# Patient Record
Sex: FEMALE | Race: WHITE | Employment: UNEMPLOYED | URBAN - METROPOLITAN AREA
[De-identification: names, ages, dates, MRNs, and addresses within clinical notes are randomized per-mention and may not be internally consistent; named-entity substitution may affect disease eponyms.]

---

## 2018-04-20 ENCOUNTER — APPOINTMENT (OUTPATIENT)
Dept: CT IMAGING | Age: 78
DRG: 149 | End: 2018-04-20
Payer: MEDICARE

## 2018-04-20 ENCOUNTER — HOSPITAL ENCOUNTER (INPATIENT)
Age: 78
LOS: 1 days | Discharge: HOME | DRG: 149 | End: 2018-04-23
Attending: STUDENT IN AN ORGANIZED HEALTH CARE EDUCATION/TRAINING PROGRAM | Admitting: INTERNAL MEDICINE
Payer: MEDICARE

## 2018-04-20 ENCOUNTER — APPOINTMENT (OUTPATIENT)
Dept: GENERAL RADIOLOGY | Age: 78
DRG: 149 | End: 2018-04-20
Payer: MEDICARE

## 2018-04-20 DIAGNOSIS — R11.2 INTRACTABLE VOMITING WITH NAUSEA, UNSPECIFIED VOMITING TYPE: ICD-10-CM

## 2018-04-20 DIAGNOSIS — H81.13 BENIGN PAROXYSMAL POSITIONAL VERTIGO DUE TO BILATERAL VESTIBULAR DISORDER: ICD-10-CM

## 2018-04-20 DIAGNOSIS — J18.9 PNEUMONIA OF RIGHT LOWER LOBE DUE TO INFECTIOUS ORGANISM: Primary | ICD-10-CM

## 2018-04-20 DIAGNOSIS — N18.9 CHRONIC KIDNEY DISEASE, UNSPECIFIED CKD STAGE: ICD-10-CM

## 2018-04-20 DIAGNOSIS — R63.8 POOR FLUID INTAKE: ICD-10-CM

## 2018-04-20 DIAGNOSIS — R42 DIZZINESS: ICD-10-CM

## 2018-04-20 LAB
ALBUMIN SERPL-MCNC: 4.4 G/DL (ref 3.9–4.9)
ALP BLD-CCNC: 79 U/L (ref 40–130)
ALT SERPL-CCNC: 13 U/L (ref 0–33)
ANION GAP SERPL CALCULATED.3IONS-SCNC: 17 MEQ/L (ref 7–13)
APTT: 25 SEC (ref 21.6–35.4)
AST SERPL-CCNC: 20 U/L (ref 0–35)
BASOPHILS ABSOLUTE: 0.1 K/UL (ref 0–0.2)
BASOPHILS RELATIVE PERCENT: 0.8 %
BILIRUB SERPL-MCNC: 0.3 MG/DL (ref 0–1.2)
BUN BLDV-MCNC: 16 MG/DL (ref 8–23)
C-REACTIVE PROTEIN, HIGH SENSITIVITY: 8.5 MG/L (ref 0–5)
CALCIUM SERPL-MCNC: 9.6 MG/DL (ref 8.6–10.2)
CHLORIDE BLD-SCNC: 99 MEQ/L (ref 98–107)
CO2: 22 MEQ/L (ref 22–29)
CREAT SERPL-MCNC: 0.69 MG/DL (ref 0.5–0.9)
EKG ATRIAL RATE: 78 BPM
EKG P AXIS: 37 DEGREES
EKG P-R INTERVAL: 162 MS
EKG Q-T INTERVAL: 408 MS
EKG QRS DURATION: 80 MS
EKG QTC CALCULATION (BAZETT): 465 MS
EKG R AXIS: 24 DEGREES
EKG T AXIS: 14 DEGREES
EKG VENTRICULAR RATE: 78 BPM
EOSINOPHILS ABSOLUTE: 0.3 K/UL (ref 0–0.7)
EOSINOPHILS RELATIVE PERCENT: 3.9 %
GFR AFRICAN AMERICAN: >60
GFR NON-AFRICAN AMERICAN: >60
GLOBULIN: 2.2 G/DL (ref 2.3–3.5)
GLUCOSE BLD-MCNC: 152 MG/DL (ref 74–109)
HCT VFR BLD CALC: 40.3 % (ref 37–47)
HEMOGLOBIN: 13.7 G/DL (ref 12–16)
INR BLD: 1
LACTIC ACID: 2.2 MMOL/L (ref 0.5–2.2)
LYMPHOCYTES ABSOLUTE: 3.4 K/UL (ref 1–4.8)
LYMPHOCYTES RELATIVE PERCENT: 45.3 %
MCH RBC QN AUTO: 31.2 PG (ref 27–31.3)
MCHC RBC AUTO-ENTMCNC: 34 % (ref 33–37)
MCV RBC AUTO: 91.8 FL (ref 82–100)
MONOCYTES ABSOLUTE: 0.6 K/UL (ref 0.2–0.8)
MONOCYTES RELATIVE PERCENT: 8.5 %
NEUTROPHILS ABSOLUTE: 3.2 K/UL (ref 1.4–6.5)
NEUTROPHILS RELATIVE PERCENT: 41.5 %
PDW BLD-RTO: 13.8 % (ref 11.5–14.5)
PLATELET # BLD: 242 K/UL (ref 130–400)
POTASSIUM SERPL-SCNC: 4.3 MEQ/L (ref 3.5–5.1)
PRO-BNP: 77 PG/ML
PROTHROMBIN TIME: 10 SEC (ref 9.6–12.3)
RBC # BLD: 4.39 M/UL (ref 4.2–5.4)
SODIUM BLD-SCNC: 138 MEQ/L (ref 132–144)
TOTAL CK: 131 U/L (ref 0–170)
TOTAL PROTEIN: 6.6 G/DL (ref 6.4–8.1)
TROPONIN: <0.01 NG/ML (ref 0–0.01)
WBC # BLD: 7.6 K/UL (ref 4.8–10.8)

## 2018-04-20 PROCEDURE — 83880 ASSAY OF NATRIURETIC PEPTIDE: CPT

## 2018-04-20 PROCEDURE — 6360000002 HC RX W HCPCS: Performed by: STUDENT IN AN ORGANIZED HEALTH CARE EDUCATION/TRAINING PROGRAM

## 2018-04-20 PROCEDURE — G0378 HOSPITAL OBSERVATION PER HR: HCPCS

## 2018-04-20 PROCEDURE — 85610 PROTHROMBIN TIME: CPT

## 2018-04-20 PROCEDURE — 86141 C-REACTIVE PROTEIN HS: CPT

## 2018-04-20 PROCEDURE — 82550 ASSAY OF CK (CPK): CPT

## 2018-04-20 PROCEDURE — 70450 CT HEAD/BRAIN W/O DYE: CPT

## 2018-04-20 PROCEDURE — 96375 TX/PRO/DX INJ NEW DRUG ADDON: CPT

## 2018-04-20 PROCEDURE — 96365 THER/PROPH/DIAG IV INF INIT: CPT

## 2018-04-20 PROCEDURE — 2580000003 HC RX 258: Performed by: STUDENT IN AN ORGANIZED HEALTH CARE EDUCATION/TRAINING PROGRAM

## 2018-04-20 PROCEDURE — 80053 COMPREHEN METABOLIC PANEL: CPT

## 2018-04-20 PROCEDURE — 96372 THER/PROPH/DIAG INJ SC/IM: CPT

## 2018-04-20 PROCEDURE — 96366 THER/PROPH/DIAG IV INF ADDON: CPT

## 2018-04-20 PROCEDURE — 85025 COMPLETE CBC W/AUTO DIFF WBC: CPT

## 2018-04-20 PROCEDURE — 36415 COLL VENOUS BLD VENIPUNCTURE: CPT

## 2018-04-20 PROCEDURE — 87040 BLOOD CULTURE FOR BACTERIA: CPT

## 2018-04-20 PROCEDURE — 6360000004 HC RX CONTRAST MEDICATION: Performed by: PHYSICIAN ASSISTANT

## 2018-04-20 PROCEDURE — 93005 ELECTROCARDIOGRAM TRACING: CPT

## 2018-04-20 PROCEDURE — 6370000000 HC RX 637 (ALT 250 FOR IP): Performed by: STUDENT IN AN ORGANIZED HEALTH CARE EDUCATION/TRAINING PROGRAM

## 2018-04-20 PROCEDURE — 99285 EMERGENCY DEPT VISIT HI MDM: CPT

## 2018-04-20 PROCEDURE — 84484 ASSAY OF TROPONIN QUANT: CPT

## 2018-04-20 PROCEDURE — 83605 ASSAY OF LACTIC ACID: CPT

## 2018-04-20 PROCEDURE — 71260 CT THORAX DX C+: CPT

## 2018-04-20 PROCEDURE — 71045 X-RAY EXAM CHEST 1 VIEW: CPT

## 2018-04-20 PROCEDURE — 85730 THROMBOPLASTIN TIME PARTIAL: CPT

## 2018-04-20 RX ORDER — DOCUSATE SODIUM 100 MG/1
100 CAPSULE, LIQUID FILLED ORAL 2 TIMES DAILY
Status: DISCONTINUED | OUTPATIENT
Start: 2018-04-20 | End: 2018-04-23 | Stop reason: HOSPADM

## 2018-04-20 RX ORDER — LORAZEPAM 2 MG/ML
3 INJECTION INTRAMUSCULAR
Status: DISCONTINUED | OUTPATIENT
Start: 2018-04-20 | End: 2018-04-23 | Stop reason: HOSPADM

## 2018-04-20 RX ORDER — ANASTROZOLE 1 MG/1
1 TABLET ORAL
COMMUNITY
Start: 2016-12-05

## 2018-04-20 RX ORDER — LORAZEPAM 1 MG/1
1 TABLET ORAL
Status: DISCONTINUED | OUTPATIENT
Start: 2018-04-20 | End: 2018-04-23 | Stop reason: HOSPADM

## 2018-04-20 RX ORDER — PROMETHAZINE HYDROCHLORIDE 25 MG/ML
25 INJECTION, SOLUTION INTRAMUSCULAR; INTRAVENOUS ONCE
Status: COMPLETED | OUTPATIENT
Start: 2018-04-20 | End: 2018-04-20

## 2018-04-20 RX ORDER — METOCLOPRAMIDE HYDROCHLORIDE 5 MG/ML
10 INJECTION INTRAMUSCULAR; INTRAVENOUS EVERY 6 HOURS
Status: DISCONTINUED | OUTPATIENT
Start: 2018-04-20 | End: 2018-04-23 | Stop reason: HOSPADM

## 2018-04-20 RX ORDER — LORAZEPAM 1 MG/1
2 TABLET ORAL
Status: DISCONTINUED | OUTPATIENT
Start: 2018-04-20 | End: 2018-04-23 | Stop reason: HOSPADM

## 2018-04-20 RX ORDER — 0.9 % SODIUM CHLORIDE 0.9 %
10 VIAL (ML) INJECTION DAILY
Status: DISCONTINUED | OUTPATIENT
Start: 2018-04-21 | End: 2018-04-23 | Stop reason: HOSPADM

## 2018-04-20 RX ORDER — ESOMEPRAZOLE MAGNESIUM 40 MG/1
CAPSULE, DELAYED RELEASE ORAL
COMMUNITY
Start: 2008-12-18

## 2018-04-20 RX ORDER — LORAZEPAM 2 MG/ML
2 INJECTION INTRAMUSCULAR
Status: DISCONTINUED | OUTPATIENT
Start: 2018-04-20 | End: 2018-04-23 | Stop reason: HOSPADM

## 2018-04-20 RX ORDER — LORAZEPAM 1 MG/1
3 TABLET ORAL
Status: DISCONTINUED | OUTPATIENT
Start: 2018-04-20 | End: 2018-04-23 | Stop reason: HOSPADM

## 2018-04-20 RX ORDER — ONDANSETRON 2 MG/ML
4 INJECTION INTRAMUSCULAR; INTRAVENOUS EVERY 6 HOURS PRN
Status: DISCONTINUED | OUTPATIENT
Start: 2018-04-20 | End: 2018-04-23 | Stop reason: HOSPADM

## 2018-04-20 RX ORDER — LOSARTAN POTASSIUM 50 MG/1
100 TABLET ORAL DAILY
Status: DISCONTINUED | OUTPATIENT
Start: 2018-04-21 | End: 2018-04-20

## 2018-04-20 RX ORDER — THIAMINE HYDROCHLORIDE 100 MG/ML
100 INJECTION, SOLUTION INTRAMUSCULAR; INTRAVENOUS DAILY
Status: DISCONTINUED | OUTPATIENT
Start: 2018-04-21 | End: 2018-04-21

## 2018-04-20 RX ORDER — LORAZEPAM 2 MG/ML
4 INJECTION INTRAMUSCULAR
Status: DISCONTINUED | OUTPATIENT
Start: 2018-04-20 | End: 2018-04-23 | Stop reason: HOSPADM

## 2018-04-20 RX ORDER — PRAVASTATIN SODIUM 20 MG
10 TABLET ORAL NIGHTLY
Status: DISCONTINUED | OUTPATIENT
Start: 2018-04-20 | End: 2018-04-23 | Stop reason: HOSPADM

## 2018-04-20 RX ORDER — SODIUM CHLORIDE 0.9 % (FLUSH) 0.9 %
10 SYRINGE (ML) INJECTION PRN
Status: DISCONTINUED | OUTPATIENT
Start: 2018-04-20 | End: 2018-04-23 | Stop reason: HOSPADM

## 2018-04-20 RX ORDER — MECLIZINE HYDROCHLORIDE 25 MG/1
25 TABLET ORAL ONCE
Status: COMPLETED | OUTPATIENT
Start: 2018-04-20 | End: 2018-04-20

## 2018-04-20 RX ORDER — ANASTROZOLE 1 MG/1
1 TABLET ORAL DAILY
Status: DISCONTINUED | OUTPATIENT
Start: 2018-04-21 | End: 2018-04-23 | Stop reason: HOSPADM

## 2018-04-20 RX ORDER — MECLIZINE HYDROCHLORIDE 25 MG/1
25 TABLET ORAL 3 TIMES DAILY
Status: DISCONTINUED | OUTPATIENT
Start: 2018-04-20 | End: 2018-04-23 | Stop reason: HOSPADM

## 2018-04-20 RX ORDER — LORAZEPAM 2 MG/ML
1 INJECTION INTRAMUSCULAR
Status: DISCONTINUED | OUTPATIENT
Start: 2018-04-20 | End: 2018-04-23 | Stop reason: HOSPADM

## 2018-04-20 RX ORDER — BENZONATATE 100 MG/1
100 CAPSULE ORAL 3 TIMES DAILY PRN
Status: DISCONTINUED | OUTPATIENT
Start: 2018-04-20 | End: 2018-04-23 | Stop reason: HOSPADM

## 2018-04-20 RX ORDER — FAMOTIDINE 20 MG/1
20 TABLET, FILM COATED ORAL DAILY
Status: DISCONTINUED | OUTPATIENT
Start: 2018-04-21 | End: 2018-04-23 | Stop reason: HOSPADM

## 2018-04-20 RX ORDER — PANTOPRAZOLE SODIUM 40 MG/10ML
40 INJECTION, POWDER, LYOPHILIZED, FOR SOLUTION INTRAVENOUS DAILY
Status: DISCONTINUED | OUTPATIENT
Start: 2018-04-21 | End: 2018-04-23 | Stop reason: HOSPADM

## 2018-04-20 RX ORDER — ACETAMINOPHEN 325 MG/1
650 TABLET ORAL EVERY 4 HOURS PRN
Status: DISCONTINUED | OUTPATIENT
Start: 2018-04-20 | End: 2018-04-23 | Stop reason: HOSPADM

## 2018-04-20 RX ORDER — FOLIC ACID 1 MG/1
1 TABLET ORAL DAILY
Status: DISCONTINUED | OUTPATIENT
Start: 2018-04-21 | End: 2018-04-23 | Stop reason: HOSPADM

## 2018-04-20 RX ORDER — HYDRALAZINE HYDROCHLORIDE 20 MG/ML
5 INJECTION INTRAMUSCULAR; INTRAVENOUS EVERY 6 HOURS PRN
Status: DISCONTINUED | OUTPATIENT
Start: 2018-04-20 | End: 2018-04-20

## 2018-04-20 RX ORDER — MULTIVITAMIN WITH FOLIC ACID 400 MCG
1 TABLET ORAL DAILY
Status: DISCONTINUED | OUTPATIENT
Start: 2018-04-21 | End: 2018-04-23 | Stop reason: HOSPADM

## 2018-04-20 RX ORDER — SODIUM CHLORIDE 0.9 % (FLUSH) 0.9 %
10 SYRINGE (ML) INJECTION EVERY 12 HOURS SCHEDULED
Status: DISCONTINUED | OUTPATIENT
Start: 2018-04-20 | End: 2018-04-23 | Stop reason: HOSPADM

## 2018-04-20 RX ORDER — LORAZEPAM 1 MG/1
4 TABLET ORAL
Status: DISCONTINUED | OUTPATIENT
Start: 2018-04-20 | End: 2018-04-23 | Stop reason: HOSPADM

## 2018-04-20 RX ORDER — ONDANSETRON 2 MG/ML
4 INJECTION INTRAMUSCULAR; INTRAVENOUS ONCE
Status: COMPLETED | OUTPATIENT
Start: 2018-04-20 | End: 2018-04-20

## 2018-04-20 RX ORDER — SODIUM CHLORIDE 9 MG/ML
INJECTION, SOLUTION INTRAVENOUS CONTINUOUS
Status: DISCONTINUED | OUTPATIENT
Start: 2018-04-20 | End: 2018-04-21

## 2018-04-20 RX ORDER — MELOXICAM 15 MG/1
15 TABLET ORAL
COMMUNITY

## 2018-04-20 RX ORDER — PRAVASTATIN SODIUM 10 MG
10 TABLET ORAL
COMMUNITY

## 2018-04-20 RX ORDER — LOSARTAN POTASSIUM AND HYDROCHLOROTHIAZIDE 12.5; 1 MG/1; MG/1
TABLET ORAL
COMMUNITY
Start: 2008-12-18

## 2018-04-20 RX ORDER — AMLODIPINE BESYLATE 5 MG/1
5 TABLET ORAL DAILY
Status: DISCONTINUED | OUTPATIENT
Start: 2018-04-21 | End: 2018-04-23 | Stop reason: HOSPADM

## 2018-04-20 RX ADMIN — PROMETHAZINE HYDROCHLORIDE 25 MG: 25 INJECTION INTRAMUSCULAR; INTRAVENOUS at 19:05

## 2018-04-20 RX ADMIN — IOPAMIDOL 75 ML: 755 INJECTION, SOLUTION INTRAVENOUS at 22:44

## 2018-04-20 RX ADMIN — CEFTRIAXONE SODIUM 1 G: 1 INJECTION, POWDER, FOR SOLUTION INTRAMUSCULAR; INTRAVENOUS at 19:18

## 2018-04-20 RX ADMIN — ONDANSETRON 4 MG: 2 INJECTION INTRAMUSCULAR; INTRAVENOUS at 18:20

## 2018-04-20 RX ADMIN — MECLIZINE HYDROCHLORIDE 25 MG: 25 TABLET ORAL at 19:21

## 2018-04-20 RX ADMIN — PROMETHAZINE HYDROCHLORIDE 25 MG: 25 INJECTION INTRAMUSCULAR; INTRAVENOUS at 17:36

## 2018-04-20 RX ADMIN — AZITHROMYCIN MONOHYDRATE 500 MG: 500 INJECTION, POWDER, LYOPHILIZED, FOR SOLUTION INTRAVENOUS at 20:17

## 2018-04-20 ASSESSMENT — ENCOUNTER SYMPTOMS
CHEST TIGHTNESS: 0
DIARRHEA: 0
COUGH: 0
SINUS PRESSURE: 0
BACK PAIN: 0
NAUSEA: 1
VOMITING: 1
ABDOMINAL PAIN: 0
TROUBLE SWALLOWING: 0
SHORTNESS OF BREATH: 0

## 2018-04-20 ASSESSMENT — PAIN SCALES - GENERAL: PAINLEVEL_OUTOF10: 0

## 2018-04-21 LAB
ANION GAP SERPL CALCULATED.3IONS-SCNC: 14 MEQ/L (ref 7–13)
BUN BLDV-MCNC: 13 MG/DL (ref 8–23)
CALCIUM SERPL-MCNC: 8.9 MG/DL (ref 8.6–10.2)
CHLORIDE BLD-SCNC: 100 MEQ/L (ref 98–107)
CK MB: 5.2 NG/ML (ref 0–3.8)
CK MB: 5.9 NG/ML (ref 0–3.8)
CK MB: 7.4 NG/ML (ref 0–3.8)
CO2: 26 MEQ/L (ref 22–29)
CREAT SERPL-MCNC: 0.62 MG/DL (ref 0.5–0.9)
CREATINE KINASE-MB INDEX: 3.9 % (ref 0–3.5)
CREATINE KINASE-MB INDEX: 4.1 % (ref 0–3.5)
CREATINE KINASE-MB INDEX: 4.3 % (ref 0–3.5)
FOLATE: 8.7 NG/ML (ref 7.3–26.1)
GFR AFRICAN AMERICAN: >60
GFR NON-AFRICAN AMERICAN: >60
GLUCOSE BLD-MCNC: 107 MG/DL (ref 74–109)
HBA1C MFR BLD: 6 % (ref 4.8–5.9)
HCT VFR BLD CALC: 36.7 % (ref 37–47)
HEMOGLOBIN: 12.7 G/DL (ref 12–16)
MAGNESIUM: 1.7 MG/DL (ref 1.7–2.3)
MCH RBC QN AUTO: 31.7 PG (ref 27–31.3)
MCHC RBC AUTO-ENTMCNC: 34.5 % (ref 33–37)
MCV RBC AUTO: 92.1 FL (ref 82–100)
PDW BLD-RTO: 13.5 % (ref 11.5–14.5)
PLATELET # BLD: 211 K/UL (ref 130–400)
POTASSIUM REFLEX MAGNESIUM: 3.5 MEQ/L (ref 3.5–5.1)
RBC # BLD: 3.99 M/UL (ref 4.2–5.4)
SODIUM BLD-SCNC: 140 MEQ/L (ref 132–144)
TOTAL CK: 134 U/L (ref 0–170)
TOTAL CK: 145 U/L (ref 0–170)
TOTAL CK: 172 U/L (ref 0–170)
TROPONIN: <0.01 NG/ML (ref 0–0.01)
TROPONIN: <0.01 NG/ML (ref 0–0.01)
TSH SERPL DL<=0.05 MIU/L-ACNC: 3.7 UIU/ML (ref 0.27–4.2)
VITAMIN B-12: 323 PG/ML (ref 232–1245)
VITAMIN D 25-HYDROXY: 22.4 NG/ML (ref 30–100)
WBC # BLD: 7.6 K/UL (ref 4.8–10.8)

## 2018-04-21 PROCEDURE — 96376 TX/PRO/DX INJ SAME DRUG ADON: CPT

## 2018-04-21 PROCEDURE — G8988 SELF CARE GOAL STATUS: HCPCS

## 2018-04-21 PROCEDURE — G8979 MOBILITY GOAL STATUS: HCPCS

## 2018-04-21 PROCEDURE — 83036 HEMOGLOBIN GLYCOSYLATED A1C: CPT

## 2018-04-21 PROCEDURE — 82607 VITAMIN B-12: CPT

## 2018-04-21 PROCEDURE — 87449 NOS EACH ORGANISM AG IA: CPT

## 2018-04-21 PROCEDURE — 84145 PROCALCITONIN (PCT): CPT

## 2018-04-21 PROCEDURE — 80048 BASIC METABOLIC PNL TOTAL CA: CPT

## 2018-04-21 PROCEDURE — 96375 TX/PRO/DX INJ NEW DRUG ADDON: CPT

## 2018-04-21 PROCEDURE — 97166 OT EVAL MOD COMPLEX 45 MIN: CPT

## 2018-04-21 PROCEDURE — G0378 HOSPITAL OBSERVATION PER HR: HCPCS

## 2018-04-21 PROCEDURE — 87899 AGENT NOS ASSAY W/OPTIC: CPT

## 2018-04-21 PROCEDURE — 6360000002 HC RX W HCPCS: Performed by: PHYSICIAN ASSISTANT

## 2018-04-21 PROCEDURE — 82746 ASSAY OF FOLIC ACID SERUM: CPT

## 2018-04-21 PROCEDURE — 96372 THER/PROPH/DIAG INJ SC/IM: CPT

## 2018-04-21 PROCEDURE — 36415 COLL VENOUS BLD VENIPUNCTURE: CPT

## 2018-04-21 PROCEDURE — 85027 COMPLETE CBC AUTOMATED: CPT

## 2018-04-21 PROCEDURE — G8978 MOBILITY CURRENT STATUS: HCPCS

## 2018-04-21 PROCEDURE — 84484 ASSAY OF TROPONIN QUANT: CPT

## 2018-04-21 PROCEDURE — 6370000000 HC RX 637 (ALT 250 FOR IP): Performed by: PHYSICIAN ASSISTANT

## 2018-04-21 PROCEDURE — 2580000003 HC RX 258: Performed by: PHYSICIAN ASSISTANT

## 2018-04-21 PROCEDURE — 82550 ASSAY OF CK (CPK): CPT

## 2018-04-21 PROCEDURE — 82306 VITAMIN D 25 HYDROXY: CPT

## 2018-04-21 PROCEDURE — 84443 ASSAY THYROID STIM HORMONE: CPT

## 2018-04-21 PROCEDURE — 83735 ASSAY OF MAGNESIUM: CPT

## 2018-04-21 PROCEDURE — C9113 INJ PANTOPRAZOLE SODIUM, VIA: HCPCS | Performed by: PHYSICIAN ASSISTANT

## 2018-04-21 PROCEDURE — 6370000000 HC RX 637 (ALT 250 FOR IP): Performed by: INTERNAL MEDICINE

## 2018-04-21 PROCEDURE — 97162 PT EVAL MOD COMPLEX 30 MIN: CPT

## 2018-04-21 PROCEDURE — G8987 SELF CARE CURRENT STATUS: HCPCS

## 2018-04-21 PROCEDURE — 82553 CREATINE MB FRACTION: CPT

## 2018-04-21 RX ORDER — AMLODIPINE BESYLATE 5 MG/1
2.5 TABLET ORAL DAILY
Qty: 30 TABLET | Refills: 3 | Status: SHIPPED | OUTPATIENT
Start: 2018-04-21

## 2018-04-21 RX ORDER — MECLIZINE HYDROCHLORIDE 25 MG/1
25 TABLET ORAL 2 TIMES DAILY PRN
Qty: 30 TABLET | Refills: 0 | Status: SHIPPED | OUTPATIENT
Start: 2018-04-21 | End: 2018-04-23

## 2018-04-21 RX ORDER — MECLIZINE HCL 12.5 MG/1
12.5 TABLET ORAL 3 TIMES DAILY PRN
Status: DISCONTINUED | OUTPATIENT
Start: 2018-04-21 | End: 2018-04-23

## 2018-04-21 RX ORDER — BENZONATATE 100 MG/1
100 CAPSULE ORAL 3 TIMES DAILY PRN
Qty: 10 CAPSULE | Refills: 0 | Status: SHIPPED | OUTPATIENT
Start: 2018-04-21 | End: 2018-04-28

## 2018-04-21 RX ADMIN — METOCLOPRAMIDE 10 MG: 5 INJECTION, SOLUTION INTRAMUSCULAR; INTRAVENOUS at 00:42

## 2018-04-21 RX ADMIN — PRAVASTATIN SODIUM 10 MG: 20 TABLET ORAL at 21:47

## 2018-04-21 RX ADMIN — THERA TABS 1 TABLET: TAB at 11:55

## 2018-04-21 RX ADMIN — METOCLOPRAMIDE 10 MG: 5 INJECTION, SOLUTION INTRAMUSCULAR; INTRAVENOUS at 11:54

## 2018-04-21 RX ADMIN — SODIUM CHLORIDE: 9 INJECTION, SOLUTION INTRAVENOUS at 00:42

## 2018-04-21 RX ADMIN — AMLODIPINE BESYLATE 5 MG: 5 TABLET ORAL at 11:55

## 2018-04-21 RX ADMIN — SODIUM CHLORIDE: 9 INJECTION, SOLUTION INTRAVENOUS at 10:23

## 2018-04-21 RX ADMIN — Medication 10 ML: at 11:58

## 2018-04-21 RX ADMIN — DOCUSATE SODIUM 100 MG: 100 CAPSULE, LIQUID FILLED ORAL at 11:56

## 2018-04-21 RX ADMIN — FAMOTIDINE 20 MG: 20 TABLET ORAL at 11:56

## 2018-04-21 RX ADMIN — PANTOPRAZOLE SODIUM 40 MG: 40 INJECTION, POWDER, FOR SOLUTION INTRAVENOUS at 11:50

## 2018-04-21 RX ADMIN — Medication 10 ML: at 21:47

## 2018-04-21 RX ADMIN — FOLIC ACID 1 MG: 1 TABLET ORAL at 11:55

## 2018-04-21 RX ADMIN — METOCLOPRAMIDE 10 MG: 5 INJECTION, SOLUTION INTRAMUSCULAR; INTRAVENOUS at 06:43

## 2018-04-21 RX ADMIN — MECLIZINE HYDROCHLORIDE 25 MG: 25 TABLET ORAL at 11:55

## 2018-04-21 RX ADMIN — ANASTROZOLE 1 MG: 1 TABLET ORAL at 11:56

## 2018-04-21 RX ADMIN — MECLIZINE HYDROCHLORIDE 25 MG: 25 TABLET ORAL at 21:47

## 2018-04-21 RX ADMIN — ENOXAPARIN SODIUM 40 MG: 40 INJECTION SUBCUTANEOUS at 11:55

## 2018-04-21 ASSESSMENT — PAIN SCALES - GENERAL
PAINLEVEL_OUTOF10: 0
PAINLEVEL_OUTOF10: 0

## 2018-04-22 ENCOUNTER — APPOINTMENT (OUTPATIENT)
Dept: MRI IMAGING | Age: 78
DRG: 149 | End: 2018-04-22
Payer: MEDICARE

## 2018-04-22 ENCOUNTER — APPOINTMENT (OUTPATIENT)
Dept: ULTRASOUND IMAGING | Age: 78
DRG: 149 | End: 2018-04-22
Payer: MEDICARE

## 2018-04-22 PROBLEM — F10.930 ALCOHOL WITHDRAWAL, UNCOMPLICATED (HCC): Status: ACTIVE | Noted: 2018-04-22

## 2018-04-22 LAB
L. PNEUMOPHILA SEROGP 1 UR AG: NEGATIVE
STREP PNEUMO AG, UR: NEGATIVE

## 2018-04-22 PROCEDURE — 6360000002 HC RX W HCPCS: Performed by: PHYSICIAN ASSISTANT

## 2018-04-22 PROCEDURE — 70547 MR ANGIOGRAPHY NECK W/O DYE: CPT

## 2018-04-22 PROCEDURE — 93880 EXTRACRANIAL BILAT STUDY: CPT

## 2018-04-22 PROCEDURE — 6370000000 HC RX 637 (ALT 250 FOR IP): Performed by: INTERNAL MEDICINE

## 2018-04-22 PROCEDURE — 70544 MR ANGIOGRAPHY HEAD W/O DYE: CPT

## 2018-04-22 PROCEDURE — 6370000000 HC RX 637 (ALT 250 FOR IP): Performed by: SPECIALIST

## 2018-04-22 PROCEDURE — 70553 MRI BRAIN STEM W/O & W/DYE: CPT

## 2018-04-22 PROCEDURE — 6370000000 HC RX 637 (ALT 250 FOR IP): Performed by: PHYSICIAN ASSISTANT

## 2018-04-22 PROCEDURE — 2580000003 HC RX 258: Performed by: INTERNAL MEDICINE

## 2018-04-22 PROCEDURE — 1210000000 HC MED SURG R&B

## 2018-04-22 PROCEDURE — 96372 THER/PROPH/DIAG INJ SC/IM: CPT

## 2018-04-22 PROCEDURE — 96376 TX/PRO/DX INJ SAME DRUG ADON: CPT

## 2018-04-22 PROCEDURE — 2580000003 HC RX 258: Performed by: PHYSICIAN ASSISTANT

## 2018-04-22 PROCEDURE — A9579 GAD-BASE MR CONTRAST NOS,1ML: HCPCS | Performed by: SPECIALIST

## 2018-04-22 PROCEDURE — 96375 TX/PRO/DX INJ NEW DRUG ADDON: CPT

## 2018-04-22 PROCEDURE — 6360000004 HC RX CONTRAST MEDICATION: Performed by: SPECIALIST

## 2018-04-22 PROCEDURE — C9113 INJ PANTOPRAZOLE SODIUM, VIA: HCPCS | Performed by: PHYSICIAN ASSISTANT

## 2018-04-22 RX ORDER — SODIUM CHLORIDE 9 MG/ML
INJECTION, SOLUTION INTRAVENOUS CONTINUOUS
Status: DISPENSED | OUTPATIENT
Start: 2018-04-22 | End: 2018-04-22

## 2018-04-22 RX ORDER — LORAZEPAM 2 MG/ML
1 INJECTION INTRAMUSCULAR ONCE
Status: COMPLETED | OUTPATIENT
Start: 2018-04-22 | End: 2018-04-22

## 2018-04-22 RX ADMIN — METOCLOPRAMIDE 10 MG: 5 INJECTION, SOLUTION INTRAMUSCULAR; INTRAVENOUS at 19:29

## 2018-04-22 RX ADMIN — METOCLOPRAMIDE 10 MG: 5 INJECTION, SOLUTION INTRAMUSCULAR; INTRAVENOUS at 00:45

## 2018-04-22 RX ADMIN — Medication 10 ML: at 08:55

## 2018-04-22 RX ADMIN — AMLODIPINE BESYLATE 5 MG: 5 TABLET ORAL at 08:54

## 2018-04-22 RX ADMIN — FOLIC ACID 1 MG: 1 TABLET ORAL at 08:54

## 2018-04-22 RX ADMIN — Medication 10 ML: at 22:05

## 2018-04-22 RX ADMIN — FAMOTIDINE 20 MG: 20 TABLET ORAL at 08:54

## 2018-04-22 RX ADMIN — PRAVASTATIN SODIUM 10 MG: 20 TABLET ORAL at 22:04

## 2018-04-22 RX ADMIN — LORAZEPAM 1 MG: 2 INJECTION, SOLUTION INTRAMUSCULAR; INTRAVENOUS at 10:11

## 2018-04-22 RX ADMIN — PANTOPRAZOLE SODIUM 40 MG: 40 INJECTION, POWDER, FOR SOLUTION INTRAVENOUS at 08:55

## 2018-04-22 RX ADMIN — ENOXAPARIN SODIUM 40 MG: 40 INJECTION SUBCUTANEOUS at 08:55

## 2018-04-22 RX ADMIN — METOCLOPRAMIDE 10 MG: 5 INJECTION, SOLUTION INTRAMUSCULAR; INTRAVENOUS at 13:18

## 2018-04-22 RX ADMIN — METOCLOPRAMIDE 10 MG: 5 INJECTION, SOLUTION INTRAMUSCULAR; INTRAVENOUS at 05:13

## 2018-04-22 RX ADMIN — VITAMIN D, TAB 1000IU (100/BT) 1000 UNITS: 25 TAB at 22:04

## 2018-04-22 RX ADMIN — MECLIZINE HYDROCHLORIDE 25 MG: 25 TABLET ORAL at 08:54

## 2018-04-22 RX ADMIN — GADOTERIDOL 17 ML: 279.3 INJECTION, SOLUTION INTRAVENOUS at 11:29

## 2018-04-22 RX ADMIN — MECLIZINE HYDROCHLORIDE 25 MG: 25 TABLET ORAL at 16:00

## 2018-04-22 RX ADMIN — THERA TABS 1 TABLET: TAB at 08:54

## 2018-04-22 RX ADMIN — MECLIZINE HYDROCHLORIDE 25 MG: 25 TABLET ORAL at 22:04

## 2018-04-22 RX ADMIN — ANASTROZOLE 1 MG: 1 TABLET ORAL at 13:17

## 2018-04-22 RX ADMIN — SODIUM CHLORIDE: 9 INJECTION, SOLUTION INTRAVENOUS at 13:12

## 2018-04-22 ASSESSMENT — ENCOUNTER SYMPTOMS
COUGH: 0
SHORTNESS OF BREATH: 0
DIARRHEA: 0
VOMITING: 0
NAUSEA: 0

## 2018-04-22 ASSESSMENT — PAIN SCALES - GENERAL: PAINLEVEL_OUTOF10: 0

## 2018-04-23 VITALS
OXYGEN SATURATION: 95 % | RESPIRATION RATE: 18 BRPM | TEMPERATURE: 97.8 F | BODY MASS INDEX: 36.23 KG/M2 | SYSTOLIC BLOOD PRESSURE: 150 MMHG | DIASTOLIC BLOOD PRESSURE: 66 MMHG | WEIGHT: 196.87 LBS | HEIGHT: 62 IN | HEART RATE: 86 BPM

## 2018-04-23 PROCEDURE — 6370000000 HC RX 637 (ALT 250 FOR IP): Performed by: SPECIALIST

## 2018-04-23 PROCEDURE — 6370000000 HC RX 637 (ALT 250 FOR IP): Performed by: PHYSICIAN ASSISTANT

## 2018-04-23 PROCEDURE — 97116 GAIT TRAINING THERAPY: CPT

## 2018-04-23 PROCEDURE — 6370000000 HC RX 637 (ALT 250 FOR IP): Performed by: INTERNAL MEDICINE

## 2018-04-23 PROCEDURE — 97110 THERAPEUTIC EXERCISES: CPT

## 2018-04-23 PROCEDURE — 2580000003 HC RX 258: Performed by: PHYSICIAN ASSISTANT

## 2018-04-23 PROCEDURE — C9113 INJ PANTOPRAZOLE SODIUM, VIA: HCPCS | Performed by: PHYSICIAN ASSISTANT

## 2018-04-23 PROCEDURE — 6360000002 HC RX W HCPCS: Performed by: PHYSICIAN ASSISTANT

## 2018-04-23 RX ORDER — MECLIZINE HYDROCHLORIDE 25 MG/1
25 TABLET ORAL 2 TIMES DAILY
Qty: 20 TABLET | Refills: 1 | Status: SHIPPED | OUTPATIENT
Start: 2018-04-23 | End: 2018-04-23

## 2018-04-23 RX ORDER — MECLIZINE HYDROCHLORIDE 25 MG/1
25 TABLET ORAL 2 TIMES DAILY
Qty: 20 TABLET | Refills: 1 | Status: SHIPPED | OUTPATIENT
Start: 2018-04-23 | End: 2018-05-03

## 2018-04-23 RX ADMIN — THERA TABS 1 TABLET: TAB at 10:06

## 2018-04-23 RX ADMIN — PANTOPRAZOLE SODIUM 40 MG: 40 INJECTION, POWDER, FOR SOLUTION INTRAVENOUS at 10:07

## 2018-04-23 RX ADMIN — METOCLOPRAMIDE 10 MG: 5 INJECTION, SOLUTION INTRAMUSCULAR; INTRAVENOUS at 00:48

## 2018-04-23 RX ADMIN — ENOXAPARIN SODIUM 40 MG: 40 INJECTION SUBCUTANEOUS at 10:07

## 2018-04-23 RX ADMIN — FAMOTIDINE 20 MG: 20 TABLET ORAL at 10:06

## 2018-04-23 RX ADMIN — METOCLOPRAMIDE 10 MG: 5 INJECTION, SOLUTION INTRAMUSCULAR; INTRAVENOUS at 06:27

## 2018-04-23 RX ADMIN — FOLIC ACID 1 MG: 1 TABLET ORAL at 10:06

## 2018-04-23 RX ADMIN — Medication 10 ML: at 10:07

## 2018-04-23 RX ADMIN — MECLIZINE HYDROCHLORIDE 25 MG: 25 TABLET ORAL at 10:06

## 2018-04-23 RX ADMIN — VITAMIN D, TAB 1000IU (100/BT) 1000 UNITS: 25 TAB at 10:06

## 2018-04-23 RX ADMIN — Medication 10 ML: at 10:21

## 2018-04-23 RX ADMIN — AMLODIPINE BESYLATE 5 MG: 5 TABLET ORAL at 10:06

## 2018-04-23 RX ADMIN — ANASTROZOLE 1 MG: 1 TABLET ORAL at 12:27

## 2018-04-23 ASSESSMENT — PAIN SCALES - GENERAL: PAINLEVEL_OUTOF10: 0

## 2018-04-24 LAB — PROCALCITONIN: <0.07 NG/ML

## 2018-04-25 LAB
BLOOD CULTURE, ROUTINE: NORMAL
CULTURE, BLOOD 2: NORMAL

## 2022-01-10 DIAGNOSIS — M17.12 PRIMARY OSTEOARTHRITIS OF LEFT KNEE: Primary | ICD-10-CM

## 2022-01-10 DIAGNOSIS — M17.11 PRIMARY OSTEOARTHRITIS OF RIGHT KNEE: ICD-10-CM

## 2022-01-14 ENCOUNTER — OFFICE VISIT (OUTPATIENT)
Dept: ORTHOPEDIC SURGERY | Age: 82
End: 2022-01-14
Payer: MEDICARE

## 2022-01-14 VITALS
TEMPERATURE: 97 F | WEIGHT: 182 LBS | HEART RATE: 86 BPM | OXYGEN SATURATION: 95 % | HEIGHT: 61 IN | BODY MASS INDEX: 34.36 KG/M2

## 2022-01-14 DIAGNOSIS — M17.12 PRIMARY OSTEOARTHRITIS OF LEFT KNEE: ICD-10-CM

## 2022-01-14 DIAGNOSIS — M17.11 PRIMARY OSTEOARTHRITIS OF RIGHT KNEE: Primary | ICD-10-CM

## 2022-01-14 PROCEDURE — G8484 FLU IMMUNIZE NO ADMIN: HCPCS | Performed by: ORTHOPAEDIC SURGERY

## 2022-01-14 PROCEDURE — G8427 DOCREV CUR MEDS BY ELIG CLIN: HCPCS | Performed by: ORTHOPAEDIC SURGERY

## 2022-01-14 PROCEDURE — 20610 DRAIN/INJ JOINT/BURSA W/O US: CPT | Performed by: ORTHOPAEDIC SURGERY

## 2022-01-14 PROCEDURE — 1090F PRES/ABSN URINE INCON ASSESS: CPT | Performed by: ORTHOPAEDIC SURGERY

## 2022-01-14 PROCEDURE — 4040F PNEUMOC VAC/ADMIN/RCVD: CPT | Performed by: ORTHOPAEDIC SURGERY

## 2022-01-14 PROCEDURE — 99204 OFFICE O/P NEW MOD 45 MIN: CPT | Performed by: ORTHOPAEDIC SURGERY

## 2022-01-14 PROCEDURE — G8417 CALC BMI ABV UP PARAM F/U: HCPCS | Performed by: ORTHOPAEDIC SURGERY

## 2022-01-14 PROCEDURE — 1036F TOBACCO NON-USER: CPT | Performed by: ORTHOPAEDIC SURGERY

## 2022-01-14 PROCEDURE — G8400 PT W/DXA NO RESULTS DOC: HCPCS | Performed by: ORTHOPAEDIC SURGERY

## 2022-01-14 PROCEDURE — 1123F ACP DISCUSS/DSCN MKR DOCD: CPT | Performed by: ORTHOPAEDIC SURGERY

## 2022-01-14 RX ORDER — TRIAMCINOLONE ACETONIDE 40 MG/ML
40 INJECTION, SUSPENSION INTRA-ARTICULAR; INTRAMUSCULAR ONCE
Status: COMPLETED | OUTPATIENT
Start: 2022-01-14 | End: 2022-01-14

## 2022-01-14 RX ORDER — LIDOCAINE HYDROCHLORIDE 10 MG/ML
8 INJECTION, SOLUTION INFILTRATION; PERINEURAL ONCE
Status: COMPLETED | OUTPATIENT
Start: 2022-01-14 | End: 2022-01-14

## 2022-01-14 RX ADMIN — LIDOCAINE HYDROCHLORIDE 8 ML: 10 INJECTION, SOLUTION INFILTRATION; PERINEURAL at 16:01

## 2022-01-14 RX ADMIN — TRIAMCINOLONE ACETONIDE 40 MG: 40 INJECTION, SUSPENSION INTRA-ARTICULAR; INTRAMUSCULAR at 16:00

## 2022-01-14 RX ADMIN — TRIAMCINOLONE ACETONIDE 40 MG: 40 INJECTION, SUSPENSION INTRA-ARTICULAR; INTRAMUSCULAR at 16:02

## 2022-01-14 RX ADMIN — LIDOCAINE HYDROCHLORIDE 8 ML: 10 INJECTION, SOLUTION INFILTRATION; PERINEURAL at 15:59

## 2022-01-14 ASSESSMENT — ENCOUNTER SYMPTOMS
COUGH: 0
CONSTIPATION: 0
EYE DISCHARGE: 0
EYE PAIN: 0
ABDOMINAL PAIN: 0
SHORTNESS OF BREATH: 0
DIARRHEA: 0
EYE ITCHING: 0

## 2022-01-14 NOTE — PROGRESS NOTES
Patient's name, date of birth, and allergies have been confirmed. Patient is aware that injection is to be given in bilateral knee. He/she is aware that they will be seeing Dr. Tri Morrell and the injection will be given by him.

## 2022-01-14 NOTE — PROGRESS NOTES
Patient ID:  Ann Crane is a 80 y.o. female who presents today for evaluation of bilateral knee pain. Injury: no  Metal Allergy: no    Location: bilateral knee pain, located on the medial, lateral and anterior aspect of the knee  Pain: yes; 7 on a scale of 1 to 10 on the right; 6 out of 10 on the left  Onset: Gradual  Duration: About a year on the right and 5 months on the left months  Frequency:  occurs daily  Quality: aching and boring   Swelling: patient notes intermittent swelling of the joint  Aggravating factors: weight bearing activity, standing and walking  Alleviating factors: removing weight from leg and rest  Mechanical symptoms: catching  Radiation: no    Activities: walking independently  Restriction:  decreased ambulatory tolerance  Progression:  worsening    Previous treatment:  anti-inflammatories  NSAIDs:  meloxicam  PT:  none    Medications:    Current Outpatient Medications on File Prior to Visit   Medication Sig Dispense Refill    amLODIPine (NORVASC) 5 MG tablet Take 0.5 tablets by mouth daily 30 tablet 3    anastrozole (ARIMIDEX) 1 MG tablet Take 1 mg by mouth      esomeprazole (NEXIUM) 40 MG delayed release capsule Take by mouth      losartan-hydrochlorothiazide (HYZAAR) 100-12.5 MG per tablet Take by mouth      pravastatin (PRAVACHOL) 10 MG tablet Take 10 mg by mouth      meloxicam (MOBIC) 15 MG tablet Take 15 mg by mouth       No current facility-administered medications on file prior to visit. Allergies:     Allergies   Allergen Reactions    Dorzolamide Other (See Comments)     Burning        Past Medical History:    Past Medical History:   Diagnosis Date    Breast cancer ([facility] [address]     right       Past Surgical History:    Past Surgical History:   Procedure Laterality Date    MASTECTOMY      right       Social History:    Social History     Socioeconomic History    Marital status:      Spouse name: Not on file    Number of children: Not on file    Years of education: Not on file    Highest education level: Not on file   Occupational History    Not on file   Tobacco Use    Smoking status: Never Smoker    Smokeless tobacco: Never Used   Substance and Sexual Activity    Alcohol use: Not on file    Drug use: Not on file    Sexual activity: Not on file   Other Topics Concern    Not on file   Social History Narrative    Not on file     Social Determinants of Health     Financial Resource Strain:     Difficulty of Paying Living Expenses: Not on file   Food Insecurity:     Worried About Running Out of Food in the Last Year: Not on file    Dayne of Food in the Last Year: Not on file   Transportation Needs:     Lack of Transportation (Medical): Not on file    Lack of Transportation (Non-Medical): Not on file   Physical Activity:     Days of Exercise per Week: Not on file    Minutes of Exercise per Session: Not on file   Stress:     Feeling of Stress : Not on file   Social Connections:     Frequency of Communication with Friends and Family: Not on file    Frequency of Social Gatherings with Friends and Family: Not on file    Attends Protestant Services: Not on file    Active Member of [address] [place] [address] or Organizations: Not on file    Attends Club or Organization Meetings: Not on file    Marital Status: Not on file   Intimate Partner Violence:     Fear of Current or Ex-Partner: Not on file    Emotionally Abused: Not on file    Physically Abused: Not on file    Sexually Abused: Not on file   Housing Stability:     Unable to Pay for Housing in the Last Year: Not on file    Number of [place] in the Last Year: Not on file    Unstable Housing in the Last Year: Not on file       Family History:    History reviewed. No pertinent family history. Occupation:  Accounting   - Occupational requirements:  Office work    Workers Compensation:  Have you missed work for this issue?  no  Is this issue being addressed under a worker's comp claim?   no    Review of Systems:    Review of Systems   Constitutional: Negative for activity change, appetite change and chills. HENT: Negative for congestion, ear pain and hearing loss. Eyes: Negative for pain, discharge and itching. Respiratory: Negative for cough and shortness of breath. Cardiovascular: Negative for chest pain and leg swelling. Gastrointestinal: Negative for abdominal pain, constipation and diarrhea. Endocrine: Negative for cold intolerance, heat intolerance and polydipsia. Genitourinary: Negative for difficulty urinating, flank pain and frequency. Skin: Negative for rash and wound. Allergic/Immunologic: Negative for environmental allergies and food allergies. Neurological: Negative for dizziness, seizures and syncope. Physical Exam:    Examination of the bilateral knee    Gait:  antalgic gait affecting right  Inspection:  neutral  Swelling:  none  Erythema:  none  Ecchymosis:  none  Effusion:  1+  Palpation:  Patient has pain on palpation over the distal medial femoral condyle of the right knee; she has pain on palpation over the distal medial and distal lateral femoral condyles of the left knee  Extension:  5  Flexion:  110  Strength:  5  Varus/Valgus Instability:  none  Anterior/Posterior Instability:  none  [place]:  negative  [place]ss[place]:  positive  Modified Apley:  positive  Lachman:  negative  Patellar compression:  negative  Neurological/Vascular:  Sensation grossly intact. Dorsalis pedis palpable and 2+    Radiographs:  Radiographs of the bilateral knee were personally reviewed, bilateral standing AP, bilateral notch, bilateral sunrise, lateral left knee and lateral right knee and they revealed:  no evidence of fracture and The patient has significant medial compartment osteoarthritis of the right knee, and significant lateral compartment osteoarthritis of the left knee.   She also has medial patellofemoral osteoarthritis on the left and lateral patellofemoral osteoarthritis on the right.    MRI: None    Diagnosis:   Diagnosis Orders   1. Primary osteoarthritis of right knee  LA ARTHROCENTESIS ASPIR&/INJ MAJOR JT/BURSA W/O US   2. Primary osteoarthritis of left knee  LA ARTHROCENTESIS ASPIR&/INJ MAJOR JT/BURSA W/O US       Plan:    Patient has osteoarthritis of the knees bilaterally. Treatment options were discussed. Patient's been taking meloxicam for upwards of 5 years. We decided together that it is time to change that. Were going to try Voltaren twice daily. Additionally, patient opted for bilateral knee intra-articular steroid injections. We discussed arthritis and its progressive nature. She will follow-up as needed. Time Out  [x] Patient Verified  [x] Site Verified  [x] Laterality Verified  [x] Procedure Verified    Before aspiration/injection risks/benefits of a cortisone injection including infection, local skin irritation, skin atrophy, continued pain/discomfort, elevated blood sugar, burning, failure to relieve pain, possible late infection were discussed with patient. Patient verbalized understanding and wanted to proceed with planned procedure. After prepping and draping the left knee in the usual sterile fashion, 1 cc of 40 mg/ml kenalog with 8 cc of 1% lidocaine were injected intra-articularly without any complications. Patient tolerated this well. After prepping and draping the right knee in the usual sterile fashion, 1 cc of 40 mg/ml kenalog with 8 cc of 1% lidocaine were injected intra-articularly without any complications. Patient tolerated this well. Post-procedure discomfort can be alleviated with additional medications/ice/elevation/rest over the first 24 hours as recommended. The patient tolerated the procedure well.     If fluid was aspirated it was sent for further studies  in sterile specimen container as ordered to the lab    Orders Placed This Encounter   Procedures    LA ARTHROCENTESIS ASPIR&/INJ MAJOR JT/BURSA W/O US    LA

## 2022-05-27 ENCOUNTER — OFFICE VISIT (OUTPATIENT)
Dept: ORTHOPEDIC SURGERY | Age: 82
End: 2022-05-27
Payer: MEDICARE

## 2022-05-27 VITALS
HEART RATE: 86 BPM | BODY MASS INDEX: 34.36 KG/M2 | OXYGEN SATURATION: 98 % | HEIGHT: 61 IN | TEMPERATURE: 97.6 F | WEIGHT: 182 LBS

## 2022-05-27 DIAGNOSIS — M17.12 PRIMARY OSTEOARTHRITIS OF LEFT KNEE: ICD-10-CM

## 2022-05-27 DIAGNOSIS — M17.11 PRIMARY OSTEOARTHRITIS OF RIGHT KNEE: Primary | ICD-10-CM

## 2022-05-27 PROCEDURE — 20610 DRAIN/INJ JOINT/BURSA W/O US: CPT | Performed by: ORTHOPAEDIC SURGERY

## 2022-05-27 RX ORDER — LIDOCAINE HYDROCHLORIDE 10 MG/ML
8 INJECTION, SOLUTION INFILTRATION; PERINEURAL ONCE
Status: COMPLETED | OUTPATIENT
Start: 2022-05-27 | End: 2022-05-27

## 2022-05-27 RX ORDER — TRIAMCINOLONE ACETONIDE 40 MG/ML
40 INJECTION, SUSPENSION INTRA-ARTICULAR; INTRAMUSCULAR ONCE
Status: COMPLETED | OUTPATIENT
Start: 2022-05-27 | End: 2022-05-27

## 2022-05-27 RX ADMIN — LIDOCAINE HYDROCHLORIDE 8 ML: 10 INJECTION, SOLUTION INFILTRATION; PERINEURAL at 09:51

## 2022-05-27 RX ADMIN — TRIAMCINOLONE ACETONIDE 40 MG: 40 INJECTION, SUSPENSION INTRA-ARTICULAR; INTRAMUSCULAR at 09:52

## 2022-05-27 RX ADMIN — LIDOCAINE HYDROCHLORIDE 8 ML: 10 INJECTION, SOLUTION INFILTRATION; PERINEURAL at 09:52

## 2022-05-27 RX ADMIN — TRIAMCINOLONE ACETONIDE 40 MG: 40 INJECTION, SUSPENSION INTRA-ARTICULAR; INTRAMUSCULAR at 09:54

## 2022-05-27 NOTE — PROGRESS NOTES
Patient's name, date of birth, and allergies have been confirmed. Patient is aware that injection is to be given in bilateral knee. He/she is aware that they will be seeing Dr. Suzanna Champion and the injection will be given by him.

## 2022-05-27 NOTE — PROGRESS NOTES
Patient ID:  Dia Davis is a 80 y.o. female who presents today for follow up of bilateral knee pain. He received bilateral knee intra-articular steroid injections on January 14. She reports that she has had about 3 weeks of return of pain. He is here today to discuss her treatment options. Injury: no    Location: bilateral knee pain, located on the medial and posterior aspect of the knee  Pain: yes; 7 on a scale of 1 to 10  Onset: gradual  Duration: 3 weeks  Frequency:  occurs daily  Quality: aching and boring   Swelling: patient notes intermittent swelling of the joint  Aggravating factors: weight bearing activity, standing and walking  Alleviating factors: removing weight from leg and rest  Mechanical symptoms: catching  Radiation: no    Activities: walking independently  Restriction:  decreased ambulatory tolerance  Progression:  worsening    Previous treatment:  steroid injection   NSAIDs:  Voltaren  PT:  none    Medications:    Current Outpatient Medications on File Prior to Visit   Medication Sig Dispense Refill    diclofenac (VOLTAREN) 50 MG EC tablet Take 1 tablet by mouth 2 times daily (with meals) 60 tablet 3    amLODIPine (NORVASC) 5 MG tablet Take 0.5 tablets by mouth daily 30 tablet 3    anastrozole (ARIMIDEX) 1 MG tablet Take 1 mg by mouth      esomeprazole (NEXIUM) 40 MG delayed release capsule Take by mouth      losartan-hydrochlorothiazide (HYZAAR) 100-12.5 MG per tablet Take by mouth      pravastatin (PRAVACHOL) 10 MG tablet Take 10 mg by mouth      meloxicam (MOBIC) 15 MG tablet Take 15 mg by mouth       No current facility-administered medications on file prior to visit. Allergies:     Allergies   Allergen Reactions    Dorzolamide Other (See Comments)     Burning        Physical Exam:    Examination of the bilateral knee    Gait:  antalgic gait affecting left  Inspection:  genu varus  Swelling:  none  Erythema:  none  Ecchymosis:  none  Effusion:  1+  Palpation:  distal medial femoral condyle, medial joint line and distal lateral femoral condyle  Extension:  5  Flexion:  110  Strength:  5  Varus/Valgus stability:  none  Anterior/Posterior Instability:  none  [place]:  negative  Thessaly:  Positive medially on the left  Modified Apley:  negative  Lachman:  negative  Patellar compression:  positive  Neurological/Vascular:  Sensation grossly intact. Dorsalis pedis palpable and 2+    Radiographs:  None today    MRI: None    Diagnosis:   Diagnosis Orders   1. Primary osteoarthritis of right knee  [place] ARTHROCENTESIS ASPIR&/INJ MAJOR JT/BURSA W/O US   2. Primary osteoarthritis of left knee  ME ARTHROCENTESIS ASPIR&/INJ MAJOR JT/BURSA W/O US       Plan:    The patient has osteoarthritis of the knees bilaterally. Treatment options were discussed. Patient is taking the Voltaren which she feels is helpful. We discussed repeat steroid injections. We discussed viscosupplementation. We discussed surgery. Patient had some questions. We answered all of her questions. Ultimately, the patient opted to try a repeat set of bilateral knee intra-articular steroid injections. We once again discussed arthritis and its progressive nature. She will follow-up as needed. Time Out  [x] Patient Verified  [x] Site Verified  [x] Laterality Verified  [x] Procedure Verified    Before aspiration/injection risks/benefits of a cortisone injection including infection, local skin irritation, skin atrophy, continued pain/discomfort, elevated blood sugar, burning, failure to relieve pain, possible late infection were discussed with patient. Patient verbalized understanding and wanted to proceed with planned procedure. After prepping and draping the left knee in the usual sterile fashion, 1 cc of 40 mg/ml kenalog with 8 cc of 1% lidocaine were injected intra-articularly without any complications. Patient tolerated this well.     After prepping and draping the right knee in the usual sterile fashion, 1 cc of 40 mg/ml kenalog with 8 cc of 1% lidocaine were injected intra-articularly without any complications. Patient tolerated this well. Post-procedure discomfort can be alleviated with additional medications/ice/elevation/rest over the first 24 hours as recommended. The patient tolerated the procedure well. If fluid was aspirated it was sent for further studies  in sterile specimen container as ordered to the lab    Orders Placed This Encounter   Procedures    [place] ARTHROCENTESIS ASPIR&/INJ MAJOR JT/BURSA W/O US    [place] ARTHROCENTESIS ASPIR&/INJ MAJOR JT/BURSA W/O US       Orders Placed This Encounter   Medications    lidocaine 1 % injection 8 mL    triamcinolone acetonide (KENALOG-40) injection 40 mg    lidocaine 1 % injection 8 mL    triamcinolone acetonide (KENALOG-40) injection 40 mg       Return if symptoms worsen or fail to improve.     Misty Freeman MD

## 2023-03-20 DIAGNOSIS — R94.4 DECREASED GFR: Primary | ICD-10-CM

## 2023-03-21 ENCOUNTER — OFFICE VISIT (OUTPATIENT)
Dept: ORTHOPEDIC SURGERY | Age: 83
End: 2023-03-21

## 2023-03-21 VITALS
BODY MASS INDEX: 34.36 KG/M2 | WEIGHT: 182 LBS | OXYGEN SATURATION: 96 % | HEIGHT: 61 IN | TEMPERATURE: 98.4 F | HEART RATE: 72 BPM

## 2023-03-21 DIAGNOSIS — M17.11 PRIMARY OSTEOARTHRITIS OF RIGHT KNEE: Primary | ICD-10-CM

## 2023-03-21 RX ORDER — METHYLPREDNISOLONE 4 MG/1
TABLET ORAL
Qty: 21 TABLET | Refills: 0 | Status: SHIPPED | OUTPATIENT
Start: 2023-03-21

## 2023-03-21 RX ORDER — HYLAN G-F 20 16MG/2ML
48 SYRINGE (ML) INTRAARTICULAR ONCE
Qty: 1 EACH | Refills: 0 | Status: SHIPPED | OUTPATIENT
Start: 2023-03-21 | End: 2023-03-21

## 2023-03-21 NOTE — PROGRESS NOTES
patella  Extension:  5  Flexion:  110  Strength:  5  Varus/Valgus stability:  none  Anterior/Posterior Instability:  none  [place]:  negative  Thessaly:  negative  Modified Apley:  positive  Lachman:  negative  Patellar compression:  positive  Neurological/Vascular:  Sensation grossly intact. Dorsalis pedis palpable and 2+    Radiographs:  None today    MRI: None    Diagnosis:   Diagnosis Orders   1. Primary osteoarthritis of right knee  [place] ARTHROCENTESIS ASPIR&/INJ MAJOR JT/BURSA W/O US          Plan:    Patient has osteoarthritis of the right knee. We sat down and discussed arthritis. We discussed its progressive nature. Treatment options were discussed. The patient feels that the steroid injection gave her only moderate relief. She wished to try something different. We discussed viscosupplementation and knee replacement. The patient does not wish to consider surgery. She opted to proceed with a course of viscosupplementation. She will follow-up as needed. Orders Placed This Encounter   Procedures    VA ARTHROCENTESIS ASPIR&/INJ MAJOR JT/BURSA W/O US       Orders Placed This Encounter   Medications    Hylan (SYNVISC ONE) 48 MG/6ML injection     Sig: Inject 6 mLs into the articular space once for 1 dose     Dispense:  1 each     Refill:  0    Hylan injection 48 mg    methylPREDNISolone (MEDROL DOSEPACK) 4 MG tablet     Sig: On days 1-6 take as directed on package for first 6-day course. Dispense:  21 tablet     Refill:  0       Return if symptoms worsen or fail to improve. Roxana Parekh MD     Time Out  [x] Patient Verified  [x] Site Verified  [x] Laterality Verified  [x] Procedure Verified    Before aspiration/injection risks/benefits of a gel injection including infection, local skin irritation, skin atrophy, continued pain/discomfort, elevated blood sugar, burning, failure to relieve pain, possible late infection were discussed with patient.    Patient verbalized understanding and wanted

## 2023-03-23 ENCOUNTER — TELEPHONE (OUTPATIENT)
Dept: PRIMARY CARE | Facility: CLINIC | Age: 83
End: 2023-03-23
Payer: MEDICARE

## 2023-03-23 NOTE — TELEPHONE ENCOUNTER
----- Message from Gerhard Roblero CMA sent at 3/23/2023  8:30 AM EDT -----  manda  ----- Message -----  From: Abraham Mohan DO  Sent: 3/20/2023   4:04 PM EDT  To: #    Patient's kidney function is still slightly decreased.  Her GFR is 54.  Her creatinine was normal this time but we need to monitor this and protect her kidneys

## 2023-05-24 ENCOUNTER — OFFICE VISIT (OUTPATIENT)
Dept: PRIMARY CARE | Facility: CLINIC | Age: 83
End: 2023-05-24
Payer: MEDICARE

## 2023-05-24 VITALS
HEART RATE: 98 BPM | WEIGHT: 175.38 LBS | RESPIRATION RATE: 16 BRPM | TEMPERATURE: 97.6 F | SYSTOLIC BLOOD PRESSURE: 161 MMHG | OXYGEN SATURATION: 93 % | DIASTOLIC BLOOD PRESSURE: 79 MMHG | BODY MASS INDEX: 32.08 KG/M2

## 2023-05-24 DIAGNOSIS — G89.29 CHRONIC RIGHT HIP PAIN: Primary | ICD-10-CM

## 2023-05-24 DIAGNOSIS — M79.661 PAIN IN RIGHT SHIN: ICD-10-CM

## 2023-05-24 DIAGNOSIS — M25.551 CHRONIC RIGHT HIP PAIN: Primary | ICD-10-CM

## 2023-05-24 PROCEDURE — 1159F MED LIST DOCD IN RCRD: CPT | Performed by: FAMILY MEDICINE

## 2023-05-24 PROCEDURE — 1036F TOBACCO NON-USER: CPT | Performed by: FAMILY MEDICINE

## 2023-05-24 PROCEDURE — 99213 OFFICE O/P EST LOW 20 MIN: CPT | Performed by: FAMILY MEDICINE

## 2023-05-24 RX ORDER — AMLODIPINE BESYLATE 2.5 MG/1
2.5 TABLET ORAL DAILY
COMMUNITY
End: 2024-02-20 | Stop reason: SDUPTHER

## 2023-05-24 RX ORDER — DICLOFENAC SODIUM 50 MG/1
1 TABLET, DELAYED RELEASE ORAL
COMMUNITY
Start: 2022-01-14 | End: 2023-12-18 | Stop reason: SDUPTHER

## 2023-05-24 RX ORDER — PRAVASTATIN SODIUM 10 MG/1
10 TABLET ORAL DAILY
COMMUNITY
End: 2024-02-20 | Stop reason: SDUPTHER

## 2023-05-24 RX ORDER — TRIAMCINOLONE ACETONIDE 1 MG/G
0.1 CREAM TOPICAL 2 TIMES DAILY
COMMUNITY
Start: 2022-11-02 | End: 2023-10-09 | Stop reason: ALTCHOICE

## 2023-05-24 RX ORDER — A/SINGAPORE/GP1908/2015 IVR-180 (AN A/MICHIGAN/45/2015 (H1N1)PDM09-LIKE VIRUS, A/HONG KONG/4801/2014, NYMC X-263B (H3N2) (AN A/HONG KONG/4801/2014-LIKE VIRUS), AND B/BRISBANE/60/2008, WILD TYPE (A B/BRISBANE/60/2008-LIKE VIRUS) 15; 15; 15 UG/.5ML; UG/.5ML; UG/.5ML
INJECTION, SUSPENSION INTRAMUSCULAR
COMMUNITY
Start: 2020-09-16 | End: 2023-10-09 | Stop reason: ALTCHOICE

## 2023-05-24 RX ORDER — ESOMEPRAZOLE MAGNESIUM 40 MG/1
40 CAPSULE, DELAYED RELEASE ORAL
COMMUNITY
Start: 2008-12-18

## 2023-05-24 RX ORDER — LOSARTAN POTASSIUM AND HYDROCHLOROTHIAZIDE 12.5; 1 MG/1; MG/1
1 TABLET ORAL DAILY
COMMUNITY
Start: 2008-12-18 | End: 2024-02-20 | Stop reason: SDUPTHER

## 2023-05-24 RX ORDER — BRIMONIDINE TARTRATE AND TIMOLOL MALEATE 2; 5 MG/ML; MG/ML
1 SOLUTION OPHTHALMIC EVERY 12 HOURS SCHEDULED
COMMUNITY
Start: 2021-02-18

## 2023-05-24 ASSESSMENT — ENCOUNTER SYMPTOMS
RESPIRATORY NEGATIVE: 1
CARDIOVASCULAR NEGATIVE: 1
LOSS OF SENSATION IN FEET: 0
OCCASIONAL FEELINGS OF UNSTEADINESS: 0
DEPRESSION: 0
LEG PAIN: 1
NUMBNESS: 1
ARTHRALGIAS: 1
CONSTITUTIONAL NEGATIVE: 1

## 2023-05-24 NOTE — PROGRESS NOTES
Subjective   Patient ID: Arabella Springer is a 82 y.o. female who presents for Leg Pain (Pt here today for ongoing pain in right leg. Pt also appears to have edema in both feet.) and Fall (Pt stated that she fell about 3 weeks ago. Pt stated that she fell on both knees and face. Pt has bruising to right knee).    Patient states she is always had right knee pain.  Has been bad.  They want to do replacement.  However her shins been bugging her for the last few months.  He had a right hip.  She did fall 3 weeks ago but she had the pain before.  No numbness tingling no redness no swelling, no injuries.  Is just in the mid shin again that radiates down.  No swelling, no bruising, she did bruise her knee.  That was weeks ago no loss of consciousness.  Patient does see Ortho    Leg Pain   Associated symptoms include numbness.   Fall  Associated symptoms include numbness.        Review of Systems   Constitutional: Negative.    Respiratory: Negative.     Cardiovascular: Negative.    Musculoskeletal:  Positive for arthralgias.   Neurological:  Positive for numbness.       Objective   /79 (BP Location: Left arm, Patient Position: Sitting)   Pulse 98   Temp 36.4 °C (97.6 °F) (Temporal)   Resp 16   Wt 79.5 kg (175 lb 6 oz)   SpO2 93%   BMI 32.08 kg/m²     Physical Exam  Constitutional:       Appearance: Normal appearance.   Cardiovascular:      Rate and Rhythm: Normal rate and regular rhythm.   Pulmonary:      Effort: Pulmonary effort is normal.      Breath sounds: Normal breath sounds.   Musculoskeletal:      Comments: Patient has tenderness along her right shin.  No bruising no swelling, neurovascular intact.  Also tenderness with hip testing.  Range of motion intact   Neurological:      Mental Status: She is alert.      Sensory: No sensory deficit.         Assessment/Plan   Problem List Items Addressed This Visit    None  Visit Diagnoses       Chronic right hip pain    -  Primary    Pain in right shin         Relevant Orders    XR tibia fibula right 2 views        Patient can follow-up with orthopedics.  We will get an x-ray of her hip and shin.  Patient may need an MRI.  If any increased symptoms worsening symptoms go to the ER if any numbness tingling swelling ER  Follow-up in 2 weeks

## 2023-06-05 ENCOUNTER — TELEPHONE (OUTPATIENT)
Dept: ORTHOPEDIC SURGERY | Age: 83
End: 2023-06-05

## 2023-06-06 ENCOUNTER — OFFICE VISIT (OUTPATIENT)
Dept: PRIMARY CARE | Facility: CLINIC | Age: 83
End: 2023-06-06
Payer: MEDICARE

## 2023-06-06 VITALS
DIASTOLIC BLOOD PRESSURE: 59 MMHG | WEIGHT: 172.38 LBS | RESPIRATION RATE: 16 BRPM | SYSTOLIC BLOOD PRESSURE: 95 MMHG | BODY MASS INDEX: 31.53 KG/M2 | HEART RATE: 86 BPM | OXYGEN SATURATION: 91 % | TEMPERATURE: 98.2 F

## 2023-06-06 DIAGNOSIS — J06.9 UPPER RESPIRATORY INFECTION, ACUTE: Primary | ICD-10-CM

## 2023-06-06 DIAGNOSIS — J40 BRONCHITIS: ICD-10-CM

## 2023-06-06 PROCEDURE — 1159F MED LIST DOCD IN RCRD: CPT

## 2023-06-06 PROCEDURE — 1036F TOBACCO NON-USER: CPT

## 2023-06-06 PROCEDURE — 99213 OFFICE O/P EST LOW 20 MIN: CPT

## 2023-06-06 RX ORDER — AZITHROMYCIN 250 MG/1
TABLET, FILM COATED ORAL
Qty: 6 TABLET | Refills: 0 | Status: SHIPPED | OUTPATIENT
Start: 2023-06-06 | End: 2023-06-11

## 2023-06-06 RX ORDER — FLUTICASONE PROPIONATE 50 MCG
1 SPRAY, SUSPENSION (ML) NASAL DAILY
Qty: 16 G | Refills: 5 | Status: SHIPPED | OUTPATIENT
Start: 2023-06-06 | End: 2023-10-09 | Stop reason: ALTCHOICE

## 2023-06-06 ASSESSMENT — ENCOUNTER SYMPTOMS
LOSS OF SENSATION IN FEET: 0
RHINORRHEA: 1
SINUS PAIN: 0
VOMITING: 0
COUGH: 1
EYE PAIN: 0
SHORTNESS OF BREATH: 0
NAUSEA: 0
EYE DISCHARGE: 0
DEPRESSION: 0
OCCASIONAL FEELINGS OF UNSTEADINESS: 0
SINUS PRESSURE: 0
CHILLS: 0
FEVER: 0
FATIGUE: 1
SORE THROAT: 1

## 2023-06-06 NOTE — ASSESSMENT & PLAN NOTE
Pt on day 10 of symptoms, pt has had historic issue with infections req zpaks previously. Given worsening of symptoms and PE pt to benefit from zpak for tx. Pt also to benefit from flonase for any post nasal drip component. Pt can use ocean mist spray after for comfort. Pt to call back should symptoms worsen or new concerning symptoms arise.

## 2023-06-06 NOTE — PROGRESS NOTES
Subjective   Arabella Springer is a 82 y.o. female who presents for Cough, Nasal Congestion, Sore Throat, and Foreign Body in Skin (Pt here today for cough, runny nose, sore throat at times, and to F/U for biopsy to lower right leg).  Cough  Associated symptoms include rhinorrhea and a sore throat. Pertinent negatives include no chills, ear pain, fever or shortness of breath.   Sore Throat   Associated symptoms include congestion and coughing. Pertinent negatives include no ear discharge, ear pain, shortness of breath or vomiting.     Pt has had a sore throat that started the day before memorial day. Last Wednesday she went to the [facility] and they prescribed her tessalon for her cough and claritin. The claritin helped with her congestion slightly. She is still feeling fatigued, has had a cough, it is much worse at night. She has coughed up colored phlegm once or twice. She did not have any fevers. She tested neg for covid, strep, and flu. She denies any facial fullness, ear pain, eye drainage. She did have pain with swallowing. Her symptoms have worsened since then, she feels like she is too fatigued to go anywhere and the cough is causing issues. She also had a biopsy of a mole of her RLE and was worried about an infection she wanted it to get looked it. Denies any warmth to touch or fevers however does have LLE pain. She also fell on both knees in May and had some swelling which has since resolved, did not have any xrays, is able to walk without difficulties.     Review of Systems   Constitutional:  Positive for fatigue. Negative for chills and fever.   HENT:  Positive for congestion, rhinorrhea and sore throat. Negative for ear discharge, ear pain, sinus pressure and sinus pain.    Eyes:  Negative for pain and discharge.   Respiratory:  Positive for cough. Negative for shortness of breath.    Gastrointestinal:  Negative for nausea and vomiting.       Objective   Physical  Exam  Constitutional:       Appearance: Normal appearance.   HENT:      Head: Normocephalic and atraumatic.      Right Ear: External ear normal.      Left Ear: External ear normal.      Nose: Congestion present.   Eyes:      Extraocular Movements: Extraocular movements intact.      Conjunctiva/sclera: Conjunctivae normal.   Cardiovascular:      Rate and Rhythm: Normal rate and regular rhythm.   Pulmonary:      Effort: Pulmonary effort is normal.      Breath sounds: Normal breath sounds.   Musculoskeletal:         General: Swelling present.      Comments: Swelling at arthritic baseline per pt   Neurological:      General: No focal deficit present.      Mental Status: She is alert and oriented to person, place, and time.   Psychiatric:         Mood and Affect: Mood normal.         Behavior: Behavior normal.         Assessment/Plan   Problem List Items Addressed This Visit          Respiratory    Bronchitis     Pt on day 10 of symptoms, pt has had historic issue with infections req zpaks previously. Given worsening of symptoms and PE pt to benefit from zpak for tx. Pt also to benefit from flonase for any post nasal drip component. Pt can use ocean mist spray after for comfort. Pt to call back should symptoms worsen or new concerning symptoms arise.         Relevant Medications    azithromycin (Zithromax) 250 mg tablet    fluticasone (Flonase) 50 mcg/actuation nasal spray    sodium chloride (Ocean) 0.65 % nasal spray       Infectious/Inflammatory    Upper respiratory infection, acute - Primary    Relevant Medications    azithromycin (Zithromax) 250 mg tablet    fluticasone (Flonase) 50 mcg/actuation nasal spray    sodium chloride (Ocean) 0.65 % nasal spray

## 2023-06-09 NOTE — PROGRESS NOTES
I reviewed with the resident the medical history and the resident’s findings on physical examination.  I discussed with the resident the patient’s diagnosis and concur with the treatment plan as documented in the resident note.     Daljit Gasca, DO

## 2023-08-30 ENCOUNTER — APPOINTMENT (OUTPATIENT)
Dept: PRIMARY CARE | Facility: CLINIC | Age: 83
End: 2023-08-30
Payer: MEDICARE

## 2023-09-29 DIAGNOSIS — C50.911 CARCINOMA OF RIGHT BREAST METASTATIC TO BONE (MULTI): Primary | ICD-10-CM

## 2023-09-29 DIAGNOSIS — M89.9 LYTIC BONE LESIONS ON XRAY: ICD-10-CM

## 2023-09-29 DIAGNOSIS — C79.51 CARCINOMA OF RIGHT BREAST METASTATIC TO BONE (MULTI): Primary | ICD-10-CM

## 2023-09-29 PROBLEM — M89.8X9 LYTIC BONE LESIONS ON XRAY: Status: ACTIVE | Noted: 2023-09-29

## 2023-09-29 PROBLEM — C50.919 BREAST CANCER METASTASIZED TO BONE (MULTI): Status: ACTIVE | Noted: 2023-09-29

## 2023-09-29 RX ORDER — FAMOTIDINE 10 MG/ML
20 INJECTION INTRAVENOUS ONCE AS NEEDED
Status: CANCELLED | OUTPATIENT
Start: 2023-11-10

## 2023-09-29 RX ORDER — HEPARIN SODIUM,PORCINE/PF 10 UNIT/ML
50 SYRINGE (ML) INTRAVENOUS AS NEEDED
Status: CANCELLED | OUTPATIENT
Start: 2023-09-29

## 2023-09-29 RX ORDER — EPINEPHRINE 0.3 MG/.3ML
0.3 INJECTION SUBCUTANEOUS EVERY 5 MIN PRN
Status: CANCELLED | OUTPATIENT
Start: 2023-11-10

## 2023-09-29 RX ORDER — FAMOTIDINE 10 MG/ML
20 INJECTION INTRAVENOUS ONCE AS NEEDED
Status: CANCELLED | OUTPATIENT
Start: 2023-10-20

## 2023-09-29 RX ORDER — EPINEPHRINE 0.3 MG/.3ML
0.3 INJECTION SUBCUTANEOUS EVERY 5 MIN PRN
Status: CANCELLED | OUTPATIENT
Start: 2023-10-20

## 2023-09-29 RX ORDER — ALBUTEROL SULFATE 0.83 MG/ML
3 SOLUTION RESPIRATORY (INHALATION) AS NEEDED
Status: CANCELLED | OUTPATIENT
Start: 2023-11-10

## 2023-09-29 RX ORDER — DIPHENHYDRAMINE HYDROCHLORIDE 50 MG/ML
50 INJECTION INTRAMUSCULAR; INTRAVENOUS AS NEEDED
Status: CANCELLED | OUTPATIENT
Start: 2023-11-10

## 2023-09-29 RX ORDER — PROCHLORPERAZINE MALEATE 10 MG
10 TABLET ORAL EVERY 6 HOURS PRN
Status: CANCELLED | OUTPATIENT
Start: 2023-10-20

## 2023-09-29 RX ORDER — DIPHENHYDRAMINE HYDROCHLORIDE 50 MG/ML
50 INJECTION INTRAMUSCULAR; INTRAVENOUS AS NEEDED
Status: CANCELLED | OUTPATIENT
Start: 2023-10-20

## 2023-09-29 RX ORDER — HEPARIN 100 UNIT/ML
500 SYRINGE INTRAVENOUS AS NEEDED
Status: CANCELLED | OUTPATIENT
Start: 2023-09-29

## 2023-09-29 RX ORDER — ALBUTEROL SULFATE 0.83 MG/ML
3 SOLUTION RESPIRATORY (INHALATION) AS NEEDED
Status: CANCELLED | OUTPATIENT
Start: 2023-10-20

## 2023-10-09 ENCOUNTER — OFFICE VISIT (OUTPATIENT)
Dept: PRIMARY CARE | Facility: CLINIC | Age: 83
End: 2023-10-09
Payer: MEDICARE

## 2023-10-09 VITALS
TEMPERATURE: 97.4 F | DIASTOLIC BLOOD PRESSURE: 74 MMHG | HEIGHT: 61 IN | OXYGEN SATURATION: 93 % | WEIGHT: 167 LBS | SYSTOLIC BLOOD PRESSURE: 139 MMHG | HEART RATE: 89 BPM | BODY MASS INDEX: 31.53 KG/M2 | RESPIRATION RATE: 18 BRPM

## 2023-10-09 DIAGNOSIS — K59.03 DRUG-INDUCED CONSTIPATION: ICD-10-CM

## 2023-10-09 DIAGNOSIS — Z00.00 ROUTINE GENERAL MEDICAL EXAMINATION AT HEALTH CARE FACILITY: Primary | ICD-10-CM

## 2023-10-09 DIAGNOSIS — C50.919 CARCINOMA OF BREAST METASTATIC TO BONE, UNSPECIFIED LATERALITY (MULTI): ICD-10-CM

## 2023-10-09 DIAGNOSIS — Z23 ENCOUNTER FOR IMMUNIZATION: ICD-10-CM

## 2023-10-09 DIAGNOSIS — N18.31 CHRONIC KIDNEY DISEASE (CKD) STAGE G3A/A2, MODERATELY DECREASED GLOMERULAR FILTRATION RATE (GFR) BETWEEN 45-59 ML/MIN/1.73 SQUARE METER AND ALBUMINURIA CREATININE RATIO BETWEEN 30-299 MG/G (C* (MULTI): ICD-10-CM

## 2023-10-09 DIAGNOSIS — F10.930 ALCOHOL WITHDRAWAL, UNCOMPLICATED (MULTI): ICD-10-CM

## 2023-10-09 DIAGNOSIS — M79.661 PAIN IN RIGHT SHIN: ICD-10-CM

## 2023-10-09 DIAGNOSIS — C79.51 CARCINOMA OF BREAST METASTATIC TO BONE, UNSPECIFIED LATERALITY (MULTI): ICD-10-CM

## 2023-10-09 PROBLEM — Z15.01 MONOALLELIC MUTATION OF CHEK2 GENE IN FEMALE PATIENT: Status: ACTIVE | Noted: 2023-10-09

## 2023-10-09 PROBLEM — H81.13 BPPV (BENIGN PAROXYSMAL POSITIONAL VERTIGO), BILATERAL: Status: ACTIVE | Noted: 2018-04-20

## 2023-10-09 PROBLEM — Z15.02 MONOALLELIC MUTATION OF CHEK2 GENE IN FEMALE PATIENT: Status: ACTIVE | Noted: 2023-10-09

## 2023-10-09 PROBLEM — Z15.89 MONOALLELIC MUTATION OF CHEK2 GENE IN FEMALE PATIENT: Status: ACTIVE | Noted: 2023-10-09

## 2023-10-09 PROBLEM — R25.2 LEG CRAMPS: Status: ACTIVE | Noted: 2023-10-09

## 2023-10-09 PROBLEM — H40.9 GLAUCOMA: Status: ACTIVE | Noted: 2023-10-09

## 2023-10-09 PROBLEM — G89.3 PAIN, CANCER: Status: ACTIVE | Noted: 2023-10-09

## 2023-10-09 PROBLEM — D64.9 ANEMIA: Status: ACTIVE | Noted: 2023-10-09

## 2023-10-09 PROBLEM — Z15.09 MONOALLELIC MUTATION OF CHEK2 GENE IN FEMALE PATIENT: Status: ACTIVE | Noted: 2023-10-09

## 2023-10-09 PROBLEM — M17.12 PRIMARY OSTEOARTHRITIS OF LEFT KNEE: Status: ACTIVE | Noted: 2023-04-11

## 2023-10-09 PROBLEM — Z15.02: Status: ACTIVE | Noted: 2023-10-09

## 2023-10-09 PROBLEM — Z15.09: Status: ACTIVE | Noted: 2023-10-09

## 2023-10-09 PROBLEM — Z15.89: Status: ACTIVE | Noted: 2023-10-09

## 2023-10-09 PROBLEM — D72.819 DECREASED WHITE BLOOD CELL COUNT: Status: ACTIVE | Noted: 2023-10-09

## 2023-10-09 PROBLEM — M85.80 OSTEOPENIA: Status: ACTIVE | Noted: 2023-10-09

## 2023-10-09 PROCEDURE — 90677 PCV20 VACCINE IM: CPT

## 2023-10-09 PROCEDURE — 3075F SYST BP GE 130 - 139MM HG: CPT

## 2023-10-09 PROCEDURE — 1170F FXNL STATUS ASSESSED: CPT

## 2023-10-09 PROCEDURE — 3078F DIAST BP <80 MM HG: CPT

## 2023-10-09 PROCEDURE — 90662 IIV NO PRSV INCREASED AG IM: CPT

## 2023-10-09 PROCEDURE — G0009 ADMIN PNEUMOCOCCAL VACCINE: HCPCS

## 2023-10-09 PROCEDURE — G0439 PPPS, SUBSEQ VISIT: HCPCS

## 2023-10-09 PROCEDURE — 1159F MED LIST DOCD IN RCRD: CPT

## 2023-10-09 PROCEDURE — 1160F RVW MEDS BY RX/DR IN RCRD: CPT

## 2023-10-09 PROCEDURE — 1036F TOBACCO NON-USER: CPT

## 2023-10-09 PROCEDURE — G0008 ADMIN INFLUENZA VIRUS VAC: HCPCS

## 2023-10-09 RX ORDER — PEMBROLIZUMAB 25 MG/ML
INJECTION, SOLUTION INTRAVENOUS ONCE
COMMUNITY
End: 2024-01-14 | Stop reason: WASHOUT

## 2023-10-09 RX ORDER — TRAMADOL HYDROCHLORIDE 50 MG/1
TABLET ORAL
COMMUNITY
Start: 2023-09-17

## 2023-10-09 RX ORDER — CALCIUM ACETATE 667 MG/1
1334 CAPSULE ORAL
COMMUNITY
End: 2023-11-29 | Stop reason: ALTCHOICE

## 2023-10-09 RX ORDER — POLYETHYLENE GLYCOL 3350 17 G/17G
17 POWDER, FOR SOLUTION ORAL DAILY
Qty: 30 PACKET | Refills: 0 | Status: SHIPPED | OUTPATIENT
Start: 2023-10-09 | End: 2023-11-08

## 2023-10-09 SDOH — ECONOMIC STABILITY: INCOME INSECURITY: IN THE LAST 12 MONTHS, WAS THERE A TIME WHEN YOU WERE NOT ABLE TO PAY THE MORTGAGE OR RENT ON TIME?: NO

## 2023-10-09 SDOH — HEALTH STABILITY: PHYSICAL HEALTH: ON AVERAGE, HOW MANY DAYS PER WEEK DO YOU ENGAGE IN MODERATE TO STRENUOUS EXERCISE (LIKE A BRISK WALK)?: 0 DAYS

## 2023-10-09 SDOH — ECONOMIC STABILITY: FOOD INSECURITY: WITHIN THE PAST 12 MONTHS, THE FOOD YOU BOUGHT JUST DIDN'T LAST AND YOU DIDN'T HAVE MONEY TO GET MORE.: NEVER TRUE

## 2023-10-09 SDOH — ECONOMIC STABILITY: FOOD INSECURITY: WITHIN THE PAST 12 MONTHS, YOU WORRIED THAT YOUR FOOD WOULD RUN OUT BEFORE YOU GOT MONEY TO BUY MORE.: NEVER TRUE

## 2023-10-09 SDOH — ECONOMIC STABILITY: TRANSPORTATION INSECURITY
IN THE PAST 12 MONTHS, HAS THE LACK OF TRANSPORTATION KEPT YOU FROM MEDICAL APPOINTMENTS OR FROM GETTING MEDICATIONS?: NO

## 2023-10-09 SDOH — ECONOMIC STABILITY: TRANSPORTATION INSECURITY
IN THE PAST 12 MONTHS, HAS LACK OF TRANSPORTATION KEPT YOU FROM MEETINGS, WORK, OR FROM GETTING THINGS NEEDED FOR DAILY LIVING?: NO

## 2023-10-09 SDOH — ECONOMIC STABILITY: HOUSING INSECURITY
IN THE LAST 12 MONTHS, WAS THERE A TIME WHEN YOU DID NOT HAVE A STEADY PLACE TO SLEEP OR SLEPT IN A SHELTER (INCLUDING NOW)?: NO

## 2023-10-09 SDOH — ECONOMIC STABILITY: GENERAL
WHICH OF THE FOLLOWING DO YOU KNOW HOW TO USE AND HAVE ACCESS TO EVERY DAY? (CHOOSE ALL THAT APPLY): DESKTOP COMPUTER, LAPTOP COMPUTER, OR TABLET WITH BROADBAND INTERNET CONNECTION;SMARTPHONE WITH CELLULAR DATA PLAN

## 2023-10-09 SDOH — HEALTH STABILITY: PHYSICAL HEALTH: ON AVERAGE, HOW MANY MINUTES DO YOU ENGAGE IN EXERCISE AT THIS LEVEL?: 0 MIN

## 2023-10-09 ASSESSMENT — SOCIAL DETERMINANTS OF HEALTH (SDOH)
IN THE PAST 12 MONTHS, HAS THE ELECTRIC, GAS, OIL, OR WATER COMPANY THREATENED TO SHUT OFF SERVICE IN YOUR HOME?: NO
HOW OFTEN DO YOU ATTENT MEETINGS OF THE CLUB OR ORGANIZATION YOU BELONG TO?: NEVER
HOW OFTEN DO YOU ATTEND CHURCH OR RELIGIOUS SERVICES?: NEVER
WITHIN THE LAST YEAR, HAVE YOU BEEN KICKED, HIT, SLAPPED, OR OTHERWISE PHYSICALLY HURT BY YOUR PARTNER OR EX-PARTNER?: NO
IN A TYPICAL WEEK, HOW MANY TIMES DO YOU TALK ON THE PHONE WITH FAMILY, FRIENDS, OR NEIGHBORS?: MORE THAN THREE TIMES A WEEK
WITHIN THE LAST YEAR, HAVE TO BEEN RAPED OR FORCED TO HAVE ANY KIND OF SEXUAL ACTIVITY BY YOUR PARTNER OR EX-PARTNER?: NO
HOW HARD IS IT FOR YOU TO PAY FOR THE VERY BASICS LIKE FOOD, HOUSING, MEDICAL CARE, AND HEATING?: NOT HARD AT ALL
HOW OFTEN DO YOU GET TOGETHER WITH FRIENDS OR RELATIVES?: ONCE A WEEK
WITHIN THE LAST YEAR, HAVE YOU BEEN AFRAID OF YOUR PARTNER OR EX-PARTNER?: NO
WITHIN THE LAST YEAR, HAVE YOU BEEN HUMILIATED OR EMOTIONALLY ABUSED IN OTHER WAYS BY YOUR PARTNER OR EX-PARTNER?: NO
DO YOU BELONG TO ANY CLUBS OR ORGANIZATIONS SUCH AS CHURCH GROUPS UNIONS, FRATERNAL OR ATHLETIC GROUPS, OR SCHOOL GROUPS?: NO

## 2023-10-09 ASSESSMENT — ENCOUNTER SYMPTOMS
FREQUENCY: 0
CONSTIPATION: 1
HEMATURIA: 0
RHINORRHEA: 0
EYE REDNESS: 0
LOSS OF SENSATION IN FEET: 0
DIARRHEA: 0
COUGH: 0
PALPITATIONS: 0
SORE THROAT: 0
DYSURIA: 0
NAUSEA: 1
ARTHRALGIAS: 1
MYALGIAS: 0
CHILLS: 0
SINUS PAIN: 0
FATIGUE: 0
HEADACHES: 0
ACTIVITY CHANGE: 0
SHORTNESS OF BREATH: 0
CHEST TIGHTNESS: 0
DIZZINESS: 0
EYE ITCHING: 0
LIGHT-HEADEDNESS: 0
NECK STIFFNESS: 0
ABDOMINAL DISTENTION: 0
SEIZURES: 0
FEVER: 0
OCCASIONAL FEELINGS OF UNSTEADINESS: 1
VOMITING: 0
STRIDOR: 0
EYE PAIN: 0
DEPRESSION: 0
TREMORS: 0
WHEEZING: 0
EYE DISCHARGE: 0
ABDOMINAL PAIN: 0
NECK PAIN: 0
WOUND: 0
UNEXPECTED WEIGHT CHANGE: 0
APPETITE CHANGE: 0
BACK PAIN: 0
SINUS PRESSURE: 0
NUMBNESS: 0
FACIAL SWELLING: 0

## 2023-10-09 ASSESSMENT — ACTIVITIES OF DAILY LIVING (ADL)
TAKING_MEDICATION: INDEPENDENT
DOING_HOUSEWORK: INDEPENDENT
MANAGING_FINANCES: INDEPENDENT
GROCERY_SHOPPING: INDEPENDENT
DRESSING: INDEPENDENT
BATHING: INDEPENDENT

## 2023-10-09 ASSESSMENT — LIFESTYLE VARIABLES
HOW OFTEN DO YOU HAVE A DRINK CONTAINING ALCOHOL: NEVER
SKIP TO QUESTIONS 9-10: 1
AUDIT-C TOTAL SCORE: 0
HOW MANY STANDARD DRINKS CONTAINING ALCOHOL DO YOU HAVE ON A TYPICAL DAY: PATIENT DOES NOT DRINK
HOW OFTEN DO YOU HAVE SIX OR MORE DRINKS ON ONE OCCASION: NEVER

## 2023-10-09 ASSESSMENT — PATIENT HEALTH QUESTIONNAIRE - PHQ9
1. LITTLE INTEREST OR PLEASURE IN DOING THINGS: NOT AT ALL
2. FEELING DOWN, DEPRESSED OR HOPELESS: NOT AT ALL
SUM OF ALL RESPONSES TO PHQ9 QUESTIONS 1 & 2: 0

## 2023-10-09 NOTE — PROGRESS NOTES
Subjective   Reason for Visit: Arabella Springer is an 82 y.o. female here for a Medicare Wellness visit.     Interval history:     Currently on Xgeva and Keytruda for metastatic breast cancer.  She is taking Tramadol for pain but feels that this makes her nauseous and constipated without helping her pain. She is currently taking Senna for the constipation. She was seen by orthopedic oncology who felt that her right lower leg pain is not caused by cancer mets.   She does report taking Diclofenac 50 mg BID for past three years despite decline in kidney function.    Past Medical, Surgical, and Family History reviewed and updated in chart.  Reviewed all medications by prescribing practitioner or clinical pharmacist (such as prescriptions, OTCs, herbal therapies and supplements) and documented in the medical record.      Specialists:   Dr. Mcfarland - Heme/onc   Optho - glaucoma     Screening:   Colonoscopy: 10 years ago, negative   Breast: S/p mastectomy   Pap: aged out     Immunizations:   Shingles: completed 2020  Pneumococcal: needs to be updated   Flu: needs to be updated  Td: needs to be updated       Patient Care Team:  Abraham Mohan DO as PCP - General  Abraham Mohan DO as PCP - [facility] Attributed Provider  Andrew Mcfarland MD as Consulting Physician (Hematology and Oncology)     Review of Systems   Constitutional:  Negative for activity change, appetite change, chills, fatigue, fever and unexpected weight change.   HENT:  Negative for dental problem, ear pain, facial swelling, rhinorrhea, sinus pressure, sinus pain, sneezing, sore throat and tinnitus.    Eyes:  Negative for pain, discharge, redness and itching.   Respiratory:  Negative for cough, chest tightness, shortness of breath, wheezing and stridor.    Cardiovascular:  Negative for chest pain, palpitations and leg swelling.   Gastrointestinal:  Positive for constipation and nausea. Negative for abdominal distention, abdominal pain, diarrhea and vomiting.  "  Genitourinary:  Negative for decreased urine volume, dysuria, enuresis, frequency, hematuria and urgency.   Musculoskeletal:  Positive for arthralgias. Negative for back pain, myalgias, neck pain and neck stiffness.   Skin:  Negative for pallor, rash and wound.   Neurological:  Negative for dizziness, tremors, seizures, light-headedness, numbness and headaches.       Objective   Vitals:  /74 (BP Location: Left arm, Patient Position: Sitting)   Pulse 89   Temp 36.3 °C (97.4 °F)   Resp 18   Ht 1.549 m (5' 1\")   Wt 75.8 kg (167 lb)   SpO2 93%   BMI 31.55 kg/m²       Physical Exam  Vitals and nursing note reviewed.   Constitutional:       General: She is not in acute distress.     Appearance: Normal appearance. She is not toxic-appearing.   HENT:      Head: Normocephalic and atraumatic.      Right Ear: External ear normal.      Left Ear: External ear normal.      Nose: Nose normal.      Mouth/Throat:      Mouth: Mucous membranes are moist.      Pharynx: No oropharyngeal exudate or posterior oropharyngeal erythema.   Eyes:      General: No scleral icterus.     Extraocular Movements: Extraocular movements intact.      Conjunctiva/sclera: Conjunctivae normal.      Pupils: Pupils are equal, round, and reactive to light.   Cardiovascular:      Rate and Rhythm: Normal rate and regular rhythm.      Pulses: Normal pulses.      Heart sounds: Normal heart sounds. No murmur heard.     No friction rub. No gallop.   Pulmonary:      Effort: Pulmonary effort is normal. No respiratory distress.      Breath sounds: Normal breath sounds. No stridor. No wheezing, rhonchi or rales.   Abdominal:      General: Abdomen is flat. Bowel sounds are normal. There is no distension.      Palpations: Abdomen is soft. There is no mass.      Tenderness: There is no abdominal tenderness. There is no guarding or rebound.      Hernia: No hernia is present.   Musculoskeletal:         General: No swelling, deformity or signs of injury. Normal " range of motion.      Cervical back: Normal range of motion and neck supple. No rigidity.      Right lower leg: No edema.      Left lower leg: No edema.   Lymphadenopathy:      Cervical: No cervical adenopathy.   Skin:     General: Skin is warm and dry.      Capillary Refill: Capillary refill takes less than 2 seconds.      Findings: No rash.   Neurological:      General: No focal deficit present.      Mental Status: She is alert and oriented to person, place, and time. Mental status is at baseline.      Cranial Nerves: No cranial nerve deficit.      Sensory: No sensory deficit.      Motor: No weakness.      Coordination: Coordination normal.   Psychiatric:         Mood and Affect: Mood normal.         Behavior: Behavior normal.         Assessment/Plan   Problem List Items Addressed This Visit    None  Visit Diagnoses       Routine general medical examination at health care facility    -  Primary    Relevant Orders    Lipid panel    Pain in right shin        Relevant Orders    FL pain management TC    Encounter for immunization        Drug-induced constipation        Relevant Medications    polyethylene glycol (Glycolax, Miralax) 17 gram packet    Chronic kidney disease (CKD) stage G3a/A2, moderately decreased glomerular filtration rate (GFR) between 45-59 mL/min/1.73 square meter and albuminuria creatinine ratio between  mg/g (CMS/[facility])                 Pneumococcal and flu shot updated today.  Patient does need tetanus updated but she would like to defer this for now.  Most of her screening lab work has been performed with oncology, but will add on lipid panel.  Discussed with the patient that NSAIDs could be worsening her kidney function.  She will use these sparingly and we will monitor.  Patient was also referred to pain management for her overall pain due to cancer metastases.  We did discuss a referral to palliative care for symptomatic treatment, however patient is resistant to this at this time.

## 2023-10-10 NOTE — PROGRESS NOTES
I saw and evaluated the patient. I personally obtained the key and critical portions of the history and physical exam or was physically present for key and critical portions performed by the resident/fellow. I reviewed the resident/fellow's documentation and discussed the patient with the resident/fellow. I agree with the resident/fellow's medical decision making as documented in the note.  Patient has no chest pain or shortness of breath.  Normal bowel bladder.  Patient has been taking her medications as prescribed.  She follows up with Dr. Mcfarland concerning her breast cancer.  Patient is trying to stay active.  Her medications appear to be working.  Has been getting her blood work.  Kidney function has been slightly off.  We will continue to monitor this.  We will get her lipid panel.  She can follow-up in 3 months.  If any chest pain shortness of breath any nausea, vomiting, diarrhea, any concerning symptoms notify the office or go to the ER  Agree with assessment and plan    Abraham Mohan, DO

## 2023-10-11 ENCOUNTER — TELEPHONE (OUTPATIENT)
Dept: HEMATOLOGY/ONCOLOGY | Facility: CLINIC | Age: 83
End: 2023-10-11
Payer: MEDICARE

## 2023-10-11 NOTE — TELEPHONE ENCOUNTER
Spoke with the patient. States she needs a cortisone shot and getting knee drained. Provided update from Dr. Mcfarland below. Pt was most happy and had no further questions or concerns at this time.

## 2023-10-18 PROBLEM — M19.90 ARTHRITIS: Status: ACTIVE | Noted: 2023-10-18

## 2023-10-19 ENCOUNTER — LAB (OUTPATIENT)
Dept: LAB | Facility: CLINIC | Age: 83
End: 2023-10-19
Payer: MEDICARE

## 2023-10-19 DIAGNOSIS — C79.51 CARCINOMA OF RIGHT BREAST METASTATIC TO BONE (MULTI): ICD-10-CM

## 2023-10-19 DIAGNOSIS — Z00.00 ROUTINE GENERAL MEDICAL EXAMINATION AT HEALTH CARE FACILITY: ICD-10-CM

## 2023-10-19 DIAGNOSIS — C50.911 CARCINOMA OF RIGHT BREAST METASTATIC TO BONE (MULTI): ICD-10-CM

## 2023-10-19 LAB
ALBUMIN SERPL BCP-MCNC: 4.2 G/DL (ref 3.4–5)
ALP SERPL-CCNC: 106 U/L (ref 33–136)
ALT SERPL W P-5'-P-CCNC: 17 U/L (ref 7–45)
ANION GAP SERPL CALC-SCNC: 14 MMOL/L (ref 10–20)
AST SERPL W P-5'-P-CCNC: 20 U/L (ref 9–39)
BASOPHILS # BLD AUTO: 0.01 X10*3/UL (ref 0–0.1)
BASOPHILS NFR BLD AUTO: 0.1 %
BILIRUB SERPL-MCNC: 0.5 MG/DL (ref 0–1.2)
BUN SERPL-MCNC: 31 MG/DL (ref 6–23)
CALCIUM SERPL-MCNC: 8.1 MG/DL (ref 8.6–10.3)
CANCER AG27-29 SERPL-ACNC: 586.8 U/ML (ref 0–38.6)
CHLORIDE SERPL-SCNC: 103 MMOL/L (ref 98–107)
CHOLEST SERPL-MCNC: 188 MG/DL (ref 0–199)
CHOLESTEROL/HDL RATIO: 2.7
CO2 SERPL-SCNC: 23 MMOL/L (ref 21–32)
CORTIS AM PEAK SERPL-MSCNC: 0.8 UG/DL (ref 5–20)
CREAT SERPL-MCNC: 0.99 MG/DL (ref 0.5–1.05)
EOSINOPHIL # BLD AUTO: 0.01 X10*3/UL (ref 0–0.4)
EOSINOPHIL NFR BLD AUTO: 0.1 %
ERYTHROCYTE [DISTWIDTH] IN BLOOD BY AUTOMATED COUNT: 14.1 % (ref 11.5–14.5)
GFR SERPL CREATININE-BSD FRML MDRD: 58 ML/MIN/1.73M*2
GLUCOSE SERPL-MCNC: 116 MG/DL (ref 74–99)
HCT VFR BLD AUTO: 31.7 % (ref 36–46)
HDLC SERPL-MCNC: 69.2 MG/DL
HGB BLD-MCNC: 10.1 G/DL (ref 12–16)
IMM GRANULOCYTES # BLD AUTO: 0.2 X10*3/UL (ref 0–0.5)
IMM GRANULOCYTES NFR BLD AUTO: 2.1 % (ref 0–0.9)
LDLC SERPL CALC-MCNC: 106 MG/DL
LYMPHOCYTES # BLD AUTO: 4.97 X10*3/UL (ref 0.8–3)
LYMPHOCYTES NFR BLD AUTO: 53.3 %
MCH RBC QN AUTO: 30.9 PG (ref 26–34)
MCHC RBC AUTO-ENTMCNC: 31.9 G/DL (ref 32–36)
MCV RBC AUTO: 97 FL (ref 80–100)
MONOCYTES # BLD AUTO: 0.53 X10*3/UL (ref 0.05–0.8)
MONOCYTES NFR BLD AUTO: 5.7 %
NEUTROPHILS # BLD AUTO: 3.6 X10*3/UL (ref 1.6–5.5)
NEUTROPHILS NFR BLD AUTO: 38.7 %
NON HDL CHOLESTEROL: 119 MG/DL (ref 0–149)
PLATELET # BLD AUTO: 227 X10*3/UL (ref 150–450)
PMV BLD AUTO: 9.2 FL (ref 7.5–11.5)
POTASSIUM SERPL-SCNC: 4.3 MMOL/L (ref 3.5–5.3)
PROT SERPL-MCNC: 6.9 G/DL (ref 6.4–8.2)
RBC # BLD AUTO: 3.27 X10*6/UL (ref 4–5.2)
SODIUM SERPL-SCNC: 136 MMOL/L (ref 136–145)
T4 FREE SERPL-MCNC: 0.96 NG/DL (ref 0.61–1.12)
TRIGL SERPL-MCNC: 65 MG/DL (ref 0–149)
TSH SERPL-ACNC: 5.42 MIU/L (ref 0.44–3.98)
VLDL: 13 MG/DL (ref 0–40)
WBC # BLD AUTO: 9.3 X10*3/UL (ref 4.4–11.3)

## 2023-10-19 PROCEDURE — 80061 LIPID PANEL: CPT | Performed by: FAMILY MEDICINE

## 2023-10-19 PROCEDURE — 82024 ASSAY OF ACTH: CPT

## 2023-10-19 PROCEDURE — 80053 COMPREHEN METABOLIC PANEL: CPT

## 2023-10-19 PROCEDURE — 84439 ASSAY OF FREE THYROXINE: CPT | Performed by: INTERNAL MEDICINE

## 2023-10-19 PROCEDURE — 85025 COMPLETE CBC W/AUTO DIFF WBC: CPT

## 2023-10-19 PROCEDURE — 86300 IMMUNOASSAY TUMOR CA 15-3: CPT | Mod: CMCLAB | Performed by: INTERNAL MEDICINE

## 2023-10-19 PROCEDURE — 82533 TOTAL CORTISOL: CPT | Performed by: INTERNAL MEDICINE

## 2023-10-19 PROCEDURE — 84443 ASSAY THYROID STIM HORMONE: CPT | Performed by: INTERNAL MEDICINE

## 2023-10-19 PROCEDURE — 36415 COLL VENOUS BLD VENIPUNCTURE: CPT

## 2023-10-20 ENCOUNTER — OFFICE VISIT (OUTPATIENT)
Dept: HEMATOLOGY/ONCOLOGY | Facility: CLINIC | Age: 83
End: 2023-10-20
Payer: MEDICARE

## 2023-10-20 ENCOUNTER — INFUSION (OUTPATIENT)
Dept: HEMATOLOGY/ONCOLOGY | Facility: CLINIC | Age: 83
End: 2023-10-20
Payer: MEDICARE

## 2023-10-20 VITALS
DIASTOLIC BLOOD PRESSURE: 65 MMHG | OXYGEN SATURATION: 96 % | SYSTOLIC BLOOD PRESSURE: 138 MMHG | RESPIRATION RATE: 18 BRPM | BODY MASS INDEX: 31.08 KG/M2 | WEIGHT: 164.46 LBS | TEMPERATURE: 97.5 F | HEART RATE: 91 BPM

## 2023-10-20 DIAGNOSIS — H40.9 GLAUCOMA OF BOTH EYES, UNSPECIFIED GLAUCOMA TYPE: ICD-10-CM

## 2023-10-20 DIAGNOSIS — C79.51 CARCINOMA OF RIGHT BREAST METASTATIC TO BONE (MULTI): ICD-10-CM

## 2023-10-20 DIAGNOSIS — C50.911 CARCINOMA OF RIGHT BREAST METASTATIC TO BONE (MULTI): ICD-10-CM

## 2023-10-20 DIAGNOSIS — E78.2 MIXED HYPERLIPIDEMIA: ICD-10-CM

## 2023-10-20 DIAGNOSIS — C50.911 CARCINOMA OF RIGHT BREAST METASTATIC TO BONE (MULTI): Primary | ICD-10-CM

## 2023-10-20 DIAGNOSIS — I10 ESSENTIAL HYPERTENSION: ICD-10-CM

## 2023-10-20 DIAGNOSIS — M89.9 LYTIC BONE LESIONS ON XRAY: ICD-10-CM

## 2023-10-20 DIAGNOSIS — M19.90 ARTHRITIS: ICD-10-CM

## 2023-10-20 DIAGNOSIS — C79.51 CARCINOMA OF RIGHT BREAST METASTATIC TO BONE (MULTI): Primary | ICD-10-CM

## 2023-10-20 DIAGNOSIS — K21.00 GASTROESOPHAGEAL REFLUX DISEASE WITH ESOPHAGITIS WITHOUT HEMORRHAGE: ICD-10-CM

## 2023-10-20 DIAGNOSIS — D61.82 MYELOPHTHISIC ANEMIA (MULTI): ICD-10-CM

## 2023-10-20 PROCEDURE — 99214 OFFICE O/P EST MOD 30 MIN: CPT | Mod: 25 | Performed by: INTERNAL MEDICINE

## 2023-10-20 PROCEDURE — 1159F MED LIST DOCD IN RCRD: CPT | Performed by: INTERNAL MEDICINE

## 2023-10-20 PROCEDURE — 96413 CHEMO IV INFUSION 1 HR: CPT

## 2023-10-20 PROCEDURE — 2500000004 HC RX 250 GENERAL PHARMACY W/ HCPCS (ALT 636 FOR OP/ED): Performed by: INTERNAL MEDICINE

## 2023-10-20 PROCEDURE — 3075F SYST BP GE 130 - 139MM HG: CPT | Performed by: INTERNAL MEDICINE

## 2023-10-20 PROCEDURE — 99214 OFFICE O/P EST MOD 30 MIN: CPT | Performed by: INTERNAL MEDICINE

## 2023-10-20 PROCEDURE — 1036F TOBACCO NON-USER: CPT | Performed by: INTERNAL MEDICINE

## 2023-10-20 PROCEDURE — 1126F AMNT PAIN NOTED NONE PRSNT: CPT | Performed by: INTERNAL MEDICINE

## 2023-10-20 PROCEDURE — 1160F RVW MEDS BY RX/DR IN RCRD: CPT | Performed by: INTERNAL MEDICINE

## 2023-10-20 PROCEDURE — 3078F DIAST BP <80 MM HG: CPT | Performed by: INTERNAL MEDICINE

## 2023-10-20 RX ORDER — FAMOTIDINE 10 MG/ML
20 INJECTION INTRAVENOUS ONCE AS NEEDED
Status: CANCELLED | OUTPATIENT
Start: 2023-11-10

## 2023-10-20 RX ORDER — EPINEPHRINE 0.3 MG/.3ML
0.3 INJECTION SUBCUTANEOUS EVERY 5 MIN PRN
Status: DISCONTINUED | OUTPATIENT
Start: 2023-10-20 | End: 2023-10-20 | Stop reason: HOSPADM

## 2023-10-20 RX ORDER — FAMOTIDINE 10 MG/ML
20 INJECTION INTRAVENOUS ONCE AS NEEDED
Status: DISCONTINUED | OUTPATIENT
Start: 2023-10-20 | End: 2023-10-20 | Stop reason: HOSPADM

## 2023-10-20 RX ORDER — ALBUTEROL SULFATE 0.83 MG/ML
3 SOLUTION RESPIRATORY (INHALATION) AS NEEDED
Status: CANCELLED | OUTPATIENT
Start: 2023-11-10

## 2023-10-20 RX ORDER — HEPARIN 100 UNIT/ML
500 SYRINGE INTRAVENOUS AS NEEDED
OUTPATIENT
Start: 2023-10-20

## 2023-10-20 RX ORDER — DIPHENHYDRAMINE HYDROCHLORIDE 50 MG/ML
50 INJECTION INTRAMUSCULAR; INTRAVENOUS AS NEEDED
Status: DISCONTINUED | OUTPATIENT
Start: 2023-10-20 | End: 2023-10-20 | Stop reason: HOSPADM

## 2023-10-20 RX ORDER — ALBUTEROL SULFATE 0.83 MG/ML
3 SOLUTION RESPIRATORY (INHALATION) AS NEEDED
Status: DISCONTINUED | OUTPATIENT
Start: 2023-10-20 | End: 2023-10-20 | Stop reason: HOSPADM

## 2023-10-20 RX ORDER — DIPHENHYDRAMINE HYDROCHLORIDE 50 MG/ML
50 INJECTION INTRAMUSCULAR; INTRAVENOUS AS NEEDED
Status: CANCELLED | OUTPATIENT
Start: 2023-11-10

## 2023-10-20 RX ORDER — PROCHLORPERAZINE MALEATE 10 MG
10 TABLET ORAL EVERY 6 HOURS PRN
Status: DISCONTINUED | OUTPATIENT
Start: 2023-10-20 | End: 2023-10-20 | Stop reason: HOSPADM

## 2023-10-20 RX ORDER — PROCHLORPERAZINE MALEATE 10 MG
10 TABLET ORAL EVERY 6 HOURS PRN
Status: CANCELLED | OUTPATIENT
Start: 2023-11-10

## 2023-10-20 RX ORDER — EPINEPHRINE 0.3 MG/.3ML
0.3 INJECTION SUBCUTANEOUS EVERY 5 MIN PRN
Status: CANCELLED | OUTPATIENT
Start: 2023-11-10

## 2023-10-20 RX ORDER — HEPARIN SODIUM,PORCINE/PF 10 UNIT/ML
50 SYRINGE (ML) INTRAVENOUS AS NEEDED
OUTPATIENT
Start: 2023-10-20

## 2023-10-20 RX ADMIN — SODIUM CHLORIDE 200 MG: 9 INJECTION, SOLUTION INTRAVENOUS at 14:37

## 2023-10-20 ASSESSMENT — PAIN SCALES - GENERAL: PAINLEVEL: 0-NO PAIN

## 2023-10-20 ASSESSMENT — ENCOUNTER SYMPTOMS
DEPRESSION: 0
LOSS OF SENSATION IN FEET: 0
OCCASIONAL FEELINGS OF UNSTEADINESS: 1

## 2023-10-20 ASSESSMENT — PATIENT HEALTH QUESTIONNAIRE - PHQ9
1. LITTLE INTEREST OR PLEASURE IN DOING THINGS: NOT AT ALL
SUM OF ALL RESPONSES TO PHQ9 QUESTIONS 1 AND 2: 0
2. FEELING DOWN, DEPRESSED OR HOPELESS: NOT AT ALL

## 2023-10-20 ASSESSMENT — COLUMBIA-SUICIDE SEVERITY RATING SCALE - C-SSRS
1. IN THE PAST MONTH, HAVE YOU WISHED YOU WERE DEAD OR WISHED YOU COULD GO TO SLEEP AND NOT WAKE UP?: NO
2. HAVE YOU ACTUALLY HAD ANY THOUGHTS OF KILLING YOURSELF?: NO
6. HAVE YOU EVER DONE ANYTHING, STARTED TO DO ANYTHING, OR PREPARED TO DO ANYTHING TO END YOUR LIFE?: NO

## 2023-10-20 NOTE — SIGNIFICANT EVENT
10/20/23 1356   Prechemo Checklist   Has the patient been in the hospital, ED, or urgent care since last date of service No   Chemo/Immuno Consent Signed Yes   Protocol/Indications Verified Yes   Confirmed to previous date/time of medication Yes   Compared to previous dose Yes   All medications are dated accurately Yes   Pregnancy Test Negative Not applicable   Parameters Met Yes   BSA/Weight-Height Verified Yes   Dose Calculations Verified Yes

## 2023-10-20 NOTE — PROGRESS NOTES
"Patient ID: Arabella Springer is a 82 y.o. female.  Referring Physician: Andrew Mcfarland MD  [address]  [place] [address]  Primary Care Provider: Abraham Mohan DO  Visit Type: Follow Up      Subjective    HPI    Review of Systems - Oncology     Objective   BSA: 1.79 meters squared  /65 (BP Location: Left arm, Patient Position: Sitting)   Pulse 91   Temp 36.4 °C (97.5 °F)   Resp 18   Wt 74.6 kg (164 lb 7.4 oz)   SpO2 96%   BMI 31.08 kg/m²      has no past medical history on file.   has no past surgical history on file.  No family history on file.  Oncology History   Breast cancer metastasized to bone (CMS/HCC)   9/8/2023 -  Chemotherapy    Pembrolizumab, 21 Day Cycles     9/29/2023 Initial Diagnosis    Breast cancer metastasized to bone (CMS/HCC)         Arabella Springer  reports that she has never smoked. She has never used smokeless tobacco.  She  reports no history of alcohol use.  She  reports no history of drug use.    Physical Exam    WBC   Date/Time Value Ref Range Status   10/19/2023 08:01 AM 9.3 4.4 - 11.3 x10*3/uL Final   09/28/2023 11:46 AM 5.4 4.4 - 11.3 x10E9/L Final   08/28/2023 10:41 AM 5.0 4.4 - 11.3 x10E9/L Final   08/09/2023 02:52 PM 5.8 4.4 - 11.3 x10E9/L Final     No results found for: \"NRBC\"  RBC   Date Value Ref Range Status   10/19/2023 3.27 (L) 4.00 - 5.20 x10*6/uL Final   09/28/2023 3.13 (L) 4.00 - 5.20 x10E12/L Final   08/28/2023 3.21 (L) 4.00 - 5.20 x10E12/L Final   08/09/2023 3.24 (L) 4.00 - 5.20 x10E12/L Final     Hemoglobin   Date Value Ref Range Status   10/19/2023 10.1 (L) 12.0 - 16.0 g/dL Final   09/28/2023 9.8 (L) 12.0 - 16.0 g/dL Final   08/28/2023 10.2 (L) 12.0 - 16.0 g/dL Final   08/09/2023 10.4 (L) 12.0 - 16.0 g/dL Final     Hematocrit   Date Value Ref Range Status   10/19/2023 31.7 (L) 36.0 - 46.0 % Final   09/28/2023 29.8 (L) 36.0 - 46.0 % Final   08/28/2023 31.1 (L) 36.0 - 46.0 % Final   08/09/2023 31.6 (L) 36.0 - 46.0 % Final     MCV "   Date/Time Value Ref Range Status   10/19/2023 08:01 AM 97 80 - 100 fL Final   09/28/2023 11:46 AM 95 80 - 100 fL Final   08/28/2023 10:41 AM 97 80 - 100 fL Final   08/09/2023 02:52 PM 98 80 - 100 fL Final     MCH   Date/Time Value Ref Range Status   10/19/2023 08:01 AM 30.9 26.0 - 34.0 pg Final     MCHC   Date/Time Value Ref Range Status   10/19/2023 08:01 AM 31.9 (L) 32.0 - 36.0 g/dL Final   09/28/2023 11:46 AM 32.9 32.0 - 36.0 g/dL Final   08/28/2023 10:41 AM 32.8 32.0 - 36.0 g/dL Final   08/09/2023 02:52 PM 32.9 32.0 - 36.0 g/dL Final     RDW   Date/Time Value Ref Range Status   10/19/2023 08:01 AM 14.1 11.5 - 14.5 % Final   09/28/2023 11:46 AM 13.1 11.5 - 14.5 % Final   08/28/2023 10:41 AM 13.6 11.5 - 14.5 % Final   08/09/2023 02:52 PM 14.2 11.5 - 14.5 % Final     Platelets   Date/Time Value Ref Range Status   10/19/2023 08:01  150 - 450 x10*3/uL Final   09/28/2023 11:46  150 - 450 x10E9/L Final   08/28/2023 10:41  150 - 450 x10E9/L Final   08/09/2023 02:52  150 - 450 x10E9/L Final     MPV   Date/Time Value Ref Range Status   10/19/2023 08:01 AM 9.2 7.5 - 11.5 fL Final     Neutrophils %   Date/Time Value Ref Range Status   10/19/2023 08:01 AM 38.7 40.0 - 80.0 % Final   09/28/2023 11:46 AM 35.9 40.0 - 80.0 % Final   08/28/2023 10:41 AM 44.9 40.0 - 80.0 % Final   08/09/2023 02:52 PM 42.0 40.0 - 80.0 % Final     Immature Granulocytes %, Automated   Date/Time Value Ref Range Status   10/19/2023 08:01 AM 2.1 (H) 0.0 - 0.9 % Final     Comment:     Immature Granulocyte Count (IG) includes promyelocytes, myelocytes and metamyelocytes but does not include bands. Percent differential counts (%) should be interpreted in the context of the absolute cell counts (cells/UL).   09/28/2023 11:46 AM 0.6 0.0 - 0.9 % Final     Comment:      Immature Granulocyte Count (IG) includes promyelocytes,    myelocytes and metamyelocytes but does not include bands.   Percent differential counts (%) should be  interpreted in the   context of the absolute cell counts (cells/L).     08/28/2023 10:41 AM 0.6 0.0 - 0.9 % Final     Comment:      Immature Granulocyte Count (IG) includes promyelocytes,    myelocytes and metamyelocytes but does not include bands.   Percent differential counts (%) should be interpreted in the   context of the absolute cell counts (cells/L).     08/09/2023 02:52 PM 0.3 0.0 - 0.9 % Final     Comment:      Immature Granulocyte Count (IG) includes promyelocytes,    myelocytes and metamyelocytes but does not include bands.   Percent differential counts (%) should be interpreted in the   context of the absolute cell counts (cells/L).       Lymphocytes %   Date/Time Value Ref Range Status   10/19/2023 08:01 AM 53.3 13.0 - 44.0 % Final   09/28/2023 11:46 AM 50.8 13.0 - 44.0 % Final   08/28/2023 10:41 AM 45.0 13.0 - 44.0 % Final   08/09/2023 02:52 PM 46.2 13.0 - 44.0 % Final     Monocytes %   Date/Time Value Ref Range Status   10/19/2023 08:01 AM 5.7 2.0 - 10.0 % Final   09/28/2023 11:46 AM 9.1 2.0 - 10.0 % Final   08/28/2023 10:41 AM 6.7 2.0 - 10.0 % Final   08/09/2023 02:52 PM 9.1 2.0 - 10.0 % Final     Eosinophils %   Date/Time Value Ref Range Status   10/19/2023 08:01 AM 0.1 0.0 - 6.0 % Final   09/28/2023 11:46 AM 3.2 0.0 - 6.0 % Final   08/28/2023 10:41 AM 2.4 0.0 - 6.0 % Final   08/09/2023 02:52 PM 1.9 0.0 - 6.0 % Final     Basophils %   Date/Time Value Ref Range Status   10/19/2023 08:01 AM 0.1 0.0 - 2.0 % Final   09/28/2023 11:46 AM 0.4 0.0 - 2.0 % Final   08/28/2023 10:41 AM 0.4 0.0 - 2.0 % Final   08/09/2023 02:52 PM 0.5 0.0 - 2.0 % Final     Neutrophils Absolute   Date/Time Value Ref Range Status   10/19/2023 08:01 AM 3.60 1.60 - 5.50 x10*3/uL Final     Comment:     Percent differential counts (%) should be interpreted in the context of the absolute cell counts (cells/uL).   09/28/2023 11:46 AM 1.94 1.60 - 5.50 x10E9/L Final   08/28/2023 10:41 AM 2.23 1.60 - 5.50 x10E9/L Final   08/09/2023 02:52  "PM 2.43 1.60 - 5.50 x10E9/L Final     Immature Granulocytes Absolute, Automated   Date/Time Value Ref Range Status   10/19/2023 08:01 AM 0.20 0.00 - 0.50 x10*3/uL Final     Lymphocytes Absolute   Date/Time Value Ref Range Status   10/19/2023 08:01 AM 4.97 (H) 0.80 - 3.00 x10*3/uL Final   09/28/2023 11:46 AM 2.74 0.80 - 3.00 x10E9/L Final   08/28/2023 10:41 AM 2.23 0.80 - 3.00 x10E9/L Final   08/09/2023 02:52 PM 2.68 0.80 - 3.00 x10E9/L Final     Monocytes Absolute   Date/Time Value Ref Range Status   10/19/2023 08:01 AM 0.53 0.05 - 0.80 x10*3/uL Final   09/28/2023 11:46 AM 0.49 0.05 - 0.80 x10E9/L Final   08/28/2023 10:41 AM 0.33 0.05 - 0.80 x10E9/L Final   08/09/2023 02:52 PM 0.53 0.05 - 0.80 x10E9/L Final     Eosinophils Absolute   Date/Time Value Ref Range Status   10/19/2023 08:01 AM 0.01 0.00 - 0.40 x10*3/uL Final   09/28/2023 11:46 AM 0.17 0.00 - 0.40 x10E9/L Final   08/28/2023 10:41 AM 0.12 0.00 - 0.40 x10E9/L Final   08/09/2023 02:52 PM 0.11 0.00 - 0.40 x10E9/L Final     Basophils Absolute   Date/Time Value Ref Range Status   10/19/2023 08:01 AM 0.01 0.00 - 0.10 x10*3/uL Final   09/28/2023 11:46 AM 0.02 0.00 - 0.10 x10E9/L Final   08/28/2023 10:41 AM 0.02 0.00 - 0.10 x10E9/L Final   08/09/2023 02:52 PM 0.03 0.00 - 0.10 x10E9/L Final       No components found for: \"PT\"  No results found for: \"APTT\"    Assessment/Plan    1) stage IV breast cancer  -has high TMB  -has BRCA1  variant of uncertain significance  -also has PIK3CA mutation  -here for next dose pembrolizumab #3  -labs done on 10/19/2023 were reviewed--TSH 5.42, cortisol 0.8, ACTH 4.7, creatinine 0.99, AST 20, ALT 17, wbc 9.3, hgb 10.1, plt 227,000, CA 27.29 586 (not surprising that marker appears to be trending up, this is secondary to immunotherapy induced inflammation of tumor lesions)  -benefits, risks, potential morbidity related to pembrolizumab were reviewed with Arabella, and she provided informed consent to proceed  -she went on to receive " pembrolizumab 200 mg IV flat  -she will return in 3 weeks for next dose, then she will have a followup CT scan done      2) bone metastases  -secondary to metastatic breast cancer  -last received xgeva on 9/29  -next xgeva due in 3 weeks  -on calcium    3) anemia  -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  -hgb slowly improving    4) hyperlipidemia  -on pravastatin    5) arthritis  -on tylenol PRN  -on diclofenac PRN  -received steroid injections into her knees and she feels so much better    6) hypertension  -on amlodipine  -on losartan-HCTZ    7) GERD  -on nexium    8) glaucoma  -on combigan eyedrops     Problem List Items Addressed This Visit             ICD-10-CM    Breast cancer metastasized to bone (CMS/HCC) C50.919, C79.51    Relevant Orders    Clinic Appointment Request Chemo Follow Up; [facility]; [facility] [id number]    Infusion Appointment Request [facility] INFUSION    Thyroid Stimulating Hormone    Cortisol    ACTH    Cancer Antigen 27-29    CBC and Auto Differential    Comprehensive metabolic panel    Lytic bone lesions on xray - Primary M89.9            Andrew Mcfarland MD

## 2023-10-21 LAB — ACTH PLAS-MCNC: 4.7 PG/ML (ref 7.2–63.3)

## 2023-11-09 ENCOUNTER — LAB (OUTPATIENT)
Dept: LAB | Facility: CLINIC | Age: 83
End: 2023-11-09
Payer: MEDICARE

## 2023-11-09 DIAGNOSIS — C50.911 CARCINOMA OF RIGHT BREAST METASTATIC TO BONE (MULTI): ICD-10-CM

## 2023-11-09 DIAGNOSIS — C79.51 CARCINOMA OF RIGHT BREAST METASTATIC TO BONE (MULTI): ICD-10-CM

## 2023-11-09 LAB
ALBUMIN SERPL BCP-MCNC: 3.9 G/DL (ref 3.4–5)
ALP SERPL-CCNC: 121 U/L (ref 33–136)
ALT SERPL W P-5'-P-CCNC: 14 U/L (ref 7–45)
ANION GAP SERPL CALC-SCNC: 15 MMOL/L (ref 10–20)
AST SERPL W P-5'-P-CCNC: 18 U/L (ref 9–39)
BASOPHILS # BLD AUTO: 0.02 X10*3/UL (ref 0–0.1)
BASOPHILS NFR BLD AUTO: 0.5 %
BILIRUB SERPL-MCNC: 0.4 MG/DL (ref 0–1.2)
BUN SERPL-MCNC: 24 MG/DL (ref 6–23)
CALCIUM SERPL-MCNC: 8.7 MG/DL (ref 8.6–10.3)
CANCER AG27-29 SERPL-ACNC: 694.7 U/ML (ref 0–38.6)
CHLORIDE SERPL-SCNC: 98 MMOL/L (ref 98–107)
CO2 SERPL-SCNC: 25 MMOL/L (ref 21–32)
CORTIS SERPL-MCNC: 3.4 UG/DL (ref 2.5–20)
CREAT SERPL-MCNC: 0.96 MG/DL (ref 0.5–1.05)
EOSINOPHIL # BLD AUTO: 0.1 X10*3/UL (ref 0–0.4)
EOSINOPHIL NFR BLD AUTO: 2.7 %
ERYTHROCYTE [DISTWIDTH] IN BLOOD BY AUTOMATED COUNT: 15.1 % (ref 11.5–14.5)
GFR SERPL CREATININE-BSD FRML MDRD: 60 ML/MIN/1.73M*2
GLUCOSE SERPL-MCNC: 152 MG/DL (ref 74–99)
HCT VFR BLD AUTO: 27.9 % (ref 36–46)
HGB BLD-MCNC: 9 G/DL (ref 12–16)
IMM GRANULOCYTES # BLD AUTO: 0.08 X10*3/UL (ref 0–0.5)
IMM GRANULOCYTES NFR BLD AUTO: 2.1 % (ref 0–0.9)
LYMPHOCYTES # BLD AUTO: 1.52 X10*3/UL (ref 0.8–3)
LYMPHOCYTES NFR BLD AUTO: 40.8 %
MCH RBC QN AUTO: 31.7 PG (ref 26–34)
MCHC RBC AUTO-ENTMCNC: 32.3 G/DL (ref 32–36)
MCV RBC AUTO: 98 FL (ref 80–100)
MONOCYTES # BLD AUTO: 0.32 X10*3/UL (ref 0.05–0.8)
MONOCYTES NFR BLD AUTO: 8.6 %
NEUTROPHILS # BLD AUTO: 1.69 X10*3/UL (ref 1.6–5.5)
NEUTROPHILS NFR BLD AUTO: 45.3 %
PLATELET # BLD AUTO: 189 X10*3/UL (ref 150–450)
POTASSIUM SERPL-SCNC: 4 MMOL/L (ref 3.5–5.3)
PROT SERPL-MCNC: 6.4 G/DL (ref 6.4–8.2)
RBC # BLD AUTO: 2.84 X10*6/UL (ref 4–5.2)
SODIUM SERPL-SCNC: 134 MMOL/L (ref 136–145)
TSH SERPL-ACNC: 5.82 MIU/L (ref 0.44–3.98)
WBC # BLD AUTO: 3.7 X10*3/UL (ref 4.4–11.3)

## 2023-11-09 PROCEDURE — 36415 COLL VENOUS BLD VENIPUNCTURE: CPT

## 2023-11-09 PROCEDURE — 86300 IMMUNOASSAY TUMOR CA 15-3: CPT | Performed by: INTERNAL MEDICINE

## 2023-11-09 PROCEDURE — 82024 ASSAY OF ACTH: CPT

## 2023-11-09 PROCEDURE — 84443 ASSAY THYROID STIM HORMONE: CPT | Performed by: INTERNAL MEDICINE

## 2023-11-09 PROCEDURE — 85025 COMPLETE CBC W/AUTO DIFF WBC: CPT

## 2023-11-09 PROCEDURE — 80053 COMPREHEN METABOLIC PANEL: CPT

## 2023-11-09 PROCEDURE — 82533 TOTAL CORTISOL: CPT | Performed by: INTERNAL MEDICINE

## 2023-11-10 ENCOUNTER — OFFICE VISIT (OUTPATIENT)
Dept: HEMATOLOGY/ONCOLOGY | Facility: CLINIC | Age: 83
End: 2023-11-10
Payer: MEDICARE

## 2023-11-10 ENCOUNTER — INFUSION (OUTPATIENT)
Dept: HEMATOLOGY/ONCOLOGY | Facility: CLINIC | Age: 83
End: 2023-11-10
Payer: MEDICARE

## 2023-11-10 VITALS
TEMPERATURE: 97.5 F | SYSTOLIC BLOOD PRESSURE: 125 MMHG | BODY MASS INDEX: 31.08 KG/M2 | DIASTOLIC BLOOD PRESSURE: 70 MMHG | OXYGEN SATURATION: 95 % | HEART RATE: 64 BPM | RESPIRATION RATE: 16 BRPM | WEIGHT: 164.46 LBS

## 2023-11-10 DIAGNOSIS — H40.1232 LOW-TENSION GLAUCOMA OF BOTH EYES, MODERATE STAGE: ICD-10-CM

## 2023-11-10 DIAGNOSIS — C50.911 CARCINOMA OF RIGHT BREAST METASTATIC TO BONE (MULTI): Primary | ICD-10-CM

## 2023-11-10 DIAGNOSIS — M89.9 LYTIC BONE LESIONS ON XRAY: ICD-10-CM

## 2023-11-10 DIAGNOSIS — C50.911 CARCINOMA OF RIGHT BREAST METASTATIC TO BONE (MULTI): ICD-10-CM

## 2023-11-10 DIAGNOSIS — E78.2 MIXED HYPERLIPIDEMIA: ICD-10-CM

## 2023-11-10 DIAGNOSIS — K21.00 GASTROESOPHAGEAL REFLUX DISEASE WITH ESOPHAGITIS WITHOUT HEMORRHAGE: ICD-10-CM

## 2023-11-10 DIAGNOSIS — D61.82 MYELOPHTHISIC ANEMIA (MULTI): ICD-10-CM

## 2023-11-10 DIAGNOSIS — M17.11 PRIMARY OSTEOARTHRITIS OF RIGHT KNEE: ICD-10-CM

## 2023-11-10 DIAGNOSIS — C79.51 CARCINOMA OF RIGHT BREAST METASTATIC TO BONE (MULTI): Primary | ICD-10-CM

## 2023-11-10 DIAGNOSIS — F41.9 ANXIETY: ICD-10-CM

## 2023-11-10 DIAGNOSIS — C79.51 CARCINOMA OF RIGHT BREAST METASTATIC TO BONE (MULTI): ICD-10-CM

## 2023-11-10 PROCEDURE — 2500000004 HC RX 250 GENERAL PHARMACY W/ HCPCS (ALT 636 FOR OP/ED): Mod: JZ | Performed by: INTERNAL MEDICINE

## 2023-11-10 PROCEDURE — 99214 OFFICE O/P EST MOD 30 MIN: CPT | Performed by: INTERNAL MEDICINE

## 2023-11-10 PROCEDURE — 1036F TOBACCO NON-USER: CPT | Performed by: INTERNAL MEDICINE

## 2023-11-10 PROCEDURE — 1126F AMNT PAIN NOTED NONE PRSNT: CPT | Performed by: INTERNAL MEDICINE

## 2023-11-10 PROCEDURE — 1160F RVW MEDS BY RX/DR IN RCRD: CPT | Performed by: INTERNAL MEDICINE

## 2023-11-10 PROCEDURE — 3074F SYST BP LT 130 MM HG: CPT | Performed by: INTERNAL MEDICINE

## 2023-11-10 PROCEDURE — 1159F MED LIST DOCD IN RCRD: CPT | Performed by: INTERNAL MEDICINE

## 2023-11-10 PROCEDURE — 96372 THER/PROPH/DIAG INJ SC/IM: CPT

## 2023-11-10 PROCEDURE — 3078F DIAST BP <80 MM HG: CPT | Performed by: INTERNAL MEDICINE

## 2023-11-10 PROCEDURE — 96413 CHEMO IV INFUSION 1 HR: CPT

## 2023-11-10 RX ORDER — EPINEPHRINE 0.3 MG/.3ML
0.3 INJECTION SUBCUTANEOUS EVERY 5 MIN PRN
Status: CANCELLED | OUTPATIENT
Start: 2023-12-01

## 2023-11-10 RX ORDER — ALBUTEROL SULFATE 0.83 MG/ML
3 SOLUTION RESPIRATORY (INHALATION) AS NEEDED
Status: CANCELLED | OUTPATIENT
Start: 2023-12-22

## 2023-11-10 RX ORDER — FAMOTIDINE 10 MG/ML
20 INJECTION INTRAVENOUS ONCE AS NEEDED
Status: CANCELLED | OUTPATIENT
Start: 2023-12-22

## 2023-11-10 RX ORDER — PROCHLORPERAZINE MALEATE 10 MG
10 TABLET ORAL EVERY 6 HOURS PRN
Status: DISCONTINUED | OUTPATIENT
Start: 2023-11-10 | End: 2023-11-10 | Stop reason: HOSPADM

## 2023-11-10 RX ORDER — EPINEPHRINE 0.3 MG/.3ML
0.3 INJECTION SUBCUTANEOUS EVERY 5 MIN PRN
Status: DISCONTINUED | OUTPATIENT
Start: 2023-11-10 | End: 2023-11-10 | Stop reason: HOSPADM

## 2023-11-10 RX ORDER — DIPHENHYDRAMINE HYDROCHLORIDE 50 MG/ML
50 INJECTION INTRAMUSCULAR; INTRAVENOUS AS NEEDED
Status: CANCELLED | OUTPATIENT
Start: 2023-12-01

## 2023-11-10 RX ORDER — DIPHENHYDRAMINE HYDROCHLORIDE 50 MG/ML
50 INJECTION INTRAMUSCULAR; INTRAVENOUS AS NEEDED
Status: CANCELLED | OUTPATIENT
Start: 2023-12-22

## 2023-11-10 RX ORDER — ALBUTEROL SULFATE 0.83 MG/ML
3 SOLUTION RESPIRATORY (INHALATION) AS NEEDED
Status: DISCONTINUED | OUTPATIENT
Start: 2023-11-10 | End: 2023-11-10 | Stop reason: HOSPADM

## 2023-11-10 RX ORDER — PROCHLORPERAZINE MALEATE 10 MG
10 TABLET ORAL EVERY 6 HOURS PRN
Status: CANCELLED | OUTPATIENT
Start: 2023-12-01

## 2023-11-10 RX ORDER — FAMOTIDINE 10 MG/ML
20 INJECTION INTRAVENOUS ONCE AS NEEDED
Status: DISCONTINUED | OUTPATIENT
Start: 2023-11-10 | End: 2023-11-10 | Stop reason: HOSPADM

## 2023-11-10 RX ORDER — FAMOTIDINE 10 MG/ML
20 INJECTION INTRAVENOUS ONCE AS NEEDED
Status: CANCELLED | OUTPATIENT
Start: 2023-12-01

## 2023-11-10 RX ORDER — DIPHENHYDRAMINE HYDROCHLORIDE 50 MG/ML
50 INJECTION INTRAMUSCULAR; INTRAVENOUS AS NEEDED
Status: DISCONTINUED | OUTPATIENT
Start: 2023-11-10 | End: 2023-11-10 | Stop reason: HOSPADM

## 2023-11-10 RX ORDER — ALBUTEROL SULFATE 0.83 MG/ML
3 SOLUTION RESPIRATORY (INHALATION) AS NEEDED
Status: CANCELLED | OUTPATIENT
Start: 2023-12-01

## 2023-11-10 RX ORDER — EPINEPHRINE 0.3 MG/.3ML
0.3 INJECTION SUBCUTANEOUS EVERY 5 MIN PRN
Status: CANCELLED | OUTPATIENT
Start: 2023-12-22

## 2023-11-10 RX ORDER — LORAZEPAM 0.5 MG/1
0.5 TABLET ORAL EVERY 2 HOUR PRN
Qty: 5 TABLET | Refills: 0 | Status: SHIPPED | OUTPATIENT
Start: 2023-11-10

## 2023-11-10 RX ADMIN — DENOSUMAB 120 MG: 120 INJECTION SUBCUTANEOUS at 14:43

## 2023-11-10 RX ADMIN — SODIUM CHLORIDE 200 MG: 9 INJECTION, SOLUTION INTRAVENOUS at 14:43

## 2023-11-10 ASSESSMENT — PAIN SCALES - GENERAL: PAINLEVEL: 0-NO PAIN

## 2023-11-10 NOTE — PROGRESS NOTES
Patient ID: Arabella pSringer is a 83 y.o. female.  Referring Physician: Andrew Mcfarland MD  [id number] [facility] [address]  [place] [address]  Primary Care Provider: Abraham Mohan DO  Visit Type: Follow Up      Subjective    HPI I am doing okay    Review of Systems   Constitutional: Negative.    HENT:  Negative.     Eyes: Negative.    Respiratory: Negative.     Cardiovascular: Negative.    Gastrointestinal: Negative.    Endocrine: Negative.    Genitourinary: Negative.     Musculoskeletal: Negative.    Skin: Negative.    Neurological: Negative.    Hematological: Negative.    Psychiatric/Behavioral: Negative.          Objective   BSA: 1.79 meters squared  /70 (BP Location: Left arm)   Pulse 64   Temp 36.4 °C (97.5 °F) (Temporal)   Resp 16   Wt 74.6 kg (164 lb 7.4 oz)   SpO2 95%   BMI 31.08 kg/m²      has no past medical history on file.   has no past surgical history on file.  No family history on file.  Oncology History   Breast cancer metastasized to bone (CMS/HCC)   9/8/2023 -  Chemotherapy    Pembrolizumab, 21 Day Cycles     9/29/2023 Initial Diagnosis    Breast cancer metastasized to bone (CMS/HCC)         Arabella Springer  reports that she has never smoked. She has never used smokeless tobacco.  She  reports no history of alcohol use.  She  reports no history of drug use.    Physical Exam  Vitals reviewed.   HENT:      Head: Normocephalic.      Mouth/Throat:      Mouth: Mucous membranes are moist.   Eyes:      Extraocular Movements: Extraocular movements intact.      Pupils: Pupils are equal, round, and reactive to light.   Cardiovascular:      Rate and Rhythm: Normal rate and regular rhythm.      Heart sounds: Normal heart sounds.   Pulmonary:      Breath sounds: Normal breath sounds.   Abdominal:      General: Bowel sounds are normal.      Palpations: Abdomen is soft.   Musculoskeletal:         General: Normal range of motion.      Cervical back: Normal range of motion and neck supple.  "  Skin:     General: Skin is warm and dry.   Neurological:      General: No focal deficit present.      Mental Status: She is alert and oriented to person, place, and time.   Psychiatric:         Mood and Affect: Mood normal.         WBC   Date/Time Value Ref Range Status   11/09/2023 09:33 AM 3.7 (L) 4.4 - 11.3 x10*3/uL Final   10/19/2023 08:01 AM 9.3 4.4 - 11.3 x10*3/uL Final   09/28/2023 11:46 AM 5.4 4.4 - 11.3 x10E9/L Final   08/28/2023 10:41 AM 5.0 4.4 - 11.3 x10E9/L Final   08/09/2023 02:52 PM 5.8 4.4 - 11.3 x10E9/L Final     No results found for: \"NRBC\"  RBC   Date Value Ref Range Status   11/09/2023 2.84 (L) 4.00 - 5.20 x10*6/uL Final   10/19/2023 3.27 (L) 4.00 - 5.20 x10*6/uL Final   09/28/2023 3.13 (L) 4.00 - 5.20 x10E12/L Final   08/28/2023 3.21 (L) 4.00 - 5.20 x10E12/L Final   08/09/2023 3.24 (L) 4.00 - 5.20 x10E12/L Final     Hemoglobin   Date Value Ref Range Status   11/09/2023 9.0 (L) 12.0 - 16.0 g/dL Final   10/19/2023 10.1 (L) 12.0 - 16.0 g/dL Final   09/28/2023 9.8 (L) 12.0 - 16.0 g/dL Final   08/28/2023 10.2 (L) 12.0 - 16.0 g/dL Final   08/09/2023 10.4 (L) 12.0 - 16.0 g/dL Final     Hematocrit   Date Value Ref Range Status   11/09/2023 27.9 (L) 36.0 - 46.0 % Final   10/19/2023 31.7 (L) 36.0 - 46.0 % Final   09/28/2023 29.8 (L) 36.0 - 46.0 % Final   08/28/2023 31.1 (L) 36.0 - 46.0 % Final   08/09/2023 31.6 (L) 36.0 - 46.0 % Final     MCV   Date/Time Value Ref Range Status   11/09/2023 09:33 AM 98 80 - 100 fL Final   10/19/2023 08:01 AM 97 80 - 100 fL Final   09/28/2023 11:46 AM 95 80 - 100 fL Final   08/28/2023 10:41 AM 97 80 - 100 fL Final   08/09/2023 02:52 PM 98 80 - 100 fL Final     MCH   Date/Time Value Ref Range Status   11/09/2023 09:33 AM 31.7 26.0 - 34.0 pg Final   10/19/2023 08:01 AM 30.9 26.0 - 34.0 pg Final     MCHC   Date/Time Value Ref Range Status   11/09/2023 09:33 AM 32.3 32.0 - 36.0 g/dL Final   10/19/2023 08:01 AM 31.9 (L) 32.0 - 36.0 g/dL Final   09/28/2023 11:46 AM 32.9 32.0 - " 36.0 g/dL Final   08/28/2023 10:41 AM 32.8 32.0 - 36.0 g/dL Final   08/09/2023 02:52 PM 32.9 32.0 - 36.0 g/dL Final     RDW   Date/Time Value Ref Range Status   11/09/2023 09:33 AM 15.1 (H) 11.5 - 14.5 % Final   10/19/2023 08:01 AM 14.1 11.5 - 14.5 % Final   09/28/2023 11:46 AM 13.1 11.5 - 14.5 % Final   08/28/2023 10:41 AM 13.6 11.5 - 14.5 % Final   08/09/2023 02:52 PM 14.2 11.5 - 14.5 % Final     Platelets   Date/Time Value Ref Range Status   11/09/2023 09:33  150 - 450 x10*3/uL Final   10/19/2023 08:01  150 - 450 x10*3/uL Final   09/28/2023 11:46  150 - 450 x10E9/L Final   08/28/2023 10:41  150 - 450 x10E9/L Final   08/09/2023 02:52  150 - 450 x10E9/L Final     MPV   Date/Time Value Ref Range Status   10/19/2023 08:01 AM 9.2 7.5 - 11.5 fL Final     Neutrophils %   Date/Time Value Ref Range Status   11/09/2023 09:33 AM 45.3 40.0 - 80.0 % Final   10/19/2023 08:01 AM 38.7 40.0 - 80.0 % Final   09/28/2023 11:46 AM 35.9 40.0 - 80.0 % Final   08/28/2023 10:41 AM 44.9 40.0 - 80.0 % Final   08/09/2023 02:52 PM 42.0 40.0 - 80.0 % Final     Immature Granulocytes %, Automated   Date/Time Value Ref Range Status   11/09/2023 09:33 AM 2.1 (H) 0.0 - 0.9 % Final     Comment:     Immature Granulocyte Count (IG) includes promyelocytes, myelocytes and metamyelocytes but does not include bands. Percent differential counts (%) should be interpreted in the context of the absolute cell counts (cells/UL).   10/19/2023 08:01 AM 2.1 (H) 0.0 - 0.9 % Final     Comment:     Immature Granulocyte Count (IG) includes promyelocytes, myelocytes and metamyelocytes but does not include bands. Percent differential counts (%) should be interpreted in the context of the absolute cell counts (cells/UL).   09/28/2023 11:46 AM 0.6 0.0 - 0.9 % Final     Comment:      Immature Granulocyte Count (IG) includes promyelocytes,    myelocytes and metamyelocytes but does not include bands.   Percent differential counts (%) should be  interpreted in the   context of the absolute cell counts (cells/L).     08/28/2023 10:41 AM 0.6 0.0 - 0.9 % Final     Comment:      Immature Granulocyte Count (IG) includes promyelocytes,    myelocytes and metamyelocytes but does not include bands.   Percent differential counts (%) should be interpreted in the   context of the absolute cell counts (cells/L).     08/09/2023 02:52 PM 0.3 0.0 - 0.9 % Final     Comment:      Immature Granulocyte Count (IG) includes promyelocytes,    myelocytes and metamyelocytes but does not include bands.   Percent differential counts (%) should be interpreted in the   context of the absolute cell counts (cells/L).       Lymphocytes %   Date/Time Value Ref Range Status   11/09/2023 09:33 AM 40.8 13.0 - 44.0 % Final   10/19/2023 08:01 AM 53.3 13.0 - 44.0 % Final   09/28/2023 11:46 AM 50.8 13.0 - 44.0 % Final   08/28/2023 10:41 AM 45.0 13.0 - 44.0 % Final   08/09/2023 02:52 PM 46.2 13.0 - 44.0 % Final     Monocytes %   Date/Time Value Ref Range Status   11/09/2023 09:33 AM 8.6 2.0 - 10.0 % Final   10/19/2023 08:01 AM 5.7 2.0 - 10.0 % Final   09/28/2023 11:46 AM 9.1 2.0 - 10.0 % Final   08/28/2023 10:41 AM 6.7 2.0 - 10.0 % Final   08/09/2023 02:52 PM 9.1 2.0 - 10.0 % Final     Eosinophils %   Date/Time Value Ref Range Status   11/09/2023 09:33 AM 2.7 0.0 - 6.0 % Final   10/19/2023 08:01 AM 0.1 0.0 - 6.0 % Final   09/28/2023 11:46 AM 3.2 0.0 - 6.0 % Final   08/28/2023 10:41 AM 2.4 0.0 - 6.0 % Final   08/09/2023 02:52 PM 1.9 0.0 - 6.0 % Final     Basophils %   Date/Time Value Ref Range Status   11/09/2023 09:33 AM 0.5 0.0 - 2.0 % Final   10/19/2023 08:01 AM 0.1 0.0 - 2.0 % Final   09/28/2023 11:46 AM 0.4 0.0 - 2.0 % Final   08/28/2023 10:41 AM 0.4 0.0 - 2.0 % Final   08/09/2023 02:52 PM 0.5 0.0 - 2.0 % Final     Neutrophils Absolute   Date/Time Value Ref Range Status   11/09/2023 09:33 AM 1.69 1.60 - 5.50 x10*3/uL Final     Comment:     Percent differential counts (%) should be interpreted  "in the context of the absolute cell counts (cells/uL).   10/19/2023 08:01 AM 3.60 1.60 - 5.50 x10*3/uL Final     Comment:     Percent differential counts (%) should be interpreted in the context of the absolute cell counts (cells/uL).   09/28/2023 11:46 AM 1.94 1.60 - 5.50 x10E9/L Final   08/28/2023 10:41 AM 2.23 1.60 - 5.50 x10E9/L Final   08/09/2023 02:52 PM 2.43 1.60 - 5.50 x10E9/L Final     Immature Granulocytes Absolute, Automated   Date/Time Value Ref Range Status   11/09/2023 09:33 AM 0.08 0.00 - 0.50 x10*3/uL Final   10/19/2023 08:01 AM 0.20 0.00 - 0.50 x10*3/uL Final     Lymphocytes Absolute   Date/Time Value Ref Range Status   11/09/2023 09:33 AM 1.52 0.80 - 3.00 x10*3/uL Final   10/19/2023 08:01 AM 4.97 (H) 0.80 - 3.00 x10*3/uL Final   09/28/2023 11:46 AM 2.74 0.80 - 3.00 x10E9/L Final   08/28/2023 10:41 AM 2.23 0.80 - 3.00 x10E9/L Final   08/09/2023 02:52 PM 2.68 0.80 - 3.00 x10E9/L Final     Monocytes Absolute   Date/Time Value Ref Range Status   11/09/2023 09:33 AM 0.32 0.05 - 0.80 x10*3/uL Final   10/19/2023 08:01 AM 0.53 0.05 - 0.80 x10*3/uL Final   09/28/2023 11:46 AM 0.49 0.05 - 0.80 x10E9/L Final   08/28/2023 10:41 AM 0.33 0.05 - 0.80 x10E9/L Final   08/09/2023 02:52 PM 0.53 0.05 - 0.80 x10E9/L Final     Eosinophils Absolute   Date/Time Value Ref Range Status   11/09/2023 09:33 AM 0.10 0.00 - 0.40 x10*3/uL Final   10/19/2023 08:01 AM 0.01 0.00 - 0.40 x10*3/uL Final   09/28/2023 11:46 AM 0.17 0.00 - 0.40 x10E9/L Final   08/28/2023 10:41 AM 0.12 0.00 - 0.40 x10E9/L Final   08/09/2023 02:52 PM 0.11 0.00 - 0.40 x10E9/L Final     Basophils Absolute   Date/Time Value Ref Range Status   11/09/2023 09:33 AM 0.02 0.00 - 0.10 x10*3/uL Final   10/19/2023 08:01 AM 0.01 0.00 - 0.10 x10*3/uL Final   09/28/2023 11:46 AM 0.02 0.00 - 0.10 x10E9/L Final   08/28/2023 10:41 AM 0.02 0.00 - 0.10 x10E9/L Final   08/09/2023 02:52 PM 0.03 0.00 - 0.10 x10E9/L Final       No components found for: \"PT\"  No results found for: " "\"APTT\"    Assessment/Plan    1) stage IV breast cancer  -has high TMB  -has BRCA1  variant of uncertain significance  -also has PIK3CA mutation  -here for next dose pembrolizumab #4  -labs done on 10/19/2023 were reviewed--TSH 5.82, cortisol 3.4, ACTH 13.1, creatinine 0.99, AST 20, ALT 17, wbc 93.7,  hgb 9.0, plt 189,000, CA 27.29 694(not surprising that marker appears to be trending up, this is secondary to immunotherapy induced inflammation of tumor lesions)  -benefits, risks, potential morbidity related to pembrolizumab were reviewed with Arabella, and she provided informed consent to proceed  -she went on to receive pembrolizumab 200 mg IV flat  -she will have have followup bone scan and CT chest/abdomen/pelvis with IV contrast to be done in 2 weeks to assess for response  -she requested prescription for anxiolytic (ativan) to help her get through the scans        2) bone metastases  -secondary to metastatic breast cancer  -last received xgeva on 9/29  -due for xgeva today  -labs done yesterday showed creatinine 0.96, calcium 8.7  -on calcium  -benefits, risks, potential morbidity related to xgeva were reviewed with Arabella, and she provided informed consent to proceed  -she went on to receive xgeva 120 mg SC     3) anemia  -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  -hgb slowly improving     4) hyperlipidemia  -on pravastatin     5) arthritis  -on tylenol PRN  -on diclofenac PRN  -received steroid injections into her knees and she feels so much better     6) hypertension  -on amlodipine  -on losartan-HCTZ     7) GERD  -on nexium     8) glaucoma  -on combigan eyedrops     Problem List Items Addressed This Visit             ICD-10-CM    Breast cancer metastasized to bone (CMS/HCC) C50.919, C79.51    Relevant Orders    Clinic Appointment Request Chemo Follow Up; JAY GARCIA; [facility] MEDONC[facility]    Infusion Appointment Request [facility] INFUSION    Thyroid Stimulating Hormone    Cortisol    ACTH "    Cancer Antigen 27-29    CBC and Auto Differential    Comprehensive metabolic panel    CT chest w IV contrast    CT abdomen pelvis w IV contrast    NM bone 3 phase     Other Visit Diagnoses         Codes    Anxiety    -  Primary F41.9    Relevant Medications    LORazepam (Ativan) 0.5 mg tablet                 Andrew Mcfarland MD

## 2023-11-10 NOTE — SIGNIFICANT EVENT
11/10/23 1420   Prechemo Checklist   Has the patient been in the hospital, ED, or urgent care since last date of service No   Chemo/Immuno Consent Signed Yes   Protocol/Indications Verified Yes   Confirmed to previous date/time of medication Yes   Compared to previous dose Yes   All medications are dated accurately Yes   Pregnancy Test Negative Not applicable   Parameters Met Yes   BSA/Weight-Height Verified Yes   Dose Calculations Verified Yes

## 2023-11-11 LAB — ACTH PLAS-MCNC: 13.1 PG/ML (ref 7.2–63.3)

## 2023-11-12 PROBLEM — F41.9 ANXIETY: Status: ACTIVE | Noted: 2023-11-12

## 2023-11-12 PROBLEM — K21.00 GASTROESOPHAGEAL REFLUX DISEASE WITH ESOPHAGITIS WITHOUT HEMORRHAGE: Status: ACTIVE | Noted: 2023-11-12

## 2023-11-12 ASSESSMENT — ENCOUNTER SYMPTOMS
CARDIOVASCULAR NEGATIVE: 1
MUSCULOSKELETAL NEGATIVE: 1
RESPIRATORY NEGATIVE: 1
CONSTITUTIONAL NEGATIVE: 1
GASTROINTESTINAL NEGATIVE: 1
EYES NEGATIVE: 1
PSYCHIATRIC NEGATIVE: 1
ENDOCRINE NEGATIVE: 1
HEMATOLOGIC/LYMPHATIC NEGATIVE: 1
NEUROLOGICAL NEGATIVE: 1

## 2023-11-27 ENCOUNTER — HOSPITAL ENCOUNTER (OUTPATIENT)
Dept: RADIOLOGY | Facility: HOSPITAL | Age: 83
Discharge: HOME | End: 2023-11-27
Payer: MEDICARE

## 2023-11-27 DIAGNOSIS — C50.911 CARCINOMA OF RIGHT BREAST METASTATIC TO BONE (MULTI): ICD-10-CM

## 2023-11-27 DIAGNOSIS — C79.51 CARCINOMA OF RIGHT BREAST METASTATIC TO BONE (MULTI): ICD-10-CM

## 2023-11-27 PROCEDURE — 2550000001 HC RX 255 CONTRASTS: Performed by: INTERNAL MEDICINE

## 2023-11-27 PROCEDURE — 74177 CT ABD & PELVIS W/CONTRAST: CPT

## 2023-11-27 PROCEDURE — 3430000001 HC RX 343 DIAGNOSTIC RADIOPHARMACEUTICALS: Performed by: INTERNAL MEDICINE

## 2023-11-27 PROCEDURE — A9503 TC99M MEDRONATE: HCPCS | Performed by: INTERNAL MEDICINE

## 2023-11-27 PROCEDURE — 74177 CT ABD & PELVIS W/CONTRAST: CPT | Performed by: RADIOLOGY

## 2023-11-27 PROCEDURE — 71260 CT THORAX DX C+: CPT | Performed by: RADIOLOGY

## 2023-11-27 PROCEDURE — 78306 BONE IMAGING WHOLE BODY: CPT

## 2023-11-27 PROCEDURE — 78306 BONE IMAGING WHOLE BODY: CPT | Performed by: STUDENT IN AN ORGANIZED HEALTH CARE EDUCATION/TRAINING PROGRAM

## 2023-11-27 RX ADMIN — IOHEXOL 75 ML: 350 INJECTION, SOLUTION INTRAVENOUS at 10:42

## 2023-11-27 RX ADMIN — TECHNETIUM TC 99M MEDRONATE 27.5 MILLICURIE: 25 INJECTION, POWDER, FOR SOLUTION INTRAVENOUS at 09:32

## 2023-11-28 ENCOUNTER — TELEPHONE (OUTPATIENT)
Dept: HEMATOLOGY/ONCOLOGY | Facility: CLINIC | Age: 83
End: 2023-11-28
Payer: MEDICARE

## 2023-11-29 ENCOUNTER — OFFICE VISIT (OUTPATIENT)
Dept: HEMATOLOGY/ONCOLOGY | Facility: CLINIC | Age: 83
End: 2023-11-29
Payer: MEDICARE

## 2023-11-29 VITALS
DIASTOLIC BLOOD PRESSURE: 77 MMHG | BODY MASS INDEX: 31.91 KG/M2 | SYSTOLIC BLOOD PRESSURE: 160 MMHG | HEART RATE: 83 BPM | RESPIRATION RATE: 16 BRPM | TEMPERATURE: 97.7 F | OXYGEN SATURATION: 96 % | WEIGHT: 168.87 LBS

## 2023-11-29 DIAGNOSIS — C50.912 CARCINOMA OF LEFT BREAST METASTATIC TO BONE (MULTI): Primary | ICD-10-CM

## 2023-11-29 DIAGNOSIS — M89.9 LYTIC BONE LESIONS ON XRAY: ICD-10-CM

## 2023-11-29 DIAGNOSIS — D61.82 MYELOPHTHISIC ANEMIA (MULTI): ICD-10-CM

## 2023-11-29 DIAGNOSIS — H40.9 GLAUCOMA OF BOTH EYES, UNSPECIFIED GLAUCOMA TYPE: ICD-10-CM

## 2023-11-29 DIAGNOSIS — M19.90 ARTHRITIS: ICD-10-CM

## 2023-11-29 DIAGNOSIS — L27.0 DRUG ERUPTION: ICD-10-CM

## 2023-11-29 DIAGNOSIS — I10 ESSENTIAL HYPERTENSION: ICD-10-CM

## 2023-11-29 DIAGNOSIS — K21.00 GASTROESOPHAGEAL REFLUX DISEASE WITH ESOPHAGITIS WITHOUT HEMORRHAGE: ICD-10-CM

## 2023-11-29 DIAGNOSIS — C79.51 CARCINOMA OF LEFT BREAST METASTATIC TO BONE (MULTI): Primary | ICD-10-CM

## 2023-11-29 DIAGNOSIS — L30.9 DERMATITIS: ICD-10-CM

## 2023-11-29 DIAGNOSIS — E78.2 MIXED HYPERLIPIDEMIA: ICD-10-CM

## 2023-11-29 PROCEDURE — 1126F AMNT PAIN NOTED NONE PRSNT: CPT | Performed by: INTERNAL MEDICINE

## 2023-11-29 PROCEDURE — 99214 OFFICE O/P EST MOD 30 MIN: CPT | Performed by: INTERNAL MEDICINE

## 2023-11-29 PROCEDURE — 3077F SYST BP >= 140 MM HG: CPT | Performed by: INTERNAL MEDICINE

## 2023-11-29 PROCEDURE — 3078F DIAST BP <80 MM HG: CPT | Performed by: INTERNAL MEDICINE

## 2023-11-29 PROCEDURE — 1036F TOBACCO NON-USER: CPT | Performed by: INTERNAL MEDICINE

## 2023-11-29 PROCEDURE — 1160F RVW MEDS BY RX/DR IN RCRD: CPT | Performed by: INTERNAL MEDICINE

## 2023-11-29 PROCEDURE — 1159F MED LIST DOCD IN RCRD: CPT | Performed by: INTERNAL MEDICINE

## 2023-11-29 RX ORDER — PREDNISONE 10 MG/1
TABLET ORAL
Qty: 15 TABLET | Refills: 0 | Status: SHIPPED | OUTPATIENT
Start: 2023-11-29 | End: 2023-12-04

## 2023-11-29 RX ORDER — LETROZOLE 2.5 MG/1
2.5 TABLET, FILM COATED ORAL DAILY
Qty: 30 TABLET | Refills: 3 | Status: SHIPPED | OUTPATIENT
Start: 2023-11-29 | End: 2024-03-28

## 2023-11-29 ASSESSMENT — PAIN SCALES - GENERAL: PAINLEVEL: 0-NO PAIN

## 2023-11-29 NOTE — PATIENT INSTRUCTIONS
I will give you a 5 day prednisone taper --to help your skin rash resolve faster    We need to hold off on keytruda for now    You will also start letrozole     See you again on 12/15/2023

## 2023-11-29 NOTE — PROGRESS NOTES
Patient ID: Arabella Springer is a 83 y.o. female.  Referring Physician: No referring provider defined for this encounter.  Primary Care Provider: Abraham Mohan DO  Visit Type: Follow Up      Subjective    HPI  I have this rash on my arms    Review of Systems   Constitutional: Negative.    HENT:  Negative.     Eyes: Negative.    Respiratory: Negative.     Cardiovascular: Negative.    Gastrointestinal: Negative.    Endocrine: Negative.    Genitourinary: Negative.     Musculoskeletal: Negative.    Skin:  Positive for rash.   Neurological: Negative.    Hematological: Negative.    Psychiatric/Behavioral: Negative.          Objective   BSA: 1.82 meters squared  /77   Pulse 83   Temp 36.5 °C (97.7 °F) (Temporal)   Resp 16   Wt 76.6 kg (168 lb 14 oz)   SpO2 96%   BMI 31.91 kg/m²      has no past medical history on file.   has no past surgical history on file.  No family history on file.  Oncology History   Breast cancer metastasized to bone (CMS/HCC)   9/8/2023 -  Chemotherapy    Pembrolizumab, 21 Day Cycles     9/29/2023 Initial Diagnosis    Breast cancer metastasized to bone (CMS/HCC)         Arabella Springer  reports that she has never smoked. She has never used smokeless tobacco.  She  reports no history of alcohol use.  She  reports no history of drug use.    Physical Exam  Vitals reviewed.   HENT:      Head: Normocephalic.      Mouth/Throat:      Mouth: Mucous membranes are moist.   Eyes:      Extraocular Movements: Extraocular movements intact.      Pupils: Pupils are equal, round, and reactive to light.   Cardiovascular:      Rate and Rhythm: Normal rate and regular rhythm.      Heart sounds: Normal heart sounds.   Pulmonary:      Breath sounds: Normal breath sounds.   Abdominal:      General: Bowel sounds are normal.      Palpations: Abdomen is soft.   Musculoskeletal:         General: Normal range of motion.      Cervical back: Normal range of motion and neck supple.   Skin:     Findings: Rash  "present.      Comments: Diffuse maculopapular erythematous rash on at least 80% of skin--all extremities, back, torso, left cheek of face   Neurological:      General: No focal deficit present.      Mental Status: She is alert and oriented to person, place, and time.   Psychiatric:         Mood and Affect: Mood normal.         WBC   Date/Time Value Ref Range Status   11/09/2023 09:33 AM 3.7 (L) 4.4 - 11.3 x10*3/uL Final   10/19/2023 08:01 AM 9.3 4.4 - 11.3 x10*3/uL Final   09/28/2023 11:46 AM 5.4 4.4 - 11.3 x10E9/L Final   08/28/2023 10:41 AM 5.0 4.4 - 11.3 x10E9/L Final   08/09/2023 02:52 PM 5.8 4.4 - 11.3 x10E9/L Final     No results found for: \"NRBC\"  RBC   Date Value Ref Range Status   11/09/2023 2.84 (L) 4.00 - 5.20 x10*6/uL Final   10/19/2023 3.27 (L) 4.00 - 5.20 x10*6/uL Final   09/28/2023 3.13 (L) 4.00 - 5.20 x10E12/L Final   08/28/2023 3.21 (L) 4.00 - 5.20 x10E12/L Final   08/09/2023 3.24 (L) 4.00 - 5.20 x10E12/L Final     Hemoglobin   Date Value Ref Range Status   11/09/2023 9.0 (L) 12.0 - 16.0 g/dL Final   10/19/2023 10.1 (L) 12.0 - 16.0 g/dL Final   09/28/2023 9.8 (L) 12.0 - 16.0 g/dL Final   08/28/2023 10.2 (L) 12.0 - 16.0 g/dL Final   08/09/2023 10.4 (L) 12.0 - 16.0 g/dL Final     Hematocrit   Date Value Ref Range Status   11/09/2023 27.9 (L) 36.0 - 46.0 % Final   10/19/2023 31.7 (L) 36.0 - 46.0 % Final   09/28/2023 29.8 (L) 36.0 - 46.0 % Final   08/28/2023 31.1 (L) 36.0 - 46.0 % Final   08/09/2023 31.6 (L) 36.0 - 46.0 % Final     MCV   Date/Time Value Ref Range Status   11/09/2023 09:33 AM 98 80 - 100 fL Final   10/19/2023 08:01 AM 97 80 - 100 fL Final   09/28/2023 11:46 AM 95 80 - 100 fL Final   08/28/2023 10:41 AM 97 80 - 100 fL Final   08/09/2023 02:52 PM 98 80 - 100 fL Final     MCH   Date/Time Value Ref Range Status   11/09/2023 09:33 AM 31.7 26.0 - 34.0 pg Final   10/19/2023 08:01 AM 30.9 26.0 - 34.0 pg Final     MCHC   Date/Time Value Ref Range Status   11/09/2023 09:33 AM 32.3 32.0 - 36.0 g/dL " Final   10/19/2023 08:01 AM 31.9 (L) 32.0 - 36.0 g/dL Final   09/28/2023 11:46 AM 32.9 32.0 - 36.0 g/dL Final   08/28/2023 10:41 AM 32.8 32.0 - 36.0 g/dL Final   08/09/2023 02:52 PM 32.9 32.0 - 36.0 g/dL Final     RDW   Date/Time Value Ref Range Status   11/09/2023 09:33 AM 15.1 (H) 11.5 - 14.5 % Final   10/19/2023 08:01 AM 14.1 11.5 - 14.5 % Final   09/28/2023 11:46 AM 13.1 11.5 - 14.5 % Final   08/28/2023 10:41 AM 13.6 11.5 - 14.5 % Final   08/09/2023 02:52 PM 14.2 11.5 - 14.5 % Final     Platelets   Date/Time Value Ref Range Status   11/09/2023 09:33  150 - 450 x10*3/uL Final   10/19/2023 08:01  150 - 450 x10*3/uL Final   09/28/2023 11:46  150 - 450 x10E9/L Final   08/28/2023 10:41  150 - 450 x10E9/L Final   08/09/2023 02:52  150 - 450 x10E9/L Final     MPV   Date/Time Value Ref Range Status   10/19/2023 08:01 AM 9.2 7.5 - 11.5 fL Final     Neutrophils %   Date/Time Value Ref Range Status   11/09/2023 09:33 AM 45.3 40.0 - 80.0 % Final   10/19/2023 08:01 AM 38.7 40.0 - 80.0 % Final   09/28/2023 11:46 AM 35.9 40.0 - 80.0 % Final   08/28/2023 10:41 AM 44.9 40.0 - 80.0 % Final   08/09/2023 02:52 PM 42.0 40.0 - 80.0 % Final     Immature Granulocytes %, Automated   Date/Time Value Ref Range Status   11/09/2023 09:33 AM 2.1 (H) 0.0 - 0.9 % Final     Comment:     Immature Granulocyte Count (IG) includes promyelocytes, myelocytes and metamyelocytes but does not include bands. Percent differential counts (%) should be interpreted in the context of the absolute cell counts (cells/UL).   10/19/2023 08:01 AM 2.1 (H) 0.0 - 0.9 % Final     Comment:     Immature Granulocyte Count (IG) includes promyelocytes, myelocytes and metamyelocytes but does not include bands. Percent differential counts (%) should be interpreted in the context of the absolute cell counts (cells/UL).   09/28/2023 11:46 AM 0.6 0.0 - 0.9 % Final     Comment:      Immature Granulocyte Count (IG) includes promyelocytes,     myelocytes and metamyelocytes but does not include bands.   Percent differential counts (%) should be interpreted in the   context of the absolute cell counts (cells/L).     08/28/2023 10:41 AM 0.6 0.0 - 0.9 % Final     Comment:      Immature Granulocyte Count (IG) includes promyelocytes,    myelocytes and metamyelocytes but does not include bands.   Percent differential counts (%) should be interpreted in the   context of the absolute cell counts (cells/L).     08/09/2023 02:52 PM 0.3 0.0 - 0.9 % Final     Comment:      Immature Granulocyte Count (IG) includes promyelocytes,    myelocytes and metamyelocytes but does not include bands.   Percent differential counts (%) should be interpreted in the   context of the absolute cell counts (cells/L).       Lymphocytes %   Date/Time Value Ref Range Status   11/09/2023 09:33 AM 40.8 13.0 - 44.0 % Final   10/19/2023 08:01 AM 53.3 13.0 - 44.0 % Final   09/28/2023 11:46 AM 50.8 13.0 - 44.0 % Final   08/28/2023 10:41 AM 45.0 13.0 - 44.0 % Final   08/09/2023 02:52 PM 46.2 13.0 - 44.0 % Final     Monocytes %   Date/Time Value Ref Range Status   11/09/2023 09:33 AM 8.6 2.0 - 10.0 % Final   10/19/2023 08:01 AM 5.7 2.0 - 10.0 % Final   09/28/2023 11:46 AM 9.1 2.0 - 10.0 % Final   08/28/2023 10:41 AM 6.7 2.0 - 10.0 % Final   08/09/2023 02:52 PM 9.1 2.0 - 10.0 % Final     Eosinophils %   Date/Time Value Ref Range Status   11/09/2023 09:33 AM 2.7 0.0 - 6.0 % Final   10/19/2023 08:01 AM 0.1 0.0 - 6.0 % Final   09/28/2023 11:46 AM 3.2 0.0 - 6.0 % Final   08/28/2023 10:41 AM 2.4 0.0 - 6.0 % Final   08/09/2023 02:52 PM 1.9 0.0 - 6.0 % Final     Basophils %   Date/Time Value Ref Range Status   11/09/2023 09:33 AM 0.5 0.0 - 2.0 % Final   10/19/2023 08:01 AM 0.1 0.0 - 2.0 % Final   09/28/2023 11:46 AM 0.4 0.0 - 2.0 % Final   08/28/2023 10:41 AM 0.4 0.0 - 2.0 % Final   08/09/2023 02:52 PM 0.5 0.0 - 2.0 % Final     Neutrophils Absolute   Date/Time Value Ref Range Status   11/09/2023 09:33 AM  1.69 1.60 - 5.50 x10*3/uL Final     Comment:     Percent differential counts (%) should be interpreted in the context of the absolute cell counts (cells/uL).   10/19/2023 08:01 AM 3.60 1.60 - 5.50 x10*3/uL Final     Comment:     Percent differential counts (%) should be interpreted in the context of the absolute cell counts (cells/uL).   09/28/2023 11:46 AM 1.94 1.60 - 5.50 x10E9/L Final   08/28/2023 10:41 AM 2.23 1.60 - 5.50 x10E9/L Final   08/09/2023 02:52 PM 2.43 1.60 - 5.50 x10E9/L Final     Immature Granulocytes Absolute, Automated   Date/Time Value Ref Range Status   11/09/2023 09:33 AM 0.08 0.00 - 0.50 x10*3/uL Final   10/19/2023 08:01 AM 0.20 0.00 - 0.50 x10*3/uL Final     Lymphocytes Absolute   Date/Time Value Ref Range Status   11/09/2023 09:33 AM 1.52 0.80 - 3.00 x10*3/uL Final   10/19/2023 08:01 AM 4.97 (H) 0.80 - 3.00 x10*3/uL Final   09/28/2023 11:46 AM 2.74 0.80 - 3.00 x10E9/L Final   08/28/2023 10:41 AM 2.23 0.80 - 3.00 x10E9/L Final   08/09/2023 02:52 PM 2.68 0.80 - 3.00 x10E9/L Final     Monocytes Absolute   Date/Time Value Ref Range Status   11/09/2023 09:33 AM 0.32 0.05 - 0.80 x10*3/uL Final   10/19/2023 08:01 AM 0.53 0.05 - 0.80 x10*3/uL Final   09/28/2023 11:46 AM 0.49 0.05 - 0.80 x10E9/L Final   08/28/2023 10:41 AM 0.33 0.05 - 0.80 x10E9/L Final   08/09/2023 02:52 PM 0.53 0.05 - 0.80 x10E9/L Final     Eosinophils Absolute   Date/Time Value Ref Range Status   11/09/2023 09:33 AM 0.10 0.00 - 0.40 x10*3/uL Final   10/19/2023 08:01 AM 0.01 0.00 - 0.40 x10*3/uL Final   09/28/2023 11:46 AM 0.17 0.00 - 0.40 x10E9/L Final   08/28/2023 10:41 AM 0.12 0.00 - 0.40 x10E9/L Final   08/09/2023 02:52 PM 0.11 0.00 - 0.40 x10E9/L Final     Basophils Absolute   Date/Time Value Ref Range Status   11/09/2023 09:33 AM 0.02 0.00 - 0.10 x10*3/uL Final   10/19/2023 08:01 AM 0.01 0.00 - 0.10 x10*3/uL Final   09/28/2023 11:46 AM 0.02 0.00 - 0.10 x10E9/L Final   08/28/2023 10:41 AM 0.02 0.00 - 0.10 x10E9/L Final  "  08/09/2023 02:52 PM 0.03 0.00 - 0.10 x10E9/L Final       No components found for: \"PT\"  No results found for: \"APTT\"  Medication Documentation Review Audit       Reviewed by Alina Greene MA (Medical Assistant) on 11/29/23 at 1401      Medication Order Taking? Sig Documenting Provider Last Dose Status   amLODIPine (Norvasc) 2.5 mg tablet [id number] Yes Take 1 tablet (2.5 mg) by mouth once daily. Historical MD Roxann Taking Active   brimonidine-timoloL (Combigan) 0.2-0.5 % ophthalmic solution [id number] Yes Administer 1 drop into both eyes every 12 hours. Historical Provider, MD Taking Active   calcium acetate (Phoslo) 667 mg capsule [id number]  Take 2 capsules (1,334 mg) by mouth 3 times a day with meals. Historical Provider, MD  Active   denosumab (Xgeva) 120 mg/1.7 mL (70 mg/mL) injection [id number] Yes Inject 1.7 mL (120 mg) under the skin 1 time. Historical MD Roxann Taking Active   diclofenac (Voltaren) 50 mg EC tablet [id number] Yes Take 1 tablet (50 mg) by mouth 2 times a day with meals. Historical Provider, MD Taking Active   esomeprazole (NexIUM) 40 mg DR capsule [id number] Yes Take 1 capsule (40 mg) by mouth once daily in the morning. Take before meals. Historical MD Roxann Taking Active   LORazepam (Ativan) 0.5 mg tablet [id number] Yes Take 1 tablet (0.5 mg) by mouth every 2 hours if needed for anxiety (anxiety related to radiology tests) for up to 5 doses. Andrew Mcfarland MD Taking Active   losartan-hydrochlorothiazide (Hyzaar) 100-12.5 mg tablet [id number] Yes Take 1 tablet by mouth once daily. Historical Provider, MD Taking Active   pembrolizumab (Keytruda) 25 mg/mL chemo injection [id number] Yes Infuse into a venous catheter 1 time. Historical Provider, MD Taking Active   pravastatin (Pravachol) 10 mg tablet [id number] Yes Take 1 tablet (10 mg) by mouth once daily. Tika Hackett MD Taking Active   traMADol (Ultram) 50 mg tablet [id number] Yes TAKE ONE TABLET BY MOUTH EVERY 8 HOURS AS NEEDED FOR " MODERATE TO SEVERE PAIN Historical Provider, MD Taking Active                   Assessment/Plan    1) stage IV breast cancer  -has high TMB  -has BRCA1  variant of uncertain significance  -also has PIK3CA mutation  -next dose pembrolizumab (#5) planned for this Friday  -however, as of a couple days ago, has developed a rash which she thought was just on her hands and forearms--has been using her 's ointment  -bone scan done shows diffuse sclerotic bony metastases  -body CT scan shows a possible mass in the gallbladder (although she did have an abdominal US done about a year ago which showed a large gallstone) and numerous very small lung nodules--which may be evidence of old childhood infection (PET scan has no sensitivity to detect anything <1 cm)  -this drug eruption is most likely related to I/O--I have recommended stopping I/O for now  -on closer exam--she has the rash all over  her back, which she was not aware of  -I have recommended a PO prednisone taper (50 -40-30-20-10)  -I have recommended starting AI therapy at the very least--her tumor is ER+ and she does not have the ESR1 mutation  -she will return in just over 2 weeks for next followup      2) bone metastases  -secondary to metastatic breast cancer  -last received xgeva on 11/10  -due for xgeva next visit     3) anemia  -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  -hgb slowly improving     4) hyperlipidemia  -on pravastatin     5) arthritis  -on tylenol PRN  -on diclofenac PRN  -received steroid injections into her knees and she feels so much better     6) hypertension  -on amlodipine  -on losartan-HCTZ     7) GERD  -on nexium     8) glaucoma  -on combigan eyedrops     Problem List Items Addressed This Visit             ICD-10-CM    Breast cancer metastasized to bone (CMS/HCC) - Primary C50.919, C79.51    Relevant Orders    Clinic Appointment Request Follow Up; JAY GARCIA; [facility]    CBC and Auto Differential     Comprehensive metabolic panel    Cancer Antigen 27-29    Infusion Appointment Request [facility] INFUSION            Andrew Mcfarland MD

## 2023-11-30 ENCOUNTER — EDUCATION (OUTPATIENT)
Dept: HEMATOLOGY/ONCOLOGY | Facility: CLINIC | Age: 83
End: 2023-11-30
Payer: MEDICARE

## 2023-11-30 NOTE — PROGRESS NOTES
I met with pt and dtr.  Pt. Developed immune mediated skin rash.  She was seen by Dr. Mcfarland.  Pembrolizumab has been stopped.  Pt. Will start oral prednisone taper per Dr. Mcfarland's instructions.  She will being oral letrozole.  We reviewed medications, how to take, possible side effects.  She will follow up in 2 weeks for toxicity check.  She is aware to call with any side effects (hot flashes, edema, arthralgias/myalgias, sleep disturbances).  She was given written information on the medication.  She/dtr. Voiced good understanding.

## 2023-12-01 ENCOUNTER — APPOINTMENT (OUTPATIENT)
Dept: HEMATOLOGY/ONCOLOGY | Facility: CLINIC | Age: 83
End: 2023-12-01
Payer: MEDICARE

## 2023-12-03 PROBLEM — L27.0 DRUG ERUPTION: Status: ACTIVE | Noted: 2023-12-03

## 2023-12-03 ASSESSMENT — ENCOUNTER SYMPTOMS
RESPIRATORY NEGATIVE: 1
MUSCULOSKELETAL NEGATIVE: 1
HEMATOLOGIC/LYMPHATIC NEGATIVE: 1
ENDOCRINE NEGATIVE: 1
CARDIOVASCULAR NEGATIVE: 1
NEUROLOGICAL NEGATIVE: 1
CONSTITUTIONAL NEGATIVE: 1
GASTROINTESTINAL NEGATIVE: 1
EYES NEGATIVE: 1
PSYCHIATRIC NEGATIVE: 1

## 2023-12-06 ENCOUNTER — TELEPHONE (OUTPATIENT)
Dept: HEMATOLOGY/ONCOLOGY | Facility: CLINIC | Age: 83
End: 2023-12-06
Payer: MEDICARE

## 2023-12-06 NOTE — TELEPHONE ENCOUNTER
VM  Patient calling Maryanne back.  Started medication this past Saturday and is not having any problems.  As for rash- she should just wait it out?  Patient was at eye doctor- available now if you want to call back to discuss.

## 2023-12-14 ENCOUNTER — LAB (OUTPATIENT)
Dept: LAB | Facility: CLINIC | Age: 83
End: 2023-12-14
Payer: MEDICARE

## 2023-12-14 DIAGNOSIS — C79.51 CARCINOMA OF LEFT BREAST METASTATIC TO BONE (MULTI): ICD-10-CM

## 2023-12-14 DIAGNOSIS — C50.912 CARCINOMA OF LEFT BREAST METASTATIC TO BONE (MULTI): ICD-10-CM

## 2023-12-14 LAB
ALBUMIN SERPL BCP-MCNC: 4.1 G/DL (ref 3.4–5)
ALP SERPL-CCNC: 98 U/L (ref 33–136)
ALT SERPL W P-5'-P-CCNC: 16 U/L (ref 7–45)
ANION GAP SERPL CALC-SCNC: 13 MMOL/L (ref 10–20)
AST SERPL W P-5'-P-CCNC: 19 U/L (ref 9–39)
BASOPHILS # BLD AUTO: 0.02 X10*3/UL (ref 0–0.1)
BASOPHILS NFR BLD AUTO: 0.5 %
BILIRUB SERPL-MCNC: 0.4 MG/DL (ref 0–1.2)
BUN SERPL-MCNC: 21 MG/DL (ref 6–23)
CALCIUM SERPL-MCNC: 8.3 MG/DL (ref 8.6–10.3)
CANCER AG27-29 SERPL-ACNC: 660.3 U/ML (ref 0–38.6)
CHLORIDE SERPL-SCNC: 102 MMOL/L (ref 98–107)
CO2 SERPL-SCNC: 25 MMOL/L (ref 21–32)
CREAT SERPL-MCNC: 0.82 MG/DL (ref 0.5–1.05)
EOSINOPHIL # BLD AUTO: 0.09 X10*3/UL (ref 0–0.4)
EOSINOPHIL NFR BLD AUTO: 2.1 %
ERYTHROCYTE [DISTWIDTH] IN BLOOD BY AUTOMATED COUNT: 16.4 % (ref 11.5–14.5)
GFR SERPL CREATININE-BSD FRML MDRD: 72 ML/MIN/1.73M*2
GLUCOSE SERPL-MCNC: 119 MG/DL (ref 74–99)
HCT VFR BLD AUTO: 28.6 % (ref 36–46)
HGB BLD-MCNC: 9.1 G/DL (ref 12–16)
IMM GRANULOCYTES # BLD AUTO: 0.05 X10*3/UL (ref 0–0.5)
IMM GRANULOCYTES NFR BLD AUTO: 1.2 % (ref 0–0.9)
LYMPHOCYTES # BLD AUTO: 1.46 X10*3/UL (ref 0.8–3)
LYMPHOCYTES NFR BLD AUTO: 34 %
MCH RBC QN AUTO: 31.8 PG (ref 26–34)
MCHC RBC AUTO-ENTMCNC: 31.8 G/DL (ref 32–36)
MCV RBC AUTO: 100 FL (ref 80–100)
MONOCYTES # BLD AUTO: 0.37 X10*3/UL (ref 0.05–0.8)
MONOCYTES NFR BLD AUTO: 8.6 %
NEUTROPHILS # BLD AUTO: 2.31 X10*3/UL (ref 1.6–5.5)
NEUTROPHILS NFR BLD AUTO: 53.6 %
PLATELET # BLD AUTO: 175 X10*3/UL (ref 150–450)
POTASSIUM SERPL-SCNC: 3.8 MMOL/L (ref 3.5–5.3)
PROT SERPL-MCNC: 6.5 G/DL (ref 6.4–8.2)
RBC # BLD AUTO: 2.86 X10*6/UL (ref 4–5.2)
SODIUM SERPL-SCNC: 136 MMOL/L (ref 136–145)
WBC # BLD AUTO: 4.3 X10*3/UL (ref 4.4–11.3)

## 2023-12-14 PROCEDURE — 85025 COMPLETE CBC W/AUTO DIFF WBC: CPT

## 2023-12-14 PROCEDURE — 86300 IMMUNOASSAY TUMOR CA 15-3: CPT | Performed by: INTERNAL MEDICINE

## 2023-12-14 PROCEDURE — 80053 COMPREHEN METABOLIC PANEL: CPT

## 2023-12-14 PROCEDURE — 36415 COLL VENOUS BLD VENIPUNCTURE: CPT

## 2023-12-15 ENCOUNTER — OFFICE VISIT (OUTPATIENT)
Dept: HEMATOLOGY/ONCOLOGY | Facility: CLINIC | Age: 83
End: 2023-12-15
Payer: MEDICARE

## 2023-12-15 VITALS
BODY MASS INDEX: 32.53 KG/M2 | HEART RATE: 80 BPM | TEMPERATURE: 97.3 F | RESPIRATION RATE: 16 BRPM | DIASTOLIC BLOOD PRESSURE: 71 MMHG | WEIGHT: 172.18 LBS | OXYGEN SATURATION: 99 % | SYSTOLIC BLOOD PRESSURE: 147 MMHG

## 2023-12-15 DIAGNOSIS — M89.9 LYTIC BONE LESIONS ON XRAY: Primary | ICD-10-CM

## 2023-12-15 DIAGNOSIS — I10 ESSENTIAL HYPERTENSION: ICD-10-CM

## 2023-12-15 DIAGNOSIS — D61.82 MYELOPHTHISIC ANEMIA (MULTI): ICD-10-CM

## 2023-12-15 DIAGNOSIS — E78.2 MIXED HYPERLIPIDEMIA: ICD-10-CM

## 2023-12-15 DIAGNOSIS — M19.90 ARTHRITIS: ICD-10-CM

## 2023-12-15 DIAGNOSIS — C50.912 CARCINOMA OF LEFT BREAST METASTATIC TO BONE (MULTI): ICD-10-CM

## 2023-12-15 DIAGNOSIS — C79.51 CARCINOMA OF LEFT BREAST METASTATIC TO BONE (MULTI): ICD-10-CM

## 2023-12-15 DIAGNOSIS — K21.00 GASTROESOPHAGEAL REFLUX DISEASE WITH ESOPHAGITIS WITHOUT HEMORRHAGE: ICD-10-CM

## 2023-12-15 DIAGNOSIS — H40.9 GLAUCOMA OF BOTH EYES, UNSPECIFIED GLAUCOMA TYPE: ICD-10-CM

## 2023-12-15 PROCEDURE — 1036F TOBACCO NON-USER: CPT | Performed by: INTERNAL MEDICINE

## 2023-12-15 PROCEDURE — 3077F SYST BP >= 140 MM HG: CPT | Performed by: INTERNAL MEDICINE

## 2023-12-15 PROCEDURE — 1159F MED LIST DOCD IN RCRD: CPT | Performed by: INTERNAL MEDICINE

## 2023-12-15 PROCEDURE — 1126F AMNT PAIN NOTED NONE PRSNT: CPT | Performed by: INTERNAL MEDICINE

## 2023-12-15 PROCEDURE — 3078F DIAST BP <80 MM HG: CPT | Performed by: INTERNAL MEDICINE

## 2023-12-15 PROCEDURE — 1160F RVW MEDS BY RX/DR IN RCRD: CPT | Performed by: INTERNAL MEDICINE

## 2023-12-15 PROCEDURE — 99214 OFFICE O/P EST MOD 30 MIN: CPT | Performed by: INTERNAL MEDICINE

## 2023-12-15 ASSESSMENT — PAIN SCALES - GENERAL: PAINLEVEL: 0-NO PAIN

## 2023-12-15 NOTE — PROGRESS NOTES
Patient ID: Arabella Springer is a 83 y.o. female.  Referring Physician: Andrew Mcfarland MD  [id number] [facility] [address]  [place] [address]  Primary Care Provider: Abraham Mohan DO  Visit Type: Follow Up      Subjective    HPI My rash is even worse    Review of Systems   Constitutional: Negative.    HENT:  Negative.     Eyes: Negative.    Cardiovascular: Negative.    Gastrointestinal: Negative.    Genitourinary: Negative.     Musculoskeletal: Negative.    Skin:  Positive for rash.   Neurological: Negative.    Hematological: Negative.    Psychiatric/Behavioral: Negative.          Objective   BSA: 1.83 meters squared  /71   Pulse 80   Temp 36.3 °C (97.3 °F) (Temporal)   Resp 16   Wt 78.1 kg (172 lb 2.9 oz)   SpO2 99%   BMI 32.53 kg/m²      has no past medical history on file.   has no past surgical history on file.  No family history on file.  Oncology History   Breast cancer metastasized to bone (CMS/HCC)   9/8/2023 -  Chemotherapy    Pembrolizumab, 21 Day Cycles     9/29/2023 Initial Diagnosis    Breast cancer metastasized to bone (CMS/HCC)         Arabella Springer  reports that she has never smoked. She has never used smokeless tobacco.  She  reports no history of alcohol use.  She  reports no history of drug use.    Physical Exam  Vitals reviewed.   HENT:      Head: Normocephalic.      Mouth/Throat:      Mouth: Mucous membranes are moist.   Eyes:      Extraocular Movements: Extraocular movements intact.      Pupils: Pupils are equal, round, and reactive to light.   Cardiovascular:      Rate and Rhythm: Normal rate and regular rhythm.      Heart sounds: Normal heart sounds.   Abdominal:      General: Bowel sounds are normal.      Palpations: Abdomen is soft.   Musculoskeletal:         General: Normal range of motion.      Cervical back: Normal range of motion and neck supple.   Skin:     Comments: Diffuse body rash on all extremities (below knees relatively spared)--torso, backside  Confluent  "erythema   Neurological:      General: No focal deficit present.      Mental Status: She is alert.         WBC   Date/Time Value Ref Range Status   12/14/2023 10:02 AM 4.3 (L) 4.4 - 11.3 x10*3/uL Final   11/09/2023 09:33 AM 3.7 (L) 4.4 - 11.3 x10*3/uL Final   10/19/2023 08:01 AM 9.3 4.4 - 11.3 x10*3/uL Final     No results found for: \"NRBC\"  RBC   Date Value Ref Range Status   12/14/2023 2.86 (L) 4.00 - 5.20 x10*6/uL Final   11/09/2023 2.84 (L) 4.00 - 5.20 x10*6/uL Final   10/19/2023 3.27 (L) 4.00 - 5.20 x10*6/uL Final     Hemoglobin   Date Value Ref Range Status   12/14/2023 9.1 (L) 12.0 - 16.0 g/dL Final   11/09/2023 9.0 (L) 12.0 - 16.0 g/dL Final   10/19/2023 10.1 (L) 12.0 - 16.0 g/dL Final     Hematocrit   Date Value Ref Range Status   12/14/2023 28.6 (L) 36.0 - 46.0 % Final   11/09/2023 27.9 (L) 36.0 - 46.0 % Final   10/19/2023 31.7 (L) 36.0 - 46.0 % Final     MCV   Date/Time Value Ref Range Status   12/14/2023 10:02  80 - 100 fL Final   11/09/2023 09:33 AM 98 80 - 100 fL Final   10/19/2023 08:01 AM 97 80 - 100 fL Final     MCH   Date/Time Value Ref Range Status   12/14/2023 10:02 AM 31.8 26.0 - 34.0 pg Final   11/09/2023 09:33 AM 31.7 26.0 - 34.0 pg Final   10/19/2023 08:01 AM 30.9 26.0 - 34.0 pg Final     MCHC   Date/Time Value Ref Range Status   12/14/2023 10:02 AM 31.8 (L) 32.0 - 36.0 g/dL Final   11/09/2023 09:33 AM 32.3 32.0 - 36.0 g/dL Final   10/19/2023 08:01 AM 31.9 (L) 32.0 - 36.0 g/dL Final     RDW   Date/Time Value Ref Range Status   12/14/2023 10:02 AM 16.4 (H) 11.5 - 14.5 % Final   11/09/2023 09:33 AM 15.1 (H) 11.5 - 14.5 % Final   10/19/2023 08:01 AM 14.1 11.5 - 14.5 % Final     Platelets   Date/Time Value Ref Range Status   12/14/2023 10:02  150 - 450 x10*3/uL Final   11/09/2023 09:33  150 - 450 x10*3/uL Final   10/19/2023 08:01  150 - 450 x10*3/uL Final     MPV   Date/Time Value Ref Range Status   10/19/2023 08:01 AM 9.2 7.5 - 11.5 fL Final     Neutrophils %   Date/Time " Value Ref Range Status   12/14/2023 10:02 AM 53.6 40.0 - 80.0 % Final   11/09/2023 09:33 AM 45.3 40.0 - 80.0 % Final   10/19/2023 08:01 AM 38.7 40.0 - 80.0 % Final     Immature Granulocytes %, Automated   Date/Time Value Ref Range Status   12/14/2023 10:02 AM 1.2 (H) 0.0 - 0.9 % Final     Comment:     Immature Granulocyte Count (IG) includes promyelocytes, myelocytes and metamyelocytes but does not include bands. Percent differential counts (%) should be interpreted in the context of the absolute cell counts (cells/UL).   11/09/2023 09:33 AM 2.1 (H) 0.0 - 0.9 % Final     Comment:     Immature Granulocyte Count (IG) includes promyelocytes, myelocytes and metamyelocytes but does not include bands. Percent differential counts (%) should be interpreted in the context of the absolute cell counts (cells/UL).   10/19/2023 08:01 AM 2.1 (H) 0.0 - 0.9 % Final     Comment:     Immature Granulocyte Count (IG) includes promyelocytes, myelocytes and metamyelocytes but does not include bands. Percent differential counts (%) should be interpreted in the context of the absolute cell counts (cells/UL).     Lymphocytes %   Date/Time Value Ref Range Status   12/14/2023 10:02 AM 34.0 13.0 - 44.0 % Final   11/09/2023 09:33 AM 40.8 13.0 - 44.0 % Final   10/19/2023 08:01 AM 53.3 13.0 - 44.0 % Final     Monocytes %   Date/Time Value Ref Range Status   12/14/2023 10:02 AM 8.6 2.0 - 10.0 % Final   11/09/2023 09:33 AM 8.6 2.0 - 10.0 % Final   10/19/2023 08:01 AM 5.7 2.0 - 10.0 % Final     Eosinophils %   Date/Time Value Ref Range Status   12/14/2023 10:02 AM 2.1 0.0 - 6.0 % Final   11/09/2023 09:33 AM 2.7 0.0 - 6.0 % Final   10/19/2023 08:01 AM 0.1 0.0 - 6.0 % Final     Basophils %   Date/Time Value Ref Range Status   12/14/2023 10:02 AM 0.5 0.0 - 2.0 % Final   11/09/2023 09:33 AM 0.5 0.0 - 2.0 % Final   10/19/2023 08:01 AM 0.1 0.0 - 2.0 % Final     Neutrophils Absolute   Date/Time Value Ref Range Status   12/14/2023 10:02 AM 2.31 1.60 - 5.50  x10*3/uL Final     Comment:     Percent differential counts (%) should be interpreted in the context of the absolute cell counts (cells/uL).   11/09/2023 09:33 AM 1.69 1.60 - 5.50 x10*3/uL Final     Comment:     Percent differential counts (%) should be interpreted in the context of the absolute cell counts (cells/uL).   10/19/2023 08:01 AM 3.60 1.60 - 5.50 x10*3/uL Final     Comment:     Percent differential counts (%) should be interpreted in the context of the absolute cell counts (cells/uL).     Immature Granulocytes Absolute, Automated   Date/Time Value Ref Range Status   12/14/2023 10:02 AM 0.05 0.00 - 0.50 x10*3/uL Final   11/09/2023 09:33 AM 0.08 0.00 - 0.50 x10*3/uL Final   10/19/2023 08:01 AM 0.20 0.00 - 0.50 x10*3/uL Final     Lymphocytes Absolute   Date/Time Value Ref Range Status   12/14/2023 10:02 AM 1.46 0.80 - 3.00 x10*3/uL Final   11/09/2023 09:33 AM 1.52 0.80 - 3.00 x10*3/uL Final   10/19/2023 08:01 AM 4.97 (H) 0.80 - 3.00 x10*3/uL Final     Monocytes Absolute   Date/Time Value Ref Range Status   12/14/2023 10:02 AM 0.37 0.05 - 0.80 x10*3/uL Final   11/09/2023 09:33 AM 0.32 0.05 - 0.80 x10*3/uL Final   10/19/2023 08:01 AM 0.53 0.05 - 0.80 x10*3/uL Final     Eosinophils Absolute   Date/Time Value Ref Range Status   12/14/2023 10:02 AM 0.09 0.00 - 0.40 x10*3/uL Final   11/09/2023 09:33 AM 0.10 0.00 - 0.40 x10*3/uL Final   10/19/2023 08:01 AM 0.01 0.00 - 0.40 x10*3/uL Final     Basophils Absolute   Date/Time Value Ref Range Status   12/14/2023 10:02 AM 0.02 0.00 - 0.10 x10*3/uL Final   11/09/2023 09:33 AM 0.02 0.00 - 0.10 x10*3/uL Final   10/19/2023 08:01 AM 0.01 0.00 - 0.10 x10*3/uL Final     Medication Documentation Review Audit       Reviewed by Babita Givens MA (Medical Assistant) on 12/15/23 at 1323      Medication Order Taking? Sig Documenting Provider Last Dose Status   amLODIPine (Norvasc) 2.5 mg tablet [id number] Yes Take 1 tablet (2.5 mg) by mouth once daily. Historical Provider, MD  "Taking Active   brimonidine-timoloL (Combigan) 0.2-0.5 % ophthalmic solution [id number] Yes Administer 1 drop into both eyes every 12 hours. Historical Provider, MD Taking Active   denosumab (Xgeva) 120 mg/1.7 mL (70 mg/mL) injection [id number] Yes Inject 1.7 mL (120 mg) under the skin 1 time. Tika Hackett MD Taking Active   diclofenac (Voltaren) 50 mg EC tablet [id number] Yes Take 1 tablet (50 mg) by mouth 2 times a day with meals. Tika Hackett MD Taking Active   esomeprazole (NexIUM) 40 mg DR capsule [id number] Yes Take 1 capsule (40 mg) by mouth once daily in the morning. Take before meals. Tika Hackett MD Taking Active   letrozole (Femara) 2.5 mg tablet [id number] Yes Take 1 tablet (2.5 mg total) by mouth once daily.  Take with or without food. Andrew Mcfarland MD Taking Active   LORazepam (Ativan) 0.5 mg tablet [id number] Yes Take 1 tablet (0.5 mg) by mouth every 2 hours if needed for anxiety (anxiety related to radiology tests) for up to 5 doses. Andrew Mcfarland MD Taking Active   losartan-hydrochlorothiazide (Hyzaar) 100-12.5 mg tablet [id number] Yes Take 1 tablet by mouth once daily. Tika Hackett MD Taking Active   pembrolizumab (Keytruda) 25 mg/mL chemo injection [id number] Yes Infuse into a venous catheter 1 time. Tika Hackett MD Taking Active   pravastatin (Pravachol) 10 mg tablet [id number] Yes Take 1 tablet (10 mg) by mouth once daily. Tika Hackett MD Taking Active   traMADol (Ultram) 50 mg tablet [id number] Yes TAKE ONE TABLET BY MOUTH EVERY 8 HOURS AS NEEDED FOR MODERATE TO SEVERE PAIN Historical MD Roxann Taking Active                   No components found for: \"PT\"  No results found for: \"APTT\"    Assessment/Plan       1) stage IV breast cancer  -has high TMB  -has BRCA1  variant of uncertain significance  -also has PIK3CA mutation  -pembrolizumab has been on hold since she developed a diffuse body rash, course of prednisone did not help, in fact a week after " "finishing prednisone, the rash got worse--papules have become confluent red/pink areas  -forearms sometimes \"burns\" at night so she uses her 's ointment  -she did start letrozole--has been uneventful she believes  -labs done on 12/14/2023 included CBC, COMP, CA 27.29  -results reviewed--wbc 4.3, hgb 9.1, plt 175,000, creatinine 0.82, calcium 8.3, albumin 4.1, AST 19, ALT 16, CA 27.29 660  -as calcium is <8.6, we will hold off on xgeva today-she will continue taking calcium + D in order to \"catch up\"  -I still believe the rash is I/O induced, she does have an upcoming appointment with her dermatologist next Wednesday  -advised her for the time being to hold letrozole too  -will return in 4 weeks, or earlier if necessary  -she and the family will keep us posted about dermatology followup--I suspect punch biopsy will be done     2) bone metastases  -secondary to metastatic breast cancer  -last received xgeva on 11/10  -due for xgeva next visit     3) anemia  -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  -hgb slowly improving     4) hyperlipidemia  -on pravastatin     5) arthritis  -on tylenol PRN  -on diclofenac PRN  -received steroid injections into her knees and she feels so much better     6) hypertension  -on amlodipine  -on losartan-HCTZ     7) GERD  -on nexium     8) glaucoma  -on combigan eyedrops  Problem List Items Addressed This Visit             ICD-10-CM    Breast cancer metastasized to bone (CMS/HCC) C50.919, C79.51    Relevant Orders    Clinic Appointment Request Chemo Follow Up; ANDREW MCFARLAND; [facility]    Infusion Appointment Request [facility] INFUSION            Andrew Mcfarland MD                         "

## 2023-12-15 NOTE — PATIENT INSTRUCTIONS
Hold off on taking letrozole for now    You will see dermatologist on Wednesday    Continue taking calcium    See you again in 4 weeks

## 2023-12-17 ASSESSMENT — ENCOUNTER SYMPTOMS
HEMATOLOGIC/LYMPHATIC NEGATIVE: 1
CARDIOVASCULAR NEGATIVE: 1
CONSTITUTIONAL NEGATIVE: 1
MUSCULOSKELETAL NEGATIVE: 1
EYES NEGATIVE: 1
NEUROLOGICAL NEGATIVE: 1
PSYCHIATRIC NEGATIVE: 1
GASTROINTESTINAL NEGATIVE: 1

## 2023-12-18 DIAGNOSIS — M19.90 ARTHRITIS: Primary | ICD-10-CM

## 2023-12-18 RX ORDER — DICLOFENAC SODIUM 50 MG/1
50 TABLET, DELAYED RELEASE ORAL
Qty: 60 TABLET | Refills: 0 | Status: SHIPPED | OUTPATIENT
Start: 2023-12-18 | End: 2024-03-26

## 2023-12-22 ENCOUNTER — APPOINTMENT (OUTPATIENT)
Dept: HEMATOLOGY/ONCOLOGY | Facility: CLINIC | Age: 83
End: 2023-12-22
Payer: MEDICARE

## 2024-01-11 ENCOUNTER — LAB (OUTPATIENT)
Dept: LAB | Facility: CLINIC | Age: 84
End: 2024-01-11
Payer: MEDICARE

## 2024-01-11 DIAGNOSIS — C50.911 CARCINOMA OF RIGHT BREAST METASTATIC TO BONE (MULTI): ICD-10-CM

## 2024-01-11 DIAGNOSIS — C79.51 CARCINOMA OF RIGHT BREAST METASTATIC TO BONE (MULTI): ICD-10-CM

## 2024-01-11 LAB
ALBUMIN SERPL BCP-MCNC: 4 G/DL (ref 3.4–5)
ALP SERPL-CCNC: 84 U/L (ref 33–136)
ALT SERPL W P-5'-P-CCNC: 15 U/L (ref 7–45)
ANION GAP SERPL CALC-SCNC: 12 MMOL/L (ref 10–20)
AST SERPL W P-5'-P-CCNC: 17 U/L (ref 9–39)
BASOPHILS # BLD AUTO: 0.02 X10*3/UL (ref 0–0.1)
BASOPHILS NFR BLD AUTO: 0.3 %
BILIRUB SERPL-MCNC: 0.5 MG/DL (ref 0–1.2)
BUN SERPL-MCNC: 19 MG/DL (ref 6–23)
CALCIUM SERPL-MCNC: 8.7 MG/DL (ref 8.6–10.3)
CANCER AG27-29 SERPL-ACNC: 640.3 U/ML (ref 0–38.6)
CHLORIDE SERPL-SCNC: 99 MMOL/L (ref 98–107)
CO2 SERPL-SCNC: 26 MMOL/L (ref 21–32)
CORTIS SERPL-MCNC: 23.4 UG/DL (ref 2.5–20)
CREAT SERPL-MCNC: 0.86 MG/DL (ref 0.5–1.05)
EGFRCR SERPLBLD CKD-EPI 2021: 68 ML/MIN/1.73M*2
EOSINOPHIL # BLD AUTO: 0.03 X10*3/UL (ref 0–0.4)
EOSINOPHIL NFR BLD AUTO: 0.5 %
ERYTHROCYTE [DISTWIDTH] IN BLOOD BY AUTOMATED COUNT: 16.1 % (ref 11.5–14.5)
GLUCOSE SERPL-MCNC: 121 MG/DL (ref 74–99)
HCT VFR BLD AUTO: 30.2 % (ref 36–46)
HGB BLD-MCNC: 9.6 G/DL (ref 12–16)
IMM GRANULOCYTES # BLD AUTO: 0.1 X10*3/UL (ref 0–0.5)
IMM GRANULOCYTES NFR BLD AUTO: 1.6 % (ref 0–0.9)
LYMPHOCYTES # BLD AUTO: 2.33 X10*3/UL (ref 0.8–3)
LYMPHOCYTES NFR BLD AUTO: 36.1 %
MCH RBC QN AUTO: 31.9 PG (ref 26–34)
MCHC RBC AUTO-ENTMCNC: 31.8 G/DL (ref 32–36)
MCV RBC AUTO: 100 FL (ref 80–100)
MONOCYTES # BLD AUTO: 0.32 X10*3/UL (ref 0.05–0.8)
MONOCYTES NFR BLD AUTO: 5 %
NEUTROPHILS # BLD AUTO: 3.65 X10*3/UL (ref 1.6–5.5)
NEUTROPHILS NFR BLD AUTO: 56.5 %
PLATELET # BLD AUTO: 185 X10*3/UL (ref 150–450)
POTASSIUM SERPL-SCNC: 4.4 MMOL/L (ref 3.5–5.3)
PROT SERPL-MCNC: 6.3 G/DL (ref 6.4–8.2)
RBC # BLD AUTO: 3.01 X10*6/UL (ref 4–5.2)
SODIUM SERPL-SCNC: 133 MMOL/L (ref 136–145)
TSH SERPL-ACNC: 4.53 MIU/L (ref 0.44–3.98)
WBC # BLD AUTO: 6.5 X10*3/UL (ref 4.4–11.3)

## 2024-01-11 PROCEDURE — 86300 IMMUNOASSAY TUMOR CA 15-3: CPT | Performed by: INTERNAL MEDICINE

## 2024-01-11 PROCEDURE — 84443 ASSAY THYROID STIM HORMONE: CPT | Performed by: INTERNAL MEDICINE

## 2024-01-11 PROCEDURE — 82533 TOTAL CORTISOL: CPT | Performed by: INTERNAL MEDICINE

## 2024-01-11 PROCEDURE — 80053 COMPREHEN METABOLIC PANEL: CPT

## 2024-01-11 PROCEDURE — 36415 COLL VENOUS BLD VENIPUNCTURE: CPT

## 2024-01-11 PROCEDURE — 82024 ASSAY OF ACTH: CPT

## 2024-01-11 PROCEDURE — 85025 COMPLETE CBC W/AUTO DIFF WBC: CPT

## 2024-01-12 ENCOUNTER — INFUSION (OUTPATIENT)
Dept: HEMATOLOGY/ONCOLOGY | Facility: CLINIC | Age: 84
End: 2024-01-12
Payer: MEDICARE

## 2024-01-12 ENCOUNTER — OFFICE VISIT (OUTPATIENT)
Dept: HEMATOLOGY/ONCOLOGY | Facility: CLINIC | Age: 84
End: 2024-01-12
Payer: MEDICARE

## 2024-01-12 ENCOUNTER — APPOINTMENT (OUTPATIENT)
Dept: HEMATOLOGY/ONCOLOGY | Facility: CLINIC | Age: 84
End: 2024-01-12
Payer: MEDICARE

## 2024-01-12 VITALS
HEART RATE: 74 BPM | TEMPERATURE: 97.7 F | SYSTOLIC BLOOD PRESSURE: 134 MMHG | BODY MASS INDEX: 32.41 KG/M2 | WEIGHT: 171.52 LBS | DIASTOLIC BLOOD PRESSURE: 66 MMHG | RESPIRATION RATE: 18 BRPM | OXYGEN SATURATION: 98 %

## 2024-01-12 DIAGNOSIS — M19.90 ARTHRITIS: ICD-10-CM

## 2024-01-12 DIAGNOSIS — C79.51 CARCINOMA OF LEFT BREAST METASTATIC TO BONE (MULTI): ICD-10-CM

## 2024-01-12 DIAGNOSIS — L40.9 PSORIASIS: ICD-10-CM

## 2024-01-12 DIAGNOSIS — C50.911 CARCINOMA OF RIGHT BREAST METASTATIC TO BONE (MULTI): ICD-10-CM

## 2024-01-12 DIAGNOSIS — E78.2 MIXED HYPERLIPIDEMIA: ICD-10-CM

## 2024-01-12 DIAGNOSIS — D61.82 MYELOPHTHISIC ANEMIA (MULTI): ICD-10-CM

## 2024-01-12 DIAGNOSIS — M89.9 LYTIC BONE LESIONS ON XRAY: ICD-10-CM

## 2024-01-12 DIAGNOSIS — C79.51 CARCINOMA OF RIGHT BREAST METASTATIC TO BONE (MULTI): ICD-10-CM

## 2024-01-12 DIAGNOSIS — M89.9 LYTIC BONE LESIONS ON XRAY: Primary | ICD-10-CM

## 2024-01-12 DIAGNOSIS — I10 ESSENTIAL HYPERTENSION: ICD-10-CM

## 2024-01-12 DIAGNOSIS — C50.912 CARCINOMA OF LEFT BREAST METASTATIC TO BONE (MULTI): ICD-10-CM

## 2024-01-12 PROCEDURE — 1126F AMNT PAIN NOTED NONE PRSNT: CPT | Performed by: INTERNAL MEDICINE

## 2024-01-12 PROCEDURE — 99214 OFFICE O/P EST MOD 30 MIN: CPT | Performed by: INTERNAL MEDICINE

## 2024-01-12 PROCEDURE — 1036F TOBACCO NON-USER: CPT | Performed by: INTERNAL MEDICINE

## 2024-01-12 PROCEDURE — 96372 THER/PROPH/DIAG INJ SC/IM: CPT

## 2024-01-12 PROCEDURE — 96372 THER/PROPH/DIAG INJ SC/IM: CPT | Performed by: INTERNAL MEDICINE

## 2024-01-12 PROCEDURE — 3078F DIAST BP <80 MM HG: CPT | Performed by: INTERNAL MEDICINE

## 2024-01-12 PROCEDURE — 3075F SYST BP GE 130 - 139MM HG: CPT | Performed by: INTERNAL MEDICINE

## 2024-01-12 PROCEDURE — 1159F MED LIST DOCD IN RCRD: CPT | Performed by: INTERNAL MEDICINE

## 2024-01-12 PROCEDURE — 2500000004 HC RX 250 GENERAL PHARMACY W/ HCPCS (ALT 636 FOR OP/ED): Mod: JZ | Performed by: INTERNAL MEDICINE

## 2024-01-12 RX ORDER — DIPHENHYDRAMINE HYDROCHLORIDE 50 MG/ML
50 INJECTION INTRAMUSCULAR; INTRAVENOUS AS NEEDED
Status: CANCELLED | OUTPATIENT
Start: 2024-02-09

## 2024-01-12 RX ORDER — FAMOTIDINE 10 MG/ML
20 INJECTION INTRAVENOUS ONCE AS NEEDED
Status: CANCELLED | OUTPATIENT
Start: 2024-02-09

## 2024-01-12 RX ORDER — EPINEPHRINE 0.3 MG/.3ML
0.3 INJECTION SUBCUTANEOUS EVERY 5 MIN PRN
Status: CANCELLED | OUTPATIENT
Start: 2024-02-09

## 2024-01-12 RX ORDER — ALBUTEROL SULFATE 0.83 MG/ML
3 SOLUTION RESPIRATORY (INHALATION) AS NEEDED
Status: CANCELLED | OUTPATIENT
Start: 2024-02-09

## 2024-01-12 RX ORDER — FERROUS SULFATE, DRIED 160(50) MG
2 TABLET, EXTENDED RELEASE ORAL 2 TIMES DAILY
COMMUNITY

## 2024-01-12 RX ORDER — BISMUTH SUBSALICYLATE 262 MG
1 TABLET,CHEWABLE ORAL DAILY
COMMUNITY

## 2024-01-12 RX ADMIN — DENOSUMAB 120 MG: 120 INJECTION SUBCUTANEOUS at 12:29

## 2024-01-12 ASSESSMENT — PAIN SCALES - GENERAL: PAINLEVEL: 0-NO PAIN

## 2024-01-12 NOTE — PROGRESS NOTES
Patient ID: Arabella Springer is a 83 y.o. female.  Referring Physician: No referring provider defined for this encounter.  Primary Care Provider: Abraham Mohan DO  Visit Type: Follow Up      Subjective    HPI My skin is slowly getting better    Review of Systems   Constitutional: Negative.    HENT:  Negative.     Eyes: Negative.    Respiratory: Negative.     Cardiovascular: Negative.    Gastrointestinal: Negative.    Endocrine: Negative.    Genitourinary: Negative.     Musculoskeletal: Negative.    Skin:  Positive for itching and rash.   Neurological: Negative.    Hematological: Negative.    Psychiatric/Behavioral: Negative.          Objective   BSA: 1.83 meters squared  Resp 18   Wt 77.8 kg (171 lb 8.3 oz)   BMI 32.41 kg/m²      has no past medical history on file.   has no past surgical history on file.  No family history on file.  Oncology History   Breast cancer metastasized to bone (CMS/HCC)   9/8/2023 -  Chemotherapy    Pembrolizumab, 21 Day Cycles     9/29/2023 Initial Diagnosis    Breast cancer metastasized to bone (CMS/HCC)         Arabella Springer  reports that she has never smoked. She has never used smokeless tobacco.  She  reports no history of alcohol use.  She  reports no history of drug use.    Physical Exam  Vitals reviewed.   Constitutional:       Appearance: Normal appearance.   HENT:      Head: Normocephalic.      Mouth/Throat:      Mouth: Mucous membranes are moist.   Eyes:      Extraocular Movements: Extraocular movements intact.      Pupils: Pupils are equal, round, and reactive to light.   Cardiovascular:      Rate and Rhythm: Normal rate and regular rhythm.      Pulses: Normal pulses.      Heart sounds: Normal heart sounds.   Pulmonary:      Breath sounds: Normal breath sounds.   Abdominal:      General: Bowel sounds are normal.      Palpations: Abdomen is soft.   Musculoskeletal:         General: Normal range of motion.      Cervical back: Normal range of motion and neck supple.  "  Skin:     Findings: Erythema, lesion and rash present.   Neurological:      General: No focal deficit present.      Mental Status: She is alert and oriented to person, place, and time.   Psychiatric:         Mood and Affect: Mood normal.         Behavior: Behavior normal.         WBC   Date/Time Value Ref Range Status   01/11/2024 11:51 AM 6.5 4.4 - 11.3 x10*3/uL Final   12/14/2023 10:02 AM 4.3 (L) 4.4 - 11.3 x10*3/uL Final   11/09/2023 09:33 AM 3.7 (L) 4.4 - 11.3 x10*3/uL Final     No results found for: \"NRBC\"  RBC   Date Value Ref Range Status   01/11/2024 3.01 (L) 4.00 - 5.20 x10*6/uL Final   12/14/2023 2.86 (L) 4.00 - 5.20 x10*6/uL Final   11/09/2023 2.84 (L) 4.00 - 5.20 x10*6/uL Final     Hemoglobin   Date Value Ref Range Status   01/11/2024 9.6 (L) 12.0 - 16.0 g/dL Final   12/14/2023 9.1 (L) 12.0 - 16.0 g/dL Final   11/09/2023 9.0 (L) 12.0 - 16.0 g/dL Final     Hematocrit   Date Value Ref Range Status   01/11/2024 30.2 (L) 36.0 - 46.0 % Final   12/14/2023 28.6 (L) 36.0 - 46.0 % Final   11/09/2023 27.9 (L) 36.0 - 46.0 % Final     MCV   Date/Time Value Ref Range Status   01/11/2024 11:51  80 - 100 fL Final   12/14/2023 10:02  80 - 100 fL Final   11/09/2023 09:33 AM 98 80 - 100 fL Final     MCH   Date/Time Value Ref Range Status   01/11/2024 11:51 AM 31.9 26.0 - 34.0 pg Final   12/14/2023 10:02 AM 31.8 26.0 - 34.0 pg Final   11/09/2023 09:33 AM 31.7 26.0 - 34.0 pg Final     MCHC   Date/Time Value Ref Range Status   01/11/2024 11:51 AM 31.8 (L) 32.0 - 36.0 g/dL Final   12/14/2023 10:02 AM 31.8 (L) 32.0 - 36.0 g/dL Final   11/09/2023 09:33 AM 32.3 32.0 - 36.0 g/dL Final     RDW   Date/Time Value Ref Range Status   01/11/2024 11:51 AM 16.1 (H) 11.5 - 14.5 % Final   12/14/2023 10:02 AM 16.4 (H) 11.5 - 14.5 % Final   11/09/2023 09:33 AM 15.1 (H) 11.5 - 14.5 % Final     Platelets   Date/Time Value Ref Range Status   01/11/2024 11:51  150 - 450 x10*3/uL Final   12/14/2023 10:02  150 - 450 " x10*3/uL Final   11/09/2023 09:33  150 - 450 x10*3/uL Final     MPV   Date/Time Value Ref Range Status   10/19/2023 08:01 AM 9.2 7.5 - 11.5 fL Final     Neutrophils %   Date/Time Value Ref Range Status   01/11/2024 11:51 AM 56.5 40.0 - 80.0 % Final   12/14/2023 10:02 AM 53.6 40.0 - 80.0 % Final   11/09/2023 09:33 AM 45.3 40.0 - 80.0 % Final     Immature Granulocytes %, Automated   Date/Time Value Ref Range Status   01/11/2024 11:51 AM 1.6 (H) 0.0 - 0.9 % Final     Comment:     Immature Granulocyte Count (IG) includes promyelocytes, myelocytes and metamyelocytes but does not include bands. Percent differential counts (%) should be interpreted in the context of the absolute cell counts (cells/UL).   12/14/2023 10:02 AM 1.2 (H) 0.0 - 0.9 % Final     Comment:     Immature Granulocyte Count (IG) includes promyelocytes, myelocytes and metamyelocytes but does not include bands. Percent differential counts (%) should be interpreted in the context of the absolute cell counts (cells/UL).   11/09/2023 09:33 AM 2.1 (H) 0.0 - 0.9 % Final     Comment:     Immature Granulocyte Count (IG) includes promyelocytes, myelocytes and metamyelocytes but does not include bands. Percent differential counts (%) should be interpreted in the context of the absolute cell counts (cells/UL).     Lymphocytes %   Date/Time Value Ref Range Status   01/11/2024 11:51 AM 36.1 13.0 - 44.0 % Final   12/14/2023 10:02 AM 34.0 13.0 - 44.0 % Final   11/09/2023 09:33 AM 40.8 13.0 - 44.0 % Final     Monocytes %   Date/Time Value Ref Range Status   01/11/2024 11:51 AM 5.0 2.0 - 10.0 % Final   12/14/2023 10:02 AM 8.6 2.0 - 10.0 % Final   11/09/2023 09:33 AM 8.6 2.0 - 10.0 % Final     Eosinophils %   Date/Time Value Ref Range Status   01/11/2024 11:51 AM 0.5 0.0 - 6.0 % Final   12/14/2023 10:02 AM 2.1 0.0 - 6.0 % Final   11/09/2023 09:33 AM 2.7 0.0 - 6.0 % Final     Basophils %   Date/Time Value Ref Range Status   01/11/2024 11:51 AM 0.3 0.0 - 2.0 % Final  "  12/14/2023 10:02 AM 0.5 0.0 - 2.0 % Final   11/09/2023 09:33 AM 0.5 0.0 - 2.0 % Final     Neutrophils Absolute   Date/Time Value Ref Range Status   01/11/2024 11:51 AM 3.65 1.60 - 5.50 x10*3/uL Final     Comment:     Percent differential counts (%) should be interpreted in the context of the absolute cell counts (cells/uL).   12/14/2023 10:02 AM 2.31 1.60 - 5.50 x10*3/uL Final     Comment:     Percent differential counts (%) should be interpreted in the context of the absolute cell counts (cells/uL).   11/09/2023 09:33 AM 1.69 1.60 - 5.50 x10*3/uL Final     Comment:     Percent differential counts (%) should be interpreted in the context of the absolute cell counts (cells/uL).     Immature Granulocytes Absolute, Automated   Date/Time Value Ref Range Status   01/11/2024 11:51 AM 0.10 0.00 - 0.50 x10*3/uL Final   12/14/2023 10:02 AM 0.05 0.00 - 0.50 x10*3/uL Final   11/09/2023 09:33 AM 0.08 0.00 - 0.50 x10*3/uL Final     Lymphocytes Absolute   Date/Time Value Ref Range Status   01/11/2024 11:51 AM 2.33 0.80 - 3.00 x10*3/uL Final   12/14/2023 10:02 AM 1.46 0.80 - 3.00 x10*3/uL Final   11/09/2023 09:33 AM 1.52 0.80 - 3.00 x10*3/uL Final     Monocytes Absolute   Date/Time Value Ref Range Status   01/11/2024 11:51 AM 0.32 0.05 - 0.80 x10*3/uL Final   12/14/2023 10:02 AM 0.37 0.05 - 0.80 x10*3/uL Final   11/09/2023 09:33 AM 0.32 0.05 - 0.80 x10*3/uL Final     Eosinophils Absolute   Date/Time Value Ref Range Status   01/11/2024 11:51 AM 0.03 0.00 - 0.40 x10*3/uL Final   12/14/2023 10:02 AM 0.09 0.00 - 0.40 x10*3/uL Final   11/09/2023 09:33 AM 0.10 0.00 - 0.40 x10*3/uL Final     Basophils Absolute   Date/Time Value Ref Range Status   01/11/2024 11:51 AM 0.02 0.00 - 0.10 x10*3/uL Final   12/14/2023 10:02 AM 0.02 0.00 - 0.10 x10*3/uL Final   11/09/2023 09:33 AM 0.02 0.00 - 0.10 x10*3/uL Final       No components found for: \"PT\"  No results found for: \"APTT\"  Medication Documentation Review Audit       Reviewed by Nimisha SCHWAB" Cedrick on 01/12/24 at 1134      Medication Order Taking? Sig Documenting Provider Last Dose Status   amLODIPine (Norvasc) 2.5 mg tablet [id number] Yes Take 1 tablet (2.5 mg) by mouth once daily. Historical MD Roxann Taking Active   brimonidine-timoloL (Combigan) 0.2-0.5 % ophthalmic solution [id number] Yes Administer 1 drop into both eyes every 12 hours. Historical Provider, MD Taking Active   calcium carbonate-vitamin D3 500 mg-5 mcg (200 unit) tablet [id number] Yes Take 2 tablets by mouth 2 times a day. Historical Provider, MD Taking Active   denosumab (Xgeva) 120 mg/1.7 mL (70 mg/mL) injection [id number] Yes Inject 1.7 mL (120 mg) under the skin 1 time. Historical Provider, MD Taking Active   diclofenac (Voltaren) 50 mg EC tablet [id number] Yes Take 1 tablet (50 mg) by mouth 2 times a day with meals. Leigh Dumont PA-C Taking Active   esomeprazole (NexIUM) 40 mg DR capsule [id number] Yes Take 1 capsule (40 mg) by mouth once daily in the morning. Take before meals. Historical Provider, MD Taking Active   letrozole (Femara) 2.5 mg tablet [id number] Yes Take 1 tablet (2.5 mg total) by mouth once daily.  Take with or without food. Andrew Mcfarland MD Taking Active   LORazepam (Ativan) 0.5 mg tablet [id number] Yes Take 1 tablet (0.5 mg) by mouth every 2 hours if needed for anxiety (anxiety related to radiology tests) for up to 5 doses. Andrew Mcfarland MD Taking Active   losartan-hydrochlorothiazide (Hyzaar) 100-12.5 mg tablet [id number] Yes Take 1 tablet by mouth once daily. Historical Provider, MD Taking Active   multivitamin tablet [id number] Yes Take 1 tablet by mouth once daily. Historical Provider, MD Taking Active   pembrolizumab (Keytruda) 25 mg/mL chemo injection [id number] No Infuse into a venous catheter 1 time. Historical Provider, MD Not Taking Active   pravastatin (Pravachol) 10 mg tablet [id number] Yes Take 1 tablet (10 mg) by mouth once daily. Historical Provider, MD Taking Active   traMADol (Ultram) 50 mg tablet  [id number] Yes TAKE ONE TABLET BY MOUTH EVERY 8 HOURS AS NEEDED FOR MODERATE TO SEVERE PAIN Historical Provider, MD Taking Active                   Assessment/Plan    1) stage IV breast cancer  -has high TMB  -has BRCA1  variant of uncertain significance  -also has PIK3CA mutation  -pembrolizumab has been on hold since she developed a diffuse body rash, course of prednisone did not help, in fact a week after finishing prednisone, the rash got worse--papules have become confluent red/pink areas  -letrozole and xgeva also currently on hold  -she did see her dermatologist (Dr Wolff)--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, and she did have a punch biopsy done--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  -she is now on a longer PO prednisone taper as well as topical steroids  -skin is slowly getting better  -labs done on 1/11/2024 included CBC, COMP, CA 27.29  -results reviewed--wbc 6.5, hgb 9.6, plt 185,000, creatinine 0.86, calcium 8.7, albumin 4.0, AST 17, ALT 15, CA 27.29 640  -benefits, risks potential morbidity related to xgeva were reviewed with Arabella and she provided informed consent to proceed  -she went on to receive xgeva 120 mg SC  -advised her to resume letrozole 2.5 mg daily as well  -will return in 4 weeks     2) bone metastases  -secondary to metastatic breast cancer  -last received xgeva on 11/10  -due for xgeva next visit     3) anemia  -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  -hgb slowly improving     4) hyperlipidemia  -on pravastatin     5) arthritis  -on tylenol PRN  -on diclofenac PRN  -received steroid injections into her knees and she feels so much better     6) hypertension  -on amlodipine  -on losartan-HCTZ     7) GERD  -on nexium     8) glaucoma  -on combigan eyedrops     Problem List Items Addressed This Visit             ICD-10-CM    Breast cancer metastasized to bone (CMS/HCC) C50.919, C79.51    Relevant Orders    Clinic  Appointment Request Chemo Follow Up; ANDREW MCFARLAND; [facility]    Infusion Appointment Request [id number] INF[facility] (Completed)    Lytic bone lesions on xray - Primary M89.9    Relevant Orders    Clinic Appointment Request Chemo Follow Up; ANDREW MCFARLAND; [facility]    Infusion Appointment Request [facility] (Completed)            Andrew Mcfarland MD                          negative...

## 2024-01-14 PROBLEM — L40.9 PSORIASIS: Status: ACTIVE | Noted: 2024-01-14

## 2024-01-14 LAB — ACTH PLAS-MCNC: 1.8 PG/ML (ref 7.2–63.3)

## 2024-01-14 ASSESSMENT — ENCOUNTER SYMPTOMS
CONSTITUTIONAL NEGATIVE: 1
EYES NEGATIVE: 1
PSYCHIATRIC NEGATIVE: 1
CARDIOVASCULAR NEGATIVE: 1
ENDOCRINE NEGATIVE: 1
RESPIRATORY NEGATIVE: 1
NEUROLOGICAL NEGATIVE: 1
HEMATOLOGIC/LYMPHATIC NEGATIVE: 1
MUSCULOSKELETAL NEGATIVE: 1
GASTROINTESTINAL NEGATIVE: 1

## 2024-01-17 ENCOUNTER — TELEPHONE (OUTPATIENT)
Dept: HEMATOLOGY/ONCOLOGY | Facility: CLINIC | Age: 84
End: 2024-01-17
Payer: MEDICARE

## 2024-02-01 ENCOUNTER — TELEPHONE (OUTPATIENT)
Dept: HEMATOLOGY/ONCOLOGY | Facility: CLINIC | Age: 84
End: 2024-02-01
Payer: MEDICARE

## 2024-02-01 NOTE — TELEPHONE ENCOUNTER
Reviewed patient chart. Appt is for Xgeva- previously scheduled. Pt currently on Xheva every 28 days. Last dose was 1.12.24 per chart. Has FUV and Xgeva appt on 2.9.24- which is correct timing. Will cancel tomorrow's appt.

## 2024-02-02 ENCOUNTER — APPOINTMENT (OUTPATIENT)
Dept: HEMATOLOGY/ONCOLOGY | Facility: CLINIC | Age: 84
End: 2024-02-02
Payer: MEDICARE

## 2024-02-08 ENCOUNTER — LAB (OUTPATIENT)
Dept: LAB | Facility: CLINIC | Age: 84
End: 2024-02-08
Payer: MEDICARE

## 2024-02-08 DIAGNOSIS — C79.51 CARCINOMA OF BREAST METASTATIC TO BONE, UNSPECIFIED LATERALITY (MULTI): ICD-10-CM

## 2024-02-08 DIAGNOSIS — C50.919 CARCINOMA OF BREAST METASTATIC TO BONE, UNSPECIFIED LATERALITY (MULTI): ICD-10-CM

## 2024-02-08 LAB
ALBUMIN SERPL BCP-MCNC: 3.9 G/DL (ref 3.4–5)
ALP SERPL-CCNC: 108 U/L (ref 33–136)
ALT SERPL W P-5'-P-CCNC: 13 U/L (ref 7–45)
ANION GAP SERPL CALC-SCNC: 14 MMOL/L (ref 10–20)
AST SERPL W P-5'-P-CCNC: 18 U/L (ref 9–39)
BASOPHILS # BLD AUTO: 0.02 X10*3/UL (ref 0–0.1)
BASOPHILS NFR BLD AUTO: 0.4 %
BILIRUB SERPL-MCNC: 0.3 MG/DL (ref 0–1.2)
BUN SERPL-MCNC: 23 MG/DL (ref 6–23)
CALCIUM SERPL-MCNC: 8.2 MG/DL (ref 8.6–10.3)
CANCER AG27-29 SERPL-ACNC: 419.5 U/ML (ref 0–38.6)
CHLORIDE SERPL-SCNC: 102 MMOL/L (ref 98–107)
CO2 SERPL-SCNC: 24 MMOL/L (ref 21–32)
CREAT SERPL-MCNC: 1.11 MG/DL (ref 0.5–1.05)
EGFRCR SERPLBLD CKD-EPI 2021: 50 ML/MIN/1.73M*2
EOSINOPHIL # BLD AUTO: 0.11 X10*3/UL (ref 0–0.4)
EOSINOPHIL NFR BLD AUTO: 2.1 %
ERYTHROCYTE [DISTWIDTH] IN BLOOD BY AUTOMATED COUNT: 14.7 % (ref 11.5–14.5)
GLUCOSE SERPL-MCNC: 112 MG/DL (ref 74–99)
HCT VFR BLD AUTO: 29.4 % (ref 36–46)
HGB BLD-MCNC: 9.4 G/DL (ref 12–16)
IMM GRANULOCYTES # BLD AUTO: 0.07 X10*3/UL (ref 0–0.5)
IMM GRANULOCYTES NFR BLD AUTO: 1.4 % (ref 0–0.9)
LYMPHOCYTES # BLD AUTO: 1.62 X10*3/UL (ref 0.8–3)
LYMPHOCYTES NFR BLD AUTO: 31.6 %
MCH RBC QN AUTO: 32.1 PG (ref 26–34)
MCHC RBC AUTO-ENTMCNC: 32 G/DL (ref 32–36)
MCV RBC AUTO: 100 FL (ref 80–100)
MONOCYTES # BLD AUTO: 0.51 X10*3/UL (ref 0.05–0.8)
MONOCYTES NFR BLD AUTO: 9.9 %
NEUTROPHILS # BLD AUTO: 2.8 X10*3/UL (ref 1.6–5.5)
NEUTROPHILS NFR BLD AUTO: 54.6 %
PLATELET # BLD AUTO: 216 X10*3/UL (ref 150–450)
POTASSIUM SERPL-SCNC: 3.8 MMOL/L (ref 3.5–5.3)
PROT SERPL-MCNC: 6.2 G/DL (ref 6.4–8.2)
RBC # BLD AUTO: 2.93 X10*6/UL (ref 4–5.2)
SODIUM SERPL-SCNC: 136 MMOL/L (ref 136–145)
WBC # BLD AUTO: 5.1 X10*3/UL (ref 4.4–11.3)

## 2024-02-08 PROCEDURE — 85025 COMPLETE CBC W/AUTO DIFF WBC: CPT

## 2024-02-08 PROCEDURE — 86300 IMMUNOASSAY TUMOR CA 15-3: CPT | Performed by: INTERNAL MEDICINE

## 2024-02-08 PROCEDURE — 36415 COLL VENOUS BLD VENIPUNCTURE: CPT

## 2024-02-08 PROCEDURE — 80053 COMPREHEN METABOLIC PANEL: CPT

## 2024-02-09 ENCOUNTER — OFFICE VISIT (OUTPATIENT)
Dept: HEMATOLOGY/ONCOLOGY | Facility: CLINIC | Age: 84
End: 2024-02-09
Payer: MEDICARE

## 2024-02-09 ENCOUNTER — INFUSION (OUTPATIENT)
Dept: HEMATOLOGY/ONCOLOGY | Facility: CLINIC | Age: 84
End: 2024-02-09
Payer: MEDICARE

## 2024-02-09 VITALS
DIASTOLIC BLOOD PRESSURE: 76 MMHG | SYSTOLIC BLOOD PRESSURE: 133 MMHG | RESPIRATION RATE: 16 BRPM | HEART RATE: 87 BPM | BODY MASS INDEX: 31.99 KG/M2 | TEMPERATURE: 97.7 F | WEIGHT: 169.31 LBS | OXYGEN SATURATION: 93 %

## 2024-02-09 DIAGNOSIS — M89.9 LYTIC BONE LESIONS ON XRAY: ICD-10-CM

## 2024-02-09 DIAGNOSIS — M19.90 ARTHRITIS: ICD-10-CM

## 2024-02-09 DIAGNOSIS — I10 ESSENTIAL HYPERTENSION: ICD-10-CM

## 2024-02-09 DIAGNOSIS — H40.9 GLAUCOMA OF BOTH EYES, UNSPECIFIED GLAUCOMA TYPE: ICD-10-CM

## 2024-02-09 DIAGNOSIS — C79.51 CARCINOMA OF RIGHT BREAST METASTATIC TO BONE (MULTI): ICD-10-CM

## 2024-02-09 DIAGNOSIS — E78.2 MIXED HYPERLIPIDEMIA: ICD-10-CM

## 2024-02-09 DIAGNOSIS — C79.51 CARCINOMA OF BREAST METASTATIC TO BONE, UNSPECIFIED LATERALITY (MULTI): ICD-10-CM

## 2024-02-09 DIAGNOSIS — C50.911 CARCINOMA OF RIGHT BREAST METASTATIC TO BONE (MULTI): ICD-10-CM

## 2024-02-09 DIAGNOSIS — C79.51 CARCINOMA OF RIGHT BREAST METASTATIC TO BONE (MULTI): Primary | ICD-10-CM

## 2024-02-09 DIAGNOSIS — C50.911 CARCINOMA OF RIGHT BREAST METASTATIC TO BONE (MULTI): Primary | ICD-10-CM

## 2024-02-09 DIAGNOSIS — D61.82 MYELOPHTHISIC ANEMIA (MULTI): ICD-10-CM

## 2024-02-09 DIAGNOSIS — C50.919 CARCINOMA OF BREAST METASTATIC TO BONE, UNSPECIFIED LATERALITY (MULTI): ICD-10-CM

## 2024-02-09 DIAGNOSIS — K21.00 GASTROESOPHAGEAL REFLUX DISEASE WITH ESOPHAGITIS WITHOUT HEMORRHAGE: ICD-10-CM

## 2024-02-09 PROCEDURE — 3075F SYST BP GE 130 - 139MM HG: CPT | Performed by: INTERNAL MEDICINE

## 2024-02-09 PROCEDURE — 1126F AMNT PAIN NOTED NONE PRSNT: CPT | Performed by: INTERNAL MEDICINE

## 2024-02-09 PROCEDURE — 3078F DIAST BP <80 MM HG: CPT | Performed by: INTERNAL MEDICINE

## 2024-02-09 PROCEDURE — 1036F TOBACCO NON-USER: CPT | Performed by: INTERNAL MEDICINE

## 2024-02-09 PROCEDURE — 99214 OFFICE O/P EST MOD 30 MIN: CPT | Performed by: INTERNAL MEDICINE

## 2024-02-09 PROCEDURE — 1159F MED LIST DOCD IN RCRD: CPT | Performed by: INTERNAL MEDICINE

## 2024-02-09 ASSESSMENT — PAIN SCALES - GENERAL: PAINLEVEL: 0-NO PAIN

## 2024-02-09 NOTE — PROGRESS NOTES
Patient ID: Arabella Springer is a 83 y.o. female.  Referring Physician: nAdrew Mcfarland MD  [address] [address]  [place] [address]  Primary Care Provider: Abraham Mohan DO  Visit Type: Follow Up      Subjective    HPI My psoriasis is slowly getting better    Review of Systems   Constitutional: Negative.    HENT:  Negative.     Eyes: Negative.    Respiratory: Negative.     Cardiovascular: Negative.    Gastrointestinal: Negative.    Endocrine: Negative.    Genitourinary: Negative.     Musculoskeletal: Negative.    Skin:  Positive for itching and rash.   Neurological: Negative.    Hematological: Negative.    Psychiatric/Behavioral: Negative.          Objective   BSA: 1.82 meters squared  /76 (BP Location: Left arm)   Pulse 87   Temp 36.5 °C (97.7 °F) (Temporal)   Resp 16   Wt 76.8 kg (169 lb 5 oz)   SpO2 93%   BMI 31.99 kg/m²      has no past medical history on file.   has no past surgical history on file.  No family history on file.  Oncology History   Breast cancer metastasized to bone (CMS/HCC)   9/8/2023 - 11/10/2023 Chemotherapy    Pembrolizumab, 21 Day Cycles     9/29/2023 Initial Diagnosis    Breast cancer metastasized to bone (CMS/HCC)         Arabella Springer  reports that she has never smoked. She has never used smokeless tobacco.  She  reports no history of alcohol use.  She  reports no history of drug use.    Physical Exam  Vitals reviewed.   Constitutional:       Appearance: Normal appearance.   HENT:      Head: Normocephalic.      Mouth/Throat:      Mouth: Mucous membranes are moist.   Eyes:      Extraocular Movements: Extraocular movements intact.      Pupils: Pupils are equal, round, and reactive to light.   Cardiovascular:      Rate and Rhythm: Normal rate and regular rhythm.      Heart sounds: Normal heart sounds.   Pulmonary:      Breath sounds: Normal breath sounds.   Abdominal:      General: Bowel sounds are normal.      Palpations: Abdomen is soft.   Musculoskeletal:     "     General: Normal range of motion.      Cervical back: Normal range of motion and neck supple.   Skin:     Findings: Lesion and rash present.   Neurological:      General: No focal deficit present.      Mental Status: She is alert and oriented to person, place, and time.   Psychiatric:         Mood and Affect: Mood normal.         Behavior: Behavior normal.         WBC   Date/Time Value Ref Range Status   02/08/2024 11:18 AM 5.1 4.4 - 11.3 x10*3/uL Final   01/11/2024 11:51 AM 6.5 4.4 - 11.3 x10*3/uL Final   12/14/2023 10:02 AM 4.3 (L) 4.4 - 11.3 x10*3/uL Final     No results found for: \"NRBC\"  RBC   Date Value Ref Range Status   02/08/2024 2.93 (L) 4.00 - 5.20 x10*6/uL Final   01/11/2024 3.01 (L) 4.00 - 5.20 x10*6/uL Final   12/14/2023 2.86 (L) 4.00 - 5.20 x10*6/uL Final     Hemoglobin   Date Value Ref Range Status   02/08/2024 9.4 (L) 12.0 - 16.0 g/dL Final   01/11/2024 9.6 (L) 12.0 - 16.0 g/dL Final   12/14/2023 9.1 (L) 12.0 - 16.0 g/dL Final     Hematocrit   Date Value Ref Range Status   02/08/2024 29.4 (L) 36.0 - 46.0 % Final   01/11/2024 30.2 (L) 36.0 - 46.0 % Final   12/14/2023 28.6 (L) 36.0 - 46.0 % Final     MCV   Date/Time Value Ref Range Status   02/08/2024 11:18  80 - 100 fL Final   01/11/2024 11:51  80 - 100 fL Final   12/14/2023 10:02  80 - 100 fL Final     MCH   Date/Time Value Ref Range Status   02/08/2024 11:18 AM 32.1 26.0 - 34.0 pg Final   01/11/2024 11:51 AM 31.9 26.0 - 34.0 pg Final   12/14/2023 10:02 AM 31.8 26.0 - 34.0 pg Final     MCHC   Date/Time Value Ref Range Status   02/08/2024 11:18 AM 32.0 32.0 - 36.0 g/dL Final   01/11/2024 11:51 AM 31.8 (L) 32.0 - 36.0 g/dL Final   12/14/2023 10:02 AM 31.8 (L) 32.0 - 36.0 g/dL Final     RDW   Date/Time Value Ref Range Status   02/08/2024 11:18 AM 14.7 (H) 11.5 - 14.5 % Final   01/11/2024 11:51 AM 16.1 (H) 11.5 - 14.5 % Final   12/14/2023 10:02 AM 16.4 (H) 11.5 - 14.5 % Final     Platelets   Date/Time Value Ref Range Status "   02/08/2024 11:18  150 - 450 x10*3/uL Final   01/11/2024 11:51  150 - 450 x10*3/uL Final   12/14/2023 10:02  150 - 450 x10*3/uL Final     MPV   Date/Time Value Ref Range Status   10/19/2023 08:01 AM 9.2 7.5 - 11.5 fL Final     Neutrophils %   Date/Time Value Ref Range Status   02/08/2024 11:18 AM 54.6 40.0 - 80.0 % Final   01/11/2024 11:51 AM 56.5 40.0 - 80.0 % Final   12/14/2023 10:02 AM 53.6 40.0 - 80.0 % Final     Immature Granulocytes %, Automated   Date/Time Value Ref Range Status   02/08/2024 11:18 AM 1.4 (H) 0.0 - 0.9 % Final     Comment:     Immature Granulocyte Count (IG) includes promyelocytes, myelocytes and metamyelocytes but does not include bands. Percent differential counts (%) should be interpreted in the context of the absolute cell counts (cells/UL).   01/11/2024 11:51 AM 1.6 (H) 0.0 - 0.9 % Final     Comment:     Immature Granulocyte Count (IG) includes promyelocytes, myelocytes and metamyelocytes but does not include bands. Percent differential counts (%) should be interpreted in the context of the absolute cell counts (cells/UL).   12/14/2023 10:02 AM 1.2 (H) 0.0 - 0.9 % Final     Comment:     Immature Granulocyte Count (IG) includes promyelocytes, myelocytes and metamyelocytes but does not include bands. Percent differential counts (%) should be interpreted in the context of the absolute cell counts (cells/UL).     Lymphocytes %   Date/Time Value Ref Range Status   02/08/2024 11:18 AM 31.6 13.0 - 44.0 % Final   01/11/2024 11:51 AM 36.1 13.0 - 44.0 % Final   12/14/2023 10:02 AM 34.0 13.0 - 44.0 % Final     Monocytes %   Date/Time Value Ref Range Status   02/08/2024 11:18 AM 9.9 2.0 - 10.0 % Final   01/11/2024 11:51 AM 5.0 2.0 - 10.0 % Final   12/14/2023 10:02 AM 8.6 2.0 - 10.0 % Final     Eosinophils %   Date/Time Value Ref Range Status   02/08/2024 11:18 AM 2.1 0.0 - 6.0 % Final   01/11/2024 11:51 AM 0.5 0.0 - 6.0 % Final   12/14/2023 10:02 AM 2.1 0.0 - 6.0 % Final  "    Basophils %   Date/Time Value Ref Range Status   02/08/2024 11:18 AM 0.4 0.0 - 2.0 % Final   01/11/2024 11:51 AM 0.3 0.0 - 2.0 % Final   12/14/2023 10:02 AM 0.5 0.0 - 2.0 % Final     Neutrophils Absolute   Date/Time Value Ref Range Status   02/08/2024 11:18 AM 2.80 1.60 - 5.50 x10*3/uL Final     Comment:     Percent differential counts (%) should be interpreted in the context of the absolute cell counts (cells/uL).   01/11/2024 11:51 AM 3.65 1.60 - 5.50 x10*3/uL Final     Comment:     Percent differential counts (%) should be interpreted in the context of the absolute cell counts (cells/uL).   12/14/2023 10:02 AM 2.31 1.60 - 5.50 x10*3/uL Final     Comment:     Percent differential counts (%) should be interpreted in the context of the absolute cell counts (cells/uL).     Immature Granulocytes Absolute, Automated   Date/Time Value Ref Range Status   02/08/2024 11:18 AM 0.07 0.00 - 0.50 x10*3/uL Final   01/11/2024 11:51 AM 0.10 0.00 - 0.50 x10*3/uL Final   12/14/2023 10:02 AM 0.05 0.00 - 0.50 x10*3/uL Final     Lymphocytes Absolute   Date/Time Value Ref Range Status   02/08/2024 11:18 AM 1.62 0.80 - 3.00 x10*3/uL Final   01/11/2024 11:51 AM 2.33 0.80 - 3.00 x10*3/uL Final   12/14/2023 10:02 AM 1.46 0.80 - 3.00 x10*3/uL Final     Monocytes Absolute   Date/Time Value Ref Range Status   02/08/2024 11:18 AM 0.51 0.05 - 0.80 x10*3/uL Final   01/11/2024 11:51 AM 0.32 0.05 - 0.80 x10*3/uL Final   12/14/2023 10:02 AM 0.37 0.05 - 0.80 x10*3/uL Final     Eosinophils Absolute   Date/Time Value Ref Range Status   02/08/2024 11:18 AM 0.11 0.00 - 0.40 x10*3/uL Final   01/11/2024 11:51 AM 0.03 0.00 - 0.40 x10*3/uL Final   12/14/2023 10:02 AM 0.09 0.00 - 0.40 x10*3/uL Final     Basophils Absolute   Date/Time Value Ref Range Status   02/08/2024 11:18 AM 0.02 0.00 - 0.10 x10*3/uL Final   01/11/2024 11:51 AM 0.02 0.00 - 0.10 x10*3/uL Final   12/14/2023 10:02 AM 0.02 0.00 - 0.10 x10*3/uL Final       No components found for: \"PT\"  No " "results found for: \"APTT\"  Medication Documentation Review Audit       Reviewed by Alina Greene MA (Medical Assistant) on 02/09/24 at 1422      Medication Order Taking? Sig Documenting Provider Last Dose Status   amLODIPine (Norvasc) 2.5 mg tablet [id number] Yes Take 1 tablet (2.5 mg) by mouth once daily. Historical MD Roxann Taking Active   brimonidine-timoloL (Combigan) 0.2-0.5 % ophthalmic solution [id number] Yes Administer 1 drop into both eyes every 12 hours. Historical Provider, MD Taking Active   calcium carbonate-vitamin D3 500 mg-5 mcg (200 unit) tablet [id number] Yes Take 2 tablets by mouth 2 times a day. Historical Provider, MD Taking Active   denosumab (Xgeva) 120 mg/1.7 mL (70 mg/mL) injection [id number] Yes Inject 1.7 mL (120 mg) under the skin 1 time. Historical Provider, MD Taking Active   diclofenac (Voltaren) 50 mg EC tablet [id number] Yes Take 1 tablet (50 mg) by mouth 2 times a day with meals. Leigh Dumont PA-C Taking Active   esomeprazole (NexIUM) 40 mg DR capsule [id number] Yes Take 1 capsule (40 mg) by mouth once daily in the morning. Take before meals. Historical Provider, MD Taking Active   letrozole (Femara) 2.5 mg tablet [id number] Yes Take 1 tablet (2.5 mg total) by mouth once daily.  Take with or without food. Andrew Mcfarland MD Taking Active   LORazepam (Ativan) 0.5 mg tablet [id number] Yes Take 1 tablet (0.5 mg) by mouth every 2 hours if needed for anxiety (anxiety related to radiology tests) for up to 5 doses. Andrew Mcfarland MD Taking Active   losartan-hydrochlorothiazide (Hyzaar) 100-12.5 mg tablet [id number] Yes Take 1 tablet by mouth once daily. Historical Provider, MD Taking Active   multivitamin tablet [id number] Yes Take 1 tablet by mouth once daily. Historical Provider, MD Taking Active   pravastatin (Pravachol) 10 mg tablet [id number] Yes Take 1 tablet (10 mg) by mouth once daily. Historical Provider, MD Taking Active   traMADol (Ultram) 50 mg tablet [id number] Yes TAKE ONE TABLET BY MOUTH " EVERY 8 HOURS AS NEEDED FOR MODERATE TO SEVERE PAIN Historical Provider, MD Taking Active                   Assessment/Plan    1) stage IV breast cancer  -has high TMB  -has BRCA1  variant of uncertain significance  -also has PIK3CA mutation  -pembrolizumab has been on hold since she developed a diffuse body rash, course of prednisone did not help, in fact a week after finishing prednisone, the rash got worse--papules have become confluent red/pink areas  -she did see her dermatologist (Dr Wolff)--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, and she did have a punch biopsy done--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  -she is now on a longer PO prednisone taper as well as topical steroids  -skin is slowly getting better  -went to he ED on 1/16/2024 for evaluation of chest/chest wall pain--no evidence of cardiac ischemia, but imaging showed 2 new additional rib fractures--pain is slowly getting better  -labs done on 2.8/2024 included CBC, COMP, CA 27.29  -results reviewed--wbc 5.1, hgb 9.4, plt 216,000, creatinine 1.11, calcium 8.2, albumin 3.9, AST 18, ALT 13, CA 27.29 419 (down from 640)  -as her calcium today was <8.4, we will need to withhold xgeva today  -advised her to continue taking calcium + D  -advised her to continue letrozole 2.5 mg daily   -will return in 4 weeks     2) bone metastases  -secondary to metastatic breast cancer  -receives xgeva Q4 weeks     3) anemia  -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  -hgb slowly improving     4) hyperlipidemia  -on pravastatin     5) arthritis  -on tylenol PRN  -on diclofenac PRN  -received steroid injections into her knees and she feels so much better     6) hypertension  -on amlodipine  -on losartan-HCTZ     7) GERD  -on nexium     8) glaucoma  -on combigan eyedrops     Problem List Items Addressed This Visit             ICD-10-CM    Breast cancer metastasized to bone (CMS/HCC) C50.919, C79.51     Relevant Orders    CBC and Auto Differential (Completed)    Comprehensive Metabolic Panel (Completed)    Cancer Antigen 27-29 (Completed)    Clinic Appointment Request Chemo Follow Up; ANDREW [facility]; [facility]    CBC and Auto Differential    Comprehensive metabolic panel    Cancer Antigen 27-29    Lytic bone lesions on xray M89.9    Obesity (BMI 30-39.9) E66.9            Andrew Mcfarland MD

## 2024-02-20 DIAGNOSIS — E78.2 MIXED HYPERLIPIDEMIA: Primary | ICD-10-CM

## 2024-02-20 DIAGNOSIS — I10 HYPERTENSION, UNSPECIFIED TYPE: ICD-10-CM

## 2024-02-20 RX ORDER — PRAVASTATIN SODIUM 10 MG/1
10 TABLET ORAL DAILY
Qty: 90 TABLET | Refills: 0 | Status: SHIPPED | OUTPATIENT
Start: 2024-02-20 | End: 2024-05-20 | Stop reason: SDUPTHER

## 2024-02-20 RX ORDER — AMLODIPINE BESYLATE 2.5 MG/1
2.5 TABLET ORAL DAILY
Qty: 90 TABLET | Refills: 0 | Status: SHIPPED | OUTPATIENT
Start: 2024-02-20 | End: 2024-05-20 | Stop reason: SDUPTHER

## 2024-02-20 RX ORDER — LOSARTAN POTASSIUM AND HYDROCHLOROTHIAZIDE 12.5; 1 MG/1; MG/1
1 TABLET ORAL DAILY
Qty: 90 TABLET | Refills: 0 | Status: SHIPPED | OUTPATIENT
Start: 2024-02-20 | End: 2024-05-20 | Stop reason: SDUPTHER

## 2024-02-22 ASSESSMENT — ENCOUNTER SYMPTOMS
HEMATOLOGIC/LYMPHATIC NEGATIVE: 1
MUSCULOSKELETAL NEGATIVE: 1
PSYCHIATRIC NEGATIVE: 1
RESPIRATORY NEGATIVE: 1
CONSTITUTIONAL NEGATIVE: 1
ENDOCRINE NEGATIVE: 1
GASTROINTESTINAL NEGATIVE: 1
CARDIOVASCULAR NEGATIVE: 1
EYES NEGATIVE: 1
NEUROLOGICAL NEGATIVE: 1

## 2024-03-07 ENCOUNTER — OFFICE VISIT (OUTPATIENT)
Dept: PRIMARY CARE | Facility: CLINIC | Age: 84
End: 2024-03-07
Payer: MEDICARE

## 2024-03-07 ENCOUNTER — LAB (OUTPATIENT)
Dept: LAB | Facility: CLINIC | Age: 84
End: 2024-03-07
Payer: MEDICARE

## 2024-03-07 VITALS
OXYGEN SATURATION: 98 % | RESPIRATION RATE: 16 BRPM | HEART RATE: 88 BPM | TEMPERATURE: 97.8 F | DIASTOLIC BLOOD PRESSURE: 72 MMHG | WEIGHT: 171 LBS | BODY MASS INDEX: 32.31 KG/M2 | SYSTOLIC BLOOD PRESSURE: 122 MMHG

## 2024-03-07 DIAGNOSIS — C50.919 CARCINOMA OF BREAST METASTATIC TO BONE, UNSPECIFIED LATERALITY (MULTI): ICD-10-CM

## 2024-03-07 DIAGNOSIS — C79.51 CARCINOMA OF BREAST METASTATIC TO BONE, UNSPECIFIED LATERALITY (MULTI): ICD-10-CM

## 2024-03-07 DIAGNOSIS — H61.23 BILATERAL IMPACTED CERUMEN: Primary | ICD-10-CM

## 2024-03-07 LAB
ALBUMIN SERPL BCP-MCNC: 3.9 G/DL (ref 3.4–5)
ALP SERPL-CCNC: 96 U/L (ref 33–136)
ALT SERPL W P-5'-P-CCNC: 13 U/L (ref 7–45)
ANION GAP SERPL CALC-SCNC: 12 MMOL/L (ref 10–20)
AST SERPL W P-5'-P-CCNC: 17 U/L (ref 9–39)
BASOPHILS # BLD AUTO: 0.01 X10*3/UL (ref 0–0.1)
BASOPHILS NFR BLD AUTO: 0.3 %
BILIRUB SERPL-MCNC: 0.3 MG/DL (ref 0–1.2)
BUN SERPL-MCNC: 24 MG/DL (ref 6–23)
CALCIUM SERPL-MCNC: 8.2 MG/DL (ref 8.6–10.3)
CANCER AG27-29 SERPL-ACNC: 637.4 U/ML (ref 0–38.6)
CHLORIDE SERPL-SCNC: 102 MMOL/L (ref 98–107)
CO2 SERPL-SCNC: 27 MMOL/L (ref 21–32)
CREAT SERPL-MCNC: 1.01 MG/DL (ref 0.5–1.05)
EGFRCR SERPLBLD CKD-EPI 2021: 56 ML/MIN/1.73M*2
EOSINOPHIL # BLD AUTO: 0.07 X10*3/UL (ref 0–0.4)
EOSINOPHIL NFR BLD AUTO: 1.8 %
ERYTHROCYTE [DISTWIDTH] IN BLOOD BY AUTOMATED COUNT: 14.3 % (ref 11.5–14.5)
GLUCOSE SERPL-MCNC: 110 MG/DL (ref 74–99)
HCT VFR BLD AUTO: 30.2 % (ref 36–46)
HGB BLD-MCNC: 9.6 G/DL (ref 12–16)
IMM GRANULOCYTES # BLD AUTO: 0.04 X10*3/UL (ref 0–0.5)
IMM GRANULOCYTES NFR BLD AUTO: 1 % (ref 0–0.9)
LYMPHOCYTES # BLD AUTO: 1.44 X10*3/UL (ref 0.8–3)
LYMPHOCYTES NFR BLD AUTO: 37.6 %
MCH RBC QN AUTO: 31.7 PG (ref 26–34)
MCHC RBC AUTO-ENTMCNC: 31.8 G/DL (ref 32–36)
MCV RBC AUTO: 100 FL (ref 80–100)
MONOCYTES # BLD AUTO: 0.35 X10*3/UL (ref 0.05–0.8)
MONOCYTES NFR BLD AUTO: 9.1 %
NEUTROPHILS # BLD AUTO: 1.92 X10*3/UL (ref 1.6–5.5)
NEUTROPHILS NFR BLD AUTO: 50.2 %
PLATELET # BLD AUTO: 189 X10*3/UL (ref 150–450)
POTASSIUM SERPL-SCNC: 4 MMOL/L (ref 3.5–5.3)
PROT SERPL-MCNC: 6.1 G/DL (ref 6.4–8.2)
RBC # BLD AUTO: 3.03 X10*6/UL (ref 4–5.2)
SODIUM SERPL-SCNC: 137 MMOL/L (ref 136–145)
WBC # BLD AUTO: 3.8 X10*3/UL (ref 4.4–11.3)

## 2024-03-07 PROCEDURE — 69210 REMOVE IMPACTED EAR WAX UNI: CPT | Performed by: FAMILY MEDICINE

## 2024-03-07 PROCEDURE — 1159F MED LIST DOCD IN RCRD: CPT

## 2024-03-07 PROCEDURE — 36415 COLL VENOUS BLD VENIPUNCTURE: CPT

## 2024-03-07 PROCEDURE — 3074F SYST BP LT 130 MM HG: CPT

## 2024-03-07 PROCEDURE — 84075 ASSAY ALKALINE PHOSPHATASE: CPT

## 2024-03-07 PROCEDURE — 85025 COMPLETE CBC W/AUTO DIFF WBC: CPT

## 2024-03-07 PROCEDURE — 86300 IMMUNOASSAY TUMOR CA 15-3: CPT | Performed by: INTERNAL MEDICINE

## 2024-03-07 PROCEDURE — 1126F AMNT PAIN NOTED NONE PRSNT: CPT

## 2024-03-07 PROCEDURE — 1036F TOBACCO NON-USER: CPT

## 2024-03-07 PROCEDURE — 99213 OFFICE O/P EST LOW 20 MIN: CPT

## 2024-03-07 PROCEDURE — 3078F DIAST BP <80 MM HG: CPT

## 2024-03-07 NOTE — ASSESSMENT & PLAN NOTE
Bilateral ear canals were impacted with cerumen and after flushing the patient's symptoms completely resolved.  Follow-up as needed.

## 2024-03-07 NOTE — PROGRESS NOTES
Subjective   Arabella Springer is a 83 y.o. female who presents for Ear Fullness.  Patient reports 5 days of left ear fullness and decreased hearing.  Originally she thought there was some dry skin on the outside of the ears so she put her head and ears under the shower and she thought maybe she got water stuck inside of it.  She has tried rubbing the front of the ear and yawning and this has not made the sensation go away.  She has never had a sensation that like this before.  She has not put any Q-tips on the inside the ear now.  She has a long history of tinnitus in both ears but it is not worse recently.  She denies any ear pain, ear drainage, dizziness, lightheadedness, stuffy/runny nose, coughs, postnasal drip, sore throat.    Objective   /72 (BP Location: Left arm, Patient Position: Sitting, BP Cuff Size: Adult)   Pulse 88   Temp 36.6 °C (97.8 °F)   Resp 16   Wt 77.6 kg (171 lb)   SpO2 98%   BMI 32.31 kg/m²    PHYSICAL EXAM  Gen: Well appearing, in NAD  Eyes: EOMI  HEENT: Originally bilateral ear canals have impacted cerumen.  Both ears were flushed out with water.  After flushing, bilateral ears canals were mostly clear and TMs were normal.  Heart: RRR, no murmurs  Lungs: No increased work of breathing, CTAB, on RA  Extremities: WWP, cap refill <2sec  Neuro: Alert, symmetrical facies, moves all extremities equally  Psych: Appropriate mood and affect    Assessment/Plan     Problem List Items Addressed This Visit       Bilateral impacted cerumen - Primary     Bilateral ear canals were impacted with cerumen and after flushing the patient's symptoms completely resolved.  Follow-up as needed.           Naina Pisano DO  Family Medicine Resident, PGY-3  [facility]  [address] [place] [address] [phone]

## 2024-03-08 ENCOUNTER — APPOINTMENT (OUTPATIENT)
Dept: HEMATOLOGY/ONCOLOGY | Facility: CLINIC | Age: 84
End: 2024-03-08
Payer: MEDICARE

## 2024-03-08 ENCOUNTER — OFFICE VISIT (OUTPATIENT)
Dept: HEMATOLOGY/ONCOLOGY | Facility: CLINIC | Age: 84
End: 2024-03-08
Payer: MEDICARE

## 2024-03-08 VITALS
WEIGHT: 167.99 LBS | DIASTOLIC BLOOD PRESSURE: 52 MMHG | HEART RATE: 79 BPM | OXYGEN SATURATION: 97 % | BODY MASS INDEX: 31.74 KG/M2 | RESPIRATION RATE: 16 BRPM | TEMPERATURE: 97.2 F | SYSTOLIC BLOOD PRESSURE: 101 MMHG

## 2024-03-08 DIAGNOSIS — I10 ESSENTIAL HYPERTENSION: ICD-10-CM

## 2024-03-08 DIAGNOSIS — M89.9 LYTIC BONE LESIONS ON XRAY: Primary | ICD-10-CM

## 2024-03-08 DIAGNOSIS — E78.2 MIXED HYPERLIPIDEMIA: ICD-10-CM

## 2024-03-08 DIAGNOSIS — C79.51 CARCINOMA OF BREAST METASTATIC TO BONE, UNSPECIFIED LATERALITY (MULTI): ICD-10-CM

## 2024-03-08 DIAGNOSIS — K21.00 GASTROESOPHAGEAL REFLUX DISEASE WITH ESOPHAGITIS WITHOUT HEMORRHAGE: ICD-10-CM

## 2024-03-08 DIAGNOSIS — C50.919 CARCINOMA OF BREAST METASTATIC TO BONE, UNSPECIFIED LATERALITY (MULTI): ICD-10-CM

## 2024-03-08 DIAGNOSIS — D61.82 MYELOPHTHISIC ANEMIA (MULTI): ICD-10-CM

## 2024-03-08 PROCEDURE — 3078F DIAST BP <80 MM HG: CPT | Performed by: INTERNAL MEDICINE

## 2024-03-08 PROCEDURE — 3074F SYST BP LT 130 MM HG: CPT | Performed by: INTERNAL MEDICINE

## 2024-03-08 PROCEDURE — 99214 OFFICE O/P EST MOD 30 MIN: CPT | Performed by: INTERNAL MEDICINE

## 2024-03-08 PROCEDURE — 1159F MED LIST DOCD IN RCRD: CPT | Performed by: INTERNAL MEDICINE

## 2024-03-08 PROCEDURE — 1036F TOBACCO NON-USER: CPT | Performed by: INTERNAL MEDICINE

## 2024-03-08 PROCEDURE — 1126F AMNT PAIN NOTED NONE PRSNT: CPT | Performed by: INTERNAL MEDICINE

## 2024-03-08 ASSESSMENT — PAIN SCALES - GENERAL: PAINLEVEL: 0-NO PAIN

## 2024-03-08 NOTE — PROGRESS NOTES
Patient ID: Arabella Springer is a 83 y.o. female.  Referring Physician: Andrew Mcfarland MD  [address] [facility] [address]  [place] [address]  Primary Care Provider: Abraham Mohan DO  Visit Type: Follow Up      Subjective    HPI My psoriasis is getting a lot better    Review of Systems   Constitutional: Negative.    HENT:  Negative.     Eyes: Negative.    Respiratory: Negative.     Cardiovascular: Negative.    Gastrointestinal: Negative.    Endocrine: Negative.    Genitourinary: Negative.     Musculoskeletal: Negative.    Skin:  Positive for rash.   Neurological: Negative.    Hematological: Negative.    Psychiatric/Behavioral: Negative.          Objective   BSA: There is no height or weight on file to calculate BSA.  There were no vitals taken for this visit.     has no past medical history on file.   has no past surgical history on file.  No family history on file.  Oncology History   Breast cancer metastasized to bone (CMS/HCC)   9/8/2023 - 11/10/2023 Chemotherapy    Pembrolizumab, 21 Day Cycles     9/29/2023 Initial Diagnosis    Breast cancer metastasized to bone (CMS/HCC)         Arabella Sprinegr  reports that she has never smoked. She has never used smokeless tobacco.  She  reports no history of alcohol use.  She  reports no history of drug use.    Physical Exam  Vitals reviewed.   Constitutional:       Appearance: Normal appearance.   HENT:      Head: Normocephalic.      Mouth/Throat:      Mouth: Mucous membranes are moist.   Eyes:      Extraocular Movements: Extraocular movements intact.      Pupils: Pupils are equal, round, and reactive to light.   Cardiovascular:      Rate and Rhythm: Normal rate and regular rhythm.      Pulses: Normal pulses.      Heart sounds: Normal heart sounds.   Pulmonary:      Breath sounds: Normal breath sounds.   Abdominal:      General: Bowel sounds are normal.      Palpations: Abdomen is soft.   Musculoskeletal:         General: Normal range of motion.      Cervical back:  "Normal range of motion and neck supple.   Skin:     General: Skin is warm.   Neurological:      General: No focal deficit present.      Mental Status: She is alert and oriented to person, place, and time.   Psychiatric:         Mood and Affect: Mood normal.         Behavior: Behavior normal.         WBC   Date/Time Value Ref Range Status   03/07/2024 10:31 AM 3.8 (L) 4.4 - 11.3 x10*3/uL Final   02/08/2024 11:18 AM 5.1 4.4 - 11.3 x10*3/uL Final   01/11/2024 11:51 AM 6.5 4.4 - 11.3 x10*3/uL Final     No results found for: \"NRBC\"  RBC   Date Value Ref Range Status   03/07/2024 3.03 (L) 4.00 - 5.20 x10*6/uL Final   02/08/2024 2.93 (L) 4.00 - 5.20 x10*6/uL Final   01/11/2024 3.01 (L) 4.00 - 5.20 x10*6/uL Final     Hemoglobin   Date Value Ref Range Status   03/07/2024 9.6 (L) 12.0 - 16.0 g/dL Final   02/08/2024 9.4 (L) 12.0 - 16.0 g/dL Final   01/11/2024 9.6 (L) 12.0 - 16.0 g/dL Final     Hematocrit   Date Value Ref Range Status   03/07/2024 30.2 (L) 36.0 - 46.0 % Final   02/08/2024 29.4 (L) 36.0 - 46.0 % Final   01/11/2024 30.2 (L) 36.0 - 46.0 % Final     MCV   Date/Time Value Ref Range Status   03/07/2024 10:31  80 - 100 fL Final   02/08/2024 11:18  80 - 100 fL Final   01/11/2024 11:51  80 - 100 fL Final     MCH   Date/Time Value Ref Range Status   03/07/2024 10:31 AM 31.7 26.0 - 34.0 pg Final   02/08/2024 11:18 AM 32.1 26.0 - 34.0 pg Final   01/11/2024 11:51 AM 31.9 26.0 - 34.0 pg Final     MCHC   Date/Time Value Ref Range Status   03/07/2024 10:31 AM 31.8 (L) 32.0 - 36.0 g/dL Final   02/08/2024 11:18 AM 32.0 32.0 - 36.0 g/dL Final   01/11/2024 11:51 AM 31.8 (L) 32.0 - 36.0 g/dL Final     RDW   Date/Time Value Ref Range Status   03/07/2024 10:31 AM 14.3 11.5 - 14.5 % Final   02/08/2024 11:18 AM 14.7 (H) 11.5 - 14.5 % Final   01/11/2024 11:51 AM 16.1 (H) 11.5 - 14.5 % Final     Platelets   Date/Time Value Ref Range Status   03/07/2024 10:31  150 - 450 x10*3/uL Final   02/08/2024 11:18  150 " - 450 x10*3/uL Final   01/11/2024 11:51  150 - 450 x10*3/uL Final     MPV   Date/Time Value Ref Range Status   10/19/2023 08:01 AM 9.2 7.5 - 11.5 fL Final     Neutrophils %   Date/Time Value Ref Range Status   03/07/2024 10:31 AM 50.2 40.0 - 80.0 % Final   02/08/2024 11:18 AM 54.6 40.0 - 80.0 % Final   01/11/2024 11:51 AM 56.5 40.0 - 80.0 % Final     Immature Granulocytes %, Automated   Date/Time Value Ref Range Status   03/07/2024 10:31 AM 1.0 (H) 0.0 - 0.9 % Final     Comment:     Immature Granulocyte Count (IG) includes promyelocytes, myelocytes and metamyelocytes but does not include bands. Percent differential counts (%) should be interpreted in the context of the absolute cell counts (cells/UL).   02/08/2024 11:18 AM 1.4 (H) 0.0 - 0.9 % Final     Comment:     Immature Granulocyte Count (IG) includes promyelocytes, myelocytes and metamyelocytes but does not include bands. Percent differential counts (%) should be interpreted in the context of the absolute cell counts (cells/UL).   01/11/2024 11:51 AM 1.6 (H) 0.0 - 0.9 % Final     Comment:     Immature Granulocyte Count (IG) includes promyelocytes, myelocytes and metamyelocytes but does not include bands. Percent differential counts (%) should be interpreted in the context of the absolute cell counts (cells/UL).     Lymphocytes %   Date/Time Value Ref Range Status   03/07/2024 10:31 AM 37.6 13.0 - 44.0 % Final   02/08/2024 11:18 AM 31.6 13.0 - 44.0 % Final   01/11/2024 11:51 AM 36.1 13.0 - 44.0 % Final     Monocytes %   Date/Time Value Ref Range Status   03/07/2024 10:31 AM 9.1 2.0 - 10.0 % Final   02/08/2024 11:18 AM 9.9 2.0 - 10.0 % Final   01/11/2024 11:51 AM 5.0 2.0 - 10.0 % Final     Eosinophils %   Date/Time Value Ref Range Status   03/07/2024 10:31 AM 1.8 0.0 - 6.0 % Final   02/08/2024 11:18 AM 2.1 0.0 - 6.0 % Final   01/11/2024 11:51 AM 0.5 0.0 - 6.0 % Final     Basophils %   Date/Time Value Ref Range Status   03/07/2024 10:31 AM 0.3 0.0 - 2.0 %  "Final   02/08/2024 11:18 AM 0.4 0.0 - 2.0 % Final   01/11/2024 11:51 AM 0.3 0.0 - 2.0 % Final     Neutrophils Absolute   Date/Time Value Ref Range Status   03/07/2024 10:31 AM 1.92 1.60 - 5.50 x10*3/uL Final     Comment:     Percent differential counts (%) should be interpreted in the context of the absolute cell counts (cells/uL).   02/08/2024 11:18 AM 2.80 1.60 - 5.50 x10*3/uL Final     Comment:     Percent differential counts (%) should be interpreted in the context of the absolute cell counts (cells/uL).   01/11/2024 11:51 AM 3.65 1.60 - 5.50 x10*3/uL Final     Comment:     Percent differential counts (%) should be interpreted in the context of the absolute cell counts (cells/uL).     Immature Granulocytes Absolute, Automated   Date/Time Value Ref Range Status   03/07/2024 10:31 AM 0.04 0.00 - 0.50 x10*3/uL Final   02/08/2024 11:18 AM 0.07 0.00 - 0.50 x10*3/uL Final   01/11/2024 11:51 AM 0.10 0.00 - 0.50 x10*3/uL Final     Lymphocytes Absolute   Date/Time Value Ref Range Status   03/07/2024 10:31 AM 1.44 0.80 - 3.00 x10*3/uL Final   02/08/2024 11:18 AM 1.62 0.80 - 3.00 x10*3/uL Final   01/11/2024 11:51 AM 2.33 0.80 - 3.00 x10*3/uL Final     Monocytes Absolute   Date/Time Value Ref Range Status   03/07/2024 10:31 AM 0.35 0.05 - 0.80 x10*3/uL Final   02/08/2024 11:18 AM 0.51 0.05 - 0.80 x10*3/uL Final   01/11/2024 11:51 AM 0.32 0.05 - 0.80 x10*3/uL Final     Eosinophils Absolute   Date/Time Value Ref Range Status   03/07/2024 10:31 AM 0.07 0.00 - 0.40 x10*3/uL Final   02/08/2024 11:18 AM 0.11 0.00 - 0.40 x10*3/uL Final   01/11/2024 11:51 AM 0.03 0.00 - 0.40 x10*3/uL Final     Basophils Absolute   Date/Time Value Ref Range Status   03/07/2024 10:31 AM 0.01 0.00 - 0.10 x10*3/uL Final   02/08/2024 11:18 AM 0.02 0.00 - 0.10 x10*3/uL Final   01/11/2024 11:51 AM 0.02 0.00 - 0.10 x10*3/uL Final       No components found for: \"PT\"  No results found for: \"APTT\"  Medication Documentation Review Audit       Reviewed by " Naina Jacobson MA (Medical Assistant) on 03/08/24 at 1142      Medication Order Taking? Sig Documenting Provider Last Dose Status   amLODIPine (Norvasc) 2.5 mg tablet [id number] Yes Take 1 tablet (2.5 mg) by mouth once daily. Abraham Mohan, DO Taking Active   brimonidine-timoloL (Combigan) 0.2-0.5 % ophthalmic solution [id number] Yes Administer 1 drop into both eyes every 12 hours. Historical Provider, MD Taking Active   calcium carbonate-vitamin D3 500 mg-5 mcg (200 unit) tablet [id number] Yes Take 2 tablets by mouth 2 times a day. Historical Provider, MD Taking Active   denosumab (Xgeva) 120 mg/1.7 mL (70 mg/mL) injection [id number] Yes Inject 1.7 mL (120 mg) under the skin 1 time. Historical Provider, MD Taking Active   diclofenac (Voltaren) 50 mg EC tablet [id number] Yes Take 1 tablet (50 mg) by mouth 2 times a day with meals. Leigh Dumont PA-C Taking Active   esomeprazole (NexIUM) 40 mg DR capsule [id number] Yes Take 1 capsule (40 mg) by mouth once daily in the morning. Take before meals. Historical Provider, MD Taking Active   letrozole (Femara) 2.5 mg tablet [id number] Yes Take 1 tablet (2.5 mg total) by mouth once daily.  Take with or without food. Andrew Mcfarland MD Taking Active   LORazepam (Ativan) 0.5 mg tablet [id number] Yes Take 1 tablet (0.5 mg) by mouth every 2 hours if needed for anxiety (anxiety related to radiology tests) for up to 5 doses. Andrew Mcfarland MD Taking Active   losartan-hydrochlorothiazide (Hyzaar) 100-12.5 mg tablet [id number] Yes Take 1 tablet by mouth once daily. Abraham Mohan, DO Taking Active   multivitamin tablet [id number] Yes Take 1 tablet by mouth once daily. Historical Provider, MD Taking Active   pravastatin (Pravachol) 10 mg tablet [id number] Yes Take 1 tablet (10 mg) by mouth once daily. Abraham Mohan, DO Taking Active   traMADol (Ultram) 50 mg tablet [id number] Yes TAKE ONE TABLET BY MOUTH EVERY 8 HOURS AS NEEDED FOR MODERATE TO SEVERE PAIN Historical Provider, MD Taking  Active                   Assessment/Plan    1) stage IV breast cancer  -has high TMB  -has BRCA1  variant of uncertain significance  -also has PIK3CA mutation  -pembrolizumab has been on hold since she developed a diffuse body rash, course of prednisone did not help, in fact a week after finishing prednisone, the rash got worse--papules have become confluent red/pink areas  -she did see her dermatologist (Dr Wolff)--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, and she did have a punch biopsy done--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  -she is now on a longer PO prednisone taper as well as topical steroids  -skin is slowly getting better  -went to he ED on 1/16/2024 for evaluation of chest/chest wall pain--no evidence of cardiac ischemia, but imaging showed 2 new additional rib fractures--pain is slowly getting better  -labs done on 3/7/2024 included CBC, COMP, CA 27.29  -results reviewed--wbc 3.8, hgb 9.6, plt 189,000, creatinine 1.01, calcium 8.2, albumin 3.9, AST 17, ALT 13, CA 27.29 637  -as her calcium today was <8.4, we will need to withhold xgeva today  -advised her to continue taking calcium + D--increase from 5 to 6 a day  -advised her to continue letrozole 2.5 mg daily   -will return in 4 weeks after new followup body CT scan is done first     2) bone metastases  -secondary to metastatic breast cancer  -receives xgeva Q4 weeks     3) anemia  -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  -hgb slowly improving     4) hyperlipidemia  -on pravastatin     5) arthritis  -on tylenol PRN  -on diclofenac PRN  -received steroid injections into her knees and she feels so much better     6) hypertension  -on amlodipine  -on losartan-HCTZ     7) GERD  -on nexium     8) glaucoma  -on combigan eyedrops        Diagnoses and all orders for this visit:  Carcinoma of breast metastatic to bone, unspecified laterality (CMS/HCC)  -     Clinic Appointment Request Chemo  Follow Up; [facility]; [place] [address]  -     CT chest w IV contrast; Future  -     CT abdomen pelvis w IV contrast; Future  -     Clinic Appointment Request Chemo Follow Up; [facility]; [facility]ON[facility]; Future  -     Infusion Appointment Request [facility] INFUSION; Future           Andrew Mcfarland MD

## 2024-03-08 NOTE — PROGRESS NOTES
I reviewed the resident/fellow's documentation and discussed the patient with the resident/fellow. I agree with the resident/fellow's medical decision making as documented in the note.     Casi Moss MD

## 2024-03-08 NOTE — PATIENT INSTRUCTIONS
Continue taking the letrozole    Increase your calcium supplement intake to 6 a day    See you again in 4 weeks (after new body CT scan is done)

## 2024-03-19 ASSESSMENT — ENCOUNTER SYMPTOMS
MUSCULOSKELETAL NEGATIVE: 1
PSYCHIATRIC NEGATIVE: 1
CONSTITUTIONAL NEGATIVE: 1
EYES NEGATIVE: 1
CARDIOVASCULAR NEGATIVE: 1
GASTROINTESTINAL NEGATIVE: 1
NEUROLOGICAL NEGATIVE: 1
RESPIRATORY NEGATIVE: 1
HEMATOLOGIC/LYMPHATIC NEGATIVE: 1
ENDOCRINE NEGATIVE: 1

## 2024-03-26 DIAGNOSIS — M19.90 ARTHRITIS: ICD-10-CM

## 2024-03-26 RX ORDER — DICLOFENAC SODIUM 50 MG/1
TABLET, DELAYED RELEASE ORAL
Qty: 60 TABLET | Refills: 0 | Status: SHIPPED | OUTPATIENT
Start: 2024-03-26

## 2024-04-03 ENCOUNTER — TELEPHONE (OUTPATIENT)
Dept: HEMATOLOGY/ONCOLOGY | Facility: CLINIC | Age: 84
End: 2024-04-03
Payer: MEDICARE

## 2024-04-03 ENCOUNTER — HOSPITAL ENCOUNTER (OUTPATIENT)
Dept: RADIOLOGY | Facility: HOSPITAL | Age: 84
Discharge: HOME | End: 2024-04-03
Payer: MEDICARE

## 2024-04-03 ENCOUNTER — LAB (OUTPATIENT)
Dept: LAB | Facility: CLINIC | Age: 84
End: 2024-04-03
Payer: MEDICARE

## 2024-04-03 DIAGNOSIS — C50.919 CARCINOMA OF BREAST METASTATIC TO BONE, UNSPECIFIED LATERALITY (MULTI): ICD-10-CM

## 2024-04-03 DIAGNOSIS — C79.51 CARCINOMA OF BREAST METASTATIC TO BONE, UNSPECIFIED LATERALITY (MULTI): ICD-10-CM

## 2024-04-03 LAB
ALBUMIN SERPL BCP-MCNC: 4 G/DL (ref 3.4–5)
ALP SERPL-CCNC: 83 U/L (ref 33–136)
ALT SERPL W P-5'-P-CCNC: 13 U/L (ref 7–45)
ANION GAP SERPL CALC-SCNC: 11 MMOL/L (ref 10–20)
AST SERPL W P-5'-P-CCNC: 19 U/L (ref 9–39)
BASOPHILS # BLD AUTO: 0.02 X10*3/UL (ref 0–0.1)
BASOPHILS NFR BLD AUTO: 0.5 %
BILIRUB SERPL-MCNC: 0.3 MG/DL (ref 0–1.2)
BUN SERPL-MCNC: 23 MG/DL (ref 6–23)
CALCIUM SERPL-MCNC: 9.2 MG/DL (ref 8.6–10.3)
CANCER AG27-29 SERPL-ACNC: 406.9 U/ML (ref 0–38.6)
CHLORIDE SERPL-SCNC: 102 MMOL/L (ref 98–107)
CO2 SERPL-SCNC: 26 MMOL/L (ref 21–32)
CREAT SERPL-MCNC: 0.94 MG/DL (ref 0.5–1.05)
EGFRCR SERPLBLD CKD-EPI 2021: 61 ML/MIN/1.73M*2
EOSINOPHIL # BLD AUTO: 0.09 X10*3/UL (ref 0–0.4)
EOSINOPHIL NFR BLD AUTO: 2.2 %
ERYTHROCYTE [DISTWIDTH] IN BLOOD BY AUTOMATED COUNT: 14.3 % (ref 11.5–14.5)
GLUCOSE SERPL-MCNC: 101 MG/DL (ref 74–99)
HCT VFR BLD AUTO: 29.8 % (ref 36–46)
HGB BLD-MCNC: 9.4 G/DL (ref 12–16)
IMM GRANULOCYTES # BLD AUTO: 0.05 X10*3/UL (ref 0–0.5)
IMM GRANULOCYTES NFR BLD AUTO: 1.2 % (ref 0–0.9)
LYMPHOCYTES # BLD AUTO: 1.31 X10*3/UL (ref 0.8–3)
LYMPHOCYTES NFR BLD AUTO: 32.3 %
MCH RBC QN AUTO: 30.6 PG (ref 26–34)
MCHC RBC AUTO-ENTMCNC: 31.5 G/DL (ref 32–36)
MCV RBC AUTO: 97 FL (ref 80–100)
MONOCYTES # BLD AUTO: 0.42 X10*3/UL (ref 0.05–0.8)
MONOCYTES NFR BLD AUTO: 10.3 %
NEUTROPHILS # BLD AUTO: 2.17 X10*3/UL (ref 1.6–5.5)
NEUTROPHILS NFR BLD AUTO: 53.5 %
PLATELET # BLD AUTO: 187 X10*3/UL (ref 150–450)
POTASSIUM SERPL-SCNC: 4.4 MMOL/L (ref 3.5–5.3)
PROT SERPL-MCNC: 6.2 G/DL (ref 6.4–8.2)
RBC # BLD AUTO: 3.07 X10*6/UL (ref 4–5.2)
SODIUM SERPL-SCNC: 135 MMOL/L (ref 136–145)
WBC # BLD AUTO: 4.1 X10*3/UL (ref 4.4–11.3)

## 2024-04-03 PROCEDURE — 80053 COMPREHEN METABOLIC PANEL: CPT

## 2024-04-03 PROCEDURE — 74177 CT ABD & PELVIS W/CONTRAST: CPT

## 2024-04-03 PROCEDURE — 74177 CT ABD & PELVIS W/CONTRAST: CPT | Performed by: RADIOLOGY

## 2024-04-03 PROCEDURE — 85025 COMPLETE CBC W/AUTO DIFF WBC: CPT

## 2024-04-03 PROCEDURE — 86300 IMMUNOASSAY TUMOR CA 15-3: CPT | Performed by: INTERNAL MEDICINE

## 2024-04-03 PROCEDURE — 2550000001 HC RX 255 CONTRASTS: Performed by: INTERNAL MEDICINE

## 2024-04-03 PROCEDURE — 36415 COLL VENOUS BLD VENIPUNCTURE: CPT

## 2024-04-03 PROCEDURE — 71260 CT THORAX DX C+: CPT | Performed by: RADIOLOGY

## 2024-04-03 RX ADMIN — IOHEXOL 75 ML: 350 INJECTION, SOLUTION INTRAVENOUS at 16:01

## 2024-04-03 NOTE — TELEPHONE ENCOUNTER
Left detailed message that all lab work appears to have been collected and is either resulted or is pending. Explained that we do not see any concerns as of now. Encouraged to call office back with any further questions or concerns,

## 2024-04-05 ENCOUNTER — OFFICE VISIT (OUTPATIENT)
Dept: HEMATOLOGY/ONCOLOGY | Facility: CLINIC | Age: 84
End: 2024-04-05
Payer: MEDICARE

## 2024-04-05 ENCOUNTER — INFUSION (OUTPATIENT)
Dept: HEMATOLOGY/ONCOLOGY | Facility: CLINIC | Age: 84
End: 2024-04-05
Payer: MEDICARE

## 2024-04-05 VITALS
SYSTOLIC BLOOD PRESSURE: 136 MMHG | DIASTOLIC BLOOD PRESSURE: 74 MMHG | WEIGHT: 173.5 LBS | HEIGHT: 61 IN | TEMPERATURE: 97.9 F | RESPIRATION RATE: 16 BRPM | HEART RATE: 89 BPM | OXYGEN SATURATION: 95 % | BODY MASS INDEX: 32.76 KG/M2

## 2024-04-05 DIAGNOSIS — C79.51 CARCINOMA OF RIGHT BREAST METASTATIC TO BONE (MULTI): ICD-10-CM

## 2024-04-05 DIAGNOSIS — C79.51 CARCINOMA OF BREAST METASTATIC TO BONE, UNSPECIFIED LATERALITY (MULTI): ICD-10-CM

## 2024-04-05 DIAGNOSIS — C50.919 CARCINOMA OF BREAST METASTATIC TO BONE, UNSPECIFIED LATERALITY (MULTI): ICD-10-CM

## 2024-04-05 DIAGNOSIS — H40.9 GLAUCOMA OF BOTH EYES, UNSPECIFIED GLAUCOMA TYPE: ICD-10-CM

## 2024-04-05 DIAGNOSIS — D61.82 MYELOPHTHISIC ANEMIA (MULTI): ICD-10-CM

## 2024-04-05 DIAGNOSIS — I10 ESSENTIAL HYPERTENSION: ICD-10-CM

## 2024-04-05 DIAGNOSIS — M89.9 LYTIC BONE LESIONS ON XRAY: ICD-10-CM

## 2024-04-05 DIAGNOSIS — M19.90 ARTHRITIS: ICD-10-CM

## 2024-04-05 DIAGNOSIS — K21.00 GASTROESOPHAGEAL REFLUX DISEASE WITH ESOPHAGITIS WITHOUT HEMORRHAGE: ICD-10-CM

## 2024-04-05 DIAGNOSIS — E78.2 MIXED HYPERLIPIDEMIA: Primary | ICD-10-CM

## 2024-04-05 DIAGNOSIS — C50.911 CARCINOMA OF RIGHT BREAST METASTATIC TO BONE (MULTI): ICD-10-CM

## 2024-04-05 PROCEDURE — 96372 THER/PROPH/DIAG INJ SC/IM: CPT

## 2024-04-05 PROCEDURE — 3078F DIAST BP <80 MM HG: CPT | Performed by: INTERNAL MEDICINE

## 2024-04-05 PROCEDURE — 96372 THER/PROPH/DIAG INJ SC/IM: CPT | Performed by: INTERNAL MEDICINE

## 2024-04-05 PROCEDURE — 3075F SYST BP GE 130 - 139MM HG: CPT | Performed by: INTERNAL MEDICINE

## 2024-04-05 PROCEDURE — 1159F MED LIST DOCD IN RCRD: CPT | Performed by: INTERNAL MEDICINE

## 2024-04-05 PROCEDURE — 1036F TOBACCO NON-USER: CPT | Performed by: INTERNAL MEDICINE

## 2024-04-05 PROCEDURE — 2500000004 HC RX 250 GENERAL PHARMACY W/ HCPCS (ALT 636 FOR OP/ED): Mod: JZ | Performed by: INTERNAL MEDICINE

## 2024-04-05 PROCEDURE — 99214 OFFICE O/P EST MOD 30 MIN: CPT | Performed by: INTERNAL MEDICINE

## 2024-04-05 PROCEDURE — 1126F AMNT PAIN NOTED NONE PRSNT: CPT | Performed by: INTERNAL MEDICINE

## 2024-04-05 RX ORDER — EPINEPHRINE 0.3 MG/.3ML
0.3 INJECTION SUBCUTANEOUS EVERY 5 MIN PRN
Status: CANCELLED | OUTPATIENT
Start: 2024-05-03

## 2024-04-05 RX ORDER — FAMOTIDINE 10 MG/ML
20 INJECTION INTRAVENOUS ONCE AS NEEDED
Status: CANCELLED | OUTPATIENT
Start: 2024-05-03

## 2024-04-05 RX ORDER — DIPHENHYDRAMINE HYDROCHLORIDE 50 MG/ML
50 INJECTION INTRAMUSCULAR; INTRAVENOUS AS NEEDED
Status: CANCELLED | OUTPATIENT
Start: 2024-05-03

## 2024-04-05 RX ORDER — ALBUTEROL SULFATE 0.83 MG/ML
3 SOLUTION RESPIRATORY (INHALATION) AS NEEDED
Status: CANCELLED | OUTPATIENT
Start: 2024-05-03

## 2024-04-05 RX ADMIN — DENOSUMAB 120 MG: 120 INJECTION SUBCUTANEOUS at 15:11

## 2024-04-05 ASSESSMENT — ENCOUNTER SYMPTOMS
CARDIOVASCULAR NEGATIVE: 1
ARTHRALGIAS: 1
RESPIRATORY NEGATIVE: 1
EYES NEGATIVE: 1
GASTROINTESTINAL NEGATIVE: 1
NEUROLOGICAL NEGATIVE: 1
ENDOCRINE NEGATIVE: 1
PSYCHIATRIC NEGATIVE: 1
HEMATOLOGIC/LYMPHATIC NEGATIVE: 1
CONSTITUTIONAL NEGATIVE: 1

## 2024-04-05 ASSESSMENT — PAIN SCALES - GENERAL: PAINLEVEL: 0-NO PAIN

## 2024-04-05 NOTE — PROGRESS NOTES
"Patient ID: Arabella Springer is a 83 y.o. female.  Referring Physician: Andrew Mcfarland MD  [address] [facility] [address]  [place] [address]  Primary Care Provider: Abraham Mohan DO  Visit Type: Follow Up      Subjective    HPI I ended up with this rash on my arms after eating Chinese peanut salad    Review of Systems   Constitutional: Negative.    HENT:  Negative.     Eyes: Negative.    Respiratory: Negative.     Cardiovascular: Negative.    Gastrointestinal: Negative.    Endocrine: Negative.    Genitourinary: Negative.     Musculoskeletal:  Positive for arthralgias.   Skin:  Positive for rash.   Neurological: Negative.    Hematological: Negative.    Psychiatric/Behavioral: Negative.          Objective   BSA: 1.84 meters squared  /74 (BP Location: Right arm, Patient Position: Sitting, BP Cuff Size: Adult)   Pulse 89   Temp 36.6 °C (97.9 °F) (Temporal)   Resp 16   Ht 1.541 m (5' 0.67\")   Wt 78.7 kg (173 lb 8 oz)   SpO2 95%   BMI 33.14 kg/m²      has no past medical history on file.   has no past surgical history on file.  No family history on file.  Oncology History   Breast cancer metastasized to bone (CMS/HCC)   9/8/2023 - 11/10/2023 Chemotherapy    Pembrolizumab, 21 Day Cycles     9/29/2023 Initial Diagnosis    Breast cancer metastasized to bone (CMS/HCC)         Arabella Springer  reports that she has never smoked. She has never used smokeless tobacco.  She  reports no history of alcohol use.  She  reports no history of drug use.    Physical Exam  Vitals reviewed.   Constitutional:       Appearance: Normal appearance.   HENT:      Head: Normocephalic.   Eyes:      Extraocular Movements: Extraocular movements intact.      Pupils: Pupils are equal, round, and reactive to light.   Cardiovascular:      Rate and Rhythm: Normal rate and regular rhythm.      Heart sounds: Normal heart sounds.   Pulmonary:      Breath sounds: Normal breath sounds.   Abdominal:      General: Bowel sounds are normal.      " "Palpations: Abdomen is soft.   Musculoskeletal:         General: Normal range of motion.      Cervical back: Normal range of motion and neck supple.   Skin:     Findings: Rash present.   Neurological:      General: No focal deficit present.      Mental Status: She is alert and oriented to person, place, and time.   Psychiatric:         Mood and Affect: Mood normal.         Behavior: Behavior normal.         WBC   Date/Time Value Ref Range Status   04/03/2024 02:21 PM 4.1 (L) 4.4 - 11.3 x10*3/uL Final   03/07/2024 10:31 AM 3.8 (L) 4.4 - 11.3 x10*3/uL Final   02/08/2024 11:18 AM 5.1 4.4 - 11.3 x10*3/uL Final     No results found for: \"NRBC\"  RBC   Date Value Ref Range Status   04/03/2024 3.07 (L) 4.00 - 5.20 x10*6/uL Final   03/07/2024 3.03 (L) 4.00 - 5.20 x10*6/uL Final   02/08/2024 2.93 (L) 4.00 - 5.20 x10*6/uL Final     Hemoglobin   Date Value Ref Range Status   04/03/2024 9.4 (L) 12.0 - 16.0 g/dL Final   03/07/2024 9.6 (L) 12.0 - 16.0 g/dL Final   02/08/2024 9.4 (L) 12.0 - 16.0 g/dL Final     Hematocrit   Date Value Ref Range Status   04/03/2024 29.8 (L) 36.0 - 46.0 % Final   03/07/2024 30.2 (L) 36.0 - 46.0 % Final   02/08/2024 29.4 (L) 36.0 - 46.0 % Final     MCV   Date/Time Value Ref Range Status   04/03/2024 02:21 PM 97 80 - 100 fL Final   03/07/2024 10:31  80 - 100 fL Final   02/08/2024 11:18  80 - 100 fL Final     MCH   Date/Time Value Ref Range Status   04/03/2024 02:21 PM 30.6 26.0 - 34.0 pg Final   03/07/2024 10:31 AM 31.7 26.0 - 34.0 pg Final   02/08/2024 11:18 AM 32.1 26.0 - 34.0 pg Final     MCHC   Date/Time Value Ref Range Status   04/03/2024 02:21 PM 31.5 (L) 32.0 - 36.0 g/dL Final   03/07/2024 10:31 AM 31.8 (L) 32.0 - 36.0 g/dL Final   02/08/2024 11:18 AM 32.0 32.0 - 36.0 g/dL Final     RDW   Date/Time Value Ref Range Status   04/03/2024 02:21 PM 14.3 11.5 - 14.5 % Final   03/07/2024 10:31 AM 14.3 11.5 - 14.5 % Final   02/08/2024 11:18 AM 14.7 (H) 11.5 - 14.5 % Final     Platelets "   Date/Time Value Ref Range Status   04/03/2024 02:21  150 - 450 x10*3/uL Final   03/07/2024 10:31  150 - 450 x10*3/uL Final   02/08/2024 11:18  150 - 450 x10*3/uL Final     MPV   Date/Time Value Ref Range Status   10/19/2023 08:01 AM 9.2 7.5 - 11.5 fL Final     Neutrophils %   Date/Time Value Ref Range Status   04/03/2024 02:21 PM 53.5 40.0 - 80.0 % Final   03/07/2024 10:31 AM 50.2 40.0 - 80.0 % Final   02/08/2024 11:18 AM 54.6 40.0 - 80.0 % Final     Immature Granulocytes %, Automated   Date/Time Value Ref Range Status   04/03/2024 02:21 PM 1.2 (H) 0.0 - 0.9 % Final     Comment:     Immature Granulocyte Count (IG) includes promyelocytes, myelocytes and metamyelocytes but does not include bands. Percent differential counts (%) should be interpreted in the context of the absolute cell counts (cells/UL).   03/07/2024 10:31 AM 1.0 (H) 0.0 - 0.9 % Final     Comment:     Immature Granulocyte Count (IG) includes promyelocytes, myelocytes and metamyelocytes but does not include bands. Percent differential counts (%) should be interpreted in the context of the absolute cell counts (cells/UL).   02/08/2024 11:18 AM 1.4 (H) 0.0 - 0.9 % Final     Comment:     Immature Granulocyte Count (IG) includes promyelocytes, myelocytes and metamyelocytes but does not include bands. Percent differential counts (%) should be interpreted in the context of the absolute cell counts (cells/UL).     Lymphocytes %   Date/Time Value Ref Range Status   04/03/2024 02:21 PM 32.3 13.0 - 44.0 % Final   03/07/2024 10:31 AM 37.6 13.0 - 44.0 % Final   02/08/2024 11:18 AM 31.6 13.0 - 44.0 % Final     Monocytes %   Date/Time Value Ref Range Status   04/03/2024 02:21 PM 10.3 2.0 - 10.0 % Final   03/07/2024 10:31 AM 9.1 2.0 - 10.0 % Final   02/08/2024 11:18 AM 9.9 2.0 - 10.0 % Final     Eosinophils %   Date/Time Value Ref Range Status   04/03/2024 02:21 PM 2.2 0.0 - 6.0 % Final   03/07/2024 10:31 AM 1.8 0.0 - 6.0 % Final   02/08/2024 11:18  AM 2.1 0.0 - 6.0 % Final     Basophils %   Date/Time Value Ref Range Status   04/03/2024 02:21 PM 0.5 0.0 - 2.0 % Final   03/07/2024 10:31 AM 0.3 0.0 - 2.0 % Final   02/08/2024 11:18 AM 0.4 0.0 - 2.0 % Final     Neutrophils Absolute   Date/Time Value Ref Range Status   04/03/2024 02:21 PM 2.17 1.60 - 5.50 x10*3/uL Final     Comment:     Percent differential counts (%) should be interpreted in the context of the absolute cell counts (cells/uL).   03/07/2024 10:31 AM 1.92 1.60 - 5.50 x10*3/uL Final     Comment:     Percent differential counts (%) should be interpreted in the context of the absolute cell counts (cells/uL).   02/08/2024 11:18 AM 2.80 1.60 - 5.50 x10*3/uL Final     Comment:     Percent differential counts (%) should be interpreted in the context of the absolute cell counts (cells/uL).     Immature Granulocytes Absolute, Automated   Date/Time Value Ref Range Status   04/03/2024 02:21 PM 0.05 0.00 - 0.50 x10*3/uL Final   03/07/2024 10:31 AM 0.04 0.00 - 0.50 x10*3/uL Final   02/08/2024 11:18 AM 0.07 0.00 - 0.50 x10*3/uL Final     Lymphocytes Absolute   Date/Time Value Ref Range Status   04/03/2024 02:21 PM 1.31 0.80 - 3.00 x10*3/uL Final   03/07/2024 10:31 AM 1.44 0.80 - 3.00 x10*3/uL Final   02/08/2024 11:18 AM 1.62 0.80 - 3.00 x10*3/uL Final     Monocytes Absolute   Date/Time Value Ref Range Status   04/03/2024 02:21 PM 0.42 0.05 - 0.80 x10*3/uL Final   03/07/2024 10:31 AM 0.35 0.05 - 0.80 x10*3/uL Final   02/08/2024 11:18 AM 0.51 0.05 - 0.80 x10*3/uL Final     Eosinophils Absolute   Date/Time Value Ref Range Status   04/03/2024 02:21 PM 0.09 0.00 - 0.40 x10*3/uL Final   03/07/2024 10:31 AM 0.07 0.00 - 0.40 x10*3/uL Final   02/08/2024 11:18 AM 0.11 0.00 - 0.40 x10*3/uL Final     Basophils Absolute   Date/Time Value Ref Range Status   04/03/2024 02:21 PM 0.02 0.00 - 0.10 x10*3/uL Final   03/07/2024 10:31 AM 0.01 0.00 - 0.10 x10*3/uL Final   02/08/2024 11:18 AM 0.02 0.00 - 0.10 x10*3/uL Final       No  "components found for: \"PT\"  No results found for: \"APTT\"  Medication Documentation Review Audit       Reviewed by Babita Kaur MA (Medical Assistant) on 04/05/24 at 1429      Medication Order Taking? Sig Documenting Provider Last Dose Status   amLODIPine (Norvasc) 2.5 mg tablet [id number] Yes Take 1 tablet (2.5 mg) by mouth once daily. Abraham Mohan, DO Taking Active   brimonidine-timoloL (Combigan) 0.2-0.5 % ophthalmic solution [id number] Yes Administer 1 drop into both eyes every 12 hours. Historical Provider, MD Taking Active   calcium carbonate-vitamin D3 500 mg-5 mcg (200 unit) tablet [id number] Yes Take 2 tablets by mouth 2 times a day. Historical Provider, MD Taking Active   denosumab (Xgeva) 120 mg/1.7 mL (70 mg/mL) injection [id number] Yes Inject 1.7 mL (120 mg) under the skin 1 time. Historical Provider, MD Taking Active   diclofenac (Voltaren) 50 mg EC tablet [id number] Yes TAKE 1 TABLET(50 MG) BY MOUTH TWICE DAILY WITH MEALS ROCHELLE Cheng-CNP Taking Active   esomeprazole (NexIUM) 40 mg DR capsule [id number] Yes Take 1 capsule (40 mg) by mouth once daily in the morning. Take before meals. Historical Provider, MD Taking Active   LORazepam (Ativan) 0.5 mg tablet [id number] Yes Take 1 tablet (0.5 mg) by mouth every 2 hours if needed for anxiety (anxiety related to radiology tests) for up to 5 doses. Andrew Mcfarland MD Taking Active   losartan-hydrochlorothiazide (Hyzaar) 100-12.5 mg tablet [id number] Yes Take 1 tablet by mouth once daily. Abraham Mohan DO Taking Active   multivitamin tablet [id number] Yes Take 1 tablet by mouth once daily. Historical Provider, MD Taking Active   pravastatin (Pravachol) 10 mg tablet [id number] Yes Take 1 tablet (10 mg) by mouth once daily. Abraham Mohan DO Taking Active   traMADol (Ultram) 50 mg tablet [id number] Yes TAKE ONE TABLET BY MOUTH EVERY 8 HOURS AS NEEDED FOR MODERATE TO SEVERE PAIN Historical Provider, MD Taking Active                   Assessment/Plan    1) " stage IV breast cancer  -has high TMB  -has BRCA1  variant of uncertain significance  -also has PIK3CA mutation  -pembrolizumab has been on hold since she developed a diffuse body rash, course of prednisone did not help, in fact a week after finishing prednisone, the rash got worse--papules have become confluent red/pink areas  -she did see her dermatologist (Dr Wolff)--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, and she did have a punch biopsy done--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  -now off steroids  -on the way here she stopped for lunch at a restaurant, at [employer]--when she took off her jacket to get her vitals done, she discovered a maculopapular rash on her forearms  -has been reporting hair thinning--secondary to aromatase inhibitor--her hairstylist wishes to offer something to put into her hair/scalp to help promote hair growth  -reports shortness of breath at times  -labs done on 4/3/2024 included CBC, COMP, CA 27.29  -results reviewed--wbc 4.1, hgb 9.4, plt 187,000, creatinine 0.94, calcium 9.2, albumin 4.0, AST 19, ALT 13, CA 27.29 406  -new CT scan done on 4/3/2024 reviewed: interval worsening of bilateral pleural effusions, right >left; multiple scattered <0.5 mm pulmonary nodules bilateral lungs not changed; interval development of several branching opacities involving left lung apex; there are no new worrisome hepatic lesions; mild right sided hydronephrosis; diffuse sclerosis of axial and appendicular skeleton  -she had a cough for a week, which is now resolved, but it may have resulted in the left lung apex branching opacities  -her rib fractures sustained a few months ago may have resulted in the pleural effusions which are likely reactive  -she otherwise has no symptoms (no edema) suggestive of heart failure  -I did bring up possibility of performing thoracentesis on the right--however, the risk of pneumothorax scares her  -advised  her to continue letrozole 2.5 mg daily   -benefits, risks, potential morbidity related to xgeva were reviewed with Arabella and she provided informed consent to proceed  -she went on to receive xgeva 120 mg subcutaneous     2) bone metastases  -secondary to metastatic breast cancer  -receives xgeva Q4 weeks     3) anemia  -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  -hgb slowly improving     4) hyperlipidemia  -on pravastatin     5) arthritis  -on tylenol PRN  -on diclofenac PRN  -received steroid injections into her knees and she feels so much better, so she wants to receive them again     6) hypertension  -on amlodipine  -on losartan-HCTZ     7) GERD  -on nexium     8) glaucoma  -on combigan eyedrops           Problem List Items Addressed This Visit             ICD-10-CM    Breast cancer metastasized to bone (CMS/HCC) C50.919, C79.51    Relevant Orders    Clinic Appointment Request Chemo Follow Up; ANDREW [facility]; [facility]            Andrew Mcfarland MD

## 2024-04-11 ENCOUNTER — OFFICE VISIT (OUTPATIENT)
Dept: PRIMARY CARE | Facility: CLINIC | Age: 84
End: 2024-04-11
Payer: MEDICARE

## 2024-04-11 VITALS
OXYGEN SATURATION: 96 % | WEIGHT: 175 LBS | RESPIRATION RATE: 16 BRPM | TEMPERATURE: 98.5 F | HEART RATE: 82 BPM | BODY MASS INDEX: 33.43 KG/M2 | SYSTOLIC BLOOD PRESSURE: 126 MMHG | DIASTOLIC BLOOD PRESSURE: 72 MMHG

## 2024-04-11 DIAGNOSIS — C50.919 CARCINOMA OF BREAST METASTATIC TO BONE, UNSPECIFIED LATERALITY (MULTI): ICD-10-CM

## 2024-04-11 DIAGNOSIS — C79.51 CARCINOMA OF BREAST METASTATIC TO BONE, UNSPECIFIED LATERALITY (MULTI): ICD-10-CM

## 2024-04-11 DIAGNOSIS — F41.9 ANXIETY: ICD-10-CM

## 2024-04-11 DIAGNOSIS — H61.23 BILATERAL IMPACTED CERUMEN: ICD-10-CM

## 2024-04-11 DIAGNOSIS — J21.9 BRONCHIOLITIS: ICD-10-CM

## 2024-04-11 DIAGNOSIS — R06.02 SOB (SHORTNESS OF BREATH): Primary | ICD-10-CM

## 2024-04-11 DIAGNOSIS — R60.1 GENERALIZED EDEMA: ICD-10-CM

## 2024-04-11 DIAGNOSIS — J90 PLEURAL EFFUSION: ICD-10-CM

## 2024-04-11 PROCEDURE — 1159F MED LIST DOCD IN RCRD: CPT

## 2024-04-11 PROCEDURE — 99214 OFFICE O/P EST MOD 30 MIN: CPT

## 2024-04-11 PROCEDURE — 3074F SYST BP LT 130 MM HG: CPT

## 2024-04-11 PROCEDURE — 3078F DIAST BP <80 MM HG: CPT

## 2024-04-11 PROCEDURE — 1036F TOBACCO NON-USER: CPT

## 2024-04-11 RX ORDER — ESCITALOPRAM OXALATE 5 MG/1
5 TABLET ORAL DAILY
Qty: 30 TABLET | Refills: 1 | Status: SHIPPED | OUTPATIENT
Start: 2024-04-11 | End: 2024-06-10

## 2024-04-11 RX ORDER — PEN NEEDLE, DIABETIC 32GX 5/32"
1 NEEDLE, DISPOSABLE MISCELLANEOUS EVERY 4 HOURS PRN
Qty: 1 EACH | Refills: 0 | Status: SHIPPED | OUTPATIENT
Start: 2024-04-11

## 2024-04-11 RX ORDER — BENZONATATE 200 MG/1
200 CAPSULE ORAL 3 TIMES DAILY PRN
Qty: 42 CAPSULE | Refills: 0 | Status: SHIPPED | OUTPATIENT
Start: 2024-04-11 | End: 2024-04-25

## 2024-04-11 RX ORDER — LETROZOLE 2.5 MG/1
2.5 TABLET, FILM COATED ORAL DAILY
Qty: 90 TABLET | Refills: 3 | Status: SHIPPED | OUTPATIENT
Start: 2024-04-11

## 2024-04-11 RX ORDER — NETARSUDIL 0.2 MG/ML
SOLUTION/ DROPS OPHTHALMIC; TOPICAL
COMMUNITY

## 2024-04-11 RX ORDER — LETROZOLE 2.5 MG/1
2.5 TABLET, FILM COATED ORAL DAILY
COMMUNITY
End: 2024-04-11 | Stop reason: SDUPTHER

## 2024-04-11 RX ORDER — ALBUTEROL SULFATE 90 UG/1
2 AEROSOL, METERED RESPIRATORY (INHALATION) EVERY 4 HOURS PRN
Qty: 8.5 G | Refills: 0 | Status: SHIPPED | OUTPATIENT
Start: 2024-04-11 | End: 2025-04-11

## 2024-04-11 NOTE — ASSESSMENT & PLAN NOTE
This could be multifactorial in origin.  Recent imaging does show bronchiolitis and she is having some recent cough and congestion associated with her shortness of breath.  For this we will treat symptomatically with Tessalon Perles and an albuterol inhaler with a spacer.  She also has worsening of her chronic bilateral pleural effusions.  She is still in remission from her breast cancer so does not seem to be related to her cancer.  It was recommended that she get an echocardiogram to see if this is related to her heart function so this was ordered today.  Patient also feels that the shortness of breath could be related to anxiety that seems to worsen with anxiety and she has never been treated for this before.  We will start her on Lexapro 5 mg daily.  Follow-up in 1 month.  If symptoms are not improving then will consider pulmonology referral.  Strict ED precautions given.

## 2024-04-11 NOTE — TELEPHONE ENCOUNTER
Per Dr. Mcfarland:  Ok to refill--she is on this long term        Refill sent to Dr. Mcfarland for approval

## 2024-04-11 NOTE — ASSESSMENT & PLAN NOTE
Bilateral ears were impacted with cerumen and after they were flushed her symptoms resolved.  No sign of infection or fluid behind the TMs.

## 2024-04-11 NOTE — PROGRESS NOTES
Subjective   Arabella Springer is a 83 y.o. female who presents for Ear Fullness.  Patient reports left ear fullness and mild discomfort.  She also had this a month ago and at that time she had bilateral cerumen impaction and her symptoms resolved after bilateral ear flushing.  Patient denies fever, chills.    Patient has had some shortness of breath for about 2 weeks.  She also has some associated nasal congestion and dry cough.  She has some rhinorrhea but she says that this is chronic for her.  She feels like the shortness of breath is worse with anxiety.  She also has acid reflux for which she takes Nexium.  She recently had a chest CT scan done by her oncologist for monitoring her breast cancer remission and it did show interval worsening of bilateral pleural effusions and possible bronchiolitis.  She was told that they did not want to drain the fluid from her lungs at that time.    Objective   /72 (BP Location: Left arm, Patient Position: Sitting, BP Cuff Size: Adult)   Pulse 82   Temp 36.9 °C (98.5 °F)   Resp 16   Wt 79.4 kg (175 lb)   SpO2 96%   BMI 33.43 kg/m²    PHYSICAL EXAM  Gen: Well appearing, in NAD  Eyes: EOMI  HEENT: MMM.  Bilateral cerumen impaction.  There is also some scaling in her left external ear which could be psoriasis as she also has psoriasis on her scalp.  After bilateral ear flushing her ear canals are clear and she has normal TMs with no sign of infection or fluid.  Heart: RRR, no murmurs  Lungs: No increased work of breathing, decreased breath sounds in bilateral lung bases, good air movement in the rest of the lungs without any wheezing, rhonchi, crackles. On RA  Extremities: WWP, cap refill <2sec, no pitting edema in LE b/l  Neuro: Alert, symmetrical facies, moves all extremities equally  Psych: Appropriate mood and affect    Assessment/Plan     Problem List Items Addressed This Visit       Anxiety    Relevant Medications    escitalopram (Lexapro) 5 mg tablet    Bilateral  impacted cerumen     Bilateral ears were impacted with cerumen and after they were flushed her symptoms resolved.  No sign of infection or fluid behind the TMs.         Bronchiolitis    Relevant Medications    albuterol (ProAir HFA) 90 mcg/actuation inhaler    inhalat.spacing dev,large mask (Pro Comfort Spacer-Adult Mask) spacer    benzonatate (Tessalon) 200 mg capsule    Pleural effusion    Relevant Orders    Transthoracic Echo (TTE) Complete    SOB (shortness of breath) - Primary     This could be multifactorial in origin.  Recent imaging does show bronchiolitis and she is having some recent cough and congestion associated with her shortness of breath.  For this we will treat symptomatically with Tessalon Perles and an albuterol inhaler with a spacer.  She also has worsening of her chronic bilateral pleural effusions.  She is still in remission from her breast cancer so does not seem to be related to her cancer.  It was recommended that she get an echocardiogram to see if this is related to her heart function so this was ordered today.  Patient also feels that the shortness of breath could be related to anxiety that seems to worsen with anxiety and she has never been treated for this before.  We will start her on Lexapro 5 mg daily.  Follow-up in 1 month.  If symptoms are not improving then will consider pulmonology referral.  Strict ED precautions given.         RESOLVED: Generalized edema    Relevant Orders    Transthoracic Echo (TTE) Complete      Naina Pisano DO  Family Medicine Resident, PGY-3  [facility]  [address] [place] [address] [phone]

## 2024-04-29 ENCOUNTER — TELEPHONE (OUTPATIENT)
Dept: HEMATOLOGY/ONCOLOGY | Facility: CLINIC | Age: 84
End: 2024-04-29
Payer: MEDICARE

## 2024-04-29 DIAGNOSIS — C50.919 CARCINOMA OF BREAST METASTATIC TO BONE, UNSPECIFIED LATERALITY (MULTI): Primary | ICD-10-CM

## 2024-04-29 DIAGNOSIS — C79.51 CARCINOMA OF BREAST METASTATIC TO BONE, UNSPECIFIED LATERALITY (MULTI): Primary | ICD-10-CM

## 2024-05-01 ENCOUNTER — LAB (OUTPATIENT)
Dept: LAB | Facility: CLINIC | Age: 84
End: 2024-05-01
Payer: MEDICARE

## 2024-05-01 DIAGNOSIS — C50.919 CARCINOMA OF BREAST METASTATIC TO BONE, UNSPECIFIED LATERALITY (MULTI): ICD-10-CM

## 2024-05-01 DIAGNOSIS — C79.51 CARCINOMA OF BREAST METASTATIC TO BONE, UNSPECIFIED LATERALITY (MULTI): ICD-10-CM

## 2024-05-01 LAB
ALBUMIN SERPL BCP-MCNC: 4 G/DL (ref 3.4–5)
ALP SERPL-CCNC: 69 U/L (ref 33–136)
ALT SERPL W P-5'-P-CCNC: 14 U/L (ref 7–45)
ANION GAP SERPL CALC-SCNC: 13 MMOL/L (ref 10–20)
AST SERPL W P-5'-P-CCNC: 18 U/L (ref 9–39)
BASOPHILS # BLD AUTO: 0.02 X10*3/UL (ref 0–0.1)
BASOPHILS NFR BLD AUTO: 0.4 %
BILIRUB SERPL-MCNC: 0.3 MG/DL (ref 0–1.2)
BUN SERPL-MCNC: 23 MG/DL (ref 6–23)
CALCIUM SERPL-MCNC: 8.7 MG/DL (ref 8.6–10.3)
CHLORIDE SERPL-SCNC: 101 MMOL/L (ref 98–107)
CO2 SERPL-SCNC: 25 MMOL/L (ref 21–32)
CREAT SERPL-MCNC: 1.01 MG/DL (ref 0.5–1.05)
EGFRCR SERPLBLD CKD-EPI 2021: 56 ML/MIN/1.73M*2
EOSINOPHIL # BLD AUTO: 0.15 X10*3/UL (ref 0–0.4)
EOSINOPHIL NFR BLD AUTO: 2.9 %
ERYTHROCYTE [DISTWIDTH] IN BLOOD BY AUTOMATED COUNT: 14.6 % (ref 11.5–14.5)
GLUCOSE SERPL-MCNC: 117 MG/DL (ref 74–99)
HCT VFR BLD AUTO: 30.3 % (ref 36–46)
HGB BLD-MCNC: 9.8 G/DL (ref 12–16)
IMM GRANULOCYTES # BLD AUTO: 0.03 X10*3/UL (ref 0–0.5)
IMM GRANULOCYTES NFR BLD AUTO: 0.6 % (ref 0–0.9)
LYMPHOCYTES # BLD AUTO: 1.71 X10*3/UL (ref 0.8–3)
LYMPHOCYTES NFR BLD AUTO: 33.3 %
MCH RBC QN AUTO: 30.8 PG (ref 26–34)
MCHC RBC AUTO-ENTMCNC: 32.3 G/DL (ref 32–36)
MCV RBC AUTO: 95 FL (ref 80–100)
MONOCYTES # BLD AUTO: 0.49 X10*3/UL (ref 0.05–0.8)
MONOCYTES NFR BLD AUTO: 9.6 %
NEUTROPHILS # BLD AUTO: 2.73 X10*3/UL (ref 1.6–5.5)
NEUTROPHILS NFR BLD AUTO: 53.2 %
PLATELET # BLD AUTO: 178 X10*3/UL (ref 150–450)
POTASSIUM SERPL-SCNC: 3.8 MMOL/L (ref 3.5–5.3)
PROT SERPL-MCNC: 6.2 G/DL (ref 6.4–8.2)
RBC # BLD AUTO: 3.18 X10*6/UL (ref 4–5.2)
SODIUM SERPL-SCNC: 135 MMOL/L (ref 136–145)
WBC # BLD AUTO: 5.1 X10*3/UL (ref 4.4–11.3)

## 2024-05-01 PROCEDURE — 86300 IMMUNOASSAY TUMOR CA 15-3: CPT | Performed by: INTERNAL MEDICINE

## 2024-05-01 PROCEDURE — 85025 COMPLETE CBC W/AUTO DIFF WBC: CPT

## 2024-05-01 PROCEDURE — 36415 COLL VENOUS BLD VENIPUNCTURE: CPT

## 2024-05-01 PROCEDURE — 84075 ASSAY ALKALINE PHOSPHATASE: CPT

## 2024-05-02 LAB — CANCER AG27-29 SERPL-ACNC: 759.4 U/ML (ref 0–38.6)

## 2024-05-03 ENCOUNTER — OFFICE VISIT (OUTPATIENT)
Dept: HEMATOLOGY/ONCOLOGY | Facility: CLINIC | Age: 84
End: 2024-05-03
Payer: MEDICARE

## 2024-05-03 ENCOUNTER — INFUSION (OUTPATIENT)
Dept: HEMATOLOGY/ONCOLOGY | Facility: CLINIC | Age: 84
End: 2024-05-03
Payer: MEDICARE

## 2024-05-03 VITALS
BODY MASS INDEX: 32.72 KG/M2 | DIASTOLIC BLOOD PRESSURE: 76 MMHG | HEART RATE: 79 BPM | WEIGHT: 171.3 LBS | RESPIRATION RATE: 18 BRPM | OXYGEN SATURATION: 94 % | TEMPERATURE: 96.4 F | SYSTOLIC BLOOD PRESSURE: 126 MMHG

## 2024-05-03 DIAGNOSIS — E78.2 MIXED HYPERLIPIDEMIA: ICD-10-CM

## 2024-05-03 DIAGNOSIS — D61.82 MYELOPHTHISIC ANEMIA (MULTI): ICD-10-CM

## 2024-05-03 DIAGNOSIS — C50.911 CARCINOMA OF RIGHT BREAST METASTATIC TO BONE (MULTI): ICD-10-CM

## 2024-05-03 DIAGNOSIS — C79.51 CARCINOMA OF RIGHT BREAST METASTATIC TO BONE (MULTI): ICD-10-CM

## 2024-05-03 DIAGNOSIS — C79.51 CARCINOMA OF BREAST METASTATIC TO BONE, UNSPECIFIED LATERALITY (MULTI): Primary | ICD-10-CM

## 2024-05-03 DIAGNOSIS — M89.9 LYTIC BONE LESIONS ON XRAY: ICD-10-CM

## 2024-05-03 DIAGNOSIS — K21.00 GASTROESOPHAGEAL REFLUX DISEASE WITH ESOPHAGITIS WITHOUT HEMORRHAGE: ICD-10-CM

## 2024-05-03 DIAGNOSIS — H40.9 GLAUCOMA OF BOTH EYES, UNSPECIFIED GLAUCOMA TYPE: ICD-10-CM

## 2024-05-03 DIAGNOSIS — I10 ESSENTIAL HYPERTENSION: ICD-10-CM

## 2024-05-03 DIAGNOSIS — M19.90 ARTHRITIS: ICD-10-CM

## 2024-05-03 DIAGNOSIS — C50.919 CARCINOMA OF BREAST METASTATIC TO BONE, UNSPECIFIED LATERALITY (MULTI): Primary | ICD-10-CM

## 2024-05-03 PROCEDURE — 99214 OFFICE O/P EST MOD 30 MIN: CPT | Performed by: INTERNAL MEDICINE

## 2024-05-03 PROCEDURE — 96372 THER/PROPH/DIAG INJ SC/IM: CPT

## 2024-05-03 PROCEDURE — 3074F SYST BP LT 130 MM HG: CPT | Performed by: INTERNAL MEDICINE

## 2024-05-03 PROCEDURE — 3078F DIAST BP <80 MM HG: CPT | Performed by: INTERNAL MEDICINE

## 2024-05-03 PROCEDURE — 1159F MED LIST DOCD IN RCRD: CPT | Performed by: INTERNAL MEDICINE

## 2024-05-03 PROCEDURE — 1126F AMNT PAIN NOTED NONE PRSNT: CPT | Performed by: INTERNAL MEDICINE

## 2024-05-03 PROCEDURE — 2500000004 HC RX 250 GENERAL PHARMACY W/ HCPCS (ALT 636 FOR OP/ED): Mod: JZ | Performed by: INTERNAL MEDICINE

## 2024-05-03 RX ORDER — EPINEPHRINE 0.3 MG/.3ML
0.3 INJECTION SUBCUTANEOUS EVERY 5 MIN PRN
OUTPATIENT
Start: 2024-05-31

## 2024-05-03 RX ORDER — DIPHENHYDRAMINE HYDROCHLORIDE 50 MG/ML
50 INJECTION INTRAMUSCULAR; INTRAVENOUS AS NEEDED
OUTPATIENT
Start: 2024-05-31

## 2024-05-03 RX ORDER — FAMOTIDINE 10 MG/ML
20 INJECTION INTRAVENOUS ONCE AS NEEDED
OUTPATIENT
Start: 2024-05-31

## 2024-05-03 RX ORDER — ALBUTEROL SULFATE 0.83 MG/ML
3 SOLUTION RESPIRATORY (INHALATION) AS NEEDED
OUTPATIENT
Start: 2024-05-31

## 2024-05-03 RX ADMIN — DENOSUMAB 120 MG: 120 INJECTION SUBCUTANEOUS at 11:28

## 2024-05-03 ASSESSMENT — PAIN SCALES - GENERAL: PAINLEVEL: 0-NO PAIN

## 2024-05-03 NOTE — PATIENT INSTRUCTIONS
Continue taking the femara    You will continue with monthly xgeva    We will anticipate a PET scan to be done some time between August and October

## 2024-05-05 ASSESSMENT — ENCOUNTER SYMPTOMS
RESPIRATORY NEGATIVE: 1
ENDOCRINE NEGATIVE: 1
GASTROINTESTINAL NEGATIVE: 1
CONSTITUTIONAL NEGATIVE: 1
PSYCHIATRIC NEGATIVE: 1
NEUROLOGICAL NEGATIVE: 1
HEMATOLOGIC/LYMPHATIC NEGATIVE: 1
MUSCULOSKELETAL NEGATIVE: 1
EYES NEGATIVE: 1
CARDIOVASCULAR NEGATIVE: 1

## 2024-05-05 NOTE — PROGRESS NOTES
Patient ID: Arabella Springer is a 83 y.o. female.  Referring Physician: Andrew Mcfarland MD  [id number]  [place] [address]  Primary Care Provider: Abraham Mohan DO  Visit Type: Follow Up      Subjective    HPI I am doing okay    Review of Systems   Constitutional: Negative.    HENT:  Negative.     Eyes: Negative.    Respiratory: Negative.     Cardiovascular: Negative.    Gastrointestinal: Negative.    Endocrine: Negative.    Genitourinary: Negative.     Musculoskeletal: Negative.    Skin: Negative.    Neurological: Negative.    Hematological: Negative.    Psychiatric/Behavioral: Negative.          Objective   BSA: 1.82 meters squared  /76 (BP Location: Left arm, Patient Position: Sitting)   Pulse 79   Temp 35.8 °C (96.4 °F) (Temporal)   Resp 18   Wt 77.7 kg (171 lb 4.8 oz)   SpO2 94%   BMI 32.72 kg/m²      has no past medical history on file.   has no past surgical history on file.  No family history on file.  Oncology History   Breast cancer metastasized to bone (Multi)   9/8/2023 - 11/10/2023 Chemotherapy    Pembrolizumab, 21 Day Cycles     9/29/2023 Initial Diagnosis    Breast cancer metastasized to bone (CMS/HCC)         Arabella Springer  reports that she has never smoked. She has never used smokeless tobacco.  She  reports no history of alcohol use.  She  reports no history of drug use.    Physical Exam  Vitals reviewed.   Constitutional:       Appearance: Normal appearance.   HENT:      Head: Normocephalic.      Mouth/Throat:      Mouth: Mucous membranes are moist.   Eyes:      Extraocular Movements: Extraocular movements intact.   Cardiovascular:      Rate and Rhythm: Normal rate and regular rhythm.      Heart sounds: Normal heart sounds.   Pulmonary:      Breath sounds: Normal breath sounds.   Abdominal:      General: Bowel sounds are normal.      Palpations: Abdomen is soft.   Musculoskeletal:         General: Normal range of motion.      Cervical back: Normal range of  "motion and neck supple.   Skin:     General: Skin is warm.   Neurological:      General: No focal deficit present.      Mental Status: She is alert and oriented to person, place, and time.   Psychiatric:         Mood and Affect: Mood normal.         Behavior: Behavior normal.         WBC   Date/Time Value Ref Range Status   05/01/2024 01:37 PM 5.1 4.4 - 11.3 x10*3/uL Final   04/03/2024 02:21 PM 4.1 (L) 4.4 - 11.3 x10*3/uL Final   03/07/2024 10:31 AM 3.8 (L) 4.4 - 11.3 x10*3/uL Final     No results found for: \"NRBC\"  RBC   Date Value Ref Range Status   05/01/2024 3.18 (L) 4.00 - 5.20 x10*6/uL Final   04/03/2024 3.07 (L) 4.00 - 5.20 x10*6/uL Final   03/07/2024 3.03 (L) 4.00 - 5.20 x10*6/uL Final     Hemoglobin   Date Value Ref Range Status   05/01/2024 9.8 (L) 12.0 - 16.0 g/dL Final   04/03/2024 9.4 (L) 12.0 - 16.0 g/dL Final   03/07/2024 9.6 (L) 12.0 - 16.0 g/dL Final     Hematocrit   Date Value Ref Range Status   05/01/2024 30.3 (L) 36.0 - 46.0 % Final   04/03/2024 29.8 (L) 36.0 - 46.0 % Final   03/07/2024 30.2 (L) 36.0 - 46.0 % Final     MCV   Date/Time Value Ref Range Status   05/01/2024 01:37 PM 95 80 - 100 fL Final   04/03/2024 02:21 PM 97 80 - 100 fL Final   03/07/2024 10:31  80 - 100 fL Final     MCH   Date/Time Value Ref Range Status   05/01/2024 01:37 PM 30.8 26.0 - 34.0 pg Final   04/03/2024 02:21 PM 30.6 26.0 - 34.0 pg Final   03/07/2024 10:31 AM 31.7 26.0 - 34.0 pg Final     MCHC   Date/Time Value Ref Range Status   05/01/2024 01:37 PM 32.3 32.0 - 36.0 g/dL Final   04/03/2024 02:21 PM 31.5 (L) 32.0 - 36.0 g/dL Final   03/07/2024 10:31 AM 31.8 (L) 32.0 - 36.0 g/dL Final     RDW   Date/Time Value Ref Range Status   05/01/2024 01:37 PM 14.6 (H) 11.5 - 14.5 % Final   04/03/2024 02:21 PM 14.3 11.5 - 14.5 % Final   03/07/2024 10:31 AM 14.3 11.5 - 14.5 % Final     Platelets   Date/Time Value Ref Range Status   05/01/2024 01:37  150 - 450 x10*3/uL Final   04/03/2024 02:21  150 - 450 x10*3/uL " Final   03/07/2024 10:31  150 - 450 x10*3/uL Final     MPV   Date/Time Value Ref Range Status   10/19/2023 08:01 AM 9.2 7.5 - 11.5 fL Final     Neutrophils %   Date/Time Value Ref Range Status   05/01/2024 01:37 PM 53.2 40.0 - 80.0 % Final   04/03/2024 02:21 PM 53.5 40.0 - 80.0 % Final   03/07/2024 10:31 AM 50.2 40.0 - 80.0 % Final     Immature Granulocytes %, Automated   Date/Time Value Ref Range Status   05/01/2024 01:37 PM 0.6 0.0 - 0.9 % Final     Comment:     Immature Granulocyte Count (IG) includes promyelocytes, myelocytes and metamyelocytes but does not include bands. Percent differential counts (%) should be interpreted in the context of the absolute cell counts (cells/UL).   04/03/2024 02:21 PM 1.2 (H) 0.0 - 0.9 % Final     Comment:     Immature Granulocyte Count (IG) includes promyelocytes, myelocytes and metamyelocytes but does not include bands. Percent differential counts (%) should be interpreted in the context of the absolute cell counts (cells/UL).   03/07/2024 10:31 AM 1.0 (H) 0.0 - 0.9 % Final     Comment:     Immature Granulocyte Count (IG) includes promyelocytes, myelocytes and metamyelocytes but does not include bands. Percent differential counts (%) should be interpreted in the context of the absolute cell counts (cells/UL).     Lymphocytes %   Date/Time Value Ref Range Status   05/01/2024 01:37 PM 33.3 13.0 - 44.0 % Final   04/03/2024 02:21 PM 32.3 13.0 - 44.0 % Final   03/07/2024 10:31 AM 37.6 13.0 - 44.0 % Final     Monocytes %   Date/Time Value Ref Range Status   05/01/2024 01:37 PM 9.6 2.0 - 10.0 % Final   04/03/2024 02:21 PM 10.3 2.0 - 10.0 % Final   03/07/2024 10:31 AM 9.1 2.0 - 10.0 % Final     Eosinophils %   Date/Time Value Ref Range Status   05/01/2024 01:37 PM 2.9 0.0 - 6.0 % Final   04/03/2024 02:21 PM 2.2 0.0 - 6.0 % Final   03/07/2024 10:31 AM 1.8 0.0 - 6.0 % Final     Basophils %   Date/Time Value Ref Range Status   05/01/2024 01:37 PM 0.4 0.0 - 2.0 % Final   04/03/2024  "02:21 PM 0.5 0.0 - 2.0 % Final   03/07/2024 10:31 AM 0.3 0.0 - 2.0 % Final     Neutrophils Absolute   Date/Time Value Ref Range Status   05/01/2024 01:37 PM 2.73 1.60 - 5.50 x10*3/uL Final     Comment:     Percent differential counts (%) should be interpreted in the context of the absolute cell counts (cells/uL).   04/03/2024 02:21 PM 2.17 1.60 - 5.50 x10*3/uL Final     Comment:     Percent differential counts (%) should be interpreted in the context of the absolute cell counts (cells/uL).   03/07/2024 10:31 AM 1.92 1.60 - 5.50 x10*3/uL Final     Comment:     Percent differential counts (%) should be interpreted in the context of the absolute cell counts (cells/uL).     Immature Granulocytes Absolute, Automated   Date/Time Value Ref Range Status   05/01/2024 01:37 PM 0.03 0.00 - 0.50 x10*3/uL Final   04/03/2024 02:21 PM 0.05 0.00 - 0.50 x10*3/uL Final   03/07/2024 10:31 AM 0.04 0.00 - 0.50 x10*3/uL Final     Lymphocytes Absolute   Date/Time Value Ref Range Status   05/01/2024 01:37 PM 1.71 0.80 - 3.00 x10*3/uL Final   04/03/2024 02:21 PM 1.31 0.80 - 3.00 x10*3/uL Final   03/07/2024 10:31 AM 1.44 0.80 - 3.00 x10*3/uL Final     Monocytes Absolute   Date/Time Value Ref Range Status   05/01/2024 01:37 PM 0.49 0.05 - 0.80 x10*3/uL Final   04/03/2024 02:21 PM 0.42 0.05 - 0.80 x10*3/uL Final   03/07/2024 10:31 AM 0.35 0.05 - 0.80 x10*3/uL Final     Eosinophils Absolute   Date/Time Value Ref Range Status   05/01/2024 01:37 PM 0.15 0.00 - 0.40 x10*3/uL Final   04/03/2024 02:21 PM 0.09 0.00 - 0.40 x10*3/uL Final   03/07/2024 10:31 AM 0.07 0.00 - 0.40 x10*3/uL Final     Basophils Absolute   Date/Time Value Ref Range Status   05/01/2024 01:37 PM 0.02 0.00 - 0.10 x10*3/uL Final   04/03/2024 02:21 PM 0.02 0.00 - 0.10 x10*3/uL Final   03/07/2024 10:31 AM 0.01 0.00 - 0.10 x10*3/uL Final       No components found for: \"PT\"  No results found for: \"APTT\"  Medication Documentation Review Audit       Reviewed by Sanna Hernandez MA " (Medical Assistant) on 05/03/24 at 1020      Medication Order Taking? Sig Documenting Provider Last Dose Status   albuterol (ProAir HFA) 90 mcg/actuation inhaler [id number] No Inhale 2 puffs every 4 hours if needed for wheezing or shortness of breath.   Patient not taking: Reported on 5/3/2024    Naina Pisano, DO Not Taking Active   amLODIPine (Norvasc) 2.5 mg tablet [id number] Yes Take 1 tablet (2.5 mg) by mouth once daily. Abraham Mohan, DO Taking Active   brimonidine-timoloL (Combigan) 0.2-0.5 % ophthalmic solution [id number] Yes Administer 1 drop into both eyes every 12 hours. Historical Provider, MD Taking Active   calcium carbonate-vitamin D3 500 mg-5 mcg (200 unit) tablet [id number] Yes Take 2 tablets by mouth 2 times a day. Historical Provider, MD Taking Active   denosumab (Xgeva) 120 mg/1.7 mL (70 mg/mL) injection [id number] Yes Inject 1.7 mL (120 mg) under the skin 1 time. Historical Provider, MD Taking Active   diclofenac (Voltaren) 50 mg EC tablet [id number] Yes TAKE 1 TABLET(50 MG) BY MOUTH TWICE DAILY WITH MEALS DEX Cheng Taking Active   escitalopram (Lexapro) 5 mg tablet [id number] Yes Take 1 tablet (5 mg) by mouth once daily. Naina Pisano, DO Taking Active   esomeprazole (NexIUM) 40 mg DR capsule [id number] Yes Take 1 capsule (40 mg) by mouth once daily in the morning. Take before meals. Historical Provider, MD Taking Active   inhalat.spacing dev,large mask (Pro Comfort Spacer-Adult Mask) spacer [id number] Yes 1 each every 4 hours if needed (shortness of breath, wheezing, cough). Naina Pisano, DO Taking Active   letrozole (Femara) 2.5 mg tablet [id number] No Take 1 tablet (2.5 mg total) by mouth once daily.  Take with or without food. Andrew Mcfarland MD Unknown Active   LORazepam (Ativan) 0.5 mg tablet [id number] No Take 1 tablet (0.5 mg) by mouth every 2 hours if needed for anxiety (anxiety related to radiology tests) for up to 5 doses.   Patient not taking: Reported on  5/3/2024    Andrew Mcfarland MD Not Taking Active   losartan-hydrochlorothiazide (Hyzaar) 100-12.5 mg tablet [id number] Yes Take 1 tablet by mouth once daily. Abraham Mohan, DO Taking Active   multivitamin tablet [id number] Yes Take 1 tablet by mouth once daily. Historical Provider, MD Taking Active   pravastatin (Pravachol) 10 mg tablet [id number] Yes Take 1 tablet (10 mg) by mouth once daily. Abraham Mohan, DO Taking Active   Rhopressa 0.02 % drops opthalmic solution [id number] Yes INSTILL 1 DROP IN RIGHT EYE DAILY AT BEDTIME Historical Provider, MD Taking Active   traMADol (Ultram) 50 mg tablet [id number] No TAKE ONE TABLET BY MOUTH EVERY 8 HOURS AS NEEDED FOR MODERATE TO SEVERE PAIN Historical Provider, MD Not Taking Active                   Assessment/Plan    1) stage IV breast cancer  -has high TMB  -has BRCA1  variant of uncertain significance  -also has PIK3CA mutation  -pembrolizumab has been on hold since she developed a diffuse body rash, course of prednisone did not help, in fact a week after finishing prednisone, the rash got worse--papules have become confluent red/pink areas  -she did see her dermatologist (Dr Wolff)--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, and she did have a punch biopsy done--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  -now off steroids  -labs done on 5/1/2024 included CBC, COMP, CA 27.29  -results reviewed--wbc 5.1, hgb 9.48 plt 178,000, creatinine 1.01, calcium 8.7, albumin 4.0, AST 18, ALT 14, CA 27.29 759  -new CT scan done on 4/3/2024 reviewed: interval worsening of bilateral pleural effusions, right >left; multiple scattered <0.5 mm pulmonary nodules bilateral lungs not changed; interval development of several branching opacities involving left lung apex; there are no new worrisome hepatic lesions; mild right sided hydronephrosis; diffuse sclerosis of axial and appendicular skeleton  -advised her to continue letrozole 2.5 mg daily    -benefits, risks, potential morbidity related to xgeva were reviewed with Arabella and she provided informed consent to proceed  -she went on to receive xgeva 120 mg subcutaneous  -she will continue to return monthly for xgeva  -will check PET scan at end of summer     2) bone metastases  -secondary to metastatic breast cancer  -receives xgeva Q4 weeks     3) anemia  -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  -hgb slowly improving     4) hyperlipidemia  -on pravastatin     5) arthritis  -on tylenol PRN  -on diclofenac PRN  -received steroid injections into her knees and she feels so much better, so she wants to receive them again     6) hypertension  -on amlodipine  -on losartan-HCTZ     7) GERD  -on nexium     8) glaucoma  -on combigan eyedrops        Problem List Items Addressed This Visit             ICD-10-CM    Breast cancer metastasized to bone (Multi) C50.919, C79.51    Relevant Orders    Clinic Appointment Request Chemo Follow Up; [facility]; [facility]    Lytic bone lesions on xray - Primary M89.9    Relevant Orders    Clinic Appointment Request Chemo Follow Up; [facility]REW [facility]; [facility]            Andrew Mcfarland MD

## 2024-05-09 ENCOUNTER — APPOINTMENT (OUTPATIENT)
Dept: CARDIOLOGY | Facility: CLINIC | Age: 84
End: 2024-05-09
Payer: MEDICARE

## 2024-05-13 ENCOUNTER — APPOINTMENT (OUTPATIENT)
Dept: PRIMARY CARE | Facility: CLINIC | Age: 84
End: 2024-05-13
Payer: MEDICARE

## 2024-05-20 DIAGNOSIS — E78.2 MIXED HYPERLIPIDEMIA: ICD-10-CM

## 2024-05-20 DIAGNOSIS — I10 HYPERTENSION, UNSPECIFIED TYPE: ICD-10-CM

## 2024-05-20 RX ORDER — LOSARTAN POTASSIUM AND HYDROCHLOROTHIAZIDE 12.5; 1 MG/1; MG/1
1 TABLET ORAL DAILY
Qty: 90 TABLET | Refills: 0 | Status: SHIPPED | OUTPATIENT
Start: 2024-05-20

## 2024-05-20 RX ORDER — AMLODIPINE BESYLATE 2.5 MG/1
2.5 TABLET ORAL DAILY
Qty: 90 TABLET | Refills: 0 | Status: SHIPPED | OUTPATIENT
Start: 2024-05-20

## 2024-05-20 RX ORDER — PRAVASTATIN SODIUM 10 MG/1
10 TABLET ORAL DAILY
Qty: 90 TABLET | Refills: 0 | Status: SHIPPED | OUTPATIENT
Start: 2024-05-20

## 2024-05-29 ENCOUNTER — LAB (OUTPATIENT)
Dept: LAB | Facility: CLINIC | Age: 84
End: 2024-05-29
Payer: MEDICARE

## 2024-05-29 DIAGNOSIS — M89.9 LYTIC BONE LESIONS ON XRAY: ICD-10-CM

## 2024-05-29 DIAGNOSIS — C50.911 CARCINOMA OF RIGHT BREAST METASTATIC TO BONE (MULTI): ICD-10-CM

## 2024-05-29 DIAGNOSIS — C79.51 CARCINOMA OF RIGHT BREAST METASTATIC TO BONE (MULTI): ICD-10-CM

## 2024-05-29 LAB
ALBUMIN SERPL BCP-MCNC: 3.9 G/DL (ref 3.4–5)
ALP SERPL-CCNC: 72 U/L (ref 33–136)
ALT SERPL W P-5'-P-CCNC: 13 U/L (ref 7–45)
ANION GAP SERPL CALC-SCNC: 12 MMOL/L (ref 10–20)
AST SERPL W P-5'-P-CCNC: 17 U/L (ref 9–39)
BASOPHILS # BLD AUTO: 0.02 X10*3/UL (ref 0–0.1)
BASOPHILS NFR BLD AUTO: 0.5 %
BILIRUB SERPL-MCNC: 0.3 MG/DL (ref 0–1.2)
BUN SERPL-MCNC: 22 MG/DL (ref 6–23)
CALCIUM SERPL-MCNC: 8.5 MG/DL (ref 8.6–10.3)
CHLORIDE SERPL-SCNC: 103 MMOL/L (ref 98–107)
CO2 SERPL-SCNC: 26 MMOL/L (ref 21–32)
CREAT SERPL-MCNC: 0.95 MG/DL (ref 0.5–1.05)
EGFRCR SERPLBLD CKD-EPI 2021: 60 ML/MIN/1.73M*2
EOSINOPHIL # BLD AUTO: 0.14 X10*3/UL (ref 0–0.4)
EOSINOPHIL NFR BLD AUTO: 3.2 %
ERYTHROCYTE [DISTWIDTH] IN BLOOD BY AUTOMATED COUNT: 15.4 % (ref 11.5–14.5)
GLUCOSE SERPL-MCNC: 175 MG/DL (ref 74–99)
HCT VFR BLD AUTO: 29 % (ref 36–46)
HGB BLD-MCNC: 9.4 G/DL (ref 12–16)
IMM GRANULOCYTES # BLD AUTO: 0.05 X10*3/UL (ref 0–0.5)
IMM GRANULOCYTES NFR BLD AUTO: 1.1 % (ref 0–0.9)
LYMPHOCYTES # BLD AUTO: 1.22 X10*3/UL (ref 0.8–3)
LYMPHOCYTES NFR BLD AUTO: 27.6 %
MCH RBC QN AUTO: 30.8 PG (ref 26–34)
MCHC RBC AUTO-ENTMCNC: 32.4 G/DL (ref 32–36)
MCV RBC AUTO: 95 FL (ref 80–100)
MONOCYTES # BLD AUTO: 0.44 X10*3/UL (ref 0.05–0.8)
MONOCYTES NFR BLD AUTO: 10 %
NEUTROPHILS # BLD AUTO: 2.55 X10*3/UL (ref 1.6–5.5)
NEUTROPHILS NFR BLD AUTO: 57.6 %
PLATELET # BLD AUTO: 176 X10*3/UL (ref 150–450)
POTASSIUM SERPL-SCNC: 4 MMOL/L (ref 3.5–5.3)
PROT SERPL-MCNC: 6 G/DL (ref 6.4–8.2)
RBC # BLD AUTO: 3.05 X10*6/UL (ref 4–5.2)
SODIUM SERPL-SCNC: 137 MMOL/L (ref 136–145)
WBC # BLD AUTO: 4.4 X10*3/UL (ref 4.4–11.3)

## 2024-05-29 PROCEDURE — 36415 COLL VENOUS BLD VENIPUNCTURE: CPT

## 2024-05-29 PROCEDURE — 86300 IMMUNOASSAY TUMOR CA 15-3: CPT | Performed by: INTERNAL MEDICINE

## 2024-05-29 PROCEDURE — 80053 COMPREHEN METABOLIC PANEL: CPT

## 2024-05-29 PROCEDURE — 85025 COMPLETE CBC W/AUTO DIFF WBC: CPT

## 2024-05-30 LAB — CANCER AG27-29 SERPL-ACNC: 636 U/ML (ref 0–38.6)

## 2024-05-31 ENCOUNTER — OFFICE VISIT (OUTPATIENT)
Dept: HEMATOLOGY/ONCOLOGY | Facility: CLINIC | Age: 84
End: 2024-05-31
Payer: MEDICARE

## 2024-05-31 ENCOUNTER — OFFICE VISIT (OUTPATIENT)
Dept: PRIMARY CARE | Facility: CLINIC | Age: 84
End: 2024-05-31
Payer: MEDICARE

## 2024-05-31 ENCOUNTER — INFUSION (OUTPATIENT)
Dept: HEMATOLOGY/ONCOLOGY | Facility: CLINIC | Age: 84
End: 2024-05-31
Payer: MEDICARE

## 2024-05-31 VITALS
SYSTOLIC BLOOD PRESSURE: 144 MMHG | HEART RATE: 91 BPM | BODY MASS INDEX: 32.09 KG/M2 | OXYGEN SATURATION: 92 % | WEIGHT: 168 LBS | TEMPERATURE: 98.1 F | RESPIRATION RATE: 18 BRPM | DIASTOLIC BLOOD PRESSURE: 78 MMHG

## 2024-05-31 VITALS
TEMPERATURE: 97.7 F | RESPIRATION RATE: 17 BRPM | WEIGHT: 168.65 LBS | SYSTOLIC BLOOD PRESSURE: 114 MMHG | HEART RATE: 82 BPM | OXYGEN SATURATION: 96 % | BODY MASS INDEX: 32.22 KG/M2 | DIASTOLIC BLOOD PRESSURE: 70 MMHG

## 2024-05-31 DIAGNOSIS — I10 ESSENTIAL HYPERTENSION: ICD-10-CM

## 2024-05-31 DIAGNOSIS — M89.9 LYTIC BONE LESIONS ON XRAY: ICD-10-CM

## 2024-05-31 DIAGNOSIS — D61.82 MYELOPHTHISIC ANEMIA (MULTI): Primary | ICD-10-CM

## 2024-05-31 DIAGNOSIS — H40.9 GLAUCOMA OF BOTH EYES, UNSPECIFIED GLAUCOMA TYPE: ICD-10-CM

## 2024-05-31 DIAGNOSIS — E78.2 MIXED HYPERLIPIDEMIA: ICD-10-CM

## 2024-05-31 DIAGNOSIS — C79.51 CARCINOMA OF RIGHT BREAST METASTATIC TO BONE (MULTI): ICD-10-CM

## 2024-05-31 DIAGNOSIS — C50.919 CARCINOMA OF BREAST METASTATIC TO BONE, UNSPECIFIED LATERALITY (MULTI): ICD-10-CM

## 2024-05-31 DIAGNOSIS — H61.23 BILATERAL IMPACTED CERUMEN: Primary | ICD-10-CM

## 2024-05-31 DIAGNOSIS — C50.911 CARCINOMA OF RIGHT BREAST METASTATIC TO BONE (MULTI): ICD-10-CM

## 2024-05-31 DIAGNOSIS — H54.61 VISION LOSS OF RIGHT EYE: ICD-10-CM

## 2024-05-31 DIAGNOSIS — M19.90 ARTHRITIS: ICD-10-CM

## 2024-05-31 DIAGNOSIS — K21.00 GASTROESOPHAGEAL REFLUX DISEASE WITH ESOPHAGITIS WITHOUT HEMORRHAGE: ICD-10-CM

## 2024-05-31 DIAGNOSIS — C79.51 CARCINOMA OF BREAST METASTATIC TO BONE, UNSPECIFIED LATERALITY (MULTI): ICD-10-CM

## 2024-05-31 PROCEDURE — 99213 OFFICE O/P EST LOW 20 MIN: CPT

## 2024-05-31 PROCEDURE — 3074F SYST BP LT 130 MM HG: CPT | Performed by: INTERNAL MEDICINE

## 2024-05-31 PROCEDURE — 3077F SYST BP >= 140 MM HG: CPT

## 2024-05-31 PROCEDURE — 1159F MED LIST DOCD IN RCRD: CPT | Performed by: INTERNAL MEDICINE

## 2024-05-31 PROCEDURE — 1036F TOBACCO NON-USER: CPT | Performed by: INTERNAL MEDICINE

## 2024-05-31 PROCEDURE — 99214 OFFICE O/P EST MOD 30 MIN: CPT | Performed by: INTERNAL MEDICINE

## 2024-05-31 PROCEDURE — 3078F DIAST BP <80 MM HG: CPT | Performed by: INTERNAL MEDICINE

## 2024-05-31 PROCEDURE — 1126F AMNT PAIN NOTED NONE PRSNT: CPT | Performed by: INTERNAL MEDICINE

## 2024-05-31 PROCEDURE — 3078F DIAST BP <80 MM HG: CPT

## 2024-05-31 PROCEDURE — 1036F TOBACCO NON-USER: CPT

## 2024-05-31 PROCEDURE — 1160F RVW MEDS BY RX/DR IN RCRD: CPT

## 2024-05-31 PROCEDURE — 1159F MED LIST DOCD IN RCRD: CPT

## 2024-05-31 ASSESSMENT — ENCOUNTER SYMPTOMS
HEMATOLOGIC/LYMPHATIC NEGATIVE: 1
PSYCHIATRIC NEGATIVE: 1
RESPIRATORY NEGATIVE: 1
GASTROINTESTINAL NEGATIVE: 1
MUSCULOSKELETAL NEGATIVE: 1
CONSTITUTIONAL NEGATIVE: 1
EYE PROBLEMS: 1
ENDOCRINE NEGATIVE: 1
NEUROLOGICAL NEGATIVE: 1
CARDIOVASCULAR NEGATIVE: 1

## 2024-05-31 ASSESSMENT — PAIN SCALES - GENERAL: PAINLEVEL: 0-NO PAIN

## 2024-05-31 NOTE — PROGRESS NOTES
Patient ID: Arabella Springer is a 83 y.o. female.  Referring Physician: Andrew Mcfarland MD  [address]  [place] [address]  Primary Care Provider: Abraham Mohan DO  Visit Type: Follow Up      Subjective    HPI I am having problems with my right eye    Review of Systems   Constitutional: Negative.    HENT:  Negative.     Eyes:  Positive for eye problems.   Respiratory: Negative.     Cardiovascular: Negative.    Gastrointestinal: Negative.    Endocrine: Negative.    Genitourinary: Negative.     Musculoskeletal: Negative.    Skin: Negative.    Neurological: Negative.    Hematological: Negative.    Psychiatric/Behavioral: Negative.          Objective   BSA: 1.81 meters squared  /70 (BP Location: Right arm, Patient Position: Sitting, BP Cuff Size: Adult)   Pulse 82   Temp 36.5 °C (97.7 °F) (Temporal)   Resp 17   Wt 76.5 kg (168 lb 10.4 oz)   SpO2 96%   BMI 32.22 kg/m²      has no past medical history on file.   has no past surgical history on file.  No family history on file.  Oncology History   Breast cancer metastasized to bone (Multi)   9/8/2023 - 11/10/2023 Chemotherapy    Pembrolizumab, 21 Day Cycles     9/29/2023 Initial Diagnosis    Breast cancer metastasized to bone (CMS/HCC)         Arabella Springer  reports that she has never smoked. She has never used smokeless tobacco.  She  reports no history of alcohol use.  She  reports no history of drug use.    Physical Exam  Vitals reviewed.   Constitutional:       Appearance: Normal appearance.   HENT:      Head: Normocephalic.      Mouth/Throat:      Mouth: Mucous membranes are moist.   Eyes:      Extraocular Movements: Extraocular movements intact.      Pupils: Pupils are equal, round, and reactive to light.   Cardiovascular:      Rate and Rhythm: Normal rate and regular rhythm.      Pulses: Normal pulses.      Heart sounds: Normal heart sounds.   Pulmonary:      Breath sounds: Normal breath sounds.   Abdominal:      General: Bowel  "sounds are normal.      Palpations: Abdomen is soft.   Musculoskeletal:         General: Normal range of motion.      Cervical back: Normal range of motion and neck supple.   Skin:     General: Skin is warm.   Neurological:      General: No focal deficit present.      Mental Status: She is alert and oriented to person, place, and time.   Psychiatric:         Mood and Affect: Mood normal.         Behavior: Behavior normal.         WBC   Date/Time Value Ref Range Status   05/29/2024 02:55 PM 4.4 4.4 - 11.3 x10*3/uL Final   05/01/2024 01:37 PM 5.1 4.4 - 11.3 x10*3/uL Final   04/03/2024 02:21 PM 4.1 (L) 4.4 - 11.3 x10*3/uL Final     No results found for: \"NRBC\"  RBC   Date Value Ref Range Status   05/29/2024 3.05 (L) 4.00 - 5.20 x10*6/uL Final   05/01/2024 3.18 (L) 4.00 - 5.20 x10*6/uL Final   04/03/2024 3.07 (L) 4.00 - 5.20 x10*6/uL Final     Hemoglobin   Date Value Ref Range Status   05/29/2024 9.4 (L) 12.0 - 16.0 g/dL Final   05/01/2024 9.8 (L) 12.0 - 16.0 g/dL Final   04/03/2024 9.4 (L) 12.0 - 16.0 g/dL Final     Hematocrit   Date Value Ref Range Status   05/29/2024 29.0 (L) 36.0 - 46.0 % Final   05/01/2024 30.3 (L) 36.0 - 46.0 % Final   04/03/2024 29.8 (L) 36.0 - 46.0 % Final     MCV   Date/Time Value Ref Range Status   05/29/2024 02:55 PM 95 80 - 100 fL Final   05/01/2024 01:37 PM 95 80 - 100 fL Final   04/03/2024 02:21 PM 97 80 - 100 fL Final     MCH   Date/Time Value Ref Range Status   05/29/2024 02:55 PM 30.8 26.0 - 34.0 pg Final   05/01/2024 01:37 PM 30.8 26.0 - 34.0 pg Final   04/03/2024 02:21 PM 30.6 26.0 - 34.0 pg Final     MCHC   Date/Time Value Ref Range Status   05/29/2024 02:55 PM 32.4 32.0 - 36.0 g/dL Final   05/01/2024 01:37 PM 32.3 32.0 - 36.0 g/dL Final   04/03/2024 02:21 PM 31.5 (L) 32.0 - 36.0 g/dL Final     RDW   Date/Time Value Ref Range Status   05/29/2024 02:55 PM 15.4 (H) 11.5 - 14.5 % Final   05/01/2024 01:37 PM 14.6 (H) 11.5 - 14.5 % Final   04/03/2024 02:21 PM 14.3 11.5 - 14.5 % Final "     Platelets   Date/Time Value Ref Range Status   05/29/2024 02:55  150 - 450 x10*3/uL Final   05/01/2024 01:37  150 - 450 x10*3/uL Final   04/03/2024 02:21  150 - 450 x10*3/uL Final     MPV   Date/Time Value Ref Range Status   10/19/2023 08:01 AM 9.2 7.5 - 11.5 fL Final     Neutrophils %   Date/Time Value Ref Range Status   05/29/2024 02:55 PM 57.6 40.0 - 80.0 % Final   05/01/2024 01:37 PM 53.2 40.0 - 80.0 % Final   04/03/2024 02:21 PM 53.5 40.0 - 80.0 % Final     Immature Granulocytes %, Automated   Date/Time Value Ref Range Status   05/29/2024 02:55 PM 1.1 (H) 0.0 - 0.9 % Final     Comment:     Immature Granulocyte Count (IG) includes promyelocytes, myelocytes and metamyelocytes but does not include bands. Percent differential counts (%) should be interpreted in the context of the absolute cell counts (cells/UL).   05/01/2024 01:37 PM 0.6 0.0 - 0.9 % Final     Comment:     Immature Granulocyte Count (IG) includes promyelocytes, myelocytes and metamyelocytes but does not include bands. Percent differential counts (%) should be interpreted in the context of the absolute cell counts (cells/UL).   04/03/2024 02:21 PM 1.2 (H) 0.0 - 0.9 % Final     Comment:     Immature Granulocyte Count (IG) includes promyelocytes, myelocytes and metamyelocytes but does not include bands. Percent differential counts (%) should be interpreted in the context of the absolute cell counts (cells/UL).     Lymphocytes %   Date/Time Value Ref Range Status   05/29/2024 02:55 PM 27.6 13.0 - 44.0 % Final   05/01/2024 01:37 PM 33.3 13.0 - 44.0 % Final   04/03/2024 02:21 PM 32.3 13.0 - 44.0 % Final     Monocytes %   Date/Time Value Ref Range Status   05/29/2024 02:55 PM 10.0 2.0 - 10.0 % Final   05/01/2024 01:37 PM 9.6 2.0 - 10.0 % Final   04/03/2024 02:21 PM 10.3 2.0 - 10.0 % Final     Eosinophils %   Date/Time Value Ref Range Status   05/29/2024 02:55 PM 3.2 0.0 - 6.0 % Final   05/01/2024 01:37 PM 2.9 0.0 - 6.0 % Final    04/03/2024 02:21 PM 2.2 0.0 - 6.0 % Final     Basophils %   Date/Time Value Ref Range Status   05/29/2024 02:55 PM 0.5 0.0 - 2.0 % Final   05/01/2024 01:37 PM 0.4 0.0 - 2.0 % Final   04/03/2024 02:21 PM 0.5 0.0 - 2.0 % Final     Neutrophils Absolute   Date/Time Value Ref Range Status   05/29/2024 02:55 PM 2.55 1.60 - 5.50 x10*3/uL Final     Comment:     Percent differential counts (%) should be interpreted in the context of the absolute cell counts (cells/uL).   05/01/2024 01:37 PM 2.73 1.60 - 5.50 x10*3/uL Final     Comment:     Percent differential counts (%) should be interpreted in the context of the absolute cell counts (cells/uL).   04/03/2024 02:21 PM 2.17 1.60 - 5.50 x10*3/uL Final     Comment:     Percent differential counts (%) should be interpreted in the context of the absolute cell counts (cells/uL).     Immature Granulocytes Absolute, Automated   Date/Time Value Ref Range Status   05/29/2024 02:55 PM 0.05 0.00 - 0.50 x10*3/uL Final   05/01/2024 01:37 PM 0.03 0.00 - 0.50 x10*3/uL Final   04/03/2024 02:21 PM 0.05 0.00 - 0.50 x10*3/uL Final     Lymphocytes Absolute   Date/Time Value Ref Range Status   05/29/2024 02:55 PM 1.22 0.80 - 3.00 x10*3/uL Final   05/01/2024 01:37 PM 1.71 0.80 - 3.00 x10*3/uL Final   04/03/2024 02:21 PM 1.31 0.80 - 3.00 x10*3/uL Final     Monocytes Absolute   Date/Time Value Ref Range Status   05/29/2024 02:55 PM 0.44 0.05 - 0.80 x10*3/uL Final   05/01/2024 01:37 PM 0.49 0.05 - 0.80 x10*3/uL Final   04/03/2024 02:21 PM 0.42 0.05 - 0.80 x10*3/uL Final     Eosinophils Absolute   Date/Time Value Ref Range Status   05/29/2024 02:55 PM 0.14 0.00 - 0.40 x10*3/uL Final   05/01/2024 01:37 PM 0.15 0.00 - 0.40 x10*3/uL Final   04/03/2024 02:21 PM 0.09 0.00 - 0.40 x10*3/uL Final     Basophils Absolute   Date/Time Value Ref Range Status   05/29/2024 02:55 PM 0.02 0.00 - 0.10 x10*3/uL Final   05/01/2024 01:37 PM 0.02 0.00 - 0.10 x10*3/uL Final   04/03/2024 02:21 PM 0.02 0.00 - 0.10 x10*3/uL  "Final       No components found for: \"PT\"  No results found for: \"APTT\"  Medication Documentation Review Audit       Reviewed by Jimmie Ardon DO (Resident) on 05/31/24 at 1351      Medication Order Taking? Sig Documenting Provider Last Dose Status   albuterol (ProAir HFA) 90 mcg/actuation inhaler [id number] Yes Inhale 2 puffs every 4 hours if needed for wheezing or shortness of breath. Naina Pisano, DO Taking Active   amLODIPine (Norvasc) 2.5 mg tablet [id number] Yes Take 1 tablet (2.5 mg) by mouth once daily. Abraham Mohan, DO Taking Active   brimonidine-timoloL (Combigan) 0.2-0.5 % ophthalmic solution [id number] Yes Administer 1 drop into both eyes every 12 hours. Historical Provider, MD Taking Active   calcium carbonate-vitamin D3 500 mg-5 mcg (200 unit) tablet [id number] Yes Take 2 tablets by mouth 2 times a day. Historical Provider, MD Taking Active   carbamide peroxide (Debrox) 6.5 % otic solution [id number]  Administer 5 drops into each ear 2 times a day for 4 days. Jimmie Ardon, DO  Active   denosumab (Xgeva) 120 mg/1.7 mL (70 mg/mL) injection [id number] Yes Inject 1.7 mL (120 mg) under the skin 1 time. Historical Provider, MD Taking Active   diclofenac (Voltaren) 50 mg EC tablet [id number] Yes TAKE 1 TABLET(50 MG) BY MOUTH TWICE DAILY WITH MEALS ROCHELLE Cheng-CNP Taking Active   escitalopram (Lexapro) 5 mg tablet [id number] Yes Take 1 tablet (5 mg) by mouth once daily. Naina Pisano,  Taking Active   esomeprazole (NexIUM) 40 mg DR capsule [id number] Yes Take 1 capsule (40 mg) by mouth once daily in the morning. Take before meals. Historical Provider, MD Taking Active   inhalat.spacing dev,large mask (Pro Comfort Spacer-Adult Mask) spacer [id number] Yes 1 each every 4 hours if needed (shortness of breath, wheezing, cough). Naina Pisano DO Taking Active   letrozole (Femara) 2.5 mg tablet [id number] Yes Take 1 tablet (2.5 mg total) by mouth once daily.  Take with or without food. Andrew" MARIA G Mcfarland MD Taking Active   LORazepam (Ativan) 0.5 mg tablet [id number] Yes Take 1 tablet (0.5 mg) by mouth every 2 hours if needed for anxiety (anxiety related to radiology tests) for up to 5 doses. Andrew Mcfarland MD Taking Active   losartan-hydrochlorothiazide (Hyzaar) 100-12.5 mg tablet [id number] Yes Take 1 tablet by mouth once daily. Abraham Mohan,  Taking Active   multivitamin tablet [id number] Yes Take 1 tablet by mouth once daily. Historical Provider, MD Taking Active   pravastatin (Pravachol) 10 mg tablet [id number] Yes Take 1 tablet (10 mg) by mouth once daily. Abraham Mohan, DO Taking Active   Rhopressa 0.02 % drops opthalmic solution [id number] Yes INSTILL 1 DROP IN RIGHT EYE DAILY AT BEDTIME Historical Provider, MD Taking Active   traMADol (Ultram) 50 mg tablet [id number] Yes TAKE ONE TABLET BY MOUTH EVERY 8 HOURS AS NEEDED FOR MODERATE TO SEVERE PAIN Historical Provider, MD Taking Active                   Assessment/Plan    1) stage IV breast cancer  -has high TMB  -has BRCA1  variant of uncertain significance  -also has PIK3CA mutation  -pembrolizumab has been on hold since she developed a diffuse body rash, course of prednisone did not help, in fact a week after finishing prednisone, the rash got worse--papules have become confluent red/pink areas  -she did see her dermatologist (Dr Wolff)--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, and she did have a punch biopsy done--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  -now off steroids  -reports easy bruising on her left arm--likely secondary to senile purpura, and cumulative steroid use (PO and topical) including going on another PO prednisone taper for acute bronchitis diagnosed on Mother's day  -4 weeks ago had full dilated eye exam--was told everything was fine; then 2 weeks ago noted right vision getting blurry--went to see her eye doctor again--was told that there was now an occluded vessel at the back of  her right eye--will be seeing specialist on Tuesday  -we discussed that aromatase inhibitors are associated with 4% risk for thrombotic events and that she may benefit from taking baby ASA--however, she wished to wait until she saw the eye specialist  -labs done on 5/29/2024 included CBC, COMP, CA 27.29  -results reviewed--wbc 4.4, hgb 9.4 plt 176,000, creatinine 0.95, calcium 8.5, albumin 3.9, AST 17, ALT 13  CA 27.29 636  -new CT scan done on 4/3/2024 reviewed: interval worsening of bilateral pleural effusions, right >left; multiple scattered <0.5 mm pulmonary nodules bilateral lungs not changed; interval development of several branching opacities involving left lung apex; there are no new worrisome hepatic lesions; mild right sided hydronephrosis; diffuse sclerosis of axial and appendicular skeleton  -advised her to continue letrozole 2.5 mg daily   -her corrected calcium today (given albumin 3.9) is 8.58, so she will not be able to receive xgeva today  -will see her again in 3 weeks  -also has gone twice in the last month to see ENT for cerumen removal--checked both of her ear canals--completely impacted with earwax  -will check PET scan at end of summer     2) bone metastases  -secondary to metastatic breast cancer  -receives xgeva Q4 weeks     3) anemia  -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  -hgb slowly improving     4) hyperlipidemia  -on pravastatin     5) arthritis  -on tylenol PRN  -on diclofenac PRN  -received steroid injections into her knees and she feels so much better, so she wants to receive them again     6) hypertension  -on amlodipine  -on losartan-HCTZ     7) GERD  -on nexium     8) glaucoma  -on combigan eyedrops        Problem List Items Addressed This Visit             ICD-10-CM    Breast cancer metastasized to bone (Multi) C50.919, C79.51    Relevant Orders    Clinic Appointment Request Chemo Follow Up; JAY GARCIA; [facility]    Infusion Appointment  Request [facility]    Lytic bone lesions on xray M89.9    Relevant Orders    Clinic Appointment Request Chemo Follow Up; ANDREW MCFARLAND; [facility]    Infusion Appointment Request [facility] INFUSION            Andrew Mcfarland MD

## 2024-05-31 NOTE — PROGRESS NOTES
Subjective   Patient ID: Arabella Springer is a 83 y.o. female who presents for Ear Fullness.  Arabella describes ear fullness and hearing loss for the past week which she attributes to earwax.  This is the third time this has happened since over the winter.  She is not having any fevers or chills, no pain just fullness, no discharge, no bleeding, no trauma to the ears.  Does not stick anything in her ears.  Does not use a solution at home.  She was at an appointment with her oncologist and he looked in ears and saw that there were cerumen impaction and sent her here to have it cleaned out.  She states that she does not have any runny nose or congestion.  She did have bronchitis for the past month which is resolving.  No changes in medications that she is aware of.  She also endorses some vision loss in her right eye that is more chronic over the past 2 months with significant decrease in vision and is seeing a retina specialist on Tuesday.  No shortness of breath currently.  She states that she does have some fluid in her lungs followed by an oncologist but no plans for a tap right now.  She is overall doing well but would like cerumen removed to help with her hearing.    Ear Fullness   There is pain in both ears. This is a recurrent problem. The current episode started in the past 7 days. The problem occurs constantly. There has been no fever.       Review of Systems    Objective   Physical Exam  Constitutional:       Appearance: She is normal weight.   HENT:      Right Ear: External ear normal. Decreased hearing noted. There is impacted cerumen.      Left Ear: External ear normal. Decreased hearing noted. There is impacted cerumen.   Eyes:      Comments: Both pupils responsive to light, significantly diminished visual acuity in right eye.    Cardiovascular:      Rate and Rhythm: Normal rate and regular rhythm.      Pulses: Normal pulses.   Pulmonary:      Effort: Pulmonary effort is normal.      Breath sounds:  Well positioned. Examination of the right-lower field reveals decreased breath sounds. Examination of the left-lower field reveals decreased breath sounds. Decreased breath sounds present. No wheezing or rhonchi.   Skin:     General: Skin is warm and dry.   Neurological:      Mental Status: She is alert.         Assessment/Plan   Problem List Items Addressed This Visit             ICD-10-CM    Bilateral impacted cerumen - Primary H61.23    Relevant Medications    carbamide peroxide (Debrox) 6.5 % otic solution    Other Relevant Orders    Ear cerumen removal     Regarding cerumen removal, bilateral cerumen impaction, irrigated with water and use speculum to remove small piece of wax from left ear afterwards.  Discussed with patient care going forward and use of possible hydroperoxide versus sending in Debrox patient to use if symptoms recur. Cerumen completely removed with bilateral ear drums intact and no immediate complications    Regarding vision loss of right eye, does not appear to be an acute problem but now chronic, patient has appointment with ophthalmology on Tuesday and will further follow-up with them.    Discussed return precautions with patient for cerumen impaction and she can otherwise follow-up as needed.Patient ID: Arabella Springer is a 83 y.o. female.    Ear Cerumen Removal    Date/Time: 5/31/2024 2:11 PM    Performed by: Jimmie Ardon DO  Authorized by: Jian Bridges MD    Consent:     Consent obtained:  Verbal    Consent given by:  Patient    Risks, benefits, and alternatives were discussed: yes      Risks discussed:  Bleeding, infection, pain, incomplete removal, dizziness and TM perforation    Alternatives discussed:  No treatment, delayed treatment and observation  Procedure details:     Location:  L ear and R ear    Procedure type: irrigation      Procedure type comment:  Followed by curette    Procedure outcomes: cerumen removed    Post-procedure details:     Inspection:  No bleeding, ear canal clear and  TM intact    Hearing quality:  Normal    Procedure completion:  Tolerated well, no immediate complications             Jimmie Ardon DO 05/31/24 1:42 PM

## 2024-06-03 NOTE — PROGRESS NOTES
I reviewed the resident/fellow's documentation and discussed the patient with the resident/fellow. I agree with the resident/fellow's medical decision making as documented in the note.     Jian Bridges MD

## 2024-06-07 ENCOUNTER — HOSPITAL ENCOUNTER (OUTPATIENT)
Dept: CARDIOLOGY | Facility: CLINIC | Age: 84
Discharge: HOME | End: 2024-06-07
Payer: MEDICARE

## 2024-06-07 DIAGNOSIS — R60.1 GENERALIZED EDEMA: ICD-10-CM

## 2024-06-07 DIAGNOSIS — J90 PLEURAL EFFUSION: ICD-10-CM

## 2024-06-07 LAB
AORTIC VALVE MEAN GRADIENT: 5 MMHG
AORTIC VALVE PEAK VELOCITY: 1.41 M/S
AV PEAK GRADIENT: 8 MMHG
AVA (PEAK VEL): 2.53 CM2
AVA (VTI): 2.38 CM2
EJECTION FRACTION APICAL 4 CHAMBER: 64.1
LEFT ATRIUM VOLUME AREA LENGTH INDEX BSA: 23.8 ML/M2
LEFT VENTRICLE INTERNAL DIMENSION DIASTOLE: 4.09 CM (ref 3.5–6)
LEFT VENTRICULAR OUTFLOW TRACT DIAMETER: 1.95 CM
LV EJECTION FRACTION BIPLANE: 64 %
MITRAL VALVE E/A RATIO: 0.58
MITRAL VALVE E/E' RATIO: 13.14
RIGHT VENTRICLE FREE WALL PEAK S': 14 CM/S
RIGHT VENTRICLE PEAK SYSTOLIC PRESSURE: 41.8 MMHG
TRICUSPID ANNULAR PLANE SYSTOLIC EXCURSION: 2.3 CM

## 2024-06-07 PROCEDURE — 93306 TTE W/DOPPLER COMPLETE: CPT | Performed by: INTERNAL MEDICINE

## 2024-06-07 PROCEDURE — 93306 TTE W/DOPPLER COMPLETE: CPT

## 2024-06-14 DIAGNOSIS — J21.9 BRONCHIOLITIS: ICD-10-CM

## 2024-06-14 RX ORDER — ALBUTEROL SULFATE 90 UG/1
2 AEROSOL, METERED RESPIRATORY (INHALATION) EVERY 4 HOURS PRN
Qty: 18 G | Refills: 11 | Status: SHIPPED | OUTPATIENT
Start: 2024-06-14 | End: 2025-06-14

## 2024-06-17 ENCOUNTER — TELEPHONE (OUTPATIENT)
Dept: HEMATOLOGY/ONCOLOGY | Facility: CLINIC | Age: 84
End: 2024-06-17
Payer: MEDICARE

## 2024-06-17 NOTE — TELEPHONE ENCOUNTER
Spoke with the patient. Pt receives Xgeva- going for dental checkup on Wednesday & cleaning. Explained to patient that  general cleaning and xrays at the dentist are fine- but she would need to have the dentist call us before doing any work below the gum line. Also updated that lab work has already been ordered and patient can go to any  lab to get this lab work drawn, Pt verbalized understanding & had no further questions or concerns at this time

## 2024-06-17 NOTE — TELEPHONE ENCOUNTER
VM    Ok that she has dental cleaning on Wednesday?  Patient getting Xgeva.    Also, checking on lab orders for appointment on Friday.  Would like to have done on Wednesday.

## 2024-06-19 ENCOUNTER — LAB (OUTPATIENT)
Dept: LAB | Facility: CLINIC | Age: 84
End: 2024-06-19
Payer: MEDICARE

## 2024-06-19 DIAGNOSIS — C50.911 CARCINOMA OF RIGHT BREAST METASTATIC TO BONE (MULTI): ICD-10-CM

## 2024-06-19 DIAGNOSIS — C79.51 CARCINOMA OF RIGHT BREAST METASTATIC TO BONE (MULTI): ICD-10-CM

## 2024-06-19 DIAGNOSIS — M89.9 LYTIC BONE LESIONS ON XRAY: ICD-10-CM

## 2024-06-19 LAB
ALBUMIN SERPL BCP-MCNC: 3.9 G/DL (ref 3.4–5)
ALP SERPL-CCNC: 71 U/L (ref 33–136)
ALT SERPL W P-5'-P-CCNC: 12 U/L (ref 7–45)
ANION GAP SERPL CALC-SCNC: <7 MMOL/L (ref 10–20)
AST SERPL W P-5'-P-CCNC: 18 U/L (ref 9–39)
BASOPHILS # BLD AUTO: 0.04 X10*3/UL (ref 0–0.1)
BASOPHILS NFR BLD AUTO: 1 %
BILIRUB SERPL-MCNC: 0.3 MG/DL (ref 0–1.2)
BUN SERPL-MCNC: 19 MG/DL (ref 6–23)
CALCIUM SERPL-MCNC: 8.9 MG/DL (ref 8.6–10.3)
CANCER AG27-29 SERPL-ACNC: 613.4 U/ML (ref 0–38.6)
CHLORIDE SERPL-SCNC: 102 MMOL/L (ref 98–107)
CO2 SERPL-SCNC: 27 MMOL/L (ref 21–32)
CREAT SERPL-MCNC: 1.01 MG/DL (ref 0.5–1.05)
EGFRCR SERPLBLD CKD-EPI 2021: 56 ML/MIN/1.73M*2
EOSINOPHIL # BLD AUTO: 0.09 X10*3/UL (ref 0–0.4)
EOSINOPHIL NFR BLD AUTO: 2.2 %
ERYTHROCYTE [DISTWIDTH] IN BLOOD BY AUTOMATED COUNT: 15.7 % (ref 11.5–14.5)
GLUCOSE SERPL-MCNC: 137 MG/DL (ref 74–99)
HCT VFR BLD AUTO: 29.4 % (ref 36–46)
HGB BLD-MCNC: 9.5 G/DL (ref 12–16)
IMM GRANULOCYTES # BLD AUTO: 0.05 X10*3/UL (ref 0–0.5)
IMM GRANULOCYTES NFR BLD AUTO: 1.2 % (ref 0–0.9)
LYMPHOCYTES # BLD AUTO: 1.45 X10*3/UL (ref 0.8–3)
LYMPHOCYTES NFR BLD AUTO: 34.9 %
MCH RBC QN AUTO: 30.6 PG (ref 26–34)
MCHC RBC AUTO-ENTMCNC: 32.3 G/DL (ref 32–36)
MCV RBC AUTO: 95 FL (ref 80–100)
MONOCYTES # BLD AUTO: 0.43 X10*3/UL (ref 0.05–0.8)
MONOCYTES NFR BLD AUTO: 10.4 %
NEUTROPHILS # BLD AUTO: 2.09 X10*3/UL (ref 1.6–5.5)
NEUTROPHILS NFR BLD AUTO: 50.3 %
PLATELET # BLD AUTO: 215 X10*3/UL (ref 150–450)
POTASSIUM SERPL-SCNC: 4 MMOL/L (ref 3.5–5.3)
PROT SERPL-MCNC: 6 G/DL (ref 6.4–8.2)
RBC # BLD AUTO: 3.1 X10*6/UL (ref 4–5.2)
SODIUM SERPL-SCNC: 131 MMOL/L (ref 136–145)
WBC # BLD AUTO: 4.2 X10*3/UL (ref 4.4–11.3)

## 2024-06-19 PROCEDURE — 80053 COMPREHEN METABOLIC PANEL: CPT

## 2024-06-19 PROCEDURE — 36415 COLL VENOUS BLD VENIPUNCTURE: CPT

## 2024-06-19 PROCEDURE — 85025 COMPLETE CBC W/AUTO DIFF WBC: CPT

## 2024-06-19 PROCEDURE — 86300 IMMUNOASSAY TUMOR CA 15-3: CPT | Performed by: INTERNAL MEDICINE

## 2024-06-21 ENCOUNTER — OFFICE VISIT (OUTPATIENT)
Dept: HEMATOLOGY/ONCOLOGY | Facility: CLINIC | Age: 84
End: 2024-06-21
Payer: MEDICARE

## 2024-06-21 ENCOUNTER — INFUSION (OUTPATIENT)
Dept: HEMATOLOGY/ONCOLOGY | Facility: CLINIC | Age: 84
End: 2024-06-21
Payer: MEDICARE

## 2024-06-21 VITALS
DIASTOLIC BLOOD PRESSURE: 66 MMHG | HEART RATE: 78 BPM | TEMPERATURE: 98.1 F | RESPIRATION RATE: 16 BRPM | WEIGHT: 167.33 LBS | BODY MASS INDEX: 31.96 KG/M2 | SYSTOLIC BLOOD PRESSURE: 135 MMHG | OXYGEN SATURATION: 96 %

## 2024-06-21 DIAGNOSIS — C50.919 CARCINOMA OF BREAST METASTATIC TO BONE, UNSPECIFIED LATERALITY (MULTI): ICD-10-CM

## 2024-06-21 DIAGNOSIS — K21.00 GASTROESOPHAGEAL REFLUX DISEASE WITH ESOPHAGITIS WITHOUT HEMORRHAGE: ICD-10-CM

## 2024-06-21 DIAGNOSIS — M89.9 LYTIC BONE LESIONS ON XRAY: ICD-10-CM

## 2024-06-21 DIAGNOSIS — C79.51 CARCINOMA OF BREAST METASTATIC TO BONE, UNSPECIFIED LATERALITY (MULTI): ICD-10-CM

## 2024-06-21 DIAGNOSIS — M17.12 PRIMARY OSTEOARTHRITIS OF LEFT KNEE: ICD-10-CM

## 2024-06-21 DIAGNOSIS — L40.9 PSORIASIS: ICD-10-CM

## 2024-06-21 DIAGNOSIS — C79.51 CARCINOMA OF RIGHT BREAST METASTATIC TO BONE (MULTI): ICD-10-CM

## 2024-06-21 DIAGNOSIS — H61.23 BILATERAL IMPACTED CERUMEN: ICD-10-CM

## 2024-06-21 DIAGNOSIS — C50.911 CARCINOMA OF RIGHT BREAST METASTATIC TO BONE (MULTI): ICD-10-CM

## 2024-06-21 DIAGNOSIS — I10 ESSENTIAL HYPERTENSION: Primary | ICD-10-CM

## 2024-06-21 DIAGNOSIS — E78.2 MIXED HYPERLIPIDEMIA: ICD-10-CM

## 2024-06-21 DIAGNOSIS — H40.9 GLAUCOMA OF BOTH EYES, UNSPECIFIED GLAUCOMA TYPE: ICD-10-CM

## 2024-06-21 PROCEDURE — 3075F SYST BP GE 130 - 139MM HG: CPT | Performed by: INTERNAL MEDICINE

## 2024-06-21 PROCEDURE — 2500000004 HC RX 250 GENERAL PHARMACY W/ HCPCS (ALT 636 FOR OP/ED): Mod: JZ | Performed by: INTERNAL MEDICINE

## 2024-06-21 PROCEDURE — 96372 THER/PROPH/DIAG INJ SC/IM: CPT

## 2024-06-21 PROCEDURE — 1126F AMNT PAIN NOTED NONE PRSNT: CPT | Performed by: INTERNAL MEDICINE

## 2024-06-21 PROCEDURE — 1159F MED LIST DOCD IN RCRD: CPT | Performed by: INTERNAL MEDICINE

## 2024-06-21 PROCEDURE — 99214 OFFICE O/P EST MOD 30 MIN: CPT | Mod: 25 | Performed by: INTERNAL MEDICINE

## 2024-06-21 PROCEDURE — 3078F DIAST BP <80 MM HG: CPT | Performed by: INTERNAL MEDICINE

## 2024-06-21 PROCEDURE — 99214 OFFICE O/P EST MOD 30 MIN: CPT | Performed by: INTERNAL MEDICINE

## 2024-06-21 RX ORDER — EPINEPHRINE 0.3 MG/.3ML
0.3 INJECTION SUBCUTANEOUS EVERY 5 MIN PRN
OUTPATIENT
Start: 2024-06-28

## 2024-06-21 RX ORDER — FAMOTIDINE 10 MG/ML
20 INJECTION INTRAVENOUS ONCE AS NEEDED
Status: DISCONTINUED | OUTPATIENT
Start: 2024-06-21 | End: 2024-06-21 | Stop reason: HOSPADM

## 2024-06-21 RX ORDER — DIPHENHYDRAMINE HYDROCHLORIDE 50 MG/ML
50 INJECTION INTRAMUSCULAR; INTRAVENOUS AS NEEDED
OUTPATIENT
Start: 2024-06-28

## 2024-06-21 RX ORDER — DIPHENHYDRAMINE HYDROCHLORIDE 50 MG/ML
50 INJECTION INTRAMUSCULAR; INTRAVENOUS AS NEEDED
Status: DISCONTINUED | OUTPATIENT
Start: 2024-06-21 | End: 2024-06-21 | Stop reason: HOSPADM

## 2024-06-21 RX ORDER — EPINEPHRINE 0.3 MG/.3ML
0.3 INJECTION SUBCUTANEOUS EVERY 5 MIN PRN
Status: DISCONTINUED | OUTPATIENT
Start: 2024-06-21 | End: 2024-06-21 | Stop reason: HOSPADM

## 2024-06-21 RX ORDER — FAMOTIDINE 10 MG/ML
20 INJECTION INTRAVENOUS ONCE AS NEEDED
OUTPATIENT
Start: 2024-06-28

## 2024-06-21 RX ORDER — ALBUTEROL SULFATE 0.83 MG/ML
3 SOLUTION RESPIRATORY (INHALATION) AS NEEDED
Status: DISCONTINUED | OUTPATIENT
Start: 2024-06-21 | End: 2024-06-21 | Stop reason: HOSPADM

## 2024-06-21 RX ORDER — ALBUTEROL SULFATE 0.83 MG/ML
3 SOLUTION RESPIRATORY (INHALATION) AS NEEDED
OUTPATIENT
Start: 2024-06-28

## 2024-06-21 ASSESSMENT — PAIN SCALES - GENERAL: PAINLEVEL: 0-NO PAIN

## 2024-06-24 ENCOUNTER — TELEPHONE (OUTPATIENT)
Dept: PRIMARY CARE | Facility: CLINIC | Age: 84
End: 2024-06-24
Payer: MEDICARE

## 2024-06-24 ASSESSMENT — ENCOUNTER SYMPTOMS
NEUROLOGICAL NEGATIVE: 1
GASTROINTESTINAL NEGATIVE: 1
MUSCULOSKELETAL NEGATIVE: 1
CONSTITUTIONAL NEGATIVE: 1
EYES NEGATIVE: 1
CARDIOVASCULAR NEGATIVE: 1
ENDOCRINE NEGATIVE: 1
PSYCHIATRIC NEGATIVE: 1
RESPIRATORY NEGATIVE: 1
HEMATOLOGIC/LYMPHATIC NEGATIVE: 1

## 2024-06-24 NOTE — PROGRESS NOTES
Patient ID: Arabella Springer is a 83 y.o. female.  Referring Physician: Andrew Mcfarland MD  [address] [facility] [address]  [place] [address]  Primary Care Provider: Abraham Mohan DO  Visit Type: Follow Up      Subjective    HPI I am doing okay    Review of Systems   Constitutional: Negative.    HENT:  Negative.     Eyes: Negative.    Respiratory: Negative.     Cardiovascular: Negative.    Gastrointestinal: Negative.    Endocrine: Negative.    Genitourinary: Negative.     Musculoskeletal: Negative.    Skin: Negative.    Neurological: Negative.    Hematological: Negative.    Psychiatric/Behavioral: Negative.          Objective   BSA: 1.8 meters squared  /66 (BP Location: Left arm, Patient Position: Sitting, BP Cuff Size: Adult)   Pulse 78   Temp 36.7 °C (98.1 °F) (Temporal)   Resp 16   Wt 75.9 kg (167 lb 5.3 oz)   SpO2 96%   BMI 31.96 kg/m²      has no past medical history on file.   has no past surgical history on file.  No family history on file.  Oncology History   Breast cancer metastasized to bone (Multi)   9/8/2023 - 11/10/2023 Chemotherapy    Pembrolizumab, 21 Day Cycles     9/29/2023 Initial Diagnosis    Breast cancer metastasized to bone (CMS/HCC)         Arabella Springer  reports that she has never smoked. She has never used smokeless tobacco.  She  reports no history of alcohol use.  She  reports no history of drug use.    Physical Exam  Vitals reviewed.   Constitutional:       Appearance: Normal appearance.   HENT:      Head: Normocephalic.      Mouth/Throat:      Mouth: Mucous membranes are moist.   Eyes:      Extraocular Movements: Extraocular movements intact.      Pupils: Pupils are equal, round, and reactive to light.   Cardiovascular:      Rate and Rhythm: Normal rate and regular rhythm.      Heart sounds: Normal heart sounds.   Pulmonary:      Breath sounds: Normal breath sounds.   Abdominal:      General: Bowel sounds are normal.      Palpations: Abdomen is soft.   Musculoskeletal:  "        General: Normal range of motion.      Cervical back: Normal range of motion and neck supple.   Skin:     General: Skin is warm.   Neurological:      General: No focal deficit present.      Mental Status: She is alert and oriented to person, place, and time.   Psychiatric:         Mood and Affect: Mood normal.         Behavior: Behavior normal.         WBC   Date/Time Value Ref Range Status   06/19/2024 11:55 AM 4.2 (L) 4.4 - 11.3 x10*3/uL Final   05/29/2024 02:55 PM 4.4 4.4 - 11.3 x10*3/uL Final   05/01/2024 01:37 PM 5.1 4.4 - 11.3 x10*3/uL Final     No results found for: \"NRBC\"  RBC   Date Value Ref Range Status   06/19/2024 3.10 (L) 4.00 - 5.20 x10*6/uL Final   05/29/2024 3.05 (L) 4.00 - 5.20 x10*6/uL Final   05/01/2024 3.18 (L) 4.00 - 5.20 x10*6/uL Final     Hemoglobin   Date Value Ref Range Status   06/19/2024 9.5 (L) 12.0 - 16.0 g/dL Final   05/29/2024 9.4 (L) 12.0 - 16.0 g/dL Final   05/01/2024 9.8 (L) 12.0 - 16.0 g/dL Final     Hematocrit   Date Value Ref Range Status   06/19/2024 29.4 (L) 36.0 - 46.0 % Final   05/29/2024 29.0 (L) 36.0 - 46.0 % Final   05/01/2024 30.3 (L) 36.0 - 46.0 % Final     MCV   Date/Time Value Ref Range Status   06/19/2024 11:55 AM 95 80 - 100 fL Final   05/29/2024 02:55 PM 95 80 - 100 fL Final   05/01/2024 01:37 PM 95 80 - 100 fL Final     MCH   Date/Time Value Ref Range Status   06/19/2024 11:55 AM 30.6 26.0 - 34.0 pg Final   05/29/2024 02:55 PM 30.8 26.0 - 34.0 pg Final   05/01/2024 01:37 PM 30.8 26.0 - 34.0 pg Final     MCHC   Date/Time Value Ref Range Status   06/19/2024 11:55 AM 32.3 32.0 - 36.0 g/dL Final   05/29/2024 02:55 PM 32.4 32.0 - 36.0 g/dL Final   05/01/2024 01:37 PM 32.3 32.0 - 36.0 g/dL Final     RDW   Date/Time Value Ref Range Status   06/19/2024 11:55 AM 15.7 (H) 11.5 - 14.5 % Final   05/29/2024 02:55 PM 15.4 (H) 11.5 - 14.5 % Final   05/01/2024 01:37 PM 14.6 (H) 11.5 - 14.5 % Final     Platelets   Date/Time Value Ref Range Status   06/19/2024 11:55  " 150 - 450 x10*3/uL Final   05/29/2024 02:55  150 - 450 x10*3/uL Final   05/01/2024 01:37  150 - 450 x10*3/uL Final     MPV   Date/Time Value Ref Range Status   10/19/2023 08:01 AM 9.2 7.5 - 11.5 fL Final     Neutrophils %   Date/Time Value Ref Range Status   06/19/2024 11:55 AM 50.3 40.0 - 80.0 % Final   05/29/2024 02:55 PM 57.6 40.0 - 80.0 % Final   05/01/2024 01:37 PM 53.2 40.0 - 80.0 % Final     Immature Granulocytes %, Automated   Date/Time Value Ref Range Status   06/19/2024 11:55 AM 1.2 (H) 0.0 - 0.9 % Final     Comment:     Immature Granulocyte Count (IG) includes promyelocytes, myelocytes and metamyelocytes but does not include bands. Percent differential counts (%) should be interpreted in the context of the absolute cell counts (cells/UL).   05/29/2024 02:55 PM 1.1 (H) 0.0 - 0.9 % Final     Comment:     Immature Granulocyte Count (IG) includes promyelocytes, myelocytes and metamyelocytes but does not include bands. Percent differential counts (%) should be interpreted in the context of the absolute cell counts (cells/UL).   05/01/2024 01:37 PM 0.6 0.0 - 0.9 % Final     Comment:     Immature Granulocyte Count (IG) includes promyelocytes, myelocytes and metamyelocytes but does not include bands. Percent differential counts (%) should be interpreted in the context of the absolute cell counts (cells/UL).     Lymphocytes %   Date/Time Value Ref Range Status   06/19/2024 11:55 AM 34.9 13.0 - 44.0 % Final   05/29/2024 02:55 PM 27.6 13.0 - 44.0 % Final   05/01/2024 01:37 PM 33.3 13.0 - 44.0 % Final     Monocytes %   Date/Time Value Ref Range Status   06/19/2024 11:55 AM 10.4 2.0 - 10.0 % Final   05/29/2024 02:55 PM 10.0 2.0 - 10.0 % Final   05/01/2024 01:37 PM 9.6 2.0 - 10.0 % Final     Eosinophils %   Date/Time Value Ref Range Status   06/19/2024 11:55 AM 2.2 0.0 - 6.0 % Final   05/29/2024 02:55 PM 3.2 0.0 - 6.0 % Final   05/01/2024 01:37 PM 2.9 0.0 - 6.0 % Final     Basophils %   Date/Time Value Ref  "Range Status   06/19/2024 11:55 AM 1.0 0.0 - 2.0 % Final   05/29/2024 02:55 PM 0.5 0.0 - 2.0 % Final   05/01/2024 01:37 PM 0.4 0.0 - 2.0 % Final     Neutrophils Absolute   Date/Time Value Ref Range Status   06/19/2024 11:55 AM 2.09 1.60 - 5.50 x10*3/uL Final     Comment:     Percent differential counts (%) should be interpreted in the context of the absolute cell counts (cells/uL).   05/29/2024 02:55 PM 2.55 1.60 - 5.50 x10*3/uL Final     Comment:     Percent differential counts (%) should be interpreted in the context of the absolute cell counts (cells/uL).   05/01/2024 01:37 PM 2.73 1.60 - 5.50 x10*3/uL Final     Comment:     Percent differential counts (%) should be interpreted in the context of the absolute cell counts (cells/uL).     Immature Granulocytes Absolute, Automated   Date/Time Value Ref Range Status   06/19/2024 11:55 AM 0.05 0.00 - 0.50 x10*3/uL Final   05/29/2024 02:55 PM 0.05 0.00 - 0.50 x10*3/uL Final   05/01/2024 01:37 PM 0.03 0.00 - 0.50 x10*3/uL Final     Lymphocytes Absolute   Date/Time Value Ref Range Status   06/19/2024 11:55 AM 1.45 0.80 - 3.00 x10*3/uL Final   05/29/2024 02:55 PM 1.22 0.80 - 3.00 x10*3/uL Final   05/01/2024 01:37 PM 1.71 0.80 - 3.00 x10*3/uL Final     Monocytes Absolute   Date/Time Value Ref Range Status   06/19/2024 11:55 AM 0.43 0.05 - 0.80 x10*3/uL Final   05/29/2024 02:55 PM 0.44 0.05 - 0.80 x10*3/uL Final   05/01/2024 01:37 PM 0.49 0.05 - 0.80 x10*3/uL Final     Eosinophils Absolute   Date/Time Value Ref Range Status   06/19/2024 11:55 AM 0.09 0.00 - 0.40 x10*3/uL Final   05/29/2024 02:55 PM 0.14 0.00 - 0.40 x10*3/uL Final   05/01/2024 01:37 PM 0.15 0.00 - 0.40 x10*3/uL Final     Basophils Absolute   Date/Time Value Ref Range Status   06/19/2024 11:55 AM 0.04 0.00 - 0.10 x10*3/uL Final   05/29/2024 02:55 PM 0.02 0.00 - 0.10 x10*3/uL Final   05/01/2024 01:37 PM 0.02 0.00 - 0.10 x10*3/uL Final       No components found for: \"PT\"  No results found for: \"APTT\"  Medication " Documentation Review Audit       Reviewed by Yamilet Hilliard RN (Registered Nurse) on 24 at 1206      Medication Order Taking? Sig Documenting Provider Last Dose Status   albuterol (ProAir HFA) 90 mcg/actuation inhaler [id number] Yes Inhale 2 puffs every 4 hours if needed for wheezing or shortness of breath. Naina Pisano DO Taking Active   amLODIPine (Norvasc) 2.5 mg tablet [id number] Yes Take 1 tablet (2.5 mg) by mouth once daily. Abraham Mohan, DO Taking Active   brimonidine-timoloL (Combigan) 0.2-0.5 % ophthalmic solution [id number] Yes Administer 1 drop into both eyes every 12 hours. Historical Provider, MD Taking Active   calcium carbonate-vitamin D3 500 mg-5 mcg (200 unit) tablet [id number] Yes Take 2 tablets by mouth 2 times a day. Historical Provider, MD Taking Active   denosumab (Xgeva) 120 mg/1.7 mL (70 mg/mL) injection [id number] Yes Inject 1.7 mL (120 mg) under the skin 1 time. Historical Provider, MD Taking Active   diclofenac (Voltaren) 50 mg EC tablet [id number] Yes TAKE 1 TABLET(50 MG) BY MOUTH TWICE DAILY WITH MEALS DEX Cheng Taking Active   escitalopram (Lexapro) 5 mg tablet [id number]  Take 1 tablet (5 mg) by mouth once daily. Naina Pisano, DO   06/10/24 2359   esomeprazole (NexIUM) 40 mg DR capsule [id number] Yes Take 1 capsule (40 mg) by mouth once daily in the morning. Take before meals. Historical Provider, MD Taking Active   inhalat.spacing dev,large mask (Pro Comfort Spacer-Adult Mask) spacer [id number] Yes 1 each every 4 hours if needed (shortness of breath, wheezing, cough). Naina Pisano DO Taking Active   letrozole (Femara) 2.5 mg tablet [id number] Yes Take 1 tablet (2.5 mg total) by mouth once daily.  Take with or without food. Andrew Mcfarland MD Taking Active   LORazepam (Ativan) 0.5 mg tablet [id number] No Take 1 tablet (0.5 mg) by mouth every 2 hours if needed for anxiety (anxiety related to radiology tests) for up to 5 doses.   Patient not taking:  Reported on 6/21/2024    Andrew Mcfarland MD Not Taking Flag for Review   losartan-hydrochlorothiazide (Hyzaar) 100-12.5 mg tablet [id number] Yes Take 1 tablet by mouth once daily. Abraham Mohan, DO Taking Active   multivitamin tablet [id number] Yes Take 1 tablet by mouth once daily. Historical Provider, MD Taking Active   pravastatin (Pravachol) 10 mg tablet [id number] Yes Take 1 tablet (10 mg) by mouth once daily. Abraham Mohan, DO Taking Active   Rhopressa 0.02 % drops opthalmic solution [id number] Yes INSTILL 1 DROP IN RIGHT EYE DAILY AT BEDTIME Historical Provider, MD Taking Active   traMADol (Ultram) 50 mg tablet [id number] Yes TAKE ONE TABLET BY MOUTH EVERY 8 HOURS AS NEEDED FOR MODERATE TO SEVERE PAIN Historical Provider, MD Taking Active                   Assessment/Plan    1) stage IV breast cancer  -has high TMB  -has BRCA1  variant of uncertain significance  -also has PIK3CA mutation  -pembrolizumab has been on hold since she developed a diffuse body rash, course of prednisone did not help, in fact a week after finishing prednisone, the rash got worse--papules have become confluent red/pink areas  -she did see her dermatologist (Dr Wolff)--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, and she did have a punch biopsy done--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  -now off steroids  -continues ti very rapid recurrent cerumen buildup in both ear canals; her ENT advised her to use OTC debrox  -labs done on 6/19/2024 included CBC, COMP, CA 27.29  -results reviewed--wbc 4.2, hgb 9.5 plt 215,000, creatinine 1.01, calcium 8.9, albumin 3.9, AST 18, ALT 12  CA 27.29 613  -last CT scan done on 4/3/2024 reviewed: interval worsening of bilateral pleural effusions, right >left; multiple scattered <0.5 mm pulmonary nodules bilateral lungs not changed; interval development of several branching opacities involving left lung apex; there are no new worrisome hepatic lesions; mild  right sided hydronephrosis; diffuse sclerosis of axial and appendicular skeleton  -advised her to continue letrozole 2.5 mg daily   -will check PET later this year  -benefits, risks, potential morbidity related to xgeva were reviewed with Arabella and she provided informed consent to proceed  -she went on to receive xgeva 120 mg subcutaneous  -she will also be given information on calcium rich foods     2) bone metastases  -secondary to metastatic breast cancer  -receives xgeva Q4 weeks     3) anemia  -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  -hgb slowly improving     4) hyperlipidemia  -on pravastatin     5) arthritis  -on tylenol PRN  -on diclofenac PRN  -received steroid injections into her knees and she feels so much better, so she wants to receive them again     6) hypertension  -on amlodipine  -on losartan-HCTZ     7) GERD  -on nexium     8) glaucoma  -on combigan eyedrops        Problem List Items Addressed This Visit             ICD-10-CM    Breast cancer metastasized to bone (Multi) C50.919, C79.51    Relevant Orders    Clinic Appointment Request Chemo Follow Up; ANDREW [facility]; [place] [address]    Lytic bone lesions on xray M89.9    Relevant Orders    Clinic Appointment Request Chemo Follow Up; ANDREW [facility]LAND; [facility]            Andrew Mcfarland MD

## 2024-06-25 ENCOUNTER — TELEPHONE (OUTPATIENT)
Dept: HEMATOLOGY/ONCOLOGY | Facility: CLINIC | Age: 84
End: 2024-06-25
Payer: MEDICARE

## 2024-06-28 ENCOUNTER — APPOINTMENT (OUTPATIENT)
Dept: HEMATOLOGY/ONCOLOGY | Facility: CLINIC | Age: 84
End: 2024-06-28
Payer: MEDICARE

## 2024-06-28 DIAGNOSIS — H61.23 BILATERAL IMPACTED CERUMEN: Primary | ICD-10-CM

## 2024-07-03 ENCOUNTER — OFFICE VISIT (OUTPATIENT)
Dept: PRIMARY CARE | Facility: CLINIC | Age: 84
End: 2024-07-03
Payer: MEDICARE

## 2024-07-03 VITALS
SYSTOLIC BLOOD PRESSURE: 169 MMHG | DIASTOLIC BLOOD PRESSURE: 83 MMHG | HEART RATE: 95 BPM | TEMPERATURE: 98.4 F | OXYGEN SATURATION: 92 % | RESPIRATION RATE: 18 BRPM | BODY MASS INDEX: 32.35 KG/M2 | WEIGHT: 169.38 LBS

## 2024-07-03 DIAGNOSIS — H61.23 BILATERAL IMPACTED CERUMEN: Primary | ICD-10-CM

## 2024-07-03 DIAGNOSIS — H65.92 MIDDLE EAR EFFUSION, LEFT: ICD-10-CM

## 2024-07-03 PROCEDURE — 3079F DIAST BP 80-89 MM HG: CPT

## 2024-07-03 PROCEDURE — 1036F TOBACCO NON-USER: CPT

## 2024-07-03 PROCEDURE — 1159F MED LIST DOCD IN RCRD: CPT

## 2024-07-03 PROCEDURE — 3077F SYST BP >= 140 MM HG: CPT

## 2024-07-03 PROCEDURE — 99213 OFFICE O/P EST LOW 20 MIN: CPT

## 2024-07-03 NOTE — PROGRESS NOTES
Subjective     Arabella Springer is a 83 y.o. female who presents for Ear Fullness (Pt here today to have ears flushed).    Arabella is an 83-year-old female with current breast cancer currently on chemotherapy, history of recurrent impacted cerumen in both ears who presents today with concerns for ear fullness, decreased hearing, muffled hearing, sensation of obstruction in ears.  Patient states that over the last 4 months she has been to the office at least once a month to get cerumen removed from both the ears.  She was previously recommended to use Debrox eardrops over-the-counter but states that it has been difficult for both her and her  to use the drops.  She is unsure if she has been using them correctly because it is hard for her to visualize where the drops are going.  She denies using any Q-tips or any other objects to stick into her ears to clear any obstruction.  The most bothersome aspect has been a weird noise she is hearing when she moves her head side-to-side.    Patient shares that in beginning of May of this year she was diagnosed with acute bronchitis and treated with inhalers, cough medicine for which she feels have brought her relief but she continues to have a lingering cough that is worse when she lies down at night.  Patient acknowledges that this may also be her acid reflux especially if she eats closer to bedtime.  She does continue to take Nexium every morning as she has for the last 20 years.  She also endorses possibility of postnasal drip because she does have a tickle down her throat throughout the rest of the day which causes her to cough and she has not been using any over-the-counter medicine for this.  She denies any fever, chills, purulent nasal drainage, a productive cough, or any other symptoms at this time.    Ear Fullness   There is pain in both ears. This is a recurrent problem. The current episode started 1 to 4 weeks ago. The problem has been gradually worsening. There  has been no fever. The patient is experiencing no pain. Associated symptoms include coughing and hearing loss. Pertinent negatives include no abdominal pain, diarrhea, ear discharge, headaches, neck pain, rash, rhinorrhea, sore throat or vomiting. She has tried ear drops for the symptoms. The treatment provided no relief.        Review of Systems   Constitutional:  Negative for activity change, chills, fatigue, fever and unexpected weight change.   HENT:  Positive for ear pain, hearing loss and postnasal drip. Negative for congestion, ear discharge, rhinorrhea, sinus pressure, sinus pain, sore throat and trouble swallowing.    Respiratory:  Positive for cough. Negative for shortness of breath and wheezing.    Cardiovascular:  Negative for chest pain and leg swelling.   Gastrointestinal:  Negative for abdominal pain, diarrhea and vomiting.   Musculoskeletal:  Negative for neck pain.   Skin:  Negative for rash.   Neurological:  Negative for headaches.       Objective     Vitals:    07/03/24 1452   BP: 169/83   BP Location: Right arm   Patient Position: Sitting   Pulse: 95   Resp: 18   Temp: 36.9 °C (98.4 °F)   TempSrc: Temporal   SpO2: 92%   Weight: 76.8 kg (169 lb 6 oz)        Current Outpatient Medications   Medication Instructions    albuterol (ProAir HFA) 90 mcg/actuation inhaler 2 puffs, inhalation, Every 4 hours PRN    amLODIPine (NORVASC) 2.5 mg, oral, Daily    brimonidine-timoloL (Combigan) 0.2-0.5 % ophthalmic solution 1 drop, Both Eyes, Every 12 hours scheduled    calcium carbonate-vitamin D3 500 mg-5 mcg (200 unit) tablet 2 tablets, oral, 2 times daily    denosumab (XGEVA) 120 mg, subcutaneous, Once    diclofenac (Voltaren) 50 mg EC tablet TAKE 1 TABLET(50 MG) BY MOUTH TWICE DAILY WITH MEALS    esomeprazole (NEXIUM) 40 mg, oral, Daily before breakfast    inhalat.spacing dev,large mask (Pro Comfort Spacer-Adult Mask) spacer 1 each, miscellaneous, Every 4 hours PRN    letrozole (FEMARA) 2.5 mg, oral, Daily,  Take with or without food.    LORazepam (ATIVAN) 0.5 mg, oral, Every 2 hour PRN    losartan-hydrochlorothiazide (Hyzaar) 100-12.5 mg tablet 1 tablet, oral, Daily    multivitamin tablet 1 tablet, oral, Daily    pravastatin (PRAVACHOL) 10 mg, oral, Daily    Rhopressa 0.02 % drops opthalmic solution INSTILL 1 DROP IN RIGHT EYE DAILY AT BEDTIME    traMADol (Ultram) 50 mg tablet TAKE ONE TABLET BY MOUTH EVERY 8 HOURS AS NEEDED FOR MODERATE TO SEVERE PAIN        No Known Allergies     History reviewed. No pertinent surgical history.     Social History     Tobacco Use    Smoking status: Never    Smokeless tobacco: Never   Vaping Use    Vaping status: Never Used   Substance Use Topics    Alcohol use: Never    Drug use: Never        Social History     Substance and Sexual Activity   Alcohol Use Never       No family history on file.     Immunization History   Administered Date(s) Administered    Flu vaccine (IIV4), preservative free *Check age/dose* 09/16/2020    Flu vaccine, quadrivalent, high-dose, preservative free, age 65y+ (FLUZONE) 10/17/2022, 10/09/2023    Influenza, High Dose Seasonal, Preservative Free 11/12/2014, 11/12/2015, 11/07/2017, 10/24/2018, 10/24/2019    Influenza, Seasonal, Quadrivalent, Adjuvanted 10/17/2021    Influenza, live, intranasal, quadrivalent 11/13/2013    Influenza, seasonal, injectable 09/22/2015    Influenza, seasonal, injectable, preservative free 10/27/2011, 10/25/2012    Influenza, trivalent, adjuvanted 10/17/2021    Pfizer COVID-19 vaccine, Fall 2023, 12 years and older, (30mcg/0.3mL) 10/28/2023    Pfizer COVID-19 vaccine, bivalent, age 12 years and older (30 mcg/0.3 mL) 09/25/2022    Pfizer Gray Cap SARS-CoV-2 04/21/2022    Pfizer Purple Cap SARS-CoV-2 01/29/2021, 02/19/2021, 09/28/2021    Pneumococcal conjugate vaccine, 13-valent (PREVNAR 13) 11/12/2015    Pneumococcal conjugate vaccine, 20-valent (PREVNAR 20) 10/09/2023    Pneumococcal polysaccharide vaccine, 23-valent, age 2 years and  older (PNEUMOVAX 23) 09/22/2014        Physical Exam  Vitals and nursing note reviewed.   Constitutional:       General: She is not in acute distress.     Appearance: Normal appearance. She is not ill-appearing.   HENT:      Head: Normocephalic and atraumatic.      Right Ear: External ear normal. No drainage or tenderness. No middle ear effusion. There is impacted cerumen. Tympanic membrane is not perforated, erythematous or bulging.      Left Ear: External ear normal. No drainage or tenderness. A middle ear effusion is present. There is impacted cerumen. Tympanic membrane is not perforated, erythematous or bulging.      Nose: No congestion.      Mouth/Throat:      Mouth: Mucous membranes are moist.      Pharynx: Oropharynx is clear. No oropharyngeal exudate or posterior oropharyngeal erythema.   Eyes:      Conjunctiva/sclera: Conjunctivae normal.   Cardiovascular:      Rate and Rhythm: Normal rate and regular rhythm.      Pulses: Normal pulses.   Pulmonary:      Effort: Pulmonary effort is normal. No respiratory distress.      Breath sounds: Normal breath sounds.   Musculoskeletal:      Cervical back: Normal range of motion and neck supple.   Lymphadenopathy:      Cervical: No cervical adenopathy.   Skin:     General: Skin is warm and dry.      Capillary Refill: Capillary refill takes less than 2 seconds.   Neurological:      General: No focal deficit present.      Mental Status: She is alert and oriented to person, place, and time. Mental status is at baseline.   Psychiatric:         Mood and Affect: Mood normal.         Thought Content: Thought content normal.         Problem List Items Addressed This Visit       Bilateral impacted cerumen - Primary     Other Visit Diagnoses       Middle ear effusion, left                Assessment/Plan     This is an 83-year-old female with past medical history significant for breast cancer on current chemotherapy treatment, mild low Fliss thick anemia, lytic bone lesions,  hyperlipidemia, hypertension, glaucoma, GERD, arthritis, history of bilateral impacted cerumen in ears who presented today with acute decrease in hearing and concern for impaction in her bilateral ears.  Patient was noted to have significant impacted cerumen in the right ear, additional cerumen seen on the left ear.  Patient's ears were flushed with saline with no immediate complications.  Patient endorsed immediate improvement in her hearing and sensation.  Patient's TMs were visualized with otoscope after the flush.  She was noted to have mild serous fluid with no purulence noted behind right TM, as well as moderate serous fluid behind left TM with no signs of disc bulging, erythema, or concern for infection.  Patient was encouraged to use Flonase nasal spray to assist with clearance of this fluid which may also be contributing to patient's discomfort and muffled hearing.  I suspect the fluid is a sequelae of the patient's recent bronchitis and upper respiratory infection over the last month.  Patient's cough is likely related to clearance of this fluid as well, possibly postnasal drip is visualized on physical examination.  Recommended once again that patient use Flonase and can consider using Claritin.  Patient was instructed to return if any concerns of sudden loss of hearing, pain, bleeding, or any other concerns.  Otherwise, she will follow-up with Dr. Mohan for her next scheduled appointment.    Patient ID: Arabella Springer is a 83 y.o. female.    Ear Cerumen Removal    Date/Time: 7/3/2024 3:15 PM    Performed by: Maty Castle DO  Authorized by: Abraham Mohan DO    Consent:     Consent obtained:  Verbal    Consent given by:  Patient    Risks, benefits, and alternatives were discussed: yes      Risks discussed:  Incomplete removal, pain and bleeding    Alternatives discussed:  No treatment and observation  Universal protocol:     Procedure explained and questions answered to patient or proxy's  satisfaction: yes      Required blood products, implants, devices, and special equipment available: yes      Patient identity confirmed:  Verbally with patient  Procedure details:     Location:  L ear and R ear    Procedure type: irrigation      Procedure outcomes: cerumen removed    Post-procedure details:     Inspection:  Ear canal clear, no bleeding and TM intact    Hearing quality:  Improved    Procedure completion:  Tolerated well, no immediate complications      The patient had been seen and discussed with attending physician, Dr. Mohan.     Maty Castle DO  Family Medicine Resident, PGY-2  [facility]  [address] [place] [address] [phone]     This note has been transcribed using Dragon voice recognition system and there is a possibility of unintentional typing misprints.  Any information found to be copied from previous providers is done in the best interest of the patient to provide accurate, quality, and continuity of care.

## 2024-07-04 ASSESSMENT — ENCOUNTER SYMPTOMS
SHORTNESS OF BREATH: 0
SINUS PAIN: 0
VOMITING: 0
UNEXPECTED WEIGHT CHANGE: 0
SORE THROAT: 0
RHINORRHEA: 0
DIARRHEA: 0
FEVER: 0
ABDOMINAL PAIN: 0
WHEEZING: 0
SINUS PRESSURE: 0
FATIGUE: 0
ACTIVITY CHANGE: 0
TROUBLE SWALLOWING: 0
COUGH: 1
CHILLS: 0
HEADACHES: 0
NECK PAIN: 0

## 2024-07-05 NOTE — PROGRESS NOTES
I saw and evaluated the patient. I personally obtained the key and critical portions of the history and physical exam or was physically present for key and critical portions performed by the resident. I reviewed the resident/fellow's documentation and discussed the patient with the resident/fellow. I agree with the resident/fellow's medical decision making as documented in the note.  Patient does have cerumen bilaterally.  Removed tolerated well.  Patient does have a fluid behind her left ear.  She can try Flonase, some OMM techniques may need to see ENT  If any chest pain shortness of breath any ear pain fever chills trouble hearing notify office go to ER  Follow-up in 1 month  Agree with assessment and plan    Abraham Mohan, DO

## 2024-07-18 ENCOUNTER — APPOINTMENT (OUTPATIENT)
Dept: OTOLARYNGOLOGY | Facility: CLINIC | Age: 84
End: 2024-07-18
Payer: MEDICARE

## 2024-07-18 DIAGNOSIS — R13.10 DYSPHAGIA, UNSPECIFIED TYPE: Primary | ICD-10-CM

## 2024-07-18 DIAGNOSIS — H61.23 BILATERAL IMPACTED CERUMEN: ICD-10-CM

## 2024-07-18 DIAGNOSIS — H60.393 INFECTIVE OTITIS EXTERNA OF BOTH EARS: ICD-10-CM

## 2024-07-18 PROCEDURE — 31575 DIAGNOSTIC LARYNGOSCOPY: CPT | Performed by: PHYSICIAN ASSISTANT

## 2024-07-18 PROCEDURE — 99203 OFFICE O/P NEW LOW 30 MIN: CPT | Performed by: PHYSICIAN ASSISTANT

## 2024-07-18 RX ORDER — OFLOXACIN 3 MG/ML
4 SOLUTION AURICULAR (OTIC) 2 TIMES DAILY
Qty: 5 ML | Refills: 0 | Status: SHIPPED | OUTPATIENT
Start: 2024-07-18 | End: 2024-07-25

## 2024-07-18 NOTE — PROGRESS NOTES
Arabella Springer is a 83 y.o. year old female patient with Cerumen Impaction     In addition to cerumen impaction the patient is also here today complaining of frequent coughing which is worse with eating and drinking with some choking episodes.  She does suffer from reflux.  The patient is otherwise well without other ENT concerns.      Review of Systems   All other systems reviewed and are negative.        Physical Exam:   General appearance: No acute distress. Normal facies. Symmetric facial movement. No gross lesions of the face are noted.  The external ear structures appear normal.  Patient with bilateral cerumen impactions removed today with the use of curette and suction under the otomicroscope.  Patient with scant otorrhea bilaterally left greater than right.  The ear canals patent and the tympanic membranes are intact without evidence of air-fluid levels, retraction, or congenital defects.  Anterior rhinoscopy notes essentially a midline nasal septum. Examination is noted for normal healthy mucosal membranes without any evidence of lesions, polyps, or exudate. The tongue is normally mobile. There are no lesions on the gingiva, buccal, or oral mucosa. There are no oral cavity masses.  The neck is negative for mass lymphadenopathy. The trachea and parotid are clear. The thyroid bed is grossly unremarkable. The salivary gland structures are grossly unremarkable.  In order to assess the larynx, flexible laryngoscopy was performed based on the patient's history.    After topical anesthesia, a very complete flexible laryngoscopy was performed. This examination reveals a normal appearance to the laryngeal structures including the true cords, false cords, epiglottis, base of tongue, and piriform sinus, except as noted.        Assessment/Plan   1.  Dysphagia  2.  Cerumen impaction  3.  Otitis externa    Patient seen in the office today for assessment of multiple concerns.  In regards to coughing choking episodes and  difficulty swallow the patient will be set up for modified barium swallow study we will see her back to review.  Patient did have cerumen impactions which were removed and does have some scant otorrhea with low-grade otitis externa and will be started on ofloxacin drops.  We will see her back following the swallow study to review./

## 2024-07-24 ENCOUNTER — LAB (OUTPATIENT)
Dept: LAB | Facility: CLINIC | Age: 84
End: 2024-07-24
Payer: MEDICARE

## 2024-07-24 DIAGNOSIS — C79.51 CARCINOMA OF RIGHT BREAST METASTATIC TO BONE (MULTI): ICD-10-CM

## 2024-07-24 DIAGNOSIS — M89.9 LYTIC BONE LESIONS ON XRAY: ICD-10-CM

## 2024-07-24 DIAGNOSIS — C50.911 CARCINOMA OF RIGHT BREAST METASTATIC TO BONE (MULTI): ICD-10-CM

## 2024-07-24 LAB
ALBUMIN SERPL BCP-MCNC: 4 G/DL (ref 3.4–5)
ALP SERPL-CCNC: 53 U/L (ref 33–136)
ALT SERPL W P-5'-P-CCNC: 13 U/L (ref 7–45)
ANION GAP SERPL CALC-SCNC: 11 MMOL/L (ref 10–20)
AST SERPL W P-5'-P-CCNC: 17 U/L (ref 9–39)
BASOPHILS # BLD AUTO: 0.01 X10*3/UL (ref 0–0.1)
BASOPHILS NFR BLD AUTO: 0.2 %
BILIRUB SERPL-MCNC: 0.4 MG/DL (ref 0–1.2)
BUN SERPL-MCNC: 21 MG/DL (ref 6–23)
CALCIUM SERPL-MCNC: 8.7 MG/DL (ref 8.6–10.3)
CANCER AG27-29 SERPL-ACNC: 481.6 U/ML (ref 0–38.6)
CHLORIDE SERPL-SCNC: 97 MMOL/L (ref 98–107)
CO2 SERPL-SCNC: 27 MMOL/L (ref 21–32)
CREAT SERPL-MCNC: 0.92 MG/DL (ref 0.5–1.05)
EGFRCR SERPLBLD CKD-EPI 2021: 63 ML/MIN/1.73M*2
EOSINOPHIL # BLD AUTO: 0.1 X10*3/UL (ref 0–0.4)
EOSINOPHIL NFR BLD AUTO: 2.5 %
ERYTHROCYTE [DISTWIDTH] IN BLOOD BY AUTOMATED COUNT: 15.2 % (ref 11.5–14.5)
GLUCOSE SERPL-MCNC: 124 MG/DL (ref 74–99)
HCT VFR BLD AUTO: 27.8 % (ref 36–46)
HGB BLD-MCNC: 9 G/DL (ref 12–16)
IMM GRANULOCYTES # BLD AUTO: 0.01 X10*3/UL (ref 0–0.5)
IMM GRANULOCYTES NFR BLD AUTO: 0.2 % (ref 0–0.9)
LYMPHOCYTES # BLD AUTO: 1.16 X10*3/UL (ref 0.8–3)
LYMPHOCYTES NFR BLD AUTO: 28.9 %
MCH RBC QN AUTO: 30.8 PG (ref 26–34)
MCHC RBC AUTO-ENTMCNC: 32.4 G/DL (ref 32–36)
MCV RBC AUTO: 95 FL (ref 80–100)
MONOCYTES # BLD AUTO: 0.47 X10*3/UL (ref 0.05–0.8)
MONOCYTES NFR BLD AUTO: 11.7 %
NEUTROPHILS # BLD AUTO: 2.27 X10*3/UL (ref 1.6–5.5)
NEUTROPHILS NFR BLD AUTO: 56.5 %
PLATELET # BLD AUTO: 185 X10*3/UL (ref 150–450)
POTASSIUM SERPL-SCNC: 4.2 MMOL/L (ref 3.5–5.3)
PROT SERPL-MCNC: 5.9 G/DL (ref 6.4–8.2)
RBC # BLD AUTO: 2.92 X10*6/UL (ref 4–5.2)
SODIUM SERPL-SCNC: 131 MMOL/L (ref 136–145)
WBC # BLD AUTO: 4 X10*3/UL (ref 4.4–11.3)

## 2024-07-24 PROCEDURE — 85025 COMPLETE CBC W/AUTO DIFF WBC: CPT

## 2024-07-24 PROCEDURE — 86300 IMMUNOASSAY TUMOR CA 15-3: CPT | Performed by: INTERNAL MEDICINE

## 2024-07-24 PROCEDURE — 80053 COMPREHEN METABOLIC PANEL: CPT

## 2024-07-24 PROCEDURE — 36415 COLL VENOUS BLD VENIPUNCTURE: CPT

## 2024-07-26 ENCOUNTER — INFUSION (OUTPATIENT)
Dept: HEMATOLOGY/ONCOLOGY | Facility: CLINIC | Age: 84
End: 2024-07-26
Payer: MEDICARE

## 2024-07-26 ENCOUNTER — OFFICE VISIT (OUTPATIENT)
Dept: HEMATOLOGY/ONCOLOGY | Facility: CLINIC | Age: 84
End: 2024-07-26
Payer: MEDICARE

## 2024-07-26 VITALS
HEART RATE: 95 BPM | RESPIRATION RATE: 16 BRPM | OXYGEN SATURATION: 96 % | WEIGHT: 170.64 LBS | SYSTOLIC BLOOD PRESSURE: 124 MMHG | BODY MASS INDEX: 32.59 KG/M2 | DIASTOLIC BLOOD PRESSURE: 75 MMHG | TEMPERATURE: 97.5 F

## 2024-07-26 DIAGNOSIS — C50.919 CARCINOMA OF BREAST METASTATIC TO BONE, UNSPECIFIED LATERALITY (MULTI): ICD-10-CM

## 2024-07-26 DIAGNOSIS — C79.51 CARCINOMA OF RIGHT BREAST METASTATIC TO BONE (MULTI): ICD-10-CM

## 2024-07-26 DIAGNOSIS — C50.911 CARCINOMA OF RIGHT BREAST METASTATIC TO BONE (MULTI): ICD-10-CM

## 2024-07-26 DIAGNOSIS — E78.2 MIXED HYPERLIPIDEMIA: ICD-10-CM

## 2024-07-26 DIAGNOSIS — M89.9 LYTIC BONE LESIONS ON XRAY: ICD-10-CM

## 2024-07-26 DIAGNOSIS — I10 ESSENTIAL HYPERTENSION: ICD-10-CM

## 2024-07-26 DIAGNOSIS — M19.90 ARTHRITIS: ICD-10-CM

## 2024-07-26 DIAGNOSIS — H40.9 GLAUCOMA OF BOTH EYES, UNSPECIFIED GLAUCOMA TYPE: ICD-10-CM

## 2024-07-26 DIAGNOSIS — C79.51 CARCINOMA OF BREAST METASTATIC TO BONE, UNSPECIFIED LATERALITY (MULTI): ICD-10-CM

## 2024-07-26 DIAGNOSIS — K21.00 GASTROESOPHAGEAL REFLUX DISEASE WITH ESOPHAGITIS WITHOUT HEMORRHAGE: ICD-10-CM

## 2024-07-26 DIAGNOSIS — D61.82 MYELOPHTHISIC ANEMIA (MULTI): Primary | ICD-10-CM

## 2024-07-26 PROCEDURE — 99214 OFFICE O/P EST MOD 30 MIN: CPT | Performed by: INTERNAL MEDICINE

## 2024-07-26 PROCEDURE — 3074F SYST BP LT 130 MM HG: CPT | Performed by: INTERNAL MEDICINE

## 2024-07-26 PROCEDURE — 2500000004 HC RX 250 GENERAL PHARMACY W/ HCPCS (ALT 636 FOR OP/ED): Mod: JZ | Performed by: INTERNAL MEDICINE

## 2024-07-26 PROCEDURE — 96372 THER/PROPH/DIAG INJ SC/IM: CPT

## 2024-07-26 PROCEDURE — 1126F AMNT PAIN NOTED NONE PRSNT: CPT | Performed by: INTERNAL MEDICINE

## 2024-07-26 PROCEDURE — 1159F MED LIST DOCD IN RCRD: CPT | Performed by: INTERNAL MEDICINE

## 2024-07-26 PROCEDURE — 3078F DIAST BP <80 MM HG: CPT | Performed by: INTERNAL MEDICINE

## 2024-07-26 RX ORDER — DIPHENHYDRAMINE HYDROCHLORIDE 50 MG/ML
50 INJECTION INTRAMUSCULAR; INTRAVENOUS AS NEEDED
OUTPATIENT
Start: 2024-08-16

## 2024-07-26 RX ORDER — ALBUTEROL SULFATE 0.83 MG/ML
3 SOLUTION RESPIRATORY (INHALATION) AS NEEDED
OUTPATIENT
Start: 2024-08-16

## 2024-07-26 RX ORDER — EPINEPHRINE 0.3 MG/.3ML
0.3 INJECTION SUBCUTANEOUS EVERY 5 MIN PRN
OUTPATIENT
Start: 2024-08-16

## 2024-07-26 RX ORDER — FAMOTIDINE 10 MG/ML
20 INJECTION INTRAVENOUS ONCE AS NEEDED
OUTPATIENT
Start: 2024-08-16

## 2024-07-26 ASSESSMENT — PAIN SCALES - GENERAL: PAINLEVEL: 0-NO PAIN

## 2024-07-26 NOTE — PATIENT INSTRUCTIONS
Continue taking the letrozole    Don't take all of the calcium at night    Try to establish primary care with Dr Velazquez    You will have a PET scan done in a few weeks

## 2024-07-27 ASSESSMENT — ENCOUNTER SYMPTOMS
CARDIOVASCULAR NEGATIVE: 1
NEUROLOGICAL NEGATIVE: 1
HEMATOLOGIC/LYMPHATIC NEGATIVE: 1
CONSTITUTIONAL NEGATIVE: 1
ABDOMINAL DISTENTION: 1
EYES NEGATIVE: 1
ENDOCRINE NEGATIVE: 1
MUSCULOSKELETAL NEGATIVE: 1
WHEEZING: 1
NERVOUS/ANXIOUS: 1

## 2024-07-27 NOTE — PROGRESS NOTES
Patient ID: Arabella Springer is a 83 y.o. female.  Referring Physician: Andrew Mcfarland MD  [address]  [place] [address]  Primary Care Provider: Abraham Mohan DO  Visit Type: Follow Up      Subjective    HPI I am not feeling well  I have been so anxious, I was prescribed an anxiety medication but I am afraid to try it  I am having stomach trouble  I am wheezing at times    Review of Systems   Constitutional: Negative.    HENT:  Negative.     Eyes: Negative.    Respiratory:  Positive for wheezing.    Cardiovascular: Negative.    Gastrointestinal:  Positive for abdominal distention.   Endocrine: Negative.    Genitourinary: Negative.     Musculoskeletal: Negative.    Skin: Negative.    Neurological: Negative.    Hematological: Negative.    Psychiatric/Behavioral:  The patient is nervous/anxious.         Objective   BSA: 1.82 meters squared  /75 (BP Location: Left arm, Patient Position: Sitting, BP Cuff Size: Adult)   Pulse 95   Temp 36.4 °C (97.5 °F) (Temporal)   Resp 16   Wt 77.4 kg (170 lb 10.2 oz)   SpO2 96%   BMI 32.59 kg/m²      has no past medical history on file.   has no past surgical history on file.  No family history on file.  Oncology History   Breast cancer metastasized to bone (Multi)   9/8/2023 - 11/10/2023 Chemotherapy    Pembrolizumab, 21 Day Cycles     9/29/2023 Initial Diagnosis    Breast cancer metastasized to bone (CMS/HCC)         Arabella Springer  reports that she has never smoked. She has never used smokeless tobacco.  She  reports no history of alcohol use.  She  reports no history of drug use.    Physical Exam  Vitals reviewed.   Constitutional:       Appearance: Normal appearance.   HENT:      Head: Normocephalic.      Mouth/Throat:      Mouth: Mucous membranes are moist.   Eyes:      Extraocular Movements: Extraocular movements intact.      Pupils: Pupils are equal, round, and reactive to light.   Cardiovascular:      Rate and Rhythm: Normal rate and  "regular rhythm.      Heart sounds: Normal heart sounds.   Pulmonary:      Breath sounds: Normal breath sounds.   Abdominal:      General: Bowel sounds are normal.      Palpations: Abdomen is soft.   Musculoskeletal:         General: Normal range of motion.      Cervical back: Normal range of motion and neck supple.   Skin:     General: Skin is warm.   Neurological:      General: No focal deficit present.      Mental Status: She is alert and oriented to person, place, and time.   Psychiatric:         Mood and Affect: Mood normal.         Behavior: Behavior normal.         WBC   Date/Time Value Ref Range Status   07/24/2024 09:57 AM 4.0 (L) 4.4 - 11.3 x10*3/uL Final   06/19/2024 11:55 AM 4.2 (L) 4.4 - 11.3 x10*3/uL Final   05/29/2024 02:55 PM 4.4 4.4 - 11.3 x10*3/uL Final     No results found for: \"NRBC\"  RBC   Date Value Ref Range Status   07/24/2024 2.92 (L) 4.00 - 5.20 x10*6/uL Final   06/19/2024 3.10 (L) 4.00 - 5.20 x10*6/uL Final   05/29/2024 3.05 (L) 4.00 - 5.20 x10*6/uL Final     Hemoglobin   Date Value Ref Range Status   07/24/2024 9.0 (L) 12.0 - 16.0 g/dL Final   06/19/2024 9.5 (L) 12.0 - 16.0 g/dL Final   05/29/2024 9.4 (L) 12.0 - 16.0 g/dL Final     Hematocrit   Date Value Ref Range Status   07/24/2024 27.8 (L) 36.0 - 46.0 % Final   06/19/2024 29.4 (L) 36.0 - 46.0 % Final   05/29/2024 29.0 (L) 36.0 - 46.0 % Final     MCV   Date/Time Value Ref Range Status   07/24/2024 09:57 AM 95 80 - 100 fL Final   06/19/2024 11:55 AM 95 80 - 100 fL Final   05/29/2024 02:55 PM 95 80 - 100 fL Final     MCH   Date/Time Value Ref Range Status   07/24/2024 09:57 AM 30.8 26.0 - 34.0 pg Final   06/19/2024 11:55 AM 30.6 26.0 - 34.0 pg Final   05/29/2024 02:55 PM 30.8 26.0 - 34.0 pg Final     MCHC   Date/Time Value Ref Range Status   07/24/2024 09:57 AM 32.4 32.0 - 36.0 g/dL Final   06/19/2024 11:55 AM 32.3 32.0 - 36.0 g/dL Final   05/29/2024 02:55 PM 32.4 32.0 - 36.0 g/dL Final     RDW   Date/Time Value Ref Range Status "   07/24/2024 09:57 AM 15.2 (H) 11.5 - 14.5 % Final   06/19/2024 11:55 AM 15.7 (H) 11.5 - 14.5 % Final   05/29/2024 02:55 PM 15.4 (H) 11.5 - 14.5 % Final     Platelets   Date/Time Value Ref Range Status   07/24/2024 09:57  150 - 450 x10*3/uL Final   06/19/2024 11:55  150 - 450 x10*3/uL Final   05/29/2024 02:55  150 - 450 x10*3/uL Final     MPV   Date/Time Value Ref Range Status   10/19/2023 08:01 AM 9.2 7.5 - 11.5 fL Final     Neutrophils %   Date/Time Value Ref Range Status   07/24/2024 09:57 AM 56.5 40.0 - 80.0 % Final   06/19/2024 11:55 AM 50.3 40.0 - 80.0 % Final   05/29/2024 02:55 PM 57.6 40.0 - 80.0 % Final     Immature Granulocytes %, Automated   Date/Time Value Ref Range Status   07/24/2024 09:57 AM 0.2 0.0 - 0.9 % Final     Comment:     Immature Granulocyte Count (IG) includes promyelocytes, myelocytes and metamyelocytes but does not include bands. Percent differential counts (%) should be interpreted in the context of the absolute cell counts (cells/UL).   06/19/2024 11:55 AM 1.2 (H) 0.0 - 0.9 % Final     Comment:     Immature Granulocyte Count (IG) includes promyelocytes, myelocytes and metamyelocytes but does not include bands. Percent differential counts (%) should be interpreted in the context of the absolute cell counts (cells/UL).   05/29/2024 02:55 PM 1.1 (H) 0.0 - 0.9 % Final     Comment:     Immature Granulocyte Count (IG) includes promyelocytes, myelocytes and metamyelocytes but does not include bands. Percent differential counts (%) should be interpreted in the context of the absolute cell counts (cells/UL).     Lymphocytes %   Date/Time Value Ref Range Status   07/24/2024 09:57 AM 28.9 13.0 - 44.0 % Final   06/19/2024 11:55 AM 34.9 13.0 - 44.0 % Final   05/29/2024 02:55 PM 27.6 13.0 - 44.0 % Final     Monocytes %   Date/Time Value Ref Range Status   07/24/2024 09:57 AM 11.7 2.0 - 10.0 % Final   06/19/2024 11:55 AM 10.4 2.0 - 10.0 % Final   05/29/2024 02:55 PM 10.0 2.0 - 10.0 %  Final     Eosinophils %   Date/Time Value Ref Range Status   07/24/2024 09:57 AM 2.5 0.0 - 6.0 % Final   06/19/2024 11:55 AM 2.2 0.0 - 6.0 % Final   05/29/2024 02:55 PM 3.2 0.0 - 6.0 % Final     Basophils %   Date/Time Value Ref Range Status   07/24/2024 09:57 AM 0.2 0.0 - 2.0 % Final   06/19/2024 11:55 AM 1.0 0.0 - 2.0 % Final   05/29/2024 02:55 PM 0.5 0.0 - 2.0 % Final     Neutrophils Absolute   Date/Time Value Ref Range Status   07/24/2024 09:57 AM 2.27 1.60 - 5.50 x10*3/uL Final     Comment:     Percent differential counts (%) should be interpreted in the context of the absolute cell counts (cells/uL).   06/19/2024 11:55 AM 2.09 1.60 - 5.50 x10*3/uL Final     Comment:     Percent differential counts (%) should be interpreted in the context of the absolute cell counts (cells/uL).   05/29/2024 02:55 PM 2.55 1.60 - 5.50 x10*3/uL Final     Comment:     Percent differential counts (%) should be interpreted in the context of the absolute cell counts (cells/uL).     Immature Granulocytes Absolute, Automated   Date/Time Value Ref Range Status   07/24/2024 09:57 AM 0.01 0.00 - 0.50 x10*3/uL Final   06/19/2024 11:55 AM 0.05 0.00 - 0.50 x10*3/uL Final   05/29/2024 02:55 PM 0.05 0.00 - 0.50 x10*3/uL Final     Lymphocytes Absolute   Date/Time Value Ref Range Status   07/24/2024 09:57 AM 1.16 0.80 - 3.00 x10*3/uL Final   06/19/2024 11:55 AM 1.45 0.80 - 3.00 x10*3/uL Final   05/29/2024 02:55 PM 1.22 0.80 - 3.00 x10*3/uL Final     Monocytes Absolute   Date/Time Value Ref Range Status   07/24/2024 09:57 AM 0.47 0.05 - 0.80 x10*3/uL Final   06/19/2024 11:55 AM 0.43 0.05 - 0.80 x10*3/uL Final   05/29/2024 02:55 PM 0.44 0.05 - 0.80 x10*3/uL Final     Eosinophils Absolute   Date/Time Value Ref Range Status   07/24/2024 09:57 AM 0.10 0.00 - 0.40 x10*3/uL Final   06/19/2024 11:55 AM 0.09 0.00 - 0.40 x10*3/uL Final   05/29/2024 02:55 PM 0.14 0.00 - 0.40 x10*3/uL Final     Basophils Absolute   Date/Time Value Ref Range Status  "  07/24/2024 09:57 AM 0.01 0.00 - 0.10 x10*3/uL Final   06/19/2024 11:55 AM 0.04 0.00 - 0.10 x10*3/uL Final   05/29/2024 02:55 PM 0.02 0.00 - 0.10 x10*3/uL Final       No components found for: \"PT\"  No results found for: \"APTT\"  Medication Documentation Review Audit       Reviewed by Akosua Candelario MA (Medical Assistant) on 07/26/24 at 1140      Medication Order Taking? Sig Documenting Provider Last Dose Status   albuterol (ProAir HFA) 90 mcg/actuation inhaler [id number] Yes Inhale 2 puffs every 4 hours if needed for wheezing or shortness of breath. Naina Pisano, DO Taking Active   amLODIPine (Norvasc) 2.5 mg tablet [id number] Yes Take 1 tablet (2.5 mg) by mouth once daily. Abraham Mohan, DO Taking Active   brimonidine-timoloL (Combigan) 0.2-0.5 % ophthalmic solution [id number] Yes Administer 1 drop into both eyes every 12 hours. Historical Provider, MD Taking Active   calcium carbonate-vitamin D3 500 mg-5 mcg (200 unit) tablet [id number] Yes Take 2 tablets by mouth 2 times a day. Historical Provider, MD Taking Active   denosumab (Xgeva) 120 mg/1.7 mL (70 mg/mL) injection [id number] Yes Inject 1.7 mL (120 mg) under the skin 1 time. Historical Provider, MD Taking Active   diclofenac (Voltaren) 50 mg EC tablet [id number] Yes TAKE 1 TABLET(50 MG) BY MOUTH TWICE DAILY WITH MEALS DEX Cheng Taking Active   esomeprazole (NexIUM) 40 mg DR capsule [id number] Yes Take 1 capsule (40 mg) by mouth once daily in the morning. Take before meals. Historical Provider, MD Taking Active   inhalat.spacing dev,large mask (Pro Comfort Spacer-Adult Mask) spacer [id number] Yes 1 each every 4 hours if needed (shortness of breath, wheezing, cough). Naina Pisano, DO Taking Active   letrozole (Femara) 2.5 mg tablet [id number] Yes Take 1 tablet (2.5 mg total) by mouth once daily.  Take with or without food. Andrew Mcfarland MD Taking Active   LORazepam (Ativan) 0.5 mg tablet [id number] Yes Take 1 tablet (0.5 mg) by mouth every " 2 hours if needed for anxiety (anxiety related to radiology tests) for up to 5 doses. Andrew Mcfarland MD Taking Active   losartan-hydrochlorothiazide (Hyzaar) 100-12.5 mg tablet [id number] Yes Take 1 tablet by mouth once daily. Abraham Mohan, DO Taking Active   multivitamin tablet [id number] Yes Take 1 tablet by mouth once daily. Historical Provider, MD Taking Active   ofloxacin (Floxin) 0.3 % otic solution [id number]  Administer 4 drops into affected ear(s) 2 times a day for 7 days. Abdirahman Leyva PA-C   24 235   pravastatin (Pravachol) 10 mg tablet [id number] Yes Take 1 tablet (10 mg) by mouth once daily. Abraham Mohan, DO Taking Active   Rhopressa 0.02 % drops opthalmic solution [id number] Yes INSTILL 1 DROP IN RIGHT EYE DAILY AT BEDTIME Historical Provider, MD Taking Active   traMADol (Ultram) 50 mg tablet [id number] Yes TAKE ONE TABLET BY MOUTH EVERY 8 HOURS AS NEEDED FOR MODERATE TO SEVERE PAIN Historical Provider, MD Taking Active                   Assessment/Plan    1) stage IV breast cancer  -has high TMB  -has BRCA1  variant of uncertain significance  -also has PIK3CA mutation  -pembrolizumab has been on hold since she developed a diffuse body rash, course of prednisone did not help, in fact a week after finishing prednisone, the rash got worse--papules have become confluent red/pink areas  -she did see her dermatologist (Dr Wolff)--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, and she did have a punch biopsy done--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  -now off steroids  -for the last couple weeks has been very anxious--was prescribed anxiolytic (ativan) by family practice resident--afraid to take it; advised her to proceed--but to take only half a tablet at a time  -having a lot of stomach trouble now--started when she started taking more calcium at night  -doesn't want to go back to her current PCP--says she sees a different resident every  time; she says she will try to establish primary care followup with  IM group at [facility] [address]  -wheezy at times  -labs done on 7/24/2024 included CBC, COMP, CA 27.29  -results reviewed--wbc 4.0, hgb 9.0 plt 185,000, creatinine 0.92, calcium 8.7, albumin 4.0 AST 17, ALT 13  CA 27.29 481  -last CT scan done on 4/3/2024 reviewed: interval worsening of bilateral pleural effusions, right >left; multiple scattered <0.5 mm pulmonary nodules bilateral lungs not changed; interval development of several branching opacities involving left lung apex; there are no new worrisome hepatic lesions; mild right sided hydronephrosis; diffuse sclerosis of axial and appendicular skeleton  -advised her to continue letrozole 2.5 mg daily   -will check PET scan in a few weeks  -benefits, risks, potential morbidity related to xgeva were reviewed with Arabella and she provided informed consent to proceed  -she went on to receive xgeva 120 mg subcutaneous     2) bone metastases  -secondary to metastatic breast cancer  -receives xgeva Q4 weeks     3) anemia  -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  -hgb slowly improving     4) hyperlipidemia  -on pravastatin     5) arthritis  -on tylenol PRN  -on diclofenac PRN  -received steroid injections into her knees and she feels so much better, so she wants to receive them again     6) hypertension  -on amlodipine  -on losartan-HCTZ     7) GERD  -on nexium     8) glaucoma  -on combigan eyedrops     Problem List Items Addressed This Visit             ICD-10-CM    Breast cancer metastasized to bone (Multi) C50.919, C79.51    Relevant Orders    NM PET CT bone skull base to mid thigh    Clinic Appointment Request Chemo Follow Up; ANDREW [facility]; [id number]    Lytic bone lesions on xray M89.9            Andrew Mcfarland MD

## 2024-07-30 ENCOUNTER — OFFICE VISIT (OUTPATIENT)
Dept: PRIMARY CARE | Facility: CLINIC | Age: 84
End: 2024-07-30
Payer: MEDICARE

## 2024-07-30 VITALS
HEART RATE: 84 BPM | HEIGHT: 61 IN | DIASTOLIC BLOOD PRESSURE: 80 MMHG | OXYGEN SATURATION: 97 % | BODY MASS INDEX: 32.28 KG/M2 | RESPIRATION RATE: 16 BRPM | TEMPERATURE: 97.3 F | SYSTOLIC BLOOD PRESSURE: 138 MMHG | WEIGHT: 171 LBS

## 2024-07-30 DIAGNOSIS — M17.11 PRIMARY OSTEOARTHRITIS OF RIGHT KNEE: ICD-10-CM

## 2024-07-30 DIAGNOSIS — D64.9 ANEMIA, UNSPECIFIED TYPE: ICD-10-CM

## 2024-07-30 DIAGNOSIS — H66.93 BILATERAL OTITIS MEDIA, UNSPECIFIED OTITIS MEDIA TYPE: ICD-10-CM

## 2024-07-30 DIAGNOSIS — H40.9 GLAUCOMA OF BOTH EYES, UNSPECIFIED GLAUCOMA TYPE: ICD-10-CM

## 2024-07-30 DIAGNOSIS — C79.51 CARCINOMA OF BREAST METASTATIC TO BONE, UNSPECIFIED LATERALITY (MULTI): ICD-10-CM

## 2024-07-30 DIAGNOSIS — K21.00 GASTROESOPHAGEAL REFLUX DISEASE WITH ESOPHAGITIS WITHOUT HEMORRHAGE: ICD-10-CM

## 2024-07-30 DIAGNOSIS — C50.919 CARCINOMA OF BREAST METASTATIC TO BONE, UNSPECIFIED LATERALITY (MULTI): ICD-10-CM

## 2024-07-30 DIAGNOSIS — R06.02 SOB (SHORTNESS OF BREATH): Primary | ICD-10-CM

## 2024-07-30 DIAGNOSIS — F41.9 ANXIETY: ICD-10-CM

## 2024-07-30 DIAGNOSIS — H60.502 ACUTE OTITIS EXTERNA OF LEFT EAR, UNSPECIFIED TYPE: ICD-10-CM

## 2024-07-30 DIAGNOSIS — E78.2 MIXED HYPERLIPIDEMIA: ICD-10-CM

## 2024-07-30 DIAGNOSIS — I10 ESSENTIAL HYPERTENSION: ICD-10-CM

## 2024-07-30 PROBLEM — F10.930 ALCOHOL WITHDRAWAL, UNCOMPLICATED (MULTI): Status: RESOLVED | Noted: 2018-04-22 | Resolved: 2024-07-30

## 2024-07-30 PROCEDURE — 3075F SYST BP GE 130 - 139MM HG: CPT | Performed by: STUDENT IN AN ORGANIZED HEALTH CARE EDUCATION/TRAINING PROGRAM

## 2024-07-30 PROCEDURE — 99215 OFFICE O/P EST HI 40 MIN: CPT | Performed by: STUDENT IN AN ORGANIZED HEALTH CARE EDUCATION/TRAINING PROGRAM

## 2024-07-30 PROCEDURE — 1036F TOBACCO NON-USER: CPT | Performed by: STUDENT IN AN ORGANIZED HEALTH CARE EDUCATION/TRAINING PROGRAM

## 2024-07-30 PROCEDURE — 93000 ELECTROCARDIOGRAM COMPLETE: CPT | Performed by: STUDENT IN AN ORGANIZED HEALTH CARE EDUCATION/TRAINING PROGRAM

## 2024-07-30 PROCEDURE — 3079F DIAST BP 80-89 MM HG: CPT | Performed by: STUDENT IN AN ORGANIZED HEALTH CARE EDUCATION/TRAINING PROGRAM

## 2024-07-30 PROCEDURE — 1159F MED LIST DOCD IN RCRD: CPT | Performed by: STUDENT IN AN ORGANIZED HEALTH CARE EDUCATION/TRAINING PROGRAM

## 2024-07-30 PROCEDURE — 1160F RVW MEDS BY RX/DR IN RCRD: CPT | Performed by: STUDENT IN AN ORGANIZED HEALTH CARE EDUCATION/TRAINING PROGRAM

## 2024-07-30 RX ORDER — CIPROFLOXACIN AND DEXAMETHASONE 3; 1 MG/ML; MG/ML
4 SUSPENSION/ DROPS AURICULAR (OTIC) 2 TIMES DAILY
Qty: 7.5 ML | Refills: 0 | Status: SHIPPED | OUTPATIENT
Start: 2024-07-30 | End: 2024-08-06

## 2024-07-30 RX ORDER — AMOXICILLIN AND CLAVULANATE POTASSIUM 875; 125 MG/1; MG/1
875 TABLET, FILM COATED ORAL 2 TIMES DAILY
Qty: 14 TABLET | Refills: 0 | Status: SHIPPED | OUTPATIENT
Start: 2024-07-30 | End: 2024-08-06

## 2024-07-30 ASSESSMENT — ENCOUNTER SYMPTOMS
WHEEZING: 1
DIZZINESS: 0
DIARRHEA: 0
DIAPHORESIS: 0
CHILLS: 0
VOMITING: 0
FEVER: 0
SHORTNESS OF BREATH: 1
NAUSEA: 1
LIGHT-HEADEDNESS: 0

## 2024-07-30 NOTE — PROGRESS NOTES
"Subjective   Patient ID: Arabella Springer is a 83 y.o. female who presents for Establish Care (Pt would like to discuss lexapro. ).  HPI    Reports had hx of ear infection and saw ENT and was given drops for low grade otitis externa. Feels like she is in a vacuum in left ear and has decreased hearing.     Has had cough since May 4th. Has swallow test this Thursday.     She still has shortness of breath. Wakes up at 3am usually and has shortness of breath. She has dyspnea on exertion walking from room to room. Has noticed more swelling in last few days. Also feels more SOB in last few weeks.         Review of Systems   Constitutional:  Negative for chills, diaphoresis and fever.   Respiratory:  Positive for shortness of breath and wheezing.    Cardiovascular:  Negative for chest pain.   Gastrointestinal:  Positive for nausea. Negative for diarrhea and vomiting.   Neurological:  Negative for dizziness and light-headedness.       /80   Pulse 84   Temp 36.3 °C (97.3 °F) (Temporal)   Resp 16   Ht 1.541 m (5' 0.67\")   Wt 77.6 kg (171 lb)   SpO2 97%   BMI 32.66 kg/m²     Objective   Physical Exam  Vitals reviewed.   HENT:      Head: Normocephalic.      Right Ear: Ear canal and external ear normal. There is no impacted cerumen.      Left Ear: Ear canal and external ear normal. There is no impacted cerumen.      Ears:      Comments: Bilateral opaque tympanic membrane, left ear canal with mild edema.     Mouth/Throat:      Mouth: Mucous membranes are moist.      Pharynx: Oropharynx is clear. No oropharyngeal exudate or posterior oropharyngeal erythema.   Cardiovascular:      Rate and Rhythm: Normal rate and regular rhythm.   Pulmonary:      Effort: Pulmonary effort is normal. No respiratory distress.      Breath sounds: Normal breath sounds.   Abdominal:      General: There is no distension.   Musculoskeletal:         General: No deformity.      Cervical back: Neck supple. No tenderness.   Lymphadenopathy:      " Cervical: No cervical adenopathy.   Skin:     Coloration: Skin is not jaundiced.   Neurological:      Mental Status: She is alert.   Psychiatric:         Mood and Affect: Mood normal.         Behavior: Behavior normal.         Assessment/Plan   Problem List Items Addressed This Visit       Breast cancer metastasized to bone (Multi)    Primary osteoarthritis of right knee    Hyperlipidemia    Glaucoma    Essential hypertension    Anemia    Anxiety    Gastroesophageal reflux disease with esophagitis without hemorrhage    SOB (shortness of breath) - Primary    Relevant Orders    XR chest 2 views    ECG 12 lead (Clinic Performed) (Completed)    Acute otitis externa of left ear    Relevant Medications    ciprofloxacin-dexamethasone (CiproDEX) otic suspension    Bilateral otitis media    Relevant Medications    amoxicillin-pot clavulanate (Augmentin) 875-125 mg tablet       Shortness of breath  - Discussed multiple etiologies can be explaining this including progression of metastatic disease, pleural effusion.  Given it has been worsening over the last few weeks we will check chest x-ray, this is ordered.  -Labs reviewed from last week and are stable.  Consider repeating labs if no obvious causes found.  -Reviewed how to use albuterol, let us know if using more than twice a week.  -EKG in office unremarkable  -Advised low threshold for ER for worsening SOB, chest pain.   -Reassess anxiety at next appointment.    Bilateral otitis media  Left otitis externa  - Augmentin sent to pharmacy, can take with probiotic yogurt to help with symptoms  - Cipro eardrops sent to pharmacy  - ER for pain, fever, chills, sweats, worsening symptoms.  - Advised prompt follow-up with ENT.    Stage IV breast cancer metastatic to bone  - Seeing Dr. Mcfarland, last CT showed worsening bilateral pleural effusions, right greater than left, multiple scattered unchanged bilateral lung nodules, interval development of left lung apex opacities with no  worrisome hepatic lesions.  - Management per oncology, on letrozole 2.5 mg a day and received Xgeva for bone mets    Anemia  - Secondary to marrow placement by metastatic breast cancer  - Per oncology    Hyperlipidemia  - On pravastatin mildly elevated LDL 10/2023    Arthritis  - On Tylenol and diclofenac as needed.  - Has received steroid injections to the knees with benefit    Hypertension  - On amlodipine and losartan-hydrochlorothiazide, blood pressure relatively controlled today  - Recheck at next visit    GERD  - On Nexium  -Stable    Glaucoma  - On Combigan eyedrops    Short-term follow-up 2 weeks virtually to discuss mood.

## 2024-08-01 ENCOUNTER — HOSPITAL ENCOUNTER (OUTPATIENT)
Dept: RADIOLOGY | Facility: HOSPITAL | Age: 84
Discharge: HOME | End: 2024-08-01
Payer: MEDICARE

## 2024-08-01 DIAGNOSIS — R13.10 DYSPHAGIA, UNSPECIFIED TYPE: ICD-10-CM

## 2024-08-01 PROCEDURE — 74230 X-RAY XM SWLNG FUNCJ C+: CPT

## 2024-08-01 PROCEDURE — 2500000005 HC RX 250 GENERAL PHARMACY W/O HCPCS: Performed by: PHYSICIAN ASSISTANT

## 2024-08-01 PROCEDURE — 92611 MOTION FLUOROSCOPY/SWALLOW: CPT | Mod: GN

## 2024-08-01 PROCEDURE — 74230 X-RAY XM SWLNG FUNCJ C+: CPT | Performed by: STUDENT IN AN ORGANIZED HEALTH CARE EDUCATION/TRAINING PROGRAM

## 2024-08-01 NOTE — PROCEDURES
Speech-Language Pathology  Outpatient Modified Barium Swallow Study    Patient Name: Arabella Springer  MRN: [id number]  : 1940  Today's Date: 24    Time Calculation  Start Time: 815  Stop Time: 855  Time Calculation (min): 40 min       Modified Barium Swallow Study completed. Informed verbal consent obtained prior to completion of exam. Trials of thin, nectar/mildly thick liquid, honey/moderately thick liquid, puree, regular solids and barium tablet with thin liquid were given.       SLP: Elisabeth Ballard CCC-SLP   Contact info: [employer] secure chat; phone: [phone]      Reason for Referral: Assess swallowing physiology; coughing during/following PO intake  Patient Hx: Stage IV breast cancer with bone mets, anemia, HLD, HTN, GERD, hiatal hernia, arthritis, glaucoma  Respiratory Status: Room air  Current diet: Regular solids and thin liquids      Pain:  Pain Scale: 0-10  Ratin      FINAL SPEECH RECOMMENDATIONS  DIET:   - Regular (IDDSI Level 7)  - Thin liquids (IDDSI Level 0)  Per the results of today's MBSS, patient to continue baseline diet of regular consistencies and thin liquids following swallow strategies listed below:    STRATEGIES:  - Small bites  - Small, single sips  - Alternate consistencies  - Upright for all PO intake  - Reflux Precautions  - Employ a few liquid washes to get pills down  - Crush large pills in a pureed carrier as needed      Plan:  Treatment/Interventions: Pharyngeal exercises, Patient/family education, Bolus trials, Compensatory strategy training, Diet tolerance/advancement  SLP Plan: No treatment needs identified at this time       Discussed POC: Patient  Discussed Risks/Benefits: Yes  Patient/Caregiver Agreeable: Yes      Education Provided: Results and recommendations per MBSS, with video review; recommendations and POC at this time. Verbal understanding and agreement given on all accounts.       Additional consult suggested: Gastroenterology; please see  detailed information/patient complaints below.      Mechanics of the Swallow Summary:  ORAL PHASE:  Lip Closure - No labial escape/anterior loss of bolus   Tongue Control During Bolus Hold - Cohesive bolus between tongue to palatal seal   Bolus prep/mastication - Timely and efficient mastication skills   Bolus transport/lingual motion - Brisk tongue motion for A-P movement of the bolus   Oral residue - Trace residue lining oral structures (coating)  PHARYNGEAL PHASE:  Initiation of pharyngeal swallow - Bolus head at posterior angle of ramus   Soft palate elevation - No bolus between soft palate/pharyngeal wall   Laryngeal elevation - Complete superior movement of thyroid cartilage with contact of arytenoids to epiglottic petiole   Anterior hyoid excursion - Complete anterior movement   Epiglottic movement - Complete inversion    Laryngeal vestibule closure - Complete - no air/contrast in laryngeal vestibule   Pharyngeal stripping wave - Complete  Pharyngeal contraction (A/P view) - Complete  Pharyngoesophageal segment opening - Partial distension/partial duration with partial obstruction of flow of bolus (still WFL)  Tongue base retraction - No bolus between tongue base and posterior pharyngeal wall   Pharyngeal residue - Trace residue within or on the pharyngeal structures (coating)  ESOPHAGEAL PHASE:  Esophageal clearance - Esophageal retention with barium tablet; required a few liquid assists to clear/pass through the LES      SLP Impressions with Severity Rating:   Patient is presenting with functional/age appropriate oropharyngeal swallow function upon completion of modified barium swallow study this date. Swallowing physiology is detailed above. The impairment that is most impacting swallowing safety and efficiency is esophageal retention with the barium tablet; required a few liquid assists to clear/pass through the LES. No laryngeal penetration or tracheal aspiration visualized during study. Note, patient  with occasional coughing towards the end of the study; not related to airway invasion or retrograde flow/reflux. Patient with no significant oral or pharyngeal residue; trace residual/coating visualized.       Strategies attempted- Cued liquid assists aided in clearance of the barium tablet.       OUTCOME MEASURES:  Functional Oral Intake Scale  Functional Oral Intake Scale: Level 7        total oral diet with no restrictions       Eating Assessment Tool (EAT-10)   0=No problem, 1=Mild problem, 2=Mild to moderate problem, 3=Moderate problem, 4=Severe problem  EAT 10  My swallowing problem has caused me to lose weight.: 0  My swallowing problem interferes with my ability to go out for meals.: 0  Swallowing liquids takes extra effort.: 0  Swallowing solids takes extra effort.: 1  Swallowing pills takes extra effort.: 1  Swallowing is painful: 0  The pleasure of eating is affected by my swallowing.: 0  When I swallow food sticks in my throat.: 0  I cough when I eat.: 2  Swallowing is stressful: 0  EAT-10 TOTAL SCORE:: 4  A total score of 3 or above may indicate difficulty with swallowing safely and/or efficiently  Note, patient reported that she coughs during/following PO intake; more so after eating and drinking. The coughing has been occurring since May. She also reported a change in her vocal quality over the last year (hoarseness). Patient denied odynophagia. Additionally, patient reported a hx of a hiatal hernia + GERD, and that she has taken Nexium every morning for the past 25 years. Furthermore, she expressed that she gets full very quickly. Patient has not been seen by GI for several years per her report. Note, patient was seen by ENT recently, and is currently on antibiotics for an ear infection. Patient's recent laryngoscopy was WNL.      Rosenbek's Penetration Aspiration Scale  Thin Liquids: 1. NO ASPIRATION & NO PENETRATION - no aspiration, contrast does not enter airway  [place] Thick Liquids: 1. NO  ASPIRATION & NO PENETRATION - no aspiration, contrast does not enter airway  Honey Thick Liquids: 1. NO ASPIRATION & NO PENETRATION - no aspiration, contrast does not enter airway  Puree: 1. NO ASPIRATION & NO PENETRATION - no aspiration, contrast does not enter airway  Solids: 1. NO ASPIRATION & NO PENETRATION - no aspiration, contrast does not enter airway      Thank you!

## 2024-08-01 NOTE — ADDENDUM NOTE
Encounter addended by: Elisabeth Ballard CCC-SLP on: 8/1/2024 11:22 AM   Actions taken: Clinical Note Signed

## 2024-08-06 ENCOUNTER — DOCUMENTATION (OUTPATIENT)
Dept: PRIMARY CARE | Facility: CLINIC | Age: 84
End: 2024-08-06
Payer: MEDICARE

## 2024-08-07 ENCOUNTER — PATIENT OUTREACH (OUTPATIENT)
Dept: CARE COORDINATION | Facility: CLINIC | Age: 84
End: 2024-08-07

## 2024-08-07 ENCOUNTER — CLINICAL SUPPORT (OUTPATIENT)
Dept: AUDIOLOGY | Facility: CLINIC | Age: 84
End: 2024-08-07
Payer: MEDICARE

## 2024-08-07 ENCOUNTER — APPOINTMENT (OUTPATIENT)
Dept: OTOLARYNGOLOGY | Facility: CLINIC | Age: 84
End: 2024-08-07
Payer: MEDICARE

## 2024-08-07 DIAGNOSIS — H60.8X3 OTHER NONINFECTIOUS CHRONIC OTITIS EXTERNA OF BOTH EARS: Primary | ICD-10-CM

## 2024-08-07 DIAGNOSIS — R13.12 OROPHARYNGEAL DYSPHAGIA: ICD-10-CM

## 2024-08-07 DIAGNOSIS — H90.3 BILATERAL HIGH FREQUENCY SENSORINEURAL HEARING LOSS: Primary | ICD-10-CM

## 2024-08-07 DIAGNOSIS — R13.19 ESOPHAGEAL DYSPHAGIA: ICD-10-CM

## 2024-08-07 PROBLEM — H60.63 CHRONIC NON-INFECTIVE OTITIS EXTERNA OF BOTH EARS: Status: ACTIVE | Noted: 2024-08-07

## 2024-08-07 PROCEDURE — 99213 OFFICE O/P EST LOW 20 MIN: CPT | Performed by: OTOLARYNGOLOGY

## 2024-08-07 PROCEDURE — 92567 TYMPANOMETRY: CPT | Performed by: AUDIOLOGIST

## 2024-08-07 PROCEDURE — 1160F RVW MEDS BY RX/DR IN RCRD: CPT | Performed by: OTOLARYNGOLOGY

## 2024-08-07 PROCEDURE — 92557 COMPREHENSIVE HEARING TEST: CPT | Performed by: AUDIOLOGIST

## 2024-08-07 PROCEDURE — 1159F MED LIST DOCD IN RCRD: CPT | Performed by: OTOLARYNGOLOGY

## 2024-08-07 RX ORDER — FUROSEMIDE 20 MG/1
20 TABLET ORAL DAILY
COMMUNITY

## 2024-08-07 NOTE — PROGRESS NOTES
The patient returns.  Was seeing her back today follow-up check on her ears and swallowing.  She had seen her dedicated physicians assistant with evidence of dysphagia.  Modified barium swallow was accomplished showing overall relatively good oropharyngeal function but some slowing in the esophagus.  She has been instructed how to take her pills in that regard.  Overall though nothing worrisome present within the swallow study.  Additionally, here to get her ears cleaned and checked out again if she still not hearing very well with a lot of moisture present.  All remaining ENT inquiry clear.  No other change in past medical surgical history.    Physical exam:  No acute distress.  Bilateral ear exam noted for extensive debris cleaned out.  Following debridement boric acid powder applied.  Tympanometry is normal and bilateral symmetric sensorineural hearing loss noted.  Nasal exam is clear anteriorly. The septum is relatively straight and the nasal mucosa is grossly unremarkable without evidence of any worrisome infection or polyp or mass. The oral cavity and oropharynx are unremarkable. There is no evidence of any gross lesion or mucosal irregularity throughout the structures. The neck is negative for mass or lymphadenopathy. The trachea and parotid region are clear free of obvious mass or tumor. Facial structures are grossly otherwise unremarkable as well.    Assessment and plan:  1.  Bilateral chronic otitis externa variant.  Ears extensively debrided with boric acid powder application performed today.  Recheck with me in 3 weeks, sooner with issue.  2.  Dysphagia both oropharyngeal which is very mild and esophageal which is typically digested tablets or pills for her.  She will follow speech pathology recommendations in this regard.  Update me accordingly as warranted.  All questions were answered in this regard accordingly.

## 2024-08-07 NOTE — PROGRESS NOTES
Discharge Facility:[facility]  Discharge Diagnosis:Pleural effusion   Admission Date:8/4/2024  Discharge Date:8/5/2024     PCP Appointment Date:8/15/2024, virtual 2 week   Specialist Appointment Date:   8/7/2024 ENT/[facility]  8/23/2024 Hem/Onc/[facility]  9/4/2024 Cardio/[facility]zer  Pulmonary TBD  Hospital Encounter and Summary Linked: Yes  See discharge assessment below for further details  Engagement  Call Start Time: 1332 (8/7/2024  2:25 PM)    Medications  Medications reviewed with patient/caregiver?: Yes (8/7/2024  2:25 PM)  Is the patient having any side effects they believe may be caused by any medication additions or changes?: No (8/7/2024  2:25 PM)  Does the patient have all medications ordered at discharge?: Yes (8/7/2024  2:25 PM)  Care Management Interventions: No intervention needed (8/7/2024  2:25 PM)  Prescription Comments: -- (Lasix 20 mg qd, D/C Amlodipine) (8/7/2024  2:25 PM)  Is the patient taking all medications as directed (includes completed medication regime)?: Yes (8/7/2024  2:25 PM)  Care Management Interventions: Provided patient education (Arabella was asking if should increase lasix, we discussed if picking up increase in fluid, she monitors her weight daily and states she has noticed continued loss. She will contact provider to advise if should double.) (8/7/2024  1:42 PM)    Appointments  Does the patient have a primary care provider?: Yes (8/7/2024  2:25 PM)  Care Management Interventions: Verified appointment date/time/provider (8/15/2024, virtual) (8/7/2024  2:25 PM)  Has the patient kept scheduled appointments due by today?: Yes (8/7/2024  2:25 PM)  Care Management Interventions: Advised to schedule with specialist (To schedule fu with Pulmonary) (8/7/2024  2:25 PM)    Self Management  What is the home health agency?: -- (N/A) (8/7/2024  2:25 PM)  What Durable Medical Equipment (DME) was ordered?: -- (N/A) (8/7/2024  2:25 PM)    Patient Teaching  Does the patient have access to their discharge  instructions?: Yes (8/7/2024  2:25 PM)  What is the patient's perception of their health status since discharge?: Improving (8/7/2024  2:25 PM)  Is the patient/caregiver able to teach back the hierarchy of who to call/visit for symptoms/problems? PCP, Specialist, Home Health nurse, Urgent Care, ED, 911: Yes (8/7/2024  2:25 PM)    Wrap Up  Wrap Up Additional Comments: -- (Arabella is doing very well. She saw ENT today and was very pleased. She has noticed weight loss of about 7 lbs, we discussed to continue with daily weights and to notice if picks up weight suddenly.She will fu with Cardio, PCP and Pulm.) (8/7/2024  2:25 PM)

## 2024-08-07 NOTE — PROGRESS NOTES
Ms. Springer, age 83, was seen today for a hearing evaluation during her ENT visit with Dr. Nam and following cerumen removal as well as boric acid powder application to the patient's bilateral ears.    Results:  Otoscopy revealed clear ear canals and tympanic membranes were visualized bilaterally.  Tympanometry revealed normal, Type A tympanograms, indicating normal ear canal volume, peak pressure and compliance bilaterally.  Audiometric thresholds revealed a slight sloping to moderate sensorineural hearing loss in her right ear and a slight sloping to severe sensorineural hearing loss in her left ear.  Word recognition scores were excellent bilaterally.    Recommendations:  Follow-up with PCP, Dr. Velazquez, as medically directed.  Follow-up with ENT, Dr. Nam, as medically directed.  Retest hearing annually to monitor.

## 2024-08-12 DIAGNOSIS — I10 HYPERTENSION, UNSPECIFIED TYPE: ICD-10-CM

## 2024-08-12 DIAGNOSIS — E78.2 MIXED HYPERLIPIDEMIA: ICD-10-CM

## 2024-08-12 RX ORDER — PRAVASTATIN SODIUM 10 MG/1
10 TABLET ORAL DAILY
Qty: 90 TABLET | Refills: 0 | Status: SHIPPED | OUTPATIENT
Start: 2024-08-12

## 2024-08-12 RX ORDER — LOSARTAN POTASSIUM AND HYDROCHLOROTHIAZIDE 12.5; 1 MG/1; MG/1
1 TABLET ORAL DAILY
Qty: 90 TABLET | Refills: 0 | Status: SHIPPED | OUTPATIENT
Start: 2024-08-12

## 2024-08-15 ENCOUNTER — APPOINTMENT (OUTPATIENT)
Dept: PRIMARY CARE | Facility: CLINIC | Age: 84
End: 2024-08-15
Payer: MEDICARE

## 2024-08-15 DIAGNOSIS — C50.919 CARCINOMA OF BREAST METASTATIC TO BONE, UNSPECIFIED LATERALITY (MULTI): ICD-10-CM

## 2024-08-15 DIAGNOSIS — I50.33 ACUTE ON CHRONIC HEART FAILURE WITH PRESERVED EJECTION FRACTION (HFPEF) (MULTI): Primary | ICD-10-CM

## 2024-08-15 DIAGNOSIS — F41.9 ANXIETY: ICD-10-CM

## 2024-08-15 DIAGNOSIS — E87.6 HYPOKALEMIA: ICD-10-CM

## 2024-08-15 DIAGNOSIS — C79.51 CARCINOMA OF BREAST METASTATIC TO BONE, UNSPECIFIED LATERALITY (MULTI): ICD-10-CM

## 2024-08-15 PROCEDURE — 1036F TOBACCO NON-USER: CPT | Performed by: STUDENT IN AN ORGANIZED HEALTH CARE EDUCATION/TRAINING PROGRAM

## 2024-08-15 PROCEDURE — 1160F RVW MEDS BY RX/DR IN RCRD: CPT | Performed by: STUDENT IN AN ORGANIZED HEALTH CARE EDUCATION/TRAINING PROGRAM

## 2024-08-15 PROCEDURE — 99495 TRANSJ CARE MGMT MOD F2F 14D: CPT | Performed by: STUDENT IN AN ORGANIZED HEALTH CARE EDUCATION/TRAINING PROGRAM

## 2024-08-15 PROCEDURE — 1159F MED LIST DOCD IN RCRD: CPT | Performed by: STUDENT IN AN ORGANIZED HEALTH CARE EDUCATION/TRAINING PROGRAM

## 2024-08-15 RX ORDER — BUSPIRONE HYDROCHLORIDE 5 MG/1
5 TABLET ORAL 2 TIMES DAILY
Qty: 60 TABLET | Refills: 1 | Status: SHIPPED | OUTPATIENT
Start: 2024-08-15 | End: 2024-10-14

## 2024-08-15 ASSESSMENT — ANXIETY QUESTIONNAIRES
1. FEELING NERVOUS, ANXIOUS, OR ON EDGE: MORE THAN HALF THE DAYS
7. FEELING AFRAID AS IF SOMETHING AWFUL MIGHT HAPPEN: NEARLY EVERY DAY
4. TROUBLE RELAXING: SEVERAL DAYS
6. BECOMING EASILY ANNOYED OR IRRITABLE: NOT AT ALL
5. BEING SO RESTLESS THAT IT IS HARD TO SIT STILL: SEVERAL DAYS
2. NOT BEING ABLE TO STOP OR CONTROL WORRYING: MORE THAN HALF THE DAYS
GAD7 TOTAL SCORE: 10
3. WORRYING TOO MUCH ABOUT DIFFERENT THINGS: SEVERAL DAYS

## 2024-08-15 NOTE — PROGRESS NOTES
"Patient: Arabella Springer  : 1940  PCP: Angel Luis Velazquez DO  MRN: [id number]  Program: Transitional Care Management  Status: Enrolled  Effective Dates: 2024 - present  Responsible Staff: Catherine Boykin MA  Social Determinants to be Addressed: Physical Activity, Social Connections, Tobacco Use      Virtual or Telephone Consent    An interactive audio and video telecommunication system which permits real time communications between the patient (at the originating site) and provider (at the distant site) was utilized to provide this telehealth service.   Verbal consent was requested and obtained from Arabella Springer on this date, 08/15/24 for a telehealth visit.       Arabella Springer is a 83 y.o. female presenting today for follow-up after being discharged from the hospital 11 days ago. The main problem requiring admission was SOB and lower extremity swelling. The discharge summary and/or Transitional Care Management documentation was reviewed. Medication reconciliation was performed as indicated via the \"Julián as Reviewed\" timestamp.     Arabella Springer was contacted by Transitional Care Management services two days after her discharge. This encounter and supporting documentation was reviewed.    She presented to the ER with chief complaint of worsening shortness of breath and lower extremity swelling.  Elevated D-dimer was noted, CT PE was negative.  She was found to have bilateral pleural effusions.  She was started on Lasix for diuresis.  She had been seen by pulmonary and thought pleural effusion was related to heart failure but cannot rule out malignancy.  She improved with IV Lasix.    Since discharge she was started on lasix and her amlodipine was stopped. Swelling in legs is resolved and shortness is better but persistent since stopping lasix.     Rib pain has not bothered her.     Has PET scan tomorrow.     Wakes up at 2AM to 4AM with anxiety. Denies any hx of anxiety and denies any psychiatric " diagnosis.     Has not been taking tramadol since January.         Review of Systems    There were no vitals taken for this visit.    Physical Exam  Constitutional:       General: She is not in acute distress.  Pulmonary:      Effort: Pulmonary effort is normal. No respiratory distress.   Neurological:      Mental Status: She is alert.       The complexity of medical decision making for this patient's transitional care is moderate.    Assessment/Plan   Problem List Items Addressed This Visit       Breast cancer metastasized to bone (Multi)    Relevant Orders    Referral to Psychology    Anxiety    Relevant Medications    busPIRone (Buspar) 5 mg tablet    Other Relevant Orders    Referral to Psychology    Acute on chronic heart failure with preserved ejection fraction (HFpEF) (Multi) - Primary    Relevant Orders    Basic metabolic panel    Hypokalemia       CHF  Pleural effusions  -She will see cardiology at [facility] regarding this.  -Continue lasix for now  -Check BMP today or tomorrow.     Mild hypokalemia  - Repeat labs ordered    Anxiety  -KIZZY 7 is 10.   -Discussed SSRI versus BuSpar.  Discussed BuSpar probably has better side effect profile and would be better tolerated.  Explained rare and common side effects.  She is interested in starting, sent to pharmacy.  QTc is appropriate from recent EKG in the ER.  - Follow-up 4 to 6 weeks virtually  -Refer to therapy as well.

## 2024-08-16 ENCOUNTER — HOSPITAL ENCOUNTER (OUTPATIENT)
Dept: RADIOLOGY | Facility: CLINIC | Age: 84
Discharge: HOME | End: 2024-08-16
Payer: MEDICARE

## 2024-08-16 ENCOUNTER — LAB (OUTPATIENT)
Dept: LAB | Facility: LAB | Age: 84
End: 2024-08-16
Payer: MEDICARE

## 2024-08-16 ENCOUNTER — PATIENT OUTREACH (OUTPATIENT)
Dept: CARE COORDINATION | Facility: CLINIC | Age: 84
End: 2024-08-16

## 2024-08-16 DIAGNOSIS — I50.33 ACUTE ON CHRONIC HEART FAILURE WITH PRESERVED EJECTION FRACTION (HFPEF) (MULTI): ICD-10-CM

## 2024-08-16 DIAGNOSIS — C50.919 CARCINOMA OF BREAST METASTATIC TO BONE, UNSPECIFIED LATERALITY (MULTI): ICD-10-CM

## 2024-08-16 DIAGNOSIS — C79.51 CARCINOMA OF BREAST METASTATIC TO BONE, UNSPECIFIED LATERALITY (MULTI): ICD-10-CM

## 2024-08-16 LAB
ANION GAP SERPL CALC-SCNC: 14 MMOL/L (ref 10–20)
BUN SERPL-MCNC: 18 MG/DL (ref 6–23)
CALCIUM SERPL-MCNC: 9.1 MG/DL (ref 8.6–10.6)
CHLORIDE SERPL-SCNC: 98 MMOL/L (ref 98–107)
CO2 SERPL-SCNC: 26 MMOL/L (ref 21–32)
CREAT SERPL-MCNC: 0.97 MG/DL (ref 0.5–1.05)
EGFRCR SERPLBLD CKD-EPI 2021: 59 ML/MIN/1.73M*2
GLUCOSE SERPL-MCNC: 111 MG/DL (ref 74–99)
POTASSIUM SERPL-SCNC: 4.8 MMOL/L (ref 3.5–5.3)
SODIUM SERPL-SCNC: 133 MMOL/L (ref 136–145)

## 2024-08-16 PROCEDURE — 78815 PET IMAGE W/CT SKULL-THIGH: CPT | Mod: PS

## 2024-08-16 PROCEDURE — 36415 COLL VENOUS BLD VENIPUNCTURE: CPT

## 2024-08-16 PROCEDURE — 3430000001 HC RX 343 DIAGNOSTIC RADIOPHARMACEUTICALS: Performed by: INTERNAL MEDICINE

## 2024-08-16 PROCEDURE — 80048 BASIC METABOLIC PNL TOTAL CA: CPT

## 2024-08-16 PROCEDURE — A9552 F18 FDG: HCPCS | Performed by: INTERNAL MEDICINE

## 2024-08-16 RX ORDER — FLUDEOXYGLUCOSE F 18 200 MCI/ML
12.7 INJECTION, SOLUTION INTRAVENOUS
Status: COMPLETED | OUTPATIENT
Start: 2024-08-16 | End: 2024-08-16

## 2024-08-16 NOTE — PROGRESS NOTES
Call regarding appt. with PCP on 8/15/2024 after hospitalization.  At time of outreach call the patient feels as if their condition has improved since last visit.  Reviewed the PCP appointment with the pt and addressed any questions or concerns.  Arabella is doing well, had PET scan today.

## 2024-08-19 RX ORDER — FUROSEMIDE 20 MG/1
20 TABLET ORAL DAILY
Qty: 30 TABLET | Refills: 2 | OUTPATIENT
Start: 2024-08-19

## 2024-08-22 ENCOUNTER — LAB (OUTPATIENT)
Dept: LAB | Facility: CLINIC | Age: 84
End: 2024-08-22
Payer: MEDICARE

## 2024-08-22 DIAGNOSIS — M89.9 LYTIC BONE LESIONS ON XRAY: ICD-10-CM

## 2024-08-22 DIAGNOSIS — C50.911 CARCINOMA OF RIGHT BREAST METASTATIC TO BONE (MULTI): ICD-10-CM

## 2024-08-22 DIAGNOSIS — C79.51 CARCINOMA OF RIGHT BREAST METASTATIC TO BONE (MULTI): ICD-10-CM

## 2024-08-22 LAB
ALBUMIN SERPL BCP-MCNC: 4 G/DL (ref 3.4–5)
ALP SERPL-CCNC: 60 U/L (ref 33–136)
ALT SERPL W P-5'-P-CCNC: 12 U/L (ref 7–45)
ANION GAP SERPL CALC-SCNC: 13 MMOL/L (ref 10–20)
AST SERPL W P-5'-P-CCNC: 18 U/L (ref 9–39)
BASOPHILS # BLD AUTO: 0.02 X10*3/UL (ref 0–0.1)
BASOPHILS NFR BLD AUTO: 0.5 %
BILIRUB SERPL-MCNC: 0.3 MG/DL (ref 0–1.2)
BUN SERPL-MCNC: 21 MG/DL (ref 6–23)
CALCIUM SERPL-MCNC: 8.9 MG/DL (ref 8.6–10.3)
CANCER AG27-29 SERPL-ACNC: 377 U/ML (ref 0–38.6)
CHLORIDE SERPL-SCNC: 95 MMOL/L (ref 98–107)
CO2 SERPL-SCNC: 29 MMOL/L (ref 21–32)
CREAT SERPL-MCNC: 1.02 MG/DL (ref 0.5–1.05)
EGFRCR SERPLBLD CKD-EPI 2021: 55 ML/MIN/1.73M*2
EOSINOPHIL # BLD AUTO: 0.11 X10*3/UL (ref 0–0.4)
EOSINOPHIL NFR BLD AUTO: 2.8 %
ERYTHROCYTE [DISTWIDTH] IN BLOOD BY AUTOMATED COUNT: 14.4 % (ref 11.5–14.5)
GLUCOSE SERPL-MCNC: 113 MG/DL (ref 74–99)
HCT VFR BLD AUTO: 28.4 % (ref 36–46)
HGB BLD-MCNC: 9.3 G/DL (ref 12–16)
IMM GRANULOCYTES # BLD AUTO: 0.04 X10*3/UL (ref 0–0.5)
IMM GRANULOCYTES NFR BLD AUTO: 1 % (ref 0–0.9)
LYMPHOCYTES # BLD AUTO: 0.95 X10*3/UL (ref 0.8–3)
LYMPHOCYTES NFR BLD AUTO: 24.4 %
MCH RBC QN AUTO: 31.3 PG (ref 26–34)
MCHC RBC AUTO-ENTMCNC: 32.7 G/DL (ref 32–36)
MCV RBC AUTO: 96 FL (ref 80–100)
MONOCYTES # BLD AUTO: 0.47 X10*3/UL (ref 0.05–0.8)
MONOCYTES NFR BLD AUTO: 12.1 %
NEUTROPHILS # BLD AUTO: 2.31 X10*3/UL (ref 1.6–5.5)
NEUTROPHILS NFR BLD AUTO: 59.2 %
PLATELET # BLD AUTO: 218 X10*3/UL (ref 150–450)
POTASSIUM SERPL-SCNC: 3.9 MMOL/L (ref 3.5–5.3)
PROT SERPL-MCNC: 6.1 G/DL (ref 6.4–8.2)
RBC # BLD AUTO: 2.97 X10*6/UL (ref 4–5.2)
SODIUM SERPL-SCNC: 133 MMOL/L (ref 136–145)
WBC # BLD AUTO: 3.9 X10*3/UL (ref 4.4–11.3)

## 2024-08-22 PROCEDURE — 80053 COMPREHEN METABOLIC PANEL: CPT

## 2024-08-22 PROCEDURE — 85025 COMPLETE CBC W/AUTO DIFF WBC: CPT

## 2024-08-22 PROCEDURE — 86300 IMMUNOASSAY TUMOR CA 15-3: CPT | Performed by: INTERNAL MEDICINE

## 2024-08-22 PROCEDURE — 36415 COLL VENOUS BLD VENIPUNCTURE: CPT

## 2024-08-23 ENCOUNTER — INFUSION (OUTPATIENT)
Dept: HEMATOLOGY/ONCOLOGY | Facility: CLINIC | Age: 84
End: 2024-08-23
Payer: MEDICARE

## 2024-08-23 ENCOUNTER — OFFICE VISIT (OUTPATIENT)
Dept: HEMATOLOGY/ONCOLOGY | Facility: CLINIC | Age: 84
End: 2024-08-23
Payer: MEDICARE

## 2024-08-23 VITALS
BODY MASS INDEX: 31.84 KG/M2 | DIASTOLIC BLOOD PRESSURE: 69 MMHG | WEIGHT: 168.65 LBS | HEIGHT: 61 IN | SYSTOLIC BLOOD PRESSURE: 133 MMHG | OXYGEN SATURATION: 93 % | TEMPERATURE: 97.3 F | HEART RATE: 78 BPM | RESPIRATION RATE: 16 BRPM

## 2024-08-23 DIAGNOSIS — K21.00 GASTROESOPHAGEAL REFLUX DISEASE WITH ESOPHAGITIS WITHOUT HEMORRHAGE: ICD-10-CM

## 2024-08-23 DIAGNOSIS — C50.919 CARCINOMA OF BREAST METASTATIC TO BONE, UNSPECIFIED LATERALITY (MULTI): ICD-10-CM

## 2024-08-23 DIAGNOSIS — I10 ESSENTIAL HYPERTENSION: ICD-10-CM

## 2024-08-23 DIAGNOSIS — M89.9 LYTIC BONE LESIONS ON XRAY: Primary | ICD-10-CM

## 2024-08-23 DIAGNOSIS — D61.82 MYELOPHTHISIC ANEMIA (MULTI): ICD-10-CM

## 2024-08-23 DIAGNOSIS — C79.51 CARCINOMA OF BREAST METASTATIC TO BONE, UNSPECIFIED LATERALITY (MULTI): ICD-10-CM

## 2024-08-23 DIAGNOSIS — M89.9 LYTIC BONE LESIONS ON XRAY: ICD-10-CM

## 2024-08-23 DIAGNOSIS — C79.51 CARCINOMA OF RIGHT BREAST METASTATIC TO BONE (MULTI): ICD-10-CM

## 2024-08-23 DIAGNOSIS — E78.2 MIXED HYPERLIPIDEMIA: ICD-10-CM

## 2024-08-23 DIAGNOSIS — M19.90 ARTHRITIS: ICD-10-CM

## 2024-08-23 DIAGNOSIS — C50.911 CARCINOMA OF RIGHT BREAST METASTATIC TO BONE (MULTI): ICD-10-CM

## 2024-08-23 DIAGNOSIS — H40.9 GLAUCOMA OF BOTH EYES, UNSPECIFIED GLAUCOMA TYPE: ICD-10-CM

## 2024-08-23 PROCEDURE — 2500000004 HC RX 250 GENERAL PHARMACY W/ HCPCS (ALT 636 FOR OP/ED): Mod: JZ | Performed by: INTERNAL MEDICINE

## 2024-08-23 PROCEDURE — 99214 OFFICE O/P EST MOD 30 MIN: CPT | Performed by: INTERNAL MEDICINE

## 2024-08-23 PROCEDURE — 1126F AMNT PAIN NOTED NONE PRSNT: CPT | Performed by: INTERNAL MEDICINE

## 2024-08-23 PROCEDURE — 3078F DIAST BP <80 MM HG: CPT | Performed by: INTERNAL MEDICINE

## 2024-08-23 PROCEDURE — 99214 OFFICE O/P EST MOD 30 MIN: CPT | Mod: 25 | Performed by: INTERNAL MEDICINE

## 2024-08-23 PROCEDURE — 1159F MED LIST DOCD IN RCRD: CPT | Performed by: INTERNAL MEDICINE

## 2024-08-23 PROCEDURE — 96372 THER/PROPH/DIAG INJ SC/IM: CPT

## 2024-08-23 PROCEDURE — 3075F SYST BP GE 130 - 139MM HG: CPT | Performed by: INTERNAL MEDICINE

## 2024-08-23 RX ORDER — EPINEPHRINE 0.3 MG/.3ML
0.3 INJECTION SUBCUTANEOUS EVERY 5 MIN PRN
OUTPATIENT
Start: 2024-09-20

## 2024-08-23 RX ORDER — ALBUTEROL SULFATE 0.83 MG/ML
3 SOLUTION RESPIRATORY (INHALATION) AS NEEDED
OUTPATIENT
Start: 2024-09-20

## 2024-08-23 RX ORDER — DIPHENHYDRAMINE HYDROCHLORIDE 50 MG/ML
50 INJECTION INTRAMUSCULAR; INTRAVENOUS AS NEEDED
OUTPATIENT
Start: 2024-09-20

## 2024-08-23 RX ORDER — FAMOTIDINE 10 MG/ML
20 INJECTION INTRAVENOUS ONCE AS NEEDED
OUTPATIENT
Start: 2024-09-20

## 2024-08-23 ASSESSMENT — PAIN SCALES - GENERAL: PAINLEVEL: 0-NO PAIN

## 2024-08-23 NOTE — PROGRESS NOTES
Patient ID: Arabella Springer is a 83 y.o. female.  Referring Physician: Andrew Mcfarland MD  [id number]  [place] [address]  Primary Care Provider: Angel Luis Velazquez,   Visit Type: Follow Up      Subjective    HPI What should I do about this fluid around my lungs?    Review of Systems   Constitutional: Negative.    HENT:  Negative.     Eyes: Negative.    Respiratory:  Positive for shortness of breath.    Cardiovascular:  Positive for leg swelling.   Gastrointestinal: Negative.    Endocrine: Negative.    Genitourinary: Negative.     Musculoskeletal: Negative.    Neurological: Negative.    Hematological: Negative.    Psychiatric/Behavioral: Negative.          Objective   BSA: 1.81 meters squared  /69 (BP Location: Right arm, Patient Position: Sitting, BP Cuff Size: Adult)   Pulse 78   Temp 36.3 °C (97.3 °F) (Temporal)   Resp 16   Wt 76.5 kg (168 lb 10.4 oz)   SpO2 93%   BMI 32.21 kg/m²      has no past medical history on file.   has no past surgical history on file.  Family History   Problem Relation Name Age of Onset    Colon cancer Sister      Breast cancer Daughter       Oncology History   Breast cancer metastasized to bone (Multi)   9/8/2023 - 11/10/2023 Chemotherapy    Pembrolizumab, 21 Day Cycles     9/29/2023 Initial Diagnosis    Breast cancer metastasized to bone (CMS/HCC)         Arabella Springer  reports that she has never smoked. She has never used smokeless tobacco.  She  reports no history of alcohol use.  She  reports no history of drug use.    Physical Exam  Vitals reviewed.   Constitutional:       Appearance: Normal appearance.   HENT:      Head: Normocephalic.      Mouth/Throat:      Mouth: Mucous membranes are moist.   Eyes:      Extraocular Movements: Extraocular movements intact.      Pupils: Pupils are equal, round, and reactive to light.   Cardiovascular:      Rate and Rhythm: Normal rate and regular rhythm.      Pulses: Normal pulses.      Heart sounds: Normal heart  "sounds.   Pulmonary:      Effort: Pulmonary effort is normal.      Breath sounds: Normal breath sounds.      Comments: Decreased breath sounds at bases  Abdominal:      General: Bowel sounds are normal.      Palpations: Abdomen is soft.   Musculoskeletal:         General: Normal range of motion.      Cervical back: Normal range of motion and neck supple.   Skin:     General: Skin is warm.   Neurological:      General: No focal deficit present.      Mental Status: She is alert and oriented to person, place, and time.   Psychiatric:         Mood and Affect: Mood normal.         Behavior: Behavior normal.         WBC   Date/Time Value Ref Range Status   08/22/2024 11:28 AM 3.9 (L) 4.4 - 11.3 x10*3/uL Final   07/24/2024 09:57 AM 4.0 (L) 4.4 - 11.3 x10*3/uL Final   06/19/2024 11:55 AM 4.2 (L) 4.4 - 11.3 x10*3/uL Final     No results found for: \"NRBC\"  RBC   Date Value Ref Range Status   08/22/2024 2.97 (L) 4.00 - 5.20 x10*6/uL Final   07/24/2024 2.92 (L) 4.00 - 5.20 x10*6/uL Final   06/19/2024 3.10 (L) 4.00 - 5.20 x10*6/uL Final     Hemoglobin   Date Value Ref Range Status   08/22/2024 9.3 (L) 12.0 - 16.0 g/dL Final   07/24/2024 9.0 (L) 12.0 - 16.0 g/dL Final   06/19/2024 9.5 (L) 12.0 - 16.0 g/dL Final     Hematocrit   Date Value Ref Range Status   08/22/2024 28.4 (L) 36.0 - 46.0 % Final   07/24/2024 27.8 (L) 36.0 - 46.0 % Final   06/19/2024 29.4 (L) 36.0 - 46.0 % Final     MCV   Date/Time Value Ref Range Status   08/22/2024 11:28 AM 96 80 - 100 fL Final   07/24/2024 09:57 AM 95 80 - 100 fL Final   06/19/2024 11:55 AM 95 80 - 100 fL Final     MCH   Date/Time Value Ref Range Status   08/22/2024 11:28 AM 31.3 26.0 - 34.0 pg Final   07/24/2024 09:57 AM 30.8 26.0 - 34.0 pg Final   06/19/2024 11:55 AM 30.6 26.0 - 34.0 pg Final     MCHC   Date/Time Value Ref Range Status   08/22/2024 11:28 AM 32.7 32.0 - 36.0 g/dL Final   07/24/2024 09:57 AM 32.4 32.0 - 36.0 g/dL Final   06/19/2024 11:55 AM 32.3 32.0 - 36.0 g/dL Final     RDW "   Date/Time Value Ref Range Status   08/22/2024 11:28 AM 14.4 11.5 - 14.5 % Final   07/24/2024 09:57 AM 15.2 (H) 11.5 - 14.5 % Final   06/19/2024 11:55 AM 15.7 (H) 11.5 - 14.5 % Final     Platelets   Date/Time Value Ref Range Status   08/22/2024 11:28  150 - 450 x10*3/uL Final   07/24/2024 09:57  150 - 450 x10*3/uL Final   06/19/2024 11:55  150 - 450 x10*3/uL Final     MPV   Date/Time Value Ref Range Status   10/19/2023 08:01 AM 9.2 7.5 - 11.5 fL Final     Neutrophils %   Date/Time Value Ref Range Status   08/22/2024 11:28 AM 59.2 40.0 - 80.0 % Final   07/24/2024 09:57 AM 56.5 40.0 - 80.0 % Final   06/19/2024 11:55 AM 50.3 40.0 - 80.0 % Final     Immature Granulocytes %, Automated   Date/Time Value Ref Range Status   08/22/2024 11:28 AM 1.0 (H) 0.0 - 0.9 % Final     Comment:     Immature Granulocyte Count (IG) includes promyelocytes, myelocytes and metamyelocytes but does not include bands. Percent differential counts (%) should be interpreted in the context of the absolute cell counts (cells/UL).   07/24/2024 09:57 AM 0.2 0.0 - 0.9 % Final     Comment:     Immature Granulocyte Count (IG) includes promyelocytes, myelocytes and metamyelocytes but does not include bands. Percent differential counts (%) should be interpreted in the context of the absolute cell counts (cells/UL).   06/19/2024 11:55 AM 1.2 (H) 0.0 - 0.9 % Final     Comment:     Immature Granulocyte Count (IG) includes promyelocytes, myelocytes and metamyelocytes but does not include bands. Percent differential counts (%) should be interpreted in the context of the absolute cell counts (cells/UL).     Lymphocytes %   Date/Time Value Ref Range Status   08/22/2024 11:28 AM 24.4 13.0 - 44.0 % Final   07/24/2024 09:57 AM 28.9 13.0 - 44.0 % Final   06/19/2024 11:55 AM 34.9 13.0 - 44.0 % Final     Monocytes %   Date/Time Value Ref Range Status   08/22/2024 11:28 AM 12.1 2.0 - 10.0 % Final   07/24/2024 09:57 AM 11.7 2.0 - 10.0 % Final    06/19/2024 11:55 AM 10.4 2.0 - 10.0 % Final     Eosinophils %   Date/Time Value Ref Range Status   08/22/2024 11:28 AM 2.8 0.0 - 6.0 % Final   07/24/2024 09:57 AM 2.5 0.0 - 6.0 % Final   06/19/2024 11:55 AM 2.2 0.0 - 6.0 % Final     Basophils %   Date/Time Value Ref Range Status   08/22/2024 11:28 AM 0.5 0.0 - 2.0 % Final   07/24/2024 09:57 AM 0.2 0.0 - 2.0 % Final   06/19/2024 11:55 AM 1.0 0.0 - 2.0 % Final     Neutrophils Absolute   Date/Time Value Ref Range Status   08/22/2024 11:28 AM 2.31 1.60 - 5.50 x10*3/uL Final     Comment:     Percent differential counts (%) should be interpreted in the context of the absolute cell counts (cells/uL).   07/24/2024 09:57 AM 2.27 1.60 - 5.50 x10*3/uL Final     Comment:     Percent differential counts (%) should be interpreted in the context of the absolute cell counts (cells/uL).   06/19/2024 11:55 AM 2.09 1.60 - 5.50 x10*3/uL Final     Comment:     Percent differential counts (%) should be interpreted in the context of the absolute cell counts (cells/uL).     Immature Granulocytes Absolute, Automated   Date/Time Value Ref Range Status   08/22/2024 11:28 AM 0.04 0.00 - 0.50 x10*3/uL Final   07/24/2024 09:57 AM 0.01 0.00 - 0.50 x10*3/uL Final   06/19/2024 11:55 AM 0.05 0.00 - 0.50 x10*3/uL Final     Lymphocytes Absolute   Date/Time Value Ref Range Status   08/22/2024 11:28 AM 0.95 0.80 - 3.00 x10*3/uL Final   07/24/2024 09:57 AM 1.16 0.80 - 3.00 x10*3/uL Final   06/19/2024 11:55 AM 1.45 0.80 - 3.00 x10*3/uL Final     Monocytes Absolute   Date/Time Value Ref Range Status   08/22/2024 11:28 AM 0.47 0.05 - 0.80 x10*3/uL Final   07/24/2024 09:57 AM 0.47 0.05 - 0.80 x10*3/uL Final   06/19/2024 11:55 AM 0.43 0.05 - 0.80 x10*3/uL Final     Eosinophils Absolute   Date/Time Value Ref Range Status   08/22/2024 11:28 AM 0.11 0.00 - 0.40 x10*3/uL Final   07/24/2024 09:57 AM 0.10 0.00 - 0.40 x10*3/uL Final   06/19/2024 11:55 AM 0.09 0.00 - 0.40 x10*3/uL Final     Basophils Absolute  "  Date/Time Value Ref Range Status   08/22/2024 11:28 AM 0.02 0.00 - 0.10 x10*3/uL Final   07/24/2024 09:57 AM 0.01 0.00 - 0.10 x10*3/uL Final   06/19/2024 11:55 AM 0.04 0.00 - 0.10 x10*3/uL Final       No components found for: \"PT\"  No results found for: \"APTT\"  Medication Documentation Review Audit       Reviewed by Akosua Candelario MA (Medical Assistant) on 08/23/24 at 0945      Medication Order Taking? Sig Documenting Provider Last Dose Status   albuterol (ProAir HFA) 90 mcg/actuation inhaler [id number] Yes Inhale 2 puffs every 4 hours if needed for wheezing or shortness of breath. Naina Pisano, DO Taking Active   amLODIPine (Norvasc) 2.5 mg tablet [id number] Yes Take 1 tablet (2.5 mg) by mouth once daily. Abraham Mohan, DO Taking Active   brimonidine-timoloL (Combigan) 0.2-0.5 % ophthalmic solution [id number] Yes Administer 1 drop into both eyes every 12 hours. Historical Provider, MD Taking Active   busPIRone (Buspar) 5 mg tablet [id number] Yes Take 1 tablet (5 mg) by mouth 2 times a day. Angel Luis Velazquez, DO Taking Active   calcium carbonate-vitamin D3 500 mg-5 mcg (200 unit) tablet [id number] Yes Take 3 tablets by mouth 2 times a day. Historical Provider, MD Taking Active   denosumab (Xgeva) 120 mg/1.7 mL (70 mg/mL) injection [id number] Yes Inject 1.7 mL (120 mg) under the skin 1 time. Historical Provider, MD Taking Active   diclofenac (Voltaren) 50 mg EC tablet [id number] Yes TAKE 1 TABLET(50 MG) BY MOUTH TWICE DAILY WITH MEALS DEX Cheng Taking Active   esomeprazole (NexIUM) 40 mg DR capsule [id number] Yes Take 1 capsule (40 mg) by mouth once daily in the morning. Take before meals. Historical Provider, MD Taking Active   furosemide (Lasix) 20 mg tablet [id number] Yes Take 1 tablet (20 mg) by mouth once daily. Historical Provider, MD Taking Active   inhalat.spacing dev,large mask (Pro Comfort Spacer-Adult Mask) spacer [id number] Yes 1 each every 4 hours if needed (shortness of breath, wheezing, " cough). Naina Pisano, DO Taking Active   letrozole (Femara) 2.5 mg tablet [id number] Yes Take 1 tablet (2.5 mg total) by mouth once daily.  Take with or without food. Andrew Mcfarland MD Taking Active   LORazepam (Ativan) 0.5 mg tablet [id number] Yes Take 1 tablet (0.5 mg) by mouth every 2 hours if needed for anxiety (anxiety related to radiology tests) for up to 5 doses. Andrew Mcfarland MD Taking Active   losartan-hydrochlorothiazide (Hyzaar) 100-12.5 mg tablet [id number] Yes Take 1 tablet by mouth once daily. Abraham Mohan, DO Taking Active   multivitamin tablet [id number] Yes Take 1 tablet by mouth once daily. Historical Provider, MD Taking Active   pravastatin (Pravachol) 10 mg tablet [id number] Yes Take 1 tablet (10 mg) by mouth once daily. Abraham Mohan, DO Taking Active   Rhopressa 0.02 % drops opthalmic solution [id number] Yes INSTILL 1 DROP IN RIGHT EYE DAILY AT BEDTIME Historical Provider, MD Taking Active   traMADol (Ultram) 50 mg tablet [id number] Yes TAKE ONE TABLET BY MOUTH EVERY 8 HOURS AS NEEDED FOR MODERATE TO SEVERE PAIN Historical Provider, MD Taking Active                   Assessment/Plan    1) stage IV breast cancer  -has high TMB  -has BRCA1  variant of uncertain significance  -also has PIK3CA mutation  -pembrolizumab has been on hold since she developed a diffuse body rash, course of prednisone did not help, in fact a week after finishing prednisone, the rash got worse--papules have become confluent red/pink areas  -she did see her dermatologist (Dr Wolff)--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, and she did have a punch biopsy done--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  -now off steroids  -CT scan done on 4/3/2024 reviewed: interval worsening of bilateral pleural effusions, right >left; multiple scattered <0.5 mm pulmonary nodules bilateral lungs not changed; interval development of several branching opacities involving left lung apex;  there are no new worrisome hepatic lesions; mild right sided hydronephrosis; diffuse sclerosis of axial and appendicular skeleton  -has been more short of breath recently--was evaluated in the ER at [facility] on 8/4--was told she had bilateral pleural effusions, which are not actually new  -right sided pleural effusion is already seen on PET scan done in 8/2023  -she had an echo done in June which was read as normal, [facility] did not perform a new one, cardiologist on 8/21 told the patient that she did not have CHF and that these effusions were likely malignant; she was started on low dose lasix which barely helped with the leg edema--and so dose was bumped up; she was recommended by pulmonology consult to undergo thoracentesis and she declined to have it done  -since effusions are not new, and actually were already noted over one year ago, a normal echo does NOT completely rule out well-compensated CHF  -PET done on 8/16/2024 reviewed--no concerning pulmonary nodule; moderate-to-large bilateral pleura effusions, right greater than left; no FDG avid mediastinal, hilar or axillary lymphadenopathy; s/p right axillary ike dissection; s/p right mastectomy and reconstruction; no FDG avid lymphadenopathy in the abdomen and pelvis; bilateral hydronephrosis; interval improvement of FDG avid widespread bone metastases throughout the axial and appendicular skeleton many of which are non FDG avid ; residual disease noted in bilateral femur (SUV 3.6), pelvis (SUV 2.4), bilateral humerus (SUV 3.3), bilateral clavicles (SUV 2/9)  -there is no FDG activity in her thorax/pleura, making the effusions less likely to be of malignant origin, and given bilateral nature--more likely cardiac in origin  -performing thoracentesis, especially on the right, has high likelihood of improving her symptoms of dyspnea, and at the same time, fluid can be sent to pathology, however, she does not want to have this done, not even as outpatient  --labs done on  8/22/2024 included CBC, COMP, CA 27.29  -results reviewed--wbc 3.9, hgb 9.3 plt 218,000, creatinine 1.02, calcium 8.9, albumin 4.0 AST 18, ALT 12  CA 27.29 377  -advised her to continue letrozole 2.5 mg daily   -benefits, risks, potential morbidity related to xgeva were reviewed with Arabella and she provided informed consent to proceed  -she went on to receive xgeva 120 mg subcutaneous     2) bone metastases  -secondary to metastatic breast cancer  -receives xgeva Q4 weeks     3) anemia  -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  -hgb slowly improving     4) hyperlipidemia  -on pravastatin     5) arthritis  -on tylenol PRN  -on diclofenac PRN  -received steroid injections into her knees and she feels so much better, so she wants to receive them again     6) hypertension  -on amlodipine  -on losartan-HCTZ     7) GERD  -on nexium     8) glaucoma  -on combigan eyedrops        Problem List Items Addressed This Visit             ICD-10-CM    Breast cancer metastasized to bone (Multi) C50.919, C79.51    Relevant Orders    Clinic Appointment Request Chemo Follow Up; ANDREW MCFARLAND; [facility]            Andrew Mcfarland MD

## 2024-08-23 NOTE — PATIENT INSTRUCTIONS
Continue taking the letrozole    You will continue with monthly xgeva    You wish to think about thoracentesis (and if it were to be done--I would favor doing it on the right side by [facility] = interventional radiology)    See you again on 9/20

## 2024-08-25 ASSESSMENT — ENCOUNTER SYMPTOMS
PSYCHIATRIC NEGATIVE: 1
HEMATOLOGIC/LYMPHATIC NEGATIVE: 1
NEUROLOGICAL NEGATIVE: 1
LEG SWELLING: 1
MUSCULOSKELETAL NEGATIVE: 1
ENDOCRINE NEGATIVE: 1
CONSTITUTIONAL NEGATIVE: 1
EYES NEGATIVE: 1
SHORTNESS OF BREATH: 1
GASTROINTESTINAL NEGATIVE: 1

## 2024-08-30 ENCOUNTER — APPOINTMENT (OUTPATIENT)
Dept: OTOLARYNGOLOGY | Facility: CLINIC | Age: 84
End: 2024-08-30
Payer: MEDICARE

## 2024-08-30 DIAGNOSIS — H60.8X3 OTHER NONINFECTIOUS CHRONIC OTITIS EXTERNA OF BOTH EARS: Primary | ICD-10-CM

## 2024-08-30 PROCEDURE — 1160F RVW MEDS BY RX/DR IN RCRD: CPT | Performed by: OTOLARYNGOLOGY

## 2024-08-30 PROCEDURE — 99213 OFFICE O/P EST LOW 20 MIN: CPT | Performed by: OTOLARYNGOLOGY

## 2024-08-30 PROCEDURE — 1159F MED LIST DOCD IN RCRD: CPT | Performed by: OTOLARYNGOLOGY

## 2024-08-30 RX ORDER — TOBRAMYCIN AND DEXAMETHASONE 3; 1 MG/ML; MG/ML
1 SUSPENSION/ DROPS OPHTHALMIC
Qty: 10 ML | Refills: 1 | Status: SHIPPED | OUTPATIENT
Start: 2024-08-30 | End: 2024-09-09

## 2024-08-30 NOTE — PROGRESS NOTES
Arabella Springer is a 83 y.o. year old female patient with 3 WEEK FU ON EARS       Patient presents to the office today for assessment of her ears.  Patient with chronic otitis externa.  Patient reports symptoms began after taking chemotherapy drug.  She states she noted changes to the hair and skin and that is when her ear problems seem to develop.  She is here today for follow-up.      Physical Exam:   General appearance: No acute distress. Normal facies. Symmetric facial movement. No gross lesions of the face are noted.  The external ear structures appear normal.  Patient with significant chronic otitis externa with dry scaling skin and weeping to the canal skin.  This involves the external ear at the auricle and conchal bowl as well.  Ear was debrided under the otomicroscope with suction and alligator forceps.  Anterior rhinoscopy notes essentially a midline nasal septum. Examination is noted for normal healthy mucosal membranes without any evidence of lesions, polyps, or exudate. The tongue is normally mobile. There are no lesions on the gingiva, buccal, or oral mucosa. There are no oral cavity masses.  The neck is negative for mass lymphadenopathy. The trachea and parotid are clear. The thyroid bed is grossly unremarkable. The salivary gland structures are grossly unremarkable.    Assessment/Plan   1.  Chronic otitis externa    Patient with chronic otitis externa bilateral ears.  Patient to utilize TobraDex drops and see me back in the next few weeks for follow-up.

## 2024-09-13 ENCOUNTER — APPOINTMENT (OUTPATIENT)
Dept: OTOLARYNGOLOGY | Facility: CLINIC | Age: 84
End: 2024-09-13
Payer: MEDICARE

## 2024-09-13 DIAGNOSIS — H60.8X3 OTHER NONINFECTIOUS CHRONIC OTITIS EXTERNA OF BOTH EARS: Primary | ICD-10-CM

## 2024-09-13 DIAGNOSIS — H65.03 NON-RECURRENT ACUTE SEROUS OTITIS MEDIA OF BOTH EARS: ICD-10-CM

## 2024-09-13 PROCEDURE — 1159F MED LIST DOCD IN RCRD: CPT | Performed by: OTOLARYNGOLOGY

## 2024-09-13 PROCEDURE — 1160F RVW MEDS BY RX/DR IN RCRD: CPT | Performed by: OTOLARYNGOLOGY

## 2024-09-13 PROCEDURE — 99212 OFFICE O/P EST SF 10 MIN: CPT | Performed by: OTOLARYNGOLOGY

## 2024-09-13 NOTE — PROGRESS NOTES
Patient returns.  Seeing her back today recheck on her ears.  She is doing better overall.  She has noted significant improvement in the ears both the canal and the middle ear space.  Hearing is much improved.  Remaining head neck inquiry is otherwise unremarkable.  There have been no significant changes in past medical or past surgical histories except as mentioned.    Exam:  No acute distress.  The external ear structures appear normal. The ear canals patent and the tympanic membranes are intact without evidence of air-fluid levels, retraction, or congenital defects.  Anterior rhinoscopy notes essentially a midline nasal septum. Examination is noted for normal healthy mucosal membranes without any evidence of lesions, polyps, or exudate. The tongue is normally mobile. There are no lesions on the gingiva, buccal, or oral mucosa. There are no oral cavity masses.  The neck is negative for mass lymphadenopathy. The trachea and parotid are clear. The thyroid bed is grossly unremarkable. The salivary gland structures are grossly unremarkable.    Assessment and plan:   Significant improvement in otitis externa/media.  Favor observation.  Follow back up with me with any worrisome issues.  All questions were answered in this regard accordingly.

## 2024-09-18 ENCOUNTER — NUTRITION (OUTPATIENT)
Dept: HEMATOLOGY/ONCOLOGY | Facility: CLINIC | Age: 84
End: 2024-09-18
Payer: MEDICARE

## 2024-09-18 VITALS — BODY MASS INDEX: 31.84 KG/M2 | HEIGHT: 61 IN | WEIGHT: 168.65 LBS

## 2024-09-18 NOTE — PROGRESS NOTES
"NUTRITION Assessment NOTE    Nutrition Assessment     Reason for Visit:  Arabella Springer is a 83 y.o. female with metastatic breast cancer, currently receiving Xgeva and is on Letrozole.     Called patient at number listed in chart, but unable to reach her at this time. I did leave a message with my contact information, along with the contact information for Twila Phoebe ([phone]), who will be covering my maternity leave.     This service will follow PRN per nutrition consult or patient phone call.     Lab Results   Component Value Date/Time    GLUCOSE 113 (H) 08/22/2024 1128     (L) 08/22/2024 1128    K 3.9 08/22/2024 1128    CL 95 (L) 08/22/2024 1128    CO2 29 08/22/2024 1128    ANIONGAP 13 08/22/2024 1128    BUN 21 08/22/2024 1128    CREATININE 1.02 08/22/2024 1128    EGFR 55 (L) 08/22/2024 1128    CALCIUM 8.9 08/22/2024 1128    ALBUMIN 4.0 08/22/2024 1128    ALKPHOS 60 08/22/2024 1128    PROT 6.1 (L) 08/22/2024 1128    AST 18 08/22/2024 1128    BILITOT 0.3 08/22/2024 1128    ALT 12 08/22/2024 1128     Anthropometrics:  Anthropometrics  Height: 154.5 cm (5' 0.83\")  Weight: 76.5 kg (168 lb 10.4 oz)  BMI (Calculated): 32.05  IBW/kg (Dietitian Calculated): 47.3 kg  Percent of IBW: 162 %  Adjusted Body Weight (kg): 54.6 kg    Wt Readings from Last 10 Encounters:   09/18/24 76.5 kg (168 lb 10.4 oz)   08/23/24 76.5 kg (168 lb 10.4 oz)   07/30/24 77.6 kg (171 lb)   07/26/24 77.4 kg (170 lb 10.2 oz)   07/03/24 76.8 kg (169 lb 6 oz)   06/21/24 75.9 kg (167 lb 5.3 oz)   05/31/24 76.2 kg (168 lb)   05/31/24 76.5 kg (168 lb 10.4 oz)   05/12/24 74.8 kg (164 lb 14.5 oz)   05/03/24 77.7 kg (171 lb 4.8 oz)        Food And Nutrient Intake:                                                                 Nutrition Focused Physical Exam Findings:                          Energy Needs  Calculated Energy Needs Using Equations  Height: 154.5 cm (5' 0.83\")        Nutrition Diagnosis             Nutrition " Interventions/Recommendations   Nutrition Prescription       Food and Nutrition Delivery       Nutrition Education       Coordination of Care       There are no Patient Instructions on file for this visit.    Nutrition Monitoring and Evaluation

## 2024-09-19 ENCOUNTER — LAB (OUTPATIENT)
Dept: LAB | Facility: CLINIC | Age: 84
End: 2024-09-19
Payer: MEDICARE

## 2024-09-19 DIAGNOSIS — C50.911 CARCINOMA OF RIGHT BREAST METASTATIC TO BONE (MULTI): ICD-10-CM

## 2024-09-19 DIAGNOSIS — M89.9 LYTIC BONE LESIONS ON XRAY: ICD-10-CM

## 2024-09-19 DIAGNOSIS — C79.51 CARCINOMA OF RIGHT BREAST METASTATIC TO BONE (MULTI): ICD-10-CM

## 2024-09-19 LAB
ALBUMIN SERPL BCP-MCNC: 3.9 G/DL (ref 3.4–5)
ALP SERPL-CCNC: 55 U/L (ref 33–136)
ALT SERPL W P-5'-P-CCNC: 13 U/L (ref 7–45)
ANION GAP SERPL CALC-SCNC: 12 MMOL/L (ref 10–20)
AST SERPL W P-5'-P-CCNC: 19 U/L (ref 9–39)
BASOPHILS # BLD AUTO: 0.03 X10*3/UL (ref 0–0.1)
BASOPHILS NFR BLD AUTO: 0.7 %
BILIRUB SERPL-MCNC: 0.4 MG/DL (ref 0–1.2)
BUN SERPL-MCNC: 20 MG/DL (ref 6–23)
CALCIUM SERPL-MCNC: 8.7 MG/DL (ref 8.6–10.3)
CANCER AG27-29 SERPL-ACNC: 408 U/ML (ref 0–38.6)
CHLORIDE SERPL-SCNC: 98 MMOL/L (ref 98–107)
CO2 SERPL-SCNC: 28 MMOL/L (ref 21–32)
CREAT SERPL-MCNC: 0.95 MG/DL (ref 0.5–1.05)
EGFRCR SERPLBLD CKD-EPI 2021: 60 ML/MIN/1.73M*2
EOSINOPHIL # BLD AUTO: 0.2 X10*3/UL (ref 0–0.4)
EOSINOPHIL NFR BLD AUTO: 4.7 %
ERYTHROCYTE [DISTWIDTH] IN BLOOD BY AUTOMATED COUNT: 13.9 % (ref 11.5–14.5)
GLUCOSE SERPL-MCNC: 122 MG/DL (ref 74–99)
HCT VFR BLD AUTO: 28.5 % (ref 36–46)
HGB BLD-MCNC: 9.1 G/DL (ref 12–16)
IMM GRANULOCYTES # BLD AUTO: 0.07 X10*3/UL (ref 0–0.5)
IMM GRANULOCYTES NFR BLD AUTO: 1.6 % (ref 0–0.9)
LYMPHOCYTES # BLD AUTO: 1.38 X10*3/UL (ref 0.8–3)
LYMPHOCYTES NFR BLD AUTO: 32.2 %
MCH RBC QN AUTO: 31 PG (ref 26–34)
MCHC RBC AUTO-ENTMCNC: 31.9 G/DL (ref 32–36)
MCV RBC AUTO: 97 FL (ref 80–100)
MONOCYTES # BLD AUTO: 0.4 X10*3/UL (ref 0.05–0.8)
MONOCYTES NFR BLD AUTO: 9.3 %
NEUTROPHILS # BLD AUTO: 2.21 X10*3/UL (ref 1.6–5.5)
NEUTROPHILS NFR BLD AUTO: 51.5 %
PLATELET # BLD AUTO: 187 X10*3/UL (ref 150–450)
POTASSIUM SERPL-SCNC: 4.3 MMOL/L (ref 3.5–5.3)
PROT SERPL-MCNC: 6.1 G/DL (ref 6.4–8.2)
RBC # BLD AUTO: 2.94 X10*6/UL (ref 4–5.2)
SODIUM SERPL-SCNC: 134 MMOL/L (ref 136–145)
WBC # BLD AUTO: 4.3 X10*3/UL (ref 4.4–11.3)

## 2024-09-19 PROCEDURE — 85025 COMPLETE CBC W/AUTO DIFF WBC: CPT

## 2024-09-19 PROCEDURE — 36415 COLL VENOUS BLD VENIPUNCTURE: CPT

## 2024-09-19 PROCEDURE — 84075 ASSAY ALKALINE PHOSPHATASE: CPT

## 2024-09-19 PROCEDURE — 86300 IMMUNOASSAY TUMOR CA 15-3: CPT

## 2024-09-20 ENCOUNTER — OFFICE VISIT (OUTPATIENT)
Dept: HEMATOLOGY/ONCOLOGY | Facility: CLINIC | Age: 84
End: 2024-09-20
Payer: MEDICARE

## 2024-09-20 ENCOUNTER — INFUSION (OUTPATIENT)
Dept: HEMATOLOGY/ONCOLOGY | Facility: CLINIC | Age: 84
End: 2024-09-20
Payer: MEDICARE

## 2024-09-20 VITALS
DIASTOLIC BLOOD PRESSURE: 75 MMHG | RESPIRATION RATE: 16 BRPM | OXYGEN SATURATION: 92 % | TEMPERATURE: 96.8 F | HEART RATE: 86 BPM | BODY MASS INDEX: 32.17 KG/M2 | WEIGHT: 169.31 LBS | SYSTOLIC BLOOD PRESSURE: 163 MMHG

## 2024-09-20 DIAGNOSIS — C50.911 CARCINOMA OF RIGHT BREAST METASTATIC TO BONE (MULTI): ICD-10-CM

## 2024-09-20 DIAGNOSIS — E78.2 MIXED HYPERLIPIDEMIA: ICD-10-CM

## 2024-09-20 DIAGNOSIS — I10 ESSENTIAL HYPERTENSION: ICD-10-CM

## 2024-09-20 DIAGNOSIS — D61.82 MYELOPHTHISIC ANEMIA (MULTI): ICD-10-CM

## 2024-09-20 DIAGNOSIS — C79.51 CARCINOMA OF BREAST METASTATIC TO BONE, UNSPECIFIED LATERALITY (MULTI): Primary | ICD-10-CM

## 2024-09-20 DIAGNOSIS — C79.51 CARCINOMA OF RIGHT BREAST METASTATIC TO BONE (MULTI): ICD-10-CM

## 2024-09-20 DIAGNOSIS — C50.919 CARCINOMA OF BREAST METASTATIC TO BONE, UNSPECIFIED LATERALITY (MULTI): Primary | ICD-10-CM

## 2024-09-20 DIAGNOSIS — M19.90 ARTHRITIS: ICD-10-CM

## 2024-09-20 DIAGNOSIS — K21.00 GASTROESOPHAGEAL REFLUX DISEASE WITH ESOPHAGITIS WITHOUT HEMORRHAGE: ICD-10-CM

## 2024-09-20 DIAGNOSIS — R07.9 CHEST PAIN, UNSPECIFIED TYPE: ICD-10-CM

## 2024-09-20 DIAGNOSIS — M89.9 LYTIC BONE LESIONS ON XRAY: ICD-10-CM

## 2024-09-20 DIAGNOSIS — H40.9 GLAUCOMA OF BOTH EYES, UNSPECIFIED GLAUCOMA TYPE: ICD-10-CM

## 2024-09-20 DIAGNOSIS — J90 PLEURAL EFFUSION ON RIGHT: ICD-10-CM

## 2024-09-20 PROCEDURE — 99214 OFFICE O/P EST MOD 30 MIN: CPT | Performed by: INTERNAL MEDICINE

## 2024-09-20 PROCEDURE — 96372 THER/PROPH/DIAG INJ SC/IM: CPT

## 2024-09-20 PROCEDURE — 3077F SYST BP >= 140 MM HG: CPT | Performed by: INTERNAL MEDICINE

## 2024-09-20 PROCEDURE — 1159F MED LIST DOCD IN RCRD: CPT | Performed by: INTERNAL MEDICINE

## 2024-09-20 PROCEDURE — 1126F AMNT PAIN NOTED NONE PRSNT: CPT | Performed by: INTERNAL MEDICINE

## 2024-09-20 PROCEDURE — 2500000004 HC RX 250 GENERAL PHARMACY W/ HCPCS (ALT 636 FOR OP/ED): Mod: JZ | Performed by: INTERNAL MEDICINE

## 2024-09-20 PROCEDURE — 3078F DIAST BP <80 MM HG: CPT | Performed by: INTERNAL MEDICINE

## 2024-09-20 RX ORDER — DIPHENHYDRAMINE HYDROCHLORIDE 50 MG/ML
50 INJECTION INTRAMUSCULAR; INTRAVENOUS AS NEEDED
Status: DISCONTINUED | OUTPATIENT
Start: 2024-09-20 | End: 2024-09-23 | Stop reason: HOSPADM

## 2024-09-20 RX ORDER — ALBUTEROL SULFATE 0.83 MG/ML
3 SOLUTION RESPIRATORY (INHALATION) AS NEEDED
Status: DISCONTINUED | OUTPATIENT
Start: 2024-09-20 | End: 2024-09-23 | Stop reason: HOSPADM

## 2024-09-20 RX ORDER — EPINEPHRINE 0.3 MG/.3ML
0.3 INJECTION SUBCUTANEOUS EVERY 5 MIN PRN
OUTPATIENT
Start: 2024-10-18

## 2024-09-20 RX ORDER — FAMOTIDINE 10 MG/ML
20 INJECTION INTRAVENOUS ONCE AS NEEDED
OUTPATIENT
Start: 2024-10-18

## 2024-09-20 RX ORDER — EPINEPHRINE 0.3 MG/.3ML
0.3 INJECTION SUBCUTANEOUS EVERY 5 MIN PRN
Status: DISCONTINUED | OUTPATIENT
Start: 2024-09-20 | End: 2024-09-23 | Stop reason: HOSPADM

## 2024-09-20 RX ORDER — DIPHENHYDRAMINE HYDROCHLORIDE 50 MG/ML
50 INJECTION INTRAMUSCULAR; INTRAVENOUS AS NEEDED
OUTPATIENT
Start: 2024-10-18

## 2024-09-20 RX ORDER — FAMOTIDINE 10 MG/ML
20 INJECTION INTRAVENOUS ONCE AS NEEDED
Status: DISCONTINUED | OUTPATIENT
Start: 2024-09-20 | End: 2024-09-23 | Stop reason: HOSPADM

## 2024-09-20 RX ORDER — ALBUTEROL SULFATE 0.83 MG/ML
3 SOLUTION RESPIRATORY (INHALATION) AS NEEDED
OUTPATIENT
Start: 2024-10-18

## 2024-09-20 ASSESSMENT — PAIN SCALES - GENERAL: PAINLEVEL: 0-NO PAIN

## 2024-09-20 NOTE — PATIENT INSTRUCTIONS
Pt received xgeva today without incident. Scheduled to have a thoracentesis next week. Will RTC in 1 month.

## 2024-09-20 NOTE — PROGRESS NOTES
Patient ID: Arabella Springer is a 83 y.o. female.  Referring Physician: Andrew Mcfarland MD  [id number]  [place] [address]  Primary Care Provider: Angel Luis Velazquez DO  Visit Type: Follow Up      Subjective    HPI I am still short of breath    Review of Systems   Constitutional: Negative.    HENT:  Negative.     Eyes: Negative.    Respiratory:  Positive for chest tightness, cough and shortness of breath.    Cardiovascular: Negative.    Gastrointestinal: Negative.    Endocrine: Negative.    Genitourinary: Negative.     Musculoskeletal: Negative.    Skin: Negative.    Neurological: Negative.    Hematological: Negative.    Psychiatric/Behavioral: Negative.          Objective   BSA: 1.82 meters squared  /75 (BP Location: Left arm, Patient Position: Sitting, BP Cuff Size: Adult)   Pulse 86   Temp 36 °C (96.8 °F) (Temporal)   Resp 16   Wt 76.8 kg (169 lb 5 oz)   SpO2 92%   BMI 32.17 kg/m²      has no past medical history on file.   has no past surgical history on file.  Family History   Problem Relation Name Age of Onset    Colon cancer Sister      Breast cancer Daughter       Oncology History   Breast cancer metastasized to bone (Multi)   9/8/2023 - 11/10/2023 Chemotherapy    Pembrolizumab, 21 Day Cycles     9/29/2023 Initial Diagnosis    Breast cancer metastasized to bone (CMS/HCC)         Arabella Springer  reports that she has never smoked. She has never used smokeless tobacco.  She  reports no history of alcohol use.  She  reports no history of drug use.    Physical Exam  Constitutional:       Appearance: Normal appearance.   HENT:      Head: Normocephalic.      Mouth/Throat:      Mouth: Mucous membranes are moist.   Eyes:      Extraocular Movements: Extraocular movements intact.      Pupils: Pupils are equal, round, and reactive to light.   Cardiovascular:      Rate and Rhythm: Normal rate and regular rhythm.      Pulses: Normal pulses.      Heart sounds: Normal heart sounds.   Pulmonary:  "     Comments: Decreased BS senior living up  Abdominal:      General: Bowel sounds are normal.   Musculoskeletal:         General: Normal range of motion.      Cervical back: Normal range of motion and neck supple.   Skin:     General: Skin is warm.   Neurological:      General: No focal deficit present.      Mental Status: She is alert and oriented to person, place, and time.   Psychiatric:         Mood and Affect: Mood normal.         Behavior: Behavior normal.         WBC   Date/Time Value Ref Range Status   09/19/2024 11:07 AM 4.3 (L) 4.4 - 11.3 x10*3/uL Final   08/22/2024 11:28 AM 3.9 (L) 4.4 - 11.3 x10*3/uL Final   07/24/2024 09:57 AM 4.0 (L) 4.4 - 11.3 x10*3/uL Final     No results found for: \"NRBC\"  RBC   Date Value Ref Range Status   09/19/2024 2.94 (L) 4.00 - 5.20 x10*6/uL Final   08/22/2024 2.97 (L) 4.00 - 5.20 x10*6/uL Final   07/24/2024 2.92 (L) 4.00 - 5.20 x10*6/uL Final     Hemoglobin   Date Value Ref Range Status   09/19/2024 9.1 (L) 12.0 - 16.0 g/dL Final   08/22/2024 9.3 (L) 12.0 - 16.0 g/dL Final   07/24/2024 9.0 (L) 12.0 - 16.0 g/dL Final     Hematocrit   Date Value Ref Range Status   09/19/2024 28.5 (L) 36.0 - 46.0 % Final   08/22/2024 28.4 (L) 36.0 - 46.0 % Final   07/24/2024 27.8 (L) 36.0 - 46.0 % Final     MCV   Date/Time Value Ref Range Status   09/19/2024 11:07 AM 97 80 - 100 fL Final   08/22/2024 11:28 AM 96 80 - 100 fL Final   07/24/2024 09:57 AM 95 80 - 100 fL Final     MCH   Date/Time Value Ref Range Status   09/19/2024 11:07 AM 31.0 26.0 - 34.0 pg Final   08/22/2024 11:28 AM 31.3 26.0 - 34.0 pg Final   07/24/2024 09:57 AM 30.8 26.0 - 34.0 pg Final     MCHC   Date/Time Value Ref Range Status   09/19/2024 11:07 AM 31.9 (L) 32.0 - 36.0 g/dL Final   08/22/2024 11:28 AM 32.7 32.0 - 36.0 g/dL Final   07/24/2024 09:57 AM 32.4 32.0 - 36.0 g/dL Final     RDW   Date/Time Value Ref Range Status   09/19/2024 11:07 AM 13.9 11.5 - 14.5 % Final   08/22/2024 11:28 AM 14.4 11.5 - 14.5 % Final   07/24/2024 " 09:57 AM 15.2 (H) 11.5 - 14.5 % Final     Platelets   Date/Time Value Ref Range Status   09/19/2024 11:07  150 - 450 x10*3/uL Final   08/22/2024 11:28  150 - 450 x10*3/uL Final   07/24/2024 09:57  150 - 450 x10*3/uL Final     MPV   Date/Time Value Ref Range Status   10/19/2023 08:01 AM 9.2 7.5 - 11.5 fL Final     Neutrophils %   Date/Time Value Ref Range Status   09/19/2024 11:07 AM 51.5 40.0 - 80.0 % Final   08/22/2024 11:28 AM 59.2 40.0 - 80.0 % Final   07/24/2024 09:57 AM 56.5 40.0 - 80.0 % Final     Immature Granulocytes %, Automated   Date/Time Value Ref Range Status   09/19/2024 11:07 AM 1.6 (H) 0.0 - 0.9 % Final     Comment:     Immature Granulocyte Count (IG) includes promyelocytes, myelocytes and metamyelocytes but does not include bands. Percent differential counts (%) should be interpreted in the context of the absolute cell counts (cells/UL).   08/22/2024 11:28 AM 1.0 (H) 0.0 - 0.9 % Final     Comment:     Immature Granulocyte Count (IG) includes promyelocytes, myelocytes and metamyelocytes but does not include bands. Percent differential counts (%) should be interpreted in the context of the absolute cell counts (cells/UL).   07/24/2024 09:57 AM 0.2 0.0 - 0.9 % Final     Comment:     Immature Granulocyte Count (IG) includes promyelocytes, myelocytes and metamyelocytes but does not include bands. Percent differential counts (%) should be interpreted in the context of the absolute cell counts (cells/UL).     Lymphocytes %   Date/Time Value Ref Range Status   09/19/2024 11:07 AM 32.2 13.0 - 44.0 % Final   08/22/2024 11:28 AM 24.4 13.0 - 44.0 % Final   07/24/2024 09:57 AM 28.9 13.0 - 44.0 % Final     Monocytes %   Date/Time Value Ref Range Status   09/19/2024 11:07 AM 9.3 2.0 - 10.0 % Final   08/22/2024 11:28 AM 12.1 2.0 - 10.0 % Final   07/24/2024 09:57 AM 11.7 2.0 - 10.0 % Final     Eosinophils %   Date/Time Value Ref Range Status   09/19/2024 11:07 AM 4.7 0.0 - 6.0 % Final   08/22/2024  11:28 AM 2.8 0.0 - 6.0 % Final   07/24/2024 09:57 AM 2.5 0.0 - 6.0 % Final     Basophils %   Date/Time Value Ref Range Status   09/19/2024 11:07 AM 0.7 0.0 - 2.0 % Final   08/22/2024 11:28 AM 0.5 0.0 - 2.0 % Final   07/24/2024 09:57 AM 0.2 0.0 - 2.0 % Final     Neutrophils Absolute   Date/Time Value Ref Range Status   09/19/2024 11:07 AM 2.21 1.60 - 5.50 x10*3/uL Final     Comment:     Percent differential counts (%) should be interpreted in the context of the absolute cell counts (cells/uL).   08/22/2024 11:28 AM 2.31 1.60 - 5.50 x10*3/uL Final     Comment:     Percent differential counts (%) should be interpreted in the context of the absolute cell counts (cells/uL).   07/24/2024 09:57 AM 2.27 1.60 - 5.50 x10*3/uL Final     Comment:     Percent differential counts (%) should be interpreted in the context of the absolute cell counts (cells/uL).     Immature Granulocytes Absolute, Automated   Date/Time Value Ref Range Status   09/19/2024 11:07 AM 0.07 0.00 - 0.50 x10*3/uL Final   08/22/2024 11:28 AM 0.04 0.00 - 0.50 x10*3/uL Final   07/24/2024 09:57 AM 0.01 0.00 - 0.50 x10*3/uL Final     Lymphocytes Absolute   Date/Time Value Ref Range Status   09/19/2024 11:07 AM 1.38 0.80 - 3.00 x10*3/uL Final   08/22/2024 11:28 AM 0.95 0.80 - 3.00 x10*3/uL Final   07/24/2024 09:57 AM 1.16 0.80 - 3.00 x10*3/uL Final     Monocytes Absolute   Date/Time Value Ref Range Status   09/19/2024 11:07 AM 0.40 0.05 - 0.80 x10*3/uL Final   08/22/2024 11:28 AM 0.47 0.05 - 0.80 x10*3/uL Final   07/24/2024 09:57 AM 0.47 0.05 - 0.80 x10*3/uL Final     Eosinophils Absolute   Date/Time Value Ref Range Status   09/19/2024 11:07 AM 0.20 0.00 - 0.40 x10*3/uL Final   08/22/2024 11:28 AM 0.11 0.00 - 0.40 x10*3/uL Final   07/24/2024 09:57 AM 0.10 0.00 - 0.40 x10*3/uL Final     Basophils Absolute   Date/Time Value Ref Range Status   09/19/2024 11:07 AM 0.03 0.00 - 0.10 x10*3/uL Final   08/22/2024 11:28 AM 0.02 0.00 - 0.10 x10*3/uL Final   07/24/2024 09:57  "AM 0.01 0.00 - 0.10 x10*3/uL Final       No components found for: \"PT\"  No results found for: \"APTT\"  Medication Documentation Review Audit       Reviewed by Akosua Candelario MA (Medical Assistant) on 09/20/24 at 1306      Medication Order Taking? Sig Documenting Provider Last Dose Status   albuterol (ProAir HFA) 90 mcg/actuation inhaler [id number] Yes Inhale 2 puffs every 4 hours if needed for wheezing or shortness of breath. Naina Pisano, DO Taking Active   amLODIPine (Norvasc) 2.5 mg tablet [id number] Yes Take 1 tablet (2.5 mg) by mouth once daily. Abraham Mohan, DO Taking Active   brimonidine-timoloL (Combigan) 0.2-0.5 % ophthalmic solution [id number] Yes Administer 1 drop into both eyes every 12 hours. Historical Provider, MD Taking Active   busPIRone (Buspar) 5 mg tablet [id number]  Take 1 tablet (5 mg) by mouth 2 times a day. Angel Luis Velazquez, DO  Active   calcium carbonate-vitamin D3 500 mg-5 mcg (200 unit) tablet [id number] Yes Take 3 tablets by mouth 2 times a day. Historical Provider, MD Taking Active   denosumab (Xgeva) 120 mg/1.7 mL (70 mg/mL) injection [id number] Yes Inject 1.7 mL (120 mg) under the skin 1 time. Historical Provider, MD Taking Active   diclofenac (Voltaren) 50 mg EC tablet [id number] Yes TAKE 1 TABLET(50 MG) BY MOUTH TWICE DAILY WITH MEALS ROCHELLE Cheng-CNP Taking Active   esomeprazole (NexIUM) 40 mg DR capsule [id number] Yes Take 1 capsule (40 mg) by mouth once daily in the morning. Take before meals. Historical Provider, MD Taking Active   furosemide (Lasix) 20 mg tablet [id number] Yes Take 1 tablet (20 mg) by mouth once daily. Historical Provider, MD Taking Active   inhalat.spacing dev,large mask (Pro Comfort Spacer-Adult Mask) spacer [id number] Yes 1 each every 4 hours if needed (shortness of breath, wheezing, cough). Naina Pisano, DO Taking Active   letrozole (Femara) 2.5 mg tablet [id number] Yes Take 1 tablet (2.5 mg total) by mouth once daily.  Take with or without food. " Andrew Mcfarland MD Taking Active   LORazepam (Ativan) 0.5 mg tablet [id number] Yes Take 1 tablet (0.5 mg) by mouth every 2 hours if needed for anxiety (anxiety related to radiology tests) for up to 5 doses. Andrew Mcfarland MD Taking Active   losartan-hydrochlorothiazide (Hyzaar) 100-12.5 mg tablet [id number] Yes Take 1 tablet by mouth once daily. Abraham Mohan,  Taking Active   multivitamin tablet [id number] Yes Take 1 tablet by mouth once daily. Historical Provider, MD Taking Active   pravastatin (Pravachol) 10 mg tablet [id number] Yes Take 1 tablet (10 mg) by mouth once daily. Abraham Mohan,  Taking Active   Rhopressa 0.02 % drops opthalmic solution [id number] Yes INSTILL 1 DROP IN RIGHT EYE DAILY AT BEDTIME Historical Provider, MD Taking Active   traMADol (Ultram) 50 mg tablet [id number] Yes TAKE ONE TABLET BY MOUTH EVERY 8 HOURS AS NEEDED FOR MODERATE TO SEVERE PAIN Historical Provider, MD Taking Active                   Assessment/Plan    1) stage IV breast cancer  -has high TMB  -has BRCA1  variant of uncertain significance  -also has PIK3CA mutation  -pembrolizumab has been on hold since she developed a diffuse body rash, course of prednisone did not help, in fact a week after finishing prednisone, the rash got worse--papules have become confluent red/pink areas  -she did see her dermatologist (Dr Wolff)--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, and she did have a punch biopsy done--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  -now off steroids  -CT scan done on 4/3/2024 reviewed: interval worsening of bilateral pleural effusions, right >left; multiple scattered <0.5 mm pulmonary nodules bilateral lungs not changed; interval development of several branching opacities involving left lung apex; there are no new worrisome hepatic lesions; mild right sided hydronephrosis; diffuse sclerosis of axial and appendicular skeleton  -has been more short of breath recently--was  evaluated in the ER at [facility] on 8/4--was told she had bilateral pleural effusions, which are not actually new  -right sided pleural effusion is already seen on PET scan done in 8/2023  -she had an echo done in June which was read as normal, [facility] did not perform a new one, cardiologist on 8/21 told the patient that she did not have CHF and that these effusions were likely malignant; she was started on low dose lasix which barely helped with the leg edema--and so dose was bumped up; she was recommended by pulmonology consult to undergo thoracentesis and she declined to have it done  -since effusions are not new, and actually were already noted over one year ago, a normal echo does NOT completely rule out well-compensated CHF  -PET done on 8/16/2024 reviewed--no concerning pulmonary nodule; moderate-to-large bilateral pleura effusions, right greater than left; no FDG avid mediastinal, hilar or axillary lymphadenopathy; s/p right axillary ike dissection; s/p right mastectomy and reconstruction; no FDG avid lymphadenopathy in the abdomen and pelvis; bilateral hydronephrosis; interval improvement of FDG avid widespread bone metastases throughout the axial and appendicular skeleton many of which are non FDG avid ; residual disease noted in bilateral femur (SUV 3.6), pelvis (SUV 2.4), bilateral humerus (SUV 3.3), bilateral clavicles (SUV 2/9)  -there is no FDG activity in her thorax/pleura, making the effusions less likely to be of malignant origin, and given bilateral nature--more likely cardiac in origin  -about a week ago, she was told by her other doctor that since her creatinine bumped up, she should back down on her lasix from 40 mg daily to 20 mg daily--since doing that her leg edema has worsened--advised her to increase lasix to 40 mg daily alternating with 20 mg every other day  -performing thoracentesis, especially on the right, has high likelihood of improving her symptoms of dyspnea, and at the same time, fluid  can be sent to pathology,  --labs done on 9/19/2024 included CBC, COMP, CA 27.29  -results reviewed--wbc 4.3, hgb 9.1 plt 187,000, creatinine 0.95, calcium 8.7, albumin 3.9 AST 19, ALT 13  CA 27.29 408  -advised her to continue letrozole 2.5 mg daily   -benefits, risks, potential morbidity related to xgeva were reviewed with Arabella and she provided informed consent to proceed  -she went on to receive xgeva 120 mg subcutaneous  -she is now in agreement to proceed with thoracentesis--right is worse than left; will place order with [facility]     2) bone metastases  -secondary to metastatic breast cancer  -receives xgeva Q4 weeks     3) anemia  -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  -hgb slowly improving     4) hyperlipidemia  -on pravastatin     5) arthritis  -on tylenol PRN  -on diclofenac PRN  -received steroid injections into her knees and she feels so much better, so she wants to receive them again     6) hypertension  -on amlodipine  -on losartan-HCTZ     7) GERD  -on nexium     8) glaucoma  -on combigan eyedrops           Problem List Items Addressed This Visit             ICD-10-CM    Breast cancer metastasized to bone (Multi) C50.919, C79.51    Relevant Orders    US thoracentesis    Cytology Consultation (Non-Gynecologic)     Other Visit Diagnoses         Codes    Pleural effusion on right    -  Primary J90    Relevant Orders    US thoracentesis    Cytology Consultation (Non-Gynecologic)                 Andrew Mcfarland MD

## 2024-09-22 PROBLEM — R07.9 CHEST PAIN: Status: ACTIVE | Noted: 2024-09-22

## 2024-09-22 PROBLEM — J90 PLEURAL EFFUSION ON RIGHT: Status: ACTIVE | Noted: 2024-09-22

## 2024-09-22 ASSESSMENT — ENCOUNTER SYMPTOMS
CONSTITUTIONAL NEGATIVE: 1
ENDOCRINE NEGATIVE: 1
COUGH: 1
GASTROINTESTINAL NEGATIVE: 1
SHORTNESS OF BREATH: 1
HEMATOLOGIC/LYMPHATIC NEGATIVE: 1
MUSCULOSKELETAL NEGATIVE: 1
EYES NEGATIVE: 1
NEUROLOGICAL NEGATIVE: 1
CARDIOVASCULAR NEGATIVE: 1
CHEST TIGHTNESS: 1
PSYCHIATRIC NEGATIVE: 1

## 2024-09-23 ENCOUNTER — PREP FOR PROCEDURE (OUTPATIENT)
Dept: RADIOLOGY | Facility: HOSPITAL | Age: 84
End: 2024-09-23
Payer: MEDICARE

## 2024-09-24 ENCOUNTER — HOSPITAL ENCOUNTER (OUTPATIENT)
Dept: RADIOLOGY | Facility: HOSPITAL | Age: 84
Discharge: HOME | End: 2024-09-24
Payer: MEDICARE

## 2024-09-24 VITALS
DIASTOLIC BLOOD PRESSURE: 70 MMHG | HEART RATE: 80 BPM | SYSTOLIC BLOOD PRESSURE: 158 MMHG | BODY MASS INDEX: 32.2 KG/M2 | WEIGHT: 164 LBS | OXYGEN SATURATION: 97 % | TEMPERATURE: 97.5 F | RESPIRATION RATE: 16 BRPM | HEIGHT: 60 IN

## 2024-09-24 DIAGNOSIS — C79.51 CARCINOMA OF BREAST METASTATIC TO BONE, UNSPECIFIED LATERALITY (MULTI): ICD-10-CM

## 2024-09-24 DIAGNOSIS — R07.9 CHEST PAIN, UNSPECIFIED TYPE: ICD-10-CM

## 2024-09-24 DIAGNOSIS — J90 PLEURAL EFFUSION ON RIGHT: ICD-10-CM

## 2024-09-24 DIAGNOSIS — C50.919 CARCINOMA OF BREAST METASTATIC TO BONE, UNSPECIFIED LATERALITY (MULTI): ICD-10-CM

## 2024-09-24 LAB
BASOPHILS NFR FLD MANUAL: 0 %
BLASTS NFR FLD MANUAL: 0 %
CLARITY FLD: CLEAR
COLOR FLD: YELLOW
EOSINOPHIL NFR FLD MANUAL: 0 %
GLUCOSE FLD-MCNC: 111 MG/DL
IMMATURE GRANULOCYTES IN FLUID: 0 %
LDH FLD L TO P-CCNC: 96 U/L
LYMPHOCYTES NFR FLD MANUAL: 90 %
MONOS+MACROS NFR FLD MANUAL: 9 %
NEUTROPHILS NFR FLD MANUAL: 1 %
OTHER CELLS NFR FLD MANUAL: 0 %
PH FLD: 6.97 [PH]
PLASMA CELLS NFR FLD MANUAL: 0 %
PROT FLD-MCNC: 4.3 G/DL
RBC # FLD AUTO: 1000 /UL
TOTAL CELLS COUNTED FLD: 100
TRIGL FLD-MCNC: 19 MG/DL
WBC # FLD AUTO: 3649 /UL

## 2024-09-24 PROCEDURE — 88112 CYTOPATH CELL ENHANCE TECH: CPT | Mod: TC | Performed by: INTERNAL MEDICINE

## 2024-09-24 PROCEDURE — 7100000010 HC PHASE TWO TIME - EACH INCREMENTAL 1 MINUTE

## 2024-09-24 PROCEDURE — 84478 ASSAY OF TRIGLYCERIDES: CPT | Mod: STJLAB | Performed by: INTERNAL MEDICINE

## 2024-09-24 PROCEDURE — 7100000009 HC PHASE TWO TIME - INITIAL BASE CHARGE

## 2024-09-24 PROCEDURE — 83615 LACTATE (LD) (LDH) ENZYME: CPT | Mod: STJLAB | Performed by: INTERNAL MEDICINE

## 2024-09-24 PROCEDURE — 83986 ASSAY PH BODY FLUID NOS: CPT | Mod: STJLAB | Performed by: INTERNAL MEDICINE

## 2024-09-24 PROCEDURE — 89051 BODY FLUID CELL COUNT: CPT | Performed by: INTERNAL MEDICINE

## 2024-09-24 PROCEDURE — 82945 GLUCOSE OTHER FLUID: CPT | Mod: STJLAB | Performed by: INTERNAL MEDICINE

## 2024-09-24 PROCEDURE — 32555 ASPIRATE PLEURA W/ IMAGING: CPT

## 2024-09-24 PROCEDURE — 2500000005 HC RX 250 GENERAL PHARMACY W/O HCPCS: Performed by: NURSE PRACTITIONER

## 2024-09-24 PROCEDURE — 84157 ASSAY OF PROTEIN OTHER: CPT | Mod: STJLAB | Performed by: INTERNAL MEDICINE

## 2024-09-24 PROCEDURE — 2720000007 HC OR 272 NO HCPCS

## 2024-09-24 RX ORDER — LIDOCAINE HYDROCHLORIDE 10 MG/ML
INJECTION, SOLUTION EPIDURAL; INFILTRATION; INTRACAUDAL; PERINEURAL
Status: COMPLETED | OUTPATIENT
Start: 2024-09-24 | End: 2024-09-24

## 2024-09-24 ASSESSMENT — PAIN SCALES - GENERAL
PAINLEVEL_OUTOF10: 0 - NO PAIN

## 2024-09-24 ASSESSMENT — PAIN - FUNCTIONAL ASSESSMENT
PAIN_FUNCTIONAL_ASSESSMENT: 0-10

## 2024-09-24 ASSESSMENT — COLUMBIA-SUICIDE SEVERITY RATING SCALE - C-SSRS
2. HAVE YOU ACTUALLY HAD ANY THOUGHTS OF KILLING YOURSELF?: NO
6. HAVE YOU EVER DONE ANYTHING, STARTED TO DO ANYTHING, OR PREPARED TO DO ANYTHING TO END YOUR LIFE?: NO
1. IN THE PAST MONTH, HAVE YOU WISHED YOU WERE DEAD OR WISHED YOU COULD GO TO SLEEP AND NOT WAKE UP?: NO

## 2024-09-24 NOTE — Clinical Note
Right thoracentesis completedRight thoracentesis completed. 700 ml of clear yellow serous fluid removed. Samples sent to lab for ordered testing. OP site dressing applied to right side of back. Pt tolerated procedure well.

## 2024-09-24 NOTE — POST-PROCEDURE NOTE
Interventional Radiology Brief Postprocedure Note    Procedure: Right thoracentesis    Provider: ROCHELLE Freeman-CNP    Assistant: None    Diagnosis: Right pleural effusion    Description of procedure: A time out was performed and Right Hemithoraxwas examined with US and appropriate entry point was confirmed and marked.  The patient was prepped and draped in a sterile manner, 1% lidocaine was used to anesthesize the skin and subcutaneous tissue. A 5F Centesis needle was then introduced through the skin into the pleural space, the centesis catheter was then threaded without difficulty. 700 ml of yellow fluid was removed without difficulty. The catheter was then removed. Specimens labeled and sent to lab per primary team. No immediate complications were noted during and immediately following the procedure.          Medications (Filter: Administrations occurring from 1017 to 1042 on 09/24/24) As of 09/24/24 1042      lidocaine PF (Xylocaine) 10 mg/mL (1 %) injection (mL) Total volume:  8 mL      Date/Time Rate/Dose/Volume Action       09/24/24  1033 8 mL Given                     Complications: None    Estimated Blood Loss: none        See detailed result report with images in PACS.    The patient tolerated the procedure well without incident or complication and is in stable condition.

## 2024-09-25 NOTE — PROGRESS NOTES
Patient ID: Arabella Springer is a 83 y.o. female.  Diagnosis:  Breast Cancer   MedOnc: Dr. Mcfarland  Primary Care Provider: Angel Luis Velazquez DO  Referring Provider: No referring provider defined for this encounter.  Visit Type: Follow Up    Date of Service: 10/18/24  Location: [facility]     Patient Care Team:  Angel Luis Velazquez DO as PCP - General (Internal Medicine)  Casi Moss MD as PCP - [facility] Attributed Provider  Andrew Mcfarland MD as Consulting Physician (Hematology and Oncology)  Catherine Boykin MA as Care Manager (Case Management)    Current Therapy: Letrozole + Xgeva    ONCOLOGIC HISTORY     No matching staging information was found for the patient.  stage IV breast cancer    2015: right mastectomy and reconstruction at [facility], positive node, declined XRT, back to OR for node dissection, no chemo, started on endocrine therapy - self D/C'd after 3 years and stopped following up with oncology  7/2022: referred to [facility] for leukopenia and anemia   8/2023: BMBX - incidental finding of CLL/SLL, but the etiology of her anemia is marrow infiltration by metastatic breast cancer   8/2023: PET widespread bone involvement by breast cancer including both femoral shafts and both knees and right sided pleural effusion. Offered Olaparib based on J Clin Oncol. 2020 Dec 20;38(36):3318-8093. doi: 10.1200/JCO.20.00648. ([facility] 048: Phase II Study of Olaparib for Metastatic Breast Cancer and Mutations in Homologous Recombination-Related Genes) - denied by insurance   8/28/2023: started Xgeva   8/2023: started Pembro - developed IO r/t psorasis confirmed with punch bx by dermatologist (Dr Wolff) after cycle 4  11/2023: Restarted on AI - letrozole  1/16/2024: ED for chest wall pain, 2 new rib fractures  4/3/2024: CT scan - interval worsening of bilateral pleural effusions, unchanged pulmonary nodules; diffuse sclerosis of axial and appendicular skeleton  8/16/2024: PET - no concerning pulmonary nodule; moderate-to-large  bilateral pleura effusions, right greater than left; no FDG avid mediastinal, hilar or axillary lymphadenopathy; s/p right axillary ike dissection; s/p right mastectomy and reconstruction; no FDG avid lymphadenopathy in the abdomen and pelvis; bilateral hydronephrosis; interval improvement of FDG avid widespread bone metastases throughout the axial and appendicular skeleton many of which are non FDG avid ; residual disease noted in bilateral femur (SUV 3.6), pelvis (SUV 2.4), bilateral humerus (SUV 3.3), bilateral clavicles (SUV 2/9). There is no FDG activity in her thorax/pleura, making the effusions less likely to be of malignant origin, and given bilateral nature--more likely cardiac in origin.  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 female who presents today for follow up of breast cancer. Patient of Dr. Mcfarland currently on letrozole and Xgeva for bony mets. Feeling good. No new changes. No difference in SOB after thoracentesis with Dr. Alonso. She needs to schedule with him next week. She is hoarse, her cough is gone. Denies pain. No GI symptoms. Issues with cerumen - ENT following. Notices SOB is worse after a meal.     Review of Systems   Constitutional:  Negative for appetite change, fatigue, fever and unexpected weight change.   HENT:   Positive for voice change.    Eyes: Negative.    Respiratory:  Positive for shortness of breath. Negative for cough.    Cardiovascular:  Negative for chest pain, leg swelling and palpitations.    Gastrointestinal:  Negative for constipation, diarrhea, nausea and vomiting.   Endocrine: Negative.    Genitourinary: Negative.     Musculoskeletal: Negative.    Skin: Negative.    Neurological: Negative.    Hematological: Negative.    Psychiatric/Behavioral: Negative.       Objective      /74 (BP Location: Left arm, Patient Position: Sitting, BP Cuff Size: Adult)   Pulse 82   Temp 36.4 °C (97.5 °F) (Temporal)   Resp 16   Wt 75.5 kg (166 lb 7.2 oz)   SpO2 93%   BMI 32.51 kg/m²   BSA: 1.79 meters squared    Wt Readings from Last 5 Encounters:   10/18/24 75.5 kg (166 lb 7.2 oz)   09/24/24 74.4 kg (164 lb)   09/20/24 76.8 kg (169 lb 5 oz)   09/18/24 76.5 kg (168 lb 10.4 oz)   08/23/24 76.5 kg (168 lb 10.4 oz)     Performance Status:  ECOG Score: 1- Restricted in physically strenuous activity.  Carries out light duty.  Karnofsky Score: 80 - Normal activity with effort; some signs or symptoms of disease    PHYSICAL EXAM   Physical Exam  Vitals reviewed.   Constitutional:       General: She is not in acute distress.     Appearance: Normal appearance. She is not toxic-appearing.   HENT:      Head: Normocephalic and atraumatic.      Mouth/Throat:      Mouth: Mucous membranes are moist.      Pharynx: Oropharynx is clear.   Eyes:      Pupils: Pupils are equal, round, and reactive to light.   Cardiovascular:      Rate and Rhythm: Normal rate and regular rhythm.      Pulses: Normal pulses.      Heart sounds: Normal heart sounds.   Pulmonary:      Effort: Pulmonary effort is normal.      Breath sounds: Examination of the right-lower field reveals wheezing. Examination of the left-lower field reveals wheezing. Wheezing present.   Abdominal:      General: Bowel sounds are normal. There is no distension.      Tenderness: There is no abdominal tenderness. There is no guarding.   Musculoskeletal:         General: Normal range of motion.      Cervical back: Normal range of motion.      Right lower leg: No edema.       Left lower leg: No edema.   Skin:     General: Skin is warm and dry.      Capillary Refill: Capillary refill takes less than 2 seconds.   Neurological:      General: No focal deficit present.      Mental Status: She is alert and oriented to person, place, and time.      Comments: cane   Psychiatric:         Mood and Affect: Mood normal.         Behavior: Behavior normal.         Thought Content: Thought content normal.         Judgment: Judgment normal.     Allergies  No Known Allergies   Medications  Current Outpatient Medications   Medication Instructions    albuterol (ProAir HFA) 90 mcg/actuation inhaler 2 puffs, inhalation, Every 4 hours PRN    amLODIPine (NORVASC) 2.5 mg, oral, Daily    brimonidine-timoloL (Combigan) 0.2-0.5 % ophthalmic solution 1 drop, Every 12 hours scheduled    calcium carbonate-vitamin D3 500 mg-5 mcg (200 unit) tablet 3 tablets, 2 times daily    denosumab (XGEVA) 120 mg, Once    diclofenac (Voltaren) 50 mg EC tablet TAKE 1 TABLET(50 MG) BY MOUTH TWICE DAILY WITH MEALS    esomeprazole (NEXIUM) 40 mg, Daily before breakfast    furosemide (LASIX) 20 mg, Daily    inhalat.spacing dev,large mask (Pro Comfort Spacer-Adult Mask) spacer 1 each, miscellaneous, Every 4 hours PRN    letrozole (FEMARA) 2.5 mg, oral, Daily, Take with or without food.    LORazepam (ATIVAN) 0.5 mg, oral, Every 2 hour PRN    losartan-hydrochlorothiazide (Hyzaar) 100-12.5 mg tablet 1 tablet, oral, Daily    multivitamin tablet 1 tablet, Daily    pravastatin (PRAVACHOL) 10 mg, oral, Daily    Rhopressa 0.02 % drops opthalmic solution INSTILL 1 DROP IN RIGHT EYE DAILY AT BEDTIME    traMADol (Ultram) 50 mg tablet TAKE ONE TABLET BY MOUTH EVERY 8 HOURS AS NEEDED FOR MODERATE TO SEVERE PAIN        Diagnostic Results   RESULTS     Results for orders placed or performed in visit on 10/17/24 (from the past 96 hours)   CBC and Auto Differential   Result Value Ref Range    WBC 3.8 (L) 4.4 - 11.3 x10*3/uL    RBC 2.96 (L) 4.00 - 5.20  x10*6/uL    Hemoglobin 9.1 (L) 12.0 - 16.0 g/dL    Hematocrit 28.5 (L) 36.0 - 46.0 %    MCV 96 80 - 100 fL    MCH 30.7 26.0 - 34.0 pg    MCHC 31.9 (L) 32.0 - 36.0 g/dL    RDW 14.0 11.5 - 14.5 %    Platelets 204 150 - 450 x10*3/uL    Neutrophils % 51.9 40.0 - 80.0 %    Immature Granulocytes %, Automated 0.8 0.0 - 0.9 %    Lymphocytes % 32.3 13.0 - 44.0 %    Monocytes % 10.9 2.0 - 10.0 %    Eosinophils % 3.6 0.0 - 6.0 %    Basophils % 0.5 0.0 - 2.0 %    Neutrophils Absolute 1.99 1.60 - 5.50 x10*3/uL    Immature Granulocytes Absolute, Automated 0.03 0.00 - 0.50 x10*3/uL    Lymphocytes Absolute 1.24 0.80 - 3.00 x10*3/uL    Monocytes Absolute 0.42 0.05 - 0.80 x10*3/uL    Eosinophils Absolute 0.14 0.00 - 0.40 x10*3/uL    Basophils Absolute 0.02 0.00 - 0.10 x10*3/uL   Comprehensive Metabolic Panel   Result Value Ref Range    Glucose 132 (H) 74 - 99 mg/dL    Sodium 134 (L) 136 - 145 mmol/L    Potassium 4.3 3.5 - 5.3 mmol/L    Chloride 98 98 - 107 mmol/L    Bicarbonate 29 21 - 32 mmol/L    Anion Gap 11 10 - 20 mmol/L    Urea Nitrogen 22 6 - 23 mg/dL    Creatinine 1.05 0.50 - 1.05 mg/dL    eGFR 53 (L) >60 mL/min/1.73m*2    Calcium 9.0 8.6 - 10.3 mg/dL    Albumin 3.9 3.4 - 5.0 g/dL    Alkaline Phosphatase 63 33 - 136 U/L    Total Protein 6.1 (L) 6.4 - 8.2 g/dL    AST 19 9 - 39 U/L    Bilirubin, Total 0.4 0.0 - 1.2 mg/dL    ALT 13 7 - 45 U/L   Cancer Antigen 27-29   Result Value Ref Range    CA 27.29 358.9 (H) 0.0 - 38.6 U/mL       Recent Imaging     8/16/24 PET  IMPRESSION:  1. Status post right mastectomy and right axillary ike dissection,  no PET evidence of local recurrence in the postsurgical bed.  2. No PET evidence of ike metastasis  3. Interval improvement of FDG avid widespread bone metastases  throughout the axial and appendicular skeleton, many of which are non  FDG avid with remaining mild FDG-avid lesions compatible with  residual viable disease  4. Bilateral hydronephrosis, new from prior exam. Mag3 renogram  could  be obtained to evaluate for mechanical urinary obstruction.  5. Moderate-to-large bilateral pleural effusions,  right-greater-than-left.    Assessment/Plan   ASSESSMENT     Arabella Springer is a 83 y.o. female with a history of breast cancer in 2015 s/p surgery and 3 years of AI now with recurrence who presents in follow up on letrozole and Xgeva. She has pleural effusions and diffuse bony mets.     Thoracentesis in Sept to the right side only, didn't notice much difference. Dr. Alonso is following - goes to [facility]. Still SOB - likely multifactorial - patient also anemic. Cough is gone, remains hoarse. Struggling with cerumen. Otherwise no new concerns. Denies dental issues and jaw pain. Labs reviewed, Ca normal, Ca 27.29 is stable, H&H is baseline, mild leukopenia with normal diff - no signs of infection. Patient remains stable on letrozole and Xgeva.     Last imagining- PET in Aug 2024- plan for imagining in December with a Dr. Mcfarland follow up.     PLAN     # Breast Cancer   - Continue letrozole 2.5 mg daily  - Pleural effusions - Continue follow up with Dr. Alonso  - Scans in December 2024    # Bony Mets  - Monthly Xgeva 120 mg  - Continue Ca and Vit D    # Pleural Effusions  - Continue follow up with Dr. Alonso ([facility])    #Anemia   - known bone marrow infiltration by metastatic breast cancer   - Stable - continue to monitor    # Increased cerumen  - Follow up with ENT     Follow up next month with me and Xgeva. December follow up with scans and Dr. Mcfarland.     Arabella was seen today for follow-up.  Diagnoses and all orders for this visit:  Carcinoma of breast metastatic to bone, unspecified laterality (Multi) (Primary)  Bilateral impacted cerumen  Pleural effusion      There are no diagnoses linked to this encounter.   Venous Access Orders  Denosumab (Xgeva), 28 Day Cycles    Patient verbalizes understanding of above plan. Time provided for patient's questions. All questions answered to patient's satisfaction  in office. Patient instructed to reach out for any new concerning issues at [phone].    Rhoda Santana MSN, APRN, A-GNP-C, [facility]  Division of Hematology & Medical Oncology   [place] [address]  Phone: [phone]  Available via [employer] Secure Chat  [email or web address]

## 2024-09-26 LAB
LABORATORY COMMENT REPORT: NORMAL
LABORATORY COMMENT REPORT: NORMAL
PATH REPORT.FINAL DX SPEC: NORMAL
PATH REPORT.GROSS SPEC: NORMAL
PATH REPORT.RELEVANT HX SPEC: NORMAL
PATH REPORT.TOTAL CANCER: NORMAL

## 2024-10-02 ENCOUNTER — TELEPHONE (OUTPATIENT)
Dept: HEMATOLOGY/ONCOLOGY | Facility: CLINIC | Age: 84
End: 2024-10-02
Payer: MEDICARE

## 2024-10-02 NOTE — TELEPHONE ENCOUNTER
"Spoke with patient's daughter- Twila. States patient is not as SOB since her thoracentesis. Still has an occasional cough and is going to see a lung doctor \"soon.\" Provided update from Dr Mcfarland. Twial verbalized understanding and had no further questions or concerns at this time.   "

## 2024-10-02 NOTE — TELEPHONE ENCOUNTER
Per Dr. Mcfarland:  No evidence of cancer as the cause for the pleural effusions.  And we predicted that too, considering nothing ever lit up in her chest--if cancer was the source of the effusions--we would have seen her pleural sac lighting up on PET  Does she at least feel better since the thoracentesis?      Left message for Twila to call the office back to discuss further.

## 2024-10-16 ENCOUNTER — PATIENT OUTREACH (OUTPATIENT)
Dept: PRIMARY CARE | Facility: CLINIC | Age: 84
End: 2024-10-16
Payer: MEDICARE

## 2024-10-16 DIAGNOSIS — M89.9 LYTIC BONE LESIONS ON XRAY: Primary | ICD-10-CM

## 2024-10-16 DIAGNOSIS — C79.51 CARCINOMA OF RIGHT BREAST METASTATIC TO BONE (MULTI): ICD-10-CM

## 2024-10-16 DIAGNOSIS — C50.911 CARCINOMA OF RIGHT BREAST METASTATIC TO BONE (MULTI): ICD-10-CM

## 2024-10-16 RX ORDER — FAMOTIDINE 10 MG/ML
20 INJECTION INTRAVENOUS ONCE AS NEEDED
Status: CANCELLED | OUTPATIENT
Start: 2024-10-18

## 2024-10-16 RX ORDER — ALBUTEROL SULFATE 0.83 MG/ML
3 SOLUTION RESPIRATORY (INHALATION) AS NEEDED
Status: CANCELLED | OUTPATIENT
Start: 2024-10-18

## 2024-10-16 RX ORDER — HEPARIN 100 UNIT/ML
500 SYRINGE INTRAVENOUS AS NEEDED
OUTPATIENT
Start: 2024-10-16

## 2024-10-16 RX ORDER — HEPARIN SODIUM,PORCINE/PF 10 UNIT/ML
50 SYRINGE (ML) INTRAVENOUS AS NEEDED
OUTPATIENT
Start: 2024-10-16

## 2024-10-16 RX ORDER — EPINEPHRINE 0.3 MG/.3ML
0.3 INJECTION SUBCUTANEOUS EVERY 5 MIN PRN
Status: CANCELLED | OUTPATIENT
Start: 2024-10-18

## 2024-10-16 RX ORDER — DIPHENHYDRAMINE HYDROCHLORIDE 50 MG/ML
50 INJECTION INTRAMUSCULAR; INTRAVENOUS AS NEEDED
Status: CANCELLED | OUTPATIENT
Start: 2024-10-18

## 2024-10-17 ENCOUNTER — LAB (OUTPATIENT)
Dept: LAB | Facility: CLINIC | Age: 84
End: 2024-10-17
Payer: MEDICARE

## 2024-10-17 DIAGNOSIS — C50.911 CARCINOMA OF RIGHT BREAST METASTATIC TO BONE (MULTI): ICD-10-CM

## 2024-10-17 DIAGNOSIS — M89.9 LYTIC BONE LESIONS ON XRAY: ICD-10-CM

## 2024-10-17 DIAGNOSIS — C79.51 CARCINOMA OF RIGHT BREAST METASTATIC TO BONE (MULTI): ICD-10-CM

## 2024-10-17 LAB
ALBUMIN SERPL BCP-MCNC: 3.9 G/DL (ref 3.4–5)
ALP SERPL-CCNC: 63 U/L (ref 33–136)
ALT SERPL W P-5'-P-CCNC: 13 U/L (ref 7–45)
ANION GAP SERPL CALC-SCNC: 11 MMOL/L (ref 10–20)
AST SERPL W P-5'-P-CCNC: 19 U/L (ref 9–39)
BASOPHILS # BLD AUTO: 0.02 X10*3/UL (ref 0–0.1)
BASOPHILS NFR BLD AUTO: 0.5 %
BILIRUB SERPL-MCNC: 0.4 MG/DL (ref 0–1.2)
BUN SERPL-MCNC: 22 MG/DL (ref 6–23)
CALCIUM SERPL-MCNC: 9 MG/DL (ref 8.6–10.3)
CANCER AG27-29 SERPL-ACNC: 358.9 U/ML (ref 0–38.6)
CHLORIDE SERPL-SCNC: 98 MMOL/L (ref 98–107)
CO2 SERPL-SCNC: 29 MMOL/L (ref 21–32)
CREAT SERPL-MCNC: 1.05 MG/DL (ref 0.5–1.05)
EGFRCR SERPLBLD CKD-EPI 2021: 53 ML/MIN/1.73M*2
EOSINOPHIL # BLD AUTO: 0.14 X10*3/UL (ref 0–0.4)
EOSINOPHIL NFR BLD AUTO: 3.6 %
ERYTHROCYTE [DISTWIDTH] IN BLOOD BY AUTOMATED COUNT: 14 % (ref 11.5–14.5)
GLUCOSE SERPL-MCNC: 132 MG/DL (ref 74–99)
HCT VFR BLD AUTO: 28.5 % (ref 36–46)
HGB BLD-MCNC: 9.1 G/DL (ref 12–16)
IMM GRANULOCYTES # BLD AUTO: 0.03 X10*3/UL (ref 0–0.5)
IMM GRANULOCYTES NFR BLD AUTO: 0.8 % (ref 0–0.9)
LYMPHOCYTES # BLD AUTO: 1.24 X10*3/UL (ref 0.8–3)
LYMPHOCYTES NFR BLD AUTO: 32.3 %
MCH RBC QN AUTO: 30.7 PG (ref 26–34)
MCHC RBC AUTO-ENTMCNC: 31.9 G/DL (ref 32–36)
MCV RBC AUTO: 96 FL (ref 80–100)
MONOCYTES # BLD AUTO: 0.42 X10*3/UL (ref 0.05–0.8)
MONOCYTES NFR BLD AUTO: 10.9 %
NEUTROPHILS # BLD AUTO: 1.99 X10*3/UL (ref 1.6–5.5)
NEUTROPHILS NFR BLD AUTO: 51.9 %
PLATELET # BLD AUTO: 204 X10*3/UL (ref 150–450)
POTASSIUM SERPL-SCNC: 4.3 MMOL/L (ref 3.5–5.3)
PROT SERPL-MCNC: 6.1 G/DL (ref 6.4–8.2)
RBC # BLD AUTO: 2.96 X10*6/UL (ref 4–5.2)
SODIUM SERPL-SCNC: 134 MMOL/L (ref 136–145)
WBC # BLD AUTO: 3.8 X10*3/UL (ref 4.4–11.3)

## 2024-10-17 PROCEDURE — 80053 COMPREHEN METABOLIC PANEL: CPT

## 2024-10-17 PROCEDURE — 86300 IMMUNOASSAY TUMOR CA 15-3: CPT

## 2024-10-17 PROCEDURE — 85025 COMPLETE CBC W/AUTO DIFF WBC: CPT

## 2024-10-17 PROCEDURE — 36415 COLL VENOUS BLD VENIPUNCTURE: CPT

## 2024-10-18 ENCOUNTER — INFUSION (OUTPATIENT)
Dept: HEMATOLOGY/ONCOLOGY | Facility: CLINIC | Age: 84
End: 2024-10-18
Payer: MEDICARE

## 2024-10-18 ENCOUNTER — OFFICE VISIT (OUTPATIENT)
Dept: HEMATOLOGY/ONCOLOGY | Facility: CLINIC | Age: 84
End: 2024-10-18
Payer: MEDICARE

## 2024-10-18 VITALS
SYSTOLIC BLOOD PRESSURE: 133 MMHG | RESPIRATION RATE: 16 BRPM | WEIGHT: 166.45 LBS | HEART RATE: 82 BPM | DIASTOLIC BLOOD PRESSURE: 74 MMHG | BODY MASS INDEX: 32.51 KG/M2 | TEMPERATURE: 97.5 F | OXYGEN SATURATION: 93 %

## 2024-10-18 DIAGNOSIS — M89.9 LYTIC BONE LESIONS ON XRAY: ICD-10-CM

## 2024-10-18 DIAGNOSIS — C50.911 CARCINOMA OF RIGHT BREAST METASTATIC TO BONE (MULTI): ICD-10-CM

## 2024-10-18 DIAGNOSIS — C50.919 CARCINOMA OF BREAST METASTATIC TO BONE, UNSPECIFIED LATERALITY (MULTI): Primary | ICD-10-CM

## 2024-10-18 DIAGNOSIS — C79.51 CARCINOMA OF RIGHT BREAST METASTATIC TO BONE (MULTI): ICD-10-CM

## 2024-10-18 DIAGNOSIS — J90 PLEURAL EFFUSION: ICD-10-CM

## 2024-10-18 DIAGNOSIS — H61.23 BILATERAL IMPACTED CERUMEN: ICD-10-CM

## 2024-10-18 DIAGNOSIS — C79.51 CARCINOMA OF BREAST METASTATIC TO BONE, UNSPECIFIED LATERALITY (MULTI): Primary | ICD-10-CM

## 2024-10-18 PROCEDURE — 3075F SYST BP GE 130 - 139MM HG: CPT

## 2024-10-18 PROCEDURE — 99203 OFFICE O/P NEW LOW 30 MIN: CPT

## 2024-10-18 PROCEDURE — 1160F RVW MEDS BY RX/DR IN RCRD: CPT

## 2024-10-18 PROCEDURE — 1126F AMNT PAIN NOTED NONE PRSNT: CPT

## 2024-10-18 PROCEDURE — 1036F TOBACCO NON-USER: CPT

## 2024-10-18 PROCEDURE — 96372 THER/PROPH/DIAG INJ SC/IM: CPT

## 2024-10-18 PROCEDURE — 99213 OFFICE O/P EST LOW 20 MIN: CPT

## 2024-10-18 PROCEDURE — 2500000004 HC RX 250 GENERAL PHARMACY W/ HCPCS (ALT 636 FOR OP/ED): Mod: JZ

## 2024-10-18 PROCEDURE — 1159F MED LIST DOCD IN RCRD: CPT

## 2024-10-18 PROCEDURE — 3078F DIAST BP <80 MM HG: CPT

## 2024-10-18 RX ORDER — ALBUTEROL SULFATE 0.83 MG/ML
3 SOLUTION RESPIRATORY (INHALATION) AS NEEDED
OUTPATIENT
Start: 2024-11-15

## 2024-10-18 RX ORDER — DIPHENHYDRAMINE HYDROCHLORIDE 50 MG/ML
50 INJECTION INTRAMUSCULAR; INTRAVENOUS AS NEEDED
OUTPATIENT
Start: 2024-11-15

## 2024-10-18 RX ORDER — EPINEPHRINE 0.3 MG/.3ML
0.3 INJECTION SUBCUTANEOUS EVERY 5 MIN PRN
OUTPATIENT
Start: 2024-11-15

## 2024-10-18 RX ORDER — FAMOTIDINE 10 MG/ML
20 INJECTION INTRAVENOUS ONCE AS NEEDED
OUTPATIENT
Start: 2024-11-15

## 2024-10-18 ASSESSMENT — ENCOUNTER SYMPTOMS
COUGH: 0
HEMATOLOGIC/LYMPHATIC NEGATIVE: 1
PALPITATIONS: 0
FEVER: 0
NAUSEA: 0
PSYCHIATRIC NEGATIVE: 1
LEG SWELLING: 0
VOICE CHANGE: 1
MUSCULOSKELETAL NEGATIVE: 1
FATIGUE: 0
VOMITING: 0
DIARRHEA: 0
APPETITE CHANGE: 0
ENDOCRINE NEGATIVE: 1
CONSTIPATION: 0
UNEXPECTED WEIGHT CHANGE: 0
SHORTNESS OF BREATH: 1
EYES NEGATIVE: 1
NEUROLOGICAL NEGATIVE: 1

## 2024-10-18 ASSESSMENT — PAIN SCALES - GENERAL: PAINLEVEL_OUTOF10: 0-NO PAIN

## 2024-10-23 ENCOUNTER — APPOINTMENT (OUTPATIENT)
Dept: OTOLARYNGOLOGY | Facility: CLINIC | Age: 84
End: 2024-10-23
Payer: MEDICARE

## 2024-10-23 DIAGNOSIS — H60.8X3 OTHER NONINFECTIOUS CHRONIC OTITIS EXTERNA OF BOTH EARS: ICD-10-CM

## 2024-10-23 DIAGNOSIS — H61.23 BILATERAL IMPACTED CERUMEN: Primary | ICD-10-CM

## 2024-10-23 PROCEDURE — 99213 OFFICE O/P EST LOW 20 MIN: CPT | Performed by: OTOLARYNGOLOGY

## 2024-10-23 NOTE — PROGRESS NOTES
Patient returns.  Seeing her back today follow-up check in ears.  She has significant blockage bilaterally.  This likely relates to her chronic noninfective otitis externa of the ears.  Remaining ENT inquiry is clear.  No other change in past medical surgical history.    Exam/procedure:  Under the operating microscope, extensive debris removed bilaterally.  Following debridement ears look good but still stable appearing noninfectious otitis externa chronic in nature bilateral.  Nasal exam is clear anteriorly. The septum is relatively straight and the nasal mucosa is grossly unremarkable without evidence of any worrisome infection or polyp or mass. The oral cavity and oropharynx are unremarkable. There is no evidence of any gross lesion or mucosal irregularity throughout the structures. The neck is negative for mass or lymphadenopathy. The trachea and parotid region are clear free of obvious mass or tumor. Facial structures are grossly otherwise unremarkable as well.    Assessment and plan:  Bilateral chronic noninfective otitis externa.  Ears extensively debrided.  Recheck with me in 1 month for further debridement.  Previous utilization of other medications was not truly beneficial.  Biggest key for her is going to be unfortunately debridement frequently.  All questions were answered in this regard accordingly.

## 2024-11-04 ENCOUNTER — PATIENT OUTREACH (OUTPATIENT)
Dept: PRIMARY CARE | Facility: CLINIC | Age: 84
End: 2024-11-04
Payer: MEDICARE

## 2024-11-05 ASSESSMENT — ENCOUNTER SYMPTOMS
FEVER: 0
UNEXPECTED WEIGHT CHANGE: 0
EYES NEGATIVE: 1
CONSTIPATION: 0
LEG SWELLING: 0
FATIGUE: 0
VOICE CHANGE: 1
MUSCULOSKELETAL NEGATIVE: 1
NAUSEA: 0
PSYCHIATRIC NEGATIVE: 1
HEMATOLOGIC/LYMPHATIC NEGATIVE: 1
ENDOCRINE NEGATIVE: 1
VOMITING: 0
NEUROLOGICAL NEGATIVE: 1
APPETITE CHANGE: 0
PALPITATIONS: 0
DIARRHEA: 0
SHORTNESS OF BREATH: 1

## 2024-11-05 NOTE — PROGRESS NOTES
Patient ID: Arabella Springer is a 84 y.o. female.  Diagnosis:  Breast Cancer   MedOnc: Dr. Mcfarland  Primary Care Provider: Angel Luis Velazquez DO  Referring Provider: No referring provider defined for this encounter.  Visit Type: Follow Up    Date of Service: 11/15/24  Location: [facility]     Patient Care Team:  Angel Luis Velazquez DO as PCP - General (Internal Medicine)  Casi Moss MD as PCP - [facility] ACO Attributed Provider  Andrew Mcfarland MD as Consulting Physician (Hematology and Oncology)    Current Therapy: Letrozole + Xgeva    ONCOLOGIC HISTORY     No matching staging information was found for the patient.  stage IV breast cancer    2015: right mastectomy and reconstruction at [facility], positive node, declined XRT, back to OR for node dissection, no chemo, started on endocrine therapy - self D/C'd after 3 years and stopped following up with oncology  7/2022: referred to [facility] for leukopenia and anemia   8/2023: BMBX - incidental finding of CLL/SLL, but the etiology of her anemia is marrow infiltration by metastatic breast cancer   8/2023: PET widespread bone involvement by breast cancer including both femoral shafts and both knees and right sided pleural effusion. Offered Olaparib based on J Clin Oncol. 2020 Dec 20;38(36):1011-5439. doi: 10.1200/JCO.20.58189. ([facility] 048: Phase II Study of Olaparib for Metastatic Breast Cancer and Mutations in Homologous Recombination-Related Genes) - denied by insurance   8/28/2023: started Xgeva   8/2023: started Pembro - developed IO r/t psorasis confirmed with punch bx by dermatologist (Dr Wolff) after cycle 4  11/2023: Restarted on AI - letrozole  1/16/2024: ED for chest wall pain, 2 new rib fractures  4/3/2024: CT scan - interval worsening of bilateral pleural effusions, unchanged pulmonary nodules; diffuse sclerosis of axial and appendicular skeleton  8/16/2024: PET - no concerning pulmonary nodule; moderate-to-large bilateral pleura effusions, right greater than left; no  FDG avid mediastinal, hilar or axillary lymphadenopathy; s/p right axillary ike dissection; s/p right mastectomy and reconstruction; no FDG avid lymphadenopathy in the abdomen and pelvis; bilateral hydronephrosis; interval improvement of FDG avid widespread bone metastases throughout the axial and appendicular skeleton many of which are non FDG avid ; residual disease noted in bilateral femur (SUV 3.6), pelvis (SUV 2.4), bilateral humerus (SUV 3.3), bilateral clavicles (SUV 2/9). There is no FDG activity in her thorax/pleura, making the effusions less likely to be of malignant origin, and given bilateral nature--more likely cardiac in origin.  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 female who presents today for follow up of breast cancer. Patient of Dr. Mcfarland currently on letrozole and Xgeva for bony mets. Feeling good. No new changes. No difference in SOB. Coughing may be a little worse. Hoarseness is stable. She is following Dr. Alonso at [facility], saw him recently, is going to be tested for asthma. They were undergoing a renovation and she was exposed to a lot of dust recently. Renovation is now complete. Denies pain. No GI symptoms. Issues with cerumen - Now has a standing ENT appt for removal - goes this afternoon. Tooth chipped, had a crown put on this Saturday. No tooth or jaw pain.     Review of Systems   Constitutional:  Negative for appetite change, fatigue, fever and unexpected weight change.   HENT:   Positive for voice change.    Eyes:  Negative.    Respiratory:  Positive for cough and shortness of breath.    Cardiovascular:  Negative for chest pain, leg swelling and palpitations.   Gastrointestinal:  Negative for constipation, diarrhea, nausea and vomiting.   Endocrine: Negative.    Genitourinary: Negative.     Musculoskeletal: Negative.    Skin: Negative.    Neurological: Negative.    Hematological: Negative.    Psychiatric/Behavioral: Negative.       Objective      /70 (BP Location: Right arm, Patient Position: Sitting, BP Cuff Size: Adult)   Pulse 86   Temp 37 °C (98.6 °F) (Temporal)   Resp 16   Wt 75.6 kg (166 lb 10.7 oz)   SpO2 92%   BMI 32.55 kg/m²   BSA: 1.79 meters squared    Wt Readings from Last 5 Encounters:   11/15/24 75.6 kg (166 lb 10.7 oz)   10/18/24 75.5 kg (166 lb 7.2 oz)   09/24/24 74.4 kg (164 lb)   09/20/24 76.8 kg (169 lb 5 oz)   09/18/24 76.5 kg (168 lb 10.4 oz)     Performance Status:  ECOG Score: 1- Restricted in physically strenuous activity.  Carries out light duty.  Karnofsky Score: 80 - Normal activity with effort; some signs or symptoms of disease    PHYSICAL EXAM   Physical Exam  Constitutional:       General: She is not in acute distress.     Appearance: Normal appearance. She is not toxic-appearing.   HENT:      Head: Normocephalic and atraumatic.      Mouth/Throat:      Mouth: Mucous membranes are moist.      Pharynx: Oropharynx is clear.   Eyes:      Pupils: Pupils are equal, round, and reactive to light.   Pulmonary:      Effort: Pulmonary effort is normal.   Musculoskeletal:         General: Normal range of motion.      Cervical back: Normal range of motion.      Right lower leg: No edema.      Left lower leg: No edema.      Comments: cane   Skin:     General: Skin is warm and dry.   Neurological:      General: No focal deficit present.      Mental Status: She is alert and oriented to person, place, and time.      Motor: No weakness.   Psychiatric:         Mood and Affect: Mood normal.          Behavior: Behavior normal.         Thought Content: Thought content normal.         Judgment: Judgment normal.     Allergies  No Known Allergies   Medications  Current Outpatient Medications   Medication Instructions    albuterol (ProAir HFA) 90 mcg/actuation inhaler 2 puffs, inhalation, Every 4 hours PRN    brimonidine-timoloL (Combigan) 0.2-0.5 % ophthalmic solution 1 drop, Every 12 hours scheduled    calcium carbonate-vitamin D3 500 mg-5 mcg (200 unit) tablet 3 tablets, 2 times daily    denosumab (XGEVA) 120 mg, Once    diclofenac (Voltaren) 50 mg EC tablet TAKE 1 TABLET(50 MG) BY MOUTH TWICE DAILY WITH MEALS    esomeprazole (NEXIUM) 40 mg, Daily before breakfast    furosemide (LASIX) 20 mg, Daily    inhalat.spacing dev,large mask (Pro Comfort Spacer-Adult Mask) spacer 1 each, miscellaneous, Every 4 hours PRN    letrozole (FEMARA) 2.5 mg, oral, Daily, Take with or without food.    LORazepam (ATIVAN) 0.5 mg, oral, Every 2 hour PRN    losartan-hydrochlorothiazide (Hyzaar) 100-12.5 mg tablet 1 tablet, oral, Daily    multivitamin tablet 1 tablet, Daily    pravastatin (PRAVACHOL) 10 mg, oral, Daily    Rhopressa 0.02 % drops opthalmic solution INSTILL 1 DROP IN RIGHT EYE DAILY AT BEDTIME    traMADol (Ultram) 50 mg tablet TAKE ONE TABLET BY MOUTH EVERY 8 HOURS AS NEEDED FOR MODERATE TO SEVERE PAIN        Diagnostic Results   RESULTS     Results for orders placed or performed in visit on 11/13/24 (from the past 96 hours)   CBC and Auto Differential   Result Value Ref Range    WBC 4.3 (L) 4.4 - 11.3 x10*3/uL    RBC 3.04 (L) 4.00 - 5.20 x10*6/uL    Hemoglobin 9.3 (L) 12.0 - 16.0 g/dL    Hematocrit 29.3 (L) 36.0 - 46.0 %    MCV 96 80 - 100 fL    MCH 30.6 26.0 - 34.0 pg    MCHC 31.7 (L) 32.0 - 36.0 g/dL    RDW 14.2 11.5 - 14.5 %    Platelets 225 150 - 450 x10*3/uL    Neutrophils % 55.1 40.0 - 80.0 %    Immature Granulocytes %, Automated 0.7 0.0 - 0.9 %    Lymphocytes % 29.7 13.0 - 44.0 %    Monocytes % 9.7 2.0 - 10.0 %     Eosinophils % 4.1 0.0 - 6.0 %    Basophils % 0.7 0.0 - 2.0 %    Neutrophils Absolute 2.39 1.60 - 5.50 x10*3/uL    Immature Granulocytes Absolute, Automated 0.03 0.00 - 0.50 x10*3/uL    Lymphocytes Absolute 1.29 0.80 - 3.00 x10*3/uL    Monocytes Absolute 0.42 0.05 - 0.80 x10*3/uL    Eosinophils Absolute 0.18 0.00 - 0.40 x10*3/uL    Basophils Absolute 0.03 0.00 - 0.10 x10*3/uL   Comprehensive Metabolic Panel   Result Value Ref Range    Glucose 129 (H) 74 - 99 mg/dL    Sodium 135 (L) 136 - 145 mmol/L    Potassium 3.7 3.5 - 5.3 mmol/L    Chloride 99 98 - 107 mmol/L    Bicarbonate 28 21 - 32 mmol/L    Anion Gap 12 10 - 20 mmol/L    Urea Nitrogen 23 6 - 23 mg/dL    Creatinine 1.14 (H) 0.50 - 1.05 mg/dL    eGFR 48 (L) >60 mL/min/1.73m*2    Calcium 8.8 8.6 - 10.3 mg/dL    Albumin 4.1 3.4 - 5.0 g/dL    Alkaline Phosphatase 65 33 - 136 U/L    Total Protein 6.7 6.4 - 8.2 g/dL    AST 17 9 - 39 U/L    Bilirubin, Total 0.4 0.0 - 1.2 mg/dL    ALT 12 7 - 45 U/L   Cancer Antigen 27-29   Result Value Ref Range    CA 27.29 331.1 (H) 0.0 - 38.6 U/mL         Recent Imaging     8/16/24 PET  IMPRESSION:  1. Status post right mastectomy and right axillary ike dissection,  no PET evidence of local recurrence in the postsurgical bed.  2. No PET evidence of ike metastasis  3. Interval improvement of FDG avid widespread bone metastases  throughout the axial and appendicular skeleton, many of which are non  FDG avid with remaining mild FDG-avid lesions compatible with  residual viable disease  4. Bilateral hydronephrosis, new from prior exam. Mag3 renogram could  be obtained to evaluate for mechanical urinary obstruction.  5. Moderate-to-large bilateral pleural effusions,  right-greater-than-left.    Assessment/Plan   ASSESSMENT     Arabella Springer is a 84 y.o. female with a history of breast cancer in 2015 s/p surgery and 3 years of AI now with recurrence who presents in follow up on letrozole and Xgeva. She has pleural effusions and  diffuse bony mets.     Thoracentesis in Sept to the right side only, didn't notice much difference. Dr. Alonso is following - goes to [facility]. Still SOB - likely multifactorial - patient also anemic - per patient she is being checked for asthma. Cough is present, voice is hoarse. Cerumen removal now routinely scheduled with ENT. No new concerns. Chipped tooth, had crown this weekend, denies dental issues and jaw pain and was cleared to continue Xgeva per dentist. Labs reviewed, Ca normal, Ca 27.29 is stable, H&H is baseline, mild leukopenia with normal diff - no signs of infection. Patient remains stable on letrozole and Xgeva.       PLAN     # Breast Cancer   - Continue letrozole 2.5 mg daily  - Pleural effusions - Continue follow up with Dr. Alonso  - Scans ordered for prior to next visit (CT and WBBS)    # Bony Mets  - Monthly Xgeva 120 mg  - Continue Ca and Vit D    # Pleural Effusions  - Continue follow up with Dr. Alonso ([facility])    #Anemia   - known bone marrow infiltration by metastatic breast cancer   - Stable - continue to monitor    # Increased cerumen  - Follow up with ENT     Follow up next month with Dr. Garcia and Pauline, scans and labs prior.    Arabella was seen today for follow-up.  Diagnoses and all orders for this visit:  Carcinoma of right breast metastatic to bone (Multi) (Primary)  -     CT chest abdomen pelvis w IV contrast; Future  -     NM bone whole body; Future  -     Clinic Appointment Request Follow Up; JAY GARCIA; [facility]ON[facility]; Future  Anxiety  -     LORazepam (Ativan) 0.5 mg tablet; Take 1 tablet (0.5 mg) by mouth every 2 hours if needed for anxiety (anxiety related to radiology tests) for up to 5 doses.      There are no diagnoses linked to this encounter.   Venous Access Orders  Denosumab (Xgeva), 28 Day Cycles    Patient verbalizes understanding of above plan. Time provided for patient's questions. All questions answered to patient's satisfaction in office. Patient instructed  to reach out for any new concerning issues at [phone].    Rhoda Santana MSN, APRN, A-GNP-C, AOCNP  [facility]  Division of Hematology & Medical Oncology   [place] [address]  Phone: [phone]  Available via [employer] Chat  [email or web address]

## 2024-11-11 DIAGNOSIS — I10 HYPERTENSION, UNSPECIFIED TYPE: ICD-10-CM

## 2024-11-11 DIAGNOSIS — E78.2 MIXED HYPERLIPIDEMIA: ICD-10-CM

## 2024-11-11 RX ORDER — PRAVASTATIN SODIUM 10 MG/1
10 TABLET ORAL DAILY
Qty: 90 TABLET | Refills: 0 | Status: SHIPPED | OUTPATIENT
Start: 2024-11-11

## 2024-11-11 RX ORDER — LOSARTAN POTASSIUM AND HYDROCHLOROTHIAZIDE 12.5; 1 MG/1; MG/1
1 TABLET ORAL DAILY
Qty: 90 TABLET | Refills: 0 | Status: SHIPPED | OUTPATIENT
Start: 2024-11-11

## 2024-11-13 ENCOUNTER — LAB (OUTPATIENT)
Dept: LAB | Facility: CLINIC | Age: 84
End: 2024-11-13
Payer: MEDICARE

## 2024-11-13 DIAGNOSIS — M89.9 LYTIC BONE LESIONS ON XRAY: ICD-10-CM

## 2024-11-13 DIAGNOSIS — C79.51 CARCINOMA OF RIGHT BREAST METASTATIC TO BONE (MULTI): ICD-10-CM

## 2024-11-13 DIAGNOSIS — C50.911 CARCINOMA OF RIGHT BREAST METASTATIC TO BONE (MULTI): ICD-10-CM

## 2024-11-13 LAB
ALBUMIN SERPL BCP-MCNC: 4.1 G/DL (ref 3.4–5)
ALP SERPL-CCNC: 65 U/L (ref 33–136)
ALT SERPL W P-5'-P-CCNC: 12 U/L (ref 7–45)
ANION GAP SERPL CALC-SCNC: 12 MMOL/L (ref 10–20)
AST SERPL W P-5'-P-CCNC: 17 U/L (ref 9–39)
BASOPHILS # BLD AUTO: 0.03 X10*3/UL (ref 0–0.1)
BASOPHILS NFR BLD AUTO: 0.7 %
BILIRUB SERPL-MCNC: 0.4 MG/DL (ref 0–1.2)
BUN SERPL-MCNC: 23 MG/DL (ref 6–23)
CALCIUM SERPL-MCNC: 8.8 MG/DL (ref 8.6–10.3)
CANCER AG27-29 SERPL-ACNC: 331.1 U/ML (ref 0–38.6)
CHLORIDE SERPL-SCNC: 99 MMOL/L (ref 98–107)
CO2 SERPL-SCNC: 28 MMOL/L (ref 21–32)
CREAT SERPL-MCNC: 1.14 MG/DL (ref 0.5–1.05)
EGFRCR SERPLBLD CKD-EPI 2021: 48 ML/MIN/1.73M*2
EOSINOPHIL # BLD AUTO: 0.18 X10*3/UL (ref 0–0.4)
EOSINOPHIL NFR BLD AUTO: 4.1 %
ERYTHROCYTE [DISTWIDTH] IN BLOOD BY AUTOMATED COUNT: 14.2 % (ref 11.5–14.5)
GLUCOSE SERPL-MCNC: 129 MG/DL (ref 74–99)
HCT VFR BLD AUTO: 29.3 % (ref 36–46)
HGB BLD-MCNC: 9.3 G/DL (ref 12–16)
IMM GRANULOCYTES # BLD AUTO: 0.03 X10*3/UL (ref 0–0.5)
IMM GRANULOCYTES NFR BLD AUTO: 0.7 % (ref 0–0.9)
LYMPHOCYTES # BLD AUTO: 1.29 X10*3/UL (ref 0.8–3)
LYMPHOCYTES NFR BLD AUTO: 29.7 %
MCH RBC QN AUTO: 30.6 PG (ref 26–34)
MCHC RBC AUTO-ENTMCNC: 31.7 G/DL (ref 32–36)
MCV RBC AUTO: 96 FL (ref 80–100)
MONOCYTES # BLD AUTO: 0.42 X10*3/UL (ref 0.05–0.8)
MONOCYTES NFR BLD AUTO: 9.7 %
NEUTROPHILS # BLD AUTO: 2.39 X10*3/UL (ref 1.6–5.5)
NEUTROPHILS NFR BLD AUTO: 55.1 %
PLATELET # BLD AUTO: 225 X10*3/UL (ref 150–450)
POTASSIUM SERPL-SCNC: 3.7 MMOL/L (ref 3.5–5.3)
PROT SERPL-MCNC: 6.7 G/DL (ref 6.4–8.2)
RBC # BLD AUTO: 3.04 X10*6/UL (ref 4–5.2)
SODIUM SERPL-SCNC: 135 MMOL/L (ref 136–145)
WBC # BLD AUTO: 4.3 X10*3/UL (ref 4.4–11.3)

## 2024-11-13 PROCEDURE — 86300 IMMUNOASSAY TUMOR CA 15-3: CPT

## 2024-11-13 PROCEDURE — 36415 COLL VENOUS BLD VENIPUNCTURE: CPT

## 2024-11-13 PROCEDURE — 80053 COMPREHEN METABOLIC PANEL: CPT

## 2024-11-13 PROCEDURE — 85025 COMPLETE CBC W/AUTO DIFF WBC: CPT

## 2024-11-15 ENCOUNTER — APPOINTMENT (OUTPATIENT)
Dept: OTOLARYNGOLOGY | Facility: CLINIC | Age: 84
End: 2024-11-15
Payer: MEDICARE

## 2024-11-15 ENCOUNTER — INFUSION (OUTPATIENT)
Dept: HEMATOLOGY/ONCOLOGY | Facility: CLINIC | Age: 84
End: 2024-11-15
Payer: MEDICARE

## 2024-11-15 ENCOUNTER — OFFICE VISIT (OUTPATIENT)
Dept: HEMATOLOGY/ONCOLOGY | Facility: CLINIC | Age: 84
End: 2024-11-15
Payer: MEDICARE

## 2024-11-15 VITALS
SYSTOLIC BLOOD PRESSURE: 145 MMHG | OXYGEN SATURATION: 92 % | TEMPERATURE: 98.6 F | RESPIRATION RATE: 16 BRPM | WEIGHT: 166.67 LBS | HEART RATE: 86 BPM | DIASTOLIC BLOOD PRESSURE: 70 MMHG | BODY MASS INDEX: 32.55 KG/M2

## 2024-11-15 DIAGNOSIS — C79.51 CARCINOMA OF RIGHT BREAST METASTATIC TO BONE (MULTI): Primary | ICD-10-CM

## 2024-11-15 DIAGNOSIS — H61.23 BILATERAL IMPACTED CERUMEN: ICD-10-CM

## 2024-11-15 DIAGNOSIS — F41.9 ANXIETY: ICD-10-CM

## 2024-11-15 DIAGNOSIS — M89.9 LYTIC BONE LESIONS ON XRAY: ICD-10-CM

## 2024-11-15 DIAGNOSIS — H60.8X3 OTHER NONINFECTIOUS CHRONIC OTITIS EXTERNA OF BOTH EARS: Primary | ICD-10-CM

## 2024-11-15 DIAGNOSIS — C79.51 CARCINOMA OF RIGHT BREAST METASTATIC TO BONE (MULTI): ICD-10-CM

## 2024-11-15 DIAGNOSIS — C50.911 CARCINOMA OF RIGHT BREAST METASTATIC TO BONE (MULTI): ICD-10-CM

## 2024-11-15 DIAGNOSIS — C50.911 CARCINOMA OF RIGHT BREAST METASTATIC TO BONE (MULTI): Primary | ICD-10-CM

## 2024-11-15 PROCEDURE — 96372 THER/PROPH/DIAG INJ SC/IM: CPT

## 2024-11-15 PROCEDURE — 3078F DIAST BP <80 MM HG: CPT

## 2024-11-15 PROCEDURE — 1159F MED LIST DOCD IN RCRD: CPT

## 2024-11-15 PROCEDURE — 99213 OFFICE O/P EST LOW 20 MIN: CPT

## 2024-11-15 PROCEDURE — 2500000004 HC RX 250 GENERAL PHARMACY W/ HCPCS (ALT 636 FOR OP/ED): Mod: JZ,JG

## 2024-11-15 PROCEDURE — 3077F SYST BP >= 140 MM HG: CPT

## 2024-11-15 PROCEDURE — 1126F AMNT PAIN NOTED NONE PRSNT: CPT

## 2024-11-15 PROCEDURE — 1160F RVW MEDS BY RX/DR IN RCRD: CPT

## 2024-11-15 RX ORDER — ALBUTEROL SULFATE 0.83 MG/ML
3 SOLUTION RESPIRATORY (INHALATION) AS NEEDED
OUTPATIENT
Start: 2024-12-13

## 2024-11-15 RX ORDER — EPINEPHRINE 0.3 MG/.3ML
0.3 INJECTION SUBCUTANEOUS EVERY 5 MIN PRN
Status: DISCONTINUED | OUTPATIENT
Start: 2024-11-15 | End: 2024-11-15 | Stop reason: HOSPADM

## 2024-11-15 RX ORDER — DIPHENHYDRAMINE HYDROCHLORIDE 50 MG/ML
50 INJECTION INTRAMUSCULAR; INTRAVENOUS AS NEEDED
OUTPATIENT
Start: 2024-12-13

## 2024-11-15 RX ORDER — DIPHENHYDRAMINE HYDROCHLORIDE 50 MG/ML
50 INJECTION INTRAMUSCULAR; INTRAVENOUS AS NEEDED
Status: DISCONTINUED | OUTPATIENT
Start: 2024-11-15 | End: 2024-11-15 | Stop reason: HOSPADM

## 2024-11-15 RX ORDER — LORAZEPAM 0.5 MG/1
0.5 TABLET ORAL EVERY 2 HOUR PRN
Qty: 5 TABLET | Refills: 0 | Status: SHIPPED | OUTPATIENT
Start: 2024-11-15

## 2024-11-15 RX ORDER — ALBUTEROL SULFATE 0.83 MG/ML
3 SOLUTION RESPIRATORY (INHALATION) AS NEEDED
Status: DISCONTINUED | OUTPATIENT
Start: 2024-11-15 | End: 2024-11-15 | Stop reason: HOSPADM

## 2024-11-15 RX ORDER — FAMOTIDINE 10 MG/ML
20 INJECTION INTRAVENOUS ONCE AS NEEDED
OUTPATIENT
Start: 2024-12-13

## 2024-11-15 RX ORDER — FAMOTIDINE 10 MG/ML
20 INJECTION INTRAVENOUS ONCE AS NEEDED
Status: DISCONTINUED | OUTPATIENT
Start: 2024-11-15 | End: 2024-11-15 | Stop reason: HOSPADM

## 2024-11-15 RX ORDER — EPINEPHRINE 0.3 MG/.3ML
0.3 INJECTION SUBCUTANEOUS EVERY 5 MIN PRN
OUTPATIENT
Start: 2024-12-13

## 2024-11-15 ASSESSMENT — ENCOUNTER SYMPTOMS: COUGH: 1

## 2024-11-15 ASSESSMENT — PAIN SCALES - GENERAL: PAINLEVEL_OUTOF10: 0-NO PAIN

## 2024-11-15 NOTE — PROGRESS NOTES
Patient returns.  Seeing her back today for ear debridement with history of severe development of sloughed skin and wax etc.  She is here to 3-week interval.  There have been no significant changes in past medical or past surgical histories except as mentioned.    Procedure:  Under the operating microscope the ears are debrided.  A very large extensive plug of wax/sloughed skin is removed bilaterally.  Following removal she looks great and she feels great.    Assessment and plan:  Bilateral chronic otitis externa with severe accumulation of debris now again removed under the microscope.  Recheck 3 weeks sooner with issue.  All questions were answered in this regard accordingly.

## 2024-12-06 ENCOUNTER — APPOINTMENT (OUTPATIENT)
Dept: OTOLARYNGOLOGY | Facility: CLINIC | Age: 84
End: 2024-12-06
Payer: MEDICARE

## 2024-12-06 DIAGNOSIS — S01.312A LACERATION OF EARLOBE, LEFT, INITIAL ENCOUNTER: ICD-10-CM

## 2024-12-06 DIAGNOSIS — H60.8X3 OTHER NONINFECTIOUS CHRONIC OTITIS EXTERNA OF BOTH EARS: Primary | ICD-10-CM

## 2024-12-06 PROCEDURE — 99213 OFFICE O/P EST LOW 20 MIN: CPT | Performed by: OTOLARYNGOLOGY

## 2024-12-06 PROCEDURE — 1160F RVW MEDS BY RX/DR IN RCRD: CPT | Performed by: OTOLARYNGOLOGY

## 2024-12-06 PROCEDURE — 1159F MED LIST DOCD IN RCRD: CPT | Performed by: OTOLARYNGOLOGY

## 2024-12-06 RX ORDER — MOMETASONE FUROATE 1 MG/ML
SOLUTION TOPICAL 2 TIMES DAILY
Qty: 30 ML | Refills: 0 | Status: SHIPPED | OUTPATIENT
Start: 2024-12-06 | End: 2025-12-06

## 2024-12-06 NOTE — PROGRESS NOTES
The patient returns.  We are seeing her back today surveillance recheck history of severe problems with skin sloughing chronic otitis externa variant.  Here for debridement again.  Additionally, is here to find out if we can repair her left torn earlobe from previous hearing use.  All remaining ENT inquiry is clear.  No other change in past medical surgical history.    Exam:  Extensive debris is removed from the ears bilaterally.  Following the level dramatically better and she hears dramatically better.  She also has a torn earlobe on the left-hand side from previous earring use.  Nasal exam is clear anteriorly. The septum is relatively straight and the nasal mucosa is grossly unremarkable without evidence of any worrisome infection or polyp or mass. The oral cavity and oropharynx are unremarkable. There is no evidence of any gross lesion or mucosal irregularity throughout the structures. The neck is negative for mass or lymphadenopathy. The trachea and parotid region are clear free of obvious mass or tumor. Facial structures are grossly otherwise unremarkable as well.    Assessment and plan:  1.  Chronic otitis externa eczema type variant with extreme sloughing of skin.  Start Elocon lotion and recheck 3 to 4 weeks.  2.  Laceration left earlobe as described.  When she returns I will recommend closure of same under brief local anesthetic.  Detailed discussion with her in this regard.  She appears to understand and agrees to proceed.

## 2024-12-09 ENCOUNTER — HOSPITAL ENCOUNTER (OUTPATIENT)
Dept: RADIOLOGY | Facility: HOSPITAL | Age: 84
Discharge: HOME | End: 2024-12-09
Payer: MEDICARE

## 2024-12-09 DIAGNOSIS — C79.51 CARCINOMA OF RIGHT BREAST METASTATIC TO BONE (MULTI): ICD-10-CM

## 2024-12-09 DIAGNOSIS — C50.911 CARCINOMA OF RIGHT BREAST METASTATIC TO BONE (MULTI): ICD-10-CM

## 2024-12-09 PROCEDURE — A9503 TC99M MEDRONATE: HCPCS

## 2024-12-09 PROCEDURE — 2550000001 HC RX 255 CONTRASTS

## 2024-12-09 PROCEDURE — 74177 CT ABD & PELVIS W/CONTRAST: CPT

## 2024-12-09 PROCEDURE — 74177 CT ABD & PELVIS W/CONTRAST: CPT | Performed by: RADIOLOGY

## 2024-12-09 PROCEDURE — 78306 BONE IMAGING WHOLE BODY: CPT | Performed by: STUDENT IN AN ORGANIZED HEALTH CARE EDUCATION/TRAINING PROGRAM

## 2024-12-09 PROCEDURE — 71260 CT THORAX DX C+: CPT | Performed by: RADIOLOGY

## 2024-12-09 PROCEDURE — 3430000001 HC RX 343 DIAGNOSTIC RADIOPHARMACEUTICALS

## 2024-12-09 PROCEDURE — 78306 BONE IMAGING WHOLE BODY: CPT

## 2024-12-11 ENCOUNTER — LAB (OUTPATIENT)
Dept: LAB | Facility: CLINIC | Age: 84
End: 2024-12-11
Payer: MEDICARE

## 2024-12-11 DIAGNOSIS — C50.911 CARCINOMA OF RIGHT BREAST METASTATIC TO BONE (MULTI): ICD-10-CM

## 2024-12-11 DIAGNOSIS — M89.9 LYTIC BONE LESIONS ON XRAY: ICD-10-CM

## 2024-12-11 DIAGNOSIS — C79.51 CARCINOMA OF RIGHT BREAST METASTATIC TO BONE (MULTI): ICD-10-CM

## 2024-12-11 LAB
ALBUMIN SERPL BCP-MCNC: 4.1 G/DL (ref 3.4–5)
ALP SERPL-CCNC: 51 U/L (ref 33–136)
ALT SERPL W P-5'-P-CCNC: 12 U/L (ref 7–45)
ANION GAP SERPL CALC-SCNC: 11 MMOL/L (ref 10–20)
AST SERPL W P-5'-P-CCNC: 18 U/L (ref 9–39)
BASOPHILS # BLD AUTO: 0.03 X10*3/UL (ref 0–0.1)
BASOPHILS NFR BLD AUTO: 0.7 %
BILIRUB SERPL-MCNC: 0.4 MG/DL (ref 0–1.2)
BUN SERPL-MCNC: 23 MG/DL (ref 6–23)
CALCIUM SERPL-MCNC: 9 MG/DL (ref 8.6–10.3)
CHLORIDE SERPL-SCNC: 99 MMOL/L (ref 98–107)
CO2 SERPL-SCNC: 30 MMOL/L (ref 21–32)
CREAT SERPL-MCNC: 1.04 MG/DL (ref 0.5–1.05)
EGFRCR SERPLBLD CKD-EPI 2021: 54 ML/MIN/1.73M*2
EOSINOPHIL # BLD AUTO: 0.12 X10*3/UL (ref 0–0.4)
EOSINOPHIL NFR BLD AUTO: 2.6 %
ERYTHROCYTE [DISTWIDTH] IN BLOOD BY AUTOMATED COUNT: 14.7 % (ref 11.5–14.5)
GLUCOSE SERPL-MCNC: 113 MG/DL (ref 74–99)
HCT VFR BLD AUTO: 28.5 % (ref 36–46)
HGB BLD-MCNC: 9.1 G/DL (ref 12–16)
IMM GRANULOCYTES # BLD AUTO: 0.03 X10*3/UL (ref 0–0.5)
IMM GRANULOCYTES NFR BLD AUTO: 0.7 % (ref 0–0.9)
LYMPHOCYTES # BLD AUTO: 1.41 X10*3/UL (ref 0.8–3)
LYMPHOCYTES NFR BLD AUTO: 30.6 %
MCH RBC QN AUTO: 30.6 PG (ref 26–34)
MCHC RBC AUTO-ENTMCNC: 31.9 G/DL (ref 32–36)
MCV RBC AUTO: 96 FL (ref 80–100)
MONOCYTES # BLD AUTO: 0.55 X10*3/UL (ref 0.05–0.8)
MONOCYTES NFR BLD AUTO: 11.9 %
NEUTROPHILS # BLD AUTO: 2.47 X10*3/UL (ref 1.6–5.5)
NEUTROPHILS NFR BLD AUTO: 53.5 %
PLATELET # BLD AUTO: 211 X10*3/UL (ref 150–450)
POTASSIUM SERPL-SCNC: 4.1 MMOL/L (ref 3.5–5.3)
PROT SERPL-MCNC: 6.2 G/DL (ref 6.4–8.2)
RBC # BLD AUTO: 2.97 X10*6/UL (ref 4–5.2)
SODIUM SERPL-SCNC: 136 MMOL/L (ref 136–145)
WBC # BLD AUTO: 4.6 X10*3/UL (ref 4.4–11.3)

## 2024-12-11 PROCEDURE — 85025 COMPLETE CBC W/AUTO DIFF WBC: CPT

## 2024-12-11 PROCEDURE — 36415 COLL VENOUS BLD VENIPUNCTURE: CPT

## 2024-12-11 PROCEDURE — 80053 COMPREHEN METABOLIC PANEL: CPT

## 2024-12-11 PROCEDURE — 86300 IMMUNOASSAY TUMOR CA 15-3: CPT

## 2024-12-12 LAB — CANCER AG27-29 SERPL-ACNC: 295.1 U/ML (ref 0–38.6)

## 2024-12-13 ENCOUNTER — APPOINTMENT (OUTPATIENT)
Dept: HEMATOLOGY/ONCOLOGY | Facility: CLINIC | Age: 84
End: 2024-12-13
Payer: MEDICARE

## 2024-12-13 ENCOUNTER — SPECIALTY PHARMACY (OUTPATIENT)
Dept: HEMATOLOGY/ONCOLOGY | Facility: CLINIC | Age: 84
End: 2024-12-13

## 2024-12-13 ENCOUNTER — INFUSION (OUTPATIENT)
Dept: HEMATOLOGY/ONCOLOGY | Facility: CLINIC | Age: 84
End: 2024-12-13
Payer: MEDICARE

## 2024-12-13 ENCOUNTER — OFFICE VISIT (OUTPATIENT)
Dept: HEMATOLOGY/ONCOLOGY | Facility: CLINIC | Age: 84
End: 2024-12-13
Payer: MEDICARE

## 2024-12-13 VITALS
OXYGEN SATURATION: 93 % | WEIGHT: 167.33 LBS | RESPIRATION RATE: 16 BRPM | DIASTOLIC BLOOD PRESSURE: 79 MMHG | TEMPERATURE: 97.7 F | HEART RATE: 94 BPM | SYSTOLIC BLOOD PRESSURE: 136 MMHG | BODY MASS INDEX: 32.68 KG/M2

## 2024-12-13 DIAGNOSIS — I10 ESSENTIAL HYPERTENSION: ICD-10-CM

## 2024-12-13 DIAGNOSIS — H40.9 GLAUCOMA OF BOTH EYES, UNSPECIFIED GLAUCOMA TYPE: ICD-10-CM

## 2024-12-13 DIAGNOSIS — C79.51 CARCINOMA OF RIGHT BREAST METASTATIC TO BONE (MULTI): ICD-10-CM

## 2024-12-13 DIAGNOSIS — M89.9 LYTIC BONE LESIONS ON XRAY: ICD-10-CM

## 2024-12-13 DIAGNOSIS — E78.2 MIXED HYPERLIPIDEMIA: ICD-10-CM

## 2024-12-13 DIAGNOSIS — K21.00 GASTROESOPHAGEAL REFLUX DISEASE WITH ESOPHAGITIS WITHOUT HEMORRHAGE: ICD-10-CM

## 2024-12-13 DIAGNOSIS — M19.90 ARTHRITIS: ICD-10-CM

## 2024-12-13 DIAGNOSIS — C79.51 CARCINOMA OF RIGHT BREAST METASTATIC TO BONE (MULTI): Primary | ICD-10-CM

## 2024-12-13 DIAGNOSIS — C50.911 CARCINOMA OF RIGHT BREAST METASTATIC TO BONE (MULTI): ICD-10-CM

## 2024-12-13 DIAGNOSIS — D61.82 MYELOPHTHISIC ANEMIA (MULTI): ICD-10-CM

## 2024-12-13 DIAGNOSIS — C50.911 CARCINOMA OF RIGHT BREAST METASTATIC TO BONE (MULTI): Primary | ICD-10-CM

## 2024-12-13 PROCEDURE — 99214 OFFICE O/P EST MOD 30 MIN: CPT | Performed by: INTERNAL MEDICINE

## 2024-12-13 PROCEDURE — G2211 COMPLEX E/M VISIT ADD ON: HCPCS | Performed by: INTERNAL MEDICINE

## 2024-12-13 PROCEDURE — 3075F SYST BP GE 130 - 139MM HG: CPT | Performed by: INTERNAL MEDICINE

## 2024-12-13 PROCEDURE — 1159F MED LIST DOCD IN RCRD: CPT | Performed by: INTERNAL MEDICINE

## 2024-12-13 PROCEDURE — 2500000004 HC RX 250 GENERAL PHARMACY W/ HCPCS (ALT 636 FOR OP/ED): Mod: JZ,JG

## 2024-12-13 PROCEDURE — 1126F AMNT PAIN NOTED NONE PRSNT: CPT | Performed by: INTERNAL MEDICINE

## 2024-12-13 PROCEDURE — 96372 THER/PROPH/DIAG INJ SC/IM: CPT

## 2024-12-13 PROCEDURE — 3078F DIAST BP <80 MM HG: CPT | Performed by: INTERNAL MEDICINE

## 2024-12-13 RX ORDER — ALBUTEROL SULFATE 0.83 MG/ML
3 SOLUTION RESPIRATORY (INHALATION) AS NEEDED
OUTPATIENT
Start: 2025-01-10

## 2024-12-13 RX ORDER — EPINEPHRINE 0.3 MG/.3ML
0.3 INJECTION SUBCUTANEOUS EVERY 5 MIN PRN
Status: DISCONTINUED | OUTPATIENT
Start: 2024-12-13 | End: 2024-12-13 | Stop reason: HOSPADM

## 2024-12-13 RX ORDER — FAMOTIDINE 10 MG/ML
20 INJECTION INTRAVENOUS ONCE AS NEEDED
Status: DISCONTINUED | OUTPATIENT
Start: 2024-12-13 | End: 2024-12-13 | Stop reason: HOSPADM

## 2024-12-13 RX ORDER — FAMOTIDINE 10 MG/ML
20 INJECTION INTRAVENOUS ONCE AS NEEDED
OUTPATIENT
Start: 2024-12-27

## 2024-12-13 RX ORDER — ALBUTEROL SULFATE 0.83 MG/ML
3 SOLUTION RESPIRATORY (INHALATION) AS NEEDED
OUTPATIENT
Start: 2024-12-27

## 2024-12-13 RX ORDER — EPINEPHRINE 0.3 MG/.3ML
0.3 INJECTION SUBCUTANEOUS EVERY 5 MIN PRN
OUTPATIENT
Start: 2025-01-10

## 2024-12-13 RX ORDER — DIPHENHYDRAMINE HYDROCHLORIDE 50 MG/ML
50 INJECTION INTRAMUSCULAR; INTRAVENOUS AS NEEDED
OUTPATIENT
Start: 2024-12-27

## 2024-12-13 RX ORDER — DIPHENHYDRAMINE HYDROCHLORIDE 50 MG/ML
50 INJECTION INTRAMUSCULAR; INTRAVENOUS AS NEEDED
OUTPATIENT
Start: 2025-01-10

## 2024-12-13 RX ORDER — LAMOTRIGINE 25 MG/1
500 TABLET ORAL ONCE
OUTPATIENT
Start: 2024-12-27

## 2024-12-13 RX ORDER — FAMOTIDINE 10 MG/ML
20 INJECTION INTRAVENOUS ONCE AS NEEDED
OUTPATIENT
Start: 2025-01-10

## 2024-12-13 RX ORDER — LAMOTRIGINE 25 MG/1
500 TABLET ORAL ONCE
OUTPATIENT
Start: 2025-01-10

## 2024-12-13 RX ORDER — PROCHLORPERAZINE MALEATE 10 MG
10 TABLET ORAL EVERY 6 HOURS PRN
Qty: 60 TABLET | Refills: 5 | Status: SHIPPED | OUTPATIENT
Start: 2024-12-13

## 2024-12-13 RX ORDER — ALBUTEROL SULFATE 0.83 MG/ML
3 SOLUTION RESPIRATORY (INHALATION) AS NEEDED
Status: DISCONTINUED | OUTPATIENT
Start: 2024-12-13 | End: 2024-12-13 | Stop reason: HOSPADM

## 2024-12-13 RX ORDER — EPINEPHRINE 0.3 MG/.3ML
0.3 INJECTION SUBCUTANEOUS EVERY 5 MIN PRN
OUTPATIENT
Start: 2024-12-27

## 2024-12-13 RX ORDER — DIPHENHYDRAMINE HYDROCHLORIDE 50 MG/ML
50 INJECTION INTRAMUSCULAR; INTRAVENOUS AS NEEDED
Status: DISCONTINUED | OUTPATIENT
Start: 2024-12-13 | End: 2024-12-13 | Stop reason: HOSPADM

## 2024-12-13 RX ORDER — ONDANSETRON HYDROCHLORIDE 8 MG/1
8 TABLET, FILM COATED ORAL EVERY 8 HOURS PRN
Qty: 60 TABLET | Refills: 5 | Status: SHIPPED | OUTPATIENT
Start: 2024-12-13

## 2024-12-13 ASSESSMENT — PAIN SCALES - GENERAL: PAINLEVEL_OUTOF10: 0-NO PAIN

## 2024-12-13 NOTE — PROGRESS NOTES
Patient ID: Arabella Springer is a 84 y.o. female.  Referring Physician: Rhoda Santana, APRN-CNP  [id number]  [place] [address]  Primary Care Provider: Angel Luis Velazquez DO  Visit Type: Follow Up      Subjective    HPI How was my CT scan?    Review of Systems   Constitutional:  Positive for fatigue.   HENT:  Negative.     Eyes: Negative.    Respiratory:  Positive for shortness of breath.    Cardiovascular: Negative.    Gastrointestinal: Negative.    Endocrine: Negative.    Genitourinary: Negative.     Musculoskeletal: Negative.    Skin: Negative.    Neurological: Negative.    Hematological: Negative.    Psychiatric/Behavioral: Negative.          Objective   BSA: 1.79 meters squared  /79 (BP Location: Left arm, Patient Position: Sitting, BP Cuff Size: Adult)   Pulse 94   Temp 36.5 °C (97.7 °F) (Temporal)   Resp 16   Wt 75.9 kg (167 lb 5.3 oz)   SpO2 93%   BMI 32.68 kg/m²      has a past medical history of Anxiety, Arthritis, Breast cancer (Multi), Breast cancer metastasized to bone (Multi), GERD (gastroesophageal reflux disease), Glaucoma, Hiatal hernia, Hyperlipidemia, and Hypertension.   has a past surgical history that includes Mastectomy complete / simple (Right); Hysterectomy; and Shoulder surgery.  Family History   Problem Relation Name Age of Onset    Colon cancer Sister      Breast cancer Daughter       Oncology History   Breast cancer metastasized to bone (Multi)   9/8/2023 - 11/10/2023 Chemotherapy    Pembrolizumab, 21 Day Cycles     9/29/2023 Initial Diagnosis    Breast cancer metastasized to bone (CMS/HCC)         Arabella Springer  reports that she has never smoked. She has never used smokeless tobacco.  She  reports no history of alcohol use.  She  reports no history of drug use.    Physical Exam  Vitals reviewed.   Constitutional:       Appearance: Normal appearance.   HENT:      Head: Normocephalic.      Mouth/Throat:      Mouth: Mucous membranes are moist.   Eyes:       "Extraocular Movements: Extraocular movements intact.      Pupils: Pupils are equal, round, and reactive to light.   Cardiovascular:      Rate and Rhythm: Normal rate and regular rhythm.      Pulses: Normal pulses.      Heart sounds: Normal heart sounds.   Pulmonary:      Effort: Pulmonary effort is normal.      Breath sounds: Normal breath sounds.   Abdominal:      General: Bowel sounds are normal.      Palpations: Abdomen is soft.   Musculoskeletal:         General: Normal range of motion.      Cervical back: Normal range of motion and neck supple.   Skin:     General: Skin is warm.   Neurological:      General: No focal deficit present.      Mental Status: She is alert and oriented to person, place, and time.   Psychiatric:         Mood and Affect: Mood normal.         Behavior: Behavior normal.       WBC   Date/Time Value Ref Range Status   12/11/2024 09:30 AM 4.6 4.4 - 11.3 x10*3/uL Final   11/13/2024 01:24 PM 4.3 (L) 4.4 - 11.3 x10*3/uL Final   10/17/2024 09:25 AM 3.8 (L) 4.4 - 11.3 x10*3/uL Final     No results found for: \"NRBC\"  RBC   Date Value Ref Range Status   12/11/2024 2.97 (L) 4.00 - 5.20 x10*6/uL Final   11/13/2024 3.04 (L) 4.00 - 5.20 x10*6/uL Final   10/17/2024 2.96 (L) 4.00 - 5.20 x10*6/uL Final     Hemoglobin   Date Value Ref Range Status   12/11/2024 9.1 (L) 12.0 - 16.0 g/dL Final   11/13/2024 9.3 (L) 12.0 - 16.0 g/dL Final   10/17/2024 9.1 (L) 12.0 - 16.0 g/dL Final     Hematocrit   Date Value Ref Range Status   12/11/2024 28.5 (L) 36.0 - 46.0 % Final   11/13/2024 29.3 (L) 36.0 - 46.0 % Final   10/17/2024 28.5 (L) 36.0 - 46.0 % Final     MCV   Date/Time Value Ref Range Status   12/11/2024 09:30 AM 96 80 - 100 fL Final   11/13/2024 01:24 PM 96 80 - 100 fL Final   10/17/2024 09:25 AM 96 80 - 100 fL Final     MCH   Date/Time Value Ref Range Status   12/11/2024 09:30 AM 30.6 26.0 - 34.0 pg Final   11/13/2024 01:24 PM 30.6 26.0 - 34.0 pg Final   10/17/2024 09:25 AM 30.7 26.0 - 34.0 pg Final     MCHC "   Date/Time Value Ref Range Status   12/11/2024 09:30 AM 31.9 (L) 32.0 - 36.0 g/dL Final   11/13/2024 01:24 PM 31.7 (L) 32.0 - 36.0 g/dL Final   10/17/2024 09:25 AM 31.9 (L) 32.0 - 36.0 g/dL Final     RDW   Date/Time Value Ref Range Status   12/11/2024 09:30 AM 14.7 (H) 11.5 - 14.5 % Final   11/13/2024 01:24 PM 14.2 11.5 - 14.5 % Final   10/17/2024 09:25 AM 14.0 11.5 - 14.5 % Final     Platelets   Date/Time Value Ref Range Status   12/11/2024 09:30  150 - 450 x10*3/uL Final   11/13/2024 01:24  150 - 450 x10*3/uL Final   10/17/2024 09:25  150 - 450 x10*3/uL Final     MPV   Date/Time Value Ref Range Status   10/19/2023 08:01 AM 9.2 7.5 - 11.5 fL Final     Neutrophils %   Date/Time Value Ref Range Status   12/11/2024 09:30 AM 53.5 40.0 - 80.0 % Final   11/13/2024 01:24 PM 55.1 40.0 - 80.0 % Final   10/17/2024 09:25 AM 51.9 40.0 - 80.0 % Final     Immature Granulocytes %, Automated   Date/Time Value Ref Range Status   12/11/2024 09:30 AM 0.7 0.0 - 0.9 % Final     Comment:     Immature Granulocyte Count (IG) includes promyelocytes, myelocytes and metamyelocytes but does not include bands. Percent differential counts (%) should be interpreted in the context of the absolute cell counts (cells/UL).   11/13/2024 01:24 PM 0.7 0.0 - 0.9 % Final     Comment:     Immature Granulocyte Count (IG) includes promyelocytes, myelocytes and metamyelocytes but does not include bands. Percent differential counts (%) should be interpreted in the context of the absolute cell counts (cells/UL).   10/17/2024 09:25 AM 0.8 0.0 - 0.9 % Final     Comment:     Immature Granulocyte Count (IG) includes promyelocytes, myelocytes and metamyelocytes but does not include bands. Percent differential counts (%) should be interpreted in the context of the absolute cell counts (cells/UL).     Lymphocytes %   Date/Time Value Ref Range Status   12/11/2024 09:30 AM 30.6 13.0 - 44.0 % Final   11/13/2024 01:24 PM 29.7 13.0 - 44.0 % Final    10/17/2024 09:25 AM 32.3 13.0 - 44.0 % Final     Monocytes %   Date/Time Value Ref Range Status   12/11/2024 09:30 AM 11.9 2.0 - 10.0 % Final   11/13/2024 01:24 PM 9.7 2.0 - 10.0 % Final   10/17/2024 09:25 AM 10.9 2.0 - 10.0 % Final     Eosinophils %   Date/Time Value Ref Range Status   12/11/2024 09:30 AM 2.6 0.0 - 6.0 % Final   11/13/2024 01:24 PM 4.1 0.0 - 6.0 % Final   10/17/2024 09:25 AM 3.6 0.0 - 6.0 % Final     Basophils %   Date/Time Value Ref Range Status   12/11/2024 09:30 AM 0.7 0.0 - 2.0 % Final   11/13/2024 01:24 PM 0.7 0.0 - 2.0 % Final   10/17/2024 09:25 AM 0.5 0.0 - 2.0 % Final     Neutrophils Absolute   Date/Time Value Ref Range Status   12/11/2024 09:30 AM 2.47 1.60 - 5.50 x10*3/uL Final     Comment:     Percent differential counts (%) should be interpreted in the context of the absolute cell counts (cells/uL).   11/13/2024 01:24 PM 2.39 1.60 - 5.50 x10*3/uL Final     Comment:     Percent differential counts (%) should be interpreted in the context of the absolute cell counts (cells/uL).   10/17/2024 09:25 AM 1.99 1.60 - 5.50 x10*3/uL Final     Comment:     Percent differential counts (%) should be interpreted in the context of the absolute cell counts (cells/uL).     Immature Granulocytes Absolute, Automated   Date/Time Value Ref Range Status   12/11/2024 09:30 AM 0.03 0.00 - 0.50 x10*3/uL Final   11/13/2024 01:24 PM 0.03 0.00 - 0.50 x10*3/uL Final   10/17/2024 09:25 AM 0.03 0.00 - 0.50 x10*3/uL Final     Lymphocytes Absolute   Date/Time Value Ref Range Status   12/11/2024 09:30 AM 1.41 0.80 - 3.00 x10*3/uL Final   11/13/2024 01:24 PM 1.29 0.80 - 3.00 x10*3/uL Final   10/17/2024 09:25 AM 1.24 0.80 - 3.00 x10*3/uL Final     Monocytes Absolute   Date/Time Value Ref Range Status   12/11/2024 09:30 AM 0.55 0.05 - 0.80 x10*3/uL Final   11/13/2024 01:24 PM 0.42 0.05 - 0.80 x10*3/uL Final   10/17/2024 09:25 AM 0.42 0.05 - 0.80 x10*3/uL Final     Eosinophils Absolute   Date/Time Value Ref Range Status  "  12/11/2024 09:30 AM 0.12 0.00 - 0.40 x10*3/uL Final   11/13/2024 01:24 PM 0.18 0.00 - 0.40 x10*3/uL Final   10/17/2024 09:25 AM 0.14 0.00 - 0.40 x10*3/uL Final     Basophils Absolute   Date/Time Value Ref Range Status   12/11/2024 09:30 AM 0.03 0.00 - 0.10 x10*3/uL Final   11/13/2024 01:24 PM 0.03 0.00 - 0.10 x10*3/uL Final   10/17/2024 09:25 AM 0.02 0.00 - 0.10 x10*3/uL Final       No components found for: \"PT\"  No results found for: \"APTT\"    Assessment/Plan    1) stage IV breast cancer  -has high TMB  -has BRCA1  variant of uncertain significance  -also has PIK3CA mutation  -pembrolizumab has been on hold since she developed a diffuse body rash, course of prednisone did not help, in fact a week after finishing prednisone, the rash got worse--papules have become confluent red/pink areas  -she did see her dermatologist (Dr Wolff)--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, and she did have a punch biopsy done--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  -now off steroids  -CT scan done on 4/3/2024 reviewed: interval worsening of bilateral pleural effusions, right >left; multiple scattered <0.5 mm pulmonary nodules bilateral lungs not changed; interval development of several branching opacities involving left lung apex; there are no new worrisome hepatic lesions; mild right sided hydronephrosis; diffuse sclerosis of axial and appendicular skeleton  -has been more short of breath recently--was evaluated in the ER at [facility] on 8/4--was told she had bilateral pleural effusions, which are not actually new  -right sided pleural effusion is already seen on PET scan done in 8/2023  -she had an echo done in June which was read as normal, [facility] did not perform a new one, cardiologist on 8/21 told the patient that she did not have CHF and that these effusions were likely malignant; she was started on low dose lasix which barely helped with the leg edema--and so dose was bumped up; she " was recommended by pulmonology consult to undergo thoracentesis and she declined to have it done  -since effusions are not new, and actually were already noted over one year ago, a normal echo does NOT completely rule out well-compensated CHF  -PET done on 8/16/2024 reviewed--no concerning pulmonary nodule; moderate-to-large bilateral pleura effusions, right greater than left; no FDG avid mediastinal, hilar or axillary lymphadenopathy; s/p right axillary ike dissection; s/p right mastectomy and reconstruction; no FDG avid lymphadenopathy in the abdomen and pelvis; bilateral hydronephrosis; interval improvement of FDG avid widespread bone metastases throughout the axial and appendicular skeleton many of which are non FDG avid ; residual disease noted in bilateral femur (SUV 3.6), pelvis (SUV 2.4), bilateral humerus (SUV 3.3), bilateral clavicles (SUV 2/9)  -there is no FDG activity in her thorax/pleura, making the effusions less likely to be of malignant origin, and given bilateral nature--more likely cardiac in origin  -she had thoracentesis done on 9/24/2024--with removal of 700 ml yellow fluid; cytology was negative  -here for interval followup  -CT scan done on 12/9/2024 reviewed--moderate bilateral pleural effusions with atelectasis, right greater than left; no concerning lung nodule; no mediastinal, hilar or axillary lymphadenopathy; postsurgical changes of right mastectomy with right breast implants;  moderate hydronephrosis left greater than right increased from prior exam; small to moderate volume ascites; new peritoneal nodular thickening (33 mm peritoneal implant in anterior deep pelvis); additional areas of peritoneal nodular thickening and enhancement in deep pelvis; no abdominopelvic lymphadenopathy; similar appearance of diffuse sclerotic lesions throughout the axial and appendicular skeleton; chronic bilateral rib fractures are noted; plate and screw fixation of the right humerus  -bone scan done on  12/9/2024 reviewed similar appearance of diffusely increased radiotracer uptake throughout the axial and appendicular skeleton corresponding with widespread sclerotic osseous lesions on CT  --labs done on 12/11/2024 included CBC, COMP, CA 27.29  -results reviewed--wbc 4.6, hgb 9.1 plt 211,000, creatinine 1.04, calcium 9.0, albumin 4.1 AST 18, ALT 12  CA 27.29 295  -benefits, risks, potential morbidity related to xgeva were reviewed with Arabella and she provided informed consent to proceed  -she went on to receive xgeva 120 mg subcutaneous  -as there is now evidence of progression of disease (new peritoneal metastases), she is failing AI, and I have recommended therapeutic switch  -since her tumor has the PIK3CA mutation, I have advised switching to faslodex + palbociclib + Inavolisib  -benefits, risks, potential morbidity related to faslodex + palbociclib + inavolisib were reviewed with Arabella and she signed informed consent to proceed  -on days 1 and 15 (and then Q28 days) she will receive faslodex 500 mg intramuscular  -she will also take palbociclib 125 mg PO once daily for 21 days on then 7 days off  -she will also take inavolisib 9 mg PO once daily     2) bone metastases  -secondary to metastatic breast cancer  -receives xgeva Q4 weeks     3) anemia  -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  -hgb slowly improving     4) hyperlipidemia  -on pravastatin     5) arthritis  -on tylenol PRN  -on diclofenac PRN  -received steroid injections into her knees and she feels so much better, so she wants to receive them again     6) hypertension  -on amlodipine  -on losartan-HCTZ     7) GERD  -on nexium     8) glaucoma  -on combigan eyedrops     Problem List Items Addressed This Visit             ICD-10-CM    Breast cancer metastasized to bone (Multi) - Primary C50.919, C79.51    Relevant Medications    ondansetron (Zofran) 8 mg tablet    prochlorperazine (Compazine) 10 mg tablet    Other Relevant Orders     Infusion Appointment Request [facility]    CBC and Auto Differential    Comprehensive metabolic panel    Hepatitis B surface antigen    Hepatitis B Core Antibody, Total    Hepatitis B surface antibody    Hemoglobin A1c    Clinic Appointment Request Chemo Follow Up; [facility]    Infusion Appointment Request [facility] INFUSION    CBC and Auto Differential    Comprehensive metabolic panel            Andrew Mcfarland MD

## 2024-12-16 DIAGNOSIS — C50.911 CARCINOMA OF RIGHT BREAST METASTATIC TO BONE (MULTI): Primary | ICD-10-CM

## 2024-12-16 DIAGNOSIS — C79.51 CARCINOMA OF RIGHT BREAST METASTATIC TO BONE (MULTI): Primary | ICD-10-CM

## 2024-12-17 ENCOUNTER — SPECIALTY PHARMACY (OUTPATIENT)
Dept: PHARMACY | Facility: CLINIC | Age: 84
End: 2024-12-17

## 2024-12-21 ASSESSMENT — ENCOUNTER SYMPTOMS
GASTROINTESTINAL NEGATIVE: 1
HEMATOLOGIC/LYMPHATIC NEGATIVE: 1
EYES NEGATIVE: 1
MUSCULOSKELETAL NEGATIVE: 1
CARDIOVASCULAR NEGATIVE: 1
ENDOCRINE NEGATIVE: 1
NEUROLOGICAL NEGATIVE: 1
FATIGUE: 1
PSYCHIATRIC NEGATIVE: 1
SHORTNESS OF BREATH: 1

## 2024-12-23 ENCOUNTER — APPOINTMENT (OUTPATIENT)
Dept: HEMATOLOGY/ONCOLOGY | Facility: CLINIC | Age: 84
End: 2024-12-23
Payer: MEDICARE

## 2024-12-26 ENCOUNTER — LAB (OUTPATIENT)
Dept: LAB | Facility: CLINIC | Age: 84
End: 2024-12-26
Payer: MEDICARE

## 2024-12-26 DIAGNOSIS — C79.51 CARCINOMA OF RIGHT BREAST METASTATIC TO BONE (MULTI): ICD-10-CM

## 2024-12-26 DIAGNOSIS — C50.911 CARCINOMA OF RIGHT BREAST METASTATIC TO BONE (MULTI): ICD-10-CM

## 2024-12-26 LAB
ALBUMIN SERPL BCP-MCNC: 4 G/DL (ref 3.4–5)
ALP SERPL-CCNC: 51 U/L (ref 33–136)
ALT SERPL W P-5'-P-CCNC: 11 U/L (ref 7–45)
ANION GAP SERPL CALC-SCNC: 15 MMOL/L (ref 10–20)
AST SERPL W P-5'-P-CCNC: 18 U/L (ref 9–39)
BASOPHILS # BLD AUTO: 0.01 X10*3/UL (ref 0–0.1)
BASOPHILS NFR BLD AUTO: 0.2 %
BILIRUB SERPL-MCNC: 0.4 MG/DL (ref 0–1.2)
BUN SERPL-MCNC: 29 MG/DL (ref 6–23)
CALCIUM SERPL-MCNC: 8.3 MG/DL (ref 8.6–10.3)
CANCER AG27-29 SERPL-ACNC: 296.8 U/ML (ref 0–38.6)
CHLORIDE SERPL-SCNC: 98 MMOL/L (ref 98–107)
CO2 SERPL-SCNC: 29 MMOL/L (ref 21–32)
CREAT SERPL-MCNC: 1.18 MG/DL (ref 0.5–1.05)
EGFRCR SERPLBLD CKD-EPI 2021: 46 ML/MIN/1.73M*2
EOSINOPHIL # BLD AUTO: 0.1 X10*3/UL (ref 0–0.4)
EOSINOPHIL NFR BLD AUTO: 2.5 %
ERYTHROCYTE [DISTWIDTH] IN BLOOD BY AUTOMATED COUNT: 14.5 % (ref 11.5–14.5)
EST. AVERAGE GLUCOSE BLD GHB EST-MCNC: 120 MG/DL
GLUCOSE SERPL-MCNC: 97 MG/DL (ref 74–99)
HBA1C MFR BLD: 5.8 %
HBV SURFACE AB SER-ACNC: <3.1 MIU/ML
HBV SURFACE AG SERPL QL IA: NONREACTIVE
HCT VFR BLD AUTO: 29.1 % (ref 36–46)
HGB BLD-MCNC: 9.4 G/DL (ref 12–16)
IMM GRANULOCYTES # BLD AUTO: 0.03 X10*3/UL (ref 0–0.5)
IMM GRANULOCYTES NFR BLD AUTO: 0.7 % (ref 0–0.9)
LYMPHOCYTES # BLD AUTO: 1.28 X10*3/UL (ref 0.8–3)
LYMPHOCYTES NFR BLD AUTO: 31.4 %
MCH RBC QN AUTO: 30.4 PG (ref 26–34)
MCHC RBC AUTO-ENTMCNC: 32.3 G/DL (ref 32–36)
MCV RBC AUTO: 94 FL (ref 80–100)
MONOCYTES # BLD AUTO: 0.52 X10*3/UL (ref 0.05–0.8)
MONOCYTES NFR BLD AUTO: 12.7 %
NEUTROPHILS # BLD AUTO: 2.14 X10*3/UL (ref 1.6–5.5)
NEUTROPHILS NFR BLD AUTO: 52.5 %
PLATELET # BLD AUTO: 191 X10*3/UL (ref 150–450)
POTASSIUM SERPL-SCNC: 4 MMOL/L (ref 3.5–5.3)
PROT SERPL-MCNC: 6.3 G/DL (ref 6.4–8.2)
RBC # BLD AUTO: 3.09 X10*6/UL (ref 4–5.2)
SODIUM SERPL-SCNC: 138 MMOL/L (ref 136–145)
WBC # BLD AUTO: 4.1 X10*3/UL (ref 4.4–11.3)

## 2024-12-26 PROCEDURE — 80053 COMPREHEN METABOLIC PANEL: CPT

## 2024-12-26 PROCEDURE — 83036 HEMOGLOBIN GLYCOSYLATED A1C: CPT

## 2024-12-26 PROCEDURE — 86704 HEP B CORE ANTIBODY TOTAL: CPT

## 2024-12-26 PROCEDURE — 86300 IMMUNOASSAY TUMOR CA 15-3: CPT

## 2024-12-26 PROCEDURE — 36415 COLL VENOUS BLD VENIPUNCTURE: CPT

## 2024-12-26 PROCEDURE — 86706 HEP B SURFACE ANTIBODY: CPT

## 2024-12-26 PROCEDURE — 87340 HEPATITIS B SURFACE AG IA: CPT

## 2024-12-26 PROCEDURE — 85025 COMPLETE CBC W/AUTO DIFF WBC: CPT

## 2024-12-27 ENCOUNTER — APPOINTMENT (OUTPATIENT)
Dept: OTOLARYNGOLOGY | Facility: CLINIC | Age: 84
End: 2024-12-27
Payer: MEDICARE

## 2024-12-27 ENCOUNTER — INFUSION (OUTPATIENT)
Dept: HEMATOLOGY/ONCOLOGY | Facility: CLINIC | Age: 84
End: 2024-12-27
Payer: MEDICARE

## 2024-12-27 VITALS
BODY MASS INDEX: 32.29 KG/M2 | TEMPERATURE: 96.8 F | WEIGHT: 165.34 LBS | DIASTOLIC BLOOD PRESSURE: 86 MMHG | HEART RATE: 80 BPM | RESPIRATION RATE: 18 BRPM | OXYGEN SATURATION: 92 % | SYSTOLIC BLOOD PRESSURE: 136 MMHG

## 2024-12-27 DIAGNOSIS — C50.911 CARCINOMA OF RIGHT BREAST METASTATIC TO BONE (MULTI): ICD-10-CM

## 2024-12-27 DIAGNOSIS — C79.51 CARCINOMA OF RIGHT BREAST METASTATIC TO BONE (MULTI): ICD-10-CM

## 2024-12-27 LAB — HBV CORE AB SER QL: NONREACTIVE

## 2024-12-27 PROCEDURE — 96402 CHEMO HORMON ANTINEOPL SQ/IM: CPT

## 2024-12-27 PROCEDURE — 2500000004 HC RX 250 GENERAL PHARMACY W/ HCPCS (ALT 636 FOR OP/ED): Mod: JZ,JG | Performed by: INTERNAL MEDICINE

## 2024-12-27 RX ORDER — HEPARIN 100 UNIT/ML
500 SYRINGE INTRAVENOUS AS NEEDED
OUTPATIENT
Start: 2024-12-27

## 2024-12-27 RX ORDER — HEPARIN SODIUM,PORCINE/PF 10 UNIT/ML
50 SYRINGE (ML) INTRAVENOUS AS NEEDED
OUTPATIENT
Start: 2024-12-27

## 2024-12-27 RX ORDER — LAMOTRIGINE 25 MG/1
500 TABLET ORAL ONCE
Status: COMPLETED | OUTPATIENT
Start: 2024-12-27 | End: 2024-12-27

## 2024-12-27 ASSESSMENT — PAIN SCALES - GENERAL: PAINLEVEL_OUTOF10: 0-NO PAIN

## 2025-01-02 ENCOUNTER — SPECIALTY PHARMACY (OUTPATIENT)
Dept: PHARMACY | Facility: CLINIC | Age: 85
End: 2025-01-02

## 2025-01-02 PROCEDURE — RXMED WILLOW AMBULATORY MEDICATION CHARGE

## 2025-01-03 ENCOUNTER — PHARMACY VISIT (OUTPATIENT)
Dept: PHARMACY | Facility: CLINIC | Age: 85
End: 2025-01-03
Payer: COMMERCIAL

## 2025-01-06 ENCOUNTER — APPOINTMENT (OUTPATIENT)
Dept: HEMATOLOGY/ONCOLOGY | Facility: CLINIC | Age: 85
End: 2025-01-06
Payer: MEDICARE

## 2025-01-08 ENCOUNTER — APPOINTMENT (OUTPATIENT)
Dept: OTOLARYNGOLOGY | Facility: CLINIC | Age: 85
End: 2025-01-08
Payer: MEDICARE

## 2025-01-08 DIAGNOSIS — H60.8X3 OTHER NONINFECTIOUS CHRONIC OTITIS EXTERNA OF BOTH EARS: ICD-10-CM

## 2025-01-08 DIAGNOSIS — C79.51 CARCINOMA OF BREAST METASTATIC TO BONE, UNSPECIFIED LATERALITY (MULTI): Primary | ICD-10-CM

## 2025-01-08 DIAGNOSIS — C50.919 CARCINOMA OF BREAST METASTATIC TO BONE, UNSPECIFIED LATERALITY (MULTI): Primary | ICD-10-CM

## 2025-01-08 PROCEDURE — 99213 OFFICE O/P EST LOW 20 MIN: CPT | Performed by: OTOLARYNGOLOGY

## 2025-01-08 PROCEDURE — 1160F RVW MEDS BY RX/DR IN RCRD: CPT | Performed by: OTOLARYNGOLOGY

## 2025-01-08 PROCEDURE — 1159F MED LIST DOCD IN RCRD: CPT | Performed by: OTOLARYNGOLOGY

## 2025-01-08 NOTE — PROGRESS NOTES
Patient returns.  Seeing her back today surveillance recheck on her history of chronic otitis externa poss related to medication for her cancer.  Unfortunately she has been found to have significant metastasis now to the abdomen from her breast cancer.  Therefore, her medications have been changed.  She denies any other worrisome symptoms or signs.  All remaining inquiry is clear.    Exam:  Ear exam is noted to be filled with a prominent skin slough type issue all debrided very easily.  Following removal looks dramatically better on both sides.  This is quite impressive when it is removed.  It is almost a cast of the entire canal.  Nasal exam is clear anteriorly. The septum is relatively straight and the nasal mucosa is grossly unremarkable without evidence of any worrisome infection or polyp or mass. The oral cavity and oropharynx are unremarkable. There is no evidence of any gross lesion or mucosal irregularity throughout the structures. The neck is negative for mass or lymphadenopathy. The trachea and parotid region are clear free of obvious mass or tumor. Facial structures are grossly otherwise unremarkable as well.    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  We will see her back in a month for next check.  Hold off on any aggressive intervention for the earlobe laceration left.  All questions were answered in this regard accordingly.

## 2025-01-09 ENCOUNTER — LAB (OUTPATIENT)
Dept: LAB | Facility: CLINIC | Age: 85
End: 2025-01-09
Payer: MEDICARE

## 2025-01-09 DIAGNOSIS — C79.51 CARCINOMA OF RIGHT BREAST METASTATIC TO BONE (MULTI): ICD-10-CM

## 2025-01-09 DIAGNOSIS — M89.9 LYTIC BONE LESIONS ON XRAY: ICD-10-CM

## 2025-01-09 DIAGNOSIS — C50.911 CARCINOMA OF RIGHT BREAST METASTATIC TO BONE (MULTI): ICD-10-CM

## 2025-01-09 LAB
ALBUMIN SERPL BCP-MCNC: 4 G/DL (ref 3.4–5)
ALP SERPL-CCNC: 48 U/L (ref 33–136)
ALT SERPL W P-5'-P-CCNC: 10 U/L (ref 7–45)
ANION GAP SERPL CALC-SCNC: 9 MMOL/L (ref 10–20)
AST SERPL W P-5'-P-CCNC: 16 U/L (ref 9–39)
BASOPHILS # BLD AUTO: 0.01 X10*3/UL (ref 0–0.1)
BASOPHILS NFR BLD AUTO: 0.2 %
BILIRUB SERPL-MCNC: 0.3 MG/DL (ref 0–1.2)
BUN SERPL-MCNC: 27 MG/DL (ref 6–23)
CALCIUM SERPL-MCNC: 8.8 MG/DL (ref 8.6–10.3)
CHLORIDE SERPL-SCNC: 99 MMOL/L (ref 98–107)
CO2 SERPL-SCNC: 32 MMOL/L (ref 21–32)
CREAT SERPL-MCNC: 1.45 MG/DL (ref 0.5–1.05)
EGFRCR SERPLBLD CKD-EPI 2021: 36 ML/MIN/1.73M*2
EOSINOPHIL # BLD AUTO: 0.12 X10*3/UL (ref 0–0.4)
EOSINOPHIL NFR BLD AUTO: 2.3 %
ERYTHROCYTE [DISTWIDTH] IN BLOOD BY AUTOMATED COUNT: 14.6 % (ref 11.5–14.5)
GLUCOSE SERPL-MCNC: 122 MG/DL (ref 74–99)
HCT VFR BLD AUTO: 29.6 % (ref 36–46)
HGB BLD-MCNC: 9.3 G/DL (ref 12–16)
IMM GRANULOCYTES # BLD AUTO: 0.02 X10*3/UL (ref 0–0.5)
IMM GRANULOCYTES NFR BLD AUTO: 0.4 % (ref 0–0.9)
LYMPHOCYTES # BLD AUTO: 1.94 X10*3/UL (ref 0.8–3)
LYMPHOCYTES NFR BLD AUTO: 37.5 %
MCH RBC QN AUTO: 30.2 PG (ref 26–34)
MCHC RBC AUTO-ENTMCNC: 31.4 G/DL (ref 32–36)
MCV RBC AUTO: 96 FL (ref 80–100)
MONOCYTES # BLD AUTO: 0.56 X10*3/UL (ref 0.05–0.8)
MONOCYTES NFR BLD AUTO: 10.8 %
NEUTROPHILS # BLD AUTO: 2.53 X10*3/UL (ref 1.6–5.5)
NEUTROPHILS NFR BLD AUTO: 48.8 %
PLATELET # BLD AUTO: 243 X10*3/UL (ref 150–450)
POTASSIUM SERPL-SCNC: 4.4 MMOL/L (ref 3.5–5.3)
PROT SERPL-MCNC: 6.4 G/DL (ref 6.4–8.2)
RBC # BLD AUTO: 3.08 X10*6/UL (ref 4–5.2)
SODIUM SERPL-SCNC: 136 MMOL/L (ref 136–145)
WBC # BLD AUTO: 5.2 X10*3/UL (ref 4.4–11.3)

## 2025-01-09 PROCEDURE — 36415 COLL VENOUS BLD VENIPUNCTURE: CPT

## 2025-01-09 PROCEDURE — 84075 ASSAY ALKALINE PHOSPHATASE: CPT

## 2025-01-09 PROCEDURE — 85025 COMPLETE CBC W/AUTO DIFF WBC: CPT

## 2025-01-09 PROCEDURE — 86300 IMMUNOASSAY TUMOR CA 15-3: CPT

## 2025-01-10 ENCOUNTER — OFFICE VISIT (OUTPATIENT)
Dept: HEMATOLOGY/ONCOLOGY | Facility: CLINIC | Age: 85
End: 2025-01-10
Payer: MEDICARE

## 2025-01-10 ENCOUNTER — INFUSION (OUTPATIENT)
Dept: HEMATOLOGY/ONCOLOGY | Facility: CLINIC | Age: 85
End: 2025-01-10
Payer: MEDICARE

## 2025-01-10 VITALS
HEART RATE: 74 BPM | WEIGHT: 164.24 LBS | RESPIRATION RATE: 16 BRPM | TEMPERATURE: 96.8 F | BODY MASS INDEX: 32.08 KG/M2 | OXYGEN SATURATION: 94 % | DIASTOLIC BLOOD PRESSURE: 52 MMHG | SYSTOLIC BLOOD PRESSURE: 117 MMHG

## 2025-01-10 DIAGNOSIS — M19.90 ARTHRITIS: ICD-10-CM

## 2025-01-10 DIAGNOSIS — C79.51 CARCINOMA OF BREAST METASTATIC TO BONE, UNSPECIFIED LATERALITY (MULTI): ICD-10-CM

## 2025-01-10 DIAGNOSIS — C50.911 CARCINOMA OF RIGHT BREAST METASTATIC TO BONE (MULTI): ICD-10-CM

## 2025-01-10 DIAGNOSIS — E78.2 MIXED HYPERLIPIDEMIA: ICD-10-CM

## 2025-01-10 DIAGNOSIS — M89.9 LYTIC BONE LESIONS ON XRAY: ICD-10-CM

## 2025-01-10 DIAGNOSIS — D61.82 MYELOPHTHISIC ANEMIA (MULTI): ICD-10-CM

## 2025-01-10 DIAGNOSIS — H40.9 GLAUCOMA OF BOTH EYES, UNSPECIFIED GLAUCOMA TYPE: ICD-10-CM

## 2025-01-10 DIAGNOSIS — C50.919 CARCINOMA OF BREAST METASTATIC TO BONE, UNSPECIFIED LATERALITY (MULTI): ICD-10-CM

## 2025-01-10 DIAGNOSIS — C79.51 CARCINOMA OF RIGHT BREAST METASTATIC TO BONE (MULTI): Primary | ICD-10-CM

## 2025-01-10 DIAGNOSIS — C79.51 CARCINOMA OF RIGHT BREAST METASTATIC TO BONE (MULTI): ICD-10-CM

## 2025-01-10 DIAGNOSIS — K21.00 GASTROESOPHAGEAL REFLUX DISEASE WITH ESOPHAGITIS WITHOUT HEMORRHAGE: ICD-10-CM

## 2025-01-10 DIAGNOSIS — I10 ESSENTIAL HYPERTENSION: ICD-10-CM

## 2025-01-10 DIAGNOSIS — C50.911 CARCINOMA OF RIGHT BREAST METASTATIC TO BONE (MULTI): Primary | ICD-10-CM

## 2025-01-10 LAB — CANCER AG27-29 SERPL-ACNC: 302.4 U/ML (ref 0–38.6)

## 2025-01-10 PROCEDURE — 96402 CHEMO HORMON ANTINEOPL SQ/IM: CPT

## 2025-01-10 PROCEDURE — 3078F DIAST BP <80 MM HG: CPT | Performed by: INTERNAL MEDICINE

## 2025-01-10 PROCEDURE — 96372 THER/PROPH/DIAG INJ SC/IM: CPT

## 2025-01-10 PROCEDURE — 99214 OFFICE O/P EST MOD 30 MIN: CPT | Performed by: INTERNAL MEDICINE

## 2025-01-10 PROCEDURE — 2500000004 HC RX 250 GENERAL PHARMACY W/ HCPCS (ALT 636 FOR OP/ED): Mod: JG,TB | Performed by: INTERNAL MEDICINE

## 2025-01-10 PROCEDURE — 2500000004 HC RX 250 GENERAL PHARMACY W/ HCPCS (ALT 636 FOR OP/ED): Mod: JZ,JG,TB

## 2025-01-10 PROCEDURE — 3074F SYST BP LT 130 MM HG: CPT | Performed by: INTERNAL MEDICINE

## 2025-01-10 PROCEDURE — 1160F RVW MEDS BY RX/DR IN RCRD: CPT | Performed by: INTERNAL MEDICINE

## 2025-01-10 PROCEDURE — G2211 COMPLEX E/M VISIT ADD ON: HCPCS | Performed by: INTERNAL MEDICINE

## 2025-01-10 PROCEDURE — 1159F MED LIST DOCD IN RCRD: CPT | Performed by: INTERNAL MEDICINE

## 2025-01-10 PROCEDURE — 1126F AMNT PAIN NOTED NONE PRSNT: CPT | Performed by: INTERNAL MEDICINE

## 2025-01-10 RX ORDER — LAMOTRIGINE 25 MG/1
500 TABLET ORAL ONCE
Status: COMPLETED | OUTPATIENT
Start: 2025-01-10 | End: 2025-01-10

## 2025-01-10 RX ORDER — FAMOTIDINE 10 MG/ML
20 INJECTION INTRAVENOUS ONCE AS NEEDED
Status: DISCONTINUED | OUTPATIENT
Start: 2025-01-10 | End: 2025-01-10 | Stop reason: HOSPADM

## 2025-01-10 RX ORDER — DIPHENHYDRAMINE HYDROCHLORIDE 50 MG/ML
50 INJECTION INTRAMUSCULAR; INTRAVENOUS AS NEEDED
OUTPATIENT
Start: 2025-02-07

## 2025-01-10 RX ORDER — ALBUTEROL SULFATE 0.83 MG/ML
3 SOLUTION RESPIRATORY (INHALATION) AS NEEDED
OUTPATIENT
Start: 2025-01-24

## 2025-01-10 RX ORDER — FAMOTIDINE 10 MG/ML
20 INJECTION INTRAVENOUS ONCE AS NEEDED
OUTPATIENT
Start: 2025-02-07

## 2025-01-10 RX ORDER — ALBUTEROL SULFATE 0.83 MG/ML
3 SOLUTION RESPIRATORY (INHALATION) AS NEEDED
OUTPATIENT
Start: 2025-02-07

## 2025-01-10 RX ORDER — EPINEPHRINE 0.3 MG/.3ML
0.3 INJECTION SUBCUTANEOUS EVERY 5 MIN PRN
OUTPATIENT
Start: 2025-01-24

## 2025-01-10 RX ORDER — ALBUTEROL SULFATE 0.83 MG/ML
3 SOLUTION RESPIRATORY (INHALATION) AS NEEDED
Status: DISCONTINUED | OUTPATIENT
Start: 2025-01-10 | End: 2025-01-10 | Stop reason: HOSPADM

## 2025-01-10 RX ORDER — DIPHENHYDRAMINE HYDROCHLORIDE 50 MG/ML
50 INJECTION INTRAMUSCULAR; INTRAVENOUS AS NEEDED
OUTPATIENT
Start: 2025-01-24

## 2025-01-10 RX ORDER — EPINEPHRINE 0.3 MG/.3ML
0.3 INJECTION SUBCUTANEOUS EVERY 5 MIN PRN
Status: DISCONTINUED | OUTPATIENT
Start: 2025-01-10 | End: 2025-01-10 | Stop reason: HOSPADM

## 2025-01-10 RX ORDER — LAMOTRIGINE 25 MG/1
500 TABLET ORAL ONCE
OUTPATIENT
Start: 2025-01-24

## 2025-01-10 RX ORDER — DIPHENHYDRAMINE HYDROCHLORIDE 50 MG/ML
50 INJECTION INTRAMUSCULAR; INTRAVENOUS AS NEEDED
Status: DISCONTINUED | OUTPATIENT
Start: 2025-01-10 | End: 2025-01-10 | Stop reason: HOSPADM

## 2025-01-10 RX ORDER — FAMOTIDINE 10 MG/ML
20 INJECTION INTRAVENOUS ONCE AS NEEDED
OUTPATIENT
Start: 2025-01-24

## 2025-01-10 RX ORDER — EPINEPHRINE 0.3 MG/.3ML
0.3 INJECTION SUBCUTANEOUS EVERY 5 MIN PRN
OUTPATIENT
Start: 2025-02-07

## 2025-01-10 RX ADMIN — DENOSUMAB 120 MG: 120 INJECTION SUBCUTANEOUS at 11:19

## 2025-01-10 RX ADMIN — FULVESTRANT 500 MG: 50 INJECTION, SOLUTION INTRAMUSCULAR at 11:28

## 2025-01-10 ASSESSMENT — PAIN SCALES - GENERAL: PAINLEVEL_OUTOF10: 0-NO PAIN

## 2025-01-10 NOTE — PATIENT INSTRUCTIONS
Continue taking the palbociclib (21 days on then 7 days off) and the inavolisib    You will return in 2 weeks for faslodex

## 2025-01-10 NOTE — PROGRESS NOTES
Patient ID: Arabella Springer is a 84 y.o. female.  Referring Physician: Andrew Mcfarland MD  [address]  [place] [address]  Primary Care Provider: Angel Luis Velazquez DO  Visit Type: Follow Up      Subjective    HPI I started the new pills 2 days ago    Review of Systems   Constitutional: Negative.    HENT:  Negative.     Eyes: Negative.    Respiratory:  Positive for shortness of breath.    Cardiovascular: Negative.    Gastrointestinal: Negative.    Endocrine: Negative.    Genitourinary: Negative.     Musculoskeletal: Negative.    Skin: Negative.    Neurological: Negative.    Hematological: Negative.    Psychiatric/Behavioral: Negative.          Objective   BSA: 1.78 meters squared  /52 (BP Location: Left arm, Patient Position: Sitting, BP Cuff Size: Adult)   Pulse 74   Temp 36 °C (96.8 °F) (Temporal)   Resp 16   Wt 74.5 kg (164 lb 3.9 oz)   SpO2 94%   BMI 32.08 kg/m²      has a past medical history of Anxiety, Arthritis, Breast cancer (Multi), Breast cancer metastasized to bone (Multi), GERD (gastroesophageal reflux disease), Glaucoma, Hiatal hernia, Hyperlipidemia, and Hypertension.   has a past surgical history that includes Mastectomy complete / simple (Right); Hysterectomy; and Shoulder surgery.  Family History   Problem Relation Name Age of Onset    Colon cancer Sister      Breast cancer Daughter       Oncology History   Breast cancer metastasized to bone (Multi)   9/8/2023 - 11/10/2023 Chemotherapy    Pembrolizumab, 21 Day Cycles     9/29/2023 Initial Diagnosis    Breast cancer metastasized to bone (CMS/HCC)     12/27/2024 -  Chemotherapy    Inavolisib + Palbociclib + Fulvestrant, 28 Day Cycles         Arabella Springer  reports that she has never smoked. She has never used smokeless tobacco.  She  reports no history of alcohol use.  She  reports no history of drug use.    Physical Exam  Vitals reviewed.   Constitutional:       Appearance: Normal appearance.   HENT:      Head:  "Normocephalic.      Mouth/Throat:      Mouth: Mucous membranes are moist.   Eyes:      Extraocular Movements: Extraocular movements intact.      Pupils: Pupils are equal, round, and reactive to light.   Cardiovascular:      Rate and Rhythm: Normal rate and regular rhythm.      Pulses: Normal pulses.      Heart sounds: Normal heart sounds.   Pulmonary:      Effort: Pulmonary effort is normal.      Breath sounds: Normal breath sounds.   Abdominal:      General: Bowel sounds are normal.      Palpations: Abdomen is soft.   Musculoskeletal:         General: Normal range of motion.      Cervical back: Normal range of motion and neck supple.   Skin:     General: Skin is warm.   Neurological:      General: No focal deficit present.      Mental Status: She is alert and oriented to person, place, and time.   Psychiatric:         Mood and Affect: Mood normal.         Behavior: Behavior normal.         WBC   Date/Time Value Ref Range Status   01/09/2025 01:08 PM 5.2 4.4 - 11.3 x10*3/uL Final   12/26/2024 02:23 PM 4.1 (L) 4.4 - 11.3 x10*3/uL Final   12/11/2024 09:30 AM 4.6 4.4 - 11.3 x10*3/uL Final     No results found for: \"NRBC\"  RBC   Date Value Ref Range Status   01/09/2025 3.08 (L) 4.00 - 5.20 x10*6/uL Final   12/26/2024 3.09 (L) 4.00 - 5.20 x10*6/uL Final   12/11/2024 2.97 (L) 4.00 - 5.20 x10*6/uL Final     Hemoglobin   Date Value Ref Range Status   01/09/2025 9.3 (L) 12.0 - 16.0 g/dL Final   12/26/2024 9.4 (L) 12.0 - 16.0 g/dL Final   12/11/2024 9.1 (L) 12.0 - 16.0 g/dL Final     Hematocrit   Date Value Ref Range Status   01/09/2025 29.6 (L) 36.0 - 46.0 % Final   12/26/2024 29.1 (L) 36.0 - 46.0 % Final   12/11/2024 28.5 (L) 36.0 - 46.0 % Final     MCV   Date/Time Value Ref Range Status   01/09/2025 01:08 PM 96 80 - 100 fL Final   12/26/2024 02:23 PM 94 80 - 100 fL Final   12/11/2024 09:30 AM 96 80 - 100 fL Final     MCH   Date/Time Value Ref Range Status   01/09/2025 01:08 PM 30.2 26.0 - 34.0 pg Final   12/26/2024 02:23 " PM 30.4 26.0 - 34.0 pg Final   12/11/2024 09:30 AM 30.6 26.0 - 34.0 pg Final     MCHC   Date/Time Value Ref Range Status   01/09/2025 01:08 PM 31.4 (L) 32.0 - 36.0 g/dL Final   12/26/2024 02:23 PM 32.3 32.0 - 36.0 g/dL Final   12/11/2024 09:30 AM 31.9 (L) 32.0 - 36.0 g/dL Final     RDW   Date/Time Value Ref Range Status   01/09/2025 01:08 PM 14.6 (H) 11.5 - 14.5 % Final   12/26/2024 02:23 PM 14.5 11.5 - 14.5 % Final   12/11/2024 09:30 AM 14.7 (H) 11.5 - 14.5 % Final     Platelets   Date/Time Value Ref Range Status   01/09/2025 01:08  150 - 450 x10*3/uL Final   12/26/2024 02:23  150 - 450 x10*3/uL Final   12/11/2024 09:30  150 - 450 x10*3/uL Final     MPV   Date/Time Value Ref Range Status   10/19/2023 08:01 AM 9.2 7.5 - 11.5 fL Final     Neutrophils %   Date/Time Value Ref Range Status   01/09/2025 01:08 PM 48.8 40.0 - 80.0 % Final   12/26/2024 02:23 PM 52.5 40.0 - 80.0 % Final   12/11/2024 09:30 AM 53.5 40.0 - 80.0 % Final     Immature Granulocytes %, Automated   Date/Time Value Ref Range Status   01/09/2025 01:08 PM 0.4 0.0 - 0.9 % Final     Comment:     Immature Granulocyte Count (IG) includes promyelocytes, myelocytes and metamyelocytes but does not include bands. Percent differential counts (%) should be interpreted in the context of the absolute cell counts (cells/UL).   12/26/2024 02:23 PM 0.7 0.0 - 0.9 % Final     Comment:     Immature Granulocyte Count (IG) includes promyelocytes, myelocytes and metamyelocytes but does not include bands. Percent differential counts (%) should be interpreted in the context of the absolute cell counts (cells/UL).   12/11/2024 09:30 AM 0.7 0.0 - 0.9 % Final     Comment:     Immature Granulocyte Count (IG) includes promyelocytes, myelocytes and metamyelocytes but does not include bands. Percent differential counts (%) should be interpreted in the context of the absolute cell counts (cells/UL).     Lymphocytes %   Date/Time Value Ref Range Status   01/09/2025  01:08 PM 37.5 13.0 - 44.0 % Final   12/26/2024 02:23 PM 31.4 13.0 - 44.0 % Final   12/11/2024 09:30 AM 30.6 13.0 - 44.0 % Final     Monocytes %   Date/Time Value Ref Range Status   01/09/2025 01:08 PM 10.8 2.0 - 10.0 % Final   12/26/2024 02:23 PM 12.7 2.0 - 10.0 % Final   12/11/2024 09:30 AM 11.9 2.0 - 10.0 % Final     Eosinophils %   Date/Time Value Ref Range Status   01/09/2025 01:08 PM 2.3 0.0 - 6.0 % Final   12/26/2024 02:23 PM 2.5 0.0 - 6.0 % Final   12/11/2024 09:30 AM 2.6 0.0 - 6.0 % Final     Basophils %   Date/Time Value Ref Range Status   01/09/2025 01:08 PM 0.2 0.0 - 2.0 % Final   12/26/2024 02:23 PM 0.2 0.0 - 2.0 % Final   12/11/2024 09:30 AM 0.7 0.0 - 2.0 % Final     Neutrophils Absolute   Date/Time Value Ref Range Status   01/09/2025 01:08 PM 2.53 1.60 - 5.50 x10*3/uL Final     Comment:     Percent differential counts (%) should be interpreted in the context of the absolute cell counts (cells/uL).   12/26/2024 02:23 PM 2.14 1.60 - 5.50 x10*3/uL Final     Comment:     Percent differential counts (%) should be interpreted in the context of the absolute cell counts (cells/uL).   12/11/2024 09:30 AM 2.47 1.60 - 5.50 x10*3/uL Final     Comment:     Percent differential counts (%) should be interpreted in the context of the absolute cell counts (cells/uL).     Immature Granulocytes Absolute, Automated   Date/Time Value Ref Range Status   01/09/2025 01:08 PM 0.02 0.00 - 0.50 x10*3/uL Final   12/26/2024 02:23 PM 0.03 0.00 - 0.50 x10*3/uL Final   12/11/2024 09:30 AM 0.03 0.00 - 0.50 x10*3/uL Final     Lymphocytes Absolute   Date/Time Value Ref Range Status   01/09/2025 01:08 PM 1.94 0.80 - 3.00 x10*3/uL Final   12/26/2024 02:23 PM 1.28 0.80 - 3.00 x10*3/uL Final   12/11/2024 09:30 AM 1.41 0.80 - 3.00 x10*3/uL Final     Monocytes Absolute   Date/Time Value Ref Range Status   01/09/2025 01:08 PM 0.56 0.05 - 0.80 x10*3/uL Final   12/26/2024 02:23 PM 0.52 0.05 - 0.80 x10*3/uL Final   12/11/2024 09:30 AM 0.55 0.05 -  "0.80 x10*3/uL Final     Eosinophils Absolute   Date/Time Value Ref Range Status   01/09/2025 01:08 PM 0.12 0.00 - 0.40 x10*3/uL Final   12/26/2024 02:23 PM 0.10 0.00 - 0.40 x10*3/uL Final   12/11/2024 09:30 AM 0.12 0.00 - 0.40 x10*3/uL Final     Basophils Absolute   Date/Time Value Ref Range Status   01/09/2025 01:08 PM 0.01 0.00 - 0.10 x10*3/uL Final   12/26/2024 02:23 PM 0.01 0.00 - 0.10 x10*3/uL Final   12/11/2024 09:30 AM 0.03 0.00 - 0.10 x10*3/uL Final       No components found for: \"PT\"  No results found for: \"APTT\"    Assessment/Plan    1) stage IV breast cancer  -has high TMB  -has BRCA1  variant of uncertain significance  -also has PIK3CA mutation  -pembrolizumab has been on hold since she developed a diffuse body rash, course of prednisone did not help, in fact a week after finishing prednisone, the rash got worse--papules have become confluent red/pink areas  -she did see her dermatologist (Dr Wolff)--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, and she did have a punch biopsy done--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  -now off steroids  -CT scan done on 4/3/2024 reviewed: interval worsening of bilateral pleural effusions, right >left; multiple scattered <0.5 mm pulmonary nodules bilateral lungs not changed; interval development of several branching opacities involving left lung apex; there are no new worrisome hepatic lesions; mild right sided hydronephrosis; diffuse sclerosis of axial and appendicular skeleton  -has been more short of breath recently--was evaluated in the ER at [facility] on 8/4--was told she had bilateral pleural effusions, which are not actually new  -right sided pleural effusion is already seen on PET scan done in 8/2023  -she had an echo done in June which was read as normal, [facility] did not perform a new one, cardiologist on 8/21 told the patient that she did not have CHF and that these effusions were likely malignant; she was started on low dose " lasix which barely helped with the leg edema--and so dose was bumped up; she was recommended by pulmonology consult to undergo thoracentesis and she declined to have it done  -since effusions are not new, and actually were already noted over one year ago, a normal echo does NOT completely rule out well-compensated CHF  -PET done on 8/16/2024 reviewed--no concerning pulmonary nodule; moderate-to-large bilateral pleura effusions, right greater than left; no FDG avid mediastinal, hilar or axillary lymphadenopathy; s/p right axillary ike dissection; s/p right mastectomy and reconstruction; no FDG avid lymphadenopathy in the abdomen and pelvis; bilateral hydronephrosis; interval improvement of FDG avid widespread bone metastases throughout the axial and appendicular skeleton many of which are non FDG avid ; residual disease noted in bilateral femur (SUV 3.6), pelvis (SUV 2.4), bilateral humerus (SUV 3.3), bilateral clavicles (SUV 2/9)  -there is no FDG activity in her thorax/pleura, making the effusions less likely to be of malignant origin, and given bilateral nature--more likely cardiac in origin  -she had thoracentesis done on 9/24/2024--with removal of 700 ml yellow fluid; cytology was negative  -here for interval followup  -CT scan done on 12/9/2024 reviewed--moderate bilateral pleural effusions with atelectasis, right greater than left; no concerning lung nodule; no mediastinal, hilar or axillary lymphadenopathy; postsurgical changes of right mastectomy with right breast implants;  moderate hydronephrosis left greater than right increased from prior exam; small to moderate volume ascites; new peritoneal nodular thickening (33 mm peritoneal implant in anterior deep pelvis); additional areas of peritoneal nodular thickening and enhancement in deep pelvis; no abdominopelvic lymphadenopathy; similar appearance of diffuse sclerotic lesions throughout the axial and appendicular skeleton; chronic bilateral rib fractures  are noted; plate and screw fixation of the right humerus  -bone scan done on 12/9/2024 reviewed similar appearance of diffusely increased radiotracer uptake throughout the axial and appendicular skeleton corresponding with widespread sclerotic osseous lesions on CT  --labs done on 12/11/2024 included CBC, COMP, CA 27.29  -results reviewed--wbc 4.6, hgb 9.1 plt 211,000, creatinine 1.04, calcium 9.0, albumin 4.1 AST 18, ALT 12  CA 27.29 295  -benefits, risks, potential morbidity related to xgeva were reviewed with Arabella and she provided informed consent to proceed  -she went on to receive xgeva 120 mg subcutaneous  -as there is now evidence of progression of disease (new peritoneal metastases), she is failing AI, and I have recommended therapeutic switch  -since her tumor has the PIK3CA mutation, I have advised switching to faslodex + palbociclib + Inavolisib  -benefits, risks, potential morbidity related to faslodex + palbociclib + inavolisib were reviewed with Arabella and she signed informed consent to proceed  -on days 1 and 15 (and then Q28 days) she will receive faslodex 500 mg intramuscular  -she will also take palbociclib 125 mg PO once daily for 21 days on then 7 days off  -she will also take inavolisib 9 mg PO once daily  -here for interval followup  -other than dyspnea on exertion, she feels well  -finally received palbociclib and inavolisib in hand a few days ago and started them 2 days ago  -received faslodex dose #1 two weeks ago  -labs done on 1/9/2025 included CBC + COMP + CA 27.29  -results reviewed--wbc 5.2, hgb 9.3, plt 243,000, creatinine 1.45, calcium 8,8, alk phos 48, AST 16, ALT 10, CA 27.29 302  -benefits, risks, potential morbidity related to faslodex were reviewed with Arabella and she provided informed consent to proceed  -she will receive faslodex 500 mg intramuscular today  -benefits, risks, potential morbidity related to xgeva were reviewed with Arabella and she provided informed consent to  proceed  -she will receive xgeva 120 mg subcutaneous today  -she will continue to take palbociclib 125 mg PO once daily for 21 days on then 7 days off  -she will continue to take inavolisib 9 mg PO once daily  -she will return in 2 weeks for next dose faslodex     2) bone metastases  -secondary to metastatic breast cancer  -receives xgeva Q4 weeks     3) anemia  -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  -hgb slowly improving     4) hyperlipidemia  -on pravastatin     5) arthritis  -on tylenol PRN  -on diclofenac PRN  -received steroid injections into her knees and she feels so much better, so she wants to receive them again     6) hypertension  -on amlodipine  -on losartan-HCTZ     7) GERD  -on nexium     8) glaucoma  -on combigan eyedrops     Problem List Items Addressed This Visit             ICD-10-CM    Breast cancer metastasized to bone (Multi) C50.919, C79.51            Andrew Mcfarland MD

## 2025-01-11 ASSESSMENT — ENCOUNTER SYMPTOMS
SHORTNESS OF BREATH: 1
MUSCULOSKELETAL NEGATIVE: 1
GASTROINTESTINAL NEGATIVE: 1
CONSTITUTIONAL NEGATIVE: 1
HEMATOLOGIC/LYMPHATIC NEGATIVE: 1
NEUROLOGICAL NEGATIVE: 1
EYES NEGATIVE: 1
ENDOCRINE NEGATIVE: 1
PSYCHIATRIC NEGATIVE: 1
CARDIOVASCULAR NEGATIVE: 1

## 2025-01-20 ENCOUNTER — TELEPHONE (OUTPATIENT)
Dept: HEMATOLOGY/ONCOLOGY | Facility: CLINIC | Age: 85
End: 2025-01-20
Payer: MEDICARE

## 2025-01-20 DIAGNOSIS — K64.9 ACUTE HEMORRHOID: ICD-10-CM

## 2025-01-20 DIAGNOSIS — K12.1 STOMATITIS: Primary | ICD-10-CM

## 2025-01-20 RX ORDER — DEXAMETHASONE 0.5 MG/5ML
1 ELIXIR ORAL 4 TIMES DAILY
Qty: 237 ML | Refills: 1 | Status: SHIPPED | OUTPATIENT
Start: 2025-01-20

## 2025-01-20 RX ORDER — HYDROCORTISONE ACETATE 25 MG/1
25 SUPPOSITORY RECTAL 2 TIMES DAILY
Qty: 12 SUPPOSITORY | Refills: 0 | Status: SHIPPED | OUTPATIENT
Start: 2025-01-20 | End: 2025-01-24

## 2025-01-20 NOTE — TELEPHONE ENCOUNTER
Spoke with the patient. Provided update from KIMBERLYN Duong. Discussed how to take/use medications that were sent in and verified correct pharmacy meds were sent to. Discussed continued increase in fluids- without caffeine and s/s when to call office back or go to the ED. Pt verbalized understanding and had no further questions or concerns at this time.

## 2025-01-20 NOTE — TELEPHONE ENCOUNTER
Spoke with the patient. States Diarrhea started on Friday x1. States she has had 1 episode per day (Saturday and Sunday). Did have an episode x1 today. Denies abd pain. Denies fever. Denies vomiting. Taking nausea medications due to the medication she is taking and the nausea medication works. States she is not eating well- orange, toast, chocolate milk, cupcake, pork chop, mashed potatoes and corn, and iced tea. Did discuss to steer away from caffeinated fluids and drink water, powerade/gatorade instead. Taking imodium- 1 per day- and this is helping. States she has developed hemorrhoids from this diarrhea. Asking if there is something we can prescribe to help her hemorrhoids.     States she is also having mouth sores. Seem to be getting worse from last seen on Friday. On lip and on the inside of his mouth, under her tongue. Taking tylenol with a little relief.     Explained I would update the office team and then call her back once I receive a response.

## 2025-01-20 NOTE — TELEPHONE ENCOUNTER
Both are likely side effects of inavolisib. For Grade 1 side effects we will attempt to treat with supportive care. She may require a dose reduction and holding treatment if it progresses to grade 2. Imodium for diarrhea, hydrocortisone suppositories, OTC topical hemorrhoid creams and pads, dexamethasone mouth wash 4 times a day, avoid eating and drinking for 1 hour after. Scripts sent to [facility] this Friday.   Rhoda Santana, ROCHELLE-CNP

## 2025-01-21 ENCOUNTER — HOSPITAL ENCOUNTER (INPATIENT)
Facility: HOSPITAL | Age: 85
LOS: 2 days | Discharge: HOME HEALTH | End: 2025-01-23
Attending: STUDENT IN AN ORGANIZED HEALTH CARE EDUCATION/TRAINING PROGRAM | Admitting: INTERNAL MEDICINE
Payer: MEDICARE

## 2025-01-21 ENCOUNTER — TELEPHONE (OUTPATIENT)
Dept: HEMATOLOGY/ONCOLOGY | Facility: CLINIC | Age: 85
End: 2025-01-21
Payer: MEDICARE

## 2025-01-21 DIAGNOSIS — C79.51 CARCINOMA OF BREAST METASTATIC TO BONE, UNSPECIFIED LATERALITY (MULTI): ICD-10-CM

## 2025-01-21 DIAGNOSIS — E87.1 HYPONATREMIA: ICD-10-CM

## 2025-01-21 DIAGNOSIS — N17.9 AKI (ACUTE KIDNEY INJURY) (CMS-HCC): Primary | ICD-10-CM

## 2025-01-21 DIAGNOSIS — C50.919 CARCINOMA OF BREAST METASTATIC TO BONE, UNSPECIFIED LATERALITY (MULTI): ICD-10-CM

## 2025-01-21 DIAGNOSIS — K13.0 RASH ON LIPS: ICD-10-CM

## 2025-01-21 DIAGNOSIS — R21 LOCALIZED RASH: ICD-10-CM

## 2025-01-21 LAB
ALBUMIN SERPL BCP-MCNC: 4 G/DL (ref 3.4–5)
ALP SERPL-CCNC: 55 U/L (ref 33–136)
ALT SERPL W P-5'-P-CCNC: 12 U/L (ref 7–45)
ANION GAP BLDV CALCULATED.4IONS-SCNC: 13 MMOL/L (ref 10–25)
ANION GAP SERPL CALC-SCNC: 18 MMOL/L (ref 10–20)
APPEARANCE UR: ABNORMAL
AST SERPL W P-5'-P-CCNC: 14 U/L (ref 9–39)
BACTERIA #/AREA URNS AUTO: ABNORMAL /HPF
BASE EXCESS BLDV CALC-SCNC: -1.1 MMOL/L (ref -2–3)
BASOPHILS # BLD AUTO: 0.01 X10*3/UL (ref 0–0.1)
BASOPHILS NFR BLD AUTO: 0.4 %
BILIRUB SERPL-MCNC: 0.4 MG/DL (ref 0–1.2)
BILIRUB UR STRIP.AUTO-MCNC: NEGATIVE MG/DL
BODY TEMPERATURE: ABNORMAL
BUN SERPL-MCNC: 63 MG/DL (ref 6–23)
CA-I BLDV-SCNC: 0.93 MMOL/L (ref 1.1–1.33)
CALCIUM SERPL-MCNC: 7.2 MG/DL (ref 8.6–10.3)
CHLORIDE BLDV-SCNC: 94 MMOL/L (ref 98–107)
CHLORIDE SERPL-SCNC: 92 MMOL/L (ref 98–107)
CO2 SERPL-SCNC: 24 MMOL/L (ref 21–32)
COLOR UR: ABNORMAL
CREAT SERPL-MCNC: 3.36 MG/DL (ref 0.5–1.05)
EGFRCR SERPLBLD CKD-EPI 2021: 13 ML/MIN/1.73M*2
EOSINOPHIL # BLD AUTO: 0.06 X10*3/UL (ref 0–0.4)
EOSINOPHIL NFR BLD AUTO: 2.4 %
ERYTHROCYTE [DISTWIDTH] IN BLOOD BY AUTOMATED COUNT: 15.3 % (ref 11.5–14.5)
FLUAV RNA RESP QL NAA+PROBE: NOT DETECTED
FLUBV RNA RESP QL NAA+PROBE: NOT DETECTED
GLUCOSE BLD MANUAL STRIP-MCNC: 279 MG/DL (ref 74–99)
GLUCOSE BLD MANUAL STRIP-MCNC: 331 MG/DL (ref 74–99)
GLUCOSE BLDV-MCNC: 432 MG/DL (ref 74–99)
GLUCOSE SERPL-MCNC: 397 MG/DL (ref 74–99)
GLUCOSE UR STRIP.AUTO-MCNC: ABNORMAL MG/DL
HCO3 BLDV-SCNC: 25.2 MMOL/L (ref 22–26)
HCT VFR BLD AUTO: 29.2 % (ref 36–46)
HCT VFR BLD EST: 29 % (ref 36–46)
HGB BLD-MCNC: 9.4 G/DL (ref 12–16)
HGB BLDV-MCNC: 9.7 G/DL (ref 12–16)
HYALINE CASTS #/AREA URNS AUTO: ABNORMAL /LPF
IMM GRANULOCYTES # BLD AUTO: 0.01 X10*3/UL (ref 0–0.5)
IMM GRANULOCYTES NFR BLD AUTO: 0.4 % (ref 0–0.9)
INHALED O2 CONCENTRATION: 21 %
KETONES UR STRIP.AUTO-MCNC: NEGATIVE MG/DL
LACTATE BLDV-SCNC: 1.1 MMOL/L (ref 0.4–2)
LEUKOCYTE ESTERASE UR QL STRIP.AUTO: ABNORMAL
LIPASE SERPL-CCNC: 8 U/L (ref 9–82)
LYMPHOCYTES # BLD AUTO: 1.35 X10*3/UL (ref 0.8–3)
LYMPHOCYTES NFR BLD AUTO: 52.9 %
MCH RBC QN AUTO: 30.4 PG (ref 26–34)
MCHC RBC AUTO-ENTMCNC: 32.2 G/DL (ref 32–36)
MCV RBC AUTO: 95 FL (ref 80–100)
MONOCYTES # BLD AUTO: 0.14 X10*3/UL (ref 0.05–0.8)
MONOCYTES NFR BLD AUTO: 5.5 %
MUCOUS THREADS #/AREA URNS AUTO: ABNORMAL /LPF
NEUTROPHILS # BLD AUTO: 0.98 X10*3/UL (ref 1.6–5.5)
NEUTROPHILS NFR BLD AUTO: 38.4 %
NITRITE UR QL STRIP.AUTO: NEGATIVE
NRBC BLD-RTO: 0 /100 WBCS (ref 0–0)
OVALOCYTES BLD QL SMEAR: NORMAL
OXYHGB MFR BLDV: 42.8 % (ref 45–75)
PCO2 BLDV: 49 MM HG (ref 41–51)
PH BLDV: 7.32 PH (ref 7.33–7.43)
PH UR STRIP.AUTO: 5 [PH]
PLATELET # BLD AUTO: 176 X10*3/UL (ref 150–450)
PO2 BLDV: 31 MM HG (ref 35–45)
POLYCHROMASIA BLD QL SMEAR: NORMAL
POTASSIUM BLDV-SCNC: 4.4 MMOL/L (ref 3.5–5.3)
POTASSIUM SERPL-SCNC: 4 MMOL/L (ref 3.5–5.3)
PROT SERPL-MCNC: 7 G/DL (ref 6.4–8.2)
PROT UR STRIP.AUTO-MCNC: NEGATIVE MG/DL
RBC # BLD AUTO: 3.09 X10*6/UL (ref 4–5.2)
RBC # UR STRIP.AUTO: ABNORMAL /UL
RBC #/AREA URNS AUTO: ABNORMAL /HPF
RBC MORPH BLD: NORMAL
RSV RNA RESP QL NAA+PROBE: NOT DETECTED
SAO2 % BLDV: 43 % (ref 45–75)
SARS-COV-2 RNA RESP QL NAA+PROBE: NOT DETECTED
SODIUM BLDV-SCNC: 128 MMOL/L (ref 136–145)
SODIUM SERPL-SCNC: 130 MMOL/L (ref 136–145)
SP GR UR STRIP.AUTO: 1.02
SQUAMOUS #/AREA URNS AUTO: ABNORMAL /HPF
UROBILINOGEN UR STRIP.AUTO-MCNC: NORMAL MG/DL
WBC # BLD AUTO: 2.6 X10*3/UL (ref 4.4–11.3)
WBC #/AREA URNS AUTO: >50 /HPF

## 2025-01-21 PROCEDURE — 87086 URINE CULTURE/COLONY COUNT: CPT | Mod: STJLAB

## 2025-01-21 PROCEDURE — 2500000001 HC RX 250 WO HCPCS SELF ADMINISTERED DRUGS (ALT 637 FOR MEDICARE OP)

## 2025-01-21 PROCEDURE — 2500000004 HC RX 250 GENERAL PHARMACY W/ HCPCS (ALT 636 FOR OP/ED): Performed by: STUDENT IN AN ORGANIZED HEALTH CARE EDUCATION/TRAINING PROGRAM

## 2025-01-21 PROCEDURE — 85025 COMPLETE CBC W/AUTO DIFF WBC: CPT

## 2025-01-21 PROCEDURE — 84132 ASSAY OF SERUM POTASSIUM: CPT

## 2025-01-21 PROCEDURE — 36415 COLL VENOUS BLD VENIPUNCTURE: CPT

## 2025-01-21 PROCEDURE — 99285 EMERGENCY DEPT VISIT HI MDM: CPT | Performed by: STUDENT IN AN ORGANIZED HEALTH CARE EDUCATION/TRAINING PROGRAM

## 2025-01-21 PROCEDURE — 81001 URINALYSIS AUTO W/SCOPE: CPT

## 2025-01-21 PROCEDURE — 87636 SARSCOV2 & INF A&B AMP PRB: CPT

## 2025-01-21 PROCEDURE — 82947 ASSAY GLUCOSE BLOOD QUANT: CPT

## 2025-01-21 PROCEDURE — 2500000004 HC RX 250 GENERAL PHARMACY W/ HCPCS (ALT 636 FOR OP/ED)

## 2025-01-21 PROCEDURE — 80053 COMPREHEN METABOLIC PANEL: CPT

## 2025-01-21 PROCEDURE — 2500000005 HC RX 250 GENERAL PHARMACY W/O HCPCS

## 2025-01-21 PROCEDURE — 99285 EMERGENCY DEPT VISIT HI MDM: CPT

## 2025-01-21 PROCEDURE — 2500000002 HC RX 250 W HCPCS SELF ADMINISTERED DRUGS (ALT 637 FOR MEDICARE OP, ALT 636 FOR OP/ED)

## 2025-01-21 PROCEDURE — 1100000001 HC PRIVATE ROOM DAILY

## 2025-01-21 PROCEDURE — 83690 ASSAY OF LIPASE: CPT | Performed by: STUDENT IN AN ORGANIZED HEALTH CARE EDUCATION/TRAINING PROGRAM

## 2025-01-21 PROCEDURE — 99223 1ST HOSP IP/OBS HIGH 75: CPT

## 2025-01-21 RX ORDER — ALUMINUM HYDROXIDE, MAGNESIUM HYDROXIDE, AND SIMETHICONE 1200; 120; 1200 MG/30ML; MG/30ML; MG/30ML
10 SUSPENSION ORAL EVERY 4 HOURS PRN
Status: DISCONTINUED | OUTPATIENT
Start: 2025-01-21 | End: 2025-01-23 | Stop reason: HOSPADM

## 2025-01-21 RX ORDER — HEPARIN SODIUM 5000 [USP'U]/ML
5000 INJECTION, SOLUTION INTRAVENOUS; SUBCUTANEOUS EVERY 8 HOURS
Status: DISCONTINUED | OUTPATIENT
Start: 2025-01-21 | End: 2025-01-23 | Stop reason: HOSPADM

## 2025-01-21 RX ORDER — FUROSEMIDE 20 MG/1
20 TABLET ORAL DAILY
Status: DISCONTINUED | OUTPATIENT
Start: 2025-01-21 | End: 2025-01-23 | Stop reason: HOSPADM

## 2025-01-21 RX ORDER — ONDANSETRON 4 MG/1
4 TABLET, ORALLY DISINTEGRATING ORAL EVERY 8 HOURS PRN
Status: DISCONTINUED | OUTPATIENT
Start: 2025-01-21 | End: 2025-01-23 | Stop reason: HOSPADM

## 2025-01-21 RX ORDER — NYSTATIN 100000 [USP'U]/G
1 POWDER TOPICAL 2 TIMES DAILY
Status: DISCONTINUED | OUTPATIENT
Start: 2025-01-21 | End: 2025-01-23 | Stop reason: HOSPADM

## 2025-01-21 RX ORDER — PANTOPRAZOLE SODIUM 40 MG/1
40 TABLET, DELAYED RELEASE ORAL
Status: DISCONTINUED | OUTPATIENT
Start: 2025-01-22 | End: 2025-01-23 | Stop reason: HOSPADM

## 2025-01-21 RX ORDER — IPRATROPIUM BROMIDE AND ALBUTEROL SULFATE 2.5; .5 MG/3ML; MG/3ML
3 SOLUTION RESPIRATORY (INHALATION)
Status: DISCONTINUED | OUTPATIENT
Start: 2025-01-21 | End: 2025-01-21

## 2025-01-21 RX ORDER — LIDOCAINE HYDROCHLORIDE 20 MG/ML
5 SOLUTION OROPHARYNGEAL EVERY 4 HOURS PRN
Status: DISCONTINUED | OUTPATIENT
Start: 2025-01-21 | End: 2025-01-23 | Stop reason: HOSPADM

## 2025-01-21 RX ORDER — PRAVASTATIN SODIUM 20 MG/1
10 TABLET ORAL NIGHTLY
Status: DISCONTINUED | OUTPATIENT
Start: 2025-01-21 | End: 2025-01-23 | Stop reason: HOSPADM

## 2025-01-21 RX ORDER — FLUCONAZOLE 150 MG/1
150 TABLET ORAL ONCE
Status: COMPLETED | OUTPATIENT
Start: 2025-01-21 | End: 2025-01-21

## 2025-01-21 RX ORDER — DEXTROSE 50 % IN WATER (D50W) INTRAVENOUS SYRINGE
12.5
Status: DISCONTINUED | OUTPATIENT
Start: 2025-01-21 | End: 2025-01-23 | Stop reason: HOSPADM

## 2025-01-21 RX ORDER — ACETAMINOPHEN 325 MG/1
650 TABLET ORAL EVERY 6 HOURS PRN
Status: DISCONTINUED | OUTPATIENT
Start: 2025-01-21 | End: 2025-01-23 | Stop reason: HOSPADM

## 2025-01-21 RX ORDER — IPRATROPIUM BROMIDE AND ALBUTEROL SULFATE 2.5; .5 MG/3ML; MG/3ML
3 SOLUTION RESPIRATORY (INHALATION) EVERY 4 HOURS PRN
Status: DISCONTINUED | OUTPATIENT
Start: 2025-01-21 | End: 2025-01-23 | Stop reason: HOSPADM

## 2025-01-21 RX ORDER — INSULIN LISPRO 100 [IU]/ML
0-5 INJECTION, SOLUTION INTRAVENOUS; SUBCUTANEOUS
Status: DISCONTINUED | OUTPATIENT
Start: 2025-01-21 | End: 2025-01-22

## 2025-01-21 RX ORDER — DEXTROSE 50 % IN WATER (D50W) INTRAVENOUS SYRINGE
25
Status: DISCONTINUED | OUTPATIENT
Start: 2025-01-21 | End: 2025-01-23 | Stop reason: HOSPADM

## 2025-01-21 RX ORDER — ONDANSETRON HYDROCHLORIDE 2 MG/ML
4 INJECTION, SOLUTION INTRAVENOUS EVERY 8 HOURS PRN
Status: DISCONTINUED | OUTPATIENT
Start: 2025-01-21 | End: 2025-01-23 | Stop reason: HOSPADM

## 2025-01-21 RX ADMIN — ONDANSETRON 4 MG: 4 TABLET, ORALLY DISINTEGRATING ORAL at 15:41

## 2025-01-21 RX ADMIN — PALBOCICLIB 125 MG: 125 TABLET, FILM COATED ORAL at 16:16

## 2025-01-21 RX ADMIN — ALUMINUM HYDROXIDE, MAGNESIUM HYDROXIDE, AND DIMETHICONE 10 ML: 200; 20; 200 SUSPENSION ORAL at 15:42

## 2025-01-21 RX ADMIN — FLUCONAZOLE 150 MG: 150 TABLET ORAL at 13:18

## 2025-01-21 RX ADMIN — SODIUM CHLORIDE, POTASSIUM CHLORIDE, SODIUM LACTATE AND CALCIUM CHLORIDE 1000 ML: 600; 310; 30; 20 INJECTION, SOLUTION INTRAVENOUS at 14:12

## 2025-01-21 RX ADMIN — INSULIN LISPRO 3 UNITS: 100 INJECTION, SOLUTION INTRAVENOUS; SUBCUTANEOUS at 16:40

## 2025-01-21 RX ADMIN — ACETAMINOPHEN 650 MG: 325 TABLET ORAL at 20:41

## 2025-01-21 RX ADMIN — LIDOCAINE HYDROCHLORIDE 5 ML: 20 SOLUTION ORAL at 15:41

## 2025-01-21 RX ADMIN — SODIUM CHLORIDE, POTASSIUM CHLORIDE, SODIUM LACTATE AND CALCIUM CHLORIDE 1000 ML: 600; 310; 30; 20 INJECTION, SOLUTION INTRAVENOUS at 12:06

## 2025-01-21 SDOH — HEALTH STABILITY: MENTAL HEALTH: HOW OFTEN DO YOU HAVE A DRINK CONTAINING ALCOHOL?: NEVER

## 2025-01-21 SDOH — ECONOMIC STABILITY: INCOME INSECURITY: IN THE PAST 12 MONTHS HAS THE ELECTRIC, GAS, OIL, OR WATER COMPANY THREATENED TO SHUT OFF SERVICES IN YOUR HOME?: NO

## 2025-01-21 SDOH — ECONOMIC STABILITY: FOOD INSECURITY: WITHIN THE PAST 12 MONTHS, YOU WORRIED THAT YOUR FOOD WOULD RUN OUT BEFORE YOU GOT THE MONEY TO BUY MORE.: NEVER TRUE

## 2025-01-21 SDOH — SOCIAL STABILITY: SOCIAL INSECURITY
WITHIN THE LAST YEAR, HAVE YOU BEEN RAPED OR FORCED TO HAVE ANY KIND OF SEXUAL ACTIVITY BY YOUR PARTNER OR EX-PARTNER?: NO

## 2025-01-21 SDOH — SOCIAL STABILITY: SOCIAL INSECURITY: WITHIN THE LAST YEAR, HAVE YOU BEEN HUMILIATED OR EMOTIONALLY ABUSED IN OTHER WAYS BY YOUR PARTNER OR EX-PARTNER?: NO

## 2025-01-21 SDOH — ECONOMIC STABILITY: HOUSING INSECURITY: IN THE PAST 12 MONTHS, HOW MANY TIMES HAVE YOU MOVED WHERE YOU WERE LIVING?: 0

## 2025-01-21 SDOH — SOCIAL STABILITY: SOCIAL INSECURITY: WITHIN THE LAST YEAR, HAVE YOU BEEN AFRAID OF YOUR PARTNER OR EX-PARTNER?: NO

## 2025-01-21 SDOH — SOCIAL STABILITY: SOCIAL INSECURITY: ABUSE: ADULT

## 2025-01-21 SDOH — SOCIAL STABILITY: SOCIAL INSECURITY: HAVE YOU HAD THOUGHTS OF HARMING ANYONE ELSE?: NO

## 2025-01-21 SDOH — SOCIAL STABILITY: SOCIAL INSECURITY: DOES ANYONE TRY TO KEEP YOU FROM HAVING/CONTACTING OTHER FRIENDS OR DOING THINGS OUTSIDE YOUR HOME?: NO

## 2025-01-21 SDOH — SOCIAL STABILITY: SOCIAL INSECURITY: WERE YOU ABLE TO COMPLETE ALL THE BEHAVIORAL HEALTH SCREENINGS?: YES

## 2025-01-21 SDOH — ECONOMIC STABILITY: HOUSING INSECURITY: AT ANY TIME IN THE PAST 12 MONTHS, WERE YOU HOMELESS OR LIVING IN A SHELTER (INCLUDING NOW)?: NO

## 2025-01-21 SDOH — SOCIAL STABILITY: SOCIAL INSECURITY: DO YOU FEEL ANYONE HAS EXPLOITED OR TAKEN ADVANTAGE OF YOU FINANCIALLY OR OF YOUR PERSONAL PROPERTY?: NO

## 2025-01-21 SDOH — SOCIAL STABILITY: SOCIAL INSECURITY: DO YOU FEEL UNSAFE GOING BACK TO THE PLACE WHERE YOU ARE LIVING?: NO

## 2025-01-21 SDOH — ECONOMIC STABILITY: HOUSING INSECURITY: IN THE LAST 12 MONTHS, WAS THERE A TIME WHEN YOU WERE NOT ABLE TO PAY THE MORTGAGE OR RENT ON TIME?: NO

## 2025-01-21 SDOH — HEALTH STABILITY: MENTAL HEALTH: HOW MANY DRINKS CONTAINING ALCOHOL DO YOU HAVE ON A TYPICAL DAY WHEN YOU ARE DRINKING?: PATIENT DOES NOT DRINK

## 2025-01-21 SDOH — SOCIAL STABILITY: SOCIAL INSECURITY: ARE YOU OR HAVE YOU BEEN THREATENED OR ABUSED PHYSICALLY, EMOTIONALLY, OR SEXUALLY BY ANYONE?: NO

## 2025-01-21 SDOH — SOCIAL STABILITY: SOCIAL INSECURITY
WITHIN THE LAST YEAR, HAVE YOU BEEN KICKED, HIT, SLAPPED, OR OTHERWISE PHYSICALLY HURT BY YOUR PARTNER OR EX-PARTNER?: NO

## 2025-01-21 SDOH — ECONOMIC STABILITY: TRANSPORTATION INSECURITY: IN THE PAST 12 MONTHS, HAS LACK OF TRANSPORTATION KEPT YOU FROM MEDICAL APPOINTMENTS OR FROM GETTING MEDICATIONS?: NO

## 2025-01-21 SDOH — SOCIAL STABILITY: SOCIAL INSECURITY: ARE THERE ANY APPARENT SIGNS OF INJURIES/BEHAVIORS THAT COULD BE RELATED TO ABUSE/NEGLECT?: NO

## 2025-01-21 SDOH — SOCIAL STABILITY: SOCIAL INSECURITY: HAS ANYONE EVER THREATENED TO HURT YOUR FAMILY OR YOUR PETS?: NO

## 2025-01-21 SDOH — SOCIAL STABILITY: SOCIAL INSECURITY: HAVE YOU HAD ANY THOUGHTS OF HARMING ANYONE ELSE?: NO

## 2025-01-21 SDOH — HEALTH STABILITY: MENTAL HEALTH: HOW OFTEN DO YOU HAVE SIX OR MORE DRINKS ON ONE OCCASION?: NEVER

## 2025-01-21 SDOH — ECONOMIC STABILITY: FOOD INSECURITY: WITHIN THE PAST 12 MONTHS, THE FOOD YOU BOUGHT JUST DIDN'T LAST AND YOU DIDN'T HAVE MONEY TO GET MORE.: NEVER TRUE

## 2025-01-21 SDOH — ECONOMIC STABILITY: FOOD INSECURITY: HOW HARD IS IT FOR YOU TO PAY FOR THE VERY BASICS LIKE FOOD, HOUSING, MEDICAL CARE, AND HEATING?: NOT HARD AT ALL

## 2025-01-21 ASSESSMENT — PATIENT HEALTH QUESTIONNAIRE - PHQ9
SUM OF ALL RESPONSES TO PHQ9 QUESTIONS 1 & 2: 0
1. LITTLE INTEREST OR PLEASURE IN DOING THINGS: NOT AT ALL
2. FEELING DOWN, DEPRESSED OR HOPELESS: NOT AT ALL
SUM OF ALL RESPONSES TO PHQ9 QUESTIONS 1 & 2: 0
1. LITTLE INTEREST OR PLEASURE IN DOING THINGS: NOT AT ALL
2. FEELING DOWN, DEPRESSED OR HOPELESS: NOT AT ALL

## 2025-01-21 ASSESSMENT — LIFESTYLE VARIABLES
SKIP TO QUESTIONS 9-10: 1
AUDIT-C TOTAL SCORE: 0
HOW OFTEN DO YOU HAVE 6 OR MORE DRINKS ON ONE OCCASION: NEVER
HOW OFTEN DO YOU HAVE A DRINK CONTAINING ALCOHOL: NEVER
AUDIT-C TOTAL SCORE: 0
SKIP TO QUESTIONS 9-10: 1
HOW OFTEN DO YOU HAVE 6 OR MORE DRINKS ON ONE OCCASION: NEVER
HAVE PEOPLE ANNOYED YOU BY CRITICIZING YOUR DRINKING: NO
HOW OFTEN DO YOU HAVE A DRINK CONTAINING ALCOHOL: NEVER
HOW MANY STANDARD DRINKS CONTAINING ALCOHOL DO YOU HAVE ON A TYPICAL DAY: PATIENT DOES NOT DRINK
EVER HAD A DRINK FIRST THING IN THE MORNING TO STEADY YOUR NERVES TO GET RID OF A HANGOVER: NO
AUDIT-C TOTAL SCORE: 0
EVER FELT BAD OR GUILTY ABOUT YOUR DRINKING: NO
HAVE YOU EVER FELT YOU SHOULD CUT DOWN ON YOUR DRINKING: NO
SKIP TO QUESTIONS 9-10: 1
HOW MANY STANDARD DRINKS CONTAINING ALCOHOL DO YOU HAVE ON A TYPICAL DAY: PATIENT DOES NOT DRINK
TOTAL SCORE: 0

## 2025-01-21 ASSESSMENT — COGNITIVE AND FUNCTIONAL STATUS - GENERAL
CLIMB 3 TO 5 STEPS WITH RAILING: A LOT
MOVING FROM LYING ON BACK TO SITTING ON SIDE OF FLAT BED WITH BEDRAILS: A LITTLE
PATIENT BASELINE BEDBOUND: NO
MOBILITY SCORE: 17
STANDING UP FROM CHAIR USING ARMS: A LITTLE
MOVING TO AND FROM BED TO CHAIR: A LITTLE
TURNING FROM BACK TO SIDE WHILE IN FLAT BAD: A LITTLE
DAILY ACTIVITIY SCORE: 24
WALKING IN HOSPITAL ROOM: A LITTLE

## 2025-01-21 ASSESSMENT — PAIN - FUNCTIONAL ASSESSMENT
PAIN_FUNCTIONAL_ASSESSMENT: 0-10
PAIN_FUNCTIONAL_ASSESSMENT: 0-10

## 2025-01-21 ASSESSMENT — PAIN SCALES - GENERAL
PAINLEVEL_OUTOF10: 6
PAINLEVEL_OUTOF10: 3
PAINLEVEL_OUTOF10: 0 - NO PAIN

## 2025-01-21 ASSESSMENT — ACTIVITIES OF DAILY LIVING (ADL)
GROOMING: INDEPENDENT
ADEQUATE_TO_COMPLETE_ADL: YES
BATHING: INDEPENDENT
HEARING - RIGHT EAR: FUNCTIONAL
PATIENT'S MEMORY ADEQUATE TO SAFELY COMPLETE DAILY ACTIVITIES?: YES
HEARING - LEFT EAR: FUNCTIONAL
FEEDING YOURSELF: INDEPENDENT
JUDGMENT_ADEQUATE_SAFELY_COMPLETE_DAILY_ACTIVITIES: YES
LACK_OF_TRANSPORTATION: NO
LACK_OF_TRANSPORTATION: NO
TOILETING: INDEPENDENT
DRESSING YOURSELF: INDEPENDENT
WALKS IN HOME: INDEPENDENT

## 2025-01-21 ASSESSMENT — PAIN DESCRIPTION - LOCATION
LOCATION: MOUTH
LOCATION: RECTUM

## 2025-01-21 ASSESSMENT — PAIN DESCRIPTION - PROGRESSION: CLINICAL_PROGRESSION: NOT CHANGED

## 2025-01-21 ASSESSMENT — PAIN DESCRIPTION - PAIN TYPE: TYPE: ACUTE PAIN

## 2025-01-21 ASSESSMENT — PAIN DESCRIPTION - FREQUENCY: FREQUENCY: CONSTANT/CONTINUOUS

## 2025-01-21 NOTE — CARE PLAN
Problem: Pain - Adult  Goal: Verbalizes/displays adequate comfort level or baseline comfort level  Outcome: Progressing     Problem: Safety - Adult  Goal: Free from fall injury  Outcome: Progressing     Problem: Discharge Planning  Goal: Discharge to home or other facility with appropriate resources  Outcome: Progressing     Problem: Chronic Conditions and Co-morbidities  Goal: Patient's chronic conditions and co-morbidity symptoms are monitored and maintained or improved  Outcome: Progressing     Problem: Pain  Goal: Takes deep breaths with improved pain control throughout the shift  Outcome: Progressing  Goal: Turns in bed with improved pain control throughout the shift  Outcome: Progressing  Goal: Walks with improved pain control throughout the shift  Outcome: Progressing  Goal: Performs ADL's with improved pain control throughout shift  Outcome: Progressing  Goal: Participates in PT with improved pain control throughout the shift  Outcome: Progressing  Goal: Free from opioid side effects throughout the shift  Outcome: Progressing  Goal: Free from acute confusion related to pain meds throughout the shift  Outcome: Progressing  No acute events occurred and safety was maintained. Pt tolerated admission to floor this shift and stated understanding of POC. Pt educated on safety protocols. Pts  at bedside. Pt resting in bed with call light within reach.     Jossy Ryder RN

## 2025-01-21 NOTE — NURSING NOTE
Pt admitted from ED, admission screening forms complete by this RNKEISHA RN floor nurse to complete assessment.

## 2025-01-21 NOTE — H&P
History Of Present Illness  Arabella Springer is a 84 y.o. female with significant medical history of metastatic breast cancer, GERD, hyperlipidemia, hypertension presenting with diarrhea.  Onset was 4 days ago, patient reports that she had 1 instance of liquid diarrhea that has been progressively becoming less loose, but today she had 3 instances of loose stools prompting her to be evaluated in the emergency department.  Denies hematochezia/melena.  She has been taking Imodium without relief.  She does have decreased p.o. intake.  Was started on a new chemo regimen by her oncologist Dr. Mcfarland.  She does note that around the time of the diarrhea she noticed that she has new skin findings on her face and arms.  They are itchy and she has been scratching.  She has had rash due to drug reactions previously.  She has been wearing a pad due to her diarrhea and does report dysuria.  She has noticed that she has a painful rash on her pubic region.     Denies fevers, , sore throat, runny nose, chest pain, shortness of breath, cough, palpitations,  vomiting, diarrhea, abdominal pain, black/bloody stools, polyuria, hematuria.    CODE STATUS: DNR and No Intubation and No ICU    ED course reviewed, vitals WNL,Sodium 130, chloride 92, potassium 4.0, creatinine 3.36, BUN 63, glucose 397, white count 2.6, hemoglobin 9.4, platelets 176, flu/RSV/COVID-negative, VBG 7.32/49.  She was administered fluids and given 1 dose of fluconazole in the ED.    Chart Review:  Per oncology note 1/10/25:  - stage IV breast cancer  -has high TMB  -has BRCA1  variant of uncertain significance  -also has PIK3CA mutation  Does appear that she had a diffuse body rash as a reaction to pembrolizumab that did not respond to prednisone and in fact may have gotten worse.        Oncology History   Breast cancer metastasized to bone (Multi)   9/8/2023 - 11/10/2023 Chemotherapy     Pembrolizumab, 21 Day Cycles      9/29/2023 Initial Diagnosis     Breast cancer  metastasized to bone (CMS/HCC)      12/27/2024 -  Chemotherapy     Inavolisib + Palbociclib + Fulvestrant, 28 Day Cycles       Past Medical History  Past Medical History:   Diagnosis Date    Anxiety     Arthritis     Breast cancer (Multi)     Breast cancer metastasized to bone (Multi)     GERD (gastroesophageal reflux disease)     Glaucoma     Hiatal hernia     Hyperlipidemia     Hypertension        Surgical History  Past Surgical History:   Procedure Laterality Date    HYSTERECTOMY      MASTECTOMY COMPLETE / SIMPLE Right     SHOULDER SURGERY          Social History  She reports that she has never smoked. She has never used smokeless tobacco. She reports that she does not drink alcohol and does not use drugs.    Family History  Family History   Problem Relation Name Age of Onset    Colon cancer Sister      Breast cancer Daughter          Allergies  Patient has no known allergies.    12-system ROS negative except as documented in HPI     Physical Exam    Constitutional: Well developed, awake/alert/oriented to person, birthdate, year, location, no distress, alert and cooperative, nontoxic  HEENT: anicteric sclera, eomi, mucositis noted on lips and ulcer on side of her tongue  Cardiovascular: Regular, rate and rhythm, no murmurs, 2+ equal pulses of the extremities, normal S1 and S2  Respiratory/Thorax: Patent airways, CTAB, normal breath sounds with good chest expansion, thorax symmetric, no conversational dyspnea  Gastrointestinal: +BS, Nondistended, soft, non-tender, no rebound tenderness or guarding  : Erythematous rash on bilateral labia extending to intergluteal cleft and inguinal crease bilaterally.  Nikolsky negative.  Malodorous.  Chaperone present BAYLEE JAUREGUI  Musculoskeletal: ROM intact, no joint swelling, normal strength  Extremities: normal extremities, no edema  Skin: warm and dry. Rash noted on face and b/l arms in addition to  rash as stated above  Neurological: alert and oriented x3, intact  senses, normal strength, follows commands, clear speech, no facial droop, 5/5 strength    Last Recorded Vitals  Blood pressure 155/71, pulse 90, temperature 36.3 °C (97.3 °F), resp. rate 18, height 1.524 m (5'), weight 72.6 kg (160 lb), SpO2 96%.    Relevant Results  Results for orders placed or performed during the hospital encounter of 01/21/25 (from the past 24 hours)   CBC and Auto Differential   Result Value Ref Range    WBC 2.6 (L) 4.4 - 11.3 x10*3/uL    nRBC 0.0 0.0 - 0.0 /100 WBCs    RBC 3.09 (L) 4.00 - 5.20 x10*6/uL    Hemoglobin 9.4 (L) 12.0 - 16.0 g/dL    Hematocrit 29.2 (L) 36.0 - 46.0 %    MCV 95 80 - 100 fL    MCH 30.4 26.0 - 34.0 pg    MCHC 32.2 32.0 - 36.0 g/dL    RDW 15.3 (H) 11.5 - 14.5 %    Platelets 176 150 - 450 x10*3/uL    Neutrophils % 38.4 40.0 - 80.0 %    Immature Granulocytes %, Automated 0.4 0.0 - 0.9 %    Lymphocytes % 52.9 13.0 - 44.0 %    Monocytes % 5.5 2.0 - 10.0 %    Eosinophils % 2.4 0.0 - 6.0 %    Basophils % 0.4 0.0 - 2.0 %    Neutrophils Absolute 0.98 (L) 1.60 - 5.50 x10*3/uL    Immature Granulocytes Absolute, Automated 0.01 0.00 - 0.50 x10*3/uL    Lymphocytes Absolute 1.35 0.80 - 3.00 x10*3/uL    Monocytes Absolute 0.14 0.05 - 0.80 x10*3/uL    Eosinophils Absolute 0.06 0.00 - 0.40 x10*3/uL    Basophils Absolute 0.01 0.00 - 0.10 x10*3/uL   Comprehensive metabolic panel   Result Value Ref Range    Glucose 397 (H) 74 - 99 mg/dL    Sodium 130 (L) 136 - 145 mmol/L    Potassium 4.0 3.5 - 5.3 mmol/L    Chloride 92 (L) 98 - 107 mmol/L    Bicarbonate 24 21 - 32 mmol/L    Anion Gap 18 10 - 20 mmol/L    Urea Nitrogen 63 (H) 6 - 23 mg/dL    Creatinine 3.36 (H) 0.50 - 1.05 mg/dL    eGFR 13 (L) >60 mL/min/1.73m*2    Calcium 7.2 (L) 8.6 - 10.3 mg/dL    Albumin 4.0 3.4 - 5.0 g/dL    Alkaline Phosphatase 55 33 - 136 U/L    Total Protein 7.0 6.4 - 8.2 g/dL    AST 14 9 - 39 U/L    Bilirubin, Total 0.4 0.0 - 1.2 mg/dL    ALT 12 7 - 45 U/L   Lipase   Result Value Ref Range    Lipase 8 (L) 9 - 82 U/L    Morphology   Result Value Ref Range    RBC Morphology See Below     Polychromasia Mild     Ovalocytes Few    Sars-CoV-2 and Influenza A/B PCR   Result Value Ref Range    Flu A Result Not Detected Not Detected    Flu B Result Not Detected Not Detected    Coronavirus 2019, PCR Not Detected Not Detected   RSV PCR   Result Value Ref Range    RSV PCR Not Detected Not Detected   POCT GLUCOSE   Result Value Ref Range    POCT Glucose 331 (H) 74 - 99 mg/dL   Blood Gas Venous Full Panel   Result Value Ref Range    POCT pH, Venous 7.32 (L) 7.33 - 7.43 pH    POCT pCO2, Venous 49 41 - 51 mm Hg    POCT pO2, Venous 31 (L) 35 - 45 mm Hg    POCT SO2, Venous 43 (L) 45 - 75 %    POCT Oxy Hemoglobin, Venous 42.8 (L) 45.0 - 75.0 %    POCT Hematocrit Calculated, Venous 29.0 (L) 36.0 - 46.0 %    POCT Sodium, Venous 128 (L) 136 - 145 mmol/L    POCT Potassium, Venous 4.4 3.5 - 5.3 mmol/L    POCT Chloride, Venous 94 (L) 98 - 107 mmol/L    POCT Ionized Calicum, Venous 0.93 (L) 1.10 - 1.33 mmol/L    POCT Glucose, Venous 432 (H) 74 - 99 mg/dL    POCT Lactate, Venous 1.1 0.4 - 2.0 mmol/L    POCT Base Excess, Venous -1.1 -2.0 - 3.0 mmol/L    POCT HCO3 Calculated, Venous 25.2 22.0 - 26.0 mmol/L    POCT Hemoglobin, Venous 9.7 (L) 12.0 - 16.0 g/dL    POCT Anion Gap, Venous 13.0 10.0 - 25.0 mmol/L    Patient Temperature      FiO2 21 %     XR chest 2 views    Result Date: 12/27/2024  * * *Final Report* * * DATE OF EXAM: Dec 27 2024  1:35PM   LNX   [id number]  -  XR CHEST 2V FRONTAL/LAT  / ACCESSION #  [id number] PROCEDURE REASON: Pleural effusion, bilateral      * * * * Physician Interpretation * * * *  EXAMINATION:  CHEST RADIOGRAPH (2 VIEW FRONTAL & LATERAL) CLINICAL HISTORY: Pleural effusion, bilateral MQ:  XC2_6 EXAM DATE/TIME:  12/27/2024 1:35 PM COMPARISON:  Chest radiograph 10/31/2024 RESULT: Lines, tubes, and devices:  None. Lungs and pleura:  Improved aeration of both mid and lower lung zones.   Unchanged small-moderate bibasilar pleural  effusions, left greater than right. No lung mass. No pneumothorax. Cardiomediastinal silhouette:  Normal cardiomediastinal silhouette. Bones and soft tissues:  Similar appearance of diffuse sclerotic osseous metastases.  Partially imaged right proximal femur internal fixation hardware.    IMPRESSION: 1.  Since 10/31/2024, stable bibasilar small-moderate pleural effusions, left greater than right. 2.  Improved aeration of bilateral mid and lower lung zones. 3.  Similar appearance of diffuse sclerotic osseous metastases.   : PSCB   Transcribe Date/Time: Dec 27 2024  4:20P Dictated by : RUFINA BECERRIL MD This examination was interpreted and the report reviewed and electronically signed by: RUFINA BECERRIL MD on Dec 27 2024  4:22PM  EST    Scheduled medications  [Held by provider] furosemide, 20 mg, oral, Daily  ipratropium-albuteroL, 3 mL, nebulization, q6h  [Held by provider] losartan 100 mg, hydroCHLOROthiazide 12.5 mg for Hyzaar 100 mg-12.5 mg, , oral, Daily  nystatin, 1 Application, Topical, BID  palbociclib, 125 mg, oral, Daily  [START ON 1/22/2025] pantoprazole, 40 mg, oral, Daily before breakfast  pravastatin, 10 mg, oral, Nightly      Continuous medications     PRN medications  PRN medications: alum-mag hydroxide-simeth, lidocaine, ondansetron ODT **OR** ondansetron       84 y.o. female with significant medical history of metastatic breast cancer, GERD, hyperlipidemia, hypertension presenting with diarrhea and rash found to have TSERING on ED workup, will be admitted for further management.     Assessment/Plan   Assessment & Plan  TSERING (acute kidney injury) (CMS-HCC)      TSERING  Diarrhea  - baseline cr 1  - 2/2 volume depletion from diarrhea  - resuscitated in the ED, encouraged oral intake  - continue to follow daily RFP  - renally dose meds  - avoid nephrotoxic agents    Drug-induced rash  Metastatic breast cancer  Pancytopenia    - on faslodex + palbociclib + Inavolisib for management of her  "malignancy  - discussed with her oncologist Dr. Mcfarland, he recommended to hold her medications except palbociclib (Ibrance)  - will continue to monitor   - she was previously treated with prednisone for a drug rash from pembrolizumab but this actually made the rash even worse  - No need for dermatology consult at this time, if rash continues to worsen or patient develops Nikolsky positive reaction, will consider transfer to tertiary care center for dermatology consult.    Dysuria  - UA pending w/ reflex micro/culture  - received 1 dose of diflucan in the ED    Hyperglycemia  - Will monitor with 4 times daily accuchecks and have on sliding scale as she is insulin naive.    GERD  Hyperlipidemia  Hypertension  -Continue home meds as appropriate, will hold her Lasix and antihypertensive in the setting of her TSERING.       DIET: regular  DVT PPX: SQH  ABX: none  CODE STATUS: DNR and No Intubation and No ICU  Disposition: RMF      To be discussed with attending physician  Conor Duque D.O.  PGY-2 Internal medicine resident    This note was partially created using voice recognition software and is inherently subject to errors including those of syntax and \"sound-alike\" substitutions which may escape proofreading. In such instances, original meaning may be extrapolated by contextual derivation     "

## 2025-01-21 NOTE — TELEPHONE ENCOUNTER
"Spoke with the patient. States she has had 3 diarrhea stools this morning. Took 1 Imodium this morning after this first episode. States she did not had any further diarrhea episodes yesterday after we talked yesterday. States the computer at  her pharmacy wasn't working yesterday and they could not  the medications we prescribed yesterday.     States she has a headache this morning. Started this morning. States pain was 3-4/10. Took tylenol and HA & ate a pancake and HA is now gone.     Pt also has heartburn that woke her up this morning. States she never has heartburn and this \"really worries me.\"    Pt states a rash also started on her leg, face and hands. States rash is itchy & looks like little pimples. States \"doesn't look as bad as when I was on Keytruda.\"    Pt states she is \"really worried\" about all these symptoms & she feels like she needs to go to the ED. States she is going to go to the ED to make sure she is OK. States she will go to a  ED this time to prevent the issue with her records- [facility].   "

## 2025-01-21 NOTE — ED PROVIDER NOTES
HPI   Chief Complaint   Patient presents with    Diarrhea     Hx of stage 4 breast CA with mets to bones and ABD and was started new cancer meds (per patient not oral chemo), Sx started Friday.  Patient also has hemorrhoids --denies rectal bleeding, endorses rectal pain       HPI  Arabella Springer is an 84-year-old female history of stage IV breast cancer with mets to the bone and abdomen as well as hypertension presenting to the ED with diarrhea since Friday.  Patient states the diarrhea has been worsening.  Patient states she has had 3 episodes of diarrhea today.  Patient denies blood in the diarrhea.  Patient states she has been taking Imodium however has not improved the diarrhea.  Patient states she has had decreased oral intake.  Patient denies nausea and vomiting and abdominal pain.  Patient states she did start new cancer medications 2 weeks ago.  Patient states she also had a headache this morning and she normally does not have headaches.  Patient states she took Tylenol and the headache has not resolved.  Patient denies chest pain, shortness of breath, fevers, body aches, chills.      Patient History   Past Medical History:   Diagnosis Date    Anxiety     Arthritis     Breast cancer (Multi)     Breast cancer metastasized to bone (Multi)     GERD (gastroesophageal reflux disease)     Glaucoma     Hiatal hernia     Hyperlipidemia     Hypertension      Past Surgical History:   Procedure Laterality Date    HYSTERECTOMY      MASTECTOMY COMPLETE / SIMPLE Right     SHOULDER SURGERY       Family History   Problem Relation Name Age of Onset    Colon cancer Sister      Breast cancer Daughter       Social History     Tobacco Use    Smoking status: Never    Smokeless tobacco: Never   Vaping Use    Vaping status: Never Used   Substance Use Topics    Alcohol use: Never    Drug use: Never       Physical Exam   ED Triage Vitals [01/21/25 0916]   Temperature Heart Rate Respirations BP   36.4 °C (97.5 °F) 82 16 156/61       Pulse Ox Temp Source Heart Rate Source Patient Position   94 % Temporal Monitor Sitting      BP Location FiO2 (%)     Left arm --       Physical Exam  Vitals and nursing note reviewed. Exam conducted with a chaperone present.   Constitutional:       Appearance: Normal appearance.   Cardiovascular:      Rate and Rhythm: Normal rate and regular rhythm.      Heart sounds: Normal heart sounds. No murmur heard.     No gallop.   Pulmonary:      Effort: Pulmonary effort is normal. No respiratory distress.      Breath sounds: Normal breath sounds. No stridor. No wheezing, rhonchi or rales.   Abdominal:      General: Abdomen is flat. Bowel sounds are normal. There is no distension.      Palpations: Abdomen is soft. There is no mass.      Tenderness: There is no abdominal tenderness. There is no guarding or rebound.      Hernia: No hernia is present.   Genitourinary:     Exam position: Supine.      Labia:         Right: Rash and tenderness present.         Left: Rash and tenderness present.       Rectum: No external hemorrhoid.   Musculoskeletal:      Right lower leg: No edema.      Left lower leg: No edema.   Skin:     General: Skin is warm and dry.      Comments: Erythematous rash to the bilateral labia and intergluteal cleft.  There are satellite lesions present.  The rash is tender.  Negative Nikolsky sign.   Neurological:      General: No focal deficit present.      Mental Status: She is alert and oriented to person, place, and time.   Psychiatric:         Mood and Affect: Mood normal.         Behavior: Behavior normal.           ED Course & MDM   ED Course as of 01/21/25 1255   Tue Jan 21, 2025   1043 84-year-old female history of breast cancer with mets on chemo presenting with rectal pain in the setting of diarrhea since Friday.  Patient believes she has hemorrhoids and was prescribed hemorrhoid medication by her primary care doctor.  However she has never had hemorrhoids before and has not been evaluated by a  physician for this problem.  She has taken Imodium for the diarrhea but has not had symptom improvement.  Exam NAD, VSS, no diaphoresis.  No abdominal tenderness.  Candidal rash noted from the gluteal cleft to the vulvar area.  Satellite lesion at the penis.  No fluctuance or purulent drainage.  Rash is tender to touch.  There are satellite lesions.  MDM 84-year-old female presenting with rectal pain and diarrhea.  Given her history of cancer with mets and chemotherapy she is immunocompromise given treatment being we will get CT to evaluate for intra-abdominal infections and cancer burden.  Will give Diflucan for Candida.  Her case was discussed with the pharmacy team who reports that diarrhea and rash is a side effect of her chemo medications.  However given that the has satellite lesions we will still treat empirically for candidal infection.  Patient is clinically stable and rash may be able to be discharged if her evaluation does not reveal any other acute life-threatening pathologies [FN]      ED Course User Index  [FN] Heather Sarkar MD         Diagnoses as of 01/21/25 1255   Hyponatremia   TSERING (acute kidney injury) (CMS-[facility])                 No data recorded     Isaias Coma Scale Score: 15 (01/21/25 0921 : Jossy Martínez RN)                           Medical Decision Making  This is an 84-year-old female presenting to the ED with diarrhea since Friday.  Patient states she began taking new cancer medications 2 weeks ago.  One of the medications is inavolisib in which diarrhea is a side effect.  On exam abdomen is soft and nontender in all 4 quadrants without rebound tenderness and guarding.  There is low suspicion for acute intra-abdominal abnormalities at this time therefore imaging not obtained today.  When attending physician spoke to the patient the patient stated she was having rectal pain.  External rectal and pelvic exam was performed today by this provider and attending physician.  On exam patient has  an erythematous rash to the intergluteal cleft and bilateral labia.  Rash appears to be due to yeast therefore patient was given one-time dose of Diflucan for treatment.  Negative Nikolsky sign.  Low suspicion for SJS and TEN.  Lipase was obtained to rule out pancreatitis and lipase is low at 8.  CBC and CMP obtained with concern for dehydration.  CBC shows leukopenia with WBC of 2.6.  Per chart review this is lower than patient's baseline.  CBC also shows anemia with hemoglobin of 9.4.  Per chart review this is near the patient's baseline.  CMP shows glucose of 397.  Point-of-care glucose was 331.  VBG was obtained to rule out DKA.  VBG shows acidosis with pH of 7.32 but CO2 and bicarb within normal limits.  Anion gap is 13.  Patient does not appear to be in DKA based on VBG results.  CMP also shows hyponatremia as well as TSERING with BUN of 63, creatinine 3.36, and patient was given 1 L LR bolus for TSERING and hyponatremia.  COVID, influenza, RSV swabs were obtained to rule out viral etiology and swabs are negative.  Patient will need to be admitted for management of hyponatremia and TSERING.    Disposition: Hospital admission  Patient is admitted to medicine under Dr. Guaman.  Patient agrees to hospital admission at this time.    Patient was discussed and staffed with Dr. Sarkar    Procedure  Procedures     Sathish Perez PA-C  01/21/25 8160

## 2025-01-22 ENCOUNTER — APPOINTMENT (OUTPATIENT)
Dept: CARDIOLOGY | Facility: HOSPITAL | Age: 85
End: 2025-01-22
Payer: MEDICARE

## 2025-01-22 LAB
ALBUMIN SERPL BCP-MCNC: 3.4 G/DL (ref 3.4–5)
ANION GAP SERPL CALC-SCNC: 16 MMOL/L (ref 10–20)
BASOPHILS # BLD AUTO: 0.01 X10*3/UL (ref 0–0.1)
BASOPHILS NFR BLD AUTO: 0.4 %
BUN SERPL-MCNC: 53 MG/DL (ref 6–23)
CA-I BLD-SCNC: 0.86 MMOL/L (ref 1.1–1.33)
CALCIUM SERPL-MCNC: 6.7 MG/DL (ref 8.6–10.3)
CHLORIDE SERPL-SCNC: 96 MMOL/L (ref 98–107)
CO2 SERPL-SCNC: 23 MMOL/L (ref 21–32)
CREAT SERPL-MCNC: 2.49 MG/DL (ref 0.5–1.05)
DACRYOCYTES BLD QL SMEAR: NORMAL
EGFRCR SERPLBLD CKD-EPI 2021: 19 ML/MIN/1.73M*2
EOSINOPHIL # BLD AUTO: 0.08 X10*3/UL (ref 0–0.4)
EOSINOPHIL NFR BLD AUTO: 2.9 %
ERYTHROCYTE [DISTWIDTH] IN BLOOD BY AUTOMATED COUNT: 15.5 % (ref 11.5–14.5)
GLUCOSE BLD MANUAL STRIP-MCNC: 249 MG/DL (ref 74–99)
GLUCOSE BLD MANUAL STRIP-MCNC: 250 MG/DL (ref 74–99)
GLUCOSE BLD MANUAL STRIP-MCNC: 287 MG/DL (ref 74–99)
GLUCOSE BLD MANUAL STRIP-MCNC: 300 MG/DL (ref 74–99)
GLUCOSE SERPL-MCNC: 271 MG/DL (ref 74–99)
HCT VFR BLD AUTO: 27.9 % (ref 36–46)
HGB BLD-MCNC: 8.8 G/DL (ref 12–16)
HOLD SPECIMEN: NORMAL
IMM GRANULOCYTES # BLD AUTO: 0.01 X10*3/UL (ref 0–0.5)
IMM GRANULOCYTES NFR BLD AUTO: 0.4 % (ref 0–0.9)
LYMPHOCYTES # BLD AUTO: 1.55 X10*3/UL (ref 0.8–3)
LYMPHOCYTES NFR BLD AUTO: 56.2 %
MAGNESIUM SERPL-MCNC: 1.26 MG/DL (ref 1.6–2.4)
MCH RBC QN AUTO: 29.5 PG (ref 26–34)
MCHC RBC AUTO-ENTMCNC: 31.5 G/DL (ref 32–36)
MCV RBC AUTO: 94 FL (ref 80–100)
MONOCYTES # BLD AUTO: 0.21 X10*3/UL (ref 0.05–0.8)
MONOCYTES NFR BLD AUTO: 7.6 %
NEUTROPHILS # BLD AUTO: 0.9 X10*3/UL (ref 1.6–5.5)
NEUTROPHILS NFR BLD AUTO: 32.5 %
NRBC BLD-RTO: 0 /100 WBCS (ref 0–0)
OVALOCYTES BLD QL SMEAR: NORMAL
PHOSPHATE SERPL-MCNC: 3.4 MG/DL (ref 2.5–4.9)
PLATELET # BLD AUTO: 164 X10*3/UL (ref 150–450)
POLYCHROMASIA BLD QL SMEAR: NORMAL
POTASSIUM SERPL-SCNC: 4 MMOL/L (ref 3.5–5.3)
RBC # BLD AUTO: 2.98 X10*6/UL (ref 4–5.2)
RBC MORPH BLD: NORMAL
SODIUM SERPL-SCNC: 131 MMOL/L (ref 136–145)
WBC # BLD AUTO: 2.8 X10*3/UL (ref 4.4–11.3)

## 2025-01-22 PROCEDURE — 2500000002 HC RX 250 W HCPCS SELF ADMINISTERED DRUGS (ALT 637 FOR MEDICARE OP, ALT 636 FOR OP/ED)

## 2025-01-22 PROCEDURE — 93005 ELECTROCARDIOGRAM TRACING: CPT

## 2025-01-22 PROCEDURE — 2500000001 HC RX 250 WO HCPCS SELF ADMINISTERED DRUGS (ALT 637 FOR MEDICARE OP)

## 2025-01-22 PROCEDURE — 2500000005 HC RX 250 GENERAL PHARMACY W/O HCPCS

## 2025-01-22 PROCEDURE — 83735 ASSAY OF MAGNESIUM: CPT

## 2025-01-22 PROCEDURE — 85025 COMPLETE CBC W/AUTO DIFF WBC: CPT

## 2025-01-22 PROCEDURE — 36415 COLL VENOUS BLD VENIPUNCTURE: CPT

## 2025-01-22 PROCEDURE — 82947 ASSAY GLUCOSE BLOOD QUANT: CPT

## 2025-01-22 PROCEDURE — 1100000001 HC PRIVATE ROOM DAILY

## 2025-01-22 PROCEDURE — 80069 RENAL FUNCTION PANEL: CPT

## 2025-01-22 PROCEDURE — 82330 ASSAY OF CALCIUM: CPT

## 2025-01-22 PROCEDURE — 2500000004 HC RX 250 GENERAL PHARMACY W/ HCPCS (ALT 636 FOR OP/ED): Performed by: INTERNAL MEDICINE

## 2025-01-22 PROCEDURE — 2500000004 HC RX 250 GENERAL PHARMACY W/ HCPCS (ALT 636 FOR OP/ED)

## 2025-01-22 PROCEDURE — 99233 SBSQ HOSP IP/OBS HIGH 50: CPT

## 2025-01-22 RX ORDER — INSULIN LISPRO 100 [IU]/ML
6 INJECTION, SOLUTION INTRAVENOUS; SUBCUTANEOUS ONCE
Status: COMPLETED | OUTPATIENT
Start: 2025-01-22 | End: 2025-01-22

## 2025-01-22 RX ORDER — INSULIN LISPRO 100 [IU]/ML
0-10 INJECTION, SOLUTION INTRAVENOUS; SUBCUTANEOUS
Status: DISCONTINUED | OUTPATIENT
Start: 2025-01-22 | End: 2025-01-23 | Stop reason: HOSPADM

## 2025-01-22 RX ORDER — MAGNESIUM CHLORIDE 64 MG
64 TABLET, DELAYED RELEASE (ENTERIC COATED) ORAL ONCE
Status: COMPLETED | OUTPATIENT
Start: 2025-01-22 | End: 2025-01-22

## 2025-01-22 RX ORDER — CEFTRIAXONE 1 G/50ML
1 INJECTION, SOLUTION INTRAVENOUS EVERY 24 HOURS
Status: DISCONTINUED | OUTPATIENT
Start: 2025-01-23 | End: 2025-01-22

## 2025-01-22 RX ORDER — INSULIN LISPRO 100 [IU]/ML
0-10 INJECTION, SOLUTION INTRAVENOUS; SUBCUTANEOUS
Status: DISCONTINUED | OUTPATIENT
Start: 2025-01-22 | End: 2025-01-22

## 2025-01-22 RX ORDER — TRAMADOL HYDROCHLORIDE 50 MG/1
50 TABLET ORAL ONCE
Status: COMPLETED | OUTPATIENT
Start: 2025-01-22 | End: 2025-01-22

## 2025-01-22 RX ORDER — CEFTRIAXONE 1 G/50ML
1 INJECTION, SOLUTION INTRAVENOUS EVERY 24 HOURS
Status: DISCONTINUED | OUTPATIENT
Start: 2025-01-22 | End: 2025-01-23

## 2025-01-22 RX ORDER — OXYCODONE HYDROCHLORIDE 5 MG/1
5 TABLET ORAL ONCE
Status: DISCONTINUED | OUTPATIENT
Start: 2025-01-22 | End: 2025-01-22

## 2025-01-22 RX ADMIN — ALUMINUM HYDROXIDE, MAGNESIUM HYDROXIDE, AND DIMETHICONE 10 ML: 200; 20; 200 SUSPENSION ORAL at 13:03

## 2025-01-22 RX ADMIN — PALBOCICLIB 125 MG: 125 TABLET, FILM COATED ORAL at 10:06

## 2025-01-22 RX ADMIN — LIDOCAINE HYDROCHLORIDE 5 ML: 20 SOLUTION ORAL at 13:03

## 2025-01-22 RX ADMIN — TRAMADOL HYDROCHLORIDE 50 MG: 50 TABLET, COATED ORAL at 16:36

## 2025-01-22 RX ADMIN — INSULIN LISPRO 6 UNITS: 100 INJECTION, SOLUTION INTRAVENOUS; SUBCUTANEOUS at 15:11

## 2025-01-22 RX ADMIN — INSULIN LISPRO 6 UNITS: 100 INJECTION, SOLUTION INTRAVENOUS; SUBCUTANEOUS at 22:31

## 2025-01-22 RX ADMIN — INSULIN LISPRO 4 UNITS: 100 INJECTION, SOLUTION INTRAVENOUS; SUBCUTANEOUS at 10:06

## 2025-01-22 RX ADMIN — ACETAMINOPHEN 650 MG: 325 TABLET ORAL at 08:52

## 2025-01-22 RX ADMIN — HEPARIN SODIUM 5000 UNITS: 5000 INJECTION, SOLUTION INTRAVENOUS; SUBCUTANEOUS at 20:55

## 2025-01-22 RX ADMIN — MAGNESIUM 64 MG (MAGNESIUM CHLORIDE) TABLET,DELAYED RELEASE 64 MG: at 15:11

## 2025-01-22 RX ADMIN — PRAVASTATIN SODIUM 10 MG: 20 TABLET ORAL at 08:53

## 2025-01-22 RX ADMIN — PANTOPRAZOLE SODIUM 40 MG: 40 TABLET, DELAYED RELEASE ORAL at 08:54

## 2025-01-22 RX ADMIN — INSULIN LISPRO 4 UNITS: 100 INJECTION, SOLUTION INTRAVENOUS; SUBCUTANEOUS at 16:36

## 2025-01-22 RX ADMIN — HEPARIN SODIUM 5000 UNITS: 5000 INJECTION, SOLUTION INTRAVENOUS; SUBCUTANEOUS at 13:03

## 2025-01-22 RX ADMIN — Medication: at 18:31

## 2025-01-22 RX ADMIN — NYSTATIN 1 APPLICATION: 100000 POWDER TOPICAL at 20:56

## 2025-01-22 RX ADMIN — ONDANSETRON 4 MG: 4 TABLET, ORALLY DISINTEGRATING ORAL at 08:58

## 2025-01-22 RX ADMIN — CEFTRIAXONE SODIUM 1 G: 1 INJECTION, SOLUTION INTRAVENOUS at 15:12

## 2025-01-22 SDOH — HEALTH STABILITY: PHYSICAL HEALTH
HOW OFTEN DO YOU NEED TO HAVE SOMEONE HELP YOU WHEN YOU READ INSTRUCTIONS, PAMPHLETS, OR OTHER WRITTEN MATERIAL FROM YOUR DOCTOR OR PHARMACY?: NEVER

## 2025-01-22 SDOH — ECONOMIC STABILITY: HOUSING INSECURITY: AT ANY TIME IN THE PAST 12 MONTHS, WERE YOU HOMELESS OR LIVING IN A SHELTER (INCLUDING NOW)?: NO

## 2025-01-22 SDOH — ECONOMIC STABILITY: HOUSING INSECURITY: IN THE LAST 12 MONTHS, WAS THERE A TIME WHEN YOU WERE NOT ABLE TO PAY THE MORTGAGE OR RENT ON TIME?: NO

## 2025-01-22 SDOH — ECONOMIC STABILITY: FOOD INSECURITY: WITHIN THE PAST 12 MONTHS, THE FOOD YOU BOUGHT JUST DIDN'T LAST AND YOU DIDN'T HAVE MONEY TO GET MORE.: NEVER TRUE

## 2025-01-22 SDOH — ECONOMIC STABILITY: INCOME INSECURITY: IN THE PAST 12 MONTHS HAS THE ELECTRIC, GAS, OIL, OR WATER COMPANY THREATENED TO SHUT OFF SERVICES IN YOUR HOME?: NO

## 2025-01-22 SDOH — ECONOMIC STABILITY: FOOD INSECURITY: HOW HARD IS IT FOR YOU TO PAY FOR THE VERY BASICS LIKE FOOD, HOUSING, MEDICAL CARE, AND HEATING?: NOT HARD AT ALL

## 2025-01-22 SDOH — ECONOMIC STABILITY: TRANSPORTATION INSECURITY: IN THE PAST 12 MONTHS, HAS LACK OF TRANSPORTATION KEPT YOU FROM MEDICAL APPOINTMENTS OR FROM GETTING MEDICATIONS?: NO

## 2025-01-22 SDOH — SOCIAL STABILITY: SOCIAL INSECURITY: ARE YOU MARRIED, WIDOWED, DIVORCED, SEPARATED, NEVER MARRIED, OR LIVING WITH A PARTNER?: MARRIED

## 2025-01-22 SDOH — ECONOMIC STABILITY: FOOD INSECURITY: WITHIN THE PAST 12 MONTHS, YOU WORRIED THAT YOUR FOOD WOULD RUN OUT BEFORE YOU GOT THE MONEY TO BUY MORE.: NEVER TRUE

## 2025-01-22 ASSESSMENT — PAIN - FUNCTIONAL ASSESSMENT
PAIN_FUNCTIONAL_ASSESSMENT: 0-10

## 2025-01-22 ASSESSMENT — COGNITIVE AND FUNCTIONAL STATUS - GENERAL
STANDING UP FROM CHAIR USING ARMS: A LITTLE
MOVING FROM LYING ON BACK TO SITTING ON SIDE OF FLAT BED WITH BEDRAILS: A LITTLE
HELP NEEDED FOR BATHING: A LITTLE
CLIMB 3 TO 5 STEPS WITH RAILING: A LOT
PERSONAL GROOMING: A LITTLE
MOVING FROM LYING ON BACK TO SITTING ON SIDE OF FLAT BED WITH BEDRAILS: A LITTLE
MOBILITY SCORE: 18
STANDING UP FROM CHAIR USING ARMS: A LITTLE
MOBILITY SCORE: 17
EATING MEALS: A LITTLE
MOVING TO AND FROM BED TO CHAIR: A LITTLE
TURNING FROM BACK TO SIDE WHILE IN FLAT BAD: A LITTLE
CLIMB 3 TO 5 STEPS WITH RAILING: A LITTLE
DAILY ACTIVITIY SCORE: 24
WALKING IN HOSPITAL ROOM: A LITTLE
TOILETING: A LITTLE
WALKING IN HOSPITAL ROOM: A LITTLE
TURNING FROM BACK TO SIDE WHILE IN FLAT BAD: A LITTLE
MOVING TO AND FROM BED TO CHAIR: A LITTLE
DRESSING REGULAR UPPER BODY CLOTHING: A LITTLE
DRESSING REGULAR LOWER BODY CLOTHING: A LITTLE
DAILY ACTIVITIY SCORE: 18

## 2025-01-22 ASSESSMENT — PAIN SCALES - GENERAL
PAINLEVEL_OUTOF10: 8
PAINLEVEL_OUTOF10: 5 - MODERATE PAIN
PAINLEVEL_OUTOF10: 5 - MODERATE PAIN
PAINLEVEL_OUTOF10: 0 - NO PAIN
PAINLEVEL_OUTOF10: 5 - MODERATE PAIN
PAINLEVEL_OUTOF10: 4
PAINLEVEL_OUTOF10: 5 - MODERATE PAIN

## 2025-01-22 ASSESSMENT — ENCOUNTER SYMPTOMS
NAUSEA: 0
VOICE CHANGE: 1
EYES NEGATIVE: 1
CONSTIPATION: 0
MUSCULOSKELETAL NEGATIVE: 1
HEMATOLOGIC/LYMPHATIC NEGATIVE: 1
PALPITATIONS: 0
NEUROLOGICAL NEGATIVE: 1
ENDOCRINE NEGATIVE: 1
UNEXPECTED WEIGHT CHANGE: 0
PSYCHIATRIC NEGATIVE: 1
FEVER: 0
FATIGUE: 0
VOMITING: 0

## 2025-01-22 ASSESSMENT — PAIN DESCRIPTION - LOCATION: LOCATION: MOUTH

## 2025-01-22 ASSESSMENT — PAIN SCALES - WONG BAKER
WONGBAKER_NUMERICALRESPONSE: HURTS WHOLE LOT
WONGBAKER_NUMERICALRESPONSE: HURTS LITTLE MORE
WONGBAKER_NUMERICALRESPONSE: HURTS LITTLE MORE

## 2025-01-22 ASSESSMENT — ACTIVITIES OF DAILY LIVING (ADL): LACK_OF_TRANSPORTATION: NO

## 2025-01-22 NOTE — PROGRESS NOTES
ID:  Arabella Springer is a 84 y.o. female on hospital day 1 of admission presenting with TSERING (acute kidney injury) (CMS-[facility]).    Subjective   The patient seen and examined this morning at bedside. No overnight events.  No more episodes of diarrhea since admission.  Rash on face, right thigh, and gluteal folds slightly improved per patient but continues to be painful with movement.  Patient was put on Rocephin overnight after UA came back positive and reports of dysuria.  Patient denies fever, chills, shortness of breath, pruritus, nausea, vomiting, or diarrhea.  She has a heme-onc appointment on Friday and an appointment with nutrition next Monday.    Objective     Visit Vitals  /65 (BP Location: Left arm, Patient Position: Lying)   Pulse 86   Temp 36.8 °C (98.2 °F) (Temporal)   Resp 20   Ht 1.524 m (5')   Wt 72.6 kg (160 lb)   SpO2 92%   BMI 31.25 kg/m²   OB Status Hysterectomy   Smoking Status Never   BSA 1.75 m²        Physical Examination:  General: No in acute distress, A&O x3, alert, coopertive, well-developed  HEENT: Normocephalic, atraumatic, EOMI, moist mucous membranes  Neck: Neck supple, trachea midline, no evidence of trauma  Cardiovascular: RRR, S1 and S2 appreciated, no murmurs rubs gallops appreciated, distal pulses 2+ bilaterally (radial and dorsalis pedis)  Respiratory: Vesicular breath sounds appreciated bilaterally, no adventitious sounds appreciated, no increased work of breathing  GI: Abdomen soft, nondistended, nontender to palpation, bowel sounds present  Extremities: No edema appreciated in lower extremities bilaterally, no cyanosis  Neuro: A&O x3, no focal deficits, strength and sensation intact bilaterally  Skin: Warm and dry, erythematous rash on intergluteal cleft and inguinal crease bilaterally extending to labia, negative Nikolsky sign  Psychiatric: Judgment intact.  Appropriate mood, affect and behavior.    Laboratory Data:  Results for orders placed or performed during the  hospital encounter of 01/21/25 (from the past 24 hours)   CBC and Auto Differential   Result Value Ref Range    WBC 2.6 (L) 4.4 - 11.3 x10*3/uL    nRBC 0.0 0.0 - 0.0 /100 WBCs    RBC 3.09 (L) 4.00 - 5.20 x10*6/uL    Hemoglobin 9.4 (L) 12.0 - 16.0 g/dL    Hematocrit 29.2 (L) 36.0 - 46.0 %    MCV 95 80 - 100 fL    MCH 30.4 26.0 - 34.0 pg    MCHC 32.2 32.0 - 36.0 g/dL    RDW 15.3 (H) 11.5 - 14.5 %    Platelets 176 150 - 450 x10*3/uL    Neutrophils % 38.4 40.0 - 80.0 %    Immature Granulocytes %, Automated 0.4 0.0 - 0.9 %    Lymphocytes % 52.9 13.0 - 44.0 %    Monocytes % 5.5 2.0 - 10.0 %    Eosinophils % 2.4 0.0 - 6.0 %    Basophils % 0.4 0.0 - 2.0 %    Neutrophils Absolute 0.98 (L) 1.60 - 5.50 x10*3/uL    Immature Granulocytes Absolute, Automated 0.01 0.00 - 0.50 x10*3/uL    Lymphocytes Absolute 1.35 0.80 - 3.00 x10*3/uL    Monocytes Absolute 0.14 0.05 - 0.80 x10*3/uL    Eosinophils Absolute 0.06 0.00 - 0.40 x10*3/uL    Basophils Absolute 0.01 0.00 - 0.10 x10*3/uL   Comprehensive metabolic panel   Result Value Ref Range    Glucose 397 (H) 74 - 99 mg/dL    Sodium 130 (L) 136 - 145 mmol/L    Potassium 4.0 3.5 - 5.3 mmol/L    Chloride 92 (L) 98 - 107 mmol/L    Bicarbonate 24 21 - 32 mmol/L    Anion Gap 18 10 - 20 mmol/L    Urea Nitrogen 63 (H) 6 - 23 mg/dL    Creatinine 3.36 (H) 0.50 - 1.05 mg/dL    eGFR 13 (L) >60 mL/min/1.73m*2    Calcium 7.2 (L) 8.6 - 10.3 mg/dL    Albumin 4.0 3.4 - 5.0 g/dL    Alkaline Phosphatase 55 33 - 136 U/L    Total Protein 7.0 6.4 - 8.2 g/dL    AST 14 9 - 39 U/L    Bilirubin, Total 0.4 0.0 - 1.2 mg/dL    ALT 12 7 - 45 U/L   Lipase   Result Value Ref Range    Lipase 8 (L) 9 - 82 U/L   Morphology   Result Value Ref Range    RBC Morphology See Below     Polychromasia Mild     Ovalocytes Few    Sars-CoV-2 and Influenza A/B PCR   Result Value Ref Range    Flu A Result Not Detected Not Detected    Flu B Result Not Detected Not Detected    Coronavirus 2019, PCR Not Detected Not Detected   RSV PCR    Result Value Ref Range    RSV PCR Not Detected Not Detected   POCT GLUCOSE   Result Value Ref Range    POCT Glucose 331 (H) 74 - 99 mg/dL   Blood Gas Venous Full Panel   Result Value Ref Range    POCT pH, Venous 7.32 (L) 7.33 - 7.43 pH    POCT pCO2, Venous 49 41 - 51 mm Hg    POCT pO2, Venous 31 (L) 35 - 45 mm Hg    POCT SO2, Venous 43 (L) 45 - 75 %    POCT Oxy Hemoglobin, Venous 42.8 (L) 45.0 - 75.0 %    POCT Hematocrit Calculated, Venous 29.0 (L) 36.0 - 46.0 %    POCT Sodium, Venous 128 (L) 136 - 145 mmol/L    POCT Potassium, Venous 4.4 3.5 - 5.3 mmol/L    POCT Chloride, Venous 94 (L) 98 - 107 mmol/L    POCT Ionized Calicum, Venous 0.93 (L) 1.10 - 1.33 mmol/L    POCT Glucose, Venous 432 (H) 74 - 99 mg/dL    POCT Lactate, Venous 1.1 0.4 - 2.0 mmol/L    POCT Base Excess, Venous -1.1 -2.0 - 3.0 mmol/L    POCT HCO3 Calculated, Venous 25.2 22.0 - 26.0 mmol/L    POCT Hemoglobin, Venous 9.7 (L) 12.0 - 16.0 g/dL    POCT Anion Gap, Venous 13.0 10.0 - 25.0 mmol/L    Patient Temperature      FiO2 21 %   POCT GLUCOSE   Result Value Ref Range    POCT Glucose 279 (H) 74 - 99 mg/dL   Urinalysis with Reflex Culture and Microscopic   Result Value Ref Range    Color, Urine Light-Yellow Light-Yellow, Yellow, Dark-Yellow    Appearance, Urine Turbid (N) Clear    Specific Gravity, Urine 1.016 1.005 - 1.035    pH, Urine 5.0 5.0, 5.5, 6.0, 6.5, 7.0, 7.5, 8.0    Protein, Urine NEGATIVE NEGATIVE, 10 (TRACE), 20 (TRACE) mg/dL    Glucose, Urine 100 (1+) (A) Normal mg/dL    Blood, Urine 0.06 (1+) (A) NEGATIVE    Ketones, Urine NEGATIVE NEGATIVE mg/dL    Bilirubin, Urine NEGATIVE NEGATIVE    Urobilinogen, Urine Normal Normal mg/dL    Nitrite, Urine NEGATIVE NEGATIVE    Leukocyte Esterase, Urine 500 Faina/uL (A) NEGATIVE   Microscopic Only, Urine   Result Value Ref Range    WBC, Urine >50 (A) 1-5, NONE /HPF    RBC, Urine 6-10 (A) NONE, 1-2, 3-5 /HPF    Squamous Epithelial Cells, Urine 1-9 (SPARSE) Reference range not established. /HPF     Bacteria, Urine 1+ (A) NONE SEEN /HPF    Mucus, Urine FEW Reference range not established. /LPF    Hyaline Casts, Urine 2+ (A) NONE /LPF   Magnesium   Result Value Ref Range    Magnesium 1.26 (L) 1.60 - 2.40 mg/dL   CBC and Auto Differential   Result Value Ref Range    WBC      RBC      Hemoglobin      Hematocrit      MCV      MCHC      RDW      Platelets 164 150 - 450 x10*3/uL    Neutrophils %      Immature Granulocytes %, Automated      Lymphocytes %      Monocytes %      Eosinophils %      Basophils %      Neutrophils Absolute      Lymphocytes Absolute      Monocytes Absolute      Eosinophils Absolute      Basophils Absolute     Renal Function Panel   Result Value Ref Range    Glucose 271 (H) 74 - 99 mg/dL    Sodium 131 (L) 136 - 145 mmol/L    Potassium 4.0 3.5 - 5.3 mmol/L    Chloride 96 (L) 98 - 107 mmol/L    Bicarbonate 23 21 - 32 mmol/L    Anion Gap 16 10 - 20 mmol/L    Urea Nitrogen 53 (H) 6 - 23 mg/dL    Creatinine 2.49 (H) 0.50 - 1.05 mg/dL    eGFR 19 (L) >60 mL/min/1.73m*2    Calcium 6.7 (L) 8.6 - 10.3 mg/dL    Phosphorus 3.4 2.5 - 4.9 mg/dL    Albumin 3.4 3.4 - 5.0 g/dL       Imaging:  No results found.     Medications:  Scheduled medications  [START ON 1/23/2025] cefTRIAXone, 1 g, intravenous, q24h  [Held by provider] furosemide, 20 mg, oral, Daily  heparin (porcine), 5,000 Units, subcutaneous, q8h  insulin lispro, 0-5 Units, subcutaneous, TID AC  [Held by provider] losartan 100 mg, hydroCHLOROthiazide 12.5 mg for Hyzaar 100 mg-12.5 mg, , oral, Daily  nystatin, 1 Application, Topical, BID  palbociclib, 125 mg, oral, Daily  pantoprazole, 40 mg, oral, Daily before breakfast  pravastatin, 10 mg, oral, Nightly      Continuous medications     PRN medications  PRN medications: acetaminophen, alum-mag hydroxide-simeth, dextrose, dextrose, glucagon, glucagon, ipratropium-albuteroL, lidocaine, ondansetron ODT **OR** ondansetron    Assessment    Assessment & Plan   Ms. Arabella Springer is a 84 y.o. female with  Pmh significant for breast cancer stage IV with mets to bone and peritoneum currently on faslodex + palbociclib + inavolisib (follows with Dr. 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 She was found to have an TSERING and was admitted for further management.    Assessment & Plan  TSERING (acute kidney injury) (CMS-HCC)    #Prerenal TSERING in the setting of poor p.o. intake and diarrhea, improving  #Diarrhea, resolved  -Baseline cr 1  -Secondary to volume depletion  -Received 1 L of LR in the ED  -Encourage p.o. intake  -daily RFP  -renally dose meds  -avoid nephrotoxic agents  -Lasix and home BP medication currently held     #Drug-induced rash  #Metastatic breast cancer  #Pancytopenia  #Hypocalcemia  - on faslodex + palbociclib + Inavolisib for management of breast cancer  - discussed with her oncologist Dr. Mcfarland, he recommended to hold her medications except palbociclib (Ibrance)  -Ionized calcium 0.86  - will continue to monitor   - she was previously treated with prednisone for a drug rash from pembrolizumab but this actually made the rash even worse  -Follow-up with Dr. Boswell on Friday as previously scheduled  -Magic mouthwash for oral ulcers  -Oxy 5 as needed for pain     # UTI  - UA pending w/ reflex micro/culture  - received 1 dose of diflucan in the ED     Hyperglycemia  -Increased SSI to #2 initiated with hypoglycemia protocol    CHRONIC PROBLEMS:  # HLD  # HTN  - Continue home medications as appropriate    Global Plan of Care  IVF: As needed  Electrolytes: Replete with goal K>4 and Mg>2   Diet: Regular  DVT prophylaxis: SQH  Consults: None  Code status: DNR and No Intubation and No ICU     Disposition: Likely discharge to home tomorrow pending improving renal function    Patient seen and plan of care was discussed with senior resident and the attending.    Makenzie Ba,   Internal Medicine,  PGY-1  January 22, 2025

## 2025-01-22 NOTE — PROGRESS NOTES
01/22/25 1142   Discharge Planning   Living Arrangements Spouse/significant other   Support Systems Spouse/significant other;Children   Assistance Needed no   Type of Residence Private residence   Home or Post Acute Services None   Expected Discharge Disposition Home   Intensity of Service   Intensity of Service 0-30 min     Met with patient and pt's son at bedside. Patient lives at home with her  in [place]. She states she is independent, uses a cane. PCP is Dr Velazquez. Patient confirms understanding of how to manage her health at home and of her home medications. She plans to return home when medically ready for discharge.

## 2025-01-22 NOTE — PROGRESS NOTES
Attempted to meet with patient- another service with patient. Will attempt assessment at another time.       Jossy Willett RN

## 2025-01-22 NOTE — PROGRESS NOTES
Patient ID: Arabella Springer is a 84 y.o. female.  Diagnosis:  Advanced Breast Cancer   MedOnc: Dr. Mcfarland  Primary Care Provider: Angel Luis Velazquez DO  Referring Provider: nAdrew Mcfarland MD  [id number] [place] [address]  Visit Type: Follow Up    Date of Service: 01/24/25  Location: [facility]     Patient Care Team:  Angel Luis Velazquez DO as PCP - General (Internal Medicine)  Angel Luis Velazquez DO as PCP - [facility] Attributed Provider  Andrew Mcfarland MD as Consulting Physician (Hematology and Oncology)  Lizz Gallegos RN as Care Manager (Case Management)  Catherine Boykin MA as Care Manager (Case Management)    Current Therapy: Inavolisib, Palbociclib, Faslodex + Xgeva    ONCOLOGIC HISTORY     No matching staging information was found for the patient.  stage IV breast cancer    2015: right mastectomy and reconstruction at [facility], positive node, declined XRT, back to OR for node dissection, no chemo, started on endocrine therapy - self D/C'd after 3 years and stopped following up with oncology  7/2022: referred to [facility] for leukopenia and anemia   8/2023: BMBX - incidental finding of CLL/SLL, but the etiology of her anemia is marrow infiltration by metastatic breast cancer   8/2023: PET widespread bone involvement by breast cancer including both femoral shafts and both knees and right sided pleural effusion. Offered Olaparib based on J Clin Oncol. 2020 Dec 20;38(36):1434-3615. doi: 10.1200/JCO.20.46763. ([facility] 048: Phase II Study of Olaparib for Metastatic Breast Cancer and Mutations in Homologous Recombination-Related Genes) - denied by insurance   8/28/2023: started Xgeva   8/2023: started Pembro - developed IO r/t psorasis confirmed with punch bx by dermatologist (Dr Wolff) after cycle 4  11/2023: Restarted on AI - letrozole  1/16/2024: ED for chest wall pain, 2 new rib fractures  4/3/2024: CT scan - interval worsening of bilateral pleural effusions, unchanged pulmonary nodules; diffuse sclerosis  of axial and appendicular skeleton  8/16/2024: PET - moderate-to-large bilateral pleura effusions; bilateral hydronephrosis; interval improvement of FDG avid widespread bone metastases throughout the axial and appendicular skeleton many of which are non FDG avid ; residual disease noted in bilateral femur (SUV 3.6), pelvis (SUV 2.4), bilateral humerus (SUV 3.3), bilateral clavicles (SUV 2/9). There is no FDG activity in her thorax/pleura, making the effusions less likely to be of malignant origin, and given bilateral nature--more likely cardiac in origin.  9/20/2024: thoracentesis--with removal of 700 ml yellow fluid; cytology was negative  12/9/2024: CT scan -moderate bilateral pleural effusions with atelectasis, right greater than left; moderate hydronephrosis left greater than right increased from prior exam; small to moderate volume ascites; new peritoneal nodular thickening (33 mm peritoneal implant in anterior deep pelvis); additional areas of peritoneal nodular thickening and enhancement in deep pelvis; similar appearance of diffuse sclerotic lesions  12/9/2024: WBBS - similar appearance of diffusely increased radiotracer uptake throughout the axial and appendicular skeleton corresponding with widespread sclerotic osseous lesions on CT  12/27/2024: received faslodex dose #1   1/8/2025: Started palbociclib and inavolisib  1/10/2025: #2 faslodex   1/21/2025: Hospital admission for TSERING - inavolisib held  1/24/2025: #3 faslodex, supportive care for inavolisib toxicities    Genetics: [employer] 360 report shows 1) high TMB (22), 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 female who presents today for follow up of breast cancer. Patient of Dr. Mcfarland currently on Inavolisib, Palbociclib, Faslodex + Xgeva. Inavolisib is on hold for recent admission for TSERING. She is having toxicities of diarrhea, rash, and stomatitis. Wound care is coming today to assist with perineum excoriation. She is still having diarrhea, has not taken imodium. She had some nausea yesterday evening, relieved with antiemetics. She is struggling to eat and drink due to the stomatitis. Her rash is significant, pruritic. No fevers or chills. No SOB or chest pain. She has some LE edema - took her Lasix this morning. Her pain is related to her mouth sores and perineum excoriation. Tylenol helping some with mouth/lip pain, only 2 doses of the dexamethasone liquid so far. The buttocks and labia burning pain happens with sitting and with voiding/BMs.     Review of Systems   Constitutional:  Positive for appetite change. Negative for fatigue, fever and unexpected weight change.   HENT:   Positive for mouth sores and voice change.    Eyes: Negative.    Respiratory:  Negative for cough and shortness of breath.    Cardiovascular:  Positive for leg swelling. Negative for chest pain and palpitations.   Gastrointestinal:  Positive for diarrhea. Negative for constipation, nausea and vomiting.   Endocrine: Negative.    Genitourinary: Negative.  Negative for bladder incontinence, frequency and hematuria.    Musculoskeletal: Negative.    Skin:  Positive for itching, rash and wound.   Neurological: Negative.    Hematological: Negative.    Psychiatric/Behavioral: Negative.       Objective      /65   Pulse 87   Temp 36.5 °C (97.7 °F) (Temporal)   Resp 18   Wt 75.3 kg (166 lb 0.1 oz)   SpO2 93%   BMI 32.42 kg/m²   BSA: 1.79 meters squared    Wt Readings from Last 5 Encounters:   01/24/25 75.3 kg  (166 lb 0.1 oz)   25 72.6 kg (160 lb)   01/10/25 74.5 kg (164 lb 3.9 oz)   24 75 kg (165 lb 5.5 oz)   24 75.9 kg (167 lb 5.3 oz)     Performance Status:  ECOG Score: 1- Restricted in physically strenuous activity.  Carries out light duty.  Karnofsky Score: 80 - Normal activity with effort; some signs or symptoms of disease    PHYSICAL EXAM   Physical Exam  Constitutional:       General: She is not in acute distress.  HENT:      Head: Normocephalic and atraumatic.      Mouth/Throat:      Lips: Lesions present.      Mouth: Oral lesions present.   Eyes:      Pupils: Pupils are equal, round, and reactive to light.   Pulmonary:      Effort: Pulmonary effort is normal.   Genitourinary:     Pubic Area: Rash present.      Labia:         Right: Rash and tenderness present.         Left: Rash and tenderness present.       Comments: Open wounds/blisters and excoriation to buttock and gluteal crease, redness and excoriation to labia majora and perineum. No hemorrhoids.    Musculoskeletal:         General: Normal range of motion.      Cervical back: Normal range of motion.      Right lower le+ Pitting Edema present.      Left lower le+ Pitting Edema present.      Comments: walker   Skin:     General: Skin is warm and dry.      Findings: Abrasion, rash and wound present. Rash is crusting, macular and vesicular.   Neurological:      General: No focal deficit present.      Mental Status: She is alert and oriented to person, place, and time.   Psychiatric:         Mood and Affect: Mood normal.         Behavior: Behavior normal.         Thought Content: Thought content normal.         Judgment: Judgment normal.     Allergies  Allergies   Allergen Reactions    Oxycodone Nausea/vomiting      Medications  Current Outpatient Medications   Medication Instructions    acetaminophen (TYLENOL) 1,000 mg, oral, 3 times daily PRN    albuterol (ProAir HFA) 90 mcg/actuation inhaler 2 puffs, inhalation, Every 4 hours PRN     beclomethasone HFA (QVAR) 40 mcg, 2 times daily RT    benzocaine 20 % mucosal gel Mouth/Throat, 4 times daily PRN    Blood glucose monitoring meter Test two times daily.    blood sugar diagnostic strip 1 each, miscellaneous, 2 times daily    brimonidine-timoloL (Combigan) 0.2-0.5 % ophthalmic solution 1 drop, Every 12 hours scheduled    calcium carbonate-vitamin D3 600 mg-10 mcg (400 unit) tablet 3 tablets, Daily    denosumab (XGEVA) 120 mg, Every 30 days    dexAMETHasone 1 mg, oral, 4 times daily, Swish 10 mL of solution for two minutes and spit, four times daily. Do not to eat for one hour after using the mouthwash.    diclofenac (Voltaren) 50 mg EC tablet TAKE 1 TABLET(50 MG) BY MOUTH TWICE DAILY WITH MEALS    esomeprazole (NEXIUM) 40 mg, Daily before breakfast    furosemide (LASIX) 20 mg, Daily    [START ON 2/7/2025] inavolisib 3 mg, oral, Daily    inhalat.spacing dev,large mask (Pro Comfort Spacer-Adult Mask) spacer 1 each, miscellaneous, Every 4 hours PRN    isopropyl alcohoL 70 % towelette Test 2 times a day, clean finger prior to testing    LORazepam (ATIVAN) 0.5 mg, oral, Every 2 hour PRN    losartan-hydrochlorothiazide (Hyzaar) 100-12.5 mg tablet 1 tablet, oral, Daily    methylPREDNISolone (Medrol Dospak) 4 mg tablets Follow schedule on package instructions    mometasone (Elocon) 0.1 % lotion Topical, 2 times daily    multivitamin tablet 1 tablet, Daily    nystatin (Mycostatin) 100,000 unit/gram powder 1 Application, Topical, 2 times daily    ondansetron (ZOFRAN) 8 mg, oral, Every 8 hours PRN    palbociclib (Ibrance) 125 mg tablet Take 125 mg (1 tablet) by mouth once daily for three weeks, then one week off.  Swallow whole.  Do not crush, chew or split.    pravastatin (PRAVACHOL) 10 mg, oral, Daily    prochlorperazine (COMPAZINE) 10 mg, oral, Every 6 hours PRN    Rhopressa 0.02 % drops opthalmic solution INSTILL 1 DROP IN RIGHT EYE DAILY AT BEDTIME    triamcinolone (Kenalog) 0.1 % cream Topical, 2 times  daily        Diagnostic Results   RESULTS      Latest Reference Range & Units 01/23/25 06:57   GLUCOSE 74 - 99 mg/dL 227 (H)   SODIUM 136 - 145 mmol/L 131 (L)   POTASSIUM 3.5 - 5.3 mmol/L 4.0   CHLORIDE 98 - 107 mmol/L 95 (L)   Bicarbonate 21 - 32 mmol/L 26   Anion Gap 10 - 20 mmol/L 14   Blood Urea Nitrogen 6 - 23 mg/dL 40 (H)   Creatinine 0.50 - 1.05 mg/dL 1.77 (H)   EGFR >60 mL/min/1.73m*2 28 (L)   Calcium 8.6 - 10.3 mg/dL 6.6 (L)   PHOSPHORUS 2.5 - 4.9 mg/dL 2.7   Albumin 3.4 - 5.0 g/dL 3.4   (H): Data is abnormally high  (L): Data is abnormally low     Latest Reference Range & Units 01/23/25 06:57   WBC 4.4 - 11.3 x10*3/uL 2.7 (L)   nRBC 0.0 - 0.0 /100 WBCs 0.0   RBC 4.00 - 5.20 x10*6/uL 2.94 (L)   HEMOGLOBIN 12.0 - 16.0 g/dL 8.8 (L)   HEMATOCRIT 36.0 - 46.0 % 27.6 (L)   MCV 80 - 100 fL 94   MCH 26.0 - 34.0 pg 29.9   MCHC 32.0 - 36.0 g/dL 31.9 (L)   RED CELL DISTRIBUTION WIDTH 11.5 - 14.5 % 15.5 (H)   Platelets 150 - 450 x10*3/uL 143 (L)   Neutrophils % 40.0 - 80.0 % 35.0   Immature Granulocytes %, Automated 0.0 - 0.9 % 0.4   Lymphocytes % 13.0 - 44.0 % 55.6   Monocytes % 2.0 - 10.0 % 6.0   Eosinophils % 0.0 - 6.0 % 2.6   Basophils % 0.0 - 2.0 % 0.4   Neutrophils Absolute 1.60 - 5.50 x10*3/uL 0.93 (L)   Immature Granulocytes Absolute, Automated 0.00 - 0.50 x10*3/uL 0.01   Lymphocytes Absolute 0.80 - 3.00 x10*3/uL 1.48   Monocytes Absolute 0.05 - 0.80 x10*3/uL 0.16   Eosinophils Absolute 0.00 - 0.40 x10*3/uL 0.07   Basophils Absolute 0.00 - 0.10 x10*3/uL 0.01   (L): Data is abnormally low  (H): Data is abnormally high    Recent Imaging     12/9/2024 CT CAP   IMPRESSION:  Lobular breast cancer restaging scan, in comparison to prior PET-CT  from August 2024:      New peritoneal implant is compatible with peritoneal carcinomatosis,  which can be seen with lobular breast cancer. Additional areas of  peritoneal nodular thickening/enhancement, increased ascites, and  increased hydronephrosis is compatible with  increased nonmeasurable  peritoneal carcinomatosis. Unchanged appearance of diffuse sclerotic  osseous metastatic disease, with evaluation for osseous tumor burden  to be correlated with concurrent bone scan, reported separately.    12/9/2024 WBBS   IMPRESSION:  1. Similar appearance of widespread osseous metastatic disease  throughout the axial and appendicular skeleton in comparison to prior  study  2. Prominent left-greater-than-right renal collecting systems  correlating with hydronephrosis seen on same day CT.    12/27/2024 CXR   IMPRESSION:   1. Since 10/31/2024, stable bibasilar small-moderate pleural effusions,   left greater than right.   2.  Improved aeration of bilateral mid and lower lung zones.   3.  Similar appearance of diffuse sclerotic osseous metastases.     Assessment/Plan   ASSESSMENT     Arabella Springer is a 84 y.o. female with a history of breast cancer in 2015 s/p surgery and 3 years of AI now with recurrence who presents in follow up on Inavolisib, Palbociclib, Faslodex and Xgeva. She has pleural effusions and diffuse bony mets. Recent progression on letrozole with new peritoneal mets, she is failing AI and was recommended therapeutic switch. Her tumor has the PIK3CA mutation so she was advised to switched to faslodex + palbociclib + Inavolisib. Faslodex 500 mg IM on days 1 and 15 (and then Q28 days). Palbociclib 125 mg PO once daily for 21 days on then 7 days off and inavolisib 9 mg PO once daily. She received Faslodex #1 on 12/27/24 and started palbociclib and inavolisib PO on 1/8/2025. On 1/17/2025 she started developing toxicities of diarrhea, nausea, stomatitis. She ended up being evaluated in the ED on 1/21/25 and admitted for TSERING. Her inavolisib was held.     She is here today for evaluation and dose #3 Faslodex. Labs from yesterday are stable.   Grade 1 diarrhea - urgency and incontinence, at most 3 episodes per day.   Grade 3 Rash - maculo-papular with scabbing, no sloughing, some  flat blotchy redness, scalp and face included, pruritic. Rash covers >80% of BSA. Her hands are the most severe with sores and cracking, bright redness. I have prescribed a medrol dose pack and triamcinolone cream. Hopeful for resolution off inavolisib, might need help from her derm.   She was asked to monitor her BG two times a day at home and call us if >350. They have a monitor at home.   Her perineum is excoriated - home care wound nurse coming to her house today at 1pm. Will follow wound care recommendations. Plus keep area clean and dry, avoid Depends. I also recommended looking into getting a bidet attachment for her toilet.  Grade 3 stomatitis - just started liquid dex and has topical benazocaine, sees dietician on Monday. We discussed foods to avoid and the importance of staying hydrated.     All side effects are likely related to inavolisib which is already on hold. She will continue Ibrance and Faslodex. We will work on supportive treatment for her toxicities and monitor her closely. I will see her next week to see if her toxicities are tolerable/lessening. She knows to call for any worsening side effects. The goal would be to restart therapy with inavolisib at 3 mg PO daily in a couple weeks.     Addendum: Received message from daughter that the glucose meter they have at home is  - requesting a script. Sent to [facility].     PLAN     # Breast Cancer   - Faslodex #3 today then continue monthly - next due   - Inavolisib on HOLD - Plan to restart at 3 mg PO daily when adverse reactions resolve - [facility] script sent to [facility]  - Continue palbociclib 125 mg PO daily, 21 days on, 7 days off     # Rash  - Triamcinolone cream BID to rash on chest, back, arms, hands, thighs  - Medrol dose pack starting today  - Monitor blood sugars BID - call if BG > 350  - Wound care for perineal excoriation  - Call for worsening rash - will recommend follow up with her derm if not resolving      # Stomatitis  - Continue  Dexamethasone oral liquid 4 times a day, wait 1 hour prior to eating  - Benazocaine mucosal gel to lips 4 times a day as needed for pain  - Avoid hot and citric foods; eat soft foods  - Dietician appt scheduled for Monday   - Tylenol as needed     # Diarrhea   - Hydrate well   - Imodium 2 mg with first diarrhea, repeat up to 8 tabs a day as needed  - Avoid dairy until diarrhea resolves     # Perineal excoriation   - wound care appt today   - inpatient team recommended calmoseptine  - Attempt to decrease the use of depends briefs, use cotton underwear or go without  - Use wet wipes or order bidet attachment     # Dehydration/ TSERING  - Increase PO intake   - Lasix on hold until diarrhea and stomatitis resolves, restart for SOB or BLE edema      # Nausea   - Compazine and zofran as needed     # Neutropenia   - Continue to monitor ANC on palbociclib    # Bony Mets  - Monthly Xgeva 120 mg due 2/7  - Continue Ca and Vit D    # Pleural Effusions  - Continue follow up with Dr. Alonso ([facility])  - On lasix - holding temporarily for dehydration/TSERING  - Inhalers as needed     #Anemia   - known bone marrow infiltration by metastatic breast cancer   - Stable - continue to monitor    # Increased cerumen  - Follow up with ENT     Follow up with me next week. On 2/7 scheduled for Xgeva and tox check labs, will add on Dr. Garcia appt.     Arabella was seen today for follow-up.  Diagnoses and all orders for this visit:  Drug reaction, initial encounter (Primary)  -     methylPREDNISolone (Medrol Dospak) 4 mg tablets; Follow schedule on package instructions  -     triamcinolone (Kenalog) 0.1 % cream; Apply topically 2 times a day.  Carcinoma of right breast metastatic to bone (Multi)  -     Clinic Appointment Request Chemo Follow Up; J[facility]ARC[facility]  -     inavolisib 3 mg tablet; Take 3 mg by mouth once daily for 28 days. Do not fill before February 7, 2025.  Drug-induced skin rash  -     methylPREDNISolone (Medrol Dospak) 4 mg tablets;  Follow schedule on package instructions  -     triamcinolone (Kenalog) 0.1 % cream; Apply topically 2 times a day.  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  -     isopropyl alcohoL 70 % towelette; Test 2 times a day, clean finger prior to testing  -     blood sugar diagnostic strip; 1 each 2 times a day.  Hyperglycemia  -     Blood glucose monitoring meter; Test two times daily.  -     isopropyl alcohoL 70 % towelette; Test 2 times a day, clean finger prior to testing  -     blood sugar diagnostic strip; 1 each 2 times a day.    There are no diagnoses linked to this encounter.   Venous Access Orders  Denosumab (Xgeva), 28 Day Cycles  Inavolisib + Palbociclib + Fulvestrant, 28 Day Cycles    Patient verbalizes understanding of above plan. Time provided for patient's questions. All questions answered to patient's satisfaction in office. Patient instructed to reach out for any new concerning issues at [phone].    Rhoda Santana MSN, APRN, A-GNP-C, AOCNP  [facility]  Division of Hematology & Medical Oncology   [place] [address]  Phone: [phone]  Available via [employer] Secure Chat  [email or web address]

## 2025-01-22 NOTE — NURSING NOTE
Patient up to the bathroom a few times this shift and patient complaining of burning with urination. Patient complaining of pain in groin area, resident up to see patient. Patient groin and  cleft red and excoriated, barrier cream applied. Patient received PRN Tylenol for back and mouth pain. Bed alarm remains on and call light in reach. Patient up with 1-2 people with walker to bathroom.

## 2025-01-23 ENCOUNTER — PHARMACY VISIT (OUTPATIENT)
Dept: PHARMACY | Facility: CLINIC | Age: 85
End: 2025-01-23
Payer: COMMERCIAL

## 2025-01-23 ENCOUNTER — DOCUMENTATION (OUTPATIENT)
Dept: HOME HEALTH SERVICES | Facility: HOME HEALTH | Age: 85
End: 2025-01-23
Payer: MEDICARE

## 2025-01-23 ENCOUNTER — HOME HEALTH ADMISSION (OUTPATIENT)
Dept: HOME HEALTH SERVICES | Facility: HOME HEALTH | Age: 85
End: 2025-01-23
Payer: MEDICARE

## 2025-01-23 VITALS
HEART RATE: 79 BPM | DIASTOLIC BLOOD PRESSURE: 82 MMHG | BODY MASS INDEX: 31.41 KG/M2 | TEMPERATURE: 97.9 F | OXYGEN SATURATION: 94 % | SYSTOLIC BLOOD PRESSURE: 140 MMHG | HEIGHT: 60 IN | RESPIRATION RATE: 16 BRPM | WEIGHT: 160 LBS

## 2025-01-23 LAB
ALBUMIN SERPL BCP-MCNC: 3.4 G/DL (ref 3.4–5)
ANION GAP SERPL CALC-SCNC: 14 MMOL/L (ref 10–20)
BASOPHILS # BLD AUTO: 0.01 X10*3/UL (ref 0–0.1)
BASOPHILS NFR BLD AUTO: 0.4 %
BUN SERPL-MCNC: 40 MG/DL (ref 6–23)
CALCIUM SERPL-MCNC: 6.6 MG/DL (ref 8.6–10.3)
CHLORIDE SERPL-SCNC: 95 MMOL/L (ref 98–107)
CO2 SERPL-SCNC: 26 MMOL/L (ref 21–32)
CREAT SERPL-MCNC: 1.77 MG/DL (ref 0.5–1.05)
EGFRCR SERPLBLD CKD-EPI 2021: 28 ML/MIN/1.73M*2
EOSINOPHIL # BLD AUTO: 0.07 X10*3/UL (ref 0–0.4)
EOSINOPHIL NFR BLD AUTO: 2.6 %
ERYTHROCYTE [DISTWIDTH] IN BLOOD BY AUTOMATED COUNT: 15.5 % (ref 11.5–14.5)
GLUCOSE BLD MANUAL STRIP-MCNC: 227 MG/DL (ref 74–99)
GLUCOSE BLD MANUAL STRIP-MCNC: 235 MG/DL (ref 74–99)
GLUCOSE BLD MANUAL STRIP-MCNC: 259 MG/DL (ref 74–99)
GLUCOSE SERPL-MCNC: 227 MG/DL (ref 74–99)
HCT VFR BLD AUTO: 27.6 % (ref 36–46)
HGB BLD-MCNC: 8.8 G/DL (ref 12–16)
IMM GRANULOCYTES # BLD AUTO: 0.01 X10*3/UL (ref 0–0.5)
IMM GRANULOCYTES NFR BLD AUTO: 0.4 % (ref 0–0.9)
LYMPHOCYTES # BLD AUTO: 1.48 X10*3/UL (ref 0.8–3)
LYMPHOCYTES NFR BLD AUTO: 55.6 %
MAGNESIUM SERPL-MCNC: 1.34 MG/DL (ref 1.6–2.4)
MCH RBC QN AUTO: 29.9 PG (ref 26–34)
MCHC RBC AUTO-ENTMCNC: 31.9 G/DL (ref 32–36)
MCV RBC AUTO: 94 FL (ref 80–100)
MONOCYTES # BLD AUTO: 0.16 X10*3/UL (ref 0.05–0.8)
MONOCYTES NFR BLD AUTO: 6 %
NEUTROPHILS # BLD AUTO: 0.93 X10*3/UL (ref 1.6–5.5)
NEUTROPHILS NFR BLD AUTO: 35 %
NRBC BLD-RTO: 0 /100 WBCS (ref 0–0)
PHOSPHATE SERPL-MCNC: 2.7 MG/DL (ref 2.5–4.9)
PLATELET # BLD AUTO: 143 X10*3/UL (ref 150–450)
POTASSIUM SERPL-SCNC: 4 MMOL/L (ref 3.5–5.3)
RBC # BLD AUTO: 2.94 X10*6/UL (ref 4–5.2)
SODIUM SERPL-SCNC: 131 MMOL/L (ref 136–145)
WBC # BLD AUTO: 2.7 X10*3/UL (ref 4.4–11.3)

## 2025-01-23 PROCEDURE — 83735 ASSAY OF MAGNESIUM: CPT

## 2025-01-23 PROCEDURE — 80069 RENAL FUNCTION PANEL: CPT

## 2025-01-23 PROCEDURE — 2500000004 HC RX 250 GENERAL PHARMACY W/ HCPCS (ALT 636 FOR OP/ED)

## 2025-01-23 PROCEDURE — 99239 HOSP IP/OBS DSCHRG MGMT >30: CPT

## 2025-01-23 PROCEDURE — 2500000001 HC RX 250 WO HCPCS SELF ADMINISTERED DRUGS (ALT 637 FOR MEDICARE OP)

## 2025-01-23 PROCEDURE — 85025 COMPLETE CBC W/AUTO DIFF WBC: CPT

## 2025-01-23 PROCEDURE — 2500000002 HC RX 250 W HCPCS SELF ADMINISTERED DRUGS (ALT 637 FOR MEDICARE OP, ALT 636 FOR OP/ED)

## 2025-01-23 PROCEDURE — RXMED WILLOW AMBULATORY MEDICATION CHARGE

## 2025-01-23 PROCEDURE — 82947 ASSAY GLUCOSE BLOOD QUANT: CPT

## 2025-01-23 PROCEDURE — 36415 COLL VENOUS BLD VENIPUNCTURE: CPT

## 2025-01-23 RX ORDER — NYSTATIN 100000 [USP'U]/G
1 POWDER TOPICAL 2 TIMES DAILY
Qty: 15 G | Refills: 0 | Status: SHIPPED | OUTPATIENT
Start: 2025-01-23 | End: 2025-02-22

## 2025-01-23 RX ORDER — PROCHLORPERAZINE EDISYLATE 5 MG/ML
5 INJECTION INTRAMUSCULAR; INTRAVENOUS ONCE
Status: COMPLETED | OUTPATIENT
Start: 2025-01-23 | End: 2025-01-23

## 2025-01-23 RX ORDER — TRAMADOL HYDROCHLORIDE 50 MG/1
50 TABLET ORAL ONCE
Status: COMPLETED | OUTPATIENT
Start: 2025-01-23 | End: 2025-01-23

## 2025-01-23 RX ORDER — AMOXICILLIN AND CLAVULANATE POTASSIUM 500; 125 MG/1; MG/1
1 TABLET, FILM COATED ORAL 2 TIMES DAILY
Qty: 20 TABLET | Refills: 0 | Status: CANCELLED | OUTPATIENT
Start: 2025-01-23 | End: 2025-02-02

## 2025-01-23 RX ORDER — GRANULES FOR ORAL 3 G/1
3 POWDER ORAL ONCE
Status: COMPLETED | OUTPATIENT
Start: 2025-01-23 | End: 2025-01-23

## 2025-01-23 RX ADMIN — Medication: at 12:26

## 2025-01-23 RX ADMIN — PROCHLORPERAZINE EDISYLATE 5 MG: 5 INJECTION INTRAMUSCULAR; INTRAVENOUS at 11:55

## 2025-01-23 RX ADMIN — ONDANSETRON 4 MG: 4 TABLET, ORALLY DISINTEGRATING ORAL at 10:26

## 2025-01-23 RX ADMIN — INSULIN LISPRO 6 UNITS: 100 INJECTION, SOLUTION INTRAVENOUS; SUBCUTANEOUS at 12:25

## 2025-01-23 RX ADMIN — PALBOCICLIB 125 MG: 125 TABLET, FILM COATED ORAL at 12:25

## 2025-01-23 RX ADMIN — FOSFOMYCIN TROMETHAMINE 3 G: 3 GRANULE, FOR SOLUTION ORAL at 13:54

## 2025-01-23 RX ADMIN — PANTOPRAZOLE SODIUM 40 MG: 40 TABLET, DELAYED RELEASE ORAL at 06:03

## 2025-01-23 RX ADMIN — HEPARIN SODIUM 5000 UNITS: 5000 INJECTION, SOLUTION INTRAVENOUS; SUBCUTANEOUS at 05:13

## 2025-01-23 RX ADMIN — ACETAMINOPHEN 650 MG: 325 TABLET ORAL at 05:13

## 2025-01-23 RX ADMIN — PRAVASTATIN SODIUM 10 MG: 20 TABLET ORAL at 08:30

## 2025-01-23 RX ADMIN — TRAMADOL HYDROCHLORIDE 50 MG: 50 TABLET, COATED ORAL at 10:26

## 2025-01-23 ASSESSMENT — PAIN SCALES - GENERAL
PAINLEVEL_OUTOF10: 2
PAINLEVEL_OUTOF10: 3
PAINLEVEL_OUTOF10: 7
PAINLEVEL_OUTOF10: 3

## 2025-01-23 ASSESSMENT — COGNITIVE AND FUNCTIONAL STATUS - GENERAL
TURNING FROM BACK TO SIDE WHILE IN FLAT BAD: A LITTLE
MOVING FROM LYING ON BACK TO SITTING ON SIDE OF FLAT BED WITH BEDRAILS: A LITTLE
TOILETING: A LITTLE
WALKING IN HOSPITAL ROOM: A LITTLE
DRESSING REGULAR UPPER BODY CLOTHING: A LITTLE
DAILY ACTIVITIY SCORE: 18
MOVING TO AND FROM BED TO CHAIR: A LITTLE
EATING MEALS: A LITTLE
STANDING UP FROM CHAIR USING ARMS: A LITTLE
PERSONAL GROOMING: A LITTLE
HELP NEEDED FOR BATHING: A LITTLE
CLIMB 3 TO 5 STEPS WITH RAILING: A LITTLE
DRESSING REGULAR LOWER BODY CLOTHING: A LITTLE
MOBILITY SCORE: 18

## 2025-01-23 ASSESSMENT — PAIN - FUNCTIONAL ASSESSMENT: PAIN_FUNCTIONAL_ASSESSMENT: 0-10

## 2025-01-23 ASSESSMENT — PAIN DESCRIPTION - LOCATION: LOCATION: GENERALIZED

## 2025-01-23 ASSESSMENT — PAIN DESCRIPTION - ORIENTATION: ORIENTATION: LOWER

## 2025-01-23 NOTE — CARE PLAN
EOS Note Pt has remained HDS throughout shift. She remains AO x 4, room air, no tele, ACHS, continent with severe blisters from cancer meds in mouth, in sherrell area and rectal area. Pt has triad cream and tramadol ordered for pain. Pt is receiving IV rocephin for UTI and has ibrance on top shelf of back omnicell shelf which is a home medication that she takes 30 minutes after her PRN zofran dosage. She is resting comfortably in the chair at this time with call light and alarms on .      The patient's goals for the shift include      The clinical goals for the shift include see plan of care

## 2025-01-23 NOTE — DISCHARGE SUMMARY
Discharge Diagnosis  TSERING (acute kidney injury) (CMS-[facility])    Issues Requiring Follow-Up  Continue Ibrance. Stop Faslodex and Inavolisib   Follow up with primary care provider and schedule a follow up appointment in 2-3 days for this hospitalization.  Follow-up with Dr. Mcfarland tomorrow and nutrition on Monday as previously scheduled.  Repeat CMP in 1 week to re-evaluate kidney function.  Home health care for wound care.     Discharge Meds     Medication List      START taking these medications     benzocaine 20 % mucosal gel; Use in the mouth or throat 4 times a day as   needed (Lip sores).   nystatin 100,000 unit/gram powder; Commonly known as: Mycostatin; Apply   1 Application topically 2 times a day.     CONTINUE taking these medications     albuterol 90 mcg/actuation inhaler; Commonly known as: ProAir HFA;   Inhale 2 puffs every 4 hours if needed for wheezing or shortness of   breath.   beclomethasone HFA 40 mcg/actuation inhaler; Commonly known as: Qvar   brimonidine-timoloL 0.2-0.5 % ophthalmic solution; Commonly known as:   Combigan   calcium carbonate-vitamin D3 600 mg-10 mcg (400 unit) tablet   denosumab 120 mg/1.7 mL (70 mg/mL) injection; Commonly known as: Xgeva   diclofenac 50 mg EC tablet; Commonly known as: Voltaren; TAKE 1   TABLET(50 MG) BY MOUTH TWICE DAILY WITH MEALS   esomeprazole 40 mg DR capsule; Commonly known as: NexIUM   furosemide 20 mg tablet; Commonly known as: Lasix   Ibrance 125 mg tablet; Generic drug: palbociclib; Take 125 mg (1 tablet)   by mouth once daily for three weeks, then one week off.  Swallow whole.    Do not crush, chew or split.   LORazepam 0.5 mg tablet; Commonly known as: Ativan; Take 1 tablet (0.5   mg) by mouth every 2 hours if needed for anxiety (anxiety related to   radiology tests) for up to 5 doses.   losartan-hydrochlorothiazide 100-12.5 mg tablet; Commonly known as:   Hyzaar; Take 1 tablet by mouth once daily.   mometasone 0.1 % lotion; Commonly known as: Elocon;  Apply topically 2   times a day.   multivitamin tablet   ondansetron 8 mg tablet; Commonly known as: Zofran; Take 1 tablet (8 mg)   by mouth every 8 hours if needed for nausea or vomiting.   pravastatin 10 mg tablet; Commonly known as: Pravachol; Take 1 tablet   (10 mg) by mouth once daily.   Pro Comfort Spacer-Adult Mask spacer; Generic drug: inhalat.spacing   dev,large mask; 1 each every 4 hours if needed (shortness of breath,   wheezing, cough).   prochlorperazine 10 mg tablet; Commonly known as: Compazine; Take 1   tablet (10 mg) by mouth every 6 hours if needed for nausea or vomiting.   Rhopressa 0.02 % drops opthalmic solution; Generic drug: netarsudiL     STOP taking these medications     Itovebi 9 mg tablet; Generic drug: inavolisib     ASK your doctor about these medications     dexAMETHasone 0.5 mg/5 mL oral liquid; Take 10 mL (1 mg) by mouth 4   times a day. Swish 10 mL of solution for two minutes and spit, four times   daily. Do not to eat for one hour after using the mouthwash.   hydrocortisone 25 mg suppository; Commonly known as: Anusol-HC; Insert 1   suppository (25 mg) into the rectum 2 times a day.       Test Results Pending At Discharge  Pending Labs       Order Current Status    Urine Culture Preliminary result            Hospital Course  Ms. Arabella Springer is a 84 y.o. female with PMH significant for breast cancer stage IV with mets to bone and peritoneum currently on faslodex + palbociclib + inavolisib (follows with Dr. 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 She was found to have an TSERING and UTI and admitted for further management. Her renal function improved after 1L of LR bolus and po intake. She is to repeat labs in a week to re-assess renal function. She received Rocephin and Fosfomycin for her UTI as her urine culture grew gram negative bacilli. She was  also evaluated by wound care for gluteal rash and agreed to [facility] for wound care needs at home. Patient will be discharged home with prescription for Benzocaine mucosal gel and Nystatin powder and instructions to follow-up with his primary care provider within 1 week. She is to follow up with Dr Mcfarland tomorrow as previously scheduled and with Nutrition next Monday. Current discharge plan was discussed with patient, patient's  and son at bedside. Patient is agreeable and verbalized understanding. All questions were answered and patient is medically stable for discharge.     Pertinent Physical Exam At Time of Discharge  General: Not in acute distress, A&O x3, alert, coopertive, well-developed  HEENT: Normocephalic, atraumatic, EOMI, moist mucous membranes with blisters present on lips and oral mucosa   Neck: Neck supple, trachea midline, no evidence of trauma  Cardiovascular: RRR, S1 and S2 appreciated, no murmurs rubs gallops appreciated, distal pulses 2+ bilaterally (radial and dorsalis pedis)  Respiratory: Vesicular breath sounds appreciated bilaterally, no adventitious sounds appreciated, no increased work of breathing  GI: Abdomen soft, nondistended, nontender to palpation, bowel sounds present  Extremities: No edema appreciated in lower extremities bilaterally, no cyanosis  Neuro: A&O x3, no focal deficits, strength and sensation intact bilaterally  Skin: Warm and dry, erythematous rash on intergluteal cleft and inguinal crease bilaterally, negative Nikolsky sign  Psychiatric: Judgment intact.  Appropriate mood, affect and behavior.    Outpatient Follow-Up  Future Appointments   Date Time Provider Department Center   1/24/2025 10:30 AM Rhoda Santana APRN[id number] [place]   1/24/2025 11:00 AM INF [address] [place], [place] [id number] [place]   1/27/2025 11:00 AM Bernie Bland RD, LD [id number] [place]   2/3/2025  2:00 PM [id number]   2/7/2025 11:00 AM INF [address] [place] [address]          Makenzie Ba, DO  Internal Medicine PGY1

## 2025-01-23 NOTE — CONSULTS
Wound Care Consult     Visit Date: 1/23/2025      Patient Name: Arabella Springer         MRN: [id number]           YOB: 1940     Reason for Consult: Buttock wounds        Wound History: Excoriation     Pertinent Labs:   Albumin   Date Value Ref Range Status   01/23/2025 3.4 3.4 - 5.0 g/dL Final   06/29/2023 4.0 3.4 - 5.0 g/dL Final       Wound Assessment: Patient complains of her buttock hurting due to diarrhea and blisters from cancer medication that she has started. Buttocks and gluteal crease with red open areas, no drainage noted. Per patient excoriation to extending toward  forward.  Current treatment Triad cream.        Wound Team Summary Assessment: Patient has open blisters and excoriation to buttock and gluteal crease.      Wound Team Plan: Recommendation for wound care- cleanse with soap and water, apply Calmoseptine daily and as needed after episodes of diarrhea. Will follow-up tomorrow if not discharged.      Brandy Bryson RN  1/23/2025  11:56 AM

## 2025-01-23 NOTE — CARE PLAN
The patient's goals for the shift include      The clinical goals for the shift include see plan of care      Problem: Pain - Adult  Goal: Verbalizes/displays adequate comfort level or baseline comfort level  Outcome: Progressing     Problem: Safety - Adult  Goal: Free from fall injury  Outcome: Progressing     Problem: Discharge Planning  Goal: Discharge to home or other facility with appropriate resources  Outcome: Progressing     Problem: Chronic Conditions and Co-morbidities  Goal: Patient's chronic conditions and co-morbidity symptoms are monitored and maintained or improved  Outcome: Progressing     Problem: Pain  Goal: Takes deep breaths with improved pain control throughout the shift  Outcome: Met  Goal: Turns in bed with improved pain control throughout the shift  Outcome: Met  Goal: Walks with improved pain control throughout the shift  Outcome: Met  Goal: Performs ADL's with improved pain control throughout shift  Outcome: Met  Goal: Participates in PT with improved pain control throughout the shift  Outcome: Met  Goal: Free from opioid side effects throughout the shift  Outcome: Met  Goal: Free from acute confusion related to pain meds throughout the shift  Outcome: Met

## 2025-01-23 NOTE — CARE PLAN
The patient's goals for the shift include      The clinical goals for the shift include see poc      Problem: Pain - Adult  Goal: Verbalizes/displays adequate comfort level or baseline comfort level  1/23/2025 0650 by Khushboo Monreal LPN  Outcome: Progressing  1/22/2025 1915 by Khushboo Monreal LPN  Outcome: Progressing     Problem: Safety - Adult  Goal: Free from fall injury  1/23/2025 0650 by Khushboo Monreal LPN  Outcome: Progressing  1/22/2025 1915 by Khushboo Monreal LPN  Outcome: Progressing     Problem: Discharge Planning  Goal: Discharge to home or other facility with appropriate resources  1/23/2025 0650 by Khushboo Monreal LPN  Outcome: Progressing  1/22/2025 1915 by Khushboo Monreal LPN  Outcome: Progressing     Problem: Chronic Conditions and Co-morbidities  Goal: Patient's chronic conditions and co-morbidity symptoms are monitored and maintained or improved  1/23/2025 0650 by Khushboo Monreal LPN  Outcome: Progressing  1/22/2025 1915 by Khushboo Monreal LPN  Outcome: Progressing     Problem: Pain  Goal: Takes deep breaths with improved pain control throughout the shift  Outcome: Met  Goal: Turns in bed with improved pain control throughout the shift  Outcome: Met  Goal: Walks with improved pain control throughout the shift  Outcome: Met  Goal: Performs ADL's with improved pain control throughout shift  Outcome: Met  Goal: Participates in PT with improved pain control throughout the shift  Outcome: Met  Goal: Free from opioid side effects throughout the shift  Outcome: Met  Goal: Free from acute confusion related to pain meds throughout the shift  Outcome: Met     Problem: Skin  Goal: Decreased wound size/increased tissue granulation at next dressing change  Outcome: Progressing  Goal: Participates in plan/prevention/treatment measures  Outcome: Progressing  Goal: Prevent/manage excess moisture  Outcome: Progressing  Goal: Prevent/minimize sheer/friction injuries  Outcome: Progressing  Goal: Promote/optimize  nutrition  Outcome: Progressing  Flowsheets (Taken 1/23/2025 7382)  Promote/optimize nutrition:   Monitor/record intake including meals   Assist with feeding  Goal: Promote skin healing  Outcome: Progressing

## 2025-01-23 NOTE — NURSING NOTE
Patient alert and oriented x3. Patient ambulatory in room with staff and walker. Patient skin remains dry and cracked in sherrell area and lips. Barrier cream and lip moisturizer applied. No acute changes this shift. Patient currently in bed resting with respirations even and not labored

## 2025-01-23 NOTE — DISCHARGE INSTRUCTIONS
Please call and follow up with your primary care provider and schedule a follow up appointment in 2-3 days.  Follow-up with Dr. Mcfarland tomorrow and nutrition on Monday as previously scheduled.  Repeat labs in 1 week to re-evaluate kidney function.  Home health care for wound care needs.     Please take all of your medications as directed.     New medications:    Nystatin powder for rash     Discontinued medications:    faslodex  + inavolisib per Dr. Mcfarland     If you have any concerning symptoms, or worsening symptoms please call or return, such as chest pain, shortness of breath, new onset confusion, loss of sensation, or fever >103F.     Thank you for allowing us to participate in your health care.     -[facility] Inpatient Medicine Teaching Service

## 2025-01-23 NOTE — PROGRESS NOTES
01/23/25 1334   Discharge Planning   Who is requesting discharge planning? Provider   Expected Discharge Disposition Home H   Patient Choice   Provider Choice list and CMS website (https://medicare.gov/care-compare#search) for post-acute Quality and Resource Measure Data were provided and reviewed with: Patient   Stroke Family Assessment   Stroke Family Assessment Needed No   Intensity of Service   Intensity of Service 0-30 min     Introduced myself and my role to patient and son.  Confirmed  [facility] is HHC of choice.  Waiting on HHC order.  1428 HHC order placed.   1458  [facility] SOC 1/24/25

## 2025-01-23 NOTE — CARE PLAN
Discharge Note Pt's PIV has been removed prior to discharge. She has been educated on medication changes, new medication information and after visit follow up appointments. All pt belongings have been returned to pt upon discharge and pt is transported home by  at this time. Pt is Sent home with [facility] [place] orders at this time and wound care rounded and gave pt wound care instructions and ointment to use at home and information as well.         The patient's goals for the shift include      The clinical goals for the shift include see poc

## 2025-01-23 NOTE — HH CARE COORDINATION
Home Care received a Referral for Nursing. We have processed the referral for a Start of Care on 01/24/2025.     If you have any questions or concerns, please feel free to contact us at [phone]. Follow the prompts, enter your five digit zip code, and you will be directed to your care team on WEST 1.

## 2025-01-24 ENCOUNTER — INFUSION (OUTPATIENT)
Dept: HEMATOLOGY/ONCOLOGY | Facility: CLINIC | Age: 85
End: 2025-01-24
Payer: MEDICARE

## 2025-01-24 ENCOUNTER — HOME CARE VISIT (OUTPATIENT)
Dept: HOME HEALTH SERVICES | Facility: HOME HEALTH | Age: 85
End: 2025-01-24
Payer: MEDICARE

## 2025-01-24 ENCOUNTER — PATIENT OUTREACH (OUTPATIENT)
Dept: PRIMARY CARE | Facility: CLINIC | Age: 85
End: 2025-01-24

## 2025-01-24 ENCOUNTER — OFFICE VISIT (OUTPATIENT)
Dept: HEMATOLOGY/ONCOLOGY | Facility: CLINIC | Age: 85
End: 2025-01-24
Payer: MEDICARE

## 2025-01-24 ENCOUNTER — SPECIALTY PHARMACY (OUTPATIENT)
Dept: HEMATOLOGY/ONCOLOGY | Facility: CLINIC | Age: 85
End: 2025-01-24

## 2025-01-24 VITALS
HEART RATE: 87 BPM | TEMPERATURE: 97.7 F | OXYGEN SATURATION: 93 % | SYSTOLIC BLOOD PRESSURE: 141 MMHG | BODY MASS INDEX: 32.42 KG/M2 | RESPIRATION RATE: 18 BRPM | WEIGHT: 166.01 LBS | DIASTOLIC BLOOD PRESSURE: 65 MMHG

## 2025-01-24 VITALS
RESPIRATION RATE: 16 BRPM | DIASTOLIC BLOOD PRESSURE: 64 MMHG | TEMPERATURE: 98 F | SYSTOLIC BLOOD PRESSURE: 110 MMHG | OXYGEN SATURATION: 100 % | HEART RATE: 79 BPM

## 2025-01-24 DIAGNOSIS — D70.1 CHEMOTHERAPY-INDUCED NEUTROPENIA (CMS-HCC): ICD-10-CM

## 2025-01-24 DIAGNOSIS — K52.1 DIARRHEA DUE TO DRUG: ICD-10-CM

## 2025-01-24 DIAGNOSIS — R73.9 HYPERGLYCEMIA: ICD-10-CM

## 2025-01-24 DIAGNOSIS — K12.1 STOMATITIS: ICD-10-CM

## 2025-01-24 DIAGNOSIS — C50.911 CARCINOMA OF RIGHT BREAST METASTATIC TO BONE (MULTI): ICD-10-CM

## 2025-01-24 DIAGNOSIS — L27.0 DRUG-INDUCED SKIN RASH: ICD-10-CM

## 2025-01-24 DIAGNOSIS — T50.905A DRUG REACTION, INITIAL ENCOUNTER: Primary | ICD-10-CM

## 2025-01-24 DIAGNOSIS — T45.1X5A CHEMOTHERAPY-INDUCED NEUTROPENIA (CMS-HCC): ICD-10-CM

## 2025-01-24 DIAGNOSIS — E86.0 DEHYDRATION: ICD-10-CM

## 2025-01-24 DIAGNOSIS — N17.9 AKI (ACUTE KIDNEY INJURY) (CMS-HCC): ICD-10-CM

## 2025-01-24 DIAGNOSIS — C79.51 CARCINOMA OF RIGHT BREAST METASTATIC TO BONE (MULTI): ICD-10-CM

## 2025-01-24 DIAGNOSIS — R11.2 CHEMOTHERAPY INDUCED NAUSEA AND VOMITING: ICD-10-CM

## 2025-01-24 DIAGNOSIS — E09.9 DRUG OR CHEMICAL INDUCED DIABETES MELLITUS WITHOUT COMPLICATION, WITHOUT LONG-TERM CURRENT USE OF INSULIN: ICD-10-CM

## 2025-01-24 DIAGNOSIS — T45.1X5A CHEMOTHERAPY INDUCED NAUSEA AND VOMITING: ICD-10-CM

## 2025-01-24 LAB — BACTERIA UR CULT: ABNORMAL

## 2025-01-24 PROCEDURE — 96402 CHEMO HORMON ANTINEOPL SQ/IM: CPT

## 2025-01-24 PROCEDURE — G0299 HHS/HOSPICE OF RN EA 15 MIN: HCPCS | Mod: HHH

## 2025-01-24 PROCEDURE — 99215 OFFICE O/P EST HI 40 MIN: CPT

## 2025-01-24 PROCEDURE — 2500000004 HC RX 250 GENERAL PHARMACY W/ HCPCS (ALT 636 FOR OP/ED): Mod: TB | Performed by: INTERNAL MEDICINE

## 2025-01-24 PROCEDURE — 169592 NO-PAY CLAIM PROCEDURE

## 2025-01-24 RX ORDER — EPINEPHRINE 0.3 MG/.3ML
0.3 INJECTION SUBCUTANEOUS EVERY 5 MIN PRN
Status: DISCONTINUED | OUTPATIENT
Start: 2025-01-24 | End: 2025-01-24 | Stop reason: HOSPADM

## 2025-01-24 RX ORDER — INSULIN PUMP SYRINGE, 3 ML
EACH MISCELLANEOUS
Qty: 1 EACH | Refills: 0 | Status: SHIPPED | OUTPATIENT
Start: 2025-01-24 | End: 2026-01-24

## 2025-01-24 RX ORDER — DIPHENHYDRAMINE HYDROCHLORIDE 50 MG/ML
50 INJECTION INTRAMUSCULAR; INTRAVENOUS AS NEEDED
Status: DISCONTINUED | OUTPATIENT
Start: 2025-01-24 | End: 2025-01-24 | Stop reason: HOSPADM

## 2025-01-24 RX ORDER — LAMOTRIGINE 25 MG/1
500 TABLET ORAL ONCE
Status: COMPLETED | OUTPATIENT
Start: 2025-01-24 | End: 2025-01-24

## 2025-01-24 RX ORDER — FAMOTIDINE 10 MG/ML
20 INJECTION INTRAVENOUS ONCE AS NEEDED
Status: DISCONTINUED | OUTPATIENT
Start: 2025-01-24 | End: 2025-01-24 | Stop reason: HOSPADM

## 2025-01-24 RX ORDER — ALBUTEROL SULFATE 0.83 MG/ML
3 SOLUTION RESPIRATORY (INHALATION) AS NEEDED
Status: DISCONTINUED | OUTPATIENT
Start: 2025-01-24 | End: 2025-01-24 | Stop reason: HOSPADM

## 2025-01-24 RX ORDER — METHYLPREDNISOLONE 4 MG/1
TABLET ORAL
Qty: 21 TABLET | Refills: 0 | Status: SHIPPED | OUTPATIENT
Start: 2025-01-24 | End: 2025-01-30

## 2025-01-24 RX ORDER — TRIAMCINOLONE ACETONIDE 1 MG/G
CREAM TOPICAL 2 TIMES DAILY
Qty: 453.6 G | Refills: 1 | Status: SHIPPED | OUTPATIENT
Start: 2025-01-24

## 2025-01-24 RX ADMIN — FULVESTRANT 500 MG: 50 INJECTION, SOLUTION INTRAMUSCULAR at 12:19

## 2025-01-24 ASSESSMENT — ENCOUNTER SYMPTOMS
LEG SWELLING: 1
HEMATURIA: 0
COUGH: 0
DIARRHEA: 1
WOUND: 1
SHORTNESS OF BREATH: 0
FREQUENCY: 0
APPETITE CHANGE: 1

## 2025-01-24 ASSESSMENT — PAIN SCALES - GENERAL: PAINLEVEL_OUTOF10: 0-NO PAIN

## 2025-01-24 NOTE — PROGRESS NOTES
Discharge Facility:[facility]  Discharge Diagnosis:TSERING  Admission Date:1/21/2025  Discharge Date: 1/23/2025    PCP Appointment Date:TBD  Specialist Appointment Date:   1/27/2025 Hem/Onc [place]  1/30/2025 [facility] Encounter and Summary Linked: Yes  See discharge assessment below for further details    ED to Hosp-Admission (Discharged) with Radha Guaman DO; Heather Sarkar MD (01/21/2025)       *Two attempts were made to reach patient within two business days after discharge. Voicemail left with contact information for patient to call back with any non-emergent questions or concerns.

## 2025-01-26 ASSESSMENT — ENCOUNTER SYMPTOMS
PAIN LOCATION: RIGHT BUTTOCK
PAIN LOCATION: MOUTH
SKIN LESIONS: 1
PAIN LOCATION - PAIN SEVERITY: 8/10
PAIN LOCATION: LEFT HAND
PAIN LOCATION - PAIN SEVERITY: 6/10
LOWEST PAIN SEVERITY IN PAST 24 HOURS: 6/10
MUSCLE WEAKNESS: 1
PAIN SEVERITY GOAL: 5/10
BOWEL PATTERN COMMENTS: LOOSE
HIGHEST PAIN SEVERITY IN PAST 24 HOURS: 10/10
PERSON REPORTING PAIN: PATIENT
APPETITE LEVEL: POOR
PAIN LOCATION - PAIN SEVERITY: 7/10
PAIN LOCATION: GROIN
LAST BOWEL MOVEMENT: 67229
PAIN LOCATION - PAIN SEVERITY: 6/10
PAIN LOCATION: RIGHT HAND
PAIN LOCATION: LEFT BUTTOCK
PAIN LOCATION - PAIN SEVERITY: 8/10
PAIN: 1
PAIN LOCATION - PAIN SEVERITY: 8/10

## 2025-01-26 ASSESSMENT — ACTIVITIES OF DAILY LIVING (ADL)
ENTERING_EXITING_HOME: SUPERVISION
OASIS_M1830: 05

## 2025-01-27 ENCOUNTER — NUTRITION (OUTPATIENT)
Dept: HEMATOLOGY/ONCOLOGY | Facility: CLINIC | Age: 85
End: 2025-01-27
Payer: MEDICARE

## 2025-01-27 ENCOUNTER — HOME CARE VISIT (OUTPATIENT)
Dept: HOME HEALTH SERVICES | Facility: HOME HEALTH | Age: 85
End: 2025-01-27
Payer: MEDICARE

## 2025-01-27 VITALS
RESPIRATION RATE: 16 BRPM | HEART RATE: 78 BPM | SYSTOLIC BLOOD PRESSURE: 122 MMHG | TEMPERATURE: 98.3 F | OXYGEN SATURATION: 99 % | DIASTOLIC BLOOD PRESSURE: 70 MMHG

## 2025-01-27 DIAGNOSIS — C79.51 CARCINOMA OF BREAST METASTATIC TO BONE, UNSPECIFIED LATERALITY (MULTI): ICD-10-CM

## 2025-01-27 DIAGNOSIS — C50.919 CARCINOMA OF BREAST METASTATIC TO BONE, UNSPECIFIED LATERALITY (MULTI): ICD-10-CM

## 2025-01-27 PROCEDURE — G0299 HHS/HOSPICE OF RN EA 15 MIN: HCPCS | Mod: HHH

## 2025-01-27 ASSESSMENT — ENCOUNTER SYMPTOMS
APPETITE LEVEL: FAIR
LAST BOWEL MOVEMENT: 67232
MUSCULOSKELETAL NEGATIVE: 1
HIGHEST PAIN SEVERITY IN PAST 24 HOURS: 9/10
VOMITING: 0
PAIN LOCATION: RIGHT BUTTOCK
PALPITATIONS: 0
APPETITE CHANGE: 1
UNEXPECTED WEIGHT CHANGE: 0
PAIN SEVERITY GOAL: 5/10
FREQUENCY: 0
MUSCLE WEAKNESS: 1
LOWEST PAIN SEVERITY IN PAST 24 HOURS: 6/10
PAIN LOCATION - PAIN SEVERITY: 7/10
WOUND: 1
EYES NEGATIVE: 1
VOICE CHANGE: 1
PSYCHIATRIC NEGATIVE: 1
PAIN LOCATION: GROIN
NAUSEA: 0
LEG SWELLING: 1
NEUROLOGICAL NEGATIVE: 1
CONSTIPATION: 0
HEMATURIA: 0
SHORTNESS OF BREATH: 0
PAIN LOCATION - PAIN SEVERITY: 7/10
FEVER: 0
FATIGUE: 0
HEMATOLOGIC/LYMPHATIC NEGATIVE: 1
DIARRHEA: 1
ENDOCRINE NEGATIVE: 1
COUGH: 0
BOWEL PATTERN COMMENTS: LOOSE
PAIN LOCATION: LEFT BUTTOCK
PAIN LOCATION - PAIN SEVERITY: 7/10

## 2025-01-27 NOTE — PROGRESS NOTES
Patient ID: Arabella Springer is a 84 y.o. female.  Diagnosis:  Advanced Breast Cancer   MedOnc: Dr. Mcfarland  Primary Care Provider: Angel Luis Velazquez DO  Referring Provider: No referring provider defined for this encounter.  Visit Type: Follow Up    Date of Service: 01/27/25  Location: [facility]     Patient Care Team:  Angel Luis Velazquez DO as PCP - General (Internal Medicine)  Angel Luis Velazquez DO as PCP - [facility]  Andrew Mcfarland MD as Consulting Physician (Hematology and Oncology)  Catherine Boykin MA as Care Manager (Case Management)    Current Therapy: Inavolisib, Palbociclib, Faslodex + Xgeva    ONCOLOGIC HISTORY     No matching staging information was found for the patient.  stage IV breast cancer    2015: right mastectomy and reconstruction at [facility], positive node, declined XRT, back to OR for node dissection, no chemo, started on endocrine therapy - self D/C'd after 3 years and stopped following up with oncology  7/2022: referred to [facility] for leukopenia and anemia   8/2023: BMBX - incidental finding of CLL/SLL, but the etiology of her anemia is marrow infiltration by metastatic breast cancer   8/2023: PET widespread bone involvement by breast cancer including both femoral shafts and both knees and right sided pleural effusion. Offered Olaparib based on J Clin Oncol. 2020 Dec 20;38(36):4635-6103. doi: 10.1200/JCO.20.49604. ([facility] 048: Phase II Study of Olaparib for Metastatic Breast Cancer and Mutations in Homologous Recombination-Related Genes) - denied by insurance   8/28/2023: started Xgeva   8/2023: started Pembro - developed IO r/t psorasis confirmed with punch bx by dermatologist (Dr Wolff) after cycle 4  11/2023: Restarted on AI - letrozole  1/16/2024: ED for chest wall pain, 2 new rib fractures  4/3/2024: CT scan - interval worsening of bilateral pleural effusions, unchanged pulmonary nodules; diffuse sclerosis of axial and appendicular skeleton  8/16/2024: PET - moderate-to-large  bilateral pleura effusions; bilateral hydronephrosis; interval improvement of FDG avid widespread bone metastases throughout the axial and appendicular skeleton many of which are non FDG avid ; residual disease noted in bilateral femur (SUV 3.6), pelvis (SUV 2.4), bilateral humerus (SUV 3.3), bilateral clavicles (SUV 2/9). There is no FDG activity in her thorax/pleura, making the effusions less likely to be of malignant origin, and given bilateral nature--more likely cardiac in origin.  9/20/2024: thoracentesis--with removal of 700 ml yellow fluid; cytology was negative  12/9/2024: CT scan -moderate bilateral pleural effusions with atelectasis, right greater than left; moderate hydronephrosis left greater than right increased from prior exam; small to moderate volume ascites; new peritoneal nodular thickening (33 mm peritoneal implant in anterior deep pelvis); additional areas of peritoneal nodular thickening and enhancement in deep pelvis; similar appearance of diffuse sclerotic lesions  12/9/2024: WBBS - similar appearance of diffusely increased radiotracer uptake throughout the axial and appendicular skeleton corresponding with widespread sclerotic osseous lesions on CT  12/27/2024: received faslodex dose #1   1/8/2025: Started palbociclib and inavolisib  1/10/2025: #2 faslodex   1/21/2025: Hospital admission for TSERING - inavolisib held  1/24/2025: #3 faslodex, supportive care for inavolisib toxicities    Genetics: [employer] 360 report shows 1) high TMB (22), 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 female who presents today for follow up of breast cancer. Patient of Dr. Mcfarland currently on Inavolisib, Palbociclib, Faslodex + Xgeva. Inavolisib is on hold for recent admission for TSERING. She is having toxicities of diarrhea, rash, and stomatitis. Wound care is coming today to assist with perineum excoriation. She is still having diarrhea, has not taken imodium. She had some nausea yesterday evening, relieved with antiemetics. She is struggling to eat and drink due to the stomatitis. Her rash is significant, pruritic. No fevers or chills. No SOB or chest pain. She has some LE edema - took her Lasix this morning. Her pain is related to her mouth sores and perineum excoriation. Tylenol helping some with mouth/lip pain, only 2 doses of the dexamethasone liquid so far. The buttocks and labia burning pain happens with sitting and with voiding/BMs.     Review of Systems   Constitutional:  Positive for appetite change. Negative for fatigue, fever and unexpected weight change.   HENT:   Positive for mouth sores and voice change.    Eyes: Negative.    Respiratory:  Negative for cough and shortness of breath.    Cardiovascular:  Positive for leg swelling. Negative for chest pain and palpitations.   Gastrointestinal:  Positive for diarrhea. Negative for constipation, nausea and vomiting.   Endocrine: Negative.    Genitourinary: Negative.  Negative for bladder incontinence, frequency and hematuria.    Musculoskeletal: Negative.    Skin:  Positive for itching, rash and wound.   Neurological: Negative.    Hematological: Negative.    Psychiatric/Behavioral: Negative.       Objective      There were no vitals taken for this visit.  BSA: There is no height or weight on file to calculate BSA.    Wt Readings from Last 5 Encounters:   01/24/25 75.3 kg (166 lb 0.1 oz)   01/21/25 72.6 kg (160 lb)   01/10/25 74.5 kg (164 lb 3.9 oz)   12/27/24 75 kg (165 lb 5.5 oz)   12/13/24 75.9 kg  (167 lb 5.3 oz)     Performance Status:  ECOG Score: 1- Restricted in physically strenuous activity.  Carries out light duty.  Karnofsky Score: 80 - Normal activity with effort; some signs or symptoms of disease    PHYSICAL EXAM   Physical Exam  Constitutional:       General: She is not in acute distress.  HENT:      Head: Normocephalic and atraumatic.      Mouth/Throat:      Lips: Lesions present.      Mouth: Oral lesions present.   Eyes:      Pupils: Pupils are equal, round, and reactive to light.   Pulmonary:      Effort: Pulmonary effort is normal.   Genitourinary:     Pubic Area: Rash present.      Labia:         Right: Rash and tenderness present.         Left: Rash and tenderness present.       Comments: Open wounds/blisters and excoriation to buttock and gluteal crease, redness and excoriation to labia majora and perineum. No hemorrhoids.    Musculoskeletal:         General: Normal range of motion.      Cervical back: Normal range of motion.      Right lower le+ Pitting Edema present.      Left lower le+ Pitting Edema present.      Comments: walker   Skin:     General: Skin is warm and dry.      Findings: Abrasion, rash and wound present. Rash is crusting, macular and vesicular.   Neurological:      General: No focal deficit present.      Mental Status: She is alert and oriented to person, place, and time.   Psychiatric:         Mood and Affect: Mood normal.         Behavior: Behavior normal.         Thought Content: Thought content normal.         Judgment: Judgment normal.     Allergies  Allergies   Allergen Reactions    Oxycodone Nausea/vomiting      Medications  Current Outpatient Medications   Medication Instructions    acetaminophen (TYLENOL) 1,000 mg, oral, 3 times daily PRN    albuterol (ProAir HFA) 90 mcg/actuation inhaler 2 puffs, inhalation, Every 4 hours PRN    beclomethasone HFA (Qvar) 40 mcg/actuation inhaler 1 Inhalation, inhalation, 2 times daily RT    benzocaine 20 % mucosal gel  Mouth/Throat, 4 times daily PRN    Blood glucose monitoring meter Test two times daily.    blood sugar diagnostic strip 1 each, miscellaneous, 2 times daily    brimonidine-timoloL (Combigan) 0.2-0.5 % ophthalmic solution 1 drop, Both Eyes, Every 12 hours scheduled    calcium carbonate-vitamin D3 600 mg-10 mcg (400 unit) tablet 3 tablets, oral, Daily    denosumab (Xgeva) 120 mg/1.7 mL (70 mg/mL) injection 1.7 mL, subcutaneous, Every 30 days    dexAMETHasone 1 mg, oral, 4 times daily, Swish 10 mL of solution for two minutes and spit, four times daily. Do not to eat for one hour after using the mouthwash.    diclofenac (Voltaren) 50 mg EC tablet TAKE 1 TABLET(50 MG) BY MOUTH TWICE DAILY WITH MEALS    esomeprazole (NexIUM) 40 mg DR capsule 1 capsule, oral, Daily before breakfast    furosemide (Lasix) 20 mg tablet 1 tablet, oral, Daily    [START ON 2/7/2025] inavolisib 3 mg, oral, Daily    inhalat.spacing dev,large mask (Pro Comfort Spacer-Adult Mask) spacer 1 each, miscellaneous, Every 4 hours PRN    isopropyl alcohoL 70 % towelette Test 2 times a day, clean finger prior to testing    LORazepam (ATIVAN) 0.5 mg, oral, Every 2 hour PRN    losartan-hydrochlorothiazide (Hyzaar) 100-12.5 mg tablet 1 tablet, oral, Daily    methylPREDNISolone (Medrol Dospak) 4 mg tablets Follow schedule on package instructions    mometasone (Elocon) 0.1 % lotion Topical, 2 times daily    multivitamin tablet 1 tablet, oral, Daily    nystatin (Mycostatin) 100,000 unit/gram powder 1 Application, Topical, 2 times daily    ondansetron (ZOFRAN) 8 mg, oral, Every 8 hours PRN    palbociclib (Ibrance) 125 mg tablet Take 125 mg (1 tablet) by mouth once daily for three weeks, then one week off.  Swallow whole.  Do not crush, chew or split.    pravastatin (PRAVACHOL) 10 mg, oral, Daily    prochlorperazine (COMPAZINE) 10 mg, oral, Every 6 hours PRN    Rhopressa 0.02 % drops opthalmic solution INSTILL 1 DROP IN RIGHT EYE DAILY AT BEDTIME    triamcinolone  (Kenalog) 0.1 % cream Topical, 2 times daily        Diagnostic Results   RESULTS      Latest Reference Range & Units 01/23/25 06:57   GLUCOSE 74 - 99 mg/dL 227 (H)   SODIUM 136 - 145 mmol/L 131 (L)   POTASSIUM 3.5 - 5.3 mmol/L 4.0   CHLORIDE 98 - 107 mmol/L 95 (L)   Bicarbonate 21 - 32 mmol/L 26   Anion Gap 10 - 20 mmol/L 14   Blood Urea Nitrogen 6 - 23 mg/dL 40 (H)   Creatinine 0.50 - 1.05 mg/dL 1.77 (H)   EGFR >60 mL/min/1.73m*2 28 (L)   Calcium 8.6 - 10.3 mg/dL 6.6 (L)   PHOSPHORUS 2.5 - 4.9 mg/dL 2.7   Albumin 3.4 - 5.0 g/dL 3.4   (H): Data is abnormally high  (L): Data is abnormally low     Latest Reference Range & Units 01/23/25 06:57   WBC 4.4 - 11.3 x10*3/uL 2.7 (L)   nRBC 0.0 - 0.0 /100 WBCs 0.0   RBC 4.00 - 5.20 x10*6/uL 2.94 (L)   HEMOGLOBIN 12.0 - 16.0 g/dL 8.8 (L)   HEMATOCRIT 36.0 - 46.0 % 27.6 (L)   MCV 80 - 100 fL 94   MCH 26.0 - 34.0 pg 29.9   MCHC 32.0 - 36.0 g/dL 31.9 (L)   RED CELL DISTRIBUTION WIDTH 11.5 - 14.5 % 15.5 (H)   Platelets 150 - 450 x10*3/uL 143 (L)   Neutrophils % 40.0 - 80.0 % 35.0   Immature Granulocytes %, Automated 0.0 - 0.9 % 0.4   Lymphocytes % 13.0 - 44.0 % 55.6   Monocytes % 2.0 - 10.0 % 6.0   Eosinophils % 0.0 - 6.0 % 2.6   Basophils % 0.0 - 2.0 % 0.4   Neutrophils Absolute 1.60 - 5.50 x10*3/uL 0.93 (L)   Immature Granulocytes Absolute, Automated 0.00 - 0.50 x10*3/uL 0.01   Lymphocytes Absolute 0.80 - 3.00 x10*3/uL 1.48   Monocytes Absolute 0.05 - 0.80 x10*3/uL 0.16   Eosinophils Absolute 0.00 - 0.40 x10*3/uL 0.07   Basophils Absolute 0.00 - 0.10 x10*3/uL 0.01   (L): Data is abnormally low  (H): Data is abnormally high    Recent Imaging     12/9/2024 CT CAP   IMPRESSION:  Lobular breast cancer restaging scan, in comparison to prior PET-CT  from August 2024:      New peritoneal implant is compatible with peritoneal carcinomatosis,  which can be seen with lobular breast cancer. Additional areas of  peritoneal nodular thickening/enhancement, increased ascites, and  increased  hydronephrosis is compatible with increased nonmeasurable  peritoneal carcinomatosis. Unchanged appearance of diffuse sclerotic  osseous metastatic disease, with evaluation for osseous tumor burden  to be correlated with concurrent bone scan, reported separately.    12/9/2024 WBBS   IMPRESSION:  1. Similar appearance of widespread osseous metastatic disease  throughout the axial and appendicular skeleton in comparison to prior  study  2. Prominent left-greater-than-right renal collecting systems  correlating with hydronephrosis seen on same day CT.    12/27/2024 CXR   IMPRESSION:   1. Since 10/31/2024, stable bibasilar small-moderate pleural effusions,   left greater than right.   2.  Improved aeration of bilateral mid and lower lung zones.   3.  Similar appearance of diffuse sclerotic osseous metastases.     Assessment/Plan   ASSESSMENT     Arabella Springer is a 84 y.o. female with a history of breast cancer in 2015 s/p surgery and 3 years of AI now with recurrence who presents in follow up on Inavolisib, Palbociclib, Faslodex and Xgeva. She has pleural effusions and diffuse bony mets. Recent progression on letrozole with new peritoneal mets, she is failing AI and was recommended therapeutic switch. Her tumor has the PIK3CA mutation so she was advised to switched to faslodex + palbociclib + Inavolisib. Faslodex 500 mg IM on days 1 and 15 (and then Q28 days). Palbociclib 125 mg PO once daily for 21 days on then 7 days off and inavolisib 9 mg PO once daily. She received Faslodex #1 on 12/27/24 and started palbociclib and inavolisib PO on 1/8/2025. On 1/17/2025 she started developing toxicities of diarrhea, nausea, stomatitis. She ended up being evaluated in the ED on 1/21/25 and admitted for TSERING. Her inavolisib was held.     She is here today for evaluation and dose #3 Faslodex. Labs from yesterday are stable.   Grade 1 diarrhea - urgency and incontinence, at most 3 episodes per day.   Grade 3 Rash - maculo-papular  with scabbing, no sloughing, some flat blotchy redness, scalp and face included, pruritic. Rash covers >80% of BSA. Her hands are the most severe with sores and cracking, bright redness. I have prescribed a medrol dose pack and triamcinolone cream. Hopeful for resolution off inavolisib, might need help from her derm.   She was asked to monitor her BG two times a day at home and call us if >350. They have a monitor at home.   Her perineum is excoriated - home care wound nurse coming to her house today at 1pm. Will follow wound care recommendations. Plus keep area clean and dry, avoid Depends. I also recommended looking into getting a bidet attachment for her toilet.  Grade 3 stomatitis - just started liquid dex and has topical benazocaine, sees dietician on Monday. We discussed foods to avoid and the importance of staying hydrated.     All side effects are likely related to inavolisib which is already on hold. She will continue Ibrance and Faslodex. We will work on supportive treatment for her toxicities and monitor her closely. I will see her next week to see if her toxicities are tolerable/lessening. She knows to call for any worsening side effects. The goal would be to restart therapy with inavolisib at 3 mg PO daily in a couple weeks.     Addendum: Received message from daughter that the glucose meter they have at home is  - requesting a script. Sent to [facility].     PLAN     # Breast Cancer   - Faslodex #3 today then continue monthly - next due   - Inavolisib on HOLD - Plan to restart at 3 mg PO daily when adverse reactions resolve - [facility] script [facility]  - Continue palbociclib 125 mg PO daily, 21 days on, 7 days off     # Rash  - Triamcinolone cream BID to rash on chest, back, arms, hands, thighs  - Medrol dose pack starting today  - Monitor blood sugars BID - call if BG > 350  - Wound care for perineal excoriation  - Call for worsening rash - will recommend follow up with her derm if not  resolving      # Stomatitis  - Continue Dexamethasone oral liquid 4 times a day, wait 1 hour prior to eating  - Benazocaine mucosal gel to lips 4 times a day as needed for pain  - Avoid hot and citric foods; eat soft foods  - Dietician appt scheduled for Monday   - Tylenol as needed     # Diarrhea   - Hydrate well   - Imodium 2 mg with first diarrhea, repeat up to 8 tabs a day as needed  - Avoid dairy until diarrhea resolves     # Perineal excoriation   - wound care appt today   - inpatient team recommended calmoseptine  - Attempt to decrease the use of depends briefs, use cotton underwear or go without  - Use wet wipes or order bidet attachment     # Dehydration/ TSERING  - Increase PO intake   - Lasix on hold until diarrhea and stomatitis resolves, restart for SOB or BLE edema      # Nausea   - Compazine and zofran as needed     # Neutropenia   - Continue to monitor ANC on palbociclib    # Bony Mets  - Monthly Xgeva 120 mg due 2/7  - Continue Ca and Vit D    # Pleural Effusions  - Continue follow up with Dr. Alonso ([facility])  - On lasix - holding temporarily for dehydration/TSERING  - Inhalers as needed     #Anemia   - known bone marrow infiltration by metastatic breast cancer   - Stable - continue to monitor    # Increased cerumen  - Follow up with ENT     Follow up with me next week. On 2/7 scheduled for Xgeva and tox check labs, will add on Dr. Mcfarland appt.     There are no diagnoses linked to this encounter.    There are no diagnoses linked to this encounter.   Venous Access Orders  Denosumab (Xgeva), 28 Day Cycles  Inavolisib + Palbociclib + Fulvestrant, 28 Day Cycles    Patient verbalizes understanding of above plan. Time provided for patient's questions. All questions answered to patient's satisfaction in office. Patient instructed to reach out for any new concerning issues at [phone].    Rhoda Santana MSN, APRN, A-GNP-C, AOCNP  [facility]  Division of Hematology & Medical  Oncology   [place] [id number]   [place] [address]  Phone: [phone]  Available via [employer] Secure Chat  [email or web address]

## 2025-01-27 NOTE — PROGRESS NOTES
NUTRITION Assessment NOTE    Nutrition Assessment     Reason for Visit:  Arabella Springer is a 84 y.o. female who presents for nutrition consultation for weight loss, diarrhea management, stomatitis, and other tx side effects interfering with PO intake.     Patient Active Problem List   Diagnosis    Decreased GFR    Bronchitis    Upper respiratory infection, acute    Breast cancer metastasized to bone (Multi)    Lytic bone lesions on xray    Spinal stenosis in cervical region    Primary osteoarthritis of right knee    Primary osteoarthritis of left knee    Palpitations    Pain, cancer    Osteopenia    Obesity (BMI 30-39.9)    Monoallelic mutation of CHEK2 gene in female patient    Low-tension glaucoma of both eyes, moderate stage    Leg cramps    Hyperlipidemia    Glaucoma    Essential hypertension    Decreased white blood cell count    Malignant neoplasm of breast associated with mutation in CHEK2 gene (Multi)    BPPV (benign paroxysmal positional vertigo), bilateral    Brachial neuritis or radiculitis    Anemia    Arthritis    Anxiety    Gastroesophageal reflux disease with esophagitis without hemorrhage    Drug-induced skin rash    Psoriasis    Bilateral impacted cerumen    Bronchiolitis    Pleural effusion    SOB (shortness of breath)    Acute otitis externa of left ear    Bilateral otitis media    Chronic non-infective otitis externa of both ears    Esophageal dysphagia    Oropharyngeal dysphagia    Bilateral high frequency sensorineural hearing loss    Acute on chronic heart failure with preserved ejection fraction (HFpEF)    Hypokalemia    Pleural effusion on right    Chest pain    Laceration of earlobe, left, initial encounter    TSERING (acute kidney injury) (CMS-HCC)    Stomatitis    Drug reaction, initial encounter    Diarrhea due to drug    Dehydration    Chemotherapy induced nausea and vomiting       Nutrition Significant Labs:  Lab Results   Component Value Date/Time    GLUCOSE 227 (H) 01/23/2025 0657     " (L) 01/23/2025 0657    K 4.0 01/23/2025 0657    CL 95 (L) 01/23/2025 0657    CO2 26 01/23/2025 0657    ANIONGAP 14 01/23/2025 0657    BUN 40 (H) 01/23/2025 0657    CREATININE 1.77 (H) 01/23/2025 0657    EGFR 28 (L) 01/23/2025 0657    CALCIUM 6.6 (L) 01/23/2025 0657    ALBUMIN 3.4 01/23/2025 0657    ALKPHOS 55 01/21/2025 1008    PROT 7.0 01/21/2025 1008    AST 14 01/21/2025 1008    BILITOT 0.4 01/21/2025 1008    ALT 12 01/21/2025 1008    MG 1.34 (L) 01/23/2025 0657    PHOS 2.7 01/23/2025 0657     No results found for: \"VITD25\"  CMP Trend:    Recent Labs     01/23/25  0657 01/22/25  0513 01/21/25  1008 01/09/25  1308 12/26/24  1423   GLUCOSE 227* 271* 397* 122* 97   * 131* 130* 136 138   K 4.0 4.0 4.0 4.4 4.0   CL 95* 96* 92* 99 98   CO2 26 23 24 32 29   ANIONGAP 14 16 18 9* 15   BUN 40* 53* 63* 27* 29*   CREATININE 1.77* 2.49* 3.36* 1.45* 1.18*   EGFR 28* 19* 13* 36* 46*   CALCIUM 6.6* 6.7* 7.2* 8.8 8.3*   ALBUMIN 3.4 3.4 4.0 4.0 4.0   ALKPHOS  --   --  55 48 51   PROT  --   --  7.0 6.4 6.3*   AST  --   --  14 16 18   BILITOT  --   --  0.4 0.3 0.4   ALT  --   --  12 10 11   , CBC Trend:   Recent Labs     01/23/25  0657 01/22/25  0513 01/21/25  1008   WBC 2.7* 2.8* 2.6*   NRBC 0.0 0.0 0.0   RBC 2.94* 2.98* 3.09*   HGB 8.8* 8.8* 9.4*   HCT 27.6* 27.9* 29.2*   MCV 94 94 95   MCH 29.9 29.5 30.4   MCHC 31.9* 31.5* 32.2   RDW 15.5* 15.5* 15.3*   * 164 176   , RFP + Serum Mag Trend:   Recent Labs     01/23/25  0657 01/22/25  0513 01/21/25  1008 01/06/23  1149 09/23/22  1400   GLUCOSE 227* 271* 397*   < > 102*   * 131* 130*   < > 134*   K 4.0 4.0 4.0   < > 4.4   CL 95* 96* 92*   < > 99   CO2 26 23 24   < > 27   ANIONGAP 14 16 18   < > 12   BUN 40* 53* 63*   < > 21   CREATININE 1.77* 2.49* 3.36*   < > 0.99   EGFR 28* 19* 13*   < >  --    CALCIUM 6.6* 6.7* 7.2*   < > 9.2   PHOS 2.7 3.4  --   --   --    ALBUMIN 3.4 3.4 4.0   < >  --    MG 1.34* 1.26*  --   --  1.70    < > = values in this interval not " "displayed.   , LFT Trend:   Recent Labs     01/23/25  0657 01/22/25  0513 01/21/25  1008 01/09/25  1308 12/26/24  1423   ALBUMIN 3.4 3.4 4.0 4.0 4.0   BILITOT  --   --  0.4 0.3 0.4   ALKPHOS  --   --  55 48 51   ALT  --   --  12 10 11   AST  --   --  14 16 18   PROT  --   --  7.0 6.4 6.3*   , DM Specific Labs Trend (includes HgbA1C):   Recent Labs     12/26/24  1423 08/18/21  1023 10/20/20  1016   HGBA1C 5.8* 5.4 5.8   , and Vitamin D: No results found for: \"VITD25\"     Anthropometrics:  Height: 154.5 cm (5' 0.83\")   Weight: 75.3 kg (166 lb 0.1 oz)   BMI (Calculated): 31.55    IBW/kg (Dietitian Calculated): 47.3 kg Percent of IBW: 159.2 %     Adjusted Body Weight (kg): 54.6 kg    Weight History:   Daily Weight  01/28/25 : 75.3 kg (166 lb 0.1 oz)  01/24/25 : 75.3 kg (166 lb 0.1 oz)  01/21/25 : 72.6 kg (160 lb)  01/10/25 : 74.5 kg (164 lb 3.9 oz)  12/27/24 : 75 kg (165 lb 5.5 oz)  12/13/24 : 75.9 kg (167 lb 5.3 oz)  11/15/24 : 75.6 kg (166 lb 10.7 oz)  10/18/24 : 75.5 kg (166 lb 7.2 oz)  09/24/24 : 74.4 kg (164 lb)  09/20/24 : 76.8 kg (169 lb 5 oz)    Weight Change %:       Nutrition History:  Food & Nutrition History:  Mouth sores, despite being of meds for a week. Diarrhea started a week ago Friday; last episode of diarrhea yesterday. Took an Imodium. Has been taking Imodium, probably has taken 6 pills. Prior to this, was constipated.     Appetite has not been good. Has not had a good appetite x 2 weeks. Started on steroids yesterday. Appetite resumed, but having difficulty sleeping.    Saturday, ate small piece of cod with buttered bread, small helping of pasta.     Sunday:   1/2 [employer] turkey dinner (turkey, dressing, mashed potatoes, corn) ,cinnamon roll, 1/2 cod, pasta.     Orders out mostly. Just hired [facility]. Had scrambled eggs and ayala this morning.     Medications:  Current Outpatient Medications   Medication Instructions    acetaminophen (TYLENOL) 1,000 mg, oral, 3 times daily PRN    " albuterol (ProAir HFA) 90 mcg/actuation inhaler 2 puffs, inhalation, Every 4 hours PRN    beclomethasone HFA (Qvar) 40 mcg/actuation inhaler 1 Inhalation, inhalation, 2 times daily RT    benzocaine 20 % mucosal gel Mouth/Throat, 4 times daily PRN    Blood glucose monitoring meter Test two times daily.    blood sugar diagnostic strip 1 each, miscellaneous, 2 times daily    brimonidine-timoloL (Combigan) 0.2-0.5 % ophthalmic solution 1 drop, Both Eyes, Every 12 hours scheduled    calcium carbonate-vitamin D3 600 mg-10 mcg (400 unit) tablet 3 tablets, oral, Daily    denosumab (Xgeva) 120 mg/1.7 mL (70 mg/mL) injection 1.7 mL, subcutaneous, Every 30 days    dexAMETHasone 1 mg, oral, 4 times daily, Swish 10 mL of solution for two minutes and spit, four times daily. Do not to eat for one hour after using the mouthwash.    diclofenac (Voltaren) 50 mg EC tablet TAKE 1 TABLET(50 MG) BY MOUTH TWICE DAILY WITH MEALS    esomeprazole (NexIUM) 40 mg DR capsule 1 capsule, oral, Daily before breakfast    furosemide (Lasix) 20 mg tablet 1 tablet, oral, Daily    [START ON 2/7/2025] inavolisib 3 mg, oral, Daily    inhalat.spacing dev,large mask (Pro Comfort Spacer-Adult Mask) spacer 1 each, miscellaneous, Every 4 hours PRN    isopropyl alcohoL 70 % towelette Test 2 times a day, clean finger prior to testing    LORazepam (ATIVAN) 0.5 mg, oral, Every 2 hour PRN    losartan-hydrochlorothiazide (Hyzaar) 100-12.5 mg tablet 1 tablet, oral, Daily    methylPREDNISolone (Medrol Dospak) 4 mg tablets Follow schedule on package instructions    mometasone (Elocon) 0.1 % lotion Topical, 2 times daily    multivitamin tablet 1 tablet, oral, Daily    nystatin (Mycostatin) 100,000 unit/gram powder 1 Application, Topical, 2 times daily    ondansetron (ZOFRAN) 8 mg, oral, Every 8 hours PRN    palbociclib (Ibrance) 125 mg tablet Take 125 mg (1 tablet) by mouth once daily for three weeks, then one week off.  Swallow whole.  Do not crush, chew or split.     pravastatin (PRAVACHOL) 10 mg, oral, Daily    prochlorperazine (COMPAZINE) 10 mg, oral, Every 6 hours PRN    Rhopressa 0.02 % drops opthalmic solution INSTILL 1 DROP IN RIGHT EYE DAILY AT BEDTIME    triamcinolone (Kenalog) 0.1 % cream Topical, 2 times daily       Nutrition Focused Physical Exam Findings:    Physical Findings: stomatitis;           Estimated Needs:       Total Energy Estimated Needs in 24 hours (kCal): 1638 kCal  Method for Estimating Needs: 30 kcal/kg ABW  Total Protein Estimated Needs in 24 Hours (g): 81.9 g  Method for Estimating 24 Hour Protein Needs: 1.5g/kg ABW  Total Fluid Estimated Needs in 24 Hours (mL): 1638 mL  Method for Estimating 24 Hour Fluid Needs: 1 mL/kcal           Nutrition Diagnosis        Nutrition Diagnosis  Patient has Nutrition Diagnosis: Yes  Diagnosis Status (1): New  Nutrition Diagnosis 1: Inadequate oral intake  Related to (1): stomatits, decreased appetite, diarrhea  As Evidenced by (1): subjective reports of ongoing inadequate oral intake; varied weights       Nutrition Interventions/Recommendations   Nutrition Prescription:    High protein/high calorie, GI soft diet     Nutrition Interventions:   Food and Nutrient Delivery:       Coordination of Care:       Nutrition Education:   Nutrition Education Content:  High protein/high calorie foods that would also work well with a GI soft diet. Reviewed protein supplement options. Provided information re: Enterade    Nutrition Monitoring and Evaluation   Food and Nutrient Intake  Monitoring and Evaluation Plan: Energy intake, Fluid intake, Protein intake, Meal/snack pattern, Amount of food    Anthropometric measurements  Monitoring and Evaluation Plan: Weight  Criteria: weight and LBM maintenance         Nutrition Focused Physical Findings  Monitoring and Evaluation Plan: Mouth  Mouth Finding: Stomatitis  Criteria: improvement in stomatitis         RD contact information provided. This service will continue to follow.

## 2025-01-28 ENCOUNTER — HOME CARE VISIT (OUTPATIENT)
Dept: HOME HEALTH SERVICES | Facility: HOME HEALTH | Age: 85
End: 2025-01-28
Payer: MEDICARE

## 2025-01-28 VITALS — BODY MASS INDEX: 31.34 KG/M2 | HEIGHT: 61 IN | WEIGHT: 166.01 LBS

## 2025-01-28 PROCEDURE — G0151 HHCP-SERV OF PT,EA 15 MIN: HCPCS | Mod: HHH

## 2025-01-28 ASSESSMENT — ENCOUNTER SYMPTOMS
PALPITATIONS: 0
FATIGUE: 0
COUGH: 0
PAIN LOCATION: RIGHT KNEE
LOWEST PAIN SEVERITY IN PAST 24 HOURS: 0/10
WOUND: 1
NEUROLOGICAL NEGATIVE: 1
UNEXPECTED WEIGHT CHANGE: 0
LEG SWELLING: 1
NAUSEA: 0
HEMATOLOGIC/LYMPHATIC NEGATIVE: 1
MUSCULOSKELETAL NEGATIVE: 1
PAIN LOCATION: LEFT KNEE
FEVER: 0
SUBJECTIVE PAIN PROGRESSION: GRADUALLY IMPROVING
HEMATURIA: 0
APPETITE CHANGE: 1
VOMITING: 0
FREQUENCY: 0
ENDOCRINE NEGATIVE: 1
HIGHEST PAIN SEVERITY IN PAST 24 HOURS: 4/10
PERSON REPORTING PAIN: PATIENT
EYES NEGATIVE: 1
PSYCHIATRIC NEGATIVE: 1
CONSTIPATION: 0
SHORTNESS OF BREATH: 0
PAIN: 1

## 2025-01-28 ASSESSMENT — ACTIVITIES OF DAILY LIVING (ADL)
AMBULATION ASSISTANCE ON FLAT SURFACES: 1
AMBULATION_DISTANCE/DURATION_TOLERATED: 65'

## 2025-01-28 NOTE — PROGRESS NOTES
Patient ID: Arabella Springer is a 84 y.o. female.  Diagnosis:  Advanced Breast Cancer   MedOnc: Dr. Mcfarland  Primary Care Provider: Angel Luis Velazquez DO  Referring Provider: No referring provider defined for this encounter.  Visit Type: Follow Up    Date of Service: 01/31/25  Location: [facility]     Patient Care Team:  Angel Luis Velazquez DO as PCP - General (Internal Medicine)  Angel Luis Velazquez DO as PCP - [facility] Attributed Provider  Andrew Mcfarland MD as Consulting Physician (Hematology and Oncology)  Catherine Boykin MA as Care Manager (Case Management)    Current Therapy: Inavolisib (on Hold), Palbociclib, Faslodex + Xgeva    ONCOLOGIC HISTORY     No matching staging information was found for the patient.    Stage IV breast cancer  2015: right mastectomy and reconstruction at [facility], positive node, declined XRT, back to OR for node dissection, no chemo, started on endocrine therapy - self D/C'd after 3 years and stopped following up with oncology  7/2022: referred to [facility] for leukopenia and anemia   8/2023: BMBX - incidental finding of CLL/SLL, but the etiology of her anemia is marrow infiltration by metastatic breast cancer   8/2023: PET widespread bone involvement by breast cancer including both femoral shafts and both knees and right sided pleural effusion. Offered Olaparib based on J Clin Oncol. 2020 Dec 20;38(36):0445-5176. doi: 10.1200/JCO.20.43584. ([facility] 048: Phase II Study of Olaparib for Metastatic Breast Cancer and Mutations in Homologous Recombination-Related Genes) - denied by insurance   8/28/2023: started Xgeva   8/2023: started Pembro - developed IO r/t psorasis confirmed with punch bx by dermatologist (Dr Wolff) after cycle 4  11/2023: Restarted on AI - letrozole  1/16/2024: ED for chest wall pain, 2 new rib fractures  4/3/2024: CT scan - interval worsening of bilateral pleural effusions, unchanged pulmonary nodules; diffuse sclerosis of axial and appendicular skeleton  8/16/2024: PET -  moderate-to-large bilateral pleura effusions; bilateral hydronephrosis; interval improvement of FDG avid widespread bone metastases throughout the axial and appendicular skeleton many of which are non FDG avid ; residual disease noted in bilateral femur (SUV 3.6), pelvis (SUV 2.4), bilateral humerus (SUV 3.3), bilateral clavicles (SUV 2/9). There is no FDG activity in her thorax/pleura, making the effusions less likely to be of malignant origin, and given bilateral nature--more likely cardiac in origin.  9/20/2024: thoracentesis--with removal of 700 ml yellow fluid; cytology was negative  12/9/2024: CT scan -moderate bilateral pleural effusions with atelectasis, right greater than left; moderate hydronephrosis left greater than right increased from prior exam; small to moderate volume ascites; new peritoneal nodular thickening (33 mm peritoneal implant in anterior deep pelvis); additional areas of peritoneal nodular thickening and enhancement in deep pelvis; similar appearance of diffuse sclerotic lesions  12/9/2024: WBBS - similar appearance of diffusely increased radiotracer uptake throughout the axial and appendicular skeleton corresponding with widespread sclerotic osseous lesions on CT  12/27/2024: received faslodex dose #1   1/8/2025: Started palbociclib and inavolisib  1/10/2025: #2 faslodex   1/21/2025: Hospital admission for TSERING - inavolisib held  1/24/2025: #3 faslodex, supportive care for inavolisib toxicities  1/31/2025: Continue to hold inavolisib, supportive care for toxicities     Genetics: [employer] 360 report shows 1) high TMB (22), 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 female who presents today for follow up of breast cancer. Patient of Dr. Mcfarland currently on Inavolisib, Palbociclib, Faslodex + Xgeva. Inavolisib remains on hold for toxicities. Her diarrhea has improved, no longer requiring Imodium. She does not feel like her stomatitis has improved, she still has pain, not sure if oral dex is working, using benzocaine on lips but she has been able to eat, has to choose certain foods and she is staying hydrated. She wants to take Tramadol tonight instead of tylenol for pain. Her rash is similar on her arms and back, not bothersome, no longer pruritic. Home care is helping with her perineum excoriation. It is still really painful. No fevers or chills. No SOB or chest pain. She has some LE edema - she's holding off on Lasix as recommended d/t her recent TSERING.     Review of Systems   Constitutional:  Positive for appetite change. Negative for chills, fatigue, fever and unexpected weight change.   HENT:   Positive for mouth sores and sore throat.    Eyes: Negative.    Respiratory:  Negative for cough and shortness of breath.    Cardiovascular:  Positive for leg swelling. Negative for chest pain and palpitations.   Gastrointestinal:  Negative for constipation, diarrhea, nausea and vomiting.   Endocrine: Negative.    Genitourinary: Negative.  Negative for bladder incontinence, frequency and hematuria.    Musculoskeletal: Negative.    Skin:  Positive for rash and wound. Negative for itching.   Neurological: Negative.    Hematological: Negative.    Psychiatric/Behavioral: Negative.       Objective      /79 (BP Location: Right arm, Patient Position: Sitting, BP Cuff Size: Adult)   Pulse 87   Temp 36.5 °C (97.7 °F) (Temporal)   Resp 16   Wt 76.9 kg (169 lb 8.5 oz)   SpO2 93%   BMI 32.22 kg/m²   BSA: 1.82 meters squared    Wt Readings  from Last 5 Encounters:   25 76.9 kg (169 lb 8.5 oz)   25 75.3 kg (166 lb 0.1 oz)   25 75.3 kg (166 lb 0.1 oz)   25 72.6 kg (160 lb)   01/10/25 74.5 kg (164 lb 3.9 oz)     Performance Status:  ECOG Score: 1- Restricted in physically strenuous activity.  Carries out light duty.  Karnofsky Score: 80 - Normal activity with effort; some signs or symptoms of disease    PHYSICAL EXAM   Physical Exam  Constitutional:       General: She is not in acute distress.  HENT:      Head: Normocephalic and atraumatic.      Mouth/Throat:      Lips: Lesions present.      Mouth: Oral lesions present.   Eyes:      Pupils: Pupils are equal, round, and reactive to light.   Pulmonary:      Effort: Pulmonary effort is normal.   Genitourinary:     Pubic Area: Rash present.      Labia:         Right: Rash and tenderness present.         Left: Rash and tenderness present.    Musculoskeletal:         General: Normal range of motion.      Cervical back: Normal range of motion.      Right lower le+ Pitting Edema present.      Left lower le+ Pitting Edema present.      Comments: walker   Skin:     General: Skin is warm and dry.      Findings: Rash present. No wound. Rash is crusting and macular.   Neurological:      General: No focal deficit present.      Mental Status: She is alert and oriented to person, place, and time.   Psychiatric:         Mood and Affect: Mood normal.         Behavior: Behavior normal.         Thought Content: Thought content normal.         Judgment: Judgment normal.     Allergies  Allergies   Allergen Reactions    Oxycodone Nausea/vomiting      Medications  Current Outpatient Medications   Medication Instructions    acetaminophen (TYLENOL) 1,000 mg, 3 times daily PRN    albuterol (ProAir HFA) 90 mcg/actuation inhaler 2 puffs, inhalation, Every 4 hours PRN    beclomethasone HFA (Qvar) 40 mcg/actuation inhaler 1 Inhalation, 2 times daily RT    benzocaine 20 % mucosal gel Mouth/Throat, 4 times  daily PRN    Blood glucose monitoring meter Test two times daily.    blood sugar diagnostic strip 1 each, miscellaneous, 2 times daily    brimonidine-timoloL (Combigan) 0.2-0.5 % ophthalmic solution 1 drop, Every 12 hours scheduled    calcium carbonate-vitamin D3 600 mg-10 mcg (400 unit) tablet 3 tablets, Daily    denosumab (Xgeva) 120 mg/1.7 mL (70 mg/mL) injection 1.7 mL, Every 30 days    dexAMETHasone 1 mg, oral, 4 times daily, Swish 10 mL of solution for two minutes and spit, four times daily. Do not to eat for one hour after using the mouthwash.    diclofenac (Voltaren) 50 mg EC tablet TAKE 1 TABLET(50 MG) BY MOUTH TWICE DAILY WITH MEALS    esomeprazole (NexIUM) 40 mg DR capsule 1 capsule, Daily before breakfast    furosemide (Lasix) 20 mg tablet 1 tablet, Daily    [START ON 2/7/2025] inavolisib 3 mg, oral, Daily    inhalat.spacing dev,large mask (Pro Comfort Spacer-Adult Mask) spacer 1 each, miscellaneous, Every 4 hours PRN    isopropyl alcohoL 70 % towelette Test 2 times a day, clean finger prior to testing    LORazepam (ATIVAN) 0.5 mg, oral, Every 2 hour PRN    losartan-hydrochlorothiazide (Hyzaar) 100-12.5 mg tablet 1 tablet, oral, Daily    mometasone (Elocon) 0.1 % lotion Topical, 2 times daily    multivitamin tablet 1 tablet, Daily    nystatin (Mycostatin) 100,000 unit/gram powder 1 Application, Topical, 2 times daily    ondansetron (ZOFRAN) 8 mg, oral, Every 8 hours PRN    palbociclib (Ibrance) 125 mg tablet Take 125 mg (1 tablet) by mouth once daily for three weeks, then one week off.  Swallow whole.  Do not crush, chew or split.    pravastatin (PRAVACHOL) 10 mg, oral, Daily    prochlorperazine (COMPAZINE) 10 mg, oral, Every 6 hours PRN    Rhopressa 0.02 % drops opthalmic solution INSTILL 1 DROP IN RIGHT EYE DAILY AT BEDTIME    triamcinolone (Kenalog) 0.1 % cream Topical, 2 times daily        Diagnostic Results   RESULTS      Latest Reference Range & Units 01/23/25 06:57 01/29/25 09:53   GLUCOSE 74 - 99  mg/dL 227 (H) 128 (H)   SODIUM 136 - 145 mmol/L 131 (L) 138   POTASSIUM 3.5 - 5.3 mmol/L 4.0 3.9   CHLORIDE 98 - 107 mmol/L 95 (L) 101   Bicarbonate 21 - 32 mmol/L 26 26   Anion Gap 10 - 20 mmol/L 14 15   Blood Urea Nitrogen 6 - 23 mg/dL 40 (H) 43 (H)   Creatinine 0.50 - 1.05 mg/dL 1.77 (H) 1.96 (H)   EGFR >60 mL/min/1.73m*2 28 (L) 25 (L)   Calcium 8.6 - 10.3 mg/dL 6.6 (L) 7.4 (L)   PHOSPHORUS 2.5 - 4.9 mg/dL 2.7    Albumin 3.4 - 5.0 g/dL 3.4 3.9   Alkaline Phosphatase 33 - 136 U/L  39   ALT 7 - 45 U/L  11   AST 9 - 39 U/L  13   Bilirubin Total 0.0 - 1.2 mg/dL  0.3     Recent Imaging     12/9/2024 CT CAP   IMPRESSION:  Lobular breast cancer restaging scan, in comparison to prior PET-CT  from August 2024:      New peritoneal implant is compatible with peritoneal carcinomatosis,  which can be seen with lobular breast cancer. Additional areas of  peritoneal nodular thickening/enhancement, increased ascites, and  increased hydronephrosis is compatible with increased nonmeasurable  peritoneal carcinomatosis. Unchanged appearance of diffuse sclerotic  osseous metastatic disease, with evaluation for osseous tumor burden  to be correlated with concurrent bone scan, reported separately.    12/9/2024 WBBS   IMPRESSION:  1. Similar appearance of widespread osseous metastatic disease  throughout the axial and appendicular skeleton in comparison to prior  study  2. Prominent left-greater-than-right renal collecting systems  correlating with hydronephrosis seen on same day CT.    12/27/2024 CXR   IMPRESSION:   1. Since 10/31/2024, stable bibasilar small-moderate pleural effusions,   left greater than right.   2.  Improved aeration of bilateral mid and lower lung zones.   3.  Similar appearance of diffuse sclerotic osseous metastases.     Assessment/Plan   ASSESSMENT     Arabella Springer is a 84 y.o. female with a history of breast cancer in 2015 s/p surgery and 3 years of AI now with recurrence who presents in follow up on  Inavolisib, Palbociclib, Faslodex and Xgeva. She has pleural effusions and diffuse bony mets. Recent progression on letrozole with new peritoneal mets, she is failing AI and was recommended therapeutic switch. Her tumor has the PIK3CA mutation so she was advised to switched to faslodex + palbociclib + Inavolisib. Faslodex 500 mg IM on days 1 and 15 (and then Q28 days). Palbociclib 125 mg PO once daily for 21 days on then 7 days off and inavolisib 9 mg PO once daily. She received Faslodex #1 on 12/27/24 and started palbociclib and inavolisib PO on 1/8/2025. On 1/17/2025 she started developing toxicities of diarrhea, nausea, stomatitis. She ended up being evaluated in the ED on 1/21/25 and admitted for TSERING. Her inavolisib was held.     She is here today for toxicity check.   Grade 1 diarrhea has resolved - no longer needing Imodium.   Grade 3 Rash - maculo-papular with scabbing, no sloughing, some flat blotchy redness. Mostly hands and back, face is clearing, pruritus resolved. Saw derm and has new creams. Completed medrol dose pack, no significant improvement. Unable to assess her thighs in clinic today but patient states some improvement, no longer bothersome. Rash covers >80% of BSA.   Has blood glucose meter now - blood sugars have been normal, was 128 on recent CMP.  Her perineum is excoriated - home care wound nurse following - pictures under Media   Grade 3 stomatitis - continue liquid Dex and topical benazocaine, saw Bernie -dietician. Now able to eat and drink. Wants to take Tramadol at night instead of Tylenol only. This is okay.     All side effects are likely related to inavolisib which is already on hold. She will continue Ibrance and Faslodex. We will continue supportive treatment for her toxicities and monitor her closely. I will see her next week to see if her toxicities are tolerable/lessening. The goal would be to restart therapy with inavolisib at 3 mg PO daily soon.     She is due for Xgeva next  week. Her Ca is low. She is on PO supplementation 1800 mg a day. We will see if it improves next week prior to Xgeva, she knows we will hold Xgeva if Ca is <8.6.     PLAN     # Breast Cancer   - Faslodex Monthly - next due 2/21  - Inavolisib on HOLD - Plan to restart at 3 mg PO daily when adverse reactions resolve - new script [facility] to [facility]  - Continue palbociclib 125 mg PO daily, 21 days on, 7 days off     # Rash  - Triamcinolone cream BID to rash on chest, back, arms, hands, thighs  - Wound care for perineal excoriation  - Continue following with derm      # Stomatitis  - Continue Dexamethasone oral liquid 4 times a day, wait 1 hour prior to eating  - Benazocaine mucosal gel to lips 4 times a day as needed for pain  - Avoid hot and citric foods; eat soft foods  - Dietician following  - Tylenol and Tramadol as needed     # Diarrhea - resolved   - Hydrate well   - Imodium 2 mg with first diarrhea, repeat up to 8 tabs a day as needed  - Avoid dairy until diarrhea resolves     # Perineal excoriation   - Continue follow up with wound care   - Attempt to decrease the use of depends briefs, use cotton underwear or go without  - Use wet wipes or order bidet attachment     # Dehydration/TSERING  - Increase PO intake   - Lasix on hold until diarrhea and stomatitis resolves, restart for SOB or BLE edema      # Nausea   - Compazine and zofran as needed     # Neutropenia   - Continue to monitor ANC on palbociclib    # Bony Mets  - Monthly Xgeva 120 mg due 2/6  - Continue Ca and Vit D    # Pleural Effusions  - Continue follow up with Dr. Alonso ([facility])  - On lasix - holding temporarily for dehydration/TSERING  - Inhalers as needed     #Anemia   - known bone marrow infiltration by metastatic breast cancer   - Stable - continue to monitor    # Increased cerumen  - Follow up with ENT     Follow up with me next week for Xgeva and tox check labs.     Arabella was seen today for follow-up.  Diagnoses and all orders for this visit:  Carcinoma of  breast metastatic to bone, unspecified laterality (Multi) (Primary)  -     Clinic Appointment Request Follow Up; [facility]; Future  -     Infusion Appointment Request [facility] INF[facility]ION; Future      There are no diagnoses linked to this encounter.   Venous Access Orders  Denosumab (Xgeva), 28 Day Cycles  Inavolisib + Palbociclib + Fulvestrant, 28 Day Cycles    Patient verbalizes understanding of above plan. Time provided for patient's questions. All questions answered to patient's satisfaction in office. Patient instructed to reach out for any new concerning issues at [phone].    Tray Mora MSN, APRN, A-GNP-C, AOCNP  [facility]  Division of Hematology & Medical Oncology   [place] [address]  Phone: [phone]  Available via [employer] Secure Chat  [email or web address]

## 2025-01-29 ENCOUNTER — HOME CARE VISIT (OUTPATIENT)
Dept: HOME HEALTH SERVICES | Facility: HOME HEALTH | Age: 85
End: 2025-01-29
Payer: MEDICARE

## 2025-01-29 ENCOUNTER — LAB (OUTPATIENT)
Dept: LAB | Facility: CLINIC | Age: 85
End: 2025-01-29
Payer: MEDICARE

## 2025-01-29 DIAGNOSIS — N17.9 AKI (ACUTE KIDNEY INJURY) (CMS-HCC): ICD-10-CM

## 2025-01-29 LAB
ALBUMIN SERPL BCP-MCNC: 3.9 G/DL (ref 3.4–5)
ALP SERPL-CCNC: 39 U/L (ref 33–136)
ALT SERPL W P-5'-P-CCNC: 11 U/L (ref 7–45)
ANION GAP SERPL CALC-SCNC: 15 MMOL/L (ref 10–20)
AST SERPL W P-5'-P-CCNC: 13 U/L (ref 9–39)
BILIRUB SERPL-MCNC: 0.3 MG/DL (ref 0–1.2)
BUN SERPL-MCNC: 43 MG/DL (ref 6–23)
CALCIUM SERPL-MCNC: 7.4 MG/DL (ref 8.6–10.3)
CHLORIDE SERPL-SCNC: 101 MMOL/L (ref 98–107)
CO2 SERPL-SCNC: 26 MMOL/L (ref 21–32)
CREAT SERPL-MCNC: 1.96 MG/DL (ref 0.5–1.05)
EGFRCR SERPLBLD CKD-EPI 2021: 25 ML/MIN/1.73M*2
GLUCOSE SERPL-MCNC: 128 MG/DL (ref 74–99)
POTASSIUM SERPL-SCNC: 3.9 MMOL/L (ref 3.5–5.3)
PROT SERPL-MCNC: 6.4 G/DL (ref 6.4–8.2)
SODIUM SERPL-SCNC: 138 MMOL/L (ref 136–145)

## 2025-01-29 PROCEDURE — 80053 COMPREHEN METABOLIC PANEL: CPT

## 2025-01-29 PROCEDURE — G0152 HHCP-SERV OF OT,EA 15 MIN: HCPCS | Mod: HHH

## 2025-01-29 PROCEDURE — 36415 COLL VENOUS BLD VENIPUNCTURE: CPT

## 2025-01-29 ASSESSMENT — ENCOUNTER SYMPTOMS
LOSS OF SENSATION IN FEET: 0
OCCASIONAL FEELINGS OF UNSTEADINESS: 1
DENIES PAIN: 1
DEPRESSION: 0
PERSON REPORTING PAIN: PATIENT

## 2025-01-29 ASSESSMENT — ACTIVITIES OF DAILY LIVING (ADL)
DRESSING_UB_CURRENT_FUNCTION: INDEPENDENT
AMBULATION ASSISTANCE: STAND BY ASSIST
TOILETING: 1
CURRENT_FUNCTION: STAND BY ASSIST
TOILETING: SUPERVISION
DRESSING_LB_CURRENT_FUNCTION: MINIMUM ASSIST
PHYSICAL_TRANSFERS_DEVICES: SC
AMBULATION ASSISTANCE: 1
PHYSICAL TRANSFERS ASSESSED: 1
TOILETING EQUIPMENT USED: SC

## 2025-01-30 ENCOUNTER — HOME CARE VISIT (OUTPATIENT)
Dept: HOME HEALTH SERVICES | Facility: HOME HEALTH | Age: 85
End: 2025-01-30
Payer: MEDICARE

## 2025-01-30 ENCOUNTER — SPECIALTY PHARMACY (OUTPATIENT)
Dept: PHARMACY | Facility: CLINIC | Age: 85
End: 2025-01-30

## 2025-01-30 VITALS
TEMPERATURE: 97.3 F | HEART RATE: 95 BPM | OXYGEN SATURATION: 96 % | DIASTOLIC BLOOD PRESSURE: 66 MMHG | SYSTOLIC BLOOD PRESSURE: 116 MMHG | RESPIRATION RATE: 18 BRPM

## 2025-01-30 PROCEDURE — G0299 HHS/HOSPICE OF RN EA 15 MIN: HCPCS | Mod: HHH

## 2025-01-30 PROCEDURE — RXMED WILLOW AMBULATORY MEDICATION CHARGE

## 2025-01-30 SDOH — ECONOMIC STABILITY: GENERAL

## 2025-01-30 ASSESSMENT — ENCOUNTER SYMPTOMS
HIGHEST PAIN SEVERITY IN PAST 24 HOURS: 10/10
DEPRESSION: 0
PAIN SEVERITY GOAL: 0/10
OCCASIONAL FEELINGS OF UNSTEADINESS: 1
PERSON REPORTING PAIN: PATIENT
HYPERTENSION: 1
APPETITE LEVEL: GOOD
DIARRHEA: 1
LOWEST PAIN SEVERITY IN PAST 24 HOURS: 4/10
PAIN LOCATION - PAIN QUALITY: ACHE
LOSS OF SENSATION IN FEET: 0
PAIN LOCATION: GROIN
LAST BOWEL MOVEMENT: 67235
MUSCLE WEAKNESS: 1
STOOL FREQUENCY: TWICE DAILY
PAIN LOCATION - PAIN DURATION: CONSTANT
PAIN LOCATION - PAIN SEVERITY: 10/10
PAIN LOCATION - RELIEVING FACTORS: REST
CHANGE IN APPETITE: UNCHANGED
PAIN LOCATION - PAIN FREQUENCY: CONSTANT
PAIN: 1

## 2025-01-30 ASSESSMENT — ACTIVITIES OF DAILY LIVING (ADL)
AMBULATION ASSISTANCE: 1
MONEY MANAGEMENT (EXPENSES/BILLS): INDEPENDENT
AMBULATION ASSISTANCE: INDEPENDENT

## 2025-01-30 ASSESSMENT — PAIN SCALES - PAIN ASSESSMENT IN ADVANCED DEMENTIA (PAINAD)
FACIALEXPRESSION: 0 - SMILING OR INEXPRESSIVE.
CONSOLABILITY: 0 - NO NEED TO CONSOLE.
BODYLANGUAGE: 0
CONSOLABILITY: 0
FACIALEXPRESSION: 0
BREATHING: 0
BODYLANGUAGE: 0 - RELAXED.
NEGVOCALIZATION: 0 - NONE.
TOTALSCORE: 0
NEGVOCALIZATION: 0

## 2025-01-31 ENCOUNTER — OFFICE VISIT (OUTPATIENT)
Dept: HEMATOLOGY/ONCOLOGY | Facility: CLINIC | Age: 85
End: 2025-01-31
Payer: MEDICARE

## 2025-01-31 ENCOUNTER — APPOINTMENT (OUTPATIENT)
Dept: HEMATOLOGY/ONCOLOGY | Facility: CLINIC | Age: 85
End: 2025-01-31
Payer: MEDICARE

## 2025-01-31 ENCOUNTER — PHARMACY VISIT (OUTPATIENT)
Dept: PHARMACY | Facility: CLINIC | Age: 85
End: 2025-01-31
Payer: COMMERCIAL

## 2025-01-31 VITALS
TEMPERATURE: 97.7 F | SYSTOLIC BLOOD PRESSURE: 151 MMHG | WEIGHT: 169.53 LBS | OXYGEN SATURATION: 93 % | BODY MASS INDEX: 32.22 KG/M2 | HEART RATE: 87 BPM | DIASTOLIC BLOOD PRESSURE: 79 MMHG | RESPIRATION RATE: 16 BRPM

## 2025-01-31 DIAGNOSIS — C50.919 CARCINOMA OF BREAST METASTATIC TO BONE, UNSPECIFIED LATERALITY (MULTI): Primary | ICD-10-CM

## 2025-01-31 DIAGNOSIS — C79.51 CARCINOMA OF BREAST METASTATIC TO BONE, UNSPECIFIED LATERALITY (MULTI): Primary | ICD-10-CM

## 2025-01-31 PROCEDURE — 1125F AMNT PAIN NOTED PAIN PRSNT: CPT

## 2025-01-31 PROCEDURE — 3078F DIAST BP <80 MM HG: CPT

## 2025-01-31 PROCEDURE — 99214 OFFICE O/P EST MOD 30 MIN: CPT

## 2025-01-31 PROCEDURE — RXMED WILLOW AMBULATORY MEDICATION CHARGE

## 2025-01-31 PROCEDURE — 1160F RVW MEDS BY RX/DR IN RCRD: CPT

## 2025-01-31 PROCEDURE — 1159F MED LIST DOCD IN RCRD: CPT

## 2025-01-31 PROCEDURE — 3077F SYST BP >= 140 MM HG: CPT

## 2025-01-31 PROCEDURE — 1111F DSCHRG MED/CURRENT MED MERGE: CPT

## 2025-01-31 ASSESSMENT — ENCOUNTER SYMPTOMS
DIARRHEA: 0
CHILLS: 0
SORE THROAT: 1

## 2025-01-31 ASSESSMENT — PAIN SCALES - GENERAL: PAINLEVEL_OUTOF10: 6

## 2025-02-01 LAB
ATRIAL RATE: 84 BPM
P AXIS: 38 DEGREES
P OFFSET: 196 MS
P ONSET: 148 MS
PR INTERVAL: 150 MS
Q ONSET: 223 MS
QRS COUNT: 14 BEATS
QRS DURATION: 76 MS
QT INTERVAL: 380 MS
QTC CALCULATION(BAZETT): 449 MS
QTC FREDERICIA: 424 MS
R AXIS: 10 DEGREES
T AXIS: 30 DEGREES
T OFFSET: 413 MS
VENTRICULAR RATE: 84 BPM

## 2025-02-02 NOTE — PROGRESS NOTES
1/24/25 clinic ([facility]):  Labs from 1/23/25:  WBC 2.7, ANC 0.93 (gr 3 - continue cycle, repeat labs in 1 wk), H/H 8.8/27.6, PLTs 143; SCr 1.77, glucose 227, K 4.0, Mg 1.34.   LP started C1D1 fulvestrant on 12/27/24, C1D15 on 1/10/25, C1D29 on 1/24/25.  Started inavolisib and palbo on 1/8/25.      On 1/17/25 Arabella started developing diarrhea, nausea, stomatitis.   1/21/25 Arabella went to ED and was admitted for TSERING; inavolisib held, palbo continued, fluids helped correct TSERING then DC.      Today at 1/24/25 clinic, Arabella is seen for C1D29 fulvestrant plus support of inavolisib toxicities:  diarrhea gr 1, resolving, maybe 3 episodes per day.  Rash gr 3 over >80% BSA, most severe on hands -- AK sent scripts for Medrol dosepack and triamcinolone cream, hopeful for rash resolution off inavolisib, may need dermatology if it doesn't resolve.  Arabella will monitor BG x 2 daily; Rx for home glucose monitoring sent; call if >350.  Perineum is excoriated, will see wound care RN.  Gr 3 stomatitis, has just started oral dex S/S.  Discussed importance of staying hydrated.      Arabella to continue palbociclib and fulvestrant as prescribed, continue to HOLD inavolisib at this time.  Goal is to restart inavolisib after recovery at reduced dose;  AK sent new Rx on 1/24/25 for inavolisib 3 mg PO once daily to [facility].

## 2025-02-02 NOTE — PROGRESS NOTES
[facility] Pharm[facility] Clinical Note  Initial Patient Education     Introduction  Arabella Springer is a 84 y.o. female who is on the specialty pharmacy service for management of: Oncology Core.    Arabella Springer is initiating the following therapy: inavolisib + palbociclib + fulvestrant for PIK3CA+ MBC, first line therapy (N Engl J Med 2024; 391:1584-96)    Medication receipt date: Est 1/8/25  Duration of therapy: Until drug toxicity or progression    The most recent encounter visit with the referring prescriber Andrew Mcfarland MD on 12/13/24 was reviewed.  Pharmacy will continue to collaborate in the care of this patient with the referring prescriber.    Clinical Background  An initial assessment was conducted prior to first fill of the medication to determine the appropriateness of therapy given the patient's diagnosis, medication list, comorbidities, allergies, medical history, patient's ability to self administer medication, and therapeutic goals based on possible outcomes of therapy. Refer to initial assessment task completed on 12/13/24.    Labs for clinical appropriateness that were reviewed include:   Oncology - CBC-diff:   Lab Results   Component Value Date    WBC 2.7 (L) 01/23/2025    RBC 2.94 (L) 01/23/2025    HGB 8.8 (L) 01/23/2025    HCT 27.6 (L) 01/23/2025    MCV 94 01/23/2025    MCHC 31.9 (L) 01/23/2025     (L) 01/23/2025    RDW 15.5 (H) 01/23/2025    NEUTOPHILPCT 35.0 01/23/2025    IGPCT 0.4 01/23/2025    LYMPHOPCT 55.6 01/23/2025    MONOPCT 6.0 01/23/2025    EOSPCT 2.6 01/23/2025    BASOPCT 0.4 01/23/2025    NEUTROABS 0.93 (L) 01/23/2025    LYMPHSABS 1.48 01/23/2025    MONOSABS 0.16 01/23/2025    EOSABS 0.07 01/23/2025    BASOSABS 0.01 01/23/2025    and CMP:   Lab Results   Component Value Date    GLUCOSE 128 (H) 01/29/2025     01/29/2025    K 3.9 01/29/2025     01/29/2025    CO2 26 01/29/2025    ANIONGAP 15 01/29/2025    BUN 43 (H) 01/29/2025    CREATININE 1.96  (H) 01/29/2025    GFRF 56 (A) 09/28/2023    CALCIUM 7.4 (L) 01/29/2025    ALBUMIN 3.9 01/29/2025    ALKPHOS 39 01/29/2025    PROT 6.4 01/29/2025    AST 13 01/29/2025    BILITOT 0.3 01/29/2025    ALT 11 01/29/2025       Education/Discussion  Arabella was contacted on 2/2/2025 at 1:53 PM for a pharmacy visit with encounter number [id number] from:   [facility] [id number] [facility]   [address] [facility]   [address]  [facility] [address]  Dept: [phone]  Dept Fax: [phone]  Arabella consented to a/an In person visit, which was performed.    Medication Start Date (planned or actual): Est 1/8/25  Education was conducted prior to start of therapy? Yes    Education discussed includes the following:  Patient Education  Counseled the Patient on the Following : Theraputic rationale and expected outcomes, Expected duration of therapy, Doses and administration, Adherence and missed doses, Possible side effects and management, Contraindications and precautions, Safe handling, storage, and disposal, Lab monitoring and follow-up, Possible drug interactions, Cost of medications, Pharmacy contact information  Learner: Patient, Family  Education Method: Explanation, Handout  Education Response: Verbalizes understanding  Additional details of the medication specific counseling are found within the linked patient education flowsheet.     The follow up timeline was discussed. Every person responds to and reacts to therapy differently. Patient should be assessed for efficacy and tolerability in approximately: 10-14 days    Provided education on goals and possible outcomes of therapy:  Adherence with therapy  Timely completion of appropriate labs  Timely and appropriate follow up with provider  Identify and address medication interactions with presciption medications, OTC medications and supplements  Optimize or maintain quality of life    The importance of adherence was discussed and they were advised to take the  medication as prescribed by their provider.     Impression/Plan  Review and Assessment   Reviewed During This Encounter: Allergies, Medications, Problem list  Medications Assessed for Appropriate Use, Dose, Route, Frequency, and Duration: Yes  Medication Reconciliation Completed: Yes  Drug Interactions Evaluated: Yes  Clinically Relevant Drug Interactions Identified: Yes  List Interactions and Management Plan: Potential cat D DDI between palbo and esomeprazole -- (1) using palbo tablets, less significant; (2) pt only taking PRN -- team to monitor    This patient has been identified as high risk due to Geriatric (over 65 years of age).  The following action was taken: N/A.         The  Specialty Pharmacy Welcome packet may be viewed here:   Specialty Pharmacy Welcome Packet     Or by scanning QR code:      Provided contact information ([phone]) for [facility] and reviewed dispensing process, refill timeline and patient management follow up. Advised to contact the pharmacy if there are any adverse effects and/or changes to medication list, including prescriptions, OTC medications, herbal products, or supplements. Confirmed understanding of education conducted during assessment. All questions and concerns were addressed and patient was encouraged to reach out for additional questions or concerns.      Dario Arredondo RP MS BCOP  Clinical Pharmacy Specialty - Ambulatory Oncology

## 2025-02-03 ENCOUNTER — APPOINTMENT (OUTPATIENT)
Dept: OTOLARYNGOLOGY | Facility: CLINIC | Age: 85
End: 2025-02-03
Payer: MEDICARE

## 2025-02-03 ENCOUNTER — TELEPHONE (OUTPATIENT)
Dept: HEMATOLOGY/ONCOLOGY | Facility: CLINIC | Age: 85
End: 2025-02-03

## 2025-02-03 DIAGNOSIS — H60.8X3 OTHER NONINFECTIOUS CHRONIC OTITIS EXTERNA OF BOTH EARS: Primary | ICD-10-CM

## 2025-02-03 PROCEDURE — 99212 OFFICE O/P EST SF 10 MIN: CPT | Performed by: OTOLARYNGOLOGY

## 2025-02-03 PROCEDURE — 1111F DSCHRG MED/CURRENT MED MERGE: CPT | Performed by: OTOLARYNGOLOGY

## 2025-02-03 ASSESSMENT — ENCOUNTER SYMPTOMS
SHORTNESS OF BREATH: 0
ENDOCRINE NEGATIVE: 1
HEMATOLOGIC/LYMPHATIC NEGATIVE: 1
FATIGUE: 0
VOMITING: 0
NAUSEA: 0
DIARRHEA: 0
PALPITATIONS: 0
PSYCHIATRIC NEGATIVE: 1
CHILLS: 0
FREQUENCY: 0
EYES NEGATIVE: 1
MUSCULOSKELETAL NEGATIVE: 1
UNEXPECTED WEIGHT CHANGE: 0
SORE THROAT: 1
NEUROLOGICAL NEGATIVE: 1
WOUND: 1
COUGH: 0
LEG SWELLING: 1
APPETITE CHANGE: 1
HEMATURIA: 0
FEVER: 0
CONSTIPATION: 0

## 2025-02-03 NOTE — PROGRESS NOTES
Patient ID: Arabella Springer is a 84 y.o. female.  Diagnosis:  Advanced Breast Cancer   MedOnc: Dr. Mcfarland  Primary Care Provider: Angel Luis Velazquez DO  Referring Provider: ROCHELLE Bolton-CNP  [id number] [facility] [address] [place] [id number]  Visit Type: Follow Up    Date of Service: 02/03/25  Location: [facility]     Patient Care Team:  Angel Luis Velazquez DO as PCP - General (Internal Medicine)  Angel Luis Velazquez DO as PCP - [facility] Attributed Provider  Andrew Mcfarland MD as Consulting Physician (Hematology and Oncology)  Catherine Boykin MA as Care Manager (Case Management)    Current Therapy: Inavolisib (on Hold), Palbociclib, Faslodex + Xgeva    ONCOLOGIC HISTORY     No matching staging information was found for the patient.    Stage IV breast cancer  2015: right mastectomy and reconstruction at [facility], positive node, declined XRT, back to OR for node dissection, no chemo, started on endocrine therapy - self D/C'd after 3 years and stopped following up with oncology  7/2022: referred to [facility] for leukopenia and anemia   8/2023: BMBX - incidental finding of CLL/SLL, but the etiology of her anemia is marrow infiltration by metastatic breast cancer   8/2023: PET widespread bone involvement by breast cancer including both femoral shafts and both knees and right sided pleural effusion. Offered Olaparib based on J Clin Oncol. 2020 Dec 20;38(36):5242-1337. doi: 10.1200/JCO.20.54332. ([facility] 048: Phase II Study of Olaparib for Metastatic Breast Cancer and Mutations in Homologous Recombination-Related Genes) - denied by insurance   8/28/2023: started Xgeva   8/2023: started Pembro - developed IO r/t psorasis confirmed with punch bx by dermatologist (Dr Wolff) after cycle 4  11/2023: Restarted on AI - letrozole  1/16/2024: ED for chest wall pain, 2 new rib fractures  4/3/2024: CT scan - interval worsening of bilateral pleural effusions, unchanged pulmonary nodules; diffuse sclerosis of axial and appendicular  skeleton  8/16/2024: PET - moderate-to-large bilateral pleura effusions; bilateral hydronephrosis; interval improvement of FDG avid widespread bone metastases throughout the axial and appendicular skeleton many of which are non FDG avid ; residual disease noted in bilateral femur (SUV 3.6), pelvis (SUV 2.4), bilateral humerus (SUV 3.3), bilateral clavicles (SUV 2/9). There is no FDG activity in her thorax/pleura, making the effusions less likely to be of malignant origin, and given bilateral nature--more likely cardiac in origin.  9/20/2024: thoracentesis--with removal of 700 ml yellow fluid; cytology was negative  12/9/2024: CT scan -moderate bilateral pleural effusions with atelectasis, right greater than left; moderate hydronephrosis left greater than right increased from prior exam; small to moderate volume ascites; new peritoneal nodular thickening (33 mm peritoneal implant in anterior deep pelvis); additional areas of peritoneal nodular thickening and enhancement in deep pelvis; similar appearance of diffuse sclerotic lesions  12/9/2024: WBBS - similar appearance of diffusely increased radiotracer uptake throughout the axial and appendicular skeleton corresponding with widespread sclerotic osseous lesions on CT  12/27/2024: received faslodex dose #1   1/8/2025: Started palbociclib and inavolisib  1/10/2025: #2 faslodex   1/21/2025: Hospital admission for TSERING - inavolisib held  1/24/2025: #3 faslodex, supportive care for inavolisib toxicities  1/31/2025: Continue to hold inavolisib, supportive care for toxicities     Genetics: [employer] 360 report shows 1) high TMB (22), 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 female who presents today for follow up of breast cancer. Patient of Dr. Mcfarland currently on Inavolisib, Palbociclib, Faslodex + Xgeva. Inavolisib remains on hold for toxicities. Her diarrhea has improved, no longer requiring Imodium. She does not feel like her stomatitis has improved, she still has pain, not sure if oral dex is working, using benzocaine on lips but she has been able to eat, has to choose certain foods and she is staying hydrated. She wants to take Tramadol tonight instead of tylenol for pain. Her rash is similar on her arms and back, not bothersome, no longer pruritic. Home care is helping with her perineum excoriation. It is still really painful. No fevers or chills. No SOB or chest pain. She has some LE edema - she's holding off on Lasix as recommended d/t her recent TSERING.     Review of Systems   Constitutional:  Positive for appetite change. Negative for chills, fatigue, fever and unexpected weight change.   HENT:   Positive for mouth sores and sore throat.    Eyes: Negative.    Respiratory:  Negative for cough and shortness of breath.    Cardiovascular:  Positive for leg swelling. Negative for chest pain and palpitations.   Gastrointestinal:  Negative for constipation, diarrhea, nausea and vomiting.   Endocrine: Negative.    Genitourinary: Negative.  Negative for bladder incontinence, frequency and hematuria.    Musculoskeletal: Negative.    Skin:  Positive for rash and wound. Negative for itching.   Neurological: Negative.    Hematological: Negative.    Psychiatric/Behavioral: Negative.       Objective      There were no vitals taken for this visit.  BSA: There is no height or weight on file to calculate BSA.    Wt Readings from Last 5 Encounters:   01/31/25 76.9 kg (169 lb 8.5 oz)   01/28/25 75.3 kg (166 lb 0.1 oz)    25 75.3 kg (166 lb 0.1 oz)   25 72.6 kg (160 lb)   01/10/25 74.5 kg (164 lb 3.9 oz)     Performance Status:  ECOG Score: 1- Restricted in physically strenuous activity.  Carries out light duty.  Karnofsky Score: 80 - Normal activity with effort; some signs or symptoms of disease    PHYSICAL EXAM   Physical Exam  Constitutional:       General: She is not in acute distress.  HENT:      Head: Normocephalic and atraumatic.      Mouth/Throat:      Lips: Lesions present.      Mouth: Oral lesions present.   Eyes:      Pupils: Pupils are equal, round, and reactive to light.   Pulmonary:      Effort: Pulmonary effort is normal.   Genitourinary:     Pubic Area: Rash present.      Labia:         Right: Rash and tenderness present.         Left: Rash and tenderness present.    Musculoskeletal:         General: Normal range of motion.      Cervical back: Normal range of motion.      Right lower le+ Pitting Edema present.      Left lower le+ Pitting Edema present.      Comments: walker   Skin:     General: Skin is warm and dry.      Findings: Rash present. No wound. Rash is crusting and macular.   Neurological:      General: No focal deficit present.      Mental Status: She is alert and oriented to person, place, and time.   Psychiatric:         Mood and Affect: Mood normal.         Behavior: Behavior normal.         Thought Content: Thought content normal.         Judgment: Judgment normal.     Allergies  Allergies   Allergen Reactions    Oxycodone Nausea/vomiting      Medications  Current Outpatient Medications   Medication Instructions    acetaminophen (TYLENOL) 1,000 mg, 3 times daily PRN    albuterol (ProAir HFA) 90 mcg/actuation inhaler 2 puffs, inhalation, Every 4 hours PRN    beclomethasone HFA (Qvar) 40 mcg/actuation inhaler 1 Inhalation, 2 times daily RT    benzocaine 20 % mucosal gel Mouth/Throat, 4 times daily PRN    Blood glucose monitoring meter Test two times daily.    blood sugar diagnostic  strip 1 each, miscellaneous, 2 times daily    brimonidine-timoloL (Combigan) 0.2-0.5 % ophthalmic solution 1 drop, Every 12 hours scheduled    calcium carbonate-vitamin D3 600 mg-10 mcg (400 unit) tablet 3 tablets, Daily    denosumab (Xgeva) 120 mg/1.7 mL (70 mg/mL) injection 1.7 mL, Every 30 days    dexAMETHasone 1 mg, oral, 4 times daily, Swish 10 mL of solution for two minutes and spit, four times daily. Do not to eat for one hour after using the mouthwash.    diclofenac (Voltaren) 50 mg EC tablet TAKE 1 TABLET(50 MG) BY MOUTH TWICE DAILY WITH MEALS    esomeprazole (NexIUM) 40 mg DR capsule 1 capsule, Daily before breakfast    furosemide (Lasix) 20 mg tablet 1 tablet, Daily    [START ON 2/7/2025] inavolisib 3 mg, oral, Daily    inhalat.spacing dev,large mask (Pro Comfort Spacer-Adult Mask) spacer 1 each, miscellaneous, Every 4 hours PRN    isopropyl alcohoL 70 % towelette Test 2 times a day, clean finger prior to testing    LORazepam (ATIVAN) 0.5 mg, oral, Every 2 hour PRN    losartan-hydrochlorothiazide (Hyzaar) 100-12.5 mg tablet 1 tablet, oral, Daily    mometasone (Elocon) 0.1 % lotion Topical, 2 times daily    multivitamin tablet 1 tablet, Daily    nystatin (Mycostatin) 100,000 unit/gram powder 1 Application, Topical, 2 times daily    ondansetron (ZOFRAN) 8 mg, oral, Every 8 hours PRN    palbociclib (Ibrance) 125 mg tablet Take 125 mg (1 tablet) by mouth once daily for three weeks, then one week off.  Swallow whole.  Do not crush, chew or split.    pravastatin (PRAVACHOL) 10 mg, oral, Daily    prochlorperazine (COMPAZINE) 10 mg, oral, Every 6 hours PRN    Rhopressa 0.02 % drops opthalmic solution INSTILL 1 DROP IN RIGHT EYE DAILY AT BEDTIME    triamcinolone (Kenalog) 0.1 % cream Topical, 2 times daily        Diagnostic Results   RESULTS      Latest Reference Range & Units 01/23/25 06:57 01/29/25 09:53   GLUCOSE 74 - 99 mg/dL 227 (H) 128 (H)   SODIUM 136 - 145 mmol/L 131 (L) 138   POTASSIUM 3.5 - 5.3 mmol/L  4.0 3.9   CHLORIDE 98 - 107 mmol/L 95 (L) 101   Bicarbonate 21 - 32 mmol/L 26 26   Anion Gap 10 - 20 mmol/L 14 15   Blood Urea Nitrogen 6 - 23 mg/dL 40 (H) 43 (H)   Creatinine 0.50 - 1.05 mg/dL 1.77 (H) 1.96 (H)   EGFR >60 mL/min/1.73m*2 28 (L) 25 (L)   Calcium 8.6 - 10.3 mg/dL 6.6 (L) 7.4 (L)   PHOSPHORUS 2.5 - 4.9 mg/dL 2.7    Albumin 3.4 - 5.0 g/dL 3.4 3.9   Alkaline Phosphatase 33 - 136 U/L  39   ALT 7 - 45 U/L  11   AST 9 - 39 U/L  13   Bilirubin Total 0.0 - 1.2 mg/dL  0.3     Recent Imaging     12/9/2024 CT CAP   IMPRESSION:  Lobular breast cancer restaging scan, in comparison to prior PET-CT  from August 2024:      New peritoneal implant is compatible with peritoneal carcinomatosis,  which can be seen with lobular breast cancer. Additional areas of  peritoneal nodular thickening/enhancement, increased ascites, and  increased hydronephrosis is compatible with increased nonmeasurable  peritoneal carcinomatosis. Unchanged appearance of diffuse sclerotic  osseous metastatic disease, with evaluation for osseous tumor burden  to be correlated with concurrent bone scan, reported separately.    12/9/2024 WBBS   IMPRESSION:  1. Similar appearance of widespread osseous metastatic disease  throughout the axial and appendicular skeleton in comparison to prior  study  2. Prominent left-greater-than-right renal collecting systems  correlating with hydronephrosis seen on same day CT.    12/27/2024 CXR   IMPRESSION:   1. Since 10/31/2024, stable bibasilar small-moderate pleural effusions,   left greater than right.   2.  Improved aeration of bilateral mid and lower lung zones.   3.  Similar appearance of diffuse sclerotic osseous metastases.     Assessment/Plan   ASSESSMENT     Arabella Springer is a 84 y.o. female with a history of breast cancer in 2015 s/p surgery and 3 years of AI now with recurrence who presents in follow up on Inavolisib, Palbociclib, Faslodex and Xgeva. She has pleural effusions and diffuse bony mets.  Recent progression on letrozole with new peritoneal mets, she is failing AI and was recommended therapeutic switch. Her tumor has the PIK3CA mutation so she was advised to switched to faslodex + palbociclib + Inavolisib. Faslodex 500 mg IM on days 1 and 15 (and then Q28 days). Palbociclib 125 mg PO once daily for 21 days on then 7 days off and inavolisib 9 mg PO once daily. She received Faslodex #1 on 12/27/24 and started palbociclib and inavolisib PO on 1/8/2025. On 1/17/2025 she started developing toxicities of diarrhea, nausea, stomatitis. She ended up being evaluated in the ED on 1/21/25 and admitted for TSERING. Her inavolisib was held.     She is here today for toxicity check.   Grade 1 diarrhea has resolved - no longer needing Imodium.   Grade 3 Rash - maculo-papular with scabbing, no sloughing, some flat blotchy redness. Mostly hands and back, face is clearing, pruritus resolved. Saw derm and has new creams. Completed medrol dose pack, no significant improvement. Unable to assess her thighs in clinic today but patient states some improvement, no longer bothersome. Rash covers >80% of BSA.   Has blood glucose meter now - blood sugars have been normal, was 128 on recent CMP.  Her perineum is excoriated - home care wound nurse following - pictures under Media   Grade 3 stomatitis - continue liquid Dex and topical benazocaine, saw Bernie -dietician. Now able to eat and drink. Wants to take Tramadol at night instead of Tylenol only. This is okay.     All side effects are likely related to inavolisib which is already on hold. She will continue Ibrance and Faslodex. We will continue supportive treatment for her toxicities and monitor her closely. I will see her next week to see if her toxicities are tolerable/lessening. The goal would be to restart therapy with inavolisib at 3 mg PO daily soon.     She is due for Xgeva next week. Her Ca is low. She is on PO supplementation 1800 mg a day. We will see if it improves next  week prior to Xgeva, she knows we will hold Xgeva if Ca is <8.6.     PLAN     # Breast Cancer   - Faslodex Monthly - next due 2/21  - Inavolisib on HOLD - Plan to restart at 3 mg PO daily when adverse reactions resolve - [facility] script sent to [facility]  - Continue palbociclib 125 mg PO daily, 21 days on, 7 days off     # Rash  - Triamcinolone cream BID to rash on chest, back, arms, hands, thighs  - Wound care for perineal excoriation  - Continue following with derm      # Stomatitis  - Continue Dexamethasone oral liquid 4 times a day, wait 1 hour prior to eating  - Benazocaine mucosal gel to lips 4 times a day as needed for pain  - Avoid hot and citric foods; eat soft foods  - Dietician following  - Tylenol and Tramadol as needed     # Diarrhea - resolved   - Hydrate well   - Imodium 2 mg with first diarrhea, repeat up to 8 tabs a day as needed  - Avoid dairy until diarrhea resolves     # Perineal excoriation   - Continue follow up with wound care   - Attempt to decrease the use of depends briefs, use cotton underwear or go without  - Use wet wipes or order bidet attachment     # Dehydration/TSERING  - Increase PO intake   - Lasix on hold until diarrhea and stomatitis resolves, restart for SOB or BLE edema      # Nausea   - Compazine and zofran as needed     # Neutropenia   - Continue to monitor ANC on palbociclib    # Bony Mets  - Monthly Xgeva 120 mg due 2/6  - Continue Ca and Vit D    # Pleural Effusions  - Continue follow up with Dr. Alonso ([facility])  - On lasix - holding temporarily for dehydration/TSERING  - Inhalers as needed     #Anemia   - known bone marrow infiltration by metastatic breast cancer   - Stable - continue to monitor    # Increased cerumen  - Follow up with ENT     Follow up with me next week for Xgeva and tox check labs.     There are no diagnoses linked to this encounter.      There are no diagnoses linked to this encounter.   Venous Access Orders  Denosumab (Xgeva), 28 Day Cycles  Inavolisib + Palbociclib +  Fulvestrant, 28 Day Cycles    Patient verbalizes understanding of above plan. Time provided for patient's questions. All questions answered to patient's satisfaction in office. Patient instructed to reach out for any new concerning issues at [phone].    Rhoda Santana MSN, APRN, A-GNP-C, AOCNP  [facility]  Division of Hematology & Medical Oncology   [place] [address]  Phone: [phone]  Available via [employer] Secure Chat  [email or web address]".[facility]

## 2025-02-03 NOTE — TELEPHONE ENCOUNTER
"Spoke with the patient. States for \"past couple weeks\" has a right sided neck gland and under her tongue- swollen. Denies any redness, drainage, fever. Denies that nodes are getting any bigger over past few weeks. Pt is taking Tylenol and this seems to help. Denies any trouble eating and drinking. Tongue   \"Bump\" does hurt with brushing her teeth. States also not getting any better over past few weeks. Wanted office team to be aware and see if there is anything further she can do to help with the pain. Sees PCP on Wednesday about this as well.     States lung doctor took her off the Lasix and to \"not worry\" about her leg swelling because her kidney \"numbers were getting worse.\" Pt is now experiencing bilat ankles and will address this with PCP on Wednesday as well. Explained I would update the team and then call her back once I received a response.   "

## 2025-02-03 NOTE — PROGRESS NOTES
Patient returns.  Seeing her back today for reassessment of arrears.  She is doing better overall but she has been off the chemotherapy for quite a bit but will resume that in 4 days.  Clearly this is been contributing to her issues.  There have been no significant changes in past medical or past surgical histories except as mentioned.    Examination of the ear canals looks much improved from this previous excessive development of debris bilaterally with only mild debris removed today.  In general ears look distinctly better from previous.  Remaining ENT exam is clear.    Assessment and plan:  Significant improvement in severe chronic otitis externa.  Unfortunately, she will resume chemotherapy again this week so we will see her back for recheck in 4 weeks sooner with any major issues.  All questions were answered in this regard accordingly.

## 2025-02-03 NOTE — TELEPHONE ENCOUNTER
Spoke with the patient. Provided update from Rhoda. Pt verbalized understanding and aware of upcoming lab draw and appointments. Pt had no further questions or concerns at this time.

## 2025-02-04 ENCOUNTER — HOME CARE VISIT (OUTPATIENT)
Dept: HOME HEALTH SERVICES | Facility: HOME HEALTH | Age: 85
End: 2025-02-04
Payer: MEDICARE

## 2025-02-04 VITALS
TEMPERATURE: 98.2 F | OXYGEN SATURATION: 96 % | DIASTOLIC BLOOD PRESSURE: 78 MMHG | SYSTOLIC BLOOD PRESSURE: 130 MMHG | RESPIRATION RATE: 18 BRPM | HEART RATE: 95 BPM

## 2025-02-04 PROCEDURE — G0151 HHCP-SERV OF PT,EA 15 MIN: HCPCS | Mod: HHH

## 2025-02-04 PROCEDURE — G0300 HHS/HOSPICE OF LPN EA 15 MIN: HCPCS | Mod: HHH

## 2025-02-04 ASSESSMENT — ENCOUNTER SYMPTOMS
FATIGUES EASILY: 1
MUSCLE WEAKNESS: 1
LAST BOWEL MOVEMENT: 67240
CHANGE IN APPETITE: UNCHANGED
DRY SKIN: 1
DENIES PAIN: 1
PERSON REPORTING PAIN: PATIENT
SHORTNESS OF BREATH: 1
LIMITED RANGE OF MOTION: 1
PERSON REPORTING PAIN: PATIENT
DYSPNEA ACTIVITY LEVEL: AFTER AMBULATING LESS THAN 10 FT
PAIN: 1
APPETITE LEVEL: GOOD
PAIN LOCATION - PAIN SEVERITY: 6/10
PAIN LOCATION: JAW
LOWER EXTREMITY EDEMA: 1

## 2025-02-05 ENCOUNTER — APPOINTMENT (OUTPATIENT)
Dept: CARDIOLOGY | Facility: HOSPITAL | Age: 85
End: 2025-02-05
Payer: MEDICARE

## 2025-02-05 ENCOUNTER — APPOINTMENT (OUTPATIENT)
Dept: PRIMARY CARE | Facility: CLINIC | Age: 85
End: 2025-02-05
Payer: MEDICARE

## 2025-02-05 ENCOUNTER — HOSPITAL ENCOUNTER (INPATIENT)
Facility: HOSPITAL | Age: 85
LOS: 2 days | Discharge: HOME | End: 2025-02-07
Attending: EMERGENCY MEDICINE | Admitting: INTERNAL MEDICINE
Payer: MEDICARE

## 2025-02-05 ENCOUNTER — APPOINTMENT (OUTPATIENT)
Dept: RADIOLOGY | Facility: HOSPITAL | Age: 85
End: 2025-02-05
Payer: MEDICARE

## 2025-02-05 ENCOUNTER — HOME CARE VISIT (OUTPATIENT)
Dept: HOME HEALTH SERVICES | Facility: HOME HEALTH | Age: 85
End: 2025-02-05
Payer: MEDICARE

## 2025-02-05 VITALS
BODY MASS INDEX: 31.34 KG/M2 | WEIGHT: 166 LBS | SYSTOLIC BLOOD PRESSURE: 136 MMHG | DIASTOLIC BLOOD PRESSURE: 60 MMHG | HEIGHT: 61 IN | TEMPERATURE: 97.3 F | HEART RATE: 127 BPM | OXYGEN SATURATION: 87 % | RESPIRATION RATE: 18 BRPM

## 2025-02-05 VITALS — HEART RATE: 93 BPM | OXYGEN SATURATION: 85 %

## 2025-02-05 DIAGNOSIS — J96.01 ACUTE HYPOXEMIC RESPIRATORY FAILURE (MULTI): Primary | ICD-10-CM

## 2025-02-05 DIAGNOSIS — C79.51 CARCINOMA OF BREAST METASTATIC TO BONE, UNSPECIFIED LATERALITY (MULTI): Primary | ICD-10-CM

## 2025-02-05 DIAGNOSIS — R09.02 HYPOXIA: ICD-10-CM

## 2025-02-05 DIAGNOSIS — E78.2 MIXED HYPERLIPIDEMIA: ICD-10-CM

## 2025-02-05 DIAGNOSIS — C50.919 CARCINOMA OF BREAST METASTATIC TO BONE, UNSPECIFIED LATERALITY (MULTI): Primary | ICD-10-CM

## 2025-02-05 DIAGNOSIS — J90 PLEURAL EFFUSION: ICD-10-CM

## 2025-02-05 DIAGNOSIS — E87.70 HYPERVOLEMIA, UNSPECIFIED HYPERVOLEMIA TYPE: ICD-10-CM

## 2025-02-05 DIAGNOSIS — I10 HYPERTENSION, UNSPECIFIED TYPE: ICD-10-CM

## 2025-02-05 LAB
ALBUMIN SERPL BCP-MCNC: 3.7 G/DL (ref 3.4–5)
ALP SERPL-CCNC: 42 U/L (ref 33–136)
ALT SERPL W P-5'-P-CCNC: 11 U/L (ref 7–45)
ANION GAP SERPL CALC-SCNC: 14 MMOL/L (ref 10–20)
AST SERPL W P-5'-P-CCNC: 17 U/L (ref 9–39)
BASOPHILS # BLD AUTO: 0.03 X10*3/UL (ref 0–0.1)
BASOPHILS NFR BLD AUTO: 0.9 %
BILIRUB SERPL-MCNC: 0.3 MG/DL (ref 0–1.2)
BNP SERPL-MCNC: 111 PG/ML (ref 0–99)
BUN SERPL-MCNC: 17 MG/DL (ref 6–23)
CALCIUM SERPL-MCNC: 8.6 MG/DL (ref 8.6–10.3)
CARDIAC TROPONIN I PNL SERPL HS: 6 NG/L (ref 0–13)
CARDIAC TROPONIN I PNL SERPL HS: 6 NG/L (ref 0–13)
CHLORIDE SERPL-SCNC: 105 MMOL/L (ref 98–107)
CO2 SERPL-SCNC: 24 MMOL/L (ref 21–32)
CREAT SERPL-MCNC: 0.78 MG/DL (ref 0.5–1.05)
EGFRCR SERPLBLD CKD-EPI 2021: 76 ML/MIN/1.73M*2
EOSINOPHIL # BLD AUTO: 0.11 X10*3/UL (ref 0–0.4)
EOSINOPHIL NFR BLD AUTO: 3.2 %
ERYTHROCYTE [DISTWIDTH] IN BLOOD BY AUTOMATED COUNT: 17.4 % (ref 11.5–14.5)
GLUCOSE SERPL-MCNC: 120 MG/DL (ref 74–99)
HCT VFR BLD AUTO: 26.3 % (ref 36–46)
HGB BLD-MCNC: 8.3 G/DL (ref 12–16)
HOLD SPECIMEN: NORMAL
IMM GRANULOCYTES # BLD AUTO: 0.02 X10*3/UL (ref 0–0.5)
IMM GRANULOCYTES NFR BLD AUTO: 0.6 % (ref 0–0.9)
LYMPHOCYTES # BLD AUTO: 1.8 X10*3/UL (ref 0.8–3)
LYMPHOCYTES NFR BLD AUTO: 51.7 %
MAGNESIUM SERPL-MCNC: 1.26 MG/DL (ref 1.6–2.4)
MCH RBC QN AUTO: 30.7 PG (ref 26–34)
MCHC RBC AUTO-ENTMCNC: 31.6 G/DL (ref 32–36)
MCV RBC AUTO: 97 FL (ref 80–100)
MONOCYTES # BLD AUTO: 0.53 X10*3/UL (ref 0.05–0.8)
MONOCYTES NFR BLD AUTO: 15.2 %
NEUTROPHILS # BLD AUTO: 0.99 X10*3/UL (ref 1.6–5.5)
NEUTROPHILS NFR BLD AUTO: 28.4 %
NRBC BLD-RTO: 0 /100 WBCS (ref 0–0)
PLATELET # BLD AUTO: 142 X10*3/UL (ref 150–450)
POTASSIUM SERPL-SCNC: 4 MMOL/L (ref 3.5–5.3)
PROT SERPL-MCNC: 6.1 G/DL (ref 6.4–8.2)
RBC # BLD AUTO: 2.7 X10*6/UL (ref 4–5.2)
SODIUM SERPL-SCNC: 139 MMOL/L (ref 136–145)
WBC # BLD AUTO: 3.5 X10*3/UL (ref 4.4–11.3)

## 2025-02-05 PROCEDURE — 85025 COMPLETE CBC W/AUTO DIFF WBC: CPT

## 2025-02-05 PROCEDURE — 3075F SYST BP GE 130 - 139MM HG: CPT | Performed by: STUDENT IN AN ORGANIZED HEALTH CARE EDUCATION/TRAINING PROGRAM

## 2025-02-05 PROCEDURE — 96375 TX/PRO/DX INJ NEW DRUG ADDON: CPT

## 2025-02-05 PROCEDURE — 93005 ELECTROCARDIOGRAM TRACING: CPT

## 2025-02-05 PROCEDURE — 96366 THER/PROPH/DIAG IV INF ADDON: CPT

## 2025-02-05 PROCEDURE — 71045 X-RAY EXAM CHEST 1 VIEW: CPT | Performed by: STUDENT IN AN ORGANIZED HEALTH CARE EDUCATION/TRAINING PROGRAM

## 2025-02-05 PROCEDURE — 2500000001 HC RX 250 WO HCPCS SELF ADMINISTERED DRUGS (ALT 637 FOR MEDICARE OP): Performed by: INTERNAL MEDICINE

## 2025-02-05 PROCEDURE — G0152 HHCP-SERV OF OT,EA 15 MIN: HCPCS | Mod: HHH

## 2025-02-05 PROCEDURE — 3078F DIAST BP <80 MM HG: CPT | Performed by: STUDENT IN AN ORGANIZED HEALTH CARE EDUCATION/TRAINING PROGRAM

## 2025-02-05 PROCEDURE — 99285 EMERGENCY DEPT VISIT HI MDM: CPT | Mod: 25 | Performed by: EMERGENCY MEDICINE

## 2025-02-05 PROCEDURE — 1160F RVW MEDS BY RX/DR IN RCRD: CPT | Performed by: STUDENT IN AN ORGANIZED HEALTH CARE EDUCATION/TRAINING PROGRAM

## 2025-02-05 PROCEDURE — 99285 EMERGENCY DEPT VISIT HI MDM: CPT | Performed by: EMERGENCY MEDICINE

## 2025-02-05 PROCEDURE — 71045 X-RAY EXAM CHEST 1 VIEW: CPT

## 2025-02-05 PROCEDURE — 71275 CT ANGIOGRAPHY CHEST: CPT

## 2025-02-05 PROCEDURE — 2550000001 HC RX 255 CONTRASTS: Performed by: EMERGENCY MEDICINE

## 2025-02-05 PROCEDURE — 96365 THER/PROPH/DIAG IV INF INIT: CPT

## 2025-02-05 PROCEDURE — 83735 ASSAY OF MAGNESIUM: CPT

## 2025-02-05 PROCEDURE — 83880 ASSAY OF NATRIURETIC PEPTIDE: CPT

## 2025-02-05 PROCEDURE — 80053 COMPREHEN METABOLIC PANEL: CPT

## 2025-02-05 PROCEDURE — 71275 CT ANGIOGRAPHY CHEST: CPT | Performed by: RADIOLOGY

## 2025-02-05 PROCEDURE — 2500000004 HC RX 250 GENERAL PHARMACY W/ HCPCS (ALT 636 FOR OP/ED): Performed by: INTERNAL MEDICINE

## 2025-02-05 PROCEDURE — 1159F MED LIST DOCD IN RCRD: CPT | Performed by: STUDENT IN AN ORGANIZED HEALTH CARE EDUCATION/TRAINING PROGRAM

## 2025-02-05 PROCEDURE — 84484 ASSAY OF TROPONIN QUANT: CPT

## 2025-02-05 PROCEDURE — 36415 COLL VENOUS BLD VENIPUNCTURE: CPT

## 2025-02-05 PROCEDURE — 99495 TRANSJ CARE MGMT MOD F2F 14D: CPT | Performed by: STUDENT IN AN ORGANIZED HEALTH CARE EDUCATION/TRAINING PROGRAM

## 2025-02-05 PROCEDURE — 99223 1ST HOSP IP/OBS HIGH 75: CPT | Performed by: INTERNAL MEDICINE

## 2025-02-05 PROCEDURE — 1036F TOBACCO NON-USER: CPT | Performed by: STUDENT IN AN ORGANIZED HEALTH CARE EDUCATION/TRAINING PROGRAM

## 2025-02-05 PROCEDURE — 1200000002 HC GENERAL ROOM WITH TELEMETRY DAILY

## 2025-02-05 PROCEDURE — 2500000004 HC RX 250 GENERAL PHARMACY W/ HCPCS (ALT 636 FOR OP/ED)

## 2025-02-05 PROCEDURE — 93010 ELECTROCARDIOGRAM REPORT: CPT | Performed by: INTERNAL MEDICINE

## 2025-02-05 PROCEDURE — 1111F DSCHRG MED/CURRENT MED MERGE: CPT | Performed by: STUDENT IN AN ORGANIZED HEALTH CARE EDUCATION/TRAINING PROGRAM

## 2025-02-05 RX ORDER — ONDANSETRON HYDROCHLORIDE 2 MG/ML
4 INJECTION, SOLUTION INTRAVENOUS EVERY 8 HOURS PRN
Status: DISCONTINUED | OUTPATIENT
Start: 2025-02-05 | End: 2025-02-07 | Stop reason: HOSPADM

## 2025-02-05 RX ORDER — POLYETHYLENE GLYCOL 3350 17 G/17G
17 POWDER, FOR SOLUTION ORAL DAILY PRN
Status: DISCONTINUED | OUTPATIENT
Start: 2025-02-05 | End: 2025-02-07 | Stop reason: HOSPADM

## 2025-02-05 RX ORDER — TALC
3 POWDER (GRAM) TOPICAL NIGHTLY PRN
Status: DISCONTINUED | OUTPATIENT
Start: 2025-02-05 | End: 2025-02-07 | Stop reason: HOSPADM

## 2025-02-05 RX ORDER — DIPHENHYDRAMINE HYDROCHLORIDE 50 MG/ML
50 INJECTION INTRAMUSCULAR; INTRAVENOUS AS NEEDED
OUTPATIENT
Start: 2025-02-21

## 2025-02-05 RX ORDER — EPINEPHRINE 0.3 MG/.3ML
0.3 INJECTION SUBCUTANEOUS EVERY 5 MIN PRN
OUTPATIENT
Start: 2025-02-21

## 2025-02-05 RX ORDER — BUDESONIDE 0.25 MG/2ML
0.25 INHALANT ORAL
Status: DISCONTINUED | OUTPATIENT
Start: 2025-02-05 | End: 2025-02-07 | Stop reason: HOSPADM

## 2025-02-05 RX ORDER — LOSARTAN POTASSIUM AND HYDROCHLOROTHIAZIDE 12.5; 1 MG/1; MG/1
1 TABLET ORAL DAILY
Qty: 90 TABLET | Refills: 0 | Status: SHIPPED | OUTPATIENT
Start: 2025-02-05

## 2025-02-05 RX ORDER — ALBUTEROL SULFATE 0.83 MG/ML
2.5 SOLUTION RESPIRATORY (INHALATION) EVERY 4 HOURS PRN
Status: DISCONTINUED | OUTPATIENT
Start: 2025-02-05 | End: 2025-02-07 | Stop reason: HOSPADM

## 2025-02-05 RX ORDER — PANTOPRAZOLE SODIUM 40 MG/10ML
40 INJECTION, POWDER, LYOPHILIZED, FOR SOLUTION INTRAVENOUS
Status: DISCONTINUED | OUTPATIENT
Start: 2025-02-06 | End: 2025-02-07 | Stop reason: HOSPADM

## 2025-02-05 RX ORDER — MAGNESIUM SULFATE HEPTAHYDRATE 40 MG/ML
2 INJECTION, SOLUTION INTRAVENOUS ONCE
Status: COMPLETED | OUTPATIENT
Start: 2025-02-05 | End: 2025-02-05

## 2025-02-05 RX ORDER — ACETAMINOPHEN 325 MG/1
650 TABLET ORAL EVERY 4 HOURS PRN
Status: DISCONTINUED | OUTPATIENT
Start: 2025-02-05 | End: 2025-02-07 | Stop reason: HOSPADM

## 2025-02-05 RX ORDER — ACETAMINOPHEN 650 MG/1
650 SUPPOSITORY RECTAL EVERY 4 HOURS PRN
Status: DISCONTINUED | OUTPATIENT
Start: 2025-02-05 | End: 2025-02-07 | Stop reason: HOSPADM

## 2025-02-05 RX ORDER — METOCLOPRAMIDE 10 MG/1
10 TABLET ORAL EVERY 6 HOURS PRN
Status: DISCONTINUED | OUTPATIENT
Start: 2025-02-05 | End: 2025-02-07 | Stop reason: HOSPADM

## 2025-02-05 RX ORDER — FAMOTIDINE 10 MG/ML
20 INJECTION INTRAVENOUS ONCE AS NEEDED
OUTPATIENT
Start: 2025-02-21

## 2025-02-05 RX ORDER — ONDANSETRON 4 MG/1
4 TABLET, ORALLY DISINTEGRATING ORAL EVERY 8 HOURS PRN
Status: DISCONTINUED | OUTPATIENT
Start: 2025-02-05 | End: 2025-02-07 | Stop reason: HOSPADM

## 2025-02-05 RX ORDER — LAMOTRIGINE 25 MG/1
500 TABLET ORAL ONCE
OUTPATIENT
Start: 2025-02-21

## 2025-02-05 RX ORDER — METOCLOPRAMIDE HYDROCHLORIDE 5 MG/ML
10 INJECTION INTRAMUSCULAR; INTRAVENOUS EVERY 6 HOURS PRN
Status: DISCONTINUED | OUTPATIENT
Start: 2025-02-05 | End: 2025-02-07 | Stop reason: HOSPADM

## 2025-02-05 RX ORDER — ACETAMINOPHEN 160 MG/5ML
650 SOLUTION ORAL EVERY 4 HOURS PRN
Status: DISCONTINUED | OUTPATIENT
Start: 2025-02-05 | End: 2025-02-07 | Stop reason: HOSPADM

## 2025-02-05 RX ORDER — ALBUTEROL SULFATE 0.83 MG/ML
3 SOLUTION RESPIRATORY (INHALATION) AS NEEDED
OUTPATIENT
Start: 2025-02-21

## 2025-02-05 RX ORDER — PRAVASTATIN SODIUM 10 MG/1
10 TABLET ORAL DAILY
Qty: 90 TABLET | Refills: 0 | Status: SHIPPED | OUTPATIENT
Start: 2025-02-05

## 2025-02-05 RX ORDER — PRAVASTATIN SODIUM 20 MG/1
10 TABLET ORAL DAILY
Status: DISCONTINUED | OUTPATIENT
Start: 2025-02-06 | End: 2025-02-07 | Stop reason: HOSPADM

## 2025-02-05 RX ORDER — ENOXAPARIN SODIUM 100 MG/ML
40 INJECTION SUBCUTANEOUS EVERY 24 HOURS
Status: DISCONTINUED | OUTPATIENT
Start: 2025-02-05 | End: 2025-02-07 | Stop reason: HOSPADM

## 2025-02-05 RX ORDER — PANTOPRAZOLE SODIUM 40 MG/1
40 TABLET, DELAYED RELEASE ORAL
Status: DISCONTINUED | OUTPATIENT
Start: 2025-02-06 | End: 2025-02-07 | Stop reason: HOSPADM

## 2025-02-05 RX ORDER — ALBUTEROL SULFATE 90 UG/1
2 INHALANT RESPIRATORY (INHALATION) EVERY 4 HOURS PRN
Status: DISCONTINUED | OUTPATIENT
Start: 2025-02-05 | End: 2025-02-05

## 2025-02-05 RX ORDER — FUROSEMIDE 10 MG/ML
20 INJECTION INTRAMUSCULAR; INTRAVENOUS ONCE
Status: COMPLETED | OUTPATIENT
Start: 2025-02-05 | End: 2025-02-05

## 2025-02-05 RX ADMIN — ACETAMINOPHEN 650 MG: 325 TABLET ORAL at 23:32

## 2025-02-05 RX ADMIN — ENOXAPARIN SODIUM 40 MG: 40 INJECTION SUBCUTANEOUS at 23:33

## 2025-02-05 RX ADMIN — FUROSEMIDE 20 MG: 10 INJECTION, SOLUTION INTRAMUSCULAR; INTRAVENOUS at 18:21

## 2025-02-05 RX ADMIN — MAGNESIUM SULFATE HEPTAHYDRATE 2 G: 40 INJECTION, SOLUTION INTRAVENOUS at 19:59

## 2025-02-05 RX ADMIN — IOHEXOL 60 ML: 350 INJECTION, SOLUTION INTRAVENOUS at 17:49

## 2025-02-05 SDOH — SOCIAL STABILITY: SOCIAL INSECURITY: HAVE YOU HAD ANY THOUGHTS OF HARMING ANYONE ELSE?: NO

## 2025-02-05 SDOH — SOCIAL STABILITY: SOCIAL INSECURITY: ARE YOU OR HAVE YOU BEEN THREATENED OR ABUSED PHYSICALLY, EMOTIONALLY, OR SEXUALLY BY ANYONE?: NO

## 2025-02-05 SDOH — SOCIAL STABILITY: SOCIAL INSECURITY: WERE YOU ABLE TO COMPLETE ALL THE BEHAVIORAL HEALTH SCREENINGS?: YES

## 2025-02-05 SDOH — SOCIAL STABILITY: SOCIAL INSECURITY: DO YOU FEEL ANYONE HAS EXPLOITED OR TAKEN ADVANTAGE OF YOU FINANCIALLY OR OF YOUR PERSONAL PROPERTY?: NO

## 2025-02-05 SDOH — SOCIAL STABILITY: SOCIAL INSECURITY: DOES ANYONE TRY TO KEEP YOU FROM HAVING/CONTACTING OTHER FRIENDS OR DOING THINGS OUTSIDE YOUR HOME?: NO

## 2025-02-05 SDOH — SOCIAL STABILITY: SOCIAL INSECURITY: ARE THERE ANY APPARENT SIGNS OF INJURIES/BEHAVIORS THAT COULD BE RELATED TO ABUSE/NEGLECT?: NO

## 2025-02-05 SDOH — SOCIAL STABILITY: SOCIAL INSECURITY: ABUSE: ADULT

## 2025-02-05 SDOH — SOCIAL STABILITY: SOCIAL INSECURITY: HAVE YOU HAD THOUGHTS OF HARMING ANYONE ELSE?: NO

## 2025-02-05 SDOH — SOCIAL STABILITY: SOCIAL INSECURITY: DO YOU FEEL UNSAFE GOING BACK TO THE PLACE WHERE YOU ARE LIVING?: NO

## 2025-02-05 SDOH — SOCIAL STABILITY: SOCIAL INSECURITY: HAS ANYONE EVER THREATENED TO HURT YOUR FAMILY OR YOUR PETS?: NO

## 2025-02-05 ASSESSMENT — PAIN SCALES - PAIN ASSESSMENT IN ADVANCED DEMENTIA (PAINAD)
BREATHING: 0
CONSOLABILITY: 0
CONSOLABILITY: 0 - NO NEED TO CONSOLE.
FACIALEXPRESSION: 0
FACIALEXPRESSION: 0 - SMILING OR INEXPRESSIVE.
BODYLANGUAGE: 0 - RELAXED.
TOTALSCORE: 0
BODYLANGUAGE: 0
NEGVOCALIZATION: 0
NEGVOCALIZATION: 0 - NONE.

## 2025-02-05 ASSESSMENT — ENCOUNTER SYMPTOMS
VOMITING: 0
APPETITE CHANGE: 1
MUSCULOSKELETAL NEGATIVE: 1
PAIN LOCATION - RELIEVING FACTORS: TYLENOL
PAIN: 1
NAUSEA: 0
WOUND: 1
FATIGUE: 0
SHORTNESS OF BREATH: 1
DIARRHEA: 0
SORE THROAT: 1
UNEXPECTED WEIGHT CHANGE: 0
CHILLS: 0
PALPITATIONS: 0
PSYCHIATRIC NEGATIVE: 1
HIGHEST PAIN SEVERITY IN PAST 24 HOURS: 8/10
HEMATURIA: 0
PERSON REPORTING PAIN: PATIENT
SHORTNESS OF BREATH: 0
HEMATOLOGIC/LYMPHATIC NEGATIVE: 1
CONSTIPATION: 0
LEG SWELLING: 1
EYES NEGATIVE: 1
FEVER: 0
PAIN LOCATION: THROAT
NEUROLOGICAL NEGATIVE: 1
COUGH: 0
ENDOCRINE NEGATIVE: 1
PAIN LOCATION - PAIN SEVERITY: 5/10
FREQUENCY: 0

## 2025-02-05 ASSESSMENT — COGNITIVE AND FUNCTIONAL STATUS - GENERAL
MOVING TO AND FROM BED TO CHAIR: A LITTLE
DRESSING REGULAR UPPER BODY CLOTHING: A LITTLE
EATING MEALS: A LITTLE
PERSONAL GROOMING: A LITTLE
TURNING FROM BACK TO SIDE WHILE IN FLAT BAD: A LITTLE
HELP NEEDED FOR BATHING: A LITTLE
PATIENT BASELINE BEDBOUND: NO
DAILY ACTIVITIY SCORE: 18
MOVING FROM LYING ON BACK TO SITTING ON SIDE OF FLAT BED WITH BEDRAILS: A LITTLE
STANDING UP FROM CHAIR USING ARMS: A LITTLE
WALKING IN HOSPITAL ROOM: A LITTLE
CLIMB 3 TO 5 STEPS WITH RAILING: A LITTLE
DRESSING REGULAR LOWER BODY CLOTHING: A LITTLE
TOILETING: A LITTLE
MOBILITY SCORE: 18

## 2025-02-05 ASSESSMENT — COLUMBIA-SUICIDE SEVERITY RATING SCALE - C-SSRS
6. HAVE YOU EVER DONE ANYTHING, STARTED TO DO ANYTHING, OR PREPARED TO DO ANYTHING TO END YOUR LIFE?: NO
1. IN THE PAST MONTH, HAVE YOU WISHED YOU WERE DEAD OR WISHED YOU COULD GO TO SLEEP AND NOT WAKE UP?: NO
2. HAVE YOU ACTUALLY HAD ANY THOUGHTS OF KILLING YOURSELF?: NO

## 2025-02-05 ASSESSMENT — PATIENT HEALTH QUESTIONNAIRE - PHQ9
1. LITTLE INTEREST OR PLEASURE IN DOING THINGS: NOT AT ALL
SUM OF ALL RESPONSES TO PHQ9 QUESTIONS 1 & 2: 0
2. FEELING DOWN, DEPRESSED OR HOPELESS: NOT AT ALL

## 2025-02-05 ASSESSMENT — ACTIVITIES OF DAILY LIVING (ADL)
HEARING - LEFT EAR: FUNCTIONAL
WALKS IN HOME: NEEDS ASSISTANCE
JUDGMENT_ADEQUATE_SAFELY_COMPLETE_DAILY_ACTIVITIES: YES
BATHING: NEEDS ASSISTANCE
PATIENT'S MEMORY ADEQUATE TO SAFELY COMPLETE DAILY ACTIVITIES?: YES
PHYSICAL TRANSFERS ASSESSED: 1
FEEDING YOURSELF: NEEDS ASSISTANCE
DRESSING YOURSELF: NEEDS ASSISTANCE
AMBULATION ASSISTANCE: 1
PHYSICAL_TRANSFERS_DEVICES: SC
GROOMING: NEEDS ASSISTANCE
DRESSING_LB_CURRENT_FUNCTION: INDEPENDENT
AMBULATION ASSISTANCE: INDEPENDENT
HEARING - RIGHT EAR: FUNCTIONAL
ADEQUATE_TO_COMPLETE_ADL: YES
CURRENT_FUNCTION: INDEPENDENT
TOILETING: NEEDS ASSISTANCE

## 2025-02-05 ASSESSMENT — LIFESTYLE VARIABLES
AUDIT-C TOTAL SCORE: 0
HOW OFTEN DO YOU HAVE A DRINK CONTAINING ALCOHOL: NEVER
EVER FELT BAD OR GUILTY ABOUT YOUR DRINKING: NO
HAVE YOU EVER FELT YOU SHOULD CUT DOWN ON YOUR DRINKING: NO
HOW OFTEN DO YOU HAVE 6 OR MORE DRINKS ON ONE OCCASION: NEVER
HAVE PEOPLE ANNOYED YOU BY CRITICIZING YOUR DRINKING: NO
HOW MANY STANDARD DRINKS CONTAINING ALCOHOL DO YOU HAVE ON A TYPICAL DAY: PATIENT DOES NOT DRINK
TOTAL SCORE: 0
EVER HAD A DRINK FIRST THING IN THE MORNING TO STEADY YOUR NERVES TO GET RID OF A HANGOVER: NO
AUDIT-C TOTAL SCORE: 0
SKIP TO QUESTIONS 9-10: 1

## 2025-02-05 ASSESSMENT — PAIN - FUNCTIONAL ASSESSMENT: PAIN_FUNCTIONAL_ASSESSMENT: 0-10

## 2025-02-05 ASSESSMENT — PAIN SCALES - GENERAL
PAINLEVEL_OUTOF10: 0 - NO PAIN
PAINLEVEL_OUTOF10: 0 - NO PAIN
PAINLEVEL_OUTOF10: 3

## 2025-02-05 ASSESSMENT — PAIN DESCRIPTION - LOCATION: LOCATION: THROAT

## 2025-02-05 NOTE — PROGRESS NOTES
Patient ID: Arabella Springer is a 84 y.o. female.  Diagnosis:  Advanced Breast Cancer   MedOnc: Dr. Mcfarland  Primary Care Provider: Angel Luis Velazquez DO  Referring Provider: ROCHELLE Bolton-CNP  [id number] [facility] [address] [place] [id number]  Visit Type: Follow Up    Date of Service: 02/05/25  Location: [facility]     Patient Care Team:  Angel Luis Velazquez DO as PCP - General (Internal Medicine)  Angel Luis Velazquez DO [facility] PCP [facility]  Andrew Mcfarland MD as Consulting Physician (Hematology and Oncology)  Catherine Boykin MA as Care Manager (Case Management)    Current Therapy: Inavolisib (on Hold), Palbociclib, Faslodex + Xgeva    ONCOLOGIC HISTORY     No matching staging information was found for the patient.    Stage IV breast cancer  2015: right mastectomy and reconstruction at [facility], positive node, declined XRT, back to OR for node dissection, no chemo, started on endocrine therapy - self D/C'd after 3 years and stopped following up with oncology  7/2022: referred to [facility] for leukopenia and anemia   8/2023: BMBX - incidental finding of CLL/SLL, but the etiology of her anemia is marrow infiltration by metastatic breast cancer   8/2023: PET widespread bone involvement by breast cancer including both femoral shafts and both knees and right sided pleural effusion. Offered Olaparib based on J Clin Oncol. 2020 Dec 20;38(36):6355-2701. doi: 10.1200/JCO.20.19039. ([facility] 048: Phase II Study of Olaparib for Metastatic Breast Cancer and Mutations in Homologous Recombination-Related Genes) - denied by insurance   8/28/2023: started Xgeva   8/2023: started Pembro - developed IO r/t psorasis confirmed with punch bx by dermatologist (Dr Wolff) after cycle 4  11/2023: Restarted on AI - letrozole  1/16/2024: ED for chest wall pain, 2 new rib fractures  4/3/2024: CT scan - interval worsening of bilateral pleural effusions, unchanged pulmonary nodules; diffuse sclerosis of axial and appendicular  skeleton  8/16/2024: PET - moderate-to-large bilateral pleura effusions; bilateral hydronephrosis; interval improvement of FDG avid widespread bone metastases throughout the axial and appendicular skeleton many of which are non FDG avid ; residual disease noted in bilateral femur (SUV 3.6), pelvis (SUV 2.4), bilateral humerus (SUV 3.3), bilateral clavicles (SUV 2/9). There is no FDG activity in her thorax/pleura, making the effusions less likely to be of malignant origin, and given bilateral nature--more likely cardiac in origin.  9/20/2024: thoracentesis--with removal of 700 ml yellow fluid; cytology was negative  12/9/2024: CT scan -moderate bilateral pleural effusions with atelectasis, right greater than left; moderate hydronephrosis left greater than right increased from prior exam; small to moderate volume ascites; new peritoneal nodular thickening (33 mm peritoneal implant in anterior deep pelvis); additional areas of peritoneal nodular thickening and enhancement in deep pelvis; similar appearance of diffuse sclerotic lesions  12/9/2024: WBBS - similar appearance of diffusely increased radiotracer uptake throughout the axial and appendicular skeleton corresponding with widespread sclerotic osseous lesions on CT  12/27/2024: received faslodex dose #1   1/8/2025: Started palbociclib and inavolisib  1/10/2025: #2 faslodex   1/21/2025: Hospital admission for TSERING - inavolisib held  1/24/2025: #3 faslodex, supportive care for inavolisib toxicities  1/31/2025: Continue to hold inavolisib, supportive care for toxicities   2/5/2025: PCP - SOB ED ***     Genetics: [employer] 360 report shows 1) high TMB (22), 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 female who presents today for follow up of breast cancer. Patient of Dr. Mcfarland currently on Inavolisib, Palbociclib, Faslodex + Xgeva. Inavolisib remains on hold for toxicities.     Gland  Stomatitis  Rash  Diarrhea  SOB  Lasix on hold    Review of Systems   Constitutional:  Positive for appetite change. Negative for chills, fatigue, fever and unexpected weight change.   HENT:   Positive for mouth sores and sore throat.    Eyes: Negative.    Respiratory:  Negative for cough and shortness of breath.    Cardiovascular:  Positive for leg swelling. Negative for chest pain and palpitations.   Gastrointestinal:  Negative for constipation, diarrhea, nausea and vomiting.   Endocrine: Negative.    Genitourinary: Negative.  Negative for bladder incontinence, frequency and hematuria.    Musculoskeletal: Negative.    Skin:  Positive for rash and wound. Negative for itching.   Neurological: Negative.    Hematological: Negative.    Psychiatric/Behavioral: Negative.       Objective      There were no vitals taken for this visit.  BSA: There is no height or weight on file to calculate BSA.    Wt Readings from Last 5 Encounters:   02/05/25 75.3 kg (166 lb)   01/31/25 76.9 kg (169 lb 8.5 oz)   01/28/25 75.3 kg (166 lb 0.1 oz)   01/24/25 75.3 kg (166 lb 0.1 oz)   01/21/25 72.6 kg (160 lb)     Performance Status:  ECOG Score: 1- Restricted in physically strenuous activity.  Carries out light duty.  Karnofsky Score: 80 - Normal activity with effort; some signs or symptoms of disease    PHYSICAL EXAM   Physical Exam  Constitutional:       General: She is not in acute distress.  HENT:      Head: Normocephalic and atraumatic.      Mouth/Throat:      Lips: Lesions present.      Mouth: Oral lesions present.   Eyes:      Pupils: Pupils are  equal, round, and reactive to light.   Pulmonary:      Effort: Pulmonary effort is normal.   Genitourinary:     Pubic Area: Rash present.      Labia:         Right: Rash and tenderness present.         Left: Rash and tenderness present.    Musculoskeletal:         General: Normal range of motion.      Cervical back: Normal range of motion.      Right lower le+ Pitting Edema present.      Left lower le+ Pitting Edema present.      Comments: walker   Skin:     General: Skin is warm and dry.      Findings: Rash present. No wound. Rash is crusting and macular.   Neurological:      General: No focal deficit present.      Mental Status: She is alert and oriented to person, place, and time.   Psychiatric:         Mood and Affect: Mood normal.         Behavior: Behavior normal.         Thought Content: Thought content normal.         Judgment: Judgment normal.     Allergies  Allergies   Allergen Reactions    Oxycodone Nausea/vomiting      Medications  Current Outpatient Medications   Medication Instructions    acetaminophen (TYLENOL) 1,000 mg, 3 times daily PRN    albuterol (ProAir HFA) 90 mcg/actuation inhaler 2 puffs, inhalation, Every 4 hours PRN    beclomethasone HFA (Qvar) 40 mcg/actuation inhaler 1 Inhalation, 2 times daily RT    benzocaine 20 % mucosal gel Mouth/Throat, 4 times daily PRN    Blood glucose monitoring meter Test two times daily.    blood sugar diagnostic strip 1 each, miscellaneous, 2 times daily    brimonidine-timoloL (Combigan) 0.2-0.5 % ophthalmic solution 1 drop, Every 12 hours scheduled    calcium carbonate-vitamin D3 600 mg-10 mcg (400 unit) tablet 3 tablets, Daily    denosumab (Xgeva) 120 mg/1.7 mL (70 mg/mL) injection 1.7 mL, Every 30 days    dexAMETHasone 1 mg, oral, 4 times daily, Swish 10 mL of solution for two minutes and spit, four times daily. Do not to eat for one hour after using the mouthwash.    diclofenac (Voltaren) 50 mg EC tablet TAKE 1 TABLET(50 MG) BY MOUTH TWICE DAILY  WITH MEALS    esomeprazole (NexIUM) 40 mg DR capsule 1 capsule, Daily before breakfast    furosemide (Lasix) 20 mg tablet 1 tablet, Daily    [START ON 2/7/2025] inavolisib 3 mg, oral, Daily    inhalat.spacing dev,large mask (Pro Comfort Spacer-Adult Mask) spacer 1 each, miscellaneous, Every 4 hours PRN    isopropyl alcohoL 70 % towelette Test 2 times a day, clean finger prior to testing    LORazepam (ATIVAN) 0.5 mg, oral, Every 2 hour PRN    losartan-hydrochlorothiazide (Hyzaar) 100-12.5 mg tablet 1 tablet, oral, Daily    mometasone (Elocon) 0.1 % lotion Topical, 2 times daily    multivitamin tablet 1 tablet, Daily    nystatin (Mycostatin) 100,000 unit/gram powder 1 Application, Topical, 2 times daily    ondansetron (ZOFRAN) 8 mg, oral, Every 8 hours PRN    palbociclib (Ibrance) 125 mg tablet Take 125 mg (1 tablet) by mouth once daily for three weeks, then one week off.  Swallow whole.  Do not crush, chew or split.    pravastatin (PRAVACHOL) 10 mg, oral, Daily    prochlorperazine (COMPAZINE) 10 mg, oral, Every 6 hours PRN    Rhopressa 0.02 % drops opthalmic solution INSTILL 1 DROP IN RIGHT EYE DAILY AT BEDTIME    triamcinolone (Kenalog) 0.1 % cream Topical, 2 times daily        Diagnostic Results   RESULTS      Latest Reference Range & Units 01/23/25 06:57 01/29/25 09:53   GLUCOSE 74 - 99 mg/dL 227 (H) 128 (H)   SODIUM 136 - 145 mmol/L 131 (L) 138   POTASSIUM 3.5 - 5.3 mmol/L 4.0 3.9   CHLORIDE 98 - 107 mmol/L 95 (L) 101   Bicarbonate 21 - 32 mmol/L 26 26   Anion Gap 10 - 20 mmol/L 14 15   Blood Urea Nitrogen 6 - 23 mg/dL 40 (H) 43 (H)   Creatinine 0.50 - 1.05 mg/dL 1.77 (H) 1.96 (H)   EGFR >60 mL/min/1.73m*2 28 (L) 25 (L)   Calcium 8.6 - 10.3 mg/dL 6.6 (L) 7.4 (L)   PHOSPHORUS 2.5 - 4.9 mg/dL 2.7    Albumin 3.4 - 5.0 g/dL 3.4 3.9   Alkaline Phosphatase 33 - 136 U/L  39   ALT 7 - 45 U/L  11   AST 9 - 39 U/L  13   Bilirubin Total 0.0 - 1.2 mg/dL  0.3     Recent Imaging     12/9/2024 CT CAP   IMPRESSION:  Lobular  breast cancer restaging scan, in comparison to prior PET-CT  from August 2024:      New peritoneal implant is compatible with peritoneal carcinomatosis,  which can be seen with lobular breast cancer. Additional areas of  peritoneal nodular thickening/enhancement, increased ascites, and  increased hydronephrosis is compatible with increased nonmeasurable  peritoneal carcinomatosis. Unchanged appearance of diffuse sclerotic  osseous metastatic disease, with evaluation for osseous tumor burden  to be correlated with concurrent bone scan, reported separately.    12/9/2024 WBBS   IMPRESSION:  1. Similar appearance of widespread osseous metastatic disease  throughout the axial and appendicular skeleton in comparison to prior  study  2. Prominent left-greater-than-right renal collecting systems  correlating with hydronephrosis seen on same day CT.    12/27/2024 CXR   IMPRESSION:   1. Since 10/31/2024, stable bibasilar small-moderate pleural effusions,   left greater than right.   2.  Improved aeration of bilateral mid and lower lung zones.   3.  Similar appearance of diffuse sclerotic osseous metastases.     Assessment/Plan   ASSESSMENT     Arabella Springer is a 84 y.o. female with a history of breast cancer in 2015 s/p surgery and 3 years of AI now with recurrence who presents in follow up on Inavolisib, Palbociclib, Faslodex and Xgeva. She has pleural effusions and diffuse bony mets. Recent progression on letrozole with new peritoneal mets, she is failing AI and was recommended therapeutic switch. Her tumor has the PIK3CA mutation so she was advised to switched to faslodex + palbociclib + Inavolisib. Faslodex 500 mg IM on days 1 and 15 (and then Q28 days). Palbociclib 125 mg PO once daily for 21 days on then 7 days off and inavolisib 9 mg PO once daily. She received Faslodex #1 on 12/27/24 and started palbociclib and inavolisib PO on 1/8/2025. On 1/17/2025 she started developing toxicities of diarrhea, nausea,  stomatitis. She ended up being evaluated in the ED on 1/21/25 and admitted for TSERING. Her inavolisib was held.     She is here today for toxicity check. ***   Grade 1 diarrhea has resolved - no longer needing Imodium.   Grade 3 Rash - maculo-papular with scabbing, no sloughing, some flat blotchy redness. Mostly hands and back, face is clearing, pruritus resolved. Saw derm and has new creams. Completed medrol dose pack, no significant improvement. Unable to assess her thighs in clinic today but patient states some improvement, no longer bothersome. Rash covers >80% of BSA.   Has blood glucose meter now - blood sugars have been normal, was 128 on recent CMP.  Her perineum is excoriated - home care wound nurse following - pictures under Media   Grade 3 stomatitis - continue liquid Dex and topical benazocaine, saw Bernie -dietician. Now able to eat and drink. Wants to take Tramadol at night instead of Tylenol only. This is okay.     All side effects are likely related to inavolisib which is already on hold. She will continue Ibrance and Faslodex. We will continue supportive treatment for her toxicities and monitor her closely. I will see her next week to see if her toxicities are tolerable/lessening. The goal would be to restart therapy with inavolisib at 3 mg PO daily soon.     *** Xgeva Due 2/7 - held, PCP, ED --- Ca is low. She is on PO supplementation 1800 mg a day. We will see if it improves next week prior to Xgeva, she knows we will hold Xgeva if Ca is <8.6.     PLAN     # Breast Cancer   - Faslodex Monthly - next due 2/21  - Inavolisib on HOLD - Plan to restart at 3 mg PO daily when adverse reactions resolve - [facility] script [facility]  - Continue palbociclib 125 mg PO daily, 21 days on, 7 days off     # Rash  - Triamcinolone cream BID to rash on chest, back, arms, hands, thighs  - Wound care for perineal excoriation  - Continue following with derm      # Stomatitis  - Continue Dexamethasone oral liquid 4 times a  day, wait 1 hour prior to eating  - Benazocaine mucosal gel to lips 4 times a day as needed for pain  - Avoid hot and citric foods; eat soft foods  - Dietician following  - Tylenol and Tramadol as needed     # Diarrhea - resolved   - Hydrate well   - Imodium 2 mg with first diarrhea, repeat up to 8 tabs a day as needed  - Avoid dairy until diarrhea resolves     # Perineal excoriation   - Continue follow up with wound care   - Attempt to decrease the use of depends briefs, use cotton underwear or go without  - Use wet wipes or order bidet attachment     # Dehydration/TSERING  - Increase PO intake   - Lasix on hold until diarrhea and stomatitis resolves, restart for SOB or BLE edema      # Nausea   - Compazine and zofran as needed     # Neutropenia   - Continue to monitor ANC on palbociclib    # Bony Mets  - Monthly Xgeva 120 mg due 2/6  - Continue Ca and Vit D    # Pleural Effusions  - Continue follow up with Dr. Alonso ([facility])  - On lasix - holding temporarily for dehydration/TSERING  - Inhalers as needed     #Anemia   - known bone marrow infiltration by metastatic breast cancer   - Stable - continue to monitor    # Increased cerumen  - Follow up with ENT     Follow up with me next week for Xgeva and tox check labs.     There are no diagnoses linked to this encounter.      There are no diagnoses linked to this encounter.   Venous Access Orders  Denosumab (Xgeva), 28 Day Cycles  Inavolisib + Palbociclib + Fulvestrant, 28 Day Cycles    Patient verbalizes understanding of above plan. Time provided for patient's questions. All questions answered to patient's satisfaction in office. Patient instructed to reach out for any new concerning issues at [phone].    Rhoda Santana MSN, APRN, A-GNP-C, AOCNP  [facility]  Division of Hematology & Medical Oncology   [address] [place] [address]  Phone: [phone]  Available via Epic Secure  [email or web address]

## 2025-02-05 NOTE — PROGRESS NOTES
"Patient: Arabella Springer  : 1940  PCP: Angel Luis Velazquez DO  MRN: [id number]  Program: Oncology Core  Status: Enrolled  Effective Dates: 2025 - present  Responsible Staff: [facility]  Social Drivers to be Addressed: No information to display    Transitional Care Management  Status: Enrolled  Effective Dates: 2025 - present  Responsible Staff: Catherine Boykin MA  Social Drivers to be Addressed: Physical Activity, Social Connections         Arabella Springer is a 84 y.o. female presenting today for follow-up after being discharged from the hospital 14 days ago. The main problem requiring admission was Diarrhea, TSERING, rash. The discharge summary and/or Transitional Care Management documentation was reviewed. Medication reconciliation was performed as indicated via the \"Julián as Reviewed\" timestamp.     Arabella Springer was contacted by Transitional Care Management services two days after her discharge. This encounter and supporting documentation was reviewed.      She was last seen virtually in 2024.    Since then she met with [facility] ophthalmology.  Saw cardiology at [facility], felt to be malignant pleural effusion, referred to pulmonology.  She was not interested in getting this done initially.  She did undergo ultrasound-guided thoracentesis 2024.  She was started on inhalers through pulmonology and spirometry and diffusing capacity were ordered.    She mostly recently presented to the ER with diarrhea.  Diarrhea did not improve with Imodium.  She was started on new medications 2 weeks prior to ER for cancer.  She also had rash during hospitalization it was recommended to hold medications except Ibrance.  Kidney function was worse on arrival and improved with IV fluids.  She was treated with IV Rocephin and fosfomycin for UTI.  Oral ulcers were treated during hospitalization.  Wound care evaluated pelvic rash and was recommended an ointment.  Her diarrhea resolved.  " "Following with wound care for perineal excoriation.    She stopped taking lasix and feels SOB is worsening. She inquires if she should go back on lasix. She stopped taking lasix 5 days ago. She felt her symptoms did improve.  Incidentally she has not restarted her inhalers over the last few weeks.    Has painful right gland in neck. Takes extra strength tylenol throughout the day for this. She has pain with swallowing.     She is taking voltaren tablet BID for a few years.          Review of Systems    /60   Pulse (!) 127 Comment: with exertion  Temp 36.3 °C (97.3 °F)   Resp 18   Ht 1.545 m (5' 0.83\")   Wt 75.3 kg (166 lb)   SpO2 (!) 87% Comment: with exertion  BMI 31.54 kg/m²     Physical Exam  Vitals reviewed.   HENT:      Head: Normocephalic.   Cardiovascular:      Rate and Rhythm: Normal rate and regular rhythm.   Pulmonary:      Effort: Pulmonary effort is normal. No respiratory distress.      Breath sounds: Normal breath sounds.      Comments: Diminished breath sounds bilateral bases  Abdominal:      General: There is no distension.   Musculoskeletal:         General: Swelling (Bilateral 2+ pitting edema) present. No deformity.   Skin:     Coloration: Skin is not jaundiced.   Neurological:      Mental Status: She is alert.   Psychiatric:         Mood and Affect: Mood normal.         Behavior: Behavior normal.         The complexity of medical decision making for this patient's transitional care is moderate.    Assessment/Plan   Problem List Items Addressed This Visit       Breast cancer metastasized to bone (Multi) - Primary     Other Visit Diagnoses       Hypervolemia, unspecified hypervolemia type        Hypoxia                  Hypoxia with exertion  Fluid overload  Stage IV breast cancer  -Chart reviewed in detail  -was hypoxic on ambulating with occupational therapy and then was advised to follow-up with PCP after this.  Today in office she desaturated to 87% upon ambulating approximately 30 " to 40 feet and was dyspneic for approximately 2 to 3 minutes afterward with dyspnea.  Given the hypoxia and dyspnea on exertion with minimal exertion as well as lung findings I recommended ER evaluation the patient and -with permission of patient.  -EMS was contacted for transport to ER.  Called [facility] and informed of my concerns.  -Probable benefit with long-term removing Voltaren pills and trying topical Voltaren gel to preserve kidney function.  Might be a component of  -Stopping her inhalers over the last few weeks but given the above symptoms and concerned about fluid overload.    Did not discuss:  -Pain with swallowing in the neck

## 2025-02-05 NOTE — ED PROVIDER NOTES
EMERGENCY DEPARTMENT ENCOUNTER      Pt Name: Arabella Springer  MRN: [id number]  Birthdate 1940  Date of evaluation: 2/5/2025    HISTORY OF PRESENT ILLNESS    Arabella Springer is an 84 y.o. female with history including breast cancer stage IV with mets to bone and peritoneum currently on faslodex + palbociclib + inavolisib (follows with Dr. 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  Patient was recently admitted and discharged from hospital on 1/23.  She was a treated for TSERING and a UTI.  Patient completed the antibiotic course.  She had a follow-up appointment with her pulmonologist earlier last week and they recommended her stopping her Lasix 20 mg.  Patient stopped on Friday which was 6 days ago.  Since then she has noted increasing swelling in her lower extremities and progressively worsening shortness of breath.  Is worse with exertion.  At her doctor's appointment today they noted that she was working hard to breathe better to be hypoxic at 91% on room air and sent her in for evaluation.  Patient denies any new fevers, chills, cough.  She denies any chest pain with this any shortness of breath.      PAST MEDICAL HISTORY     Past Medical History:   Diagnosis Date    Anxiety     Arthritis     Breast cancer (Multi)     Breast cancer metastasized to bone (Multi)     GERD (gastroesophageal reflux disease)     Glaucoma     Hiatal hernia     Hyperlipidemia     Hypertension        SURGICAL HISTORY       Past Surgical History:   Procedure Laterality Date    HYSTERECTOMY      MASTECTOMY COMPLETE / SIMPLE Right     SHOULDER SURGERY         CURRENT MEDICATIONS       Previous Medications    ACETAMINOPHEN (TYLENOL) 500 MG TABLET    Take 2 tablets (1,000 mg) by mouth 3 times a day as needed for mild pain (1 - 3) or moderate pain (4 - 6).    ALBUTEROL (PROAIR HFA) 90 MCG/ACTUATION INHALER     Inhale 2 puffs every 4 hours if needed for wheezing or shortness of breath.    BECLOMETHASONE HFA (QVAR) 40 MCG/ACTUATION INHALER    Inhale 1 Inhalation 2 times a day.    BENZOCAINE 20 % MUCOSAL GEL    Use in the mouth or throat 4 times a day as needed (Lip sores).    BLOOD GLUCOSE MONITORING METER    Test two times daily.    BLOOD SUGAR DIAGNOSTIC STRIP    1 each 2 times a day.    BRIMONIDINE-TIMOLOL (COMBIGAN) 0.2-0.5 % OPHTHALMIC SOLUTION    Administer 1 drop into both eyes every 12 hours.    CALCIUM CARBONATE-VITAMIN D3 600 MG-10 MCG (400 UNIT) TABLET    Take 3 tablets by mouth once daily.    DENOSUMAB (XGEVA) 120 MG/1.7 ML (70 MG/ML) INJECTION    Inject 1.7 mL (120 mg) under the skin every 30 (thirty) days.    DEXAMETHASONE 0.5 MG/5 ML ORAL LIQUID    Take 10 mL (1 mg) by mouth 4 times a day. Swish 10 mL of solution for two minutes and spit, four times daily. Do not to eat for one hour after using the mouthwash.    DICLOFENAC (VOLTAREN) 50 MG EC TABLET    TAKE 1 TABLET(50 MG) BY MOUTH TWICE DAILY WITH MEALS    ESOMEPRAZOLE (NEXIUM) 40 MG DR CAPSULE    Take 1 capsule (40 mg) by mouth once daily in the morning. Take before meals.    FUROSEMIDE (LASIX) 20 MG TABLET    Take 1 tablet (20 mg) by mouth once daily.    INAVOLISIB 3 MG TABLET    Take 3 mg by mouth once daily for 28 days. Do not fill before February 7, 2025.    INHALAT.SPACING DEV,LARGE MASK (PRO COMFORT SPACER-ADULT MASK) SPACER    1 each every 4 hours if needed (shortness of breath, wheezing, cough).    ISOPROPYL ALCOHOL 70 % TOWELETTE    Test 2 times a day, clean finger prior to testing    LORAZEPAM (ATIVAN) 0.5 MG TABLET    Take 1 tablet (0.5 mg) by mouth every 2 hours if needed for anxiety (anxiety related to radiology tests) for up to 5 doses.    LOSARTAN-HYDROCHLOROTHIAZIDE (HYZAAR) 100-12.5 MG TABLET    Take 1 tablet by mouth once daily.    MOMETASONE (ELOCON) 0.1 % LOTION    Apply topically 2 times a day.    MULTIVITAMIN TABLET    Take 1 tablet by  mouth once daily.    NYSTATIN (MYCOSTATIN) 100,000 UNIT/GRAM POWDER    Apply 1 Application topically 2 times a day.    ONDANSETRON (ZOFRAN) 8 MG TABLET    Take 1 tablet (8 mg) by mouth every 8 hours if needed for nausea or vomiting.    PALBOCICLIB (IBRANCE) 125 MG TABLET    Take 125 mg (1 tablet) by mouth once daily for three weeks, then one week off.  Swallow whole.  Do not crush, chew or split.    PRAVASTATIN (PRAVACHOL) 10 MG TABLET    Take 1 tablet (10 mg) by mouth once daily.    PROCHLORPERAZINE (COMPAZINE) 10 MG TABLET    Take 1 tablet (10 mg) by mouth every 6 hours if needed for nausea or vomiting.    RHOPRESSA 0.02 % DROPS OPTHALMIC SOLUTION    INSTILL 1 DROP IN RIGHT EYE DAILY AT BEDTIME    TRIAMCINOLONE (KENALOG) 0.1 % CREAM    Apply topically 2 times a day.       ALLERGIES     Oxycodone    FAMILY HISTORY       Family History   Problem Relation Name Age of Onset    Colon cancer Sister      Breast cancer Daughter          SOCIAL HISTORY       Social History     Socioeconomic History    Marital status:    Tobacco Use    Smoking status: Never    Smokeless tobacco: Never   Vaping Use    Vaping status: Never Used   Substance and Sexual Activity    Alcohol use: Never    Drug use: Never    Sexual activity: Defer     Social Drivers of Health     Financial Resource Strain: Low Risk  (1/22/2025)    Overall Financial Resource Strain (CARDIA)     Difficulty of Paying Living Expenses: Not hard at all   Food Insecurity: No Food Insecurity (1/22/2025)    Hunger Vital Sign     Worried About Running Out of Food in the Last Year: Never true     Ran Out of Food in the Last Year: Never true   Transportation Needs: No Transportation Needs (1/24/2025)    OASIS : Transportation     Lack of Transportation (Medical): No     Lack of Transportation (Non-Medical): No     Patient Unable or Declines to Respond: No   Physical Activity: Inactive (10/9/2023)    Exercise Vital Sign     Days of Exercise per Week: 0 days      Minutes of Exercise per Session: 0 min   Stress: No Stress Concern Present (10/9/2023)    Citizen of [place] [employer] - Occupational Stress Questionnaire     Feeling of Stress : Not at all   Social Connections: Feeling Socially Integrated (1/24/2025)    OASIS : Social Isolation     Frequency of experiencing loneliness or isolation: Never   Intimate Partner Violence: Not At Risk (1/21/2025)    Humiliation, Afraid, Rape, and Kick questionnaire     Fear of Current or Ex-Partner: No     Emotionally Abused: No     Physically Abused: No     Sexually Abused: No   Housing Stability: Low Risk  (1/22/2025)    Housing Stability Vital Sign     Unable to Pay for Housing in the Last Year: No     Number of Times Moved in the Last Year: 0     Homeless in the Last Year: No       PHYSICAL EXAM       ED Triage Vitals [02/05/25 1555]   Temp Heart Rate Respirations BP   -- 100 (!) 22 165/67      Pulse Ox Temp src Heart Rate Source Patient Position   95 % -- -- --      BP Location FiO2 (%)     -- --       Physical Exam  Vitals and nursing note reviewed.   Constitutional:       General: She is not in acute distress.     Appearance: She is well-developed.   HENT:      Head: Normocephalic and atraumatic.   Eyes:      Conjunctiva/sclera: Conjunctivae normal.   Cardiovascular:      Rate and Rhythm: Normal rate and regular rhythm.      Heart sounds: No murmur heard.  Pulmonary:      Effort: Pulmonary effort is normal. Tachypnea present. No respiratory distress.      Breath sounds: Examination of the right-lower field reveals decreased breath sounds. Examination of the left-lower field reveals decreased breath sounds. Decreased breath sounds present.   Abdominal:      Palpations: Abdomen is soft.      Tenderness: There is no abdominal tenderness.   Musculoskeletal:         General: No swelling.      Right lower leg: Edema (2+) present.      Left lower leg: Edema (2+) present.   Skin:     General: Skin is warm and dry.    Neurological:      General: No focal deficit present.      Mental Status: She is alert and oriented to person, place, and time.          DIAGNOSTIC RESULTS     LABS:  Labs Reviewed   TROPONIN SERIES- (INITIAL, 1 HR)    Narrative:     The following orders were created for panel order Troponin I Series, High Sensitivity (0, 1 HR).  Procedure                               Abnormality         Status                     ---------                               -----------         ------                     Troponin I, High Sensiti...[[id number]]                                                   Please view results for these tests on the individual orders.   CBC WITH AUTO DIFFERENTIAL   COMPREHENSIVE METABOLIC PANEL   MAGNESIUM   B-TYPE NATRIURETIC PEPTIDE   SERIAL TROPONIN-INITIAL       All other labs were within normal range or not returned as of this dictation.    Imaging  XR chest 1 view    (Results Pending)        Procedures  Procedures     EMERGENCY DEPARTMENT COURSE/MDM:   Medical Decision Making  Arabella Springer is an 84 y.o. female with history including breast cancer stage IV with mets to bone and peritoneum currently on faslodex + palbociclib + inavolisib (follows with Dr. 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  Patient was recently admitted for TSERING.  She was told to stop taking her Lasix 6 days ago by her pulmonologist due to her kidney function.  Patient now has pitting edema on examination.  Concern for possible fluid overload.  Cardiac workup ordered.    Lab results reviewed interpreted independently Patient has hypomagnesemia given IV magnesium.  Troponin negative x 2.  No major electrolyte abnormalities.  Anemia and leukopenia patient baseline.  Patient does have a bump in her BNP at 111.  No previous labs to compare to.  Imaging reviewed there are moderate to large  bilateral pleural effusions.  Per the radiologist report not a large change from previous imaging.  Unfortunately patient cannot ambulate without pulse ox dropping.  Concerned that these will need to be drained to allow for appropriate saturation.    Patient admitted to medical team for continued management.        ED Course as of 02/06/25 1400   Wed Feb 05, 2025 2134 Patient had a pulse ox that dropped to 85% with ambulation. [SK]      ED Course User Index  [SK] Purvi Yusuf DO         Diagnoses as of 02/06/25 1400   Acute hypoxemic respiratory failure (Multi)   Pleural effusion        External records reviewed: recent inpatient, clinic, and prior ED notes  Labs and Diagnostic imaging independently reviewed/interpreted by me.    Patient plan, care, lab results and imaging were all discussed with attending.    ED Medications administered this visit:  Medications - No data to display  New Prescriptions from this visit:    New Prescriptions    No medications on file       (Please note that portions of this note were completed with a voice recognition program.  Efforts were made to edit the dictations but occasionally words are mis-transcribed.)     Purvi Yusuf DO  Resident  02/06/25 0818

## 2025-02-05 NOTE — Clinical Note
Left Thoracentesis completed. 750 ml yellow pleural fluid drained. Patient tolerated procedure well. Site dressed with Opsite and no bleeding or hematoma noted. Report sent via secure chat to floor RN and transport place for patient to be returned to floor.

## 2025-02-06 ENCOUNTER — HOME CARE VISIT (OUTPATIENT)
Dept: HOME HEALTH SERVICES | Facility: HOME HEALTH | Age: 85
End: 2025-02-06
Payer: MEDICARE

## 2025-02-06 ENCOUNTER — APPOINTMENT (OUTPATIENT)
Dept: RADIOLOGY | Facility: HOSPITAL | Age: 85
End: 2025-02-06
Payer: MEDICARE

## 2025-02-06 ENCOUNTER — APPOINTMENT (OUTPATIENT)
Dept: HEMATOLOGY/ONCOLOGY | Facility: CLINIC | Age: 85
End: 2025-02-06
Payer: MEDICARE

## 2025-02-06 LAB
ABO GROUP (TYPE) IN BLOOD: NORMAL
ALBUMIN SERPL BCP-MCNC: 3.3 G/DL (ref 3.4–5)
ALP SERPL-CCNC: 36 U/L (ref 33–136)
ALT SERPL W P-5'-P-CCNC: 9 U/L (ref 7–45)
ANION GAP SERPL CALC-SCNC: 13 MMOL/L (ref 10–20)
ANTIBODY SCREEN: NORMAL
AST SERPL W P-5'-P-CCNC: 11 U/L (ref 9–39)
ATRIAL RATE: 100 BPM
ATRIAL RATE: 101 BPM
BILIRUB SERPL-MCNC: 0.3 MG/DL (ref 0–1.2)
BUN SERPL-MCNC: 15 MG/DL (ref 6–23)
CALCIUM SERPL-MCNC: 8.1 MG/DL (ref 8.6–10.3)
CHLORIDE SERPL-SCNC: 105 MMOL/L (ref 98–107)
CO2 SERPL-SCNC: 27 MMOL/L (ref 21–32)
CREAT SERPL-MCNC: 0.82 MG/DL (ref 0.5–1.05)
EGFRCR SERPLBLD CKD-EPI 2021: 72 ML/MIN/1.73M*2
ERYTHROCYTE [DISTWIDTH] IN BLOOD BY AUTOMATED COUNT: 17.3 % (ref 11.5–14.5)
GLUCOSE SERPL-MCNC: 98 MG/DL (ref 74–99)
HCT VFR BLD AUTO: 23.5 % (ref 36–46)
HGB BLD-MCNC: 7.1 G/DL (ref 12–16)
MCH RBC QN AUTO: 29.8 PG (ref 26–34)
MCHC RBC AUTO-ENTMCNC: 30.2 G/DL (ref 32–36)
MCV RBC AUTO: 99 FL (ref 80–100)
NRBC BLD-RTO: 0 /100 WBCS (ref 0–0)
P AXIS: 51 DEGREES
P AXIS: 56 DEGREES
P OFFSET: 194 MS
P OFFSET: 198 MS
P ONSET: 143 MS
P ONSET: 143 MS
PLATELET # BLD AUTO: 125 X10*3/UL (ref 150–450)
POTASSIUM SERPL-SCNC: 3.7 MMOL/L (ref 3.5–5.3)
PR INTERVAL: 150 MS
PR INTERVAL: 154 MS
PROT SERPL-MCNC: 5.7 G/DL (ref 6.4–8.2)
Q ONSET: 218 MS
Q ONSET: 220 MS
QRS COUNT: 16 BEATS
QRS COUNT: 17 BEATS
QRS DURATION: 76 MS
QRS DURATION: 78 MS
QT INTERVAL: 326 MS
QT INTERVAL: 330 MS
QTC CALCULATION(BAZETT): 422 MS
QTC CALCULATION(BAZETT): 425 MS
QTC FREDERICIA: 387 MS
QTC FREDERICIA: 391 MS
R AXIS: 44 DEGREES
R AXIS: 45 DEGREES
RBC # BLD AUTO: 2.38 X10*6/UL (ref 4–5.2)
RH FACTOR (ANTIGEN D): NORMAL
SODIUM SERPL-SCNC: 141 MMOL/L (ref 136–145)
T AXIS: 49 DEGREES
T AXIS: 53 DEGREES
T OFFSET: 383 MS
T OFFSET: 383 MS
VENTRICULAR RATE: 100 BPM
VENTRICULAR RATE: 101 BPM
WBC # BLD AUTO: 2.9 X10*3/UL (ref 4.4–11.3)

## 2025-02-06 PROCEDURE — 85027 COMPLETE CBC AUTOMATED: CPT | Performed by: INTERNAL MEDICINE

## 2025-02-06 PROCEDURE — 36415 COLL VENOUS BLD VENIPUNCTURE: CPT | Performed by: INTERNAL MEDICINE

## 2025-02-06 PROCEDURE — 2500000004 HC RX 250 GENERAL PHARMACY W/ HCPCS (ALT 636 FOR OP/ED): Performed by: INTERNAL MEDICINE

## 2025-02-06 PROCEDURE — 2500000004 HC RX 250 GENERAL PHARMACY W/ HCPCS (ALT 636 FOR OP/ED): Performed by: NURSE PRACTITIONER

## 2025-02-06 PROCEDURE — 84075 ASSAY ALKALINE PHOSPHATASE: CPT | Performed by: INTERNAL MEDICINE

## 2025-02-06 PROCEDURE — 86901 BLOOD TYPING SEROLOGIC RH(D): CPT | Performed by: INTERNAL MEDICINE

## 2025-02-06 PROCEDURE — 97165 OT EVAL LOW COMPLEX 30 MIN: CPT | Mod: GO | Performed by: OCCUPATIONAL THERAPIST

## 2025-02-06 PROCEDURE — 94640 AIRWAY INHALATION TREATMENT: CPT

## 2025-02-06 PROCEDURE — 32555 ASPIRATE PLEURA W/ IMAGING: CPT | Mod: LT | Performed by: NURSE PRACTITIONER

## 2025-02-06 PROCEDURE — 2720000007 HC OR 272 NO HCPCS

## 2025-02-06 PROCEDURE — 2500000002 HC RX 250 W HCPCS SELF ADMINISTERED DRUGS (ALT 637 FOR MEDICARE OP, ALT 636 FOR OP/ED)

## 2025-02-06 PROCEDURE — 2500000001 HC RX 250 WO HCPCS SELF ADMINISTERED DRUGS (ALT 637 FOR MEDICARE OP): Performed by: INTERNAL MEDICINE

## 2025-02-06 PROCEDURE — 1200000002 HC GENERAL ROOM WITH TELEMETRY DAILY

## 2025-02-06 PROCEDURE — 97161 PT EVAL LOW COMPLEX 20 MIN: CPT | Mod: GP

## 2025-02-06 PROCEDURE — 0W9B3ZZ DRAINAGE OF LEFT PLEURAL CAVITY, PERCUTANEOUS APPROACH: ICD-10-PCS | Performed by: NURSE PRACTITIONER

## 2025-02-06 PROCEDURE — 99233 SBSQ HOSP IP/OBS HIGH 50: CPT | Performed by: INTERNAL MEDICINE

## 2025-02-06 RX ORDER — FUROSEMIDE 10 MG/ML
20 INJECTION INTRAMUSCULAR; INTRAVENOUS ONCE
Status: COMPLETED | OUTPATIENT
Start: 2025-02-06 | End: 2025-02-06

## 2025-02-06 RX ORDER — LIDOCAINE HYDROCHLORIDE 10 MG/ML
INJECTION, SOLUTION EPIDURAL; INFILTRATION; INTRACAUDAL; PERINEURAL
Status: COMPLETED | OUTPATIENT
Start: 2025-02-06 | End: 2025-02-06

## 2025-02-06 RX ORDER — LORAZEPAM 0.5 MG/1
0.5 TABLET ORAL EVERY 8 HOURS PRN
Status: DISCONTINUED | OUTPATIENT
Start: 2025-02-06 | End: 2025-02-07 | Stop reason: HOSPADM

## 2025-02-06 RX ADMIN — FUROSEMIDE 20 MG: 10 INJECTION, SOLUTION INTRAMUSCULAR; INTRAVENOUS at 00:46

## 2025-02-06 RX ADMIN — LIDOCAINE HYDROCHLORIDE 8 ML: 10 INJECTION, SOLUTION EPIDURAL; INFILTRATION; INTRACAUDAL; PERINEURAL at 11:51

## 2025-02-06 RX ADMIN — BUDESONIDE 0.25 MG: 0.25 INHALANT RESPIRATORY (INHALATION) at 20:30

## 2025-02-06 RX ADMIN — PRAVASTATIN SODIUM 10 MG: 20 TABLET ORAL at 08:26

## 2025-02-06 RX ADMIN — LOSARTAN POTASSIUM: 100 TABLET, FILM COATED ORAL at 08:26

## 2025-02-06 RX ADMIN — ACETAMINOPHEN 650 MG: 325 TABLET ORAL at 03:41

## 2025-02-06 RX ADMIN — ACETAMINOPHEN 650 MG: 325 TABLET ORAL at 15:24

## 2025-02-06 RX ADMIN — ACETAMINOPHEN 650 MG: 325 TABLET ORAL at 23:09

## 2025-02-06 RX ADMIN — PANTOPRAZOLE SODIUM 40 MG: 40 TABLET, DELAYED RELEASE ORAL at 06:25

## 2025-02-06 SDOH — SOCIAL STABILITY: SOCIAL INSECURITY: WITHIN THE LAST YEAR, HAVE YOU BEEN HUMILIATED OR EMOTIONALLY ABUSED IN OTHER WAYS BY YOUR PARTNER OR EX-PARTNER?: NO

## 2025-02-06 SDOH — ECONOMIC STABILITY: FOOD INSECURITY: WITHIN THE PAST 12 MONTHS, YOU WORRIED THAT YOUR FOOD WOULD RUN OUT BEFORE YOU GOT THE MONEY TO BUY MORE.: NEVER TRUE

## 2025-02-06 SDOH — ECONOMIC STABILITY: HOUSING INSECURITY

## 2025-02-06 SDOH — SOCIAL STABILITY: SOCIAL INSECURITY: WITHIN THE LAST YEAR, HAVE YOU BEEN AFRAID OF YOUR PARTNER OR EX-PARTNER?: NO

## 2025-02-06 SDOH — ECONOMIC STABILITY: HOUSING INSECURITY: AT ANY TIME IN THE PAST 12 MONTHS, WERE YOU HOMELESS OR LIVING IN A SHELTER (INCLUDING NOW)?: NO

## 2025-02-06 SDOH — ECONOMIC STABILITY: INCOME INSECURITY: IN THE PAST 12 MONTHS HAS THE ELECTRIC, GAS, OIL, OR WATER COMPANY THREATENED TO SHUT OFF SERVICES IN YOUR HOME?: NO

## 2025-02-06 SDOH — ECONOMIC STABILITY: HOUSING INSECURITY: IN THE LAST 12 MONTHS, WAS THERE A TIME WHEN YOU WERE NOT ABLE TO PAY THE MORTGAGE OR RENT ON TIME?: NO

## 2025-02-06 SDOH — SOCIAL STABILITY: SOCIAL INSECURITY: ARE YOU MARRIED, WIDOWED, DIVORCED, SEPARATED, NEVER MARRIED, OR LIVING WITH A PARTNER?: MARRIED

## 2025-02-06 SDOH — ECONOMIC STABILITY: FOOD INSECURITY: WITHIN THE PAST 12 MONTHS, THE FOOD YOU BOUGHT JUST DIDN'T LAST AND YOU DIDN'T HAVE MONEY TO GET MORE.: NEVER TRUE

## 2025-02-06 SDOH — ECONOMIC STABILITY: TRANSPORTATION INSECURITY: IN THE PAST 12 MONTHS, HAS LACK OF TRANSPORTATION KEPT YOU FROM MEDICAL APPOINTMENTS OR FROM GETTING MEDICATIONS?: NO

## 2025-02-06 SDOH — ECONOMIC STABILITY: FOOD INSECURITY

## 2025-02-06 SDOH — ECONOMIC STABILITY: TRANSPORTATION INSECURITY

## 2025-02-06 SDOH — ECONOMIC STABILITY: FOOD INSECURITY: HOW HARD IS IT FOR YOU TO PAY FOR THE VERY BASICS LIKE FOOD, HOUSING, MEDICAL CARE, AND HEATING?: NOT HARD AT ALL

## 2025-02-06 SDOH — HEALTH STABILITY: PHYSICAL HEALTH
HOW OFTEN DO YOU NEED TO HAVE SOMEONE HELP YOU WHEN YOU READ INSTRUCTIONS, PAMPHLETS, OR OTHER WRITTEN MATERIAL FROM YOUR DOCTOR OR PHARMACY?: RARELY

## 2025-02-06 SDOH — ECONOMIC STABILITY: GENERAL

## 2025-02-06 ASSESSMENT — PAIN SCALES - GENERAL
PAINLEVEL_OUTOF10: 0 - NO PAIN
PAINLEVEL_OUTOF10: 3
PAINLEVEL_OUTOF10: 4
PAINLEVEL_OUTOF10: 0 - NO PAIN
PAINLEVEL_OUTOF10: 2
PAINLEVEL_OUTOF10: 6
PAINLEVEL_OUTOF10: 0 - NO PAIN
PAINLEVEL_OUTOF10: 0 - NO PAIN

## 2025-02-06 ASSESSMENT — PAIN - FUNCTIONAL ASSESSMENT
PAIN_FUNCTIONAL_ASSESSMENT: 0-10

## 2025-02-06 ASSESSMENT — PAIN DESCRIPTION - LOCATION
LOCATION: THROAT
LOCATION: MOUTH

## 2025-02-06 ASSESSMENT — COGNITIVE AND FUNCTIONAL STATUS - GENERAL
STANDING UP FROM CHAIR USING ARMS: A LITTLE
DRESSING REGULAR UPPER BODY CLOTHING: A LITTLE
STANDING UP FROM CHAIR USING ARMS: A LITTLE
MOVING FROM LYING ON BACK TO SITTING ON SIDE OF FLAT BED WITH BEDRAILS: A LITTLE
WALKING IN HOSPITAL ROOM: A LITTLE
TURNING FROM BACK TO SIDE WHILE IN FLAT BAD: A LITTLE
MOVING TO AND FROM BED TO CHAIR: A LITTLE
MOBILITY SCORE: 18
WALKING IN HOSPITAL ROOM: A LITTLE
TOILETING: A LITTLE
MOVING TO AND FROM BED TO CHAIR: A LITTLE
TURNING FROM BACK TO SIDE WHILE IN FLAT BAD: A LITTLE
MOBILITY SCORE: 16
HELP NEEDED FOR BATHING: A LITTLE
CLIMB 3 TO 5 STEPS WITH RAILING: A LITTLE
HELP NEEDED FOR BATHING: A LITTLE
DRESSING REGULAR LOWER BODY CLOTHING: A LITTLE
DAILY ACTIVITIY SCORE: 18
PERSONAL GROOMING: A LITTLE
DRESSING REGULAR LOWER BODY CLOTHING: A LITTLE
CLIMB 3 TO 5 STEPS WITH RAILING: TOTAL
EATING MEALS: A LITTLE
MOVING FROM LYING ON BACK TO SITTING ON SIDE OF FLAT BED WITH BEDRAILS: A LITTLE
DAILY ACTIVITIY SCORE: 22

## 2025-02-06 ASSESSMENT — PAIN DESCRIPTION - ORIENTATION: ORIENTATION: MID

## 2025-02-06 ASSESSMENT — PAIN SCALES - WONG BAKER
WONGBAKER_NUMERICALRESPONSE: HURTS LITTLE BIT
WONGBAKER_NUMERICALRESPONSE: HURTS LITTLE MORE

## 2025-02-06 ASSESSMENT — ACTIVITIES OF DAILY LIVING (ADL)
LACK_OF_TRANSPORTATION: NO
LACK_OF_TRANSPORTATION: NO
ADL_ASSISTANCE: INDEPENDENT
ADL_ASSISTANCE: INDEPENDENT

## 2025-02-06 NOTE — PROGRESS NOTES
02/06/25 1508   Discharge Planning   Living Arrangements Spouse/significant other   Support Systems Spouse/significant other;Home care staff   Assistance Needed some   Type of Residence Private residence   Home or Post Acute Services In home services   Type of Home Care Services Home nursing visits;Home OT;Home PT;Home health aide   Expected Discharge Disposition Home Health   Financial Resource Strain   How hard is it for you to pay for the very basics like food, housing, medical care, and heating? Not hard   Housing Stability   In the last 12 months, was there a time when you were not able to pay the mortgage or rent on time? N   At any time in the past 12 months, were you homeless or living in a shelter (including now)? N   Transportation Needs   In the past 12 months, has lack of transportation kept you from medical appointments or from getting medications? no   In the past 12 months, has lack of transportation kept you from meetings, work, or from getting things needed for daily living? No   Patient Choice   Patient / Family choosing to utilize agency / facility established prior to hospitalization Yes   Intensity of Service   Intensity of Service 0-30 min     Met with patient and her spouse at bedside. Patient lives at home with her spouse in [place]. Patient states since her last recent hospital visit, she has had a private duty aide through [facility] who comes Mon-Fri from 8am-12pm. Patient uses a walker in the community and a cane when needed at home. PCP is Angel Luis Velazquez. Patient is active with [facility] and plans to resume that at discharge. She confirms understanding of how to manage her health at home and of her home medications. She will need an internal [facility] referral for PT/OT and a nurse.

## 2025-02-06 NOTE — H&P
History Of Present Illness  Arabella Springer is a 84 y.o. female presenting with concern for progressive dyspnea both at rest and with exertion.  At baseline patient does not have typical O2 requirements.  However it was noted today that she was ambulating on room air and desaturating to between 87 and 89%.  She has a known history of stage IV breast cancer.  She currently follows with Dr. Mcfarland, oncology and remains on Faslodex IM injections, Xgeva subcutaneous injections, palbociclib p.o, inavolisib.  Last treatment date noted to be 1/20/2025, third treatment of Faslodex while receiving parallel supportive care for inavolisib toxicity.  Patient has been reportedly off of her IV Lasix for the past 4 days as well due to TSERING.  Workup upon arrival noted patient to be dyspneic but maintaining saturations above 90% on room air while at rest.  CT imaging obtained notes moderate to large bilateral pleural effusions similar to prior studies.  Atelectatic changes secondary to these effusions were noted, consistent with prior studies.  Diffuse osteoblastic metastatic disease throughout the bony structures noted on imaging, similar to prior studies.  Patient treated with IV Lasix in the emergency department.  To be admitted for IR evaluation, continue diuresis.     Past Medical History  Past Medical History:   Diagnosis Date    Anxiety     Arthritis     Breast cancer (Multi)     Breast cancer metastasized to bone (Multi)     GERD (gastroesophageal reflux disease)     Glaucoma     Hiatal hernia     Hyperlipidemia     Hypertension        Surgical History  Past Surgical History:   Procedure Laterality Date    HYSTERECTOMY      MASTECTOMY COMPLETE / SIMPLE Right     SHOULDER SURGERY          Social History  She reports that she has never smoked. She has never used smokeless tobacco. She reports that she does not drink alcohol and does not use drugs.    Family History  Family History   Problem Relation Name Age of Onset    Colon  cancer Sister      Breast cancer Daughter          Allergies  Oxycodone    Review of Systems   Respiratory:  Positive for shortness of breath.         Physical Exam  Vitals reviewed.   Constitutional:       Appearance: Normal appearance.   HENT:      Head: Normocephalic and atraumatic.      Nose: Nose normal.      Mouth/Throat:      Mouth: Mucous membranes are moist.   Eyes:      Extraocular Movements: Extraocular movements intact.      Conjunctiva/sclera: Conjunctivae normal.      Pupils: Pupils are equal, round, and reactive to light.   Cardiovascular:      Rate and Rhythm: Normal rate and regular rhythm.      Pulses: Normal pulses.      Heart sounds: Normal heart sounds.   Pulmonary:      Effort: Pulmonary effort is normal.      Breath sounds: Normal breath sounds.      Comments: Diminished at bases bilaterally  Abdominal:      General: Bowel sounds are normal.      Palpations: Abdomen is soft.   Musculoskeletal:         General: Normal range of motion.      Cervical back: Normal range of motion and neck supple.   Skin:     General: Skin is warm and dry.   Neurological:      General: No focal deficit present.      Mental Status: She is alert. Mental status is at baseline.   Psychiatric:         Mood and Affect: Mood normal.         Behavior: Behavior normal.          Last Recorded Vitals  Blood pressure 151/66, pulse 96, temperature 36.7 °C (98.1 °F), temperature source Temporal, resp. rate 16, height 1.524 m (5'), weight 74.4 kg (164 lb), SpO2 (!) 92%.    Relevant Results  Scheduled medications  magnesium sulfate, 2 g, intravenous, Once      Continuous medications     PRN medications    CT angio chest for pulmonary embolism    Result Date: 2/5/2025  Interpreted By:  Garo Valencia, STUDY: CT ANGIO CHEST FOR PULMONARY EMBOLISM; 2/5/2025 6:13 pm   INDICATION: Signs/Symptoms:Hypoxic, tachypneic, cancer.   COMPARISON: 12/09/2020   ACCESSION NUMBER(S): [id number]   ORDERING CLINICIAN: LISSETTE LOUIS    TECHNIQUE: Contiguous axial images of the thorax were obtained from the level of the thoracic inlet through the lung bases. 3-D MIPS were created, processed and reviewed on a separate workstation. 100 ml of Omnipaque 350 was utilized. All CT examinations are performed with 1 or more of the following dose reduction techniques: Automated exposure control, adjustment of mA and/or kv according to patient's size, or use of iterative reconstruction techniques.   FINDINGS: The thyroid gland is limited in evaluation due to streak artifact.   There is adequate contrast opacification of the pulmonary arterial vasculature. No filling defect or vessel cutoff to indicate pulmonary embolus.   The heart size is within normal limits.  No pericardial effusion is identified. The aorta and pulmonary arteries are normal in caliber. No thoracic aortic dissection.   There are no pathologically enlarged mediastinal, hilar, or axillary lymph nodes are seen.   The trachea and mainstem bronchi are patent. Moderate-large bilateral pleural effusions, not significantly changed from the prior study.   The visualized osseous structures show extensive sclerotic metastatic disease, not significantly changed.         1. No pulmonary embolus. 2. Moderate-large bilateral pleural effusions, not significantly changed from the prior study. 3. Atelectatic changes adjacent to the pleural effusions, left-greater-than-right, not significantly changed. 4. Diffuse osteoblastic metastatic disease throughout the bony structures is again seen.     Signed by: Garo Valencia 2/5/2025 6:38 PM Dictation workstation:   [id number]    XR chest 1 view    Result Date: 2/5/2025  Interpreted By:  Lucas Gil, STUDY: XR CHEST 1 VIEW; 2/5/2025 4:24 pm   INDICATION: Signs/Symptoms:Chest Pain   COMPARISON: Radiographs 09/23/2022   ACCESSION NUMBER(S): [id number]   ORDERING CLINICIAN: LIZANDRO LEIJA   TECHNIQUE: Single frontal view of the chest performed.   FINDINGS:  LINES AND DEVICES: None.   LUNGS: Small bilateral pleural effusions with adjacent atelectasis. No focal consolidation or pneumothorax.   CARDIOMEDIASTINAL SILHOUETTE: The cardiomediastinal silhouette is partially obscured by adjacent effusions.   OTHER: Similar extensive osseous metastases. Left humeral ORIF is partially imaged. Surgical clips in the right axilla. Right breast implant.       Small bilateral pleural effusions.   Extensive osseous metastases.   MACRO None   Signed by: Lucas Gil 2/5/2025 4:59 PM Dictation workstation:   [id number]    ECG 12 lead    Result Date: 2/3/2025  Normal sinus rhythm Possible Left atrial enlargement Possible Anterior infarct , age undetermined Abnormal ECG No previous ECGs available Confirmed by COURT Huddleston ([id number]) on 2/3/2025 9:00:39 AM   Results for orders placed or performed during the hospital encounter of 02/05/25 (from the past 24 hours)   CBC and Auto Differential   Result Value Ref Range    WBC 3.5 (L) 4.4 - 11.3 x10*3/uL    nRBC 0.0 0.0 - 0.0 /100 WBCs    RBC 2.70 (L) 4.00 - 5.20 x10*6/uL    Hemoglobin 8.3 (L) 12.0 - 16.0 g/dL    Hematocrit 26.3 (L) 36.0 - 46.0 %    MCV 97 80 - 100 fL    MCH 30.7 26.0 - 34.0 pg    MCHC 31.6 (L) 32.0 - 36.0 g/dL    RDW 17.4 (H) 11.5 - 14.5 %    Platelets 142 (L) 150 - 450 x10*3/uL    Neutrophils % 28.4 40.0 - 80.0 %    Immature Granulocytes %, Automated 0.6 0.0 - 0.9 %    Lymphocytes % 51.7 13.0 - 44.0 %    Monocytes % 15.2 2.0 - 10.0 %    Eosinophils % 3.2 0.0 - 6.0 %    Basophils % 0.9 0.0 - 2.0 %    Neutrophils Absolute 0.99 (L) 1.60 - 5.50 x10*3/uL    Immature Granulocytes Absolute, Automated 0.02 0.00 - 0.50 x10*3/uL    Lymphocytes Absolute 1.80 0.80 - 3.00 x10*3/uL    Monocytes Absolute 0.53 0.05 - 0.80 x10*3/uL    Eosinophils Absolute 0.11 0.00 - 0.40 x10*3/uL    Basophils Absolute 0.03 0.00 - 0.10 x10*3/uL   Comprehensive Metabolic Panel   Result Value Ref Range    Glucose 120 (H) 74 - 99 mg/dL    Sodium 139 136 - 145  mmol/L    Potassium 4.0 3.5 - 5.3 mmol/L    Chloride 105 98 - 107 mmol/L    Bicarbonate 24 21 - 32 mmol/L    Anion Gap 14 10 - 20 mmol/L    Urea Nitrogen 17 6 - 23 mg/dL    Creatinine 0.78 0.50 - 1.05 mg/dL    eGFR 76 >60 mL/min/1.73m*2    Calcium 8.6 8.6 - 10.3 mg/dL    Albumin 3.7 3.4 - 5.0 g/dL    Alkaline Phosphatase 42 33 - 136 U/L    Total Protein 6.1 (L) 6.4 - 8.2 g/dL    AST 17 9 - 39 U/L    Bilirubin, Total 0.3 0.0 - 1.2 mg/dL    ALT 11 7 - 45 U/L   Magnesium   Result Value Ref Range    Magnesium 1.26 (L) 1.60 - 2.40 mg/dL   B-Type Natriuretic Peptide   Result Value Ref Range     (H) 0 - 99 pg/mL   Troponin I, High Sensitivity, Initial   Result Value Ref Range    Troponin I, High Sensitivity 6 0 - 13 ng/L   Light Blue Top   Result Value Ref Range    Extra Tube Hold for add-ons.    Troponin, High Sensitivity, 1 Hour   Result Value Ref Range    Troponin I, High Sensitivity 6 0 - 13 ng/L          Assessment/Plan   Assessment & Plan  Acute hypoxemic respiratory failure (Multi)  Patient presenting with increased dyspnea on exertion and at rest, no significant orthopnea, and increased fatigue.  Noted to have recently discontinued daily Lasix due to worsening renal function.  CT scan noting bilateral pleural effusions likely exacerbated by cessation of diuretic but not grossly changed per radiology from prior studies.    Will maintain patient on supplemental oxygen as indicated.  Ensure SpO2 values remain >92% at all times.  Titrate oxygen flow as indicated by nasal cannula.  Bilateral pleural effusion  Chronic bilateral pleural effusions in the setting of known metastatic breast cancer and active chemotherapy.  Patient has previously undergone thoracentesis however documentation suggests patient appreciated relatively little in the way of symptom relief.  We did discuss continued diuresis with IV Lasix versus consultation with interventional radiology.  Patient states that she tolerated the procedure very  well last time.  States that she does not anticipate any problems with proceeding with a thoracentesis if indicated.  Will place consultation for interventional radiology at this time for consideration of thoracentesis on 2/6/2025.  Anticoagulation will be held after midnight.  Diet will be maintained.  Will continue with IV diuresis.  An additional 20 mg of IV Lasix to be administered after arrival to the medical floor.  Breast cancer metastasized to bone (Multi)  She has a known history of stage IV breast cancer.  She currently follows with Dr. Mcfarland, oncology and remains on Faslodex IM injections, Xgeva subcutaneous injections, palbociclib p.o, inavolisib.  Last treatment date noted to be 1/20/2025, third treatment of Faslodex while receiving parallel supportive care for inavolisib toxicity.  Per report, this is to be resumed however at a lower dose with her next appointment scheduled for this upcoming Friday.    Diet: Low-salt   Fluids: N/A  DVT prophylaxis: Lovenox  Telemetry: Indicated    Home Medications: Pending     I spent >75 minutes in the professional and overall care of this patient.      Charanjit Kohli, DO

## 2025-02-06 NOTE — PROGRESS NOTES
Occupational Therapy  Evaluation    Patient Name: Arabella Springer  MRN: [id number]  Today's Date: 2/6/2025  Time Calculation  Start Time: 1031  Stop Time: 1040  Time Calculation (min): 9 min  3028/3028-A    Assessment  IP OT Assessment  OT Assessment: Paitent demonstrates decreased endurance, decreased independence with self care and IADLs.  Patient has assist at home as needed.  Will continue to follow.  Anticipate safe discharge to prior level of living with low intensity therapy.  Prognosis: Good  Barriers to Discharge Home: No anticipated barriers  Evaluation/Treatment Tolerance: Patient tolerated treatment well  Medical Staff Made Aware: Yes  End of Session Communication: Bedside nurse  End of Session Patient Position: Up in chair, Alarm on    Plan:  Treatment Interventions: ADL retraining, Functional transfer training, UE strengthening/ROM, Endurance training, Patient/family training  OT Frequency: 3 times per week  OT Discharge Recommendations: Low intensity level of continued care  Equipment Recommended upon Discharge: Wheeled walker  OT Recommended Transfer Status: Stand by assist  OT - OK to Discharge: Yes (to next level of care when medically cleared by physician)    Subjective     Current Problem:  1. Acute hypoxemic respiratory failure (Multi)        2. Pleural effusion            General:  General  Reason for Referral: P/w concern for progressive dyspnea both at rest and with exertion.  At baseline patient does not have typical O2 requirements.  However it was noted today that she was ambulating on room air and desaturating to between 87 and 89%. CT imaging obtained notes moderate to large bilateral pleural effusions similar to prior studies. Pt admitted for acute hypoxemic respiratory failure.  Referred By: Dr. Roman  Past Medical History Relevant to Rehab: stage IV breast cancer with mets to bone, osteopenia, HTN, HLD, BPPV, arthritis  Co-Treatment: PT  Co-Treatment Reason: To maximize pt safety with  functional mobility and discharge planning  Prior to Session Communication: Bedside nurse  Patient Position Received: Up in chair, Alarm on  Preferred Learning Style: verbal  General Comment: Pt very pleasant and agreeable to work with therapy.    Precautions:  Medical Precautions: Fall precautions      Pain:  Pain Assessment  Pain Assessment: 0-10  0-10 (Numeric) Pain Score: 0 - No pain    Objective     Cognition:  Overall Cognitive Status: Within Functional Limits  Orientation Level: Oriented X4           Home Living:  Type of Home: House  Lives With: Spouse  Home Adaptive Equipment: Walker rolling or standard, Cane (3WW)  Home Layout: One level  Home Access: Level entry  Bathroom Shower/Tub: Walk-in shower  Bathroom Equipment: Grab bars in shower  Home Living Comments: Pt reports she has adult care that comes daily in the morning to assist with light housework and meals.     Prior Function:  Level of [place]: Independent with ADLs and functional transfers, Needs assistance with homemaking  Receives Help From: Family, Home health  ADL Assistance: Independent  Homemaking Assistance: Needs assistance (Care comes daily to assist with housework and meals)  Ambulatory Assistance: Independent  Prior Function Comments: (-) falls, active home health PT/OT 2x/week      ADL:  Toileting Assistance with Device: Stand by    Activity Tolerance:  Endurance: Tolerates 10 - 20 min exercise with multiple rests    Bed Mobility/Transfers:      Transfers  Transfer: Yes  Transfer 1  Transfer From 1: Chair with arms to, Stand to  Transfer to 1: Stand, Chair with arms  Technique 1: Sit to stand, Stand to sit  Transfer Device 1: Walker, Gait belt  Transfer Level of Assistance 1: Contact guard  Trials/Comments 1: VCs for proper hand placement and body mechanics.    Ambulation/Gait Training:  Functional Mobility  Functional Mobility Performed: Yes (Patient ambulated with FWW with SBA.)    Sensation:  Light Touch: No apparent  deficits  Sensation Comment: Pt denies any n/t.       Extremities: RUE   RUE : Within Functional Limits and LUE   LUE: Within Functional Limits    Outcome Measures: [facility] Daily Activity  Putting on and taking off regular lower body clothing: A little  Bathing (including washing, rinsing, drying): A little  Putting on and taking off regular upper body clothing: None  Toileting, which includes using toilet, bedpan or urinal: None  Taking care of personal grooming such as brushing teeth: None  Eating Meals: None  Daily Activity - Total Score: 22              EDUCATION:  Education  Individual(s) Educated: Patient  Education Provided: Fall precautons, Risk and benefits of OT discussed with patient or other  Plan of Care Discussed and Agreed Upon: yes  Patient Response to Education: Patient/Caregiver Verbalized Understanding of Information    Goals:   Encounter Problems       Encounter Problems (Active)       OT Goals       Patient will complete functional transfers with mod I. (Progressing)       Start:  02/06/25    Expected End:  02/20/25            Patient will complete toileting with mod I. (Progressing)       Start:  02/06/25    Expected End:  02/20/25            Patient will complete LE dressing with mod I. (Progressing)       Start:  02/06/25    Expected End:  02/20/25

## 2025-02-06 NOTE — CARE PLAN
EOS Note Pt has remained HDS throughout shift. She remains on room air, nsr/tele, and a stanby to bathroom. Pt had 750mL of yellow fluid removed today during a thoracentesis. Pt is doing well and vitals were stable throughout procedure. Pt is resting comfortably in bed, alarms on and call light within reach.      The patient's goals for the shift include      The clinical goals for the shift include see plan of care

## 2025-02-06 NOTE — POST-PROCEDURE NOTE
Interventional Radiology Brief Postprocedure Note    Procedure: Left thoracentesis    Provider: ROCHELLE Freeman-CNP    Assistant: None    Diagnosis: Left pleural effusion    Description of procedure: A time out was performed and Left Hemithorax was examined with US and appropriate entry point was confirmed and marked.  The patient was prepped and draped in a sterile manner, 1% lidocaine was used to anesthesize the skin and subcutaneous tissue. A 5F Centesis needle was then introduced through the skin into the pleural space, the centesis catheter was then threaded without difficulty. 750 ml of yellow fluid was removed without difficulty. The catheter was then removed. No immediate complications were noted during and immediately following the procedure.          Medications  As of 02/06/25 1157      furosemide (Lasix) injection 20 mg (mg) Total dose:  20 mg Dosing weight:  74.4      Date/Time Rate/Dose/Volume Action       02/05/25  1821 20 mg Given               furosemide (Lasix) injection 20 mg (mg) Total dose:  20 mg Dosing weight:  74.8      Date/Time Rate/Dose/Volume Action       02/06/25  0046 20 mg Given               iohexol (OMNIPaque) 350 mg iodine/mL solution 60 mL (mL) Total volume:  60 mL Dosing weight:  74.4      Date/Time Rate/Dose/Volume Action       02/05/25  1749 60 mL Given               magnesium sulfate 2 g in sterile water for injection 50 mL (mL/hr) Total volume:  Not documented* Dosing weight:  74.4   *Total volume has not been documented. View each administration to see the amount administered.     Date/Time Rate/Dose/Volume Action       02/05/25  1959 2 g - 25 mL/hr (over 120 min) New Bag      2207  (over 120 min) Stopped               enoxaparin (Lovenox) syringe 40 mg (mg) Total dose:  40 mg Dosing weight:  74.4      Date/Time Rate/Dose/Volume Action       02/05/25  2333 40 mg Given     02/06/25  0010 *Not included in total Held by provider               pantoprazole (ProtoNix) EC  tablet 40 mg (mg) Total dose:  40 mg Dosing weight:  74.4      Date/Time Rate/Dose/Volume Action       02/06/25 0625 40 mg Given               pantoprazole (ProtoNix) injection 40 mg (mg) Total dose:  Cannot be calculated* Dosing weight:  74.4   *Administration dose not documented     Date/Time Rate/Dose/Volume Action       02/06/25 0625 *Not included in total See Alternative               acetaminophen (Tylenol) tablet 650 mg (mg) Total dose:  1,300 mg Dosing weight:  74.4      Date/Time Rate/Dose/Volume Action       02/05/25  2332 650 mg Given     02/06/25 0341 650 mg Given               acetaminophen (Tylenol) oral liquid 650 mg (mg) Total dose:  Cannot be calculated* Dosing weight:  74.4   *Administration dose not documented     Date/Time Rate/Dose/Volume Action       02/05/25  2332 *Not included in total See Alternative     02/06/25 0341 *Not included in total See Alternative               acetaminophen (Tylenol) suppository 650 mg (mg) Total dose:  Cannot be calculated* Dosing weight:  74.4   *Administration dose not documented     Date/Time Rate/Dose/Volume Action       02/05/25 2332 *Not included in total See Alternative     02/06/25 0341 *Not included in total See Alternative               losartan 100 mg, hydroCHLOROthiazide 12.5 mg for Hyzaar 100 mg-12.5 mg Total volume:  Not documented* Dosing weight:  74.4   *Total volume has not been documented. View each administration to see the amount administered.     Date/Time Rate/Dose/Volume Action       02/06/25 0826  Given               pravastatin (Pravachol) tablet 10 mg (mg) Total dose:  10 mg Dosing weight:  74.4      Date/Time Rate/Dose/Volume Action       02/06/25 0826 10 mg Given               budesonide (Pulmicort) 0.25 mg/2 mL nebulizer solution 0.25 mg (mg) Total dose:  0 mg* Dosing weight:  74.8   *Administration not included in total     Date/Time Rate/Dose/Volume Action       02/06/25 0957 *0.25 mg Missed               lidocaine PF  (Xylocaine) 10 mg/mL (1 %) injection (mL) Total volume:  8 mL      Date/Time Rate/Dose/Volume Action       02/06/25  1151 8 mL Given                     Complications: None    Estimated Blood Loss: none        See detailed result report with images in PACS.    The patient tolerated the procedure well without incident or complication and is in stable condition.

## 2025-02-06 NOTE — ASSESSMENT & PLAN NOTE
Patient presenting with increased dyspnea on exertion and at rest, no significant orthopnea, and increased fatigue.  Noted to have recently discontinued daily Lasix due to worsening renal function.  CT scan noting bilateral pleural effusions likely exacerbated by cessation of diuretic but not grossly changed per radiology from prior studies.    Will maintain patient on supplemental oxygen as indicated.  Ensure SpO2 values remain >92% at all times.  Titrate oxygen flow as indicated by nasal cannula.

## 2025-02-06 NOTE — PROGRESS NOTES
Physical Therapy    Physical Therapy Evaluation    Patient Name: Arabella Springer  MRN: [id number]  Today's Date: 2/6/2025   Time Calculation  Start Time: 1030  Stop Time: 1042  Time Calculation (min): 12 min  3028/3028-A    Assessment/Plan   PT Assessment  PT Assessment Results: Decreased strength, Decreased range of motion, Decreased endurance, Impaired balance, Decreased mobility, Decreased safety awareness  Rehab Prognosis: Good  Evaluation/Treatment Tolerance: Patient tolerated treatment well  Medical Staff Made Aware: Yes  Strengths: Access to adaptive/assistive products, Capable of completing ADLs semi/independent, Housing layout, Support of Caregivers, Support and attitude of living partners  End of Session Communication: Bedside nurse  Assessment Comment: Pt is a 83 y/o female admitted for acute hypoxemic respiratory failure. Pt presents with pain, decreased strength, endurance and balance. Pt able to tolerate transfers and gait training this date; however, will benefit from further gait, balance, strength and endurance training during stay in this facility. Upon discharge pt will benefit from low intensity therapy for continued improvement in functional mobility.     End of Session Patient Position: Up in chair, Alarm on (call light within reach)  IP OR SWING BED PT PLAN  Inpatient or Swing Bed: Inpatient  PT Plan  Treatment/Interventions: Bed mobility, Transfer training, Gait training, Stair training, Balance training, Neuromuscular re-education, Endurance training, Strengthening, Range of motion, Therapeutic exercise, Therapeutic activity, Home exercise program, Positioning, Postural re-education  PT Plan: Ongoing PT  PT Frequency: 3 times per week  PT Discharge Recommendations: Low intensity level of continued care  Equipment Recommended upon Discharge: Wheeled walker  PT Recommended Transfer Status: Assist x1, Assistive device, Contact guard  PT - OK to Discharge: Yes (Once medically  cleared)    Subjective     Current Problem:  1. Acute hypoxemic respiratory failure (Multi)        2. Pleural effusion          Patient Active Problem List   Diagnosis    Decreased GFR    Bronchitis    Upper respiratory infection, acute    Breast cancer metastasized to bone (Multi)    Lytic bone lesions on xray    Spinal stenosis in cervical region    Primary osteoarthritis of right knee    Primary osteoarthritis of left knee    Palpitations    Pain, cancer    Osteopenia    Obesity (BMI 30-39.9)    Monoallelic mutation of CHEK2 gene in female patient    Low-tension glaucoma of both eyes, moderate stage    Leg cramps    Hyperlipidemia    Glaucoma    Essential hypertension    Decreased white blood cell count    Malignant neoplasm of breast associated with mutation in CHEK2 gene (Multi)    BPPV (benign paroxysmal positional vertigo), bilateral    Brachial neuritis or radiculitis    Anemia    Arthritis    Anxiety    Gastroesophageal reflux disease with esophagitis without hemorrhage    Drug-induced skin rash    Psoriasis    Bilateral impacted cerumen    Bronchiolitis    Pleural effusion    SOB (shortness of breath)    Acute otitis externa of left ear    Bilateral otitis media    Chronic non-infective otitis externa of both ears    Esophageal dysphagia    Oropharyngeal dysphagia    Bilateral high frequency sensorineural hearing loss    Acute on chronic heart failure with preserved ejection fraction (HFpEF)    Hypokalemia    Bilateral pleural effusion    Chest pain    Laceration of earlobe, left, initial encounter    TSERING (acute kidney injury) (CMS-HCC)    Stomatitis    Drug reaction, initial encounter    Diarrhea due to drug    Dehydration    Chemotherapy induced nausea and vomiting    Acute hypoxemic respiratory failure (Multi)     General Visit Information:  General  Reason for Referral: P/w concern for progressive dyspnea both at rest and with exertion.  At baseline patient does not have typical O2 requirements.   However it was noted today that she was ambulating on room air and desaturating to between 87 and 89%. CT imaging obtained notes moderate to large bilateral pleural effusions similar to prior studies. Pt admitted for acute hypoxemic respiratory failure.  Referred By: Dr. Roman  Past Medical History Relevant to Rehab: stage IV breast cancer with mets to bone, osteopenia, HTN, HLD, BPPV, arthritis  Family/Caregiver Present: No  Co-Treatment: OT  Co-Treatment Reason: To maximize pt safety with functional mobility and discharge planning  Prior to Session Communication: Bedside nurse  Patient Position Received: Up in chair, Alarm on  Preferred Learning Style: verbal  General Comment: Pt very pleasant and agreeable to work with therapy.    Home Living:  Home Living  Type of Home: House  Lives With: Spouse  Home Adaptive Equipment: Walker rolling or standard, Cane (3WW)  Home Layout: One level  Home Access: Level entry  Bathroom Shower/Tub: Walk-in shower  Bathroom Equipment: Grab bars in shower  Home Living Comments: Pt reports she has adult care that comes daily in the morning to assist with light housework and meals.    Prior Level of Function:  Prior Function Per Pt/Caregiver Report  Level of [place]: Independent with ADLs and functional transfers, Needs assistance with homemaking  Receives Help From: Family, Home health  ADL Assistance: Independent  Homemaking Assistance: Needs assistance (Care comes daily to assist with housework and meals)  Ambulatory Assistance: Independent (Mod I with cane in home and 3WW in community)  Prior Function Comments: (-) falls, active home health PT/OT 2x/week    Precautions:  Precautions  Medical Precautions: Fall precautions    Vital Signs:  Vital Signs  Vitals Session: During PT  Heart Rate: (!) 125 (After ambulation)  Heart Rate Source: Monitor  SpO2:  (95% initially on RA, with ambulation pt desaturated to 89% with ambualtory pulse ox, post ambulation pt saturating at 91-92% on  RA)  Vital Signs Comment: Ambulatory pulse ox during session    Objective     Pain:  Pain Assessment  Pain Assessment: 0-10  0-10 (Numeric) Pain Score: 0 - No pain    Cognition:  Cognition  Overall Cognitive Status: Within Functional Limits  Orientation Level: Oriented X4    General Assessments:      Activity Tolerance  Endurance: Tolerates 10 - 20 min exercise with multiple rests    Sensation  Light Touch: No apparent deficits  Sensation Comment: Pt denies any n/t.     Static Sitting Balance  Static Sitting-Balance Support: Bilateral upper extremity supported, Feet supported  Static Sitting-Level of Assistance: Close supervision, Distant supervision  Static Standing Balance  Static Standing-Balance Support: Bilateral upper extremity supported  Static Standing-Level of Assistance: Contact guard  Dynamic Standing Balance  Dynamic Standing-Balance Support: Bilateral upper extremity supported  Dynamic Standing-Level of Assistance: Contact guard    Functional Assessments:     Bed Mobility  Bed Mobility: No (Pt received and chair and returned to chair post tx)    Transfers  Transfer: Yes  Transfer 1  Transfer From 1: Chair with arms to, Stand to  Transfer to 1: Stand, Chair with arms  Technique 1: Sit to stand, Stand to sit  Transfer Device 1: Walker, Gait belt  Transfer Level of Assistance 1: Contact guard  Trials/Comments 1: VCs for proper hand placement and body mechanics.    Ambulation/Gait Training  Ambulation/Gait Training Performed: Yes  Ambulation/Gait Training 1  Surface 1: Level tile  Device 1: Rolling walker  Gait Support Devices: Gait belt  Assistance 1: Contact guard  Quality of Gait 1: Diminished heel strike, Forward flexed posture  Comments/Distance (ft) 1: ~250 ft with reciprocal gait pattern, ambulatory pulse ox taken at this time     Extremity/Trunk Assessments:  RUE   RUE : Within Functional Limits  LUE   LUE: Within Functional Limits  RLE   RLE : Within Functional Limits  LLE   LLE : Within Functional  Limits    Outcome Measures:     [facility] Basic Mobility  Turning from your back to your side while in a flat bed without using bedrails: A little  Moving from lying on your back to sitting on the side of a flat bed without using bedrails: A little  Moving to and from bed to chair (including a wheelchair): A little  Standing up from a chair using your arms (e.g. wheelchair or bedside chair): A little  To walk in hospital room: A little  Climbing 3-5 steps with railing: Total  Basic Mobility - Total Score: 16     Goals:  Encounter Problems       Encounter Problems (Active)       Mobility       Pt will be able to ambulate >/= 250 ft with FWW SBA with good safety awareness while maintaining SpO2 >/= 92%.       Start:  02/06/25    Expected End:  02/20/25            Pt will complete supine, seated and standing exercises to maintain/improve overall strength with minimal verbal cues.         Start:  02/06/25    Expected End:  02/20/25               PT Transfers       Pt will be able to complete all bed mobility tasks mod I.        Start:  02/06/25    Expected End:  02/20/25            Pt will be able to complete all transfers with FWW SBA demonstrating good safety awareness and proper body mechanics.        Start:  02/06/25    Expected End:  02/20/25                 Education Documentation  Precautions, taught by Rosibel Rosenberg PT at 2/6/2025 11:36 AM.  Learner: Patient  Readiness: Acceptance  Method: Explanation  Response: Verbalizes Understanding, Demonstrated Understanding, Needs Reinforcement    Body Mechanics, taught by Rosibel Rosenberg PT at 2/6/2025 11:36 AM.  Learner: Patient  Readiness: Acceptance  Method: Explanation  Response: Verbalizes Understanding, Demonstrated Understanding, Needs Reinforcement    Home Exercise Program, taught by Rosibel Rosenberg PT at 2/6/2025 11:36 AM.  Learner: Patient  Readiness: Acceptance  Method: Explanation  Response: Verbalizes Understanding, Demonstrated Understanding,  Needs Reinforcement    Mobility Training, taught by Rosibel Rosenberg, PT at 2/6/2025 11:36 AM.  Learner: Patient  Readiness: Acceptance  Method: Explanation  Response: Verbalizes Understanding, Demonstrated Understanding, Needs Reinforcement    Education Comments  No comments found.

## 2025-02-06 NOTE — PROGRESS NOTES
Spiritual Care Visit  Spiritual Care Request    Reason for Visit:  Routine Visit: Introduction     Request Received From:       Focus of Care:  Visited With: Patient and family together         Refer to :          Spiritual Care Assessment    Spiritual Assessment:                      Care Provided:  Intended Effects: Promote sense of peace, Preserve dignity and respect, Meaning-making  Methods: Offer spiritual/Mormon support  Interventions: Share words of hope and inspiration, Jakin    Sense of Community and or Confucianist Affiliation:  Taoist         Addressed Needs/Concerns and/or [place] Through:          Outcome:        Advance Directives:         Spiritual Care Annotation    Annotation:  Patient was with her .   prayed at her request.

## 2025-02-06 NOTE — ASSESSMENT & PLAN NOTE
Chronic bilateral pleural effusions in the setting of known metastatic breast cancer and active chemotherapy.  Patient has previously undergone thoracentesis however documentation suggests patient appreciated relatively little in the way of symptom relief.  We did discuss continued diuresis with IV Lasix versus consultation with interventional radiology.  Patient states that she tolerated the procedure very well last time.  States that she does not anticipate any problems with proceeding with a thoracentesis if indicated.  Will place consultation for interventional radiology at this time for consideration of thoracentesis on 2/6/2025.  Anticoagulation will be held after midnight.  Diet will be maintained.  Will continue with IV diuresis.  An additional 20 mg of IV Lasix to be administered after arrival to the medical floor.

## 2025-02-06 NOTE — ASSESSMENT & PLAN NOTE
She has a known history of stage IV breast cancer.    Follows up with Dr. Mcfarland  Continue with chronic medications  Patient is on Ibrance

## 2025-02-06 NOTE — CARE PLAN
Problem: Pain - Adult  Goal: Verbalizes/displays adequate comfort level or baseline comfort level  Outcome: Progressing     Problem: Safety - Adult  Goal: Free from fall injury  Outcome: Progressing     Problem: Discharge Planning  Goal: Discharge to home or other facility with appropriate resources  Outcome: Progressing     Problem: Chronic Conditions and Co-morbidities  Goal: Patient's chronic conditions and co-morbidity symptoms are monitored and maintained or improved  Outcome: Progressing     Problem: Nutrition  Goal: Nutrient intake appropriate for maintaining nutritional needs  Outcome: Progressing   Pt admitted overnight for CHF exacerbation. Pt given IV lasix overnight. Medicated with tylenol for throat pain. Otherwise, VS stable.  Bed alarm. Safety maintained.

## 2025-02-06 NOTE — ASSESSMENT & PLAN NOTE
Plan for thoracentesis  Will reevaluate volume status tomorrow  Patient might require some large  She is pancytopenic

## 2025-02-06 NOTE — ASSESSMENT & PLAN NOTE
She has a known history of stage IV breast cancer.  She currently follows with Dr. Mcfarland, oncology and remains on Faslodex IM injections, Xgeva subcutaneous injections, palbociclib p.o, inavolisib.  Last treatment date noted to be 1/20/2025, third treatment of Faslodex while receiving parallel supportive care for inavolisib toxicity.  Per report, this is to be resumed however at a lower dose with her next appointment scheduled for this upcoming Friday.

## 2025-02-06 NOTE — PROGRESS NOTES
Attempted to meet with patient- she is not in her room. Will attempt assessment at another time. Per EMR, patient is active with [facility]. PT recommending low intensity rehab at d/c.     Jossy Willett RN

## 2025-02-06 NOTE — PROGRESS NOTES
Arabella Springer is a 84 y.o. female on day 1 of admission presenting with Acute hypoxemic respiratory failure (Multi).      Subjective   On room air but feels short of breath.  Talked about working with interventional radiology to get thoracentesis done.  Questioning if she can go home afterwards.  Told patient that it is a possibility but her hemoglobin is 7.1 with pancytopenia and would like to see how she does postprocedure into tomorrow and she is agreeable.       Objective     Last Recorded Vitals  /79 (BP Location: Left arm, Patient Position: Lying) Comment: Jenna ORTEGA, notified of BP.  Pulse 96   Temp 37.1 °C (98.8 °F) (Temporal)   Resp 18   Wt 74.8 kg (164 lb 14.5 oz)   SpO2 94%   Intake/Output last 3 Shifts:    Intake/Output Summary (Last 24 hours) at 2/6/2025 1047  Last data filed at 2/6/2025 0300  Gross per 24 hour   Intake --   Output 200 ml   Net -200 ml       Admission Weight  Weight: 74.4 kg (164 lb) (02/05/25 1559)    Daily Weight  02/05/25 : 74.8 kg (164 lb 14.5 oz)      Physical Exam:  General: Not in acute distress, alert  HEENT: PERRLA, head intact and normocephalic  Neck: Normal to inspection  Lungs: Lungs are diminished bilaterally  Cardiac: Regular rate and rhythm  Abdomen: Soft nontender, positive bowel sounds  : Exam deferred  Skin: Intact  Hematology: No petechia or excessive ecchymosis  Musculoskeletal: Without significant trauma  Neurological: Alert awake oriented, no focal deficit, cranial nerves grossly intact  Psych: No suicidal ideation or homicidal ideation    Relevant Results  Scheduled medications  budesonide, 0.25 mg, nebulization, BID  [Held by provider] enoxaparin, 40 mg, subcutaneous, q24h  losartan 100 mg, hydroCHLOROthiazide 12.5 mg for Hyzaar 100 mg-12.5 mg, , oral, Daily  pantoprazole, 40 mg, oral, Daily before breakfast   Or  pantoprazole, 40 mg, intravenous, Daily before breakfast  pravastatin, 10 mg, oral, Daily      Continuous medications     PRN  medications  PRN medications: acetaminophen **OR** acetaminophen **OR** acetaminophen, acetaminophen **OR** acetaminophen **OR** acetaminophen, albuterol, melatonin, metoclopramide **OR** metoclopramide, ondansetron ODT **OR** ondansetron, polyethylene glycol   Labs  Results from last 7 days   Lab Units 02/06/25  0604 02/05/25  1603   WBC AUTO x10*3/uL 2.9* 3.5*   HEMOGLOBIN g/dL 7.1* 8.3*   HEMATOCRIT % 23.5* 26.3*   PLATELETS AUTO x10*3/uL 125* 142*     Results from last 7 days   Lab Units 02/06/25  0604 02/05/25  1603   SODIUM mmol/L 141 139   POTASSIUM mmol/L 3.7 4.0   CHLORIDE mmol/L 105 105   CO2 mmol/L 27 24   BUN mg/dL 15 17   CREATININE mg/dL 0.82 0.78   CALCIUM mg/dL 8.1* 8.6   PROTEIN TOTAL g/dL 5.7* 6.1*   BILIRUBIN TOTAL mg/dL 0.3 0.3   ALK PHOS U/L 36 42   ALT U/L 9 11   AST U/L 11 17   GLUCOSE mg/dL 98 120*       ECG 12 lead    Result Date: 2/6/2025  Normal sinus rhythm Normal ECG When compared with ECG of 22-JAN-2025 16:12, Borderline criteria for Anterior infarct are no longer Present Nonspecific T wave abnormality no longer evident in Anterior leads    CT angio chest for pulmonary embolism    Result Date: 2/5/2025  Interpreted By:  Garo Valencia, STUDY: CT ANGIO CHEST FOR PULMONARY EMBOLISM; 2/5/2025 6:13 pm   INDICATION: Signs/Symptoms:Hypoxic, tachypneic, cancer.   COMPARISON: 12/09/2020   ACCESSION NUMBER(S): [id number]   ORDERING CLINICIAN: LISSETTE LOUIS   TECHNIQUE: Contiguous axial images of the thorax were obtained from the level of the thoracic inlet through the lung bases. 3-D MIPS were created, processed and reviewed on a separate workstation. 100 ml of Omnipaque 350 was utilized. All CT examinations are performed with 1 or more of the following dose reduction techniques: Automated exposure control, adjustment of mA and/or kv according to patient's size, or use of iterative reconstruction techniques.   FINDINGS: The thyroid gland is limited in evaluation due to streak artifact.    There is adequate contrast opacification of the pulmonary arterial vasculature. No filling defect or vessel cutoff to indicate pulmonary embolus.   The heart size is within normal limits.  No pericardial effusion is identified. The aorta and pulmonary arteries are normal in caliber. No thoracic aortic dissection.   There are no pathologically enlarged mediastinal, hilar, or axillary lymph nodes are seen.   The trachea and mainstem bronchi are patent. Moderate-large bilateral pleural effusions, not significantly changed from the prior study.   The visualized osseous structures show extensive sclerotic metastatic disease, not significantly changed.         1. No pulmonary embolus. 2. Moderate-large bilateral pleural effusions, not significantly changed from the prior study. 3. Atelectatic changes adjacent to the pleural effusions, left-greater-than-right, not significantly changed. 4. Diffuse osteoblastic metastatic disease throughout the bony structures is again seen.     Signed by: Garo Valencia 2/5/2025 6:38 PM Dictation workstation:   [id number]    XR chest 1 view    Result Date: 2/5/2025  Interpreted By:  Lucas Gil, STUDY: XR CHEST 1 VIEW; 2/5/2025 4:24 pm   INDICATION: Signs/Symptoms:Chest Pain   COMPARISON: Radiographs 09/23/2022   ACCESSION NUMBER(S): [id number]   ORDERING CLINICIAN: LIZANDRO LEIJA   TECHNIQUE: Single frontal view of the chest performed.   FINDINGS: LINES AND DEVICES: None.   LUNGS: Small bilateral pleural effusions with adjacent atelectasis. No focal consolidation or pneumothorax.   CARDIOMEDIASTINAL SILHOUETTE: The cardiomediastinal silhouette is partially obscured by adjacent effusions.   OTHER: Similar extensive osseous metastases. Left humeral ORIF is partially imaged. Surgical clips in the right axilla. Right breast implant.       Small bilateral pleural effusions.   Extensive osseous metastases.   MACRO None   Signed by: Lucas Gil 2/5/2025 4:59 PM Dictation workstation:    [id number]    ECG 12 lead    Result Date: 2/3/2025  Normal sinus rhythm Possible Left atrial enlargement Possible Anterior infarct , age undetermined Abnormal ECG No previous ECGs available Confirmed by COURT Huddleston ([id number]) on 2/3/2025 9:00:39 AM                    Assessment/Plan   Arabella Springer is a 84 y.o. female on day 1 of admission presenting with Acute hypoxemic respiratory failure (Multi).  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    Follows up with Dr. Mcfarland  Continue with chronic medications  Patient is on Ibrance    Pancytopenia  Monitor  Type and screen done  May need blood transfusion tomorrow       Plan discussed with patient at bedside    High level of MDM based on above issue and discussing plan    This note is created using voice recognition software. All efforts are made to minimize errors, if there are errors there due to transcription.    Brandon Roman  Hospitalist

## 2025-02-07 ENCOUNTER — APPOINTMENT (OUTPATIENT)
Dept: HEMATOLOGY/ONCOLOGY | Facility: CLINIC | Age: 85
End: 2025-02-07
Payer: MEDICARE

## 2025-02-07 ENCOUNTER — DOCUMENTATION (OUTPATIENT)
Dept: HOME HEALTH SERVICES | Facility: HOME HEALTH | Age: 85
End: 2025-02-07
Payer: MEDICARE

## 2025-02-07 VITALS
HEART RATE: 90 BPM | RESPIRATION RATE: 18 BRPM | WEIGHT: 163.3 LBS | SYSTOLIC BLOOD PRESSURE: 136 MMHG | HEIGHT: 60 IN | OXYGEN SATURATION: 97 % | BODY MASS INDEX: 32.06 KG/M2 | TEMPERATURE: 96.6 F | DIASTOLIC BLOOD PRESSURE: 69 MMHG

## 2025-02-07 LAB
ANION GAP SERPL CALC-SCNC: 11 MMOL/L (ref 10–20)
BASOPHILS # BLD AUTO: 0.02 X10*3/UL (ref 0–0.1)
BASOPHILS NFR BLD AUTO: 0.7 %
BUN SERPL-MCNC: 15 MG/DL (ref 6–23)
CALCIUM SERPL-MCNC: 8 MG/DL (ref 8.6–10.3)
CHLORIDE SERPL-SCNC: 103 MMOL/L (ref 98–107)
CO2 SERPL-SCNC: 28 MMOL/L (ref 21–32)
CREAT SERPL-MCNC: 0.9 MG/DL (ref 0.5–1.05)
EGFRCR SERPLBLD CKD-EPI 2021: 64 ML/MIN/1.73M*2
EOSINOPHIL # BLD AUTO: 0.07 X10*3/UL (ref 0–0.4)
EOSINOPHIL NFR BLD AUTO: 2.6 %
ERYTHROCYTE [DISTWIDTH] IN BLOOD BY AUTOMATED COUNT: 17.3 % (ref 11.5–14.5)
GLUCOSE SERPL-MCNC: 96 MG/DL (ref 74–99)
HCT VFR BLD AUTO: 24 % (ref 36–46)
HGB BLD-MCNC: 7.5 G/DL (ref 12–16)
IMM GRANULOCYTES # BLD AUTO: 0.02 X10*3/UL (ref 0–0.5)
IMM GRANULOCYTES NFR BLD AUTO: 0.7 % (ref 0–0.9)
LYMPHOCYTES # BLD AUTO: 1.36 X10*3/UL (ref 0.8–3)
LYMPHOCYTES NFR BLD AUTO: 50.9 %
MAGNESIUM SERPL-MCNC: 1.41 MG/DL (ref 1.6–2.4)
MCH RBC QN AUTO: 30.5 PG (ref 26–34)
MCHC RBC AUTO-ENTMCNC: 31.3 G/DL (ref 32–36)
MCV RBC AUTO: 98 FL (ref 80–100)
MONOCYTES # BLD AUTO: 0.34 X10*3/UL (ref 0.05–0.8)
MONOCYTES NFR BLD AUTO: 12.7 %
NEUTROPHILS # BLD AUTO: 0.86 X10*3/UL (ref 1.6–5.5)
NEUTROPHILS NFR BLD AUTO: 32.4 %
NRBC BLD-RTO: 0 /100 WBCS (ref 0–0)
PLATELET # BLD AUTO: 151 X10*3/UL (ref 150–450)
POTASSIUM SERPL-SCNC: 3.9 MMOL/L (ref 3.5–5.3)
RBC # BLD AUTO: 2.46 X10*6/UL (ref 4–5.2)
SODIUM SERPL-SCNC: 138 MMOL/L (ref 136–145)
WBC # BLD AUTO: 2.7 X10*3/UL (ref 4.4–11.3)

## 2025-02-07 PROCEDURE — 99239 HOSP IP/OBS DSCHRG MGMT >30: CPT | Performed by: INTERNAL MEDICINE

## 2025-02-07 PROCEDURE — 2500000001 HC RX 250 WO HCPCS SELF ADMINISTERED DRUGS (ALT 637 FOR MEDICARE OP): Performed by: INTERNAL MEDICINE

## 2025-02-07 PROCEDURE — 82374 ASSAY BLOOD CARBON DIOXIDE: CPT | Performed by: INTERNAL MEDICINE

## 2025-02-07 PROCEDURE — 85025 COMPLETE CBC W/AUTO DIFF WBC: CPT | Performed by: INTERNAL MEDICINE

## 2025-02-07 PROCEDURE — 80048 BASIC METABOLIC PNL TOTAL CA: CPT | Performed by: INTERNAL MEDICINE

## 2025-02-07 PROCEDURE — 2500000004 HC RX 250 GENERAL PHARMACY W/ HCPCS (ALT 636 FOR OP/ED): Performed by: INTERNAL MEDICINE

## 2025-02-07 PROCEDURE — 36415 COLL VENOUS BLD VENIPUNCTURE: CPT | Performed by: INTERNAL MEDICINE

## 2025-02-07 PROCEDURE — 97110 THERAPEUTIC EXERCISES: CPT | Mod: GP,CQ

## 2025-02-07 PROCEDURE — 94640 AIRWAY INHALATION TREATMENT: CPT

## 2025-02-07 PROCEDURE — 2500000002 HC RX 250 W HCPCS SELF ADMINISTERED DRUGS (ALT 637 FOR MEDICARE OP, ALT 636 FOR OP/ED)

## 2025-02-07 PROCEDURE — 83735 ASSAY OF MAGNESIUM: CPT | Performed by: INTERNAL MEDICINE

## 2025-02-07 PROCEDURE — 97116 GAIT TRAINING THERAPY: CPT | Mod: GP,CQ

## 2025-02-07 RX ORDER — TOPICAL ANESTHETIC 200 MG/ML
1 SPRAY DENTAL; PERIODONTAL 4 TIMES DAILY PRN
Status: DISCONTINUED | OUTPATIENT
Start: 2025-02-07 | End: 2025-02-07 | Stop reason: HOSPADM

## 2025-02-07 RX ORDER — MAGNESIUM SULFATE HEPTAHYDRATE 40 MG/ML
2 INJECTION, SOLUTION INTRAVENOUS ONCE
Status: COMPLETED | OUTPATIENT
Start: 2025-02-07 | End: 2025-02-07

## 2025-02-07 RX ORDER — ONDANSETRON 4 MG/1
8 TABLET, ORALLY DISINTEGRATING ORAL ONCE AS NEEDED
Status: COMPLETED | OUTPATIENT
Start: 2025-02-07 | End: 2025-02-07

## 2025-02-07 RX ADMIN — PRAVASTATIN SODIUM 10 MG: 20 TABLET ORAL at 08:29

## 2025-02-07 RX ADMIN — PANTOPRAZOLE SODIUM 40 MG: 40 TABLET, DELAYED RELEASE ORAL at 06:54

## 2025-02-07 RX ADMIN — MAGNESIUM SULFATE HEPTAHYDRATE 2 G: 40 INJECTION, SOLUTION INTRAVENOUS at 09:25

## 2025-02-07 RX ADMIN — ACETAMINOPHEN 650 MG: 325 TABLET ORAL at 08:30

## 2025-02-07 RX ADMIN — BUDESONIDE 0.25 MG: 0.25 INHALANT RESPIRATORY (INHALATION) at 08:11

## 2025-02-07 RX ADMIN — PALBOCICLIB 125 MG: 125 TABLET, FILM COATED ORAL at 10:57

## 2025-02-07 RX ADMIN — ONDANSETRON 8 MG: 4 TABLET, ORALLY DISINTEGRATING ORAL at 10:57

## 2025-02-07 RX ADMIN — LOSARTAN POTASSIUM: 100 TABLET, FILM COATED ORAL at 08:29

## 2025-02-07 ASSESSMENT — COGNITIVE AND FUNCTIONAL STATUS - GENERAL
WALKING IN HOSPITAL ROOM: A LITTLE
TURNING FROM BACK TO SIDE WHILE IN FLAT BAD: A LITTLE
STANDING UP FROM CHAIR USING ARMS: A LITTLE
DAILY ACTIVITIY SCORE: 19
DRESSING REGULAR LOWER BODY CLOTHING: A LITTLE
PERSONAL GROOMING: A LITTLE
MOBILITY SCORE: 18
CLIMB 3 TO 5 STEPS WITH RAILING: A LOT
MOBILITY SCORE: 18
MOVING FROM LYING ON BACK TO SITTING ON SIDE OF FLAT BED WITH BEDRAILS: A LITTLE
WALKING IN HOSPITAL ROOM: A LITTLE
MOVING TO AND FROM BED TO CHAIR: A LITTLE
TURNING FROM BACK TO SIDE WHILE IN FLAT BAD: A LITTLE
DRESSING REGULAR UPPER BODY CLOTHING: A LITTLE
STANDING UP FROM CHAIR USING ARMS: A LITTLE
MOVING FROM LYING ON BACK TO SITTING ON SIDE OF FLAT BED WITH BEDRAILS: A LITTLE
HELP NEEDED FOR BATHING: A LITTLE
CLIMB 3 TO 5 STEPS WITH RAILING: A LITTLE
TOILETING: A LITTLE

## 2025-02-07 ASSESSMENT — PAIN SCALES - GENERAL
PAINLEVEL_OUTOF10: 0 - NO PAIN
PAINLEVEL_OUTOF10: 5 - MODERATE PAIN
PAINLEVEL_OUTOF10: 0 - NO PAIN

## 2025-02-07 ASSESSMENT — PAIN SCALES - PAIN ASSESSMENT IN ADVANCED DEMENTIA (PAINAD): TOTALSCORE: MEDICATION (SEE MAR)

## 2025-02-07 ASSESSMENT — PAIN - FUNCTIONAL ASSESSMENT
PAIN_FUNCTIONAL_ASSESSMENT: 0-10
PAIN_FUNCTIONAL_ASSESSMENT: 0-10

## 2025-02-07 ASSESSMENT — PAIN DESCRIPTION - LOCATION: LOCATION: MOUTH

## 2025-02-07 ASSESSMENT — PAIN DESCRIPTION - ORIENTATION: ORIENTATION: MID

## 2025-02-07 NOTE — DISCHARGE INSTRUCTIONS
"It was a pleasure to meet you in the hospital  You were diagnosed with having a large pleural effusion  You underwent a procedure to have this \"drained\" called a thoracentesis  During the thoracentesis, 750mL of fluid was removed  Dr. Roman kept you in the hospital for an additional night to watch her hemoglobin, or red blood cell count numbers  These numbers remained stable, you are determined to be a good candidate for discharge from the hospital  Dr. Das, the hospital doctor, the took over for your care on 2/7/2025, recommended that you use salt water gargles for the tiny ulcer that you have under the right side your tongue, these are referred to as \"aphthous ulcers\"  Likewise, you may  Orajel, or a similar product with the same active ingredients from your local drugstore, to use per the package instructions  While you are in the hospital, you missed an appointment with your oncologist, you also need to reschedule with your primary care physician; please call both of their offices and reschedule your appointment at their earliest convenience  None of your other medications were changed while you are in the hospital  "

## 2025-02-07 NOTE — CARE PLAN
Problem: Pain - Adult  Goal: Verbalizes/displays adequate comfort level or baseline comfort level  Outcome: Progressing     Problem: Safety - Adult  Goal: Free from fall injury  Outcome: Progressing     Problem: Discharge Planning  Goal: Discharge to home or other facility with appropriate resources  Outcome: Progressing     Problem: Nutrition  Goal: Nutrient intake appropriate for maintaining nutritional needs  Outcome: Progressing   Patient slept through night. No complaints of pain. VS stable. Bed alarm on. Safety maintained.

## 2025-02-07 NOTE — DISCHARGE SUMMARY
"Discharge Diagnosis  Acute hypoxemic respiratory failure (Multi)    Issues Requiring Follow-Up  It was a pleasure to meet you in the hospital  You were diagnosed with having a large pleural effusion  You underwent a procedure to have this \"drained\" called a thoracentesis  During the thoracentesis, 750mL of fluid was removed  Dr. Roman kept you in the hospital for an additional night to watch her hemoglobin, or red blood cell count numbers  These numbers remained stable, you are determined to be a good candidate for discharge from the hospital  Dr. Das, the hospital doctor, the took over for your care on 2/7/2025, recommended that you use salt water gargles for the tiny ulcer that you have under the right side your tongue, these are referred to as \"aphthous ulcers\"  Likewise, you may  Orajel, or a similar product with the same active ingredients from your local drugstore, to use per the package instructions  While you are in the hospital, you missed an appointment with your oncologist, you also need to reschedule with your primary care physician; please call both of their offices and reschedule your appointment at their earliest convenience  None of your other medications were changed while you are in the hospital    Test Results Pending At Discharge  Pending Labs       No current pending labs.          Hospital Course  This is a very pleasant anxiety, hyperlipidemia, HTN, stage IV breast cancer metastasized to bone who presented to the hospital with dyspnea on exertion and was found to have bilateral large pleural effusions; radiology was consulted for thoracentesis and ended up removing 750 mL from the left side; she felt much better and never required oxygen, her hemoglobin was noted to be 7.1 on 2/6/2025 and she was monitored overnight, this came back at 7.5 the subsequent day; she was noted to have a 1 cm circumferential and clean-based aphthous ulcer on the bottom side of her tongue that was giving " her some minor irritation, she was advised on salt water gargles and using over-the-counter symptomatic treatment medications which she was satisfied with; none of her other previously prescribed medications were changed or altered and she was discharged with the above-mentioned instructions.    Greater than 30 minutes was spent facilitating this patients discharge from the hospital which included examining the patient, reconciling medications, and making arrangements for future care..    Angel Luis Das MD  [facility] [place]  Internal Medicine    This document was generated in whole or in part using the Dragon One medical voice recognition software and there may be some incorrect words/wording, spelling, or punctuation errors that were not corrected prior to finalization in the medical record.    Pertinent Physical Exam At Time of Discharge  Physical Exam  General: Not in acute distress, alert  HEENT: head intact and normocephalic  Neck: Normal to inspection  Lungs: Lungs are diminished bilaterally  Cardiac: Regular rate and rhythm  Abdomen: Soft nontender, positive bowel sounds  : Exam deferred  Skin: Intact  Hematology: No petechia or excessive ecchymosis  Musculoskeletal: Without significant trauma  Neurological: Alert awake oriented, no focal deficit, cranial nerves grossly intact    Home Medications     Medication List      CONTINUE taking these medications     Blood glucose monitoring meter; Test two times daily.   isopropyl alcohoL 70 % towelette; Test 2 times a day, clean finger prior   to testing   Pro Comfort Spacer-Adult Mask spacer; Generic drug: inhalat.spacing   dev,large mask; 1 each every 4 hours if needed (shortness of breath,   wheezing, cough).     ASK your doctor about these medications     acetaminophen 500 mg tablet; Commonly known as: Tylenol   albuterol 90 mcg/actuation inhaler; Commonly known as: ProAir HFA;   Inhale 2 puffs every 4 hours if needed for wheezing or shortness of    breath.   beclomethasone HFA 40 mcg/actuation inhaler; Commonly known as: Qvar   blood sugar diagnostic strip; 1 each 2 times a day.   brimonidine-timoloL 0.2-0.5 % ophthalmic solution; Commonly known as:   Combigan   calcium carbonate-vitamin D3 600 mg-10 mcg (400 unit) tablet   denosumab 120 mg/1.7 mL (70 mg/mL) injection; Commonly known as: Xgeva   dexAMETHasone 0.5 mg/5 mL oral liquid; Take 10 mL (1 mg) by mouth 4   times a day. Swish 10 mL of solution for two minutes and spit, four times   daily. Do not to eat for one hour after using the mouthwash.   diclofenac 50 mg EC tablet; Commonly known as: Voltaren; TAKE 1   TABLET(50 MG) BY MOUTH TWICE DAILY WITH MEALS   esomeprazole 40 mg DR capsule; Commonly known as: NexIUM   furosemide 20 mg tablet; Commonly known as: Lasix   Ibrance 125 mg tablet; Generic drug: palbociclib; Take 125 mg (1 tablet)   by mouth once daily for three weeks, then one week off.  Swallow whole.    Do not crush, chew or split.   inavolisib 3 mg tablet; Take 3 mg by mouth once daily for 28 days. Do   not fill before February 7, 2025.   LORazepam 0.5 mg tablet; Commonly known as: Ativan; Take 1 tablet (0.5   mg) by mouth every 2 hours if needed for anxiety (anxiety related to   radiology tests) for up to 5 doses.   losartan-hydrochlorothiazide 100-12.5 mg tablet; Commonly known as:   Hyzaar; Take 1 tablet by mouth once daily.   methylPREDNISolone 4 mg tablets; Commonly known as: Medrol Dospak;   Follow schedule on package instructions; Ask about: Should I take this   medication?   mometasone 0.1 % lotion; Commonly known as: Elocon; Apply topically 2   times a day.   multivitamin tablet   Nystop 100,000 unit/gram powder; Generic drug: nystatin; Apply 1   Application topically 2 times a day.   ondansetron 8 mg tablet; Commonly known as: Zofran; Take 1 tablet (8 mg)   by mouth every 8 hours if needed for nausea or vomiting.   Orajel 3X Mouth Sores 20-0.1-0.15 % mucosal gel; Generic drug:    benzocaine; Use in the mouth or throat 4 times a day as needed (Lip   sores).   pravastatin 10 mg tablet; Commonly known as: Pravachol; Take 1 tablet   (10 mg) by mouth once daily.   prochlorperazine 10 mg tablet; Commonly known as: Compazine; Take 1   tablet (10 mg) by mouth every 6 hours if needed for nausea or vomiting.   Rhopressa 0.02 % drops opthalmic solution; Generic drug: netarsudiL   triamcinolone 0.1 % cream; Commonly known as: Kenalog; Apply topically 2   times a day.       Outpatient Follow-Up  Future Appointments   Date Time Provider Department [place]   2/14/2025  2:00 PM [id number] [place]   2/14/2025  3:00 PM INF [address] [place] [address]   3/3/2025  2:00 PM Jimmie Nam MD [id number] [place]   3/7/2025 11:00 AM INF [address] [place] [address]       Angel Luis Das MD

## 2025-02-07 NOTE — HH CARE COORDINATION
Home Care received a Referral to Resume Care for Nursing, Physical Therapy, and Occupational Therapy. We have processed the referral for a Resumption of Care on 2/9-2/10/25.     If you have any questions or concerns, please feel free to contact us at [phone]. Follow the prompts, enter your five digit zip code, and you will be directed to your care team on WEST 1.

## 2025-02-07 NOTE — CARE PLAN
Problem: Pain - Adult  Goal: Verbalizes/displays adequate comfort level or baseline comfort level  Outcome: Progressing     Problem: Safety - Adult  Goal: Free from fall injury  Outcome: Progressing     Problem: Discharge Planning  Goal: Discharge to home or other facility with appropriate resources  Outcome: Progressing     Problem: Chronic Conditions and Co-morbidities  Goal: Patient's chronic conditions and co-morbidity symptoms are monitored and maintained or improved  Outcome: Progressing     Problem: Nutrition  Goal: Nutrient intake appropriate for maintaining nutritional needs  Outcome: Progressing   The patient's goals for the shift include      The clinical goals for the shift include see poc  1420 No decline in assessment, injury free throughout shift. Discharge instructions given to pt, verbalized understanding. Discharged home with  via wheelchair.

## 2025-02-07 NOTE — PROGRESS NOTES
02/07/25 1353   Discharge Planning   Expected Discharge Disposition [facility]  ([facility])   Intensity of Service   Intensity of Service 0-30 min     Patient ready to d/c per medical team. MD to place referral for patient to resume [facility]. Notified [facility] intake nurse.

## 2025-02-07 NOTE — PROGRESS NOTES
Physical Therapy    Physical Therapy Treatment    Patient Name: Arabella Springer  MRN: [id number]  Today's Date: 2/7/2025  Time Calculation  Start Time: 0820  Stop Time: 0900  Time Calculation (min): 40 min     3028/3028-A       02/07/25 0820   PT  Visit   PT Received On 02/07/25   Response to Previous Treatment Patient with no complaints from previous session.   General   Reason for Referral P/w concern for progressive dyspnea both at rest and with exertion.  At baseline patient does not have typical O2 requirements.  However it was noted today that she was ambulating on room air and desaturating to between 87 and 89%. CT imaging obtained notes moderate to large bilateral pleural effusions similar to prior studies. Pt admitted for acute hypoxemic respiratory failure.   Referred By Dr. Roman   Past Medical History Relevant to Rehab stage IV breast cancer with mets to bone, osteopenia, HTN, HLD, BPPV, arthritis   Family/Caregiver Present No   Prior to Session Communication Bedside nurse   Patient Position Received Bed, 3 rail up;Alarm on   Preferred Learning Style verbal;visual   General Comment Pt very pleasant and agreeable to work with therapy.   Precautions   Medical Precautions Fall precautions   Vital Signs   SpO2 95 %   Pain Assessment   Pain Assessment 0-10   0-10 (Numeric) Pain Score 0 - No pain   Cognition   Overall Cognitive Status WFL   Orientation Level Oriented X4   Processing Speed WFL   Coordination   Movements are Fluid and Coordinated Yes   Postural Control   Postural Control WFL   Static Sitting Balance   Static Sitting-Balance Support No upper extremity supported   Static Sitting-Level of Assistance Distant supervision   Static Standing Balance   Static Standing-Balance Support Bilateral upper extremity supported   Static Standing-Level of Assistance Close supervision   Therapeutic Exercise   Therapeutic Exercise Performed Yes   Therapeutic Exercise Activity 1 PF/DF x15   Therapeutic Exercise  Activity 2 LAQ x15   Therapeutic Exercise Activity 3 Marches x15   Therapeutic Exercise Activity 4 Pillow squeeze x15   Therapeutic Exercise Activity 5 Resisted hip ABD x15   Bed Mobility   Bed Mobility Yes   Bed Mobility 1   Bed Mobility 1 Supine to sitting   Level of Assistance 1 Independent   Bed Mobility Comments 1 x1   Ambulation/Gait Training   Ambulation/Gait Training Performed Yes   Ambulation/Gait Training 1   Surface 1 Level tile   Device 1 Rolling walker   Gait Support Devices Gait belt   Assistance 1 Contact guard   Comments/Distance (ft) 1 250' x1.   Transfers   Transfer Yes   Transfer 1   Transfer From 1 Bed to   Transfer to 1 Stand   Technique 1 Sit to stand   Transfer Device 1 Walker;Gait belt   Transfer Level of Assistance 1 Modified independent   Trials/Comments 1 x1   Transfers 2   Transfer From 2 Stand to   Transfer to 2 Chair with arms   Technique 2 Stand to sit;Sit to stand   Transfer Device 2 Walker;Gait belt   Transfer Level of Assistance 2 Close supervision   Trials/Comments 2 x2   Activity Tolerance   Endurance Tolerates 30 min exercise with multiple rests   Early Mobility/Exercise Safety Screen Proceed with mobilization - No exclusion criteria met   PT Assessment   PT Assessment Results Decreased mobility;Decreased strength   Rehab Prognosis Good   Evaluation/Treatment Tolerance Patient tolerated treatment well   End of Session Communication Bedside nurse   Assessment Comment Pt demonstrates improved endurance and distance with ambulation. Pt will continue to benefit from PT interventions to improve overall strength.   End of Session Patient Position Up in chair;Alarm on   Outpatient Education   Individual(s) Educated Patient   Education Provided Posture   Risk and Benefits Discussed with Patient/Caregiver/Other yes   Patient/Caregiver Demonstrated Understanding yes   Plan of Care Discussed and Agreed Upon yes   Patient Response to Education Patient/Caregiver Verbalized Understanding of  Information   PT Plan   Inpatient/Swing Bed or Outpatient Inpatient   PT Plan   Treatment/Interventions Bed mobility;Transfer training;Gait training;Therapeutic exercise   PT Plan Ongoing PT   PT Frequency 3 times per week   PT Discharge Recommendations Low intensity level of continued care   Equipment Recommended upon Discharge Wheeled walker   PT Recommended Transfer Status Assist x1         Outcome Measures:  [facility] Basic Mobility  Turning from your back to your side while in a flat bed without using bedrails: A little  Moving from lying on your back to sitting on the side of a flat bed without using bedrails: A little  Moving to and from bed to chair (including a wheelchair): None  Standing up from a chair using your arms (e.g. wheelchair or bedside chair): A little  To walk in hospital room: A little  Climbing 3-5 steps with railing: A lot  Basic Mobility - Total Score: 18        EDUCATION:  Outpatient Education  Individual(s) Educated: Patient  Education Provided: Posture  Risk and Benefits Discussed with Patient/Caregiver/Other: yes  Patient/Caregiver Demonstrated Understanding: yes  Plan of Care Discussed and Agreed Upon: yes  Patient Response to Education: Patient/Caregiver Verbalized Understanding of Information    GOALS:  Encounter Problems       Encounter Problems (Active)       Mobility       Pt will be able to ambulate >/= 250 ft with FWW SBA with good safety awareness while maintaining SpO2 >/= 92%.       Start:  02/06/25    Expected End:  02/20/25            Pt will complete supine, seated and standing exercises to maintain/improve overall strength with minimal verbal cues.         Start:  02/06/25    Expected End:  02/20/25               PT Transfers       Pt will be able to complete all bed mobility tasks mod I.        Start:  02/06/25    Expected End:  02/20/25            Pt will be able to complete all transfers with FWW SBA demonstrating good safety awareness and proper body mechanics.         Start:  02/06/25    Expected End:  02/20/25               Pain - Adult              I personally have reviewed data entered by the student into the flowsheet and agree with this treatment session of this patient.    Ray Forrest, PTA

## 2025-02-09 ENCOUNTER — HOME CARE VISIT (OUTPATIENT)
Dept: HOME HEALTH SERVICES | Facility: HOME HEALTH | Age: 85
End: 2025-02-09
Payer: MEDICARE

## 2025-02-09 VITALS
OXYGEN SATURATION: 95 % | SYSTOLIC BLOOD PRESSURE: 122 MMHG | DIASTOLIC BLOOD PRESSURE: 62 MMHG | WEIGHT: 158 LBS | TEMPERATURE: 96.6 F | BODY MASS INDEX: 31.02 KG/M2 | HEIGHT: 60 IN | HEART RATE: 86 BPM | RESPIRATION RATE: 18 BRPM

## 2025-02-09 PROCEDURE — G0299 HHS/HOSPICE OF RN EA 15 MIN: HCPCS | Mod: HHH

## 2025-02-09 ASSESSMENT — ENCOUNTER SYMPTOMS
LOWEST PAIN SEVERITY IN PAST 24 HOURS: 0/10
PAIN SEVERITY GOAL: 0/10
PAIN LOCATION: MOUTH
HIGHEST PAIN SEVERITY IN PAST 24 HOURS: 4/10
PAIN LOCATION - PAIN QUALITY: ACHE
DEPRESSION: 0
PAIN: 1
LAST BOWEL MOVEMENT: 67245
ARTHRALGIAS: 1
APPETITE LEVEL: GOOD
CHANGE IN APPETITE: UNCHANGED
SUBJECTIVE PAIN PROGRESSION: UNCHANGED
PERSON REPORTING PAIN: PATIENT
HYPERTENSION: 1
MUSCLE WEAKNESS: 1
LOSS OF SENSATION IN FEET: 0
CONSTIPATION: 1
PAIN LOCATION - PAIN DURATION: INTERMITTENT
PAIN LOCATION - PAIN FREQUENCY: INTERMITTENT
PAIN LOCATION - PAIN SEVERITY: 4/10
OCCASIONAL FEELINGS OF UNSTEADINESS: 1

## 2025-02-09 ASSESSMENT — ACTIVITIES OF DAILY LIVING (ADL)
PHYSICAL TRANSFERS ASSESSED: 1
CURRENT_FUNCTION: INDEPENDENT
AMBULATION ASSISTANCE: INDEPENDENT
ENTERING_EXITING_HOME: NEEDS ASSISTANCE
AMBULATION ASSISTANCE: 1
OASIS_M1830: 03

## 2025-02-09 ASSESSMENT — PAIN SCALES - PAIN ASSESSMENT IN ADVANCED DEMENTIA (PAINAD)
NEGVOCALIZATION: 0 - NONE.
CONSOLABILITY: 0 - NO NEED TO CONSOLE.
BODYLANGUAGE: 0
TOTALSCORE: 0
BREATHING: 0
NEGVOCALIZATION: 0
FACIALEXPRESSION: 0 - SMILING OR INEXPRESSIVE.
FACIALEXPRESSION: 0
CONSOLABILITY: 0
BODYLANGUAGE: 0 - RELAXED.

## 2025-02-10 ENCOUNTER — HOME CARE VISIT (OUTPATIENT)
Dept: HOME HEALTH SERVICES | Facility: HOME HEALTH | Age: 85
End: 2025-02-10
Payer: MEDICARE

## 2025-02-10 ENCOUNTER — PATIENT OUTREACH (OUTPATIENT)
Dept: PRIMARY CARE | Facility: CLINIC | Age: 85
End: 2025-02-10
Payer: MEDICARE

## 2025-02-10 PROCEDURE — G0151 HHCP-SERV OF PT,EA 15 MIN: HCPCS | Mod: HHH

## 2025-02-10 ASSESSMENT — ACTIVITIES OF DAILY LIVING (ADL)
AMBULATION_DISTANCE/DURATION_TOLERATED: 100'
AMBULATION ASSISTANCE ON FLAT SURFACES: 1

## 2025-02-10 NOTE — PROGRESS NOTES
Discharge Facility: [facility]  Discharge Diagnosis: respiratory failure  Admission Date: 2/5/25  Discharge Date: 2/7/25    PCP Appointment Date: TBD  Specialist Appointment Date: 2/14 oncology  Hospital Encounter and Summary Linked: Yes  ED to Hosp-Admission (Discharged) with Angel Luis Das MD; Charanjit Kohli DO (02/05/2025)   See discharge assessment below for further details    Wrap Up  Wrap Up Additional Comments: Spoke with Arabella who states she is doing ok, lost about 10lbs from fluid and breathing better, however has questions for Dr Velazquez about what to do with her lasix. Has sinsce restarted 20mg daily but not sure what to do going forward. Sent message to office about needing appt due to no appts avail within 14 days. Aware to call sooner with concerns. (2/10/2025  2:18 PM)  Call End Time: 1200 (2/10/2025  2:18 PM)    Engagement  Call Start Time: 1153 (2/10/2025  2:18 PM)    Medications  Medications reviewed with patient/caregiver?: Yes (2/10/2025  2:18 PM)  Is the patient having any side effects they believe may be caused by any medication additions or changes?: No (2/10/2025  2:18 PM)  Does the patient have all medications ordered at discharge?: Yes (2/10/2025  2:18 PM)  Care Management Interventions: Provided patient education (2/10/2025  2:18 PM)  Prescription Comments: No changes to meds, encouraged salt water rinses due to lesions in mouth (2/10/2025  2:18 PM)  Is the patient taking all medications as directed (includes completed medication regime)?: Yes (2/10/2025  2:18 PM)  Care Management Interventions: Provided patient education (2/10/2025  2:18 PM)  Medication Comments: Has questions on lasix, sent message to office for appt (2/10/2025  2:18 PM)    Appointments  Does the patient have a primary care provider?: Yes (2/10/2025  2:18 PM)  Has the patient kept scheduled appointments due by today?: Yes (2/10/2025  2:18 PM)  Care Management Interventions: Advised patient to keep appointment (2/10/2025   2:18 PM)    Self Management  What is the home health agency?: [facility] (2/10/2025  2:18 PM)  Has home health visited the patient within 72 hours of discharge?: Yes (2/10/2025  2:18 PM)    Patient Teaching  Does the patient have access to their discharge instructions?: Yes (2/10/2025  2:18 PM)  Care Management Interventions: Reviewed instructions with patient (2/10/2025  2:18 PM)  What is the patient's perception of their health status since discharge?: Improving (2/10/2025  2:18 PM)  Is the patient/caregiver able to teach back the hierarchy of who to call/visit for symptoms/problems? PCP, Specialist, Home Health nurse, Urgent Care, ED, 911: Yes (2/10/2025  2:18 PM)  Patient/Caregiver Education Comments: Aware to seek care with any return of worsening shortness of breath (2/10/2025  2:18 PM)

## 2025-02-11 ENCOUNTER — HOME CARE VISIT (OUTPATIENT)
Dept: HOME HEALTH SERVICES | Facility: HOME HEALTH | Age: 85
End: 2025-02-11
Payer: MEDICARE

## 2025-02-12 ENCOUNTER — OFFICE VISIT (OUTPATIENT)
Dept: PRIMARY CARE | Facility: CLINIC | Age: 85
End: 2025-02-12
Payer: MEDICARE

## 2025-02-12 VITALS
DIASTOLIC BLOOD PRESSURE: 58 MMHG | HEIGHT: 60 IN | BODY MASS INDEX: 31.02 KG/M2 | OXYGEN SATURATION: 95 % | TEMPERATURE: 97.9 F | WEIGHT: 158 LBS | RESPIRATION RATE: 14 BRPM | HEART RATE: 82 BPM | SYSTOLIC BLOOD PRESSURE: 110 MMHG

## 2025-02-12 DIAGNOSIS — Z00.00 ROUTINE HEALTH MAINTENANCE: Primary | ICD-10-CM

## 2025-02-12 DIAGNOSIS — J90 BILATERAL PLEURAL EFFUSION: ICD-10-CM

## 2025-02-12 DIAGNOSIS — D64.9 ANEMIA, UNSPECIFIED TYPE: ICD-10-CM

## 2025-02-12 DIAGNOSIS — I50.33 ACUTE ON CHRONIC HEART FAILURE WITH PRESERVED EJECTION FRACTION (HFPEF): ICD-10-CM

## 2025-02-12 DIAGNOSIS — C79.51 CARCINOMA OF BREAST METASTATIC TO BONE, UNSPECIFIED LATERALITY (MULTI): ICD-10-CM

## 2025-02-12 DIAGNOSIS — C50.919 CARCINOMA OF BREAST METASTATIC TO BONE, UNSPECIFIED LATERALITY (MULTI): ICD-10-CM

## 2025-02-12 PROCEDURE — 3078F DIAST BP <80 MM HG: CPT | Performed by: STUDENT IN AN ORGANIZED HEALTH CARE EDUCATION/TRAINING PROGRAM

## 2025-02-12 PROCEDURE — 1111F DSCHRG MED/CURRENT MED MERGE: CPT | Performed by: STUDENT IN AN ORGANIZED HEALTH CARE EDUCATION/TRAINING PROGRAM

## 2025-02-12 PROCEDURE — 1036F TOBACCO NON-USER: CPT | Performed by: STUDENT IN AN ORGANIZED HEALTH CARE EDUCATION/TRAINING PROGRAM

## 2025-02-12 PROCEDURE — 1159F MED LIST DOCD IN RCRD: CPT | Performed by: STUDENT IN AN ORGANIZED HEALTH CARE EDUCATION/TRAINING PROGRAM

## 2025-02-12 PROCEDURE — 3074F SYST BP LT 130 MM HG: CPT | Performed by: STUDENT IN AN ORGANIZED HEALTH CARE EDUCATION/TRAINING PROGRAM

## 2025-02-12 PROCEDURE — 99496 TRANSJ CARE MGMT HIGH F2F 7D: CPT | Performed by: STUDENT IN AN ORGANIZED HEALTH CARE EDUCATION/TRAINING PROGRAM

## 2025-02-12 PROCEDURE — 1160F RVW MEDS BY RX/DR IN RCRD: CPT | Performed by: STUDENT IN AN ORGANIZED HEALTH CARE EDUCATION/TRAINING PROGRAM

## 2025-02-12 RX ORDER — FUROSEMIDE 20 MG/1
20 TABLET ORAL DAILY
Qty: 30 TABLET | Refills: 0 | Status: SHIPPED | OUTPATIENT
Start: 2025-02-12 | End: 2025-03-14

## 2025-02-12 ASSESSMENT — ENCOUNTER SYMPTOMS
JOINT SWELLING: 0
DYSURIA: 0
FEVER: 0
LIGHT-HEADEDNESS: 0
DIARRHEA: 0
DIZZINESS: 0
CHILLS: 0
SHORTNESS OF BREATH: 0
VOMITING: 0
COUGH: 0
DIAPHORESIS: 0
MYALGIAS: 0
NAUSEA: 0

## 2025-02-12 NOTE — PROGRESS NOTES
"Patient: Arabella Springer  : 1940  PCP: Angel Luis Velazquez DO  MRN: [id number]  Program: Oncology Core  Status: Enrolled  Effective Dates: 2025 - present  Responsible Staff: [facility]  Social Drivers to be Addressed: No information to display    Transitional Care Management  Status: Enrolled  Effective Dates: 2/10/2025 - present  Responsible Staff: Lizz Gallegos RN  Social Drivers to be Addressed: No information to display         Arabella Springer is a 84 y.o. female presenting today for follow-up after being discharged from the hospital 5 days ago. The main problem requiring admission was acute hypoxemic respiratory failure, bilateral pleural effusion. The discharge summary and/or Transitional Care Management documentation was reviewed. Medication reconciliation was performed as indicated via the \"Julián as Reviewed\" timestamp.     Arabella Springer was contacted by Transitional Care Management services two days after her discharge. This encounter and supporting documentation was reviewed.    During hospitalization she underwent thoracentesis with 750 mL of fluid removed.  Hemoglobin was monitored and was discharged from the hospital.  She was recommended salt water gargles and Orajel for oral ulcer.  She was discharged on the same medicine regimen including Lasix 20 mg.     Doing well since discharge. She has an ecchymosis area on right lateral knee, no swelling and not painful. Swollen gland is much better and oral ulcers are much improved. Pulse ox ambulating was 94% at home. She is no longer taking magnesium. Has a mild left foot drop.     Off diclofenac for a week.         Review of Systems   Constitutional:  Negative for chills, diaphoresis and fever.   HENT:  Negative for hearing loss.    Eyes:  Negative for visual disturbance.   Respiratory:  Negative for cough and shortness of breath.    Cardiovascular:  Negative for chest pain.   Gastrointestinal:  Negative for diarrhea, nausea and " vomiting.   Genitourinary:  Negative for dysuria and urgency.   Musculoskeletal:  Negative for joint swelling and myalgias.   Neurological:  Negative for dizziness, syncope and light-headedness.       /58   Pulse 82   Temp 36.6 °C (97.9 °F)   Resp 14   Ht 1.524 m (5')   Wt 71.7 kg (158 lb)   SpO2 95%   BMI 30.86 kg/m²     Physical Exam  Vitals reviewed.   HENT:      Head: Normocephalic.   Cardiovascular:      Rate and Rhythm: Normal rate and regular rhythm.   Pulmonary:      Effort: Pulmonary effort is normal. No respiratory distress.      Breath sounds: Normal breath sounds.   Abdominal:      General: There is no distension.   Musculoskeletal:         General: No deformity.   Skin:     Coloration: Skin is not jaundiced.   Neurological:      General: No focal deficit present.      Mental Status: She is alert.      Cranial Nerves: No cranial nerve deficit.      Sensory: No sensory deficit.      Motor: Weakness (Right foot drop present) present.      Coordination: Coordination normal.      Gait: Gait normal.   Psychiatric:         Mood and Affect: Mood normal.         Behavior: Behavior normal.         The complexity of medical decision making for this patient's transitional care is high.    Assessment/Plan   Problem List Items Addressed This Visit       Anemia    Relevant Orders    CBC    Acute on chronic heart failure with preserved ejection fraction (HFpEF)    Relevant Orders    CBC    Magnesium    Basic metabolic panel    Breast cancer metastasized to bone (Multi)    Bilateral pleural effusion    Relevant Medications    furosemide (Lasix) 20 mg tablet     Other Visit Diagnoses       Routine health maintenance    -  Primary            Right foot drop  - Unclear etiology of this or if connected to ecchymosis  -Discussed possibly MRI brain to evaluate this further, patient declines for now will let us know if she changes her mind  - She will discuss with sports medicine    Bilateral pleural effusions  -  Much improved clinically  - Currently on Lasix 20 mg a day, defer long-term management of this to her oncologist and pulmonologist. Short supply sent to pharmacy  - Agree with holding off on diclofenac pill and trying topical Voltaren gel.  - Hospital course reviewed  - Labs today  -ER for shortness of breath, chest pain, hypoxia, patient monitoring for this at home.  - Follow-up with Dr. Mcfarland scheduled and recommended follow-up with pulmonology    Anemia  - Repeat CBC ordered    Follow-up 2 months, sooner if needed.

## 2025-02-13 ENCOUNTER — HOME CARE VISIT (OUTPATIENT)
Dept: HOME HEALTH SERVICES | Facility: HOME HEALTH | Age: 85
End: 2025-02-13
Payer: MEDICARE

## 2025-02-13 ENCOUNTER — SPECIALTY PHARMACY (OUTPATIENT)
Dept: PHARMACY | Facility: CLINIC | Age: 85
End: 2025-02-13

## 2025-02-13 VITALS
TEMPERATURE: 96.6 F | OXYGEN SATURATION: 95 % | HEART RATE: 82 BPM | RESPIRATION RATE: 16 BRPM | SYSTOLIC BLOOD PRESSURE: 110 MMHG | DIASTOLIC BLOOD PRESSURE: 58 MMHG

## 2025-02-13 LAB
ANION GAP SERPL CALCULATED.4IONS-SCNC: 11 MMOL/L (CALC) (ref 7–17)
BUN SERPL-MCNC: 21 MG/DL (ref 7–25)
BUN/CREAT SERPL: 18 (CALC) (ref 6–22)
CALCIUM SERPL-MCNC: 8.7 MG/DL (ref 8.6–10.4)
CHLORIDE SERPL-SCNC: 104 MMOL/L (ref 98–110)
CO2 SERPL-SCNC: 24 MMOL/L (ref 20–32)
CREAT SERPL-MCNC: 1.14 MG/DL (ref 0.6–0.95)
EGFRCR SERPLBLD CKD-EPI 2021: 48 ML/MIN/1.73M2
ERYTHROCYTE [DISTWIDTH] IN BLOOD BY AUTOMATED COUNT: 16.9 % (ref 11–15)
GLUCOSE SERPL-MCNC: 73 MG/DL (ref 65–139)
HCT VFR BLD AUTO: 27 % (ref 35–45)
HGB BLD-MCNC: 8.5 G/DL (ref 11.7–15.5)
MAGNESIUM SERPL-MCNC: 1.5 MG/DL (ref 1.5–2.5)
MCH RBC QN AUTO: 31 PG (ref 27–33)
MCHC RBC AUTO-ENTMCNC: 31.5 G/DL (ref 32–36)
MCV RBC AUTO: 98.5 FL (ref 80–100)
PLATELET # BLD AUTO: 306 THOUSAND/UL (ref 140–400)
PMV BLD REES-ECKER: 9.8 FL (ref 7.5–12.5)
POTASSIUM SERPL-SCNC: 4.4 MMOL/L (ref 3.5–5.3)
RBC # BLD AUTO: 2.74 MILLION/UL (ref 3.8–5.1)
SODIUM SERPL-SCNC: 139 MMOL/L (ref 135–146)
WBC # BLD AUTO: 2.8 THOUSAND/UL (ref 3.8–10.8)

## 2025-02-13 PROCEDURE — G0300 HHS/HOSPICE OF LPN EA 15 MIN: HCPCS | Mod: HHH

## 2025-02-13 ASSESSMENT — ENCOUNTER SYMPTOMS
MUSCLE WEAKNESS: 1
FATIGUES EASILY: 1
APPETITE LEVEL: FAIR
CHANGE IN APPETITE: UNCHANGED
DENIES PAIN: 1
PERSON REPORTING PAIN: PATIENT

## 2025-02-13 NOTE — DOCUMENTATION CLARIFICATION NOTE
"    PATIENT:               ALEXIS AMARAL  ACCT #:                  [id number]  MRN:                       [id number]  :                       1940  ADMIT DATE:       2025 3:49 PM  DISCH DATE:        2025 2:53 PM  RESPONDING PROVIDER #:        [id number]          PROVIDER RESPONSE TEXT:    Pancytopenia due to chronic disease    CDI QUERY TEXT:    Clarification      Instruction:    Based on your assessment of the patient and the clinical information, please provide the requested documentation by clicking on the appropriate radio button and enter any additional information if prompted.    Question: Can the diagnosis of pancytopenia be further clarified    When answering this query, please exercise your independent professional judgment. The fact that a question is being asked, does not imply that any particular answer is desired or expected.    The patient's clinical indicators include:  Clinical Information:  - 83yo female admitted with ARF, pancytopenia and pleural effusions.  PMH includes Breast cancer stage IV with mets to bone and peritoneum currently on faslodex + palbociclib + inavolisib, immunotherapy induced psoriasis, HTN, recent TSERING and has been off of her Lasix for 6 days.    Clinical Indicators:    - Labs:  2  wbc 3.5,  hgb 8.3,   plt 142  2/6  wbc 2.9,  hgb 7.1,   plt 125  2/7  wbc 2.7,  hgb 7.5,   plt  151    - 2/6 Dr Roman : \" She is anemic along with pancytopenia from her treatment \"    Treatment: Lab monitoring.    Risk Factors: Cancer on chemotherapy.  Options provided:  -- Pancytopenia due to chemotherapy  -- Pancytopenia due to chronic disease  -- Other - I will add my own diagnosis  -- Refer to Clinical Documentation Reviewer    Query created by: Kiya Guerra on 2025 11:53 AM      Electronically signed by:  JOHNNY VALDEZ MD 2025 12:07 PM          "

## 2025-02-13 NOTE — DOCUMENTATION CLARIFICATION NOTE
"    PATIENT:               ALEXIS AMARAL  ACCT #:                  [id number]  MRN:                       [id number]  :                       1940  ADMIT DATE:       2025 3:49 PM  DISCH DATE:        2025 2:53 PM  RESPONDING PROVIDER #:        [id number]          PROVIDER RESPONSE TEXT:    Bilateral pleural effusions related to other see notes    CDI QUERY TEXT:    Clarification      Instruction:    Based on your assessment of the patient and the clinical information, please provide the requested documentation by clicking on the appropriate radio button and enter any additional information if prompted.    Question: Please clarify if a relationship exists between    When answering this query, please exercise your independent professional judgment. The fact that a question is being asked, does not imply that any particular answer is desired or expected.    The patient's clinical indicators include:  Clinical Information:  - 83yo female admitted with ARF, pancytopenia and pleural effusions.  PMH includes Breast cancer stage IV with mets to bone and peritoneum currently on faslodex + palbociclib + inavolisib, immunotherapy induced psoriasis,  HTN, recent TSERING and has been off of her Lasix for 6 days.    Clinical Indicators:  -  CT Chest:  No pulmonary embolus. Moderate-large bilateral pleural effusions, not significantly changed from the prior study. Atelectatic changes adjacent to the pleural effusions, left-greater-than-right, not significantly changed. Diffuse osteoblastic metastatic disease throughout the bony  structures is again seen.    -  H&P Dr Kohli: . \"Chronic bilateral pleural effusions in the setting of known metastatic breast cancer and active chemotherapy... CT scan noting bilateral pleural effusions likely exacerbated by cessation of diuretic but not grossly changed per radiology from prior studies.\"    -  IR KIMBERLYN Steinberg:  \" Thoracentesis.  L pleural effusion:  750 ml of yellow " "fluid was removed without difficulty.\"    Treatment: Lasix, thoracentesis.    Risk Factors: Cancer, Lasix held for several days prior to arrival.  Options provided:  -- Bilateral pleural effusions related to cancer  -- Bilateral pleural effusions related to other, Please specify cause of pleural effusions  -- Other - I will add my own diagnosis  -- Refer to Clinical Documentation Reviewer    Query created by: Kiya Guerra on 2/13/2025 11:49 AM      Electronically signed by:  JOHNNY VALDEZ MD 2/13/2025 12:07 PM          "

## 2025-02-14 ENCOUNTER — PHARMACY VISIT (OUTPATIENT)
Dept: PHARMACY | Facility: CLINIC | Age: 85
End: 2025-02-14
Payer: COMMERCIAL

## 2025-02-14 ENCOUNTER — APPOINTMENT (OUTPATIENT)
Dept: HEMATOLOGY/ONCOLOGY | Facility: CLINIC | Age: 85
End: 2025-02-14
Payer: MEDICARE

## 2025-02-14 DIAGNOSIS — C79.51 CARCINOMA OF RIGHT BREAST METASTATIC TO BONE (MULTI): Primary | ICD-10-CM

## 2025-02-14 DIAGNOSIS — C50.911 CARCINOMA OF RIGHT BREAST METASTATIC TO BONE (MULTI): Primary | ICD-10-CM

## 2025-02-17 LAB
ATRIAL RATE: 101 BPM
P AXIS: 51 DEGREES
P OFFSET: 198 MS
P ONSET: 143 MS
PR INTERVAL: 154 MS
Q ONSET: 220 MS
QRS COUNT: 17 BEATS
QRS DURATION: 78 MS
QT INTERVAL: 326 MS
QTC CALCULATION(BAZETT): 422 MS
QTC FREDERICIA: 387 MS
R AXIS: 45 DEGREES
T AXIS: 49 DEGREES
T OFFSET: 383 MS
VENTRICULAR RATE: 101 BPM

## 2025-02-18 DIAGNOSIS — J90 BILATERAL PLEURAL EFFUSION: ICD-10-CM

## 2025-02-18 RX ORDER — FUROSEMIDE 20 MG/1
20 TABLET ORAL DAILY
Qty: 30 TABLET | Refills: 0 | Status: SHIPPED | OUTPATIENT
Start: 2025-02-18 | End: 2025-03-20

## 2025-02-20 ENCOUNTER — HOME CARE VISIT (OUTPATIENT)
Dept: HOME HEALTH SERVICES | Facility: HOME HEALTH | Age: 85
End: 2025-02-20
Payer: MEDICARE

## 2025-02-20 ENCOUNTER — LAB (OUTPATIENT)
Dept: LAB | Facility: CLINIC | Age: 85
End: 2025-02-20
Payer: MEDICARE

## 2025-02-20 VITALS
RESPIRATION RATE: 18 BRPM | HEART RATE: 84 BPM | DIASTOLIC BLOOD PRESSURE: 66 MMHG | SYSTOLIC BLOOD PRESSURE: 108 MMHG | OXYGEN SATURATION: 96 % | TEMPERATURE: 97.4 F

## 2025-02-20 DIAGNOSIS — C79.51 CARCINOMA OF RIGHT BREAST METASTATIC TO BONE (MULTI): ICD-10-CM

## 2025-02-20 DIAGNOSIS — C50.911 CARCINOMA OF RIGHT BREAST METASTATIC TO BONE (MULTI): ICD-10-CM

## 2025-02-20 LAB
ALBUMIN SERPL BCP-MCNC: 4.1 G/DL (ref 3.4–5)
ALP SERPL-CCNC: 44 U/L (ref 33–136)
ALT SERPL W P-5'-P-CCNC: 8 U/L (ref 7–45)
ANION GAP SERPL CALC-SCNC: 11 MMOL/L (ref 10–20)
AST SERPL W P-5'-P-CCNC: 15 U/L (ref 9–39)
BASOPHILS # BLD AUTO: 0.01 X10*3/UL (ref 0–0.1)
BASOPHILS NFR BLD AUTO: 0.4 %
BILIRUB SERPL-MCNC: 0.3 MG/DL (ref 0–1.2)
BUN SERPL-MCNC: 24 MG/DL (ref 6–23)
CALCIUM SERPL-MCNC: 9.1 MG/DL (ref 8.6–10.3)
CHLORIDE SERPL-SCNC: 103 MMOL/L (ref 98–107)
CO2 SERPL-SCNC: 27 MMOL/L (ref 21–32)
CREAT SERPL-MCNC: 1.22 MG/DL (ref 0.5–1.05)
EGFRCR SERPLBLD CKD-EPI 2021: 44 ML/MIN/1.73M*2
EOSINOPHIL # BLD AUTO: 0.06 X10*3/UL (ref 0–0.4)
EOSINOPHIL NFR BLD AUTO: 2.2 %
ERYTHROCYTE [DISTWIDTH] IN BLOOD BY AUTOMATED COUNT: 19.4 % (ref 11.5–14.5)
GIANT PLATELETS BLD QL SMEAR: NORMAL
GLUCOSE SERPL-MCNC: 138 MG/DL (ref 74–99)
HCT VFR BLD AUTO: 26.6 % (ref 36–46)
HGB BLD-MCNC: 8.3 G/DL (ref 12–16)
IMM GRANULOCYTES # BLD AUTO: 0 X10*3/UL (ref 0–0.5)
IMM GRANULOCYTES NFR BLD AUTO: 0 % (ref 0–0.9)
LYMPHOCYTES # BLD AUTO: 1.49 X10*3/UL (ref 0.8–3)
LYMPHOCYTES NFR BLD AUTO: 55.6 %
MCH RBC QN AUTO: 31.2 PG (ref 26–34)
MCHC RBC AUTO-ENTMCNC: 31.2 G/DL (ref 32–36)
MCV RBC AUTO: 100 FL (ref 80–100)
MONOCYTES # BLD AUTO: 0.2 X10*3/UL (ref 0.05–0.8)
MONOCYTES NFR BLD AUTO: 7.5 %
NEUTROPHILS # BLD AUTO: 0.92 X10*3/UL (ref 1.6–5.5)
NEUTROPHILS NFR BLD AUTO: 34.3 %
NRBC BLD-RTO: ABNORMAL /100{WBCS}
OVALOCYTES BLD QL SMEAR: NORMAL
PLATELET # BLD AUTO: 218 X10*3/UL (ref 150–450)
POTASSIUM SERPL-SCNC: 4.4 MMOL/L (ref 3.5–5.3)
PROT SERPL-MCNC: 6.4 G/DL (ref 6.4–8.2)
RBC # BLD AUTO: 2.66 X10*6/UL (ref 4–5.2)
RBC MORPH BLD: NORMAL
SODIUM SERPL-SCNC: 137 MMOL/L (ref 136–145)
WBC # BLD AUTO: 2.7 X10*3/UL (ref 4.4–11.3)

## 2025-02-20 PROCEDURE — 80053 COMPREHEN METABOLIC PANEL: CPT

## 2025-02-20 PROCEDURE — G0300 HHS/HOSPICE OF LPN EA 15 MIN: HCPCS | Mod: HHH

## 2025-02-20 PROCEDURE — 85025 COMPLETE CBC W/AUTO DIFF WBC: CPT

## 2025-02-20 PROCEDURE — 36415 COLL VENOUS BLD VENIPUNCTURE: CPT

## 2025-02-20 PROCEDURE — 86300 IMMUNOASSAY TUMOR CA 15-3: CPT

## 2025-02-20 NOTE — TELEPHONE ENCOUNTER
Called pt to inform her that medication was sent over but should come from pulmonology or oncology per PF. Pt aware

## 2025-02-21 ENCOUNTER — OFFICE VISIT (OUTPATIENT)
Dept: HEMATOLOGY/ONCOLOGY | Facility: CLINIC | Age: 85
End: 2025-02-21
Payer: MEDICARE

## 2025-02-21 ENCOUNTER — INFUSION (OUTPATIENT)
Dept: HEMATOLOGY/ONCOLOGY | Facility: CLINIC | Age: 85
End: 2025-02-21
Payer: MEDICARE

## 2025-02-21 VITALS
TEMPERATURE: 96.8 F | SYSTOLIC BLOOD PRESSURE: 120 MMHG | RESPIRATION RATE: 16 BRPM | OXYGEN SATURATION: 93 % | HEART RATE: 77 BPM | WEIGHT: 157.41 LBS | BODY MASS INDEX: 30.74 KG/M2 | DIASTOLIC BLOOD PRESSURE: 65 MMHG

## 2025-02-21 DIAGNOSIS — M17.11 PRIMARY OSTEOARTHRITIS OF RIGHT KNEE: ICD-10-CM

## 2025-02-21 DIAGNOSIS — E78.2 MIXED HYPERLIPIDEMIA: ICD-10-CM

## 2025-02-21 DIAGNOSIS — M89.9 LYTIC BONE LESIONS ON XRAY: ICD-10-CM

## 2025-02-21 DIAGNOSIS — K21.00 GASTROESOPHAGEAL REFLUX DISEASE WITH ESOPHAGITIS WITHOUT HEMORRHAGE: ICD-10-CM

## 2025-02-21 DIAGNOSIS — C50.911 CARCINOMA OF RIGHT BREAST METASTATIC TO BONE (MULTI): Primary | ICD-10-CM

## 2025-02-21 DIAGNOSIS — C79.51 CARCINOMA OF RIGHT BREAST METASTATIC TO BONE (MULTI): Primary | ICD-10-CM

## 2025-02-21 DIAGNOSIS — H40.9 GLAUCOMA OF BOTH EYES, UNSPECIFIED GLAUCOMA TYPE: ICD-10-CM

## 2025-02-21 DIAGNOSIS — C79.51 CARCINOMA OF RIGHT BREAST METASTATIC TO BONE (MULTI): ICD-10-CM

## 2025-02-21 DIAGNOSIS — I10 ESSENTIAL HYPERTENSION: ICD-10-CM

## 2025-02-21 DIAGNOSIS — C50.911 CARCINOMA OF RIGHT BREAST METASTATIC TO BONE (MULTI): ICD-10-CM

## 2025-02-21 DIAGNOSIS — D61.82 MYELOPHTHISIC ANEMIA (MULTI): ICD-10-CM

## 2025-02-21 LAB — CANCER AG27-29 SERPL-ACNC: 295 U/ML (ref 0–38.6)

## 2025-02-21 PROCEDURE — 1160F RVW MEDS BY RX/DR IN RCRD: CPT | Performed by: INTERNAL MEDICINE

## 2025-02-21 PROCEDURE — 1111F DSCHRG MED/CURRENT MED MERGE: CPT | Performed by: INTERNAL MEDICINE

## 2025-02-21 PROCEDURE — G2211 COMPLEX E/M VISIT ADD ON: HCPCS | Performed by: INTERNAL MEDICINE

## 2025-02-21 PROCEDURE — 3074F SYST BP LT 130 MM HG: CPT | Performed by: INTERNAL MEDICINE

## 2025-02-21 PROCEDURE — 1126F AMNT PAIN NOTED NONE PRSNT: CPT | Performed by: INTERNAL MEDICINE

## 2025-02-21 PROCEDURE — RXMED WILLOW AMBULATORY MEDICATION CHARGE

## 2025-02-21 PROCEDURE — 96402 CHEMO HORMON ANTINEOPL SQ/IM: CPT

## 2025-02-21 PROCEDURE — 1159F MED LIST DOCD IN RCRD: CPT | Performed by: INTERNAL MEDICINE

## 2025-02-21 PROCEDURE — 96372 THER/PROPH/DIAG INJ SC/IM: CPT

## 2025-02-21 PROCEDURE — 99214 OFFICE O/P EST MOD 30 MIN: CPT | Performed by: INTERNAL MEDICINE

## 2025-02-21 PROCEDURE — 2500000004 HC RX 250 GENERAL PHARMACY W/ HCPCS (ALT 636 FOR OP/ED): Mod: JZ,TB

## 2025-02-21 PROCEDURE — 3078F DIAST BP <80 MM HG: CPT | Performed by: INTERNAL MEDICINE

## 2025-02-21 RX ORDER — FAMOTIDINE 10 MG/ML
20 INJECTION, SOLUTION INTRAVENOUS ONCE AS NEEDED
Status: DISCONTINUED | OUTPATIENT
Start: 2025-02-21 | End: 2025-02-21 | Stop reason: HOSPADM

## 2025-02-21 RX ORDER — LAMOTRIGINE 25 MG/1
500 TABLET ORAL ONCE
OUTPATIENT
Start: 2025-03-21

## 2025-02-21 RX ORDER — DIPHENHYDRAMINE HYDROCHLORIDE 50 MG/ML
50 INJECTION INTRAMUSCULAR; INTRAVENOUS AS NEEDED
OUTPATIENT
Start: 2025-02-28

## 2025-02-21 RX ORDER — ALBUTEROL SULFATE 0.83 MG/ML
3 SOLUTION RESPIRATORY (INHALATION) AS NEEDED
OUTPATIENT
Start: 2025-03-21

## 2025-02-21 RX ORDER — LAMOTRIGINE 25 MG/1
500 TABLET ORAL ONCE
Status: COMPLETED | OUTPATIENT
Start: 2025-02-21 | End: 2025-02-21

## 2025-02-21 RX ORDER — DIPHENHYDRAMINE HYDROCHLORIDE 50 MG/ML
50 INJECTION INTRAMUSCULAR; INTRAVENOUS AS NEEDED
Status: DISCONTINUED | OUTPATIENT
Start: 2025-02-21 | End: 2025-02-21 | Stop reason: HOSPADM

## 2025-02-21 RX ORDER — EPINEPHRINE 0.3 MG/.3ML
0.3 INJECTION SUBCUTANEOUS EVERY 5 MIN PRN
Status: DISCONTINUED | OUTPATIENT
Start: 2025-02-21 | End: 2025-02-21 | Stop reason: HOSPADM

## 2025-02-21 RX ORDER — FAMOTIDINE 10 MG/ML
20 INJECTION, SOLUTION INTRAVENOUS ONCE AS NEEDED
OUTPATIENT
Start: 2025-03-21

## 2025-02-21 RX ORDER — ALBUTEROL SULFATE 0.83 MG/ML
3 SOLUTION RESPIRATORY (INHALATION) AS NEEDED
Status: DISCONTINUED | OUTPATIENT
Start: 2025-02-21 | End: 2025-02-21 | Stop reason: HOSPADM

## 2025-02-21 RX ORDER — DIPHENHYDRAMINE HYDROCHLORIDE 50 MG/ML
50 INJECTION INTRAMUSCULAR; INTRAVENOUS AS NEEDED
OUTPATIENT
Start: 2025-03-21

## 2025-02-21 RX ORDER — EPINEPHRINE 0.3 MG/.3ML
0.3 INJECTION SUBCUTANEOUS EVERY 5 MIN PRN
OUTPATIENT
Start: 2025-02-28

## 2025-02-21 RX ORDER — FAMOTIDINE 10 MG/ML
20 INJECTION, SOLUTION INTRAVENOUS ONCE AS NEEDED
OUTPATIENT
Start: 2025-02-28

## 2025-02-21 RX ORDER — ALBUTEROL SULFATE 0.83 MG/ML
3 SOLUTION RESPIRATORY (INHALATION) AS NEEDED
OUTPATIENT
Start: 2025-02-28

## 2025-02-21 RX ORDER — EPINEPHRINE 0.3 MG/.3ML
0.3 INJECTION SUBCUTANEOUS EVERY 5 MIN PRN
OUTPATIENT
Start: 2025-03-21

## 2025-02-21 RX ADMIN — DENOSUMAB 120 MG: 120 INJECTION SUBCUTANEOUS at 13:05

## 2025-02-21 RX ADMIN — FULVESTRANT 500 MG: 50 INJECTION, SOLUTION INTRAMUSCULAR at 13:05

## 2025-02-21 ASSESSMENT — PAIN SCALES - GENERAL: PAINLEVEL_OUTOF10: 0-NO PAIN

## 2025-02-21 NOTE — PROGRESS NOTES
Patient ID: Arabella Springer is a 84 y.o. female.  Referring Physician: Andrew Mcfarland MD  [id number]  [place] [address]  Primary Care Provider: Angel Luis Velazquez DO  Visit Type: Follow Up      Subjective    HPI I restarted the chemo pill a couple days ago  I found these 2 spots on my cheeks when I looked in the mirror    Review of Systems   Constitutional: Negative.    HENT:  Negative.     Eyes: Negative.    Respiratory:  Positive for shortness of breath.    Cardiovascular: Negative.    Gastrointestinal: Negative.    Endocrine: Negative.    Genitourinary: Negative.     Musculoskeletal:  Positive for arthralgias.   Skin: Negative.    Neurological:  Positive for extremity weakness.   Hematological: Negative.    Psychiatric/Behavioral: Negative.          Objective   BSA: 1.74 meters squared  /65 (BP Location: Left arm, Patient Position: Sitting, BP Cuff Size: Adult)   Pulse 77   Temp 36 °C (96.8 °F) (Temporal)   Resp 16   Wt 71.4 kg (157 lb 6.5 oz)   SpO2 93%   BMI 30.74 kg/m²      has a past medical history of Anxiety, Arthritis, Breast cancer (Multi), Breast cancer metastasized to bone (Multi), GERD (gastroesophageal reflux disease), Glaucoma, Hiatal hernia, Hyperlipidemia, and Hypertension.   has a past surgical history that includes Mastectomy complete / simple (Right); Hysterectomy; and Shoulder surgery.  Family History   Problem Relation Name Age of Onset    Colon cancer Sister      Breast cancer Daughter       Oncology History   Breast cancer metastasized to bone (Multi)   9/8/2023 - 11/10/2023 Chemotherapy    Pembrolizumab, 21 Day Cycles     9/29/2023 Initial Diagnosis    Breast cancer metastasized to bone (CMS/HCC)     12/27/2024 -  Chemotherapy    Inavolisib + Palbociclib + Fulvestrant, 28 Day Cycles         Arabella Springer  reports that she has never smoked. She has never used smokeless tobacco.  She  reports no history of alcohol use.  She  reports no history of drug  use.    Physical Exam  Vitals reviewed.   Constitutional:       Appearance: Normal appearance.   HENT:      Head: Normocephalic.      Mouth/Throat:      Mouth: Mucous membranes are moist.   Eyes:      Extraocular Movements: Extraocular movements intact.      Pupils: Pupils are equal, round, and reactive to light.   Cardiovascular:      Rate and Rhythm: Normal rate and regular rhythm.      Pulses: Normal pulses.      Heart sounds: Normal heart sounds.   Pulmonary:      Effort: Pulmonary effort is normal.      Breath sounds: Normal breath sounds.   Abdominal:      General: Bowel sounds are normal.      Palpations: Abdomen is soft.   Musculoskeletal:         General: Normal range of motion.      Cervical back: Normal range of motion and neck supple.   Skin:     General: Skin is warm.   Neurological:      General: No focal deficit present.      Mental Status: She is alert and oriented to person, place, and time.   Psychiatric:         Mood and Affect: Mood normal.         Behavior: Behavior normal.         WBC   Date/Time Value Ref Range Status   02/20/2025 02:35 PM 2.7 (L) 4.4 - 11.3 x10*3/uL Final   02/07/2025 06:33 AM 2.7 (L) 4.4 - 11.3 x10*3/uL Final   02/06/2025 06:04 AM 2.9 (L) 4.4 - 11.3 x10*3/uL Final     WHITE BLOOD CELL COUNT   Date/Time Value Ref Range Status   02/12/2025 10:34 AM 2.8 (L) 3.8 - 10.8 Thousand/uL Final     nRBC   Date Value Ref Range Status   02/20/2025   Final     Comment:     Not Measured   02/07/2025 0.0 0.0 - 0.0 /100 WBCs Final   02/06/2025 0.0 0.0 - 0.0 /100 WBCs Final     RBC   Date Value Ref Range Status   02/20/2025 2.66 (L) 4.00 - 5.20 x10*6/uL Final   02/07/2025 2.46 (L) 4.00 - 5.20 x10*6/uL Final   02/06/2025 2.38 (L) 4.00 - 5.20 x10*6/uL Final     RED BLOOD CELL COUNT   Date Value Ref Range Status   02/12/2025 2.74 (L) 3.80 - 5.10 Million/uL Final     Hemoglobin   Date Value Ref Range Status   02/20/2025 8.3 (L) 12.0 - 16.0 g/dL Final   02/07/2025 7.5 (L) 12.0 - 16.0 g/dL Final    02/06/2025 7.1 (L) 12.0 - 16.0 g/dL Final     HEMOGLOBIN   Date Value Ref Range Status   02/12/2025 8.5 (L) 11.7 - 15.5 g/dL Final     Hematocrit   Date Value Ref Range Status   02/20/2025 26.6 (L) 36.0 - 46.0 % Final   02/07/2025 24.0 (L) 36.0 - 46.0 % Final   02/06/2025 23.5 (L) 36.0 - 46.0 % Final     HEMATOCRIT   Date Value Ref Range Status   02/12/2025 27.0 (L) 35.0 - 45.0 % Final     MCV   Date/Time Value Ref Range Status   02/20/2025 02:35  80 - 100 fL Final   02/12/2025 10:34 AM 98.5 80.0 - 100.0 fL Final   02/07/2025 06:33 AM 98 80 - 100 fL Final   02/06/2025 06:04 AM 99 80 - 100 fL Final     MCH   Date/Time Value Ref Range Status   02/20/2025 02:35 PM 31.2 26.0 - 34.0 pg Final   02/12/2025 10:34 AM 31.0 27.0 - 33.0 pg Final   02/07/2025 06:33 AM 30.5 26.0 - 34.0 pg Final   02/06/2025 06:04 AM 29.8 26.0 - 34.0 pg Final     MCHC   Date/Time Value Ref Range Status   02/20/2025 02:35 PM 31.2 (L) 32.0 - 36.0 g/dL Final   02/12/2025 10:34 AM 31.5 (L) 32.0 - 36.0 g/dL Final     Comment:     For adults, a slight decrease in the calculated MCHC  value (in the range of 30 to 32 g/dL) is most likely  not clinically significant; however, it should be  interpreted with caution in correlation with other  red cell parameters and the patient's clinical  condition.     02/07/2025 06:33 AM 31.3 (L) 32.0 - 36.0 g/dL Final   02/06/2025 06:04 AM 30.2 (L) 32.0 - 36.0 g/dL Final     RDW   Date/Time Value Ref Range Status   02/20/2025 02:35 PM 19.4 (H) 11.5 - 14.5 % Final   02/12/2025 10:34 AM 16.9 (H) 11.0 - 15.0 % Final   02/07/2025 06:33 AM 17.3 (H) 11.5 - 14.5 % Final   02/06/2025 06:04 AM 17.3 (H) 11.5 - 14.5 % Final     Platelets   Date/Time Value Ref Range Status   02/20/2025 02:35  150 - 450 x10*3/uL Final   02/07/2025 06:33  150 - 450 x10*3/uL Final   02/06/2025 06:04  (L) 150 - 450 x10*3/uL Final     PLATELET COUNT   Date/Time Value Ref Range Status   02/12/2025 10:34  140 - 400  Thousand/uL Final     MPV   Date/Time Value Ref Range Status   02/12/2025 10:34 AM 9.8 7.5 - 12.5 fL Final   10/19/2023 08:01 AM 9.2 7.5 - 11.5 fL Final     Neutrophils %   Date/Time Value Ref Range Status   02/20/2025 02:35 PM 34.3 40.0 - 80.0 % Final   02/07/2025 06:33 AM 32.4 40.0 - 80.0 % Final   02/05/2025 04:03 PM 28.4 40.0 - 80.0 % Final     Immature Granulocytes %, Automated   Date/Time Value Ref Range Status   02/20/2025 02:35 PM 0.0 0.0 - 0.9 % Final     Comment:     Immature Granulocyte Count (IG) includes promyelocytes, myelocytes and metamyelocytes but does not include bands. Percent differential counts (%) should be interpreted in the context of the absolute cell counts (cells/UL).   02/07/2025 06:33 AM 0.7 0.0 - 0.9 % Final     Comment:     Immature Granulocyte Count (IG) includes promyelocytes, myelocytes and metamyelocytes but does not include bands. Percent differential counts (%) should be interpreted in the context of the absolute cell counts (cells/UL).   02/05/2025 04:03 PM 0.6 0.0 - 0.9 % Final     Comment:     Immature Granulocyte Count (IG) includes promyelocytes, myelocytes and metamyelocytes but does not include bands. Percent differential counts (%) should be interpreted in the context of the absolute cell counts (cells/UL).     Lymphocytes %   Date/Time Value Ref Range Status   02/20/2025 02:35 PM 55.6 13.0 - 44.0 % Final   02/07/2025 06:33 AM 50.9 13.0 - 44.0 % Final   02/05/2025 04:03 PM 51.7 13.0 - 44.0 % Final     Monocytes %   Date/Time Value Ref Range Status   02/20/2025 02:35 PM 7.5 2.0 - 10.0 % Final   02/07/2025 06:33 AM 12.7 2.0 - 10.0 % Final   02/05/2025 04:03 PM 15.2 2.0 - 10.0 % Final     Eosinophils %   Date/Time Value Ref Range Status   02/20/2025 02:35 PM 2.2 0.0 - 6.0 % Final   02/07/2025 06:33 AM 2.6 0.0 - 6.0 % Final   02/05/2025 04:03 PM 3.2 0.0 - 6.0 % Final     Basophils %   Date/Time Value Ref Range Status   02/20/2025 02:35 PM 0.4 0.0 - 2.0 % Final   02/07/2025  "06:33 AM 0.7 0.0 - 2.0 % Final   02/05/2025 04:03 PM 0.9 0.0 - 2.0 % Final     Neutrophils Absolute   Date/Time Value Ref Range Status   02/20/2025 02:35 PM 0.92 (L) 1.60 - 5.50 x10*3/uL Final     Comment:     Percent differential counts (%) should be interpreted in the context of the absolute cell counts (cells/uL).   02/07/2025 06:33 AM 0.86 (L) 1.60 - 5.50 x10*3/uL Final     Comment:     Percent differential counts (%) should be interpreted in the context of the absolute cell counts (cells/uL).   02/05/2025 04:03 PM 0.99 (L) 1.60 - 5.50 x10*3/uL Final     Comment:     Percent differential counts (%) should be interpreted in the context of the absolute cell counts (cells/uL).     Immature Granulocytes Absolute, Automated   Date/Time Value Ref Range Status   02/20/2025 02:35 PM 0.00 0.00 - 0.50 x10*3/uL Final   02/07/2025 06:33 AM 0.02 0.00 - 0.50 x10*3/uL Final   02/05/2025 04:03 PM 0.02 0.00 - 0.50 x10*3/uL Final     Lymphocytes Absolute   Date/Time Value Ref Range Status   02/20/2025 02:35 PM 1.49 0.80 - 3.00 x10*3/uL Final   02/07/2025 06:33 AM 1.36 0.80 - 3.00 x10*3/uL Final   02/05/2025 04:03 PM 1.80 0.80 - 3.00 x10*3/uL Final     Monocytes Absolute   Date/Time Value Ref Range Status   02/20/2025 02:35 PM 0.20 0.05 - 0.80 x10*3/uL Final   02/07/2025 06:33 AM 0.34 0.05 - 0.80 x10*3/uL Final   02/05/2025 04:03 PM 0.53 0.05 - 0.80 x10*3/uL Final     Eosinophils Absolute   Date/Time Value Ref Range Status   02/20/2025 02:35 PM 0.06 0.00 - 0.40 x10*3/uL Final   02/07/2025 06:33 AM 0.07 0.00 - 0.40 x10*3/uL Final   02/05/2025 04:03 PM 0.11 0.00 - 0.40 x10*3/uL Final     Basophils Absolute   Date/Time Value Ref Range Status   02/20/2025 02:35 PM 0.01 0.00 - 0.10 x10*3/uL Final     Comment:     Automated WBC differential has been confirmed by manual smear.   02/07/2025 06:33 AM 0.02 0.00 - 0.10 x10*3/uL Final   02/05/2025 04:03 PM 0.03 0.00 - 0.10 x10*3/uL Final       No components found for: \"PT\"  No results found " "for: \"APTT\"  Medication Documentation Review Audit       Reviewed by Akosua Candelario MA (Medical Assistant) on 02/21/25 at 1135      Medication Order Taking? Sig Documenting Provider Last Dose Status   acetaminophen (Tylenol) 500 mg tablet [id number] Yes Take 2 tablets (1,000 mg) by mouth 3 times a day as needed for mild pain (1 - 3) or moderate pain (4 - 6). Tika Hackett MD Past Month Active   albuterol (ProAir HFA) 90 mcg/actuation inhaler [id number] Yes Inhale 2 puffs every 4 hours if needed for wheezing or shortness of breath. Naina Pisano, DO Past Month Active   beclomethasone HFA (Qvar) 40 mcg/actuation inhaler [id number] Yes Inhale 1 Inhalation 2 times a day. Tika Hackett MD 2/5/2025 Morning Active   benzocaine 20 % mucosal gel [id number] Yes Use in the mouth or throat 4 times a day as needed (Lip sores). Makenzie Ba,  Past Month Active   Blood glucose monitoring meter [id number] Yes Test two times daily. DEX Bolton  Active   blood sugar diagnostic strip [id number] Yes 1 each 2 times a day. DEX Bolton  Active   brimonidine-timoloL (Combigan) 0.2-0.5 % ophthalmic solution [id number] Yes Administer 1 drop into both eyes every 12 hours. Tika Hackett MD 2/5/2025 Morning Active   calcium carbonate-vitamin D3 600 mg-10 mcg (400 unit) tablet [id number] Yes Take 3 tablets by mouth once daily. Tika Hackett MD 2/5/2025 Morning Active   denosumab (Xgeva) 120 mg/1.7 mL (70 mg/mL) injection [id number] Yes Inject 1.7 mL (120 mg) under the skin every 30 (thirty) days. Tika Provider, MD Past Month Active   dexAMETHasone 0.5 mg/5 mL oral liquid [id number] Yes Take 10 mL (1 mg) by mouth 4 times a day. Swish 10 mL of solution for two minutes and spit, four times daily. Do not to eat for one hour after using the mouthwash. DEX Bolton 2/5/2025 Noon Active   diclofenac (Voltaren) 50 mg EC tablet [id number] No TAKE 1 TABLET(50 MG) BY MOUTH TWICE " DAILY WITH MEALS DEX Cheng 2/5/2025 Morning Active   esomeprazole (NexIUM) 40 mg DR capsule [id number] Yes Take 1 capsule (40 mg) by mouth once daily in the morning. Take before meals. Historical Provider, MD 2/5/2025 Morning Active     Discontinued 02/18/25 1650   furosemide (Lasix) 20 mg tablet [id number] Yes Take 1 tablet (20 mg) by mouth once daily. Angel Luis Velazquez,   Active   inavolisib 3 mg tablet [id number] Yes Take 3 mg by mouth once daily for 28 days. Do not fill before February 7, 2025. ROCHELLE Bolton-CNP  Active   inhalat.spacing dev,large mask (Pro Comfort Spacer-Adult Mask) spacer [id number] Yes 1 each every 4 hours if needed (shortness of breath, wheezing, cough). Naina Pisano, DO Unknown Active   isopropyl alcohoL 70 % towelette [id number] Yes Test 2 times a day, clean finger prior to testing ROCHELLE Bolton-CNP  Active   LORazepam (Ativan) 0.5 mg tablet [id number] Yes Take 1 tablet (0.5 mg) by mouth every 2 hours if needed for anxiety (anxiety related to radiology tests) for up to 5 doses. DEX Bolton Past Month Active   losartan-hydrochlorothiazide (Hyzaar) 100-12.5 mg tablet [id number] Yes Take 1 tablet by mouth once daily. Abraham Mohan,  2/5/2025 Morning Active   mometasone (Elocon) 0.1 % lotion [id number] Yes Apply topically 2 times a day. Jimmie Nam MD 2/5/2025 Morning Active   multivitamin tablet [id number] Yes Take 1 tablet by mouth once daily. Historical Provider, MD 2/5/2025 Morning Active   nystatin (Mycostatin) 100,000 unit/gram powder [id number] Yes Apply 1 Application topically 2 times a day. Makenzie Ba DO 2/5/2025 Morning Active   ondansetron (Zofran) 8 mg tablet [id number] Yes Take 1 tablet (8 mg) by mouth every 8 hours if needed for nausea or vomiting. Andrew Mcfarland MD Past Month Active   palbociclib (Ibrance) 125 mg tablet [id number] Yes Take 125 mg (1 tablet) by mouth once daily for three weeks, then one week off.  Swallow  whole.  Do not crush, chew or split. Andrew Mcfarland MD 2/5/2025 Morning Active   pravastatin (Pravachol) 10 mg tablet [id number] Yes Take 1 tablet (10 mg) by mouth once daily. Abraham Mohan, DO 2/5/2025 Morning Active   prochlorperazine (Compazine) 10 mg tablet [id number] Yes Take 1 tablet (10 mg) by mouth every 6 hours if needed for nausea or vomiting. Andrew Mcfarland MD Past Month Active   Rhopressa 0.02 % drops opthalmic solution [id number] Yes Administer 1 drop into the right eye once daily at bedtime. Historical Provider, MD 2/4/2025 Bedtime Active   triamcinolone (Kenalog) 0.1 % cream [id number] Yes Apply topically 2 times a day. 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  -now off steroids  -CT scan done on 4/3/2024 reviewed: interval worsening of bilateral pleural effusions, right >left; multiple scattered <0.5 mm pulmonary nodules bilateral lungs not changed; interval development of several branching opacities involving left lung apex; there are no new worrisome hepatic lesions; mild right sided hydronephrosis; diffuse sclerosis of axial and appendicular skeleton  -has been more short of breath recently--was evaluated in the ER at [facility] on 8/4--was told she had bilateral pleural effusions, which are not actually new  -right sided pleural effusion is already seen on PET scan done in 8/2023  -she had an echo done in June which was  read as normal, [facility] did not perform a new one, cardiologist on 8/21 told the patient that she did not have CHF and that these effusions were likely malignant; she was started on low dose lasix which barely helped with the leg edema--and so dose was bumped up; she was recommended by pulmonology consult to undergo thoracentesis and she declined to have it done  -since effusions are not new, and actually were already noted over one year ago, a normal echo does NOT completely rule out well-compensated CHF  -PET done on 8/16/2024 reviewed--no concerning pulmonary nodule; moderate-to-large bilateral pleura effusions, right greater than left; no FDG avid mediastinal, hilar or axillary lymphadenopathy; s/p right axillary ike dissection; s/p right mastectomy and reconstruction; no FDG avid lymphadenopathy in the abdomen and pelvis; bilateral hydronephrosis; interval improvement of FDG avid widespread bone metastases throughout the axial and appendicular skeleton many of which are non FDG avid ; residual disease noted in bilateral femur (SUV 3.6), pelvis (SUV 2.4), bilateral humerus (SUV 3.3), bilateral clavicles (SUV 2/9)  -there is no FDG activity in her thorax/pleura, making the effusions less likely to be of malignant origin, and given bilateral nature--more likely cardiac in origin  -she had thoracentesis done on 9/24/2024--with removal of 700 ml yellow fluid; cytology was negative  -here for interval followup  -CT scan done on 12/9/2024 reviewed--moderate bilateral pleural effusions with atelectasis, right greater than left; no concerning lung nodule; no mediastinal, hilar or axillary lymphadenopathy; postsurgical changes of right mastectomy with right breast implants;  moderate hydronephrosis left greater than right increased from prior exam; small to moderate volume ascites; new peritoneal nodular thickening (33 mm peritoneal implant in anterior deep pelvis); additional areas of peritoneal nodular thickening and  enhancement in deep pelvis; no abdominopelvic lymphadenopathy; similar appearance of diffuse sclerotic lesions throughout the axial and appendicular skeleton; chronic bilateral rib fractures are noted; plate and screw fixation of the right humerus  -bone scan done on 12/9/2024 reviewed similar appearance of diffusely increased radiotracer uptake throughout the axial and appendicular skeleton corresponding with widespread sclerotic osseous lesions on CT  --labs done on 12/11/2024 included CBC, COMP, CA 27.29  -results reviewed--wbc 4.6, hgb 9.1 plt 211,000, creatinine 1.04, calcium 9.0, albumin 4.1 AST 18, ALT 12  CA 27.29 295  -benefits, risks, potential morbidity related to xgeva were reviewed with Arabella and she provided informed consent to proceed  -she went on to receive xgeva 120 mg subcutaneous  -as there is now evidence of progression of disease (new peritoneal metastases), she is failing AI, and I have recommended therapeutic switch  -since her tumor has the PIK3CA mutation, I have advised switching to faslodex + palbociclib + Inavolisib  -benefits, risks, potential morbidity related to faslodex + palbociclib + inavolisib were reviewed with Arabella and she signed informed consent to proceed  -on days 1 and 15 (and then Q28 days) she will receive faslodex 500 mg intramuscular  -she will also take palbociclib 125 mg PO once daily for 21 days on then 7 days off  -she will also take inavolisib 9 mg PO once daily  -here for interval followup  -shortly after starting the new combination, she was admitted to [place] from 1/21 to 1/23/2025 for mucositis and electrolyte abnormalities  -inavolisib was held  -she was then readmitted to [place] from 2/5 to 2/72025 for shortness of breath, thoracentesis was done on the left side with removal of 750 ml of fluid but none was sent for cytology  -her mucositis eventually recovered  -she received inavoliisb 3 mg in hand a couple days ago and started it; mucositis has not  returned but this morning she noticed in the mirror 2 maculopapular lesions--one on right cheek, one near left nares  -she was at physical therapy a couple weeks ago--and the therapist noted a slight asymmetry in her ability to rotate her ankles and asked Arabella if she has ever had a stroke--which quite alarmed her  -labs done on 2/20/2025 included CBC + COMP + CA 27.29  -results reviewed--creatinine 1.22, calcium 9.1, alk phos 44, AST 15, ALT 8, wbc 2.66, hgb 8.3, plt 218,000, , CA 27.29 295  --benefits, risks, potential morbidity related to faslodex were reviewed with Arabella and she provided informed consent to proceed  -she will receive faslodex 500 mg intramuscular today  -benefits, risks, potential morbidity related to xgeva were reviewed with Arabella and she provided informed consent to proceed  -she will receive xgeva 120 mg subcutaneous today  -she will continue to take palbociclib 125 mg PO once daily for 21 days on then 7 days off  -she will continue to take inavolisib 3 mg PO once daily  -will follow her very closely for now--Q2 weeks  -she will return in 4 weeks for next dose faslodex + xgeva     2) bone metastases  -secondary to metastatic breast cancer  -receives xgeva Q4 weeks     3) anemia  -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  -hgb slowly improving     4) hyperlipidemia  -on pravastatin     5) arthritis  -on tylenol PRN  -on diclofenac PRN  -received steroid injections into her knees and she feels so much better, so she wants to receive them again     6) hypertension  -on amlodipine  -on losartan-HCTZ     7) GERD  -on nexium     8) glaucoma  -on combigan eyedrops        Problem List Items Addressed This Visit             ICD-10-CM    Breast cancer metastasized to bone (Multi) C50.919, C79.51            Andrew Mcfarland MD

## 2025-02-22 LAB
ATRIAL RATE: 100 BPM
P AXIS: 56 DEGREES
P OFFSET: 194 MS
P ONSET: 143 MS
PR INTERVAL: 150 MS
Q ONSET: 218 MS
QRS COUNT: 16 BEATS
QRS DURATION: 76 MS
QT INTERVAL: 330 MS
QTC CALCULATION(BAZETT): 425 MS
QTC FREDERICIA: 391 MS
R AXIS: 44 DEGREES
T AXIS: 53 DEGREES
T OFFSET: 383 MS
VENTRICULAR RATE: 100 BPM

## 2025-02-22 ASSESSMENT — PAIN SCALES - PAIN ASSESSMENT IN ADVANCED DEMENTIA (PAINAD)
TOTALSCORE: 0
BODYLANGUAGE: 0 - RELAXED.
FACIALEXPRESSION: 0 - SMILING OR INEXPRESSIVE.
NEGVOCALIZATION: 0
FACIALEXPRESSION: 0
BODYLANGUAGE: 0
CONSOLABILITY: 0 - NO NEED TO CONSOLE.
NEGVOCALIZATION: 0 - NONE.
CONSOLABILITY: 0
BREATHING: 0

## 2025-02-22 ASSESSMENT — ENCOUNTER SYMPTOMS
ENDOCRINE NEGATIVE: 1
GASTROINTESTINAL NEGATIVE: 1
DENIES PAIN: 1
EXTREMITY WEAKNESS: 1
HEMATOLOGIC/LYMPHATIC NEGATIVE: 1
EYES NEGATIVE: 1
OCCASIONAL FEELINGS OF UNSTEADINESS: 0
CONSTITUTIONAL NEGATIVE: 1
SHORTNESS OF BREATH: 1
PSYCHIATRIC NEGATIVE: 1
CARDIOVASCULAR NEGATIVE: 1
ARTHRALGIAS: 1
PERSON REPORTING PAIN: PATIENT

## 2025-02-24 ENCOUNTER — PATIENT OUTREACH (OUTPATIENT)
Dept: PRIMARY CARE | Facility: CLINIC | Age: 85
End: 2025-02-24
Payer: MEDICARE

## 2025-02-24 NOTE — PROGRESS NOTES
Call regarding appt. with PCP on 2/12/25 after hospitalization.  At time of outreach call the patient feels as if their condition has improved since last visit.  Reviewed the PCP appointment with the pt and addressed any questions or concerns.  Has been following up with Dr Mcfarland and had labs.

## 2025-02-25 ENCOUNTER — SPECIALTY PHARMACY (OUTPATIENT)
Dept: PHARMACY | Facility: CLINIC | Age: 85
End: 2025-02-25

## 2025-02-27 ENCOUNTER — HOME CARE VISIT (OUTPATIENT)
Dept: HOME HEALTH SERVICES | Facility: HOME HEALTH | Age: 85
End: 2025-02-27
Payer: MEDICARE

## 2025-02-27 ENCOUNTER — APPOINTMENT (OUTPATIENT)
Dept: HOME HEALTH SERVICES | Facility: HOME HEALTH | Age: 85
End: 2025-02-27
Payer: MEDICARE

## 2025-02-27 ENCOUNTER — PHARMACY VISIT (OUTPATIENT)
Dept: PHARMACY | Facility: CLINIC | Age: 85
End: 2025-02-27
Payer: COMMERCIAL

## 2025-02-27 VITALS
RESPIRATION RATE: 20 BRPM | HEART RATE: 77 BPM | SYSTOLIC BLOOD PRESSURE: 118 MMHG | OXYGEN SATURATION: 98 % | DIASTOLIC BLOOD PRESSURE: 58 MMHG

## 2025-02-27 PROCEDURE — G0300 HHS/HOSPICE OF LPN EA 15 MIN: HCPCS | Mod: HHH

## 2025-02-27 ASSESSMENT — ENCOUNTER SYMPTOMS
DENIES PAIN: 1
PERSON REPORTING PAIN: PATIENT
MUSCLE WEAKNESS: 1
DYSPNEA ACTIVITY LEVEL: AFTER AMBULATING 10 - 20 FT
SHORTNESS OF BREATH: 1
APPETITE LEVEL: GOOD
FATIGUES EASILY: 1
CHANGE IN APPETITE: UNCHANGED

## 2025-03-03 ENCOUNTER — APPOINTMENT (OUTPATIENT)
Dept: OTOLARYNGOLOGY | Facility: CLINIC | Age: 85
End: 2025-03-03
Payer: MEDICARE

## 2025-03-03 DIAGNOSIS — H60.8X3 OTHER NONINFECTIOUS CHRONIC OTITIS EXTERNA OF BOTH EARS: Primary | ICD-10-CM

## 2025-03-03 DIAGNOSIS — I50.33 ACUTE ON CHRONIC HEART FAILURE WITH PRESERVED EJECTION FRACTION (HFPEF): Primary | ICD-10-CM

## 2025-03-03 PROCEDURE — 99212 OFFICE O/P EST SF 10 MIN: CPT | Performed by: OTOLARYNGOLOGY

## 2025-03-03 PROCEDURE — 1111F DSCHRG MED/CURRENT MED MERGE: CPT | Performed by: OTOLARYNGOLOGY

## 2025-03-03 NOTE — PROGRESS NOTES
Patient returns.  Seeing her back today follow-up check on her chronic otitis externa.  She is off the chemotherapy agent that induced this chronic response.  She is doing much better.  She is on different chemotherapy which is causing different issues but thankfully ears have been spared.  There have been no significant changes in past medical or past surgical histories except as mentioned.    Exam:  No acute distress.  The external ear structures appear normal. The ear canals patent and the tympanic membranes are intact without evidence of air-fluid levels, retraction, or congenital defects.  Anterior rhinoscopy notes essentially a midline nasal septum. Examination is noted for normal healthy mucosal membranes without any evidence of lesions, polyps, or exudate. The tongue is normally mobile. There are no lesions on the gingiva, buccal, or oral mucosa. There are no oral cavity masses.  The neck is negative for mass lymphadenopathy. The trachea and parotid are clear. The thyroid bed is grossly unremarkable. The salivary gland structures are grossly unremarkable.    Assessment and plan:  Dramatic improvement in chronic otitis externa.  Favor observation.  Recheck with me as needed.  All questions were answered in this regard accordingly.

## 2025-03-06 ENCOUNTER — LAB (OUTPATIENT)
Dept: LAB | Facility: CLINIC | Age: 85
End: 2025-03-06
Payer: MEDICARE

## 2025-03-06 ENCOUNTER — HOME CARE VISIT (OUTPATIENT)
Dept: HOME HEALTH SERVICES | Facility: HOME HEALTH | Age: 85
End: 2025-03-06
Payer: MEDICARE

## 2025-03-06 VITALS
OXYGEN SATURATION: 98 % | TEMPERATURE: 97.4 F | SYSTOLIC BLOOD PRESSURE: 106 MMHG | HEART RATE: 70 BPM | DIASTOLIC BLOOD PRESSURE: 50 MMHG | RESPIRATION RATE: 18 BRPM

## 2025-03-06 DIAGNOSIS — C79.51 CARCINOMA OF RIGHT BREAST METASTATIC TO BONE (MULTI): ICD-10-CM

## 2025-03-06 DIAGNOSIS — M89.9 LYTIC BONE LESIONS ON XRAY: ICD-10-CM

## 2025-03-06 DIAGNOSIS — C50.911 CARCINOMA OF RIGHT BREAST METASTATIC TO BONE (MULTI): ICD-10-CM

## 2025-03-06 LAB
ALBUMIN SERPL BCP-MCNC: 3.9 G/DL (ref 3.4–5)
ALP SERPL-CCNC: 40 U/L (ref 33–136)
ALT SERPL W P-5'-P-CCNC: 7 U/L (ref 7–45)
ANION GAP SERPL CALC-SCNC: 11 MMOL/L (ref 10–20)
AST SERPL W P-5'-P-CCNC: 14 U/L (ref 9–39)
BASOPHILS # BLD AUTO: 0.03 X10*3/UL (ref 0–0.1)
BASOPHILS NFR BLD AUTO: 0.7 %
BILIRUB SERPL-MCNC: 0.2 MG/DL (ref 0–1.2)
BUN SERPL-MCNC: 20 MG/DL (ref 6–23)
CALCIUM SERPL-MCNC: 8.5 MG/DL (ref 8.6–10.3)
CHLORIDE SERPL-SCNC: 103 MMOL/L (ref 98–107)
CO2 SERPL-SCNC: 28 MMOL/L (ref 21–32)
CREAT SERPL-MCNC: 1.27 MG/DL (ref 0.5–1.05)
EGFRCR SERPLBLD CKD-EPI 2021: 42 ML/MIN/1.73M*2
EOSINOPHIL # BLD AUTO: 0.05 X10*3/UL (ref 0–0.4)
EOSINOPHIL NFR BLD AUTO: 1.2 %
ERYTHROCYTE [DISTWIDTH] IN BLOOD BY AUTOMATED COUNT: 21.5 % (ref 11.5–14.5)
GIANT PLATELETS BLD QL SMEAR: NORMAL
GLUCOSE SERPL-MCNC: 134 MG/DL (ref 74–99)
HCT VFR BLD AUTO: 26.2 % (ref 36–46)
HGB BLD-MCNC: 8.2 G/DL (ref 12–16)
IMM GRANULOCYTES # BLD AUTO: 0.03 X10*3/UL (ref 0–0.5)
IMM GRANULOCYTES NFR BLD AUTO: 0.7 % (ref 0–0.9)
LYMPHOCYTES # BLD AUTO: 2.2 X10*3/UL (ref 0.8–3)
LYMPHOCYTES NFR BLD AUTO: 54.9 %
MCH RBC QN AUTO: 32.4 PG (ref 26–34)
MCHC RBC AUTO-ENTMCNC: 31.3 G/DL (ref 32–36)
MCV RBC AUTO: 104 FL (ref 80–100)
MONOCYTES # BLD AUTO: 0.53 X10*3/UL (ref 0.05–0.8)
MONOCYTES NFR BLD AUTO: 13.2 %
NEUTROPHILS # BLD AUTO: 1.17 X10*3/UL (ref 1.6–5.5)
NEUTROPHILS NFR BLD AUTO: 29.3 %
NRBC BLD-RTO: ABNORMAL /100{WBCS}
PLATELET # BLD AUTO: 152 X10*3/UL (ref 150–450)
POTASSIUM SERPL-SCNC: 4.2 MMOL/L (ref 3.5–5.3)
PROT SERPL-MCNC: 6 G/DL (ref 6.4–8.2)
RBC # BLD AUTO: 2.53 X10*6/UL (ref 4–5.2)
RBC MORPH BLD: NORMAL
SODIUM SERPL-SCNC: 138 MMOL/L (ref 136–145)
WBC # BLD AUTO: 4 X10*3/UL (ref 4.4–11.3)

## 2025-03-06 PROCEDURE — G0300 HHS/HOSPICE OF LPN EA 15 MIN: HCPCS | Mod: HHH

## 2025-03-06 PROCEDURE — 85025 COMPLETE CBC W/AUTO DIFF WBC: CPT

## 2025-03-06 PROCEDURE — 86300 IMMUNOASSAY TUMOR CA 15-3: CPT

## 2025-03-06 PROCEDURE — 84075 ASSAY ALKALINE PHOSPHATASE: CPT

## 2025-03-06 PROCEDURE — 36415 COLL VENOUS BLD VENIPUNCTURE: CPT

## 2025-03-06 ASSESSMENT — ENCOUNTER SYMPTOMS
OCCASIONAL FEELINGS OF UNSTEADINESS: 0
DENIES PAIN: 1

## 2025-03-06 NOTE — HOME HEALTH
SN visit completed. VSS. Denies pain. Wound care provided per CG prior to visit. Pt states she has diarrhea again from medication. Educated on foods to decrease diarrhea and what can cause it. Pt understands. No concerns.

## 2025-03-07 ENCOUNTER — OFFICE VISIT (OUTPATIENT)
Dept: HEMATOLOGY/ONCOLOGY | Facility: CLINIC | Age: 85
End: 2025-03-07
Payer: MEDICARE

## 2025-03-07 ENCOUNTER — APPOINTMENT (OUTPATIENT)
Dept: HEMATOLOGY/ONCOLOGY | Facility: CLINIC | Age: 85
End: 2025-03-07
Payer: MEDICARE

## 2025-03-07 VITALS
HEART RATE: 80 BPM | RESPIRATION RATE: 16 BRPM | BODY MASS INDEX: 31.17 KG/M2 | WEIGHT: 159.61 LBS | TEMPERATURE: 97.2 F | OXYGEN SATURATION: 96 % | SYSTOLIC BLOOD PRESSURE: 136 MMHG | DIASTOLIC BLOOD PRESSURE: 59 MMHG

## 2025-03-07 DIAGNOSIS — M19.90 ARTHRITIS: ICD-10-CM

## 2025-03-07 DIAGNOSIS — Z15.09 MONOALLELIC MUTATION OF CHEK2 GENE IN FEMALE PATIENT: ICD-10-CM

## 2025-03-07 DIAGNOSIS — C79.51 CARCINOMA OF RIGHT BREAST METASTATIC TO BONE (MULTI): Primary | ICD-10-CM

## 2025-03-07 DIAGNOSIS — M89.9 LYTIC BONE LESIONS ON XRAY: ICD-10-CM

## 2025-03-07 DIAGNOSIS — Z15.01 MONOALLELIC MUTATION OF CHEK2 GENE IN FEMALE PATIENT: ICD-10-CM

## 2025-03-07 DIAGNOSIS — H40.9 GLAUCOMA OF BOTH EYES, UNSPECIFIED GLAUCOMA TYPE: ICD-10-CM

## 2025-03-07 DIAGNOSIS — Z15.89 MONOALLELIC MUTATION OF CHEK2 GENE IN FEMALE PATIENT: ICD-10-CM

## 2025-03-07 DIAGNOSIS — E78.2 MIXED HYPERLIPIDEMIA: ICD-10-CM

## 2025-03-07 DIAGNOSIS — D61.82 MYELOPHTHISIC ANEMIA (MULTI): ICD-10-CM

## 2025-03-07 DIAGNOSIS — Z15.02 MONOALLELIC MUTATION OF CHEK2 GENE IN FEMALE PATIENT: ICD-10-CM

## 2025-03-07 DIAGNOSIS — I10 ESSENTIAL HYPERTENSION: ICD-10-CM

## 2025-03-07 DIAGNOSIS — C50.911 CARCINOMA OF RIGHT BREAST METASTATIC TO BONE (MULTI): Primary | ICD-10-CM

## 2025-03-07 DIAGNOSIS — K21.00 GASTROESOPHAGEAL REFLUX DISEASE WITH ESOPHAGITIS WITHOUT HEMORRHAGE: ICD-10-CM

## 2025-03-07 LAB — CANCER AG27-29 SERPL-ACNC: 268.8 U/ML (ref 0–38.6)

## 2025-03-07 PROCEDURE — 99214 OFFICE O/P EST MOD 30 MIN: CPT | Performed by: INTERNAL MEDICINE

## 2025-03-07 PROCEDURE — 1111F DSCHRG MED/CURRENT MED MERGE: CPT | Performed by: INTERNAL MEDICINE

## 2025-03-07 PROCEDURE — 3078F DIAST BP <80 MM HG: CPT | Performed by: INTERNAL MEDICINE

## 2025-03-07 PROCEDURE — 1160F RVW MEDS BY RX/DR IN RCRD: CPT | Performed by: INTERNAL MEDICINE

## 2025-03-07 PROCEDURE — 3075F SYST BP GE 130 - 139MM HG: CPT | Performed by: INTERNAL MEDICINE

## 2025-03-07 PROCEDURE — G2211 COMPLEX E/M VISIT ADD ON: HCPCS | Performed by: INTERNAL MEDICINE

## 2025-03-07 PROCEDURE — 1126F AMNT PAIN NOTED NONE PRSNT: CPT | Performed by: INTERNAL MEDICINE

## 2025-03-07 PROCEDURE — 1159F MED LIST DOCD IN RCRD: CPT | Performed by: INTERNAL MEDICINE

## 2025-03-07 ASSESSMENT — PAIN SCALES - GENERAL: PAINLEVEL_OUTOF10: 0-NO PAIN

## 2025-03-07 NOTE — PROGRESS NOTES
Pharmacy Post-Discharge Visit    Arabella Springer is a 84 y.o. female who was referred to Clinical Pharmacy Team to complete a post-discharge medication optimization and monitoring visit.  The patient was referred for their No chief complaint on file..    Pt is here for First appointment.   ***Platinum flag (delete once completed)    Admission Date: 2025  Discharge Date: 2025  Discharge Diagnosis: Acute hypoxemic respiratory failure     Notable Medication changes following discharge  No changes made to medications at discharge     Referring Provider/PCP: Angel Luis Velazquez,   Last Visit: 2025  Next visit: 04/15/2025      Subjective   HPI  CONGESTIVE HEART FAILURE ASSESSMENT  Does patient follow with Cardiology: Yes    Date: 24 at [facility]  Per Dr. 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; he noted that the normal echo does not rule out well compensated heart failure, however, since the effusions were not new     Staging  Ejection Fraction: 60-65% (24)  NYHA Class: {CHF NYHA Stagin}    Symptom Assessment  Weight changes/edema?: {YES wildcard/NO:60}  Dyspnea?: {BADYSPNEA:34191}  Dizziness/syncope/palpitations?: {YES wildcard/NO:60}    Medication Therapy  Current Regimen (GDMT):  ARNI/ACEi/ARB: Yes - Losartan-hydrochlorothiazide 100-12.5 mg daily   Beta Blocker: No  MRA: No  SGLT2i: No    Other therapy:  Furosemide 20 mg ocne daily     Previous Medications:   None    Adverse Effects: ***     Secondary Prevention  The ASCVD Risk score (Elfego GIRON, et al., 2019) failed to calculate for the following reasons:    The 2019 ASCVD risk score is only valid for ages 40 to 79  Aspirin 81mg? no  Statin?: Yes - Pravastatin 10 mg daily   HTN?: Yes - most recent in-office BP was 106/50 mmHg on 25  BP monitor at home?:  Home readings:      Drug Interactions  The following drug interactions were  noted:    Additive immunosuppression: denosumab, dexamethasone, palbociclib  Duplicate therapy: mometasone lotion and triamcinolone cream     Medication System Management  Adherence/Organization: ***  Affordability/Accessibility: ***    Patient's Preferred Pharmacy    [id number] - [place], [place] - [address] AT [facility] [address]  [address] [place] [address]  Phone: [phone] Fax: [phone]     Specialty Pharmacy  [address] [facility] [address]  Phone: [phone] Fax: [phone]     Objective   Allergies   Allergen Reactions    Oxycodone Nausea/vomiting     Social History     Social History Narrative    Not on file     Medication Reconciliation  Changed:   Added:   Discontinued:     Medication Review  Current Outpatient Medications   Medication Instructions    acetaminophen (TYLENOL) 1,000 mg, 3 times daily PRN    albuterol (ProAir HFA) 90 mcg/actuation inhaler 2 puffs, inhalation, Every 4 hours PRN    beclomethasone HFA (Qvar) 40 mcg/actuation inhaler 1 Inhalation, 2 times daily RT    benzocaine 20 % mucosal gel Mouth/Throat, 4 times daily PRN    Blood glucose monitoring meter Test two times daily.    blood sugar diagnostic strip 1 each, miscellaneous, 2 times daily    brimonidine-timoloL (Combigan) 0.2-0.5 % ophthalmic solution 1 drop, Every 12 hours scheduled    calcium carbonate-vitamin D3 600 mg-10 mcg (400 unit) tablet 3 tablets, Daily    denosumab (Xgeva) 120 mg/1.7 mL (70 mg/mL) injection 1.7 mL, Every 30 days    dexAMETHasone 1 mg, oral, 4 times daily, Swish 10 mL of solution for two minutes and spit, four times daily. Do not to eat for one hour after using the mouthwash.    esomeprazole (NexIUM) 40 mg DR capsule 1 capsule, Daily before breakfast    furosemide (LASIX) 20 mg, oral, Daily    inavolisib 3 mg tablet Take 3 mg by mouth once daily for 28 days. Do not fill before February 7, 2025.    inhalat.spacing dev,large mask (Pro  "Comfort Spacer-Adult Mask) spacer 1 each, miscellaneous, Every 4 hours PRN    isopropyl alcohoL 70 % towelette Test 2 times a day, clean finger prior to testing    LORazepam (ATIVAN) 0.5 mg, oral, Every 2 hour PRN    losartan-hydrochlorothiazide (Hyzaar) 100-12.5 mg tablet 1 tablet, oral, Daily    mometasone (Elocon) 0.1 % lotion Topical, 2 times daily    multivitamin tablet 1 tablet, Daily    ondansetron (ZOFRAN) 8 mg, oral, Every 8 hours PRN    [START ON 3/21/2025] palbociclib (Ibrance) 125 mg tablet Take 125 mg (1 tablet) by mouth once daily for three weeks, then one week off.  Swallow whole.  Do not crush, chew or split.    pravastatin (PRAVACHOL) 10 mg, oral, Daily    prochlorperazine (COMPAZINE) 10 mg, oral, Every 6 hours PRN    Rhopressa 0.02 % drops opthalmic solution Administer 1 drop into the right eye once daily at bedtime.    triamcinolone (Kenalog) 0.1 % cream Topical, 2 times daily      Vitals  BP Readings from Last 2 Encounters:   03/06/25 106/50   02/27/25 118/58     BMI Readings from Last 1 Encounters:   02/21/25 30.74 kg/m²      Labs  A1C  Lab Results   Component Value Date    HGBA1C 5.8 (H) 12/26/2024    HGBA1C 5.4 08/18/2021    HGBA1C 5.8 10/20/2020     BMP  Lab Results   Component Value Date    CALCIUM 8.5 (L) 03/06/2025     03/06/2025    K 4.2 03/06/2025    CO2 28 03/06/2025     03/06/2025    BUN 20 03/06/2025    CREATININE 1.27 (H) 03/06/2025    EGFR 42 (L) 03/06/2025     LFTs  Lab Results   Component Value Date    ALT 7 03/06/2025    AST 14 03/06/2025    ALKPHOS 40 03/06/2025    BILITOT 0.2 03/06/2025     FLP  Lab Results   Component Value Date    TRIG 65 10/19/2023    CHOL 188 10/19/2023    LDLF 72 05/31/2022    LDLCALC 106 (H) 10/19/2023    HDL 69.2 10/19/2023     Urine Microalbumin  No results found for: \"MICROALBCREA\"  Wt Readings from Last 3 Encounters:   02/21/25 71.4 kg (157 lb 6.5 oz)   02/12/25 71.7 kg (158 lb)   02/09/25 71.7 kg (158 lb)      There is no height or weight " on file to calculate BMI.       Assessment/Plan   Problem List Items Addressed This Visit    None      Clinical Pharmacist follow-up: ***, {In-Person / Telehealth:32944} visit    Continue all meds under the continuation of care with the referring provider and clinical pharmacy team.    Thank you,  *** (Name)  Clinical Pharmacist  *** (Phone Number)    Verbal consent to manage patient's drug therapy was obtained from {patient rights:58534}. They were informed they may decline to participate or withdraw from participation in pharmacy services at any time.     level   Activity is limited for patient, she was encouraged to at least ensure she is getting up and moving throughout the day even just around her house   She does not check her BP at home but was encouraged to reach out to Dr. Velazquez if she notices increased lightheadedness/dizziness that could be associated with low blood pressure.   Patient did ask if her Qvar interacts with her eye drops. She was informed that the Qvar does not interact with her eye drops as there is no beta agonist or anticholinergic medication in the inhaler. She is not sure if she needs her Qvar anymore and has not been taking it regularly. She was encouraged to discuss this with her pulmonologist to ensure that she does use it if she needs to continue with it.   She also asked about Diclofenac oral tablets today as when she met with Dr. Velazquez last they discussed that the medication can effect her kidneys. Explained to patient that taking medications like diclofenac regularly can impact the kidneys. Based on her most recent kidney function NSAIDs are not contraindicated but it is recommended to use other options first. Her GFR was significantly lower back in January and use would have been contraindicated at that time. She was encouraged to discuss with her orthopedic physician if her pain is still not controlled using OTC diclofenac gel.     At this time patient is doing well on current therapy, no scheduled pharmacy follow-up needed. She was encouraged to reach out with any medication related questions and/or concerns in the future.           Continue all meds under the continuation of care with the referring provider and clinical pharmacy team.    Pharmacy Follow-Up: As needed per patient or provider request  PCP Follow-Up: 04/15/2025    Thank you,    Gamal Davila, PharmD   Clinical Pharmacist    Verbal consent to manage patient's drug therapy was obtained from the patient. They were informed they may decline to participate or  withdraw from participation in pharmacy services at any time.

## 2025-03-07 NOTE — PROGRESS NOTES
Patient ID: Arabella Springer is a 84 y.o. female.  Referring Physician: Andrew Mcfarland MD  [id number] [address]  [place] [id number]  Primary Care Provider: Angel Luis Velazquez DO  Visit Type: Follow Up      Subjective    HPI How was my bloodwork?  I did not get the mouth sores again    Review of Systems   Constitutional: Negative.    HENT:  Negative.     Eyes: Negative.    Respiratory: Negative.     Cardiovascular:  Positive for leg swelling.   Gastrointestinal: Negative.    Endocrine: Negative.    Genitourinary: Negative.     Musculoskeletal: Negative.    Skin: Negative.    Neurological: Negative.    Hematological: Negative.    Psychiatric/Behavioral: Negative.          Objective   BSA: 1.75 meters squared  /59 (BP Location: Left arm, Patient Position: Sitting, BP Cuff Size: Adult)   Pulse 80   Temp 36.2 °C (97.2 °F) (Temporal)   Resp 16   Wt 72.4 kg (159 lb 9.8 oz)   SpO2 96%   BMI 31.17 kg/m²      has a past medical history of Anxiety, Arthritis, Breast cancer (Multi), Breast cancer metastasized to bone (Multi), GERD (gastroesophageal reflux disease), Glaucoma, Hiatal hernia, Hyperlipidemia, and Hypertension.   has a past surgical history that includes Mastectomy complete / simple (Right); Hysterectomy; and Shoulder surgery.  Family History   Problem Relation Name Age of Onset    Colon cancer Sister      Breast cancer Daughter       Oncology History   Breast cancer metastasized to bone (Multi)   9/8/2023 - 11/10/2023 Chemotherapy    Pembrolizumab, 21 Day Cycles     9/29/2023 Initial Diagnosis    Breast cancer metastasized to bone (CMS/HCC)     12/27/2024 -  Chemotherapy    Inavolisib + Palbociclib + Fulvestrant, 28 Day Cycles         Arabella Springer  reports that she has never smoked. She has never used smokeless tobacco.  She  reports no history of alcohol use.  She  reports no history of drug use.    Physical Exam  Vitals reviewed.   Constitutional:       Appearance: Normal appearance.    HENT:      Head: Normocephalic.      Mouth/Throat:      Mouth: Mucous membranes are moist.   Eyes:      Extraocular Movements: Extraocular movements intact.      Pupils: Pupils are equal, round, and reactive to light.   Cardiovascular:      Rate and Rhythm: Normal rate and regular rhythm.      Pulses: Normal pulses.      Heart sounds: Normal heart sounds.   Pulmonary:      Effort: Pulmonary effort is normal.      Breath sounds: Normal breath sounds.   Abdominal:      General: Bowel sounds are normal.      Palpations: Abdomen is soft.   Musculoskeletal:         General: Normal range of motion.      Cervical back: Normal range of motion and neck supple.   Skin:     General: Skin is warm.   Neurological:      General: No focal deficit present.      Mental Status: She is alert and oriented to person, place, and time.   Psychiatric:         Mood and Affect: Mood normal.         Behavior: Behavior normal.         WBC   Date/Time Value Ref Range Status   03/06/2025 02:25 PM 4.0 (L) 4.4 - 11.3 x10*3/uL Final   02/20/2025 02:35 PM 2.7 (L) 4.4 - 11.3 x10*3/uL Final   02/07/2025 06:33 AM 2.7 (L) 4.4 - 11.3 x10*3/uL Final     WHITE BLOOD CELL COUNT   Date/Time Value Ref Range Status   02/12/2025 10:34 AM 2.8 (L) 3.8 - 10.8 Thousand/uL Final     nRBC   Date Value Ref Range Status   03/06/2025   Final     Comment:     Not Measured   02/20/2025   Final     Comment:     Not Measured   02/07/2025 0.0 0.0 - 0.0 /100 WBCs Final     RBC   Date Value Ref Range Status   03/06/2025 2.53 (L) 4.00 - 5.20 x10*6/uL Final   02/20/2025 2.66 (L) 4.00 - 5.20 x10*6/uL Final   02/07/2025 2.46 (L) 4.00 - 5.20 x10*6/uL Final     RED BLOOD CELL COUNT   Date Value Ref Range Status   02/12/2025 2.74 (L) 3.80 - 5.10 Million/uL Final     Hemoglobin   Date Value Ref Range Status   03/06/2025 8.2 (L) 12.0 - 16.0 g/dL Final   02/20/2025 8.3 (L) 12.0 - 16.0 g/dL Final   02/07/2025 7.5 (L) 12.0 - 16.0 g/dL Final     HEMOGLOBIN   Date Value Ref Range Status    02/12/2025 8.5 (L) 11.7 - 15.5 g/dL Final     Hematocrit   Date Value Ref Range Status   03/06/2025 26.2 (L) 36.0 - 46.0 % Final   02/20/2025 26.6 (L) 36.0 - 46.0 % Final   02/07/2025 24.0 (L) 36.0 - 46.0 % Final     HEMATOCRIT   Date Value Ref Range Status   02/12/2025 27.0 (L) 35.0 - 45.0 % Final     MCV   Date/Time Value Ref Range Status   03/06/2025 02:25  (H) 80 - 100 fL Final   02/20/2025 02:35  80 - 100 fL Final   02/12/2025 10:34 AM 98.5 80.0 - 100.0 fL Final   02/07/2025 06:33 AM 98 80 - 100 fL Final     MCH   Date/Time Value Ref Range Status   03/06/2025 02:25 PM 32.4 26.0 - 34.0 pg Final   02/20/2025 02:35 PM 31.2 26.0 - 34.0 pg Final   02/12/2025 10:34 AM 31.0 27.0 - 33.0 pg Final   02/07/2025 06:33 AM 30.5 26.0 - 34.0 pg Final     MCHC   Date/Time Value Ref Range Status   03/06/2025 02:25 PM 31.3 (L) 32.0 - 36.0 g/dL Final   02/20/2025 02:35 PM 31.2 (L) 32.0 - 36.0 g/dL Final   02/12/2025 10:34 AM 31.5 (L) 32.0 - 36.0 g/dL Final     Comment:     For adults, a slight decrease in the calculated MCHC  value (in the range of 30 to 32 g/dL) is most likely  not clinically significant; however, it should be  interpreted with caution in correlation with other  red cell parameters and the patient's clinical  condition.     02/07/2025 06:33 AM 31.3 (L) 32.0 - 36.0 g/dL Final     RDW   Date/Time Value Ref Range Status   03/06/2025 02:25 PM 21.5 (H) 11.5 - 14.5 % Final   02/20/2025 02:35 PM 19.4 (H) 11.5 - 14.5 % Final   02/12/2025 10:34 AM 16.9 (H) 11.0 - 15.0 % Final   02/07/2025 06:33 AM 17.3 (H) 11.5 - 14.5 % Final     Platelets   Date/Time Value Ref Range Status   03/06/2025 02:25  150 - 450 x10*3/uL Final   02/20/2025 02:35  150 - 450 x10*3/uL Final   02/07/2025 06:33  150 - 450 x10*3/uL Final     PLATELET COUNT   Date/Time Value Ref Range Status   02/12/2025 10:34  140 - 400 Thousand/uL Final     MPV   Date/Time Value Ref Range Status   02/12/2025 10:34 AM 9.8 7.5 - 12.5  fL Final   10/19/2023 08:01 AM 9.2 7.5 - 11.5 fL Final     Neutrophils %   Date/Time Value Ref Range Status   03/06/2025 02:25 PM 29.3 40.0 - 80.0 % Final   02/20/2025 02:35 PM 34.3 40.0 - 80.0 % Final   02/07/2025 06:33 AM 32.4 40.0 - 80.0 % Final     Immature Granulocytes %, Automated   Date/Time Value Ref Range Status   03/06/2025 02:25 PM 0.7 0.0 - 0.9 % Final     Comment:     Immature Granulocyte Count (IG) includes promyelocytes, myelocytes and metamyelocytes but does not include bands. Percent differential counts (%) should be interpreted in the context of the absolute cell counts (cells/UL).   02/20/2025 02:35 PM 0.0 0.0 - 0.9 % Final     Comment:     Immature Granulocyte Count (IG) includes promyelocytes, myelocytes and metamyelocytes but does not include bands. Percent differential counts (%) should be interpreted in the context of the absolute cell counts (cells/UL).   02/07/2025 06:33 AM 0.7 0.0 - 0.9 % Final     Comment:     Immature Granulocyte Count (IG) includes promyelocytes, myelocytes and metamyelocytes but does not include bands. Percent differential counts (%) should be interpreted in the context of the absolute cell counts (cells/UL).     Lymphocytes %   Date/Time Value Ref Range Status   03/06/2025 02:25 PM 54.9 13.0 - 44.0 % Final   02/20/2025 02:35 PM 55.6 13.0 - 44.0 % Final   02/07/2025 06:33 AM 50.9 13.0 - 44.0 % Final     Monocytes %   Date/Time Value Ref Range Status   03/06/2025 02:25 PM 13.2 2.0 - 10.0 % Final   02/20/2025 02:35 PM 7.5 2.0 - 10.0 % Final   02/07/2025 06:33 AM 12.7 2.0 - 10.0 % Final     Eosinophils %   Date/Time Value Ref Range Status   03/06/2025 02:25 PM 1.2 0.0 - 6.0 % Final   02/20/2025 02:35 PM 2.2 0.0 - 6.0 % Final   02/07/2025 06:33 AM 2.6 0.0 - 6.0 % Final     Basophils %   Date/Time Value Ref Range Status   03/06/2025 02:25 PM 0.7 0.0 - 2.0 % Final   02/20/2025 02:35 PM 0.4 0.0 - 2.0 % Final   02/07/2025 06:33 AM 0.7 0.0 - 2.0 % Final     Neutrophils Absolute  "  Date/Time Value Ref Range Status   03/06/2025 02:25 PM 1.17 (L) 1.60 - 5.50 x10*3/uL Final     Comment:     Percent differential counts (%) should be interpreted in the context of the absolute cell counts (cells/uL).   02/20/2025 02:35 PM 0.92 (L) 1.60 - 5.50 x10*3/uL Final     Comment:     Percent differential counts (%) should be interpreted in the context of the absolute cell counts (cells/uL).   02/07/2025 06:33 AM 0.86 (L) 1.60 - 5.50 x10*3/uL Final     Comment:     Percent differential counts (%) should be interpreted in the context of the absolute cell counts (cells/uL).     Immature Granulocytes Absolute, Automated   Date/Time Value Ref Range Status   03/06/2025 02:25 PM 0.03 0.00 - 0.50 x10*3/uL Final   02/20/2025 02:35 PM 0.00 0.00 - 0.50 x10*3/uL Final   02/07/2025 06:33 AM 0.02 0.00 - 0.50 x10*3/uL Final     Lymphocytes Absolute   Date/Time Value Ref Range Status   03/06/2025 02:25 PM 2.20 0.80 - 3.00 x10*3/uL Final   02/20/2025 02:35 PM 1.49 0.80 - 3.00 x10*3/uL Final   02/07/2025 06:33 AM 1.36 0.80 - 3.00 x10*3/uL Final     Monocytes Absolute   Date/Time Value Ref Range Status   03/06/2025 02:25 PM 0.53 0.05 - 0.80 x10*3/uL Final   02/20/2025 02:35 PM 0.20 0.05 - 0.80 x10*3/uL Final   02/07/2025 06:33 AM 0.34 0.05 - 0.80 x10*3/uL Final     Eosinophils Absolute   Date/Time Value Ref Range Status   03/06/2025 02:25 PM 0.05 0.00 - 0.40 x10*3/uL Final   02/20/2025 02:35 PM 0.06 0.00 - 0.40 x10*3/uL Final   02/07/2025 06:33 AM 0.07 0.00 - 0.40 x10*3/uL Final     Basophils Absolute   Date/Time Value Ref Range Status   03/06/2025 02:25 PM 0.03 0.00 - 0.10 x10*3/uL Final     Comment:     Automated WBC differential has been confirmed by manual smear.   02/20/2025 02:35 PM 0.01 0.00 - 0.10 x10*3/uL Final     Comment:     Automated WBC differential has been confirmed by manual smear.   02/07/2025 06:33 AM 0.02 0.00 - 0.10 x10*3/uL Final       No components found for: \"PT\"  No results found for: " "\"APTT\"  Medication Documentation Review Audit       Reviewed by Akosua Candelario MA (Medical Assistant) on 03/07/25 at 1409      Medication Order Taking? Sig Documenting Provider Last Dose Status   acetaminophen (Tylenol) 500 mg tablet [id number] Yes Take 2 tablets (1,000 mg) by mouth 3 times a day as needed for mild pain (1 - 3) or moderate pain (4 - 6). Tika Hackett MD Past Month Active   albuterol (ProAir HFA) 90 mcg/actuation inhaler [id number] Yes Inhale 2 puffs every 4 hours if needed for wheezing or shortness of breath. Naina Pisano, DO Past Month Active   beclomethasone HFA (Qvar) 40 mcg/actuation inhaler [id number] Yes Inhale 1 Inhalation 2 times a day. Tika Hackett MD 2/5/2025 Morning Active   benzocaine 20 % mucosal gel [id number] Yes Use in the mouth or throat 4 times a day as needed (Lip sores). Makenzie Ba, DO Past Month Active   Blood glucose monitoring meter [id number] Yes Test two times daily. DEX Bolton  Active   blood sugar diagnostic strip [id number] Yes 1 each 2 times a day. DEX Bolton  Active   brimonidine-timoloL (Combigan) 0.2-0.5 % ophthalmic solution [id number] Yes Administer 1 drop into both eyes every 12 hours. Tika Hackett MD 2/5/2025 Morning Active   calcium carbonate-vitamin D3 600 mg-10 mcg (400 unit) tablet [id number] Yes Take 3 tablets by mouth once daily. Tika Hackett MD 2/5/2025 Morning Active   denosumab (Xgeva) 120 mg/1.7 mL (70 mg/mL) injection [id number] Yes Inject 1.7 mL (120 mg) under the skin every 30 (thirty) days. Tika Provider, MD Past Month Active   dexAMETHasone 0.5 mg/5 mL oral liquid [id number] Yes Take 10 mL (1 mg) by mouth 4 times a day. Swish 10 mL of solution for two minutes and spit, four times daily. Do not to eat for one hour after using the mouthwash. DEX Bolton 2/5/2025 Noon Active   esomeprazole (NexIUM) 40 mg DR capsule [id number] Yes Take 1 capsule (40 mg) by mouth once " daily in the morning. Take before meals. Historical Provider, MD 2/5/2025 Morning Active   furosemide (Lasix) 20 mg tablet [id number] Yes Take 1 tablet (20 mg) by mouth once daily. Angel Luis Velazquez,   Active   inavolisib 3 mg tablet [id number] Yes Take 3 mg by mouth once daily for 28 days. Do not fill before February 7, 2025. DEX Bolton  Active   inhalat.spacing dev,large mask (Pro Comfort Spacer-Adult Mask) spacer [id number] Yes 1 each every 4 hours if needed (shortness of breath, wheezing, cough). Naina Pisano, DO Unknown Active   isopropyl alcohoL 70 % towelette [id number] Yes Test 2 times a day, clean finger prior to testing DEX Bolton  Active   LORazepam (Ativan) 0.5 mg tablet [id number] Yes Take 1 tablet (0.5 mg) by mouth every 2 hours if needed for anxiety (anxiety related to radiology tests) for up to 5 doses. DEX Bolton Past Month Active   losartan-hydrochlorothiazide (Hyzaar) 100-12.5 mg tablet [id number] Yes Take 1 tablet by mouth once daily. Abraham Mohan DO 2/5/2025 Morning Active   mometasone (Elocon) 0.1 % lotion [id number] Yes Apply topically 2 times a day. Jimmie Nam MD 2/5/2025 Morning Active   multivitamin tablet [id number] Yes Take 1 tablet by mouth once daily. Tika Provider, MD 2/5/2025 Morning Active   ondansetron (Zofran) 8 mg tablet [id number] Yes Take 1 tablet (8 mg) by mouth every 8 hours if needed for nausea or vomiting. Andrew Mcfarland MD Past Month Active   Discontinued 03/06/25 1349   palbociclib (Ibrance) 125 mg tablet [id number] Yes Take 125 mg (1 tablet) by mouth once daily for three weeks, then one week off.  Swallow whole.  Do not crush, chew or split. Andrew Mcfarland MD  Active   pravastatin (Pravachol) 10 mg tablet [id number] Yes Take 1 tablet (10 mg) by mouth once daily. Abraham Mohan DO 2/5/2025 Morning Active   prochlorperazine (Compazine) 10 mg tablet [id number] Yes Take 1 tablet (10 mg) by mouth every 6 hours if needed  for nausea or vomiting. Andrew Mcfarland MD Past Month Active   Rhopressa 0.02 % drops opthalmic solution [id number] Yes Administer 1 drop into the right eye once daily at bedtime. Historical Provider, MD 2/4/2025 Bedtime Active   triamcinolone (Kenalog) 0.1 % cream [id number] Yes Apply topically 2 times a day. 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  -now off steroids  -CT scan done on 4/3/2024 reviewed: interval worsening of bilateral pleural effusions, right >left; multiple scattered <0.5 mm pulmonary nodules bilateral lungs not changed; interval development of several branching opacities involving left lung apex; there are no new worrisome hepatic lesions; mild right sided hydronephrosis; diffuse sclerosis of axial and appendicular skeleton  -has been more short of breath recently--was evaluated in the ER at [facility] on 8/4--was told she had bilateral pleural effusions, which are not actually new  -right sided pleural effusion is already seen on PET scan done in 8/2023  -she had an echo done in June which was read as normal, [facility] did not perform a new one, cardiologist on 8/21 told the patient that she did not have CHF and that these effusions were likely malignant; she was started on low dose lasix which barely helped with the leg edema--and so dose was bumped up; she was recommended by pulmonology consult to undergo thoracentesis and she  declined to have it done  -since effusions are not new, and actually were already noted over one year ago, a normal echo does NOT completely rule out well-compensated CHF  -PET done on 8/16/2024 reviewed--no concerning pulmonary nodule; moderate-to-large bilateral pleura effusions, right greater than left; no FDG avid mediastinal, hilar or axillary lymphadenopathy; s/p right axillary ike dissection; s/p right mastectomy and reconstruction; no FDG avid lymphadenopathy in the abdomen and pelvis; bilateral hydronephrosis; interval improvement of FDG avid widespread bone metastases throughout the axial and appendicular skeleton many of which are non FDG avid ; residual disease noted in bilateral femur (SUV 3.6), pelvis (SUV 2.4), bilateral humerus (SUV 3.3), bilateral clavicles (SUV 2/9)  -there is no FDG activity in her thorax/pleura, making the effusions less likely to be of malignant origin, and given bilateral nature--more likely cardiac in origin  -she had thoracentesis done on 9/24/2024--with removal of 700 ml yellow fluid; cytology was negative  -here for interval followup  -CT scan done on 12/9/2024 reviewed--moderate bilateral pleural effusions with atelectasis, right greater than left; no concerning lung nodule; no mediastinal, hilar or axillary lymphadenopathy; postsurgical changes of right mastectomy with right breast implants;  moderate hydronephrosis left greater than right increased from prior exam; small to moderate volume ascites; new peritoneal nodular thickening (33 mm peritoneal implant in anterior deep pelvis); additional areas of peritoneal nodular thickening and enhancement in deep pelvis; no abdominopelvic lymphadenopathy; similar appearance of diffuse sclerotic lesions throughout the axial and appendicular skeleton; chronic bilateral rib fractures are noted; plate and screw fixation of the right humerus  -bone scan done on 12/9/2024 reviewed similar appearance of diffusely increased  radiotracer uptake throughout the axial and appendicular skeleton corresponding with widespread sclerotic osseous lesions on CT  --labs done on 12/11/2024 included CBC, COMP, CA 27.29  -results reviewed--wbc 4.6, hgb 9.1 plt 211,000, creatinine 1.04, calcium 9.0, albumin 4.1 AST 18, ALT 12  CA 27.29 295  -benefits, risks, potential morbidity related to xgeva were reviewed with Arabella and she provided informed consent to proceed  -she went on to receive xgeva 120 mg subcutaneous  -as there is now evidence of progression of disease (new peritoneal metastases), she is failing AI, and I have recommended therapeutic switch  -since her tumor has the PIK3CA mutation, I have advised switching to faslodex + palbociclib + Inavolisib  -benefits, risks, potential morbidity related to faslodex + palbociclib + inavolisib were reviewed with Arabella and she signed informed consent to proceed  -on days 1 and 15 (and then Q28 days) she will receive faslodex 500 mg intramuscular  -she will also take palbociclib 125 mg PO once daily for 21 days on then 7 days off  -she will also take inavolisib 9 mg PO once daily  -here for interval followup  -shortly after starting the new combination, she was admitted to [place] from 1/21 to 1/23/2025 for mucositis and electrolyte abnormalities  -inavolisib was held  -she was then readmitted to [place] from 2/5 to 2/72025 for shortness of breath, thoracentesis was done on the left side with removal of 750 ml of fluid but none was sent for cytology  -her mucositis eventually recovered  -she received inavoliisb 3 mg in hand a couple days ago and started it; mucositis has not returned but this morning she noticed in the mirror 2 maculopapular lesions--one on right cheek, one near left nares  -she was at physical therapy a couple weeks ago--and the therapist noted a slight asymmetry in her ability to rotate her ankles and asked Arabella if she has ever had a stroke--which quite alarmed her  -labs done on  2/20/2025 included CBC + COMP + CA 27.29  -results reviewed--creatinine 1.22, calcium 9.1, alk phos 44, AST 15, ALT 8, wbc 2.66, hgb 8.3, plt 218,000, , CA 27.29 295  -here for interval followup  -she has taken inavolisib 3 mg daily for an additional 2 weeks--has not had recurrent mucositis, no new dermatologic issues  -continues to take lasix 20 mg every other day--leg edema and shortness of breath improved and stable  -labs done on 3/6/2025 included CBC +  COMP + CA 27.29  -results reviewed--wbc 4.0, hgb 8.2, plt 152,000, creatinine 1.27, calcium 8.5, alk phos 40, AST 14, total bili 0.2, ALT 7, CA 27.29 268  -she will continue to take palbociclib 125 mg PO once daily for 21 days on then 7 days off  -she will continue to take inavolisib 3 mg PO once daily  -she will return in 2 weeks for next dose faslodex + xgeva     2) bone metastases  -secondary to metastatic breast cancer  -receives xgeva Q4 weeks     3) anemia  -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  -hgb slowly improving     4) hyperlipidemia  -on pravastatin     5) arthritis  -on tylenol PRN  -on diclofenac PRN  -received steroid injections into her knees and she feels so much better, so she wants to receive them again     6) hypertension  -on amlodipine  -on losartan-HCTZ     7) GERD  -on nexium     8) glaucoma  -on combigan eyedrops        Problem List Items Addressed This Visit             ICD-10-CM    Breast cancer metastasized to bone (Multi) C50.919, C79.51            Andrew Mcfarland MD

## 2025-03-08 ASSESSMENT — ENCOUNTER SYMPTOMS
CONSTITUTIONAL NEGATIVE: 1
ENDOCRINE NEGATIVE: 1
EYES NEGATIVE: 1
LEG SWELLING: 1
RESPIRATORY NEGATIVE: 1
PSYCHIATRIC NEGATIVE: 1
HEMATOLOGIC/LYMPHATIC NEGATIVE: 1
MUSCULOSKELETAL NEGATIVE: 1
GASTROINTESTINAL NEGATIVE: 1
NEUROLOGICAL NEGATIVE: 1

## 2025-03-10 ENCOUNTER — APPOINTMENT (OUTPATIENT)
Dept: PHARMACY | Facility: HOSPITAL | Age: 85
End: 2025-03-10
Payer: MEDICARE

## 2025-03-10 DIAGNOSIS — I50.33 ACUTE ON CHRONIC HEART FAILURE WITH PRESERVED EJECTION FRACTION (HFPEF): ICD-10-CM

## 2025-03-10 RX ORDER — DICLOFENAC SODIUM 10 MG/G
4 GEL TOPICAL 2 TIMES DAILY PRN
COMMUNITY

## 2025-03-10 NOTE — ASSESSMENT & PLAN NOTE
Patient has NYHA Class I HFpEF with most recent EF 60-65% on 06/04/24. Patient is on appropriate medications due to her lower blood pressure.     Rationale for plan:   Patient reports no symptoms of heart failure and denies shortness of breath even on exertion  Her blood pressure is on the lower end, last in-office reading was 106/50 mmHg on 02/21/25 and she states it is usually in this range. She does not check blood pressure at home.  Denies any increased weight due to edema and has maintained the weight lost after thoracentesis during admission.   No recommendations for changes to medications today.     Medication Changes:  CONTINUE:  Losartan-hydrochlorothiazide 100-12.5 mg 1 tablet by mouth once daily   Furosemide 20 mg 1 tablet by mouth every other day     Monitoring and Education:  Patient confirmed that she does weigh herself regularly at home:   Contact your physician/seek help immediately if you notice the following with symptoms of shortness of breath or swelling in your extremities:   Weight gain of 3+ lbs overnight   Weight gain of 5+ lbs in a week   Physical limitations to your normal physical activity level   Activity is limited for patient, she was encouraged to at least ensure she is getting up and moving throughout the day even just around her house   She does not check her BP at home but was encouraged to reach out to Dr. Velazquez if she notices increased lightheadedness/dizziness that could be associated with low blood pressure.   Patient did ask if her Qvar interacts with her eye drops. She was informed that the Qvar does not interact with her eye drops as there is no beta agonist or anticholinergic medication in the inhaler. She is not sure if she needs her Qvar anymore and has not been taking it regularly. She was encouraged to discuss this with her pulmonologist to ensure that she does use it if she needs to continue with it.   She also asked about Diclofenac oral tablets today as when she met  with Dr. Caroline herman they discussed that the medication can effect her kidneys. Explained to patient that taking medications like diclofenac regularly can impact the kidneys. Based on her most recent kidney function NSAIDs are not contraindicated but it is recommended to use other options first. Her GFR was significantly lower back in January and use would have been contraindicated at that time. She was encouraged to discuss with her orthopedic physician if her pain is still not controlled using OTC diclofenac gel.     At this time patient is doing well on current therapy, no scheduled pharmacy follow-up needed. She was encouraged to reach out with any medication related questions and/or concerns in the future.

## 2025-03-11 ENCOUNTER — SPECIALTY PHARMACY (OUTPATIENT)
Dept: PHARMACY | Facility: CLINIC | Age: 85
End: 2025-03-11

## 2025-03-11 PROCEDURE — RXMED WILLOW AMBULATORY MEDICATION CHARGE

## 2025-03-13 ENCOUNTER — PHARMACY VISIT (OUTPATIENT)
Dept: PHARMACY | Facility: CLINIC | Age: 85
End: 2025-03-13
Payer: COMMERCIAL

## 2025-03-14 ENCOUNTER — HOME CARE VISIT (OUTPATIENT)
Dept: HOME HEALTH SERVICES | Facility: HOME HEALTH | Age: 85
End: 2025-03-14
Payer: MEDICARE

## 2025-03-14 VITALS
DIASTOLIC BLOOD PRESSURE: 64 MMHG | RESPIRATION RATE: 20 BRPM | WEIGHT: 159 LBS | SYSTOLIC BLOOD PRESSURE: 123 MMHG | HEART RATE: 72 BPM | TEMPERATURE: 98.3 F | BODY MASS INDEX: 31.05 KG/M2 | OXYGEN SATURATION: 95 %

## 2025-03-14 PROCEDURE — G0299 HHS/HOSPICE OF RN EA 15 MIN: HCPCS | Mod: HHH

## 2025-03-14 SDOH — ECONOMIC STABILITY: GENERAL

## 2025-03-14 ASSESSMENT — ENCOUNTER SYMPTOMS
LIMITED RANGE OF MOTION: 1
MUSCLE WEAKNESS: 1
SHORTNESS OF BREATH: 1
PAIN LOCATION: RIGHT KNEE
PAIN: 1
PAIN SEVERITY GOAL: 0/10
CHANGE IN APPETITE: UNCHANGED
APPETITE LEVEL: FAIR
LOWER EXTREMITY EDEMA: 1
LOWEST PAIN SEVERITY IN PAST 24 HOURS: 0/10
LAST BOWEL MOVEMENT: 67277
BOWEL PATTERN NORMAL: 1
PAIN LOCATION: LEFT KNEE
HIGHEST PAIN SEVERITY IN PAST 24 HOURS: 3/10
DYSPNEA ACTIVITY LEVEL: AFTER AMBULATING 10 - 20 FT

## 2025-03-14 ASSESSMENT — ACTIVITIES OF DAILY LIVING (ADL)
MONEY MANAGEMENT (EXPENSES/BILLS): NEEDS ASSISTANCE
CURRENT_FUNCTION: STAND BY ASSIST
PHYSICAL_TRANSFERS_DEVICES: WALKER
AMBULATION ASSISTANCE: ONE PERSON
AMBULATION ASSISTANCE: STAND BY ASSIST
AMBULATION ASSISTANCE: 1
PHYSICAL TRANSFERS ASSESSED: 1
CURRENT_FUNCTION: ONE PERSON

## 2025-03-20 ENCOUNTER — LAB (OUTPATIENT)
Dept: LAB | Facility: CLINIC | Age: 85
End: 2025-03-20
Payer: MEDICARE

## 2025-03-20 ENCOUNTER — HOME CARE VISIT (OUTPATIENT)
Dept: HOME HEALTH SERVICES | Facility: HOME HEALTH | Age: 85
End: 2025-03-20
Payer: MEDICARE

## 2025-03-20 DIAGNOSIS — C79.51 CARCINOMA OF RIGHT BREAST METASTATIC TO BONE: ICD-10-CM

## 2025-03-20 DIAGNOSIS — C50.911 CARCINOMA OF RIGHT BREAST METASTATIC TO BONE: ICD-10-CM

## 2025-03-20 LAB
ALBUMIN SERPL BCP-MCNC: 4.3 G/DL (ref 3.4–5)
ALP SERPL-CCNC: 40 U/L (ref 33–136)
ALT SERPL W P-5'-P-CCNC: 8 U/L (ref 7–45)
ANION GAP SERPL CALC-SCNC: 13 MMOL/L (ref 10–20)
AST SERPL W P-5'-P-CCNC: 15 U/L (ref 9–39)
BASOPHILS # BLD AUTO: 0.02 X10*3/UL (ref 0–0.1)
BASOPHILS NFR BLD AUTO: 0.8 %
BILIRUB SERPL-MCNC: 0.4 MG/DL (ref 0–1.2)
BUN SERPL-MCNC: 22 MG/DL (ref 6–23)
CALCIUM SERPL-MCNC: 8.5 MG/DL (ref 8.6–10.3)
CHLORIDE SERPL-SCNC: 100 MMOL/L (ref 98–107)
CO2 SERPL-SCNC: 27 MMOL/L (ref 21–32)
CREAT SERPL-MCNC: 1.18 MG/DL (ref 0.5–1.05)
EGFRCR SERPLBLD CKD-EPI 2021: 46 ML/MIN/1.73M*2
EOSINOPHIL # BLD AUTO: 0.05 X10*3/UL (ref 0–0.4)
EOSINOPHIL NFR BLD AUTO: 1.9 %
ERYTHROCYTE [DISTWIDTH] IN BLOOD BY AUTOMATED COUNT: 22.4 % (ref 11.5–14.5)
GLUCOSE SERPL-MCNC: 113 MG/DL (ref 74–99)
HCT VFR BLD AUTO: 27.2 % (ref 36–46)
HGB BLD-MCNC: 8.7 G/DL (ref 12–16)
HYPOCHROMIA BLD QL SMEAR: NORMAL
IMM GRANULOCYTES # BLD AUTO: 0 X10*3/UL (ref 0–0.5)
IMM GRANULOCYTES NFR BLD AUTO: 0 % (ref 0–0.9)
LYMPHOCYTES # BLD AUTO: 1.52 X10*3/UL (ref 0.8–3)
LYMPHOCYTES NFR BLD AUTO: 58.2 %
MCH RBC QN AUTO: 33.3 PG (ref 26–34)
MCHC RBC AUTO-ENTMCNC: 32 G/DL (ref 32–36)
MCV RBC AUTO: 104 FL (ref 80–100)
MONOCYTES # BLD AUTO: 0.18 X10*3/UL (ref 0.05–0.8)
MONOCYTES NFR BLD AUTO: 6.9 %
NEUTROPHILS # BLD AUTO: 0.84 X10*3/UL (ref 1.6–5.5)
NEUTROPHILS NFR BLD AUTO: 32.2 %
NRBC BLD-RTO: ABNORMAL /100{WBCS}
PLATELET # BLD AUTO: 198 X10*3/UL (ref 150–450)
POLYCHROMASIA BLD QL SMEAR: NORMAL
POTASSIUM SERPL-SCNC: 4.2 MMOL/L (ref 3.5–5.3)
PROT SERPL-MCNC: 6.5 G/DL (ref 6.4–8.2)
RBC # BLD AUTO: 2.61 X10*6/UL (ref 4–5.2)
RBC MORPH BLD: NORMAL
SODIUM SERPL-SCNC: 136 MMOL/L (ref 136–145)
WBC # BLD AUTO: 2.6 X10*3/UL (ref 4.4–11.3)

## 2025-03-20 PROCEDURE — 80053 COMPREHEN METABOLIC PANEL: CPT

## 2025-03-20 PROCEDURE — 36415 COLL VENOUS BLD VENIPUNCTURE: CPT

## 2025-03-20 PROCEDURE — 86300 IMMUNOASSAY TUMOR CA 15-3: CPT

## 2025-03-20 PROCEDURE — G0299 HHS/HOSPICE OF RN EA 15 MIN: HCPCS | Mod: HHH

## 2025-03-20 PROCEDURE — 85025 COMPLETE CBC W/AUTO DIFF WBC: CPT

## 2025-03-21 ENCOUNTER — INFUSION (OUTPATIENT)
Dept: HEMATOLOGY/ONCOLOGY | Facility: CLINIC | Age: 85
End: 2025-03-21
Payer: MEDICARE

## 2025-03-21 ENCOUNTER — SPECIALTY PHARMACY (OUTPATIENT)
Dept: HEMATOLOGY/ONCOLOGY | Facility: CLINIC | Age: 85
End: 2025-03-21

## 2025-03-21 ENCOUNTER — OFFICE VISIT (OUTPATIENT)
Dept: HEMATOLOGY/ONCOLOGY | Facility: CLINIC | Age: 85
End: 2025-03-21
Payer: MEDICARE

## 2025-03-21 VITALS
RESPIRATION RATE: 16 BRPM | OXYGEN SATURATION: 93 % | HEART RATE: 68 BPM | BODY MASS INDEX: 31 KG/M2 | SYSTOLIC BLOOD PRESSURE: 152 MMHG | TEMPERATURE: 97.7 F | WEIGHT: 158.73 LBS | DIASTOLIC BLOOD PRESSURE: 72 MMHG

## 2025-03-21 VITALS
HEART RATE: 74 BPM | DIASTOLIC BLOOD PRESSURE: 60 MMHG | RESPIRATION RATE: 20 BRPM | BODY MASS INDEX: 31.02 KG/M2 | WEIGHT: 158 LBS | OXYGEN SATURATION: 96 % | SYSTOLIC BLOOD PRESSURE: 118 MMHG | HEIGHT: 60 IN

## 2025-03-21 DIAGNOSIS — E78.2 MIXED HYPERLIPIDEMIA: ICD-10-CM

## 2025-03-21 DIAGNOSIS — Z15.89 MALIGNANT NEOPLASM OF BREAST ASSOCIATED WITH MUTATION IN CHEK2 GENE: ICD-10-CM

## 2025-03-21 DIAGNOSIS — C50.911 CARCINOMA OF RIGHT BREAST METASTATIC TO BONE: Primary | ICD-10-CM

## 2025-03-21 DIAGNOSIS — K21.00 GASTROESOPHAGEAL REFLUX DISEASE WITH ESOPHAGITIS WITHOUT HEMORRHAGE: ICD-10-CM

## 2025-03-21 DIAGNOSIS — I10 ESSENTIAL HYPERTENSION: ICD-10-CM

## 2025-03-21 DIAGNOSIS — J90 PLEURAL EFFUSION: ICD-10-CM

## 2025-03-21 DIAGNOSIS — Z15.02 MALIGNANT NEOPLASM OF BREAST ASSOCIATED WITH MUTATION IN CHEK2 GENE: ICD-10-CM

## 2025-03-21 DIAGNOSIS — M89.9 LYTIC BONE LESIONS ON XRAY: ICD-10-CM

## 2025-03-21 DIAGNOSIS — H40.9 GLAUCOMA OF BOTH EYES, UNSPECIFIED GLAUCOMA TYPE: ICD-10-CM

## 2025-03-21 DIAGNOSIS — Z15.09 MALIGNANT NEOPLASM OF BREAST ASSOCIATED WITH MUTATION IN CHEK2 GENE: ICD-10-CM

## 2025-03-21 DIAGNOSIS — C50.919 MALIGNANT NEOPLASM OF BREAST ASSOCIATED WITH MUTATION IN CHEK2 GENE: ICD-10-CM

## 2025-03-21 DIAGNOSIS — D61.82 MYELOPHTHISIC ANEMIA: ICD-10-CM

## 2025-03-21 DIAGNOSIS — M19.90 ARTHRITIS: ICD-10-CM

## 2025-03-21 DIAGNOSIS — C79.51 CARCINOMA OF RIGHT BREAST METASTATIC TO BONE: Primary | ICD-10-CM

## 2025-03-21 DIAGNOSIS — C79.51 CARCINOMA OF RIGHT BREAST METASTATIC TO BONE (MULTI): ICD-10-CM

## 2025-03-21 DIAGNOSIS — C50.911 CARCINOMA OF RIGHT BREAST METASTATIC TO BONE (MULTI): ICD-10-CM

## 2025-03-21 LAB — CANCER AG27-29 SERPL-ACNC: 240.7 U/ML (ref 0–38.6)

## 2025-03-21 PROCEDURE — 1159F MED LIST DOCD IN RCRD: CPT | Performed by: INTERNAL MEDICINE

## 2025-03-21 PROCEDURE — G2211 COMPLEX E/M VISIT ADD ON: HCPCS | Performed by: INTERNAL MEDICINE

## 2025-03-21 PROCEDURE — 1160F RVW MEDS BY RX/DR IN RCRD: CPT | Performed by: INTERNAL MEDICINE

## 2025-03-21 PROCEDURE — 99214 OFFICE O/P EST MOD 30 MIN: CPT | Mod: 25 | Performed by: INTERNAL MEDICINE

## 2025-03-21 PROCEDURE — 1125F AMNT PAIN NOTED PAIN PRSNT: CPT | Performed by: INTERNAL MEDICINE

## 2025-03-21 PROCEDURE — 2500000004 HC RX 250 GENERAL PHARMACY W/ HCPCS (ALT 636 FOR OP/ED): Mod: TB | Performed by: INTERNAL MEDICINE

## 2025-03-21 PROCEDURE — 3078F DIAST BP <80 MM HG: CPT | Performed by: INTERNAL MEDICINE

## 2025-03-21 PROCEDURE — 2500000004 HC RX 250 GENERAL PHARMACY W/ HCPCS (ALT 636 FOR OP/ED): Mod: JZ,TB

## 2025-03-21 PROCEDURE — 99214 OFFICE O/P EST MOD 30 MIN: CPT | Performed by: INTERNAL MEDICINE

## 2025-03-21 PROCEDURE — 3077F SYST BP >= 140 MM HG: CPT | Performed by: INTERNAL MEDICINE

## 2025-03-21 RX ORDER — LAMOTRIGINE 25 MG/1
500 TABLET ORAL ONCE
Status: COMPLETED | OUTPATIENT
Start: 2025-03-21 | End: 2025-03-21

## 2025-03-21 RX ORDER — DIPHENHYDRAMINE HYDROCHLORIDE 50 MG/ML
50 INJECTION, SOLUTION INTRAMUSCULAR; INTRAVENOUS AS NEEDED
OUTPATIENT
Start: 2025-04-04

## 2025-03-21 RX ORDER — EPINEPHRINE 0.3 MG/.3ML
0.3 INJECTION SUBCUTANEOUS EVERY 5 MIN PRN
OUTPATIENT
Start: 2025-04-04

## 2025-03-21 RX ORDER — ALBUTEROL SULFATE 0.83 MG/ML
3 SOLUTION RESPIRATORY (INHALATION) AS NEEDED
OUTPATIENT
Start: 2025-04-04

## 2025-03-21 RX ORDER — FAMOTIDINE 10 MG/ML
20 INJECTION, SOLUTION INTRAVENOUS ONCE AS NEEDED
OUTPATIENT
Start: 2025-04-04

## 2025-03-21 RX ADMIN — FULVESTRANT 500 MG: 50 INJECTION, SOLUTION INTRAMUSCULAR at 14:20

## 2025-03-21 RX ADMIN — DENOSUMAB 120 MG: 120 INJECTION SUBCUTANEOUS at 14:11

## 2025-03-21 ASSESSMENT — ENCOUNTER SYMPTOMS: DENIES PAIN: 1

## 2025-03-21 ASSESSMENT — PAIN SCALES - GENERAL: PAINLEVEL_OUTOF10: 2

## 2025-03-21 NOTE — PATIENT INSTRUCTIONS
Your inavolisib will change to 21 days on, then 7 days off    I will refer you to [facility] pulmonologist

## 2025-03-21 NOTE — PROGRESS NOTES
[facility] Refill Management:    Medication(s) Requested: inavolisib 3 mg PO once daily on D1 - 21 of a 28 day cycle    Date of Request: 03/21/25 1:39 PM    - Labs in last 3 months: Yes    Date: 3/20/25  - Office visit in last 3 months: Yes    Date: 3/20/25  - Changes in medications in last 3 months: Yes - dosing adjustments in inavolisib, per pt tolerance  - Actionable labs or dose adjustments requiring physician clarification: No    Next FUV scheduled for TBD w/ Dr. Mcfarland or Rhoda Santana CNP    Approved for refill under Consult Agreement: Yes    Comments: Making dosing adjustment to inavolisib per pt tolerance.  Keeping palbociclib and fulvestrant dosing the same.    Dario Arredondo Formerly [facility] MS, BCOP  Clinical Pharmacy Specialist - Ambulatory Oncology

## 2025-03-21 NOTE — PROGRESS NOTES
Patient ID: Arabella Springer is a 84 y.o. female.  Referring Physician: Andrew Mcfarland MD  [id number] [facility] [address]  [place] [id number]  Primary Care Provider: Angel Luis Velazquez DO  Visit Type: Follow Up      Subjective    HPI The mouth sores are coming back    Review of Systems   Constitutional: Negative.    HENT:   Positive for mouth sores.    Eyes: Negative.    Respiratory: Negative.     Cardiovascular: Negative.    Gastrointestinal: Negative.    Endocrine: Negative.    Genitourinary: Negative.     Musculoskeletal: Negative.    Skin: Negative.    Neurological: Negative.    Hematological: Negative.    Psychiatric/Behavioral: Negative.          Objective   BSA: 1.75 meters squared  /72 (BP Location: Left arm, Patient Position: Sitting, BP Cuff Size: Adult)   Pulse 68   Temp 36.5 °C (97.7 °F) (Temporal)   Resp 16   Wt 72 kg (158 lb 11.7 oz)   SpO2 93%   BMI 31.00 kg/m²      has a past medical history of Anxiety, Arthritis, Breast cancer (Multi), Breast cancer metastasized to bone (Multi), GERD (gastroesophageal reflux disease), Glaucoma, Hiatal hernia, Hyperlipidemia, and Hypertension.   has a past surgical history that includes Mastectomy complete / simple (Right); Hysterectomy; and Shoulder surgery.  Family History   Problem Relation Name Age of Onset    Colon cancer Sister      Breast cancer Daughter       Oncology History   Breast cancer metastasized to bone (Multi)   9/8/2023 - 11/10/2023 Chemotherapy    Pembrolizumab, 21 Day Cycles     9/29/2023 Initial Diagnosis    Breast cancer metastasized to bone (CMS/HCC)     12/27/2024 -  Chemotherapy    Inavolisib + Palbociclib + Fulvestrant, 28 Day Cycles         Arabella Springer  reports that she quit smoking about 60 years ago. Her smoking use included cigarettes. She has never used smokeless tobacco.  She  reports that she does not currently use alcohol.  She  reports no history of drug use.    Physical Exam  Vitals reviewed.   Constitutional:        Appearance: Normal appearance.   HENT:      Head: Normocephalic.      Mouth/Throat:      Mouth: Mucous membranes are moist.   Eyes:      Extraocular Movements: Extraocular movements intact.      Pupils: Pupils are equal, round, and reactive to light.   Cardiovascular:      Rate and Rhythm: Normal rate and regular rhythm.      Pulses: Normal pulses.      Heart sounds: Normal heart sounds.   Pulmonary:      Effort: Pulmonary effort is normal.      Breath sounds: Normal breath sounds.   Abdominal:      General: Bowel sounds are normal.      Palpations: Abdomen is soft.   Musculoskeletal:         General: Normal range of motion.      Cervical back: Normal range of motion and neck supple.   Skin:     General: Skin is warm.   Neurological:      General: No focal deficit present.      Mental Status: She is alert and oriented to person, place, and time.   Psychiatric:         Mood and Affect: Mood normal.         Behavior: Behavior normal.         WBC   Date/Time Value Ref Range Status   03/20/2025 02:26 PM 2.6 (L) 4.4 - 11.3 x10*3/uL Final   03/06/2025 02:25 PM 4.0 (L) 4.4 - 11.3 x10*3/uL Final   02/20/2025 02:35 PM 2.7 (L) 4.4 - 11.3 x10*3/uL Final     WHITE BLOOD CELL COUNT   Date/Time Value Ref Range Status   02/12/2025 10:34 AM 2.8 (L) 3.8 - 10.8 Thousand/uL Final     nRBC   Date Value Ref Range Status   03/20/2025   Final     Comment:     Not Measured   03/06/2025   Final     Comment:     Not Measured   02/20/2025   Final     Comment:     Not Measured     RBC   Date Value Ref Range Status   03/20/2025 2.61 (L) 4.00 - 5.20 x10*6/uL Final   03/06/2025 2.53 (L) 4.00 - 5.20 x10*6/uL Final   02/20/2025 2.66 (L) 4.00 - 5.20 x10*6/uL Final     RED BLOOD CELL COUNT   Date Value Ref Range Status   02/12/2025 2.74 (L) 3.80 - 5.10 Million/uL Final     Hemoglobin   Date Value Ref Range Status   03/20/2025 8.7 (L) 12.0 - 16.0 g/dL Final   03/06/2025 8.2 (L) 12.0 - 16.0 g/dL Final   02/20/2025 8.3 (L) 12.0 - 16.0 g/dL Final      HEMOGLOBIN   Date Value Ref Range Status   02/12/2025 8.5 (L) 11.7 - 15.5 g/dL Final     Hematocrit   Date Value Ref Range Status   03/20/2025 27.2 (L) 36.0 - 46.0 % Final   03/06/2025 26.2 (L) 36.0 - 46.0 % Final   02/20/2025 26.6 (L) 36.0 - 46.0 % Final     HEMATOCRIT   Date Value Ref Range Status   02/12/2025 27.0 (L) 35.0 - 45.0 % Final     MCV   Date/Time Value Ref Range Status   03/20/2025 02:26  (H) 80 - 100 fL Final   03/06/2025 02:25  (H) 80 - 100 fL Final   02/20/2025 02:35  80 - 100 fL Final   02/12/2025 10:34 AM 98.5 80.0 - 100.0 fL Final     MCH   Date/Time Value Ref Range Status   03/20/2025 02:26 PM 33.3 26.0 - 34.0 pg Final   03/06/2025 02:25 PM 32.4 26.0 - 34.0 pg Final   02/20/2025 02:35 PM 31.2 26.0 - 34.0 pg Final   02/12/2025 10:34 AM 31.0 27.0 - 33.0 pg Final     MCHC   Date/Time Value Ref Range Status   03/20/2025 02:26 PM 32.0 32.0 - 36.0 g/dL Final   03/06/2025 02:25 PM 31.3 (L) 32.0 - 36.0 g/dL Final   02/20/2025 02:35 PM 31.2 (L) 32.0 - 36.0 g/dL Final   02/12/2025 10:34 AM 31.5 (L) 32.0 - 36.0 g/dL Final     Comment:     For adults, a slight decrease in the calculated MCHC  value (in the range of 30 to 32 g/dL) is most likely  not clinically significant; however, it should be  interpreted with caution in correlation with other  red cell parameters and the patient's clinical  condition.       RDW   Date/Time Value Ref Range Status   03/20/2025 02:26 PM 22.4 (H) 11.5 - 14.5 % Final   03/06/2025 02:25 PM 21.5 (H) 11.5 - 14.5 % Final   02/20/2025 02:35 PM 19.4 (H) 11.5 - 14.5 % Final   02/12/2025 10:34 AM 16.9 (H) 11.0 - 15.0 % Final     Platelets   Date/Time Value Ref Range Status   03/20/2025 02:26  150 - 450 x10*3/uL Final   03/06/2025 02:25  150 - 450 x10*3/uL Final   02/20/2025 02:35  150 - 450 x10*3/uL Final     PLATELET COUNT   Date/Time Value Ref Range Status   02/12/2025 10:34  140 - 400 Thousand/uL Final     MPV   Date/Time Value Ref  Range Status   02/12/2025 10:34 AM 9.8 7.5 - 12.5 fL Final   10/19/2023 08:01 AM 9.2 7.5 - 11.5 fL Final     Neutrophils %   Date/Time Value Ref Range Status   03/20/2025 02:26 PM 32.2 40.0 - 80.0 % Final   03/06/2025 02:25 PM 29.3 40.0 - 80.0 % Final   02/20/2025 02:35 PM 34.3 40.0 - 80.0 % Final     Immature Granulocytes %, Automated   Date/Time Value Ref Range Status   03/20/2025 02:26 PM 0.0 0.0 - 0.9 % Final     Comment:     Immature Granulocyte Count (IG) includes promyelocytes, myelocytes and metamyelocytes but does not include bands. Percent differential counts (%) should be interpreted in the context of the absolute cell counts (cells/UL).   03/06/2025 02:25 PM 0.7 0.0 - 0.9 % Final     Comment:     Immature Granulocyte Count (IG) includes promyelocytes, myelocytes and metamyelocytes but does not include bands. Percent differential counts (%) should be interpreted in the context of the absolute cell counts (cells/UL).   02/20/2025 02:35 PM 0.0 0.0 - 0.9 % Final     Comment:     Immature Granulocyte Count (IG) includes promyelocytes, myelocytes and metamyelocytes but does not include bands. Percent differential counts (%) should be interpreted in the context of the absolute cell counts (cells/UL).     Lymphocytes %   Date/Time Value Ref Range Status   03/20/2025 02:26 PM 58.2 13.0 - 44.0 % Final   03/06/2025 02:25 PM 54.9 13.0 - 44.0 % Final   02/20/2025 02:35 PM 55.6 13.0 - 44.0 % Final     Monocytes %   Date/Time Value Ref Range Status   03/20/2025 02:26 PM 6.9 2.0 - 10.0 % Final   03/06/2025 02:25 PM 13.2 2.0 - 10.0 % Final   02/20/2025 02:35 PM 7.5 2.0 - 10.0 % Final     Eosinophils %   Date/Time Value Ref Range Status   03/20/2025 02:26 PM 1.9 0.0 - 6.0 % Final   03/06/2025 02:25 PM 1.2 0.0 - 6.0 % Final   02/20/2025 02:35 PM 2.2 0.0 - 6.0 % Final     Basophils %   Date/Time Value Ref Range Status   03/20/2025 02:26 PM 0.8 0.0 - 2.0 % Final   03/06/2025 02:25 PM 0.7 0.0 - 2.0 % Final   02/20/2025 02:35  PM 0.4 0.0 - 2.0 % Final     Neutrophils Absolute   Date/Time Value Ref Range Status   03/20/2025 02:26 PM 0.84 (L) 1.60 - 5.50 x10*3/uL Final     Comment:     Percent differential counts (%) should be interpreted in the context of the absolute cell counts (cells/uL).   03/06/2025 02:25 PM 1.17 (L) 1.60 - 5.50 x10*3/uL Final     Comment:     Percent differential counts (%) should be interpreted in the context of the absolute cell counts (cells/uL).   02/20/2025 02:35 PM 0.92 (L) 1.60 - 5.50 x10*3/uL Final     Comment:     Percent differential counts (%) should be interpreted in the context of the absolute cell counts (cells/uL).     Immature Granulocytes Absolute, Automated   Date/Time Value Ref Range Status   03/20/2025 02:26 PM 0.00 0.00 - 0.50 x10*3/uL Final   03/06/2025 02:25 PM 0.03 0.00 - 0.50 x10*3/uL Final   02/20/2025 02:35 PM 0.00 0.00 - 0.50 x10*3/uL Final     Lymphocytes Absolute   Date/Time Value Ref Range Status   03/20/2025 02:26 PM 1.52 0.80 - 3.00 x10*3/uL Final   03/06/2025 02:25 PM 2.20 0.80 - 3.00 x10*3/uL Final   02/20/2025 02:35 PM 1.49 0.80 - 3.00 x10*3/uL Final     Monocytes Absolute   Date/Time Value Ref Range Status   03/20/2025 02:26 PM 0.18 0.05 - 0.80 x10*3/uL Final   03/06/2025 02:25 PM 0.53 0.05 - 0.80 x10*3/uL Final   02/20/2025 02:35 PM 0.20 0.05 - 0.80 x10*3/uL Final     Eosinophils Absolute   Date/Time Value Ref Range Status   03/20/2025 02:26 PM 0.05 0.00 - 0.40 x10*3/uL Final   03/06/2025 02:25 PM 0.05 0.00 - 0.40 x10*3/uL Final   02/20/2025 02:35 PM 0.06 0.00 - 0.40 x10*3/uL Final     Basophils Absolute   Date/Time Value Ref Range Status   03/20/2025 02:26 PM 0.02 0.00 - 0.10 x10*3/uL Final     Comment:     Automated WBC differential has been confirmed by manual smear.   03/06/2025 02:25 PM 0.03 0.00 - 0.10 x10*3/uL Final     Comment:     Automated WBC differential has been confirmed by manual smear.   02/20/2025 02:35 PM 0.01 0.00 - 0.10 x10*3/uL Final     Comment:      "Automated WBC differential has been confirmed by manual smear.       No components found for: \"PT\"  No results found for: \"APTT\"  Medication Documentation Review Audit       Reviewed by Akousa Candelario MA (Medical Assistant) on 25 at 1245      Medication Order Taking? Sig Documenting Provider Last Dose Status   acetaminophen (Tylenol) 500 mg tablet [id number] Yes Take 2 tablets (1,000 mg) by mouth 3 times a day as needed for mild pain (1 - 3) or moderate pain (4 - 6). Tika Hackett MD Past Month Active   beclomethasone HFA (Qvar) 40 mcg/actuation inhaler [id number] Yes Inhale 1 Inhalation 2 times a day. Tika Hackett MD 2025 Morning Active   Blood glucose monitoring meter [id number] Yes Test two times daily. DEX Bolton  Active   blood sugar diagnostic strip [id number] Yes 1 each 2 times a day. DEX Bolton  Active   brimonidine-timoloL (Combigan) 0.2-0.5 % ophthalmic solution [id number] Yes Administer 1 drop into both eyes every 12 hours. Tika Hackett MD 2025 Morning Active   calcium carbonate-vitamin D3 600 mg-10 mcg (400 unit) tablet [id number] Yes Take 3 tablets by mouth once daily. Tika Hackett MD 2025 Morning Active   denosumab (Xgeva) 120 mg/1.7 mL (70 mg/mL) injection [id number] Yes Inject 1.7 mL (120 mg) under the skin every 30 (thirty) days. Tika Hackett MD Past Month Active   diclofenac sodium (Voltaren Arthritis Pain) 1 % gel [id number] Yes Apply 4.5 inches (4 g) topically 2 times a day as needed. Tika Hackett MD  Active   esomeprazole (NexIUM) 40 mg DR capsule [id number] Yes Take 1 capsule (40 mg) by mouth once daily in the morning. Take before meals. Tika Hackett MD 2025 Morning Active   furosemide (Lasix) 20 mg tablet [id number]  Take 1 tablet (20 mg) by mouth once daily.   Patient taking differently: Take 1 tablet (20 mg) by mouth every other day.    Angel Luis Velazquez, DO   25 [id number]   inavolisib 3 mg " tablet [id number] Yes Take 3 mg by mouth once daily for 28 days. Do not fill before February 7, 2025. DEX Bolton  Active   inhalat.spacing dev,large mask (Pro Comfort Spacer-Adult Mask) spacer [id number] Yes 1 each every 4 hours if needed (shortness of breath, wheezing, cough). Naina GLEN Briggs, DO Unknown Active   isopropyl alcohoL 70 % towelette [id number] Yes Test 2 times a day, clean finger prior to testing DEX Bolton  Active   LORazepam (Ativan) 0.5 mg tablet [id number] Yes Take 1 tablet (0.5 mg) by mouth every 2 hours if needed for anxiety (anxiety related to radiology tests) for up to 5 doses. DEX Bolton Past Month Active   losartan-hydrochlorothiazide (Hyzaar) 100-12.5 mg tablet [id number] Yes Take 1 tablet by mouth once daily. Abraham Mohan DO 2/5/2025 Morning Active   mometasone (Elocon) 0.1 % lotion [id number] Yes Apply topically 2 times a day.   Patient taking differently: Apply topically 2 times a day as needed.    Jimmie Nam MD 2/5/2025 Morning Active   multivitamin tablet [id number] Yes Take 1 tablet by mouth once daily. Tika Hackett MD 2/5/2025 Morning Active   ondansetron (Zofran) 8 mg tablet [id number] Yes Take 1 tablet (8 mg) by mouth every 8 hours if needed for nausea or vomiting. Andrew Mcfarland MD Past Month Active   palbociclib (Ibrance) 125 mg tablet [id number] Yes Take 125 mg (1 tablet) by mouth once daily for three weeks, then one week off.  Swallow whole.  Do not crush, chew or split. Andrew Mcfarland MD  Active   pravastatin (Pravachol) 10 mg tablet [id number] Yes Take 1 tablet (10 mg) by mouth once daily. Abraham Mohan DO 2/5/2025 Morning Active   prochlorperazine (Compazine) 10 mg tablet [id number] Yes Take 1 tablet (10 mg) by mouth every 6 hours if needed for nausea or vomiting. Andrew Mcfarland MD Past Month Active   Rhopressa 0.02 % drops opthalmic solution [id number] Yes Administer 1 drop into the right eye once daily at  bedtime. Historical Provider, MD 2/4/2025 Bedtime Active   triamcinolone (Kenalog) 0.1 % cream [id number] Yes Apply topically 2 times a day.   Patient taking differently: Apply topically 2 times a day as needed.    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  -now off steroids  -CT scan done on 4/3/2024 reviewed: interval worsening of bilateral pleural effusions, right >left; multiple scattered <0.5 mm pulmonary nodules bilateral lungs not changed; interval development of several branching opacities involving left lung apex; there are no new worrisome hepatic lesions; mild right sided hydronephrosis; diffuse sclerosis of axial and appendicular skeleton  -has been more short of breath recently--was evaluated in the ER at [facility] on 8/4--was told she had bilateral pleural effusions, which are not actually new  -right sided pleural effusion is already seen on PET scan done in 8/2023  -she had an echo done in June which was read as normal, [facility] did not perform a new one, cardiologist on 8/21 told the patient that she did not have CHF and that these effusions were likely malignant; she was started on low dose lasix which barely helped with the leg edema--and so dose was bumped up; she was recommended by pulmonology consult to undergo thoracentesis and she declined to have it done  -since effusions are not new, and actually were already noted over one  year ago, a normal echo does NOT completely rule out well-compensated CHF  -PET done on 8/16/2024 reviewed--no concerning pulmonary nodule; moderate-to-large bilateral pleura effusions, right greater than left; no FDG avid mediastinal, hilar or axillary lymphadenopathy; s/p right axillary ike dissection; s/p right mastectomy and reconstruction; no FDG avid lymphadenopathy in the abdomen and pelvis; bilateral hydronephrosis; interval improvement of FDG avid widespread bone metastases throughout the axial and appendicular skeleton many of which are non FDG avid ; residual disease noted in bilateral femur (SUV 3.6), pelvis (SUV 2.4), bilateral humerus (SUV 3.3), bilateral clavicles (SUV 2/9)  -there is no FDG activity in her thorax/pleura, making the effusions less likely to be of malignant origin, and given bilateral nature--more likely cardiac in origin  -she had thoracentesis done on 9/24/2024--with removal of 700 ml yellow fluid; cytology was negative  -here for interval followup  -CT scan done on 12/9/2024 reviewed--moderate bilateral pleural effusions with atelectasis, right greater than left; no concerning lung nodule; no mediastinal, hilar or axillary lymphadenopathy; postsurgical changes of right mastectomy with right breast implants;  moderate hydronephrosis left greater than right increased from prior exam; small to moderate volume ascites; new peritoneal nodular thickening (33 mm peritoneal implant in anterior deep pelvis); additional areas of peritoneal nodular thickening and enhancement in deep pelvis; no abdominopelvic lymphadenopathy; similar appearance of diffuse sclerotic lesions throughout the axial and appendicular skeleton; chronic bilateral rib fractures are noted; plate and screw fixation of the right humerus  -bone scan done on 12/9/2024 reviewed similar appearance of diffusely increased radiotracer uptake throughout the axial and appendicular skeleton corresponding with widespread sclerotic  osseous lesions on CT  --labs done on 12/11/2024 included CBC, COMP, CA 27.29  -results reviewed--wbc 4.6, hgb 9.1 plt 211,000, creatinine 1.04, calcium 9.0, albumin 4.1 AST 18, ALT 12  CA 27.29 295  -benefits, risks, potential morbidity related to xgeva were reviewed with Arabella and she provided informed consent to proceed  -she went on to receive xgeva 120 mg subcutaneous  -as there is now evidence of progression of disease (new peritoneal metastases), she is failing AI, and I have recommended therapeutic switch  -since her tumor has the PIK3CA mutation, I have advised switching to faslodex + palbociclib + Inavolisib  -benefits, risks, potential morbidity related to faslodex + palbociclib + inavolisib were reviewed with Arabella and she signed informed consent to proceed  -on days 1 and 15 (and then Q28 days) she will receive faslodex 500 mg intramuscular  -she will also take palbociclib 125 mg PO once daily for 21 days on then 7 days off  -she will also take inavolisib 9 mg PO once daily  -here for interval followup  -shortly after starting the new combination, she was admitted to [facility] from 1/21 to 1/23/2025 for mucositis and electrolyte abnormalities  -inavolisib was held  -she was then readmitted to [facility] from 2/5 to 2/72025 for shortness of breath, thoracentesis was done on the left side with removal of 750 ml of fluid but none was sent for cytology  -her mucositis eventually recovered  -she received inavoliisb 3 mg in hand a couple days ago and started it; mucositis has not returned but this morning she noticed in the mirror 2 maculopapular lesions--one on right cheek, one near left nares  -she was at physical therapy a couple weeks ago--and the therapist noted a slight asymmetry in her ability to rotate her ankles and asked Arabella if she has ever had a stroke--which quite alarmed her  -labs done on 2/20/2025 included CBC + COMP + CA 27.29  -results reviewed--creatinine 1.22, calcium 9.1, alk phos 44, AST 15,  ALT 8, wbc 2.66, hgb 8.3, plt 218,000, , CA 27.29 295  -here for interval followup  -she has taken inavolisib 3 mg daily for an additional 2 weeks--has not had recurrent mucositis, no new dermatologic issues  -continues to take lasix 20 mg every other day--leg edema and shortness of breath improved and stable  -here for interval followup  -over the last week, some mucositis has recurred--inside her lower lip  -labs done on 3/20/2025 included CBC +  COMP + CA 27.29  -results reviewed--wbc 2.6, hgb 8.7, plt 198,000, , creatinine 1.18, calcium 8.5, alk phos 40, AST 15, total bili 0.4, ALT 8 CA 27.29 240.7  -she will continue to take palbociclib 125 mg PO once daily for 21 days on then 7 days off  -she will now take inavolisib 3 mg PO once daily for 21 days on then 7 days off, in the hopes of helping her mucositis resolve  -due for xgeva (120 mg subcutaneously) and faslodex (500 mg intramuscularly) today  -she wants to transfer all of her care to , has been seeing a pulmonologist at [facility], but wants to see  one--will refer  -will see her again in 4 weeks     2) bone metastases  -secondary to metastatic breast cancer  -receives xgeva Q4 weeks     3) anemia  -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  -hgb slowly improving     4) hyperlipidemia  -on pravastatin     5) arthritis  -on tylenol PRN  -on diclofenac PRN  -received steroid injections into her knees and she feels so much better, so she wants to receive them again     6) hypertension  -on amlodipine  -on losartan-HCTZ     7) GERD  -on nexium     8) glaucoma  -on combigan eyedrops        Problem List Items Addressed This Visit             ICD-10-CM    Breast cancer metastasized to bone (Multi) C50.919, C79.51    Relevant Medications    inavolisib 3 mg tablet    Other Relevant Orders    Referral to Pulmonology    Clinic Appointment Request Chemo Follow Up; JAY GARCIA; [facility]    Pleural effusion - Primary J90     Relevant Orders    Referral to Pulmonology            Andrew Mcfarland MD

## 2025-03-22 PROCEDURE — RXMED WILLOW AMBULATORY MEDICATION CHARGE

## 2025-03-23 RX ORDER — EPINEPHRINE 0.3 MG/.3ML
0.3 INJECTION SUBCUTANEOUS EVERY 5 MIN PRN
OUTPATIENT
Start: 2025-05-16

## 2025-03-23 RX ORDER — FAMOTIDINE 10 MG/ML
20 INJECTION, SOLUTION INTRAVENOUS ONCE AS NEEDED
OUTPATIENT
Start: 2025-04-18

## 2025-03-23 RX ORDER — EPINEPHRINE 0.3 MG/.3ML
0.3 INJECTION SUBCUTANEOUS EVERY 5 MIN PRN
OUTPATIENT
Start: 2025-04-18

## 2025-03-23 RX ORDER — DIPHENHYDRAMINE HYDROCHLORIDE 50 MG/ML
50 INJECTION, SOLUTION INTRAMUSCULAR; INTRAVENOUS AS NEEDED
OUTPATIENT
Start: 2025-04-18

## 2025-03-23 RX ORDER — LAMOTRIGINE 25 MG/1
500 TABLET ORAL ONCE
OUTPATIENT
Start: 2025-04-18

## 2025-03-23 RX ORDER — FAMOTIDINE 10 MG/ML
20 INJECTION, SOLUTION INTRAVENOUS ONCE AS NEEDED
OUTPATIENT
Start: 2025-05-16

## 2025-03-23 RX ORDER — ALBUTEROL SULFATE 0.83 MG/ML
3 SOLUTION RESPIRATORY (INHALATION) AS NEEDED
OUTPATIENT
Start: 2025-04-18

## 2025-03-23 RX ORDER — DIPHENHYDRAMINE HYDROCHLORIDE 50 MG/ML
50 INJECTION, SOLUTION INTRAMUSCULAR; INTRAVENOUS AS NEEDED
OUTPATIENT
Start: 2025-05-16

## 2025-03-23 RX ORDER — LAMOTRIGINE 25 MG/1
500 TABLET ORAL ONCE
OUTPATIENT
Start: 2025-05-16

## 2025-03-23 RX ORDER — ALBUTEROL SULFATE 0.83 MG/ML
3 SOLUTION RESPIRATORY (INHALATION) AS NEEDED
OUTPATIENT
Start: 2025-05-16

## 2025-03-23 ASSESSMENT — ENCOUNTER SYMPTOMS
EYES NEGATIVE: 1
RESPIRATORY NEGATIVE: 1
PSYCHIATRIC NEGATIVE: 1
NEUROLOGICAL NEGATIVE: 1
ENDOCRINE NEGATIVE: 1
CARDIOVASCULAR NEGATIVE: 1
HEMATOLOGIC/LYMPHATIC NEGATIVE: 1
CONSTITUTIONAL NEGATIVE: 1
GASTROINTESTINAL NEGATIVE: 1
MUSCULOSKELETAL NEGATIVE: 1

## 2025-03-24 ENCOUNTER — SPECIALTY PHARMACY (OUTPATIENT)
Dept: PHARMACY | Facility: CLINIC | Age: 85
End: 2025-03-24

## 2025-03-25 ASSESSMENT — ACTIVITIES OF DAILY LIVING (ADL)
OASIS_M1830: 03
PHYSICAL TRANSFERS ASSESSED: 1
PHYSICAL_TRANSFERS_DEVICES: WALKER
AMBULATION ASSISTANCE: 1
TRANSPORTATION ASSESSED: 1
MODE OF TRANSPORTATION: WALKER
CURRENT_FUNCTION: ONE PERSON
TRANSPORTATION: NEEDS ASSISTANCE
AMBULATION ASSISTANCE: ONE PERSON
HOME_HEALTH_OASIS: 01

## 2025-03-26 NOTE — PROGRESS NOTES
Patient: Arabella Springer    [id number]  : 1940 -- AGE 84 y.o.    Provider: DEX Barcenas     Location [facility]   Service Date: 2025              [facility]  New Visit Note      HISTORY OF PRESENT ILLNESS     The patient's referring provider is: Andrew Mcfarland MD    HISTORY OF PRESENT ILLNESS   Arabella Springer is a 84 y.o. female who presents to a [facility] for an evaluation with concerns of Cough, Wheezing, Shortness of Breath, Hoarseness, and GERD. I have independently interviewed and examined the patient in the office and reviewed available records.    Current History      Patient has mets breast cancer with bone mets who presents with chronic bilateral effusions. She was initially seen by Dr Alonso at [facility], she initially had pleural effusions noted in CT 2024. She had transudate effusion drained in Sept   Results:   2024,  right Pleural space, thoracentesis, : 700 cc yellow fluid. pH 6.97, WBC 3649, RBC 1000, 90% lymphocytes, 9% monocytes, 1% neutrophils  Triglycerides 19, total protein 4.3, LDH 96, glucose 111; Cytology negative.  Serum labs 2024: Glucose 122, sodium 134, albumin 3.9, total protein 6.1, CA 27-29 408, BUN 20, creatinine 0.95  Protein ratio 0.70;     In Feb Had 750ml left sided thoracentesis in Feb - Specimens are not in the system to review    On today's visit, the patient reports she was seen in ER in 2024 for shortness of breath - has noted to pleural effusions and was started lasix.  It helped but worsened in kidney function. She weighs herself daily and alternating btwn 20mg and 40mg of lasix.       At baseline,  has dyspnea on exertion, but none at rest. Symptoms started  2024 - dyspnea is better since thoracentesis. Currently sits for most of the day, works inside the house, but does not carry loads and do strenuous exercise.   They are s short of  breath when hurrying on level ground or walking up a slight hill (mMRC 1). She Sleeps with 3 pillows, has known GERD, c/o kari.  Has ost X 10 pounds in the last X 12 months. Had a dry cough, but non lately, C/o wheezing, and denies green, blood streaks sputum. No night cough. No hemoptysis. No fever or shivering chills. Has a runny nose, Denies tingling sensation in the back of his throat. Also complains of heartburn. Denies chest pain    Previous pulmonary history Previously has bronchitis yearly - no known allergies  Has known pleural effusions    Inhalers/nebulized medications: NA    Hospitalization History:  Has been hospitalized over the last year for breathing related problem.    Sleep history:  Complains of snoring, apnea, feeling tired during the day, and taking naps during the day.   Only sleeps 4-5 hours         Previous Pulm Note by Dr. Alonso reviewed in EPic:     Initially seen by PCP in late July for 1 to 2 months of persistent cough and shortness of breath. Several episodes of PND on multiple nights per week as well as dyspnea on walking. Positive lower extremity edema.  Seen by SLP 8/1/2024 with MBSS showing normal oropharyngeal swallow.  Presented to ED 8/4/2024 with worsening shortness of breath associated with nonproductive cough and lower extremity swelling. Evaluation demonstrated normal vital signs, negative troponins, EKG, BNP with positive D-dimer. CT PE demonstrated right greater than left pleural effusions without other acute consolidation/infiltrates. Provide IV diuresis and ED for suspected heart failure associated effusions with planned thoracentesis. However, on further discussion with patient thoracentesis was declined and patient was discharged with follow-up.  PCP follow-up 8/15/2024 noted improved shortness of breath and lower extremity swelling since her discharge. Continued to endorse nocturnal awakenings, although less PND associated and more anxiety associated.  PET scan 8/17/2024  demonstrated no evidence of local recurrence, no ike metastases, as well as bilateral hydronephrosis and interval improvement in widespread FDG avid bone metastases. No evidence of FDG avid disease in the chest.  Seen by cardiology 8/21/2024 for evaluation. Per comparison of TTE from 6/2024 to prior at [facility] in 2014 felt unchanged and therefore unlikely to be cardiac in etiology. Recommended increasing Lasix from 20 to 40 mg daily.  8/23/2024 seen by oncology who felt effusions were unlikely malignant in origin. Continued on Lasix.    CURRENT VISIT 01/30/25  She has been in the hospital since her last visit. Her SOB peaked 3 weeks ago, but hasn't had it since. She was not eating for two weeks, and eating usually triggers her SOB. She has some swelling in her feet and ankles.    She is on medication for cancer, as is having significant side effects. She wasn't on Lasix in the hospital, but she has been taking 40mg for the last few days. She has not been using her inhaler in the last few weeks, she was using Qvar. She had been on a course of steroid since Sunday.    9/24/2024, thoracentesis, : 700 cc yellow fluid. pH 6.97, WBC 3649, RBC 1000, 90% lymphocytes, 9% monocytes, 1% neutrophils  Triglycerides 19, total protein 4.3, LDH 96, glucose 111; Cytology negative.  Serum labs 9/19/2024: Glucose 122, sodium 134, albumin 3.9, total protein 6.1, CA 27-29 408, BUN 20, creatinine 0.95  Protein ratio 0.70;    Pulmonary Function  Date FVC (%) FEV1 (%) FEV1/FVC BD FEF 25-75 DLco Notes  12/11/24 0.92, 40% 0.61, 35% 66 3%, 60cc 0.30, 20%  48% BD 7.02, 38%  /VA 69%      IMAGING  CXR, 9/26/24: Changes of metastatic disease involving the bony thorax with persistent small pleural effusions.    CXR, 9/12/24: 1. Since 08/29/2024, increase in size of small bibasilar pleural effusions. 2. Similar appearance of diffuse sclerotic osseous metastases.    CXR, 8/29/24: Plain radiographic exam of the chest shows persistent pleural  fluid/effusions and adjacent consolidations, probable congestive changes, and extensive osseous metastatic disease.    PET scan, 8/16/2024:  1. Status post right mastectomy and right axillary ike dissection, no PET evidence of local recurrence in the postsurgical bed.  2. No PET evidence of ike metastasis  3. Interval improvement of FDG avid widespread bone metastases throughout the axial and appendicular skeleton, many of which are non FDG avid with remaining mild FDG-avid lesions compatible with residual viable disease  4. Bilateral hydronephrosis, new from prior exam. Mag3 renogram could be obtained to evaluate for mechanical urinary obstruction.  5. Moderate-to-large bilateral pleural effusions, right-greater-than-left.    CT-PE Chest, 8/4/24:  1. No CT evidence of pulmonary embolism.  2. Bilateral pleural effusions, question CHF.  3. Redemonstrated innumerable sclerotic foci throughout the skeleton compatible with metastatic disease.  4. Additional less pertinent chronic, postoperative, and incidental findings as detailed.  Other: The tracheobronchial tree is patent. Right greater than left moderate to large pleural effusions increased over prior CT and correspond to prior  radiograph. There is subsegmental atelectasis of bilateral lower lobes.    CTA Chest, 4/3/24:  2. No definite evidence of new metastatic disease in the chest, abdomen or pelvis.  3. New fractures of the left posterior 5th rib and the left transverse spinous process of T5 the left posterior 6th and 7th ribs.  4. Interval worsening of the bilateral pleural effusions, mild periportal edema, and abdominopelvic ascites likely secondary to fluid overload versus secondary to underlying cardiac issues. Further evaluation with echocardiogram can be considered..  5. Interval development of mild right-sided hydronephrosis with dilatation of the mid ureter without definite evidence of obstructing stone, correlation with CT urogram is recommended to  rule out any mass lesions.  6. New branching opacities of the left lung apex likely representing bronchiolitis and attention on follow-up imaging is recommended to document resolution.    CTA Chest, 11/27/23:  4. Multiple pulmonary nodules which could represent metastatic disease.  5. Small bilateral pleural effusions.  6. Extensive sclerotic bony metastatic disease. Pathologic fractures left 8th and 9th ribs.    TTE, 6/2024  1. Left ventricular systolic function is normal with a 60-65% estimated ejection fraction.  2. Moderately increased left ventricular septal thickness.  3. Spectral Doppler shows an impaired relaxation pattern of left ventricular diastolic filling. There is a left ventricular outflow tract obstruction with the Valsalva maneuver. The provoked gradient is 25 mmHg.  4. Mildly elevated RVSP (41.8 mmHg)    TTE 8/3/18  - Technically difficult exam due to body habitus.  - Exam indication: Shortness of Breath  - The left ventricle is normal in size. Left ventricular systolic function is normal. EF = 64 ± 5% (2D biplane) Grade I left ventricular diastolic dysfunction.  - The right ventricle is normal in size. Right ventricular systolic function is normal.  - Estimated right ventricular systolic pressure is 37 mmHg consistent with mild pulmonary hypertension. Estimated right atrial pressure is 5 mmHg.  - Exam was compared with the prior  echocardiographic exam performed on 12/4/2014 (stress). No significant change.    GI Studies  [facility] [place], 8/1/24: SLP Impressions with Severity Rating: Patient is presenting with functional/age appropriate oropharyngeal swallow function upon completion of modified barium swallow study this date. Swallowing physiology is detailed above. The impairment that is most impacting swallowing safety and efficiency is esophageal retention with the barium tablet; required a few liquid assists to clear/pass through the LES. No laryngeal penetration or tracheal aspiration visualized during  study. Patient with no significant oral or pharyngeal residue; trace residual/coating visualized.    ------------------------------------------------------------------------------------------------------------------------------  ASSESSMENT/PLAN, 1/2/2025:  > Pleural effusion, bilateral with > Dyspnea on exertion & Cough  83F w/ stage IV metastatic breast ca with mets to bone c/b suspected pathologic rib fractures, chronic diastolic heart dz, I am seeing for evaluation of symptomatic acute on chronic bilateral pleural effusions.  Review of imaging, personally reviewed imaging and available reports from  clearly demonstrate chronicity of her effusions at least as far back as 11/2023 with slow progression demonstrated as of 4/2024 in setting of pathologic rib fractures.  She has no evidence to suggest infectious etiology and minimal suspicion for inflammatory.  9/24/2024, R-sided thoracentesis @  - cytology negative, studies exudative.  Remains on alternating days lasix therapy to control edema/volume  Poor nutrition is concerning, low albumin will also predispose to volume-related complications  Remains symptomatic with cough and dyspnea  Last imaging notable for stable small effusions.  Studies remain most c/w chronic malignancy-related effusion, although not clearly trigger for worsening. Mildly elevated WBCs with 90% lymphocyte predominance, low pH - raise question of possible alternative process , ruled out TB (neg Quantiferon Gold 10/7), lupus-related (drug-induced, negative RHETT) or even more remotely lymphoma.  Spirometry is primarily restrictive, no BD although 48% improvement in [id number] with BD challenge. Sig decreased diffusing capacity.  1/2/25: Discussed repeat evaluation for draining left effusion given ongoing symptoms, but size of effusions and stability, as well as the fairly minimal improvement in symptoms with her last thoracentesis make me less optimistic that her effusions are the main   of her dyspnea and cough. She notes symptom onset with eating, likely dysphagia, possible component of reactive airways contributing. Will check CXR today, cont her current course of diuretics, cont to monitor effusions for now. Will trial ICS + LAMA for airways. Check PFTs on followup.  1/30/25: nutritional status remains poor, multifactorial. Back on lasix, monitoring renal function. At present no indication to repeat thoracentesis, will cont to monitor. Possibly benefiting from ICS, will repeat trial, add LAMA if persisting symptoms.  PLAN 01/30/25  - Take 20mg Lasix every 1-2 days once kidney function normalizes, increase to 40mg every other day if not effective  - Restart Qvar  - Add Spiriva is still symptomatic after two weeks  - Consider overnight oxygen test        ALLERGIES AND MEDICATIONS     ALLERGIES  Allergies   Allergen Reactions    Oxycodone Nausea/vomiting       MEDICATIONS  Current Outpatient Medications   Medication Sig Dispense Refill    acetaminophen (Tylenol) 500 mg tablet Take 2 tablets (1,000 mg) by mouth 3 times a day as needed for mild pain (1 - 3) or moderate pain (4 - 6).      beclomethasone HFA (Qvar) 40 mcg/actuation inhaler Inhale 1 Inhalation 2 times a day.      Blood glucose monitoring meter Test two times daily. 1 each 0    blood sugar diagnostic strip 1 each 2 times a day. 100 each 0    brimonidine-timoloL (Combigan) 0.2-0.5 % ophthalmic solution Administer 1 drop into both eyes every 12 hours.      calcium carbonate-vitamin D3 600 mg-10 mcg (400 unit) tablet Take 3 tablets by mouth once daily.      denosumab (Xgeva) 120 mg/1.7 mL (70 mg/mL) injection Inject 1.7 mL (120 mg) under the skin every 30 (thirty) days.      diclofenac sodium (Voltaren Arthritis Pain) 1 % gel Apply 4.5 inches (4 g) topically 2 times a day as needed.      esomeprazole (NexIUM) 40 mg DR capsule Take 1 capsule (40 mg) by mouth once daily in the morning. Take before meals.      inavolisib 3 mg tablet Take 3 mg  by mouth once daily. For 3 weeks then take 1 week off 21 tablet 1    isopropyl alcohoL 70 % towelette Test 2 times a day, clean finger prior to testing 1 each 0    LORazepam (Ativan) 0.5 mg tablet Take 1 tablet (0.5 mg) by mouth every 2 hours if needed for anxiety (anxiety related to radiology tests) for up to 5 doses. 5 tablet 0    losartan-hydrochlorothiazide (Hyzaar) 100-12.5 mg tablet Take 1 tablet by mouth once daily. 90 tablet 0    mometasone (Elocon) 0.1 % lotion Apply topically 2 times a day. 30 mL 0    multivitamin tablet Take 1 tablet by mouth once daily.      ondansetron (Zofran) 8 mg tablet Take 1 tablet (8 mg) by mouth every 8 hours if needed for nausea or vomiting. 60 tablet 5    palbociclib (Ibrance) 125 mg tablet Take 125 mg (1 tablet) by mouth once daily for three weeks, then one week off.  Swallow whole.  Do not crush, chew or split. 21 tablet 2    pravastatin (Pravachol) 10 mg tablet Take 1 tablet (10 mg) by mouth once daily. 90 tablet 0    prochlorperazine (Compazine) 10 mg tablet Take 1 tablet (10 mg) by mouth every 6 hours if needed for nausea or vomiting. 60 tablet 5    Rhopressa 0.02 % drops opthalmic solution Administer 1 drop into the right eye once daily at bedtime.      triamcinolone (Kenalog) 0.1 % cream Apply topically 2 times a day. 453.6 g 1    furosemide (Lasix) 20 mg tablet Take 20mg alternating with 40mg 45 tablet 3    inhalat.spacing dev,large mask (Pro Comfort Spacer-Adult Mask) spacer 1 each every 4 hours if needed (shortness of breath, wheezing, cough). 1 each 0     No current facility-administered medications for this visit.         PAST HISTORY     PAST MEDICAL HISTORY  She  has a past medical history of Anxiety, Arthritis, Breast cancer, Breast cancer metastasized to bone, GERD (gastroesophageal reflux disease), Glaucoma, Hiatal hernia, Hyperlipidemia, and Hypertension.    PAST SURGICAL HISTORY  Past Surgical History:   Procedure Laterality Date    HYSTERECTOMY       MASTECTOMY COMPLETE / SIMPLE Right     SHOULDER SURGERY         IMMUNIZATION HISTORY  Immunization History   Administered Date(s) Administered    COVID-19, mRNA, LNP-S, PF, 30 mcg/0.3 mL dose 01/29/2021, 02/19/2021, 09/28/2021    Flu vaccine (IIV4), preservative free *Check age/dose* 09/16/2020    Flu vaccine, quadrivalent, high-dose, preservative free, age 65y+ (FLUZONE) 10/17/2022, 10/09/2023    Flu vaccine, trivalent, preservative free, HIGH-DOSE, age 65y+ (Fluzone) 11/12/2014, 11/12/2015, 11/07/2017, 10/24/2018, 10/24/2019    Flu vaccine, trivalent, preservative free, age 6 months and greater (Fluarix/Fluzone/Flulaval) 10/27/2011, 10/25/2012    Influenza, Seasonal, Quadrivalent, Adjuvanted 10/17/2021    Influenza, live, intranasal, quadrivalent 11/13/2013    Influenza, seasonal, injectable 09/22/2015    Influenza, trivalent, adjuvanted 10/17/2021    Moderna COVID-19 vaccine, 12 years and older (50mcg/0.5mL)(Spikevax) 10/25/2024    Pfizer COVID-19 vaccine, 12 years and older, (30mcg/0.3mL) (Comirnaty) 10/28/2023    Pfizer COVID-19 vaccine, bivalent, age 12 years and older (30 mcg/0.3 mL) 09/25/2022    Pfizer Gray Cap SARS-CoV-2 04/21/2022    Pneumococcal conjugate vaccine, 13-valent (PREVNAR 13) 11/12/2015    Pneumococcal conjugate vaccine, 20-valent (PREVNAR 20) 10/09/2023    Pneumococcal polysaccharide vaccine, 23-valent, age 2 years and older (PNEUMOVAX 23) 09/22/2014       SOCIAL HISTORY  She  reports that she quit smoking about 60 years ago. Her smoking use included cigarettes. She has never used smokeless tobacco. She reports that she does not currently use alcohol. She reports that she does not use drugs. She Patient  light smoker x  2 years  quit 60 years ago     OCCUPATIONAL/ENVIRONMENTAL HISTORY  Previously worked as:  ing   DOES/DOES NOT EC: does not have known exposure to asbestos, silica, beryllium or inhaled metals.  DOES/DOES NOT EC: does not have exposure to birds or exotic  animals.    FAMILY HISTORY  Family History   Problem Relation Name Age of Onset    Colon cancer Sister      Breast cancer Daughter       DOES/DOES NOT EC: does not have a family history of pulmonary disease.  DOES/DOES NOT EC: does not have a family history of cancer. Mother had stomach cancer   DOES/DOES NOT EC: does not have a family history of autoimmune disorders.    RESULTS/DATA     Pulmonary Function Test Results     No testing done     Chest Radiograph     XR chest 1 view 02/05/2025    Impression  Small bilateral pleural effusions.    Extensive osseous metastases.        Chest CT Scan     CT angio chest for pulmonary embolism 02/05/2025      FINDINGS:  The thyroid gland is limited in evaluation due to streak artifact.    There is adequate contrast opacification of the pulmonary arterial  vasculature. No filling defect or vessel cutoff to indicate pulmonary  embolus.    The heart size is within normal limits.  No pericardial effusion is  identified. The aorta and pulmonary arteries are normal in caliber.  No thoracic aortic dissection.    There are no pathologically enlarged mediastinal, hilar, or axillary  lymph nodes are seen.    The trachea and mainstem bronchi are patent. Moderate-large bilateral  pleural effusions, not significantly changed from the prior study.    The visualized osseous structures show extensive sclerotic metastatic  disease, not significantly changed.    Impression  1. No pulmonary embolus.  2. Moderate-large bilateral pleural effusions, not significantly  changed from the prior study.  3. Atelectatic changes adjacent to the pleural effusions,  left-greater-than-right, not significantly changed.  4. Diffuse osteoblastic metastatic disease throughout the bony  structures is again seen.        Echocardiogram     TRANSTHORACIC ECHOCARDIOGRAM REPORT 6/7/2024        Patient Name:      ALEXIS AMARAL     Reading Physician:    [id number] Angel Velazquez                                                                 MD  Study Date:        6/7/2024             Ordering Provider:    [id number] TRISTAN HALEY    PHYSICIAN INTERPRETATION:  Left Ventricle: The left ventricular systolic function is normal, with an estimated ejection fraction of 60-65%. There are no regional wall motion abnormalities. The left ventricular cavity size is normal. The left ventricular septal wall thickness is moderately increased. Spectral Doppler shows an impaired relaxation pattern of left ventricular diastolic filling. There is a left ventricular outflow tract obstruction with the Valsalva maneuver. The provoked gradient is 25 mmHg.  Left Atrium: The left atrium is normal in size.  Right Ventricle: The right ventricle is normal in size. There is normal right ventricular global systolic function.  Right Atrium: The right atrium is normal in size.  Aortic Valve: The aortic valve appears structurally normal. There is mild aortic valve cusp calcification. There is no evidence of aortic valve regurgitation. The peak instantaneous gradient of the aortic valve is 8.0 mmHg. The mean gradient of the aortic valve is 5.0 mmHg.  Mitral Valve: The mitral valve is normal in structure. There is mild mitral annular calcification. There is trace mitral valve regurgitation.  Tricuspid Valve: The tricuspid valve is structurally normal. There is trace tricuspid regurgitation. The Doppler estimated RVSP is mildly elevated at 41.8 mmHg.  Pulmonic Valve: The pulmonic valve is structurally normal. There is trace to mild pulmonic valve regurgitation.  Pericardium: There is a trivial pericardial effusion.  Aorta: The aortic root is normal. The Ao Sinus is 3.50 cm. The Asc Ao is 3.30 cm.  Systemic Veins: The inferior vena cava appears to be of normal size. There is IVC inspiratory collapse greater than 50%.  In comparison to the previous echocardiogram(s): There are no prior studies on this patient  for comparison purposes.        CONCLUSIONS:   1. Left ventricular systolic function is normal with a 60-65% estimated ejection fraction.   2. Moderately increased left ventricular septal thickness.   3. Spectral Doppler shows an impaired relaxation pattern of left ventricular diastolic filling.   4. Mildly elevated RVSP.     TRICUSPID VALVE/RVSP:                              Normal Ranges:  Peak TR Velocity: 3.11 m/s  Est. RA Pressure: 3 mmHg  RV Syst Pressure: 41.8 mmHg (< 30mmHg)  IVC Diam:         1.90 cm       REVIEW OF SYSTEMS     REVIEW OF SYSTEMS  Review of Systems   Respiratory:  Positive for cough, shortness of breath and wheezing.    Cardiovascular:  Positive for leg swelling.   All other systems reviewed and are negative.        PHYSICAL EXAM     VITAL SIGNS: /62   Pulse 86   Temp 36.3 °C (97.3 °F)   Resp 18   Ht 1.524 m (5')   Wt 71.4 kg (157 lb 6.4 oz)   SpO2 96%   BMI 30.74 kg/m²  /62   Pulse 86   Temp 36.3 °C (97.3 °F)   Resp 18   Ht 1.524 m (5')   Wt 71.4 kg (157 lb 6.4 oz)   SpO2 96%   BMI 30.74 kg/m²      CURRENT WEIGHT: [unfilled]  BMI: [unfilled]  PREVIOUS WEIGHTS:  Wt Readings from Last 3 Encounters:   04/01/25 71.4 kg (157 lb 6.4 oz)   03/21/25 72 kg (158 lb 11.7 oz)   03/20/25 71.7 kg (158 lb)       Physical Exam  Constitutional:       Appearance: Normal appearance.      Comments: Presents in a wheelchair.    HENT:      Head: Normocephalic and atraumatic.      Right Ear: External ear normal.      Left Ear: External ear normal.      Nose: Nose normal.      Mouth/Throat:      Mouth: Mucous membranes are moist.      Pharynx: Oropharynx is clear.   Eyes:      Extraocular Movements: Extraocular movements intact.      Conjunctiva/sclera: Conjunctivae normal.      Pupils: Pupils are equal, round, and reactive to light.   Cardiovascular:      Rate and Rhythm: Normal rate and regular rhythm.      Pulses: Normal pulses.      Heart sounds: Normal heart sounds.   Pulmonary:       Effort: Pulmonary effort is normal.      Breath sounds: Normal breath sounds.      Comments: Left lower diminished bases  Abdominal:      General: Bowel sounds are normal.      Palpations: Abdomen is soft.   Musculoskeletal:         General: Normal range of motion.      Cervical back: Normal range of motion and neck supple.   Skin:     General: Skin is warm and dry.   Neurological:      General: No focal deficit present.      Mental Status: She is alert and oriented to person, place, and time. Mental status is at baseline.   Psychiatric:         Mood and Affect: Mood normal.         Behavior: Behavior normal.         Thought Content: Thought content normal.         Judgment: Judgment normal.         ASSESSMENT/PLAN     Ms. Springer is a 84 y.o. female and  has a past medical history of Anxiety, Arthritis, Breast cancer, Breast cancer metastasized to bone, GERD (gastroesophageal reflux disease), Glaucoma, Hiatal hernia, Hyperlipidemia, and Hypertension. She was referred to the [facility] for evaluation of bilateral pleural effusions     Problem List and Orders      Assessment and Plan / Recommendations:  Problem List Items Addressed This Visit       Breast cancer metastasized to bone    Relevant Orders    Follow Up In Pulmonology    Pleural effusion    Relevant Orders    XR chest 2 views (Completed)    Follow Up In Pulmonology    Bilateral pleural effusion    Relevant Medications    furosemide (Lasix) 20 mg tablet     Other Visit Diagnoses       Bilateral lower extremity edema    -  Primary    Relevant Orders    Vascular US lower extremity venous duplex bilateral                      Pleural effusions - previously right neg for malignancy  If complete thoracentesis send AFB, ADA, and consider flow cytometry on the lymphocytes and possibly a fluid RHETT.   - cont lasix 20mg alternating with 40mg  Weigh daily  CXR today unchanged bilateral pleural effusions   Repeat CXR prior to next  visit   - if develops worsening shortness of breath consider symptom relief thoracentesis   - refilled lasix     Left leg swelling - US BLE to r/o dvt     Thank you for visiting the Pulmonary clinic today!       Return to clinic after 4-6 weeks and  or sooner if needed   Raina Ewing CNP  My office number is [phone] -     Call to schedule  for radiology - CT scans/PFTs etc at  [phone]  General scheduling  [phone]     Best way to get a hold of me is to call my office --> Please do not send me follow my health messages  Any test results will be discussed at next visit -- please make sure to make a follow up appt after testing.    I personally spent 60 minutes today (exclusive of procedures) providing care for this patient, including preparation, face to face time, EMR documentation and other services such as review of medical records, diagnostic results, patient education, counseling, and coordination of care.

## 2025-03-27 ENCOUNTER — PHARMACY VISIT (OUTPATIENT)
Dept: PHARMACY | Facility: CLINIC | Age: 85
End: 2025-03-27
Payer: COMMERCIAL

## 2025-03-28 ENCOUNTER — PATIENT OUTREACH (OUTPATIENT)
Dept: PRIMARY CARE | Facility: CLINIC | Age: 85
End: 2025-03-28
Payer: MEDICARE

## 2025-03-31 NOTE — PROGRESS NOTES
[facility] Clinical Note  Patient Reassessment     Introduction  Arabella Springer is a 84 y.o. female who is on the specialty pharmacy service for management of: Oncology Core.      [facility] supplied medication: inavolisib 3 mg PO once daily on D1 - 21 of a 28 day cycle + palbociclib 125 mg PO once daily on D1 - 21 of a 28 day cycle    Also on fulvestrant 500 mg IM D1 of a a 28 day cycle via [facility]    Duration of therapy: Until drug toxicity or progression    The most recent encounter visit with the referring prescriber Andrew Mcfarland MD on 3/21/25 was reviewed.  Pharmacy will continue to collaborate in the care of this patient with the referring prescriber.    Discussion  Arabella was contacted on 3/30/2025 at 10:52 PM for a pharmacy visit with encounter number [id number] from:   [facility] [address] [facility]   [address] [facility]   [address]  [place] [address]  Dept: [phone]  Dept Fax: [phone]  Arabella and Spouse consented to a/an In person visit, which was performed.    Efficacy  Patient has developed new symptoms of condition: No  Patient/caregiver feels medication is affecting the disease state: Hopefully controlling disease.  Try to find right dosing (re: AEs), doing much better now.    Goals  Provided education on goals and possible outcomes of therapy:  Adherence with therapy  Timely completion of appropriate labs  Timely and appropriate follow up with provider  Identify and address medication interactions with presciption medications, OTC medications and supplements  Optimize or maintain quality of life  Oncology: Prolong life/No disease progression  Manage side effects (ex: nausea/vomiting, constipation, fatigue) in conjunction with care team  Patient has documented target(s) for goals of therapy: Yes    Tolerance  Patient has experienced side effects from this medication: Yes - Rash has cleared up.  Stomatitis much  better, has single isolated mouth sore now (vs. Multiple oral lesions before -- used oral dexamethasone S/S).  Appetite remains somewhat suppressed.  Sleep OK, energy unchanged, more fatigue than when therapy initiated.    Changes to current therapy regimen: Yes - inavolisib dosing was modified to 3 mg PO once daily but only on D1 - 21 of a 28 day cycle.    The follow-up timeline was discussed. Every person responds to and reacts to therapy differently. Patient should be assessed for efficacy and tolerability in approximately: 3 months    Adherence  Patient Information  Informant: Self (Patient)  Demonstrates Understanding of Importance of Adherence: Yes  Does the patient have any barriers to self-administration (including physical and mental?): No  Support Network for Adherence: Family Member, Healthcare Provider  Medication Information  Medication:  (Adherence assessed for both inavolisib and palbociclib.)  Patient Reported Missed Doses in the Last 4 Weeks: 0  Estimated Medication Adherence Level: Good  Adherence Estimation Source: Claims history  Barriers to Adherence: No Problems identified   The importance of adherence was discussed and patient/caregiver was advised to take the medication as prescribed by their provider. Encouraged patient/caregiver to call physician's office or specialty pharmacy if they have a question regarding a missed dose.    General Assessment  Changes to home medications, OTCs or supplements: No  Current Outpatient Medications   Medication Sig Dispense Refill    acetaminophen (Tylenol) 500 mg tablet Take 2 tablets (1,000 mg) by mouth 3 times a day as needed for mild pain (1 - 3) or moderate pain (4 - 6).      beclomethasone HFA (Qvar) 40 mcg/actuation inhaler Inhale 1 Inhalation 2 times a day.      Blood glucose monitoring meter Test two times daily. 1 each 0    blood sugar diagnostic strip 1 each 2 times a day. 100 each 0    brimonidine-timoloL (Combigan) 0.2-0.5 % ophthalmic solution  Administer 1 drop into both eyes every 12 hours.      calcium carbonate-vitamin D3 600 mg-10 mcg (400 unit) tablet Take 3 tablets by mouth once daily.      denosumab (Xgeva) 120 mg/1.7 mL (70 mg/mL) injection Inject 1.7 mL (120 mg) under the skin every 30 (thirty) days.      diclofenac sodium (Voltaren Arthritis Pain) 1 % gel Apply 4.5 inches (4 g) topically 2 times a day as needed.      esomeprazole (NexIUM) 40 mg DR capsule Take 1 capsule (40 mg) by mouth once daily in the morning. Take before meals.      furosemide (Lasix) 20 mg tablet Take 1 tablet (20 mg) by mouth once daily. (Patient taking differently: Take 1 tablet (20 mg) by mouth every other day.) 30 tablet 0    inavolisib 3 mg tablet Take 3 mg by mouth once daily. For 3 weeks then take 1 week off 21 tablet 1    inhalat.spacing dev,large mask (Pro Comfort Spacer-Adult Mask) spacer 1 each every 4 hours if needed (shortness of breath, wheezing, cough). 1 each 0    isopropyl alcohoL 70 % towelette Test 2 times a day, clean finger prior to testing 1 each 0    LORazepam (Ativan) 0.5 mg tablet Take 1 tablet (0.5 mg) by mouth every 2 hours if needed for anxiety (anxiety related to radiology tests) for up to 5 doses. 5 tablet 0    losartan-hydrochlorothiazide (Hyzaar) 100-12.5 mg tablet Take 1 tablet by mouth once daily. 90 tablet 0    mometasone (Elocon) 0.1 % lotion Apply topically 2 times a day. (Patient taking differently: Apply topically 2 times a day as needed.) 30 mL 0    multivitamin tablet Take 1 tablet by mouth once daily.      ondansetron (Zofran) 8 mg tablet Take 1 tablet (8 mg) by mouth every 8 hours if needed for nausea or vomiting. 60 tablet 5    palbociclib (Ibrance) 125 mg tablet Take 125 mg (1 tablet) by mouth once daily for three weeks, then one week off.  Swallow whole.  Do not crush, chew or split. 21 tablet 2    pravastatin (Pravachol) 10 mg tablet Take 1 tablet (10 mg) by mouth once daily. 90 tablet 0    prochlorperazine (Compazine) 10 mg  tablet Take 1 tablet (10 mg) by mouth every 6 hours if needed for nausea or vomiting. 60 tablet 5    Rhopressa 0.02 % drops opthalmic solution Administer 1 drop into the right eye once daily at bedtime.      triamcinolone (Kenalog) 0.1 % cream Apply topically 2 times a day. (Patient taking differently: Apply topically 2 times a day as needed.) 453.6 g 1     No current facility-administered medications for this visit.     Reported new allergies: No  Reported new medical conditions: No  Additional monitoring reviewed: Oncology - CBC-diff:   Lab Results   Component Value Date    WBC 2.6 (L) 03/20/2025    RBC 2.61 (L) 03/20/2025    HGB 8.7 (L) 03/20/2025    HCT 27.2 (L) 03/20/2025     (H) 03/20/2025    MCHC 32.0 03/20/2025     03/20/2025    RDW 22.4 (H) 03/20/2025    NEUTOPHILPCT 32.2 03/20/2025    IGPCT 0.0 03/20/2025    LYMPHOPCT 58.2 03/20/2025    MONOPCT 6.9 03/20/2025    EOSPCT 1.9 03/20/2025    BASOPCT 0.8 03/20/2025    NEUTROABS 0.84 (L) 03/20/2025    LYMPHSABS 1.52 03/20/2025    MONOSABS 0.18 03/20/2025    EOSABS 0.05 03/20/2025    BASOSABS 0.02 03/20/2025    and CMP:   Lab Results   Component Value Date    GLUCOSE 113 (H) 03/20/2025     03/20/2025    K 4.2 03/20/2025     03/20/2025    CO2 27 03/20/2025    ANIONGAP 13 03/20/2025    BUN 22 03/20/2025    CREATININE 1.18 (H) 03/20/2025    GFRF 56 (A) 09/28/2023    CALCIUM 8.5 (L) 03/20/2025    ALBUMIN 4.3 03/20/2025    ALKPHOS 40 03/20/2025    PROT 6.5 03/20/2025    AST 15 03/20/2025    BILITOT 0.4 03/20/2025    ALT 8 03/20/2025     Is laboratory follow up needed? Yes - routine per clinic    Advised to contact the pharmacy if there are any changes to the patient's medication list, including prescriptions, OTC medications, herbal products, or supplements.    Impression/Plan  This patient has been identified as high risk due to Co-morbidities or Geriatric (over 65 years of age).  The following action was taken: Follow up timeline adjusted  for high risk status.    QOL/Patient Satisfaction  Rate your quality of life on scale of 1-10: 5  Rate your satisfaction with  Specialty Pharmacy on scale of 1-10: 8    Provided contact information ([phone]) for [facility] and reviewed dispensing process, refill timeline and patient management follow up. Confirmed understanding of education conducted during assessment. All questions and concerns were addressed and patient/caregiver was encouraged to reach out for additional questions or concerns.    Based on the patient's diagnosis, medication list, progress towards goals, adherence, tolerance, and medication list, medication remains appropriate: Therapy remains appropriate with modifications or additional outreach; provider outreach/MTP documented    Dario Arredondo, [place], MS, BCOP  Clinical Pharmacy Specialist - Ambulatory Oncology

## 2025-04-01 ENCOUNTER — HOSPITAL ENCOUNTER (OUTPATIENT)
Dept: RADIOLOGY | Facility: HOSPITAL | Age: 85
Discharge: HOME | End: 2025-04-01
Payer: MEDICARE

## 2025-04-01 ENCOUNTER — APPOINTMENT (OUTPATIENT)
Facility: CLINIC | Age: 85
End: 2025-04-01
Payer: MEDICARE

## 2025-04-01 VITALS
TEMPERATURE: 97.3 F | RESPIRATION RATE: 18 BRPM | DIASTOLIC BLOOD PRESSURE: 62 MMHG | BODY MASS INDEX: 30.9 KG/M2 | SYSTOLIC BLOOD PRESSURE: 134 MMHG | OXYGEN SATURATION: 96 % | WEIGHT: 157.4 LBS | HEIGHT: 60 IN | HEART RATE: 86 BPM

## 2025-04-01 DIAGNOSIS — J90 BILATERAL PLEURAL EFFUSION: ICD-10-CM

## 2025-04-01 DIAGNOSIS — C50.911 CARCINOMA OF RIGHT BREAST METASTATIC TO BONE: ICD-10-CM

## 2025-04-01 DIAGNOSIS — J90 PLEURAL EFFUSION: ICD-10-CM

## 2025-04-01 DIAGNOSIS — R60.0 BILATERAL LOWER EXTREMITY EDEMA: Primary | ICD-10-CM

## 2025-04-01 DIAGNOSIS — C79.51 CARCINOMA OF RIGHT BREAST METASTATIC TO BONE: ICD-10-CM

## 2025-04-01 PROCEDURE — 1159F MED LIST DOCD IN RCRD: CPT | Performed by: NURSE PRACTITIONER

## 2025-04-01 PROCEDURE — 99205 OFFICE O/P NEW HI 60 MIN: CPT | Performed by: NURSE PRACTITIONER

## 2025-04-01 PROCEDURE — 71046 X-RAY EXAM CHEST 2 VIEWS: CPT

## 2025-04-01 PROCEDURE — 71046 X-RAY EXAM CHEST 2 VIEWS: CPT | Performed by: STUDENT IN AN ORGANIZED HEALTH CARE EDUCATION/TRAINING PROGRAM

## 2025-04-01 PROCEDURE — 3075F SYST BP GE 130 - 139MM HG: CPT | Performed by: NURSE PRACTITIONER

## 2025-04-01 PROCEDURE — 3078F DIAST BP <80 MM HG: CPT | Performed by: NURSE PRACTITIONER

## 2025-04-01 RX ORDER — FUROSEMIDE 20 MG/1
TABLET ORAL
Qty: 45 TABLET | Refills: 3 | Status: SHIPPED | OUTPATIENT
Start: 2025-04-01

## 2025-04-01 ASSESSMENT — ENCOUNTER SYMPTOMS
COUGH: 1
OCCASIONAL FEELINGS OF UNSTEADINESS: 0
DEPRESSION: 0
LOSS OF SENSATION IN FEET: 0
SHORTNESS OF BREATH: 1
WHEEZING: 1

## 2025-04-01 ASSESSMENT — PATIENT HEALTH QUESTIONNAIRE - PHQ9
2. FEELING DOWN, DEPRESSED OR HOPELESS: NOT AT ALL
SUM OF ALL RESPONSES TO PHQ9 QUESTIONS 1 AND 2: 0
1. LITTLE INTEREST OR PLEASURE IN DOING THINGS: NOT AT ALL

## 2025-04-01 NOTE — PATIENT INSTRUCTIONS
Pleural effusions - previously right neg for maligna  If complete thoracentesis send AFB, ADA, and consider flow cytometry on the lymphocytes and possibly a fluid RHETT.   - cont lasix 20mg alternating with 40mg  Weigh daily  And CXR today    Thank you for visiting the Pulmonary clinic today!       Return to clinic after 4-6 weeks and  or sooner if needed   Raina Ewing CNP  My office number is [phone] -     Call to schedule  for radiology - CT scans/PFTs etc at  [phone]  General scheduling  [phone]     Best way to get a hold of me is to call my office --> Please do not send me follow my health messages  Any test results will be discussed at next visit -- please make sure to make a follow up appt after testing.

## 2025-04-02 DIAGNOSIS — J90 PLEURAL EFFUSION: Primary | ICD-10-CM

## 2025-04-02 DIAGNOSIS — R60.0 BILATERAL LOWER EXTREMITY EDEMA: ICD-10-CM

## 2025-04-08 ENCOUNTER — SPECIALTY PHARMACY (OUTPATIENT)
Dept: PHARMACY | Facility: CLINIC | Age: 85
End: 2025-04-08

## 2025-04-08 PROCEDURE — RXMED WILLOW AMBULATORY MEDICATION CHARGE

## 2025-04-14 ENCOUNTER — HOSPITAL ENCOUNTER (OUTPATIENT)
Dept: CARDIOLOGY | Facility: HOSPITAL | Age: 85
Discharge: HOME | End: 2025-04-14
Payer: MEDICARE

## 2025-04-14 ENCOUNTER — DOCUMENTATION (OUTPATIENT)
Facility: CLINIC | Age: 85
End: 2025-04-14
Payer: MEDICARE

## 2025-04-14 ENCOUNTER — PHARMACY VISIT (OUTPATIENT)
Dept: PHARMACY | Facility: CLINIC | Age: 85
End: 2025-04-14
Payer: COMMERCIAL

## 2025-04-14 DIAGNOSIS — R60.0 BILATERAL LOWER EXTREMITY EDEMA: ICD-10-CM

## 2025-04-14 DIAGNOSIS — I82.4Z1 ACUTE DEEP VEIN THROMBOSIS (DVT) OF DISTAL END OF RIGHT LOWER EXTREMITY: Primary | ICD-10-CM

## 2025-04-14 PROCEDURE — 93970 EXTREMITY STUDY: CPT | Performed by: SURGERY

## 2025-04-14 PROCEDURE — 93970 EXTREMITY STUDY: CPT

## 2025-04-14 NOTE — PROGRESS NOTES
positive for DVT in her right gastrocnemius veins in the calf. Per Dr. Mcfarland recommend eliquis. Will prescribe x 1 month then defer to PCP or Dr. Michele Mcfarland and Dr. Velazquez both made aware

## 2025-04-15 ENCOUNTER — APPOINTMENT (OUTPATIENT)
Dept: PRIMARY CARE | Facility: CLINIC | Age: 85
End: 2025-04-15
Payer: MEDICARE

## 2025-04-15 ENCOUNTER — TELEPHONE (OUTPATIENT)
Facility: CLINIC | Age: 85
End: 2025-04-15

## 2025-04-15 VITALS
DIASTOLIC BLOOD PRESSURE: 76 MMHG | WEIGHT: 155 LBS | BODY MASS INDEX: 30.43 KG/M2 | SYSTOLIC BLOOD PRESSURE: 118 MMHG | HEIGHT: 60 IN | HEART RATE: 75 BPM | TEMPERATURE: 97.2 F | RESPIRATION RATE: 16 BRPM

## 2025-04-15 DIAGNOSIS — I82.451 ACUTE DEEP VEIN THROMBOSIS (DVT) OF RIGHT PERONEAL VEIN (MULTI): ICD-10-CM

## 2025-04-15 DIAGNOSIS — E83.42 HYPOMAGNESEMIA: Primary | ICD-10-CM

## 2025-04-15 DIAGNOSIS — I10 ESSENTIAL HYPERTENSION: ICD-10-CM

## 2025-04-15 DIAGNOSIS — J90 BILATERAL PLEURAL EFFUSION: ICD-10-CM

## 2025-04-15 DIAGNOSIS — D61.82 MYELOPHTHISIC ANEMIA: ICD-10-CM

## 2025-04-15 DIAGNOSIS — K21.00 GASTROESOPHAGEAL REFLUX DISEASE WITH ESOPHAGITIS WITHOUT HEMORRHAGE: ICD-10-CM

## 2025-04-15 DIAGNOSIS — C79.51 CARCINOMA OF BREAST METASTATIC TO BONE, UNSPECIFIED LATERALITY: ICD-10-CM

## 2025-04-15 DIAGNOSIS — C50.919 CARCINOMA OF BREAST METASTATIC TO BONE, UNSPECIFIED LATERALITY: ICD-10-CM

## 2025-04-15 DIAGNOSIS — E78.2 MIXED HYPERLIPIDEMIA: ICD-10-CM

## 2025-04-15 DIAGNOSIS — H40.9 GLAUCOMA OF BOTH EYES, UNSPECIFIED GLAUCOMA TYPE: ICD-10-CM

## 2025-04-15 ASSESSMENT — ENCOUNTER SYMPTOMS
SHORTNESS OF BREATH: 0
DEPRESSION: 0
UNEXPECTED WEIGHT CHANGE: 0
DIAPHORESIS: 0
NAUSEA: 0
DYSURIA: 0
CHILLS: 0
VOMITING: 0
LOSS OF SENSATION IN FEET: 0
DIZZINESS: 0
DIARRHEA: 0
MYALGIAS: 0
LIGHT-HEADEDNESS: 0
OCCASIONAL FEELINGS OF UNSTEADINESS: 1
FEVER: 0

## 2025-04-15 ASSESSMENT — ACTIVITIES OF DAILY LIVING (ADL)
GROCERY_SHOPPING: TOTAL CARE
TAKING_MEDICATION: INDEPENDENT
BATHING: INDEPENDENT
DOING_HOUSEWORK: INDEPENDENT
MANAGING_FINANCES: INDEPENDENT
DRESSING: INDEPENDENT

## 2025-04-15 ASSESSMENT — PATIENT HEALTH QUESTIONNAIRE - PHQ9
1. LITTLE INTEREST OR PLEASURE IN DOING THINGS: NOT AT ALL
2. FEELING DOWN, DEPRESSED OR HOPELESS: NOT AT ALL
SUM OF ALL RESPONSES TO PHQ9 QUESTIONS 1 AND 2: 0

## 2025-04-15 NOTE — PROGRESS NOTES
Subjective   Reason for Visit: Arabella Springer is an84 y.o. female who presents for a Medicare Wellness visit.         Reviewed all medications by prescribing practitioner or clinical pharmacist (such as prescriptions, OTCs, herbal therapies and supplements) and documented in the medical record.    History of Present Illness  Arabella Springer is an 84-year-old female who presents for an annual wellness visit.    She was last seen in February following a hospital discharge, during which she developed a right foot drop. She declined further workup with an MRI of the brain. She has been provided a brace for the foot drop but has difficulty finding shoes that accommodate it. The foot drop began when she was hospitalized in January.    She has bilateral pleural effusions and is on Lasix 20 mg daily. Her breathing is stable. She is concerned about her magnesium levels, which were low during her hospital stay, and requests that they be checked again.    She has a history of metastatic breast cancer to the bone. She is on pravastatin for hyperlipidemia, and her hypertension is well-controlled on losartan and hydrochlorothiazide. 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 She is also on a cancer medication that has previously caused mouth sores, which resolved when she was off the medication for a week.    Her A1c in December was 5.8, indicating prediabetes. She has osteoarthritis in her right knee and has seen orthopedics for this. She had a DVT in the right gastrocnemius veins, and there have been issues with obtaining her blood thinner medication but it is sent over. Her left leg is more swollen than the right, and she experiences burning around the ankle, which she attributes to thin skin.    She experiences intermittent stomach cramps over the past few weeks, located in the middle of the abdomen. She has been on Nexium for GERD for 20-30 years and takes it  every morning. Her acid reflux worsens if she overeats and is better if she eats smaller meals.    She has received the shingles vaccine and is up to date with her COVID and flu vaccinations.     Review of Systems   Constitutional:  Negative for chills, diaphoresis, fever and unexpected weight change.   HENT:  Negative for hearing loss.    Eyes:  Negative for visual disturbance.   Respiratory:  Negative for shortness of breath.    Cardiovascular:  Negative for chest pain.   Gastrointestinal:  Negative for diarrhea, nausea and vomiting.   Endocrine: Negative for cold intolerance and heat intolerance.   Genitourinary:  Negative for dysuria.   Musculoskeletal:  Negative for myalgias.   Skin:  Negative for rash.   Neurological:  Negative for dizziness, syncope and light-headedness.     ROS otherwise negative aside from what was mentioned above in HPI.    Objective     /76   Pulse 75   Temp 36.2 °C (97.2 °F)   Resp 16   Ht 1.524 m (5')   Wt 70.3 kg (155 lb)   BMI 30.27 kg/m²      Current Outpatient Medications   Medication Instructions    acetaminophen (TYLENOL) 1,000 mg, 3 times daily PRN    apixaban (Eliquis) 5 mg tablet Take 2 tablets (10 mg) by mouth 2 times a day for 7 days, THEN 1 tablet (5 mg) 2 times a day. 10Mg (2pills) twice a day x 7 days then 5 mg twice a day.    beclomethasone HFA (Qvar) 40 mcg/actuation inhaler 1 Inhalation, 2 times daily RT    Blood glucose monitoring meter Test two times daily.    blood sugar diagnostic strip 1 each, miscellaneous, 2 times daily    brimonidine-timoloL (Combigan) 0.2-0.5 % ophthalmic solution 1 drop, Every 12 hours scheduled    calcium carbonate-vitamin D3 600 mg-10 mcg (400 unit) tablet 3 tablets, Daily    denosumab (Xgeva) 120 mg/1.7 mL (70 mg/mL) injection 1.7 mL, Every 30 days    diclofenac sodium (VOLTAREN ARTHRITIS PAIN) 4 g, 2 times daily PRN    esomeprazole (NexIUM) 40 mg DR capsule 1 capsule, Daily before breakfast    furosemide (Lasix) 20 mg tablet  Take 20mg alternating with 40mg    inhalat.spacing dev,large mask (Pro Comfort Spacer-Adult Mask) spacer 1 each, miscellaneous, Every 4 hours PRN    isopropyl alcohoL 70 % towelette Test 2 times a day, clean finger prior to testing    Itovebi 3 mg, oral, Daily, For 3 weeks then take 1 week off    LORazepam (ATIVAN) 0.5 mg, oral, Every 2 hour PRN    losartan-hydrochlorothiazide (Hyzaar) 100-12.5 mg tablet 1 tablet, oral, Daily    mometasone (Elocon) 0.1 % lotion Topical, 2 times daily    multivitamin tablet 1 tablet, Daily    ondansetron (ZOFRAN) 8 mg, oral, Every 8 hours PRN    palbociclib (Ibrance) 125 mg tablet Take 125 mg (1 tablet) by mouth once daily for three weeks, then one week off.  Swallow whole.  Do not crush, chew or split.    pravastatin (PRAVACHOL) 10 mg, oral, Daily    prochlorperazine (COMPAZINE) 10 mg, oral, Every 6 hours PRN    Rhopressa 0.02 % drops opthalmic solution Administer 1 drop into the right eye once daily at bedtime.    triamcinolone (Kenalog) 0.1 % cream Topical, 2 times daily       Physical Exam  GENERAL: Alert, cooperative, well developed, no acute distress, no diaphoresis.  HEENT: Normocephalic, normal oropharynx, moist mucous membranes, ear canals and tympanic membranes normal bilaterally, no oral lesions, no oral erythema.  NECK: Supple, trachea midline, no cervical lymphadenopathy.  CHEST: Clear to auscultation bilaterally, no wheezes, rhonchi, or rales.  CARDIOVASCULAR: Normal heart rate and rhythm, S1 and S2 normal without murmurs, rubs, or gallops.  ABDOMEN: Soft, non-tender, non-distended, normal bowel sounds present in all four quadrants. No rebound or guarding.  EXTREMITIES: Varicose veins present. No cyanosis or significant edema present, non pitting edema present bilateral LE.  NEUROLOGICAL: Moves all extremities without gross motor deficit.  SKIN: Dry, flaky skin on legs.    Assessment & Plan  Breast cancer metastasized to bone    Under the care of oncologist   Bartolo.  - Continue coordination of care with oncologist Dr. Mcfarland.      Deep Vein Thrombosis (DVT) in the right gastrocnemius veins    DVT in the right gastrocnemius veins, likely related to cancer, increases thrombosis risk. The clot is below the knee, considered less severe. She is on anticoagulation therapy to prevent further clots. Risks of anticoagulants, including easy bruising and bleeding, were discussed. Treatment duration is approximately three months, subject to oncologist's discretion.    -  Continue NOAC  - Advise monitoring for symptoms of pulmonary embolism and seek emergency care if chest pain or SOB occur.     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 Monitored by oncologist Dr. Mcfarland with regular blood work.    - Order CBC to monitor anemia.      Right foot drop    Right foot drop confirmed by orthopedics, suspected to be nerve-related. She declined further workup with an MRI of the brain. Provided with a brace but experiencing difficulty finding suitable footwear.    - F/u podiatry  - Offer MRI of the brain if she changes her mind about further workup.      Hypertension    Hypertension is well-controlled on losartan and hydrochlorothiazide. Amlodipine was discontinued due to the addition of Lasix.    - Continue current antihypertensive regimen.    - Check labs    Prediabetes  -Diet and exercise recommended. A1c 5.8 3 months ago.    Pleural effusions  -F/u pulmonology    HLD  -Repeat lipids  -Continue statin    Glaucoma  -On comibgan eye drops, f/u ophtho    Gastroesophageal Reflux Disease (GERD)    GERD worsens with overeating. She is on Nexium but has been taking it with food, which may reduce its effectiveness.    - Advise taking Nexium on an empty stomach, 30 minutes to an hour before eating.    - Consider referral to GI if symptoms persist despite medication adjustment.      General Health Maintenance    Up to date on most vaccinations,  including flu and COVID-19. Received shingles vaccine but not RSV vaccine. Does not require Pap smears due to hysterectomy. Colonoscopy not necessary due to age and cancer diagnosis. Bone density scan discussed but deferred.    - Recommend RSV vaccine after discussing with oncologist.    - Continue regular mammograms as per oncologist's guidance.    - Encourage heart-healthy diet and lifestyle modifications.      Goals of Care    Healthcare power of  in place, with daughter Twila designated as the decision-maker.    - Ensure documentation of healthcare power of  is available in medical records.      Follow-up    Follow-up appointments to monitor conditions and treatment progress.    - Schedule follow-up visit in 3 months.    - Ensure blood work is completed  - MWV 1 year    Health Maintenance  -Pap smear: S/p hysterectomy and told doesn't need.   -Vaccinations: Reports UTD shingrix, recommend RSV. She gets covid booster.   -Colonoscopy: She declines.   -DEXA: She declines.   -Advance Care Planning: Reports named daughter Twila, she will provide paperwork.     CPE completed.  Advised to keep a heart healthy, low fat  diet with fruits and veggies like Mediterranean diet.  Advised on the importance of exercise and maintaining 150 minutes of exercise per week (30 minutes per day 5 days a week).  Advised on regular eye and dental visits.  Discussed age appropriate cancer screening, immunizations and recommendations given.  Discussed avoiding illicit drugs and tobacco. Advised on appropriate use of alcohol.  Advised to wear seat belt.    Assessment/Plan   Assessment & Plan       Angel Luis Velazquez DO            This medical note was created with the assistance of artificial intelligence (AI) for documentation purposes. The content has been reviewed and confirmed by the healthcare provider for accuracy and completeness. Patient consented to the use of audio recording and use of AI during their visit.

## 2025-04-15 NOTE — TELEPHONE ENCOUNTER
Pharmacy is in need of clarification on Rx of Elequis     Prabha called to verify dose - eliquis 10 mg BID x 7 days then 5 mg BID.     Prior auth is needed.

## 2025-04-17 ENCOUNTER — LAB (OUTPATIENT)
Dept: LAB | Facility: CLINIC | Age: 85
End: 2025-04-17
Payer: MEDICARE

## 2025-04-17 ENCOUNTER — HOSPITAL ENCOUNTER (OUTPATIENT)
Dept: RADIOLOGY | Facility: HOSPITAL | Age: 85
Discharge: HOME | End: 2025-04-17
Payer: MEDICARE

## 2025-04-17 DIAGNOSIS — C79.51 CARCINOMA OF RIGHT BREAST METASTATIC TO BONE: ICD-10-CM

## 2025-04-17 DIAGNOSIS — R60.0 BILATERAL LOWER EXTREMITY EDEMA: ICD-10-CM

## 2025-04-17 DIAGNOSIS — C50.911 CARCINOMA OF RIGHT BREAST METASTATIC TO BONE: ICD-10-CM

## 2025-04-17 DIAGNOSIS — J90 PLEURAL EFFUSION: ICD-10-CM

## 2025-04-17 DIAGNOSIS — M89.8X9 LYTIC BONE LESIONS ON XRAY: ICD-10-CM

## 2025-04-17 LAB
ALBUMIN SERPL BCP-MCNC: 3.8 G/DL (ref 3.4–5)
ALP SERPL-CCNC: 46 U/L (ref 33–136)
ALT SERPL W P-5'-P-CCNC: 8 U/L (ref 7–45)
ANION GAP SERPL CALC-SCNC: 14 MMOL/L (ref 10–20)
AST SERPL W P-5'-P-CCNC: 10 U/L (ref 9–39)
BASOPHILS # BLD AUTO: 0.03 X10*3/UL (ref 0–0.1)
BASOPHILS NFR BLD AUTO: 1.1 %
BILIRUB SERPL-MCNC: 0.4 MG/DL (ref 0–1.2)
BUN SERPL-MCNC: 26 MG/DL (ref 6–23)
CALCIUM SERPL-MCNC: 8.5 MG/DL (ref 8.6–10.3)
CHLORIDE SERPL-SCNC: 100 MMOL/L (ref 98–107)
CHOLEST SERPL-MCNC: 157 MG/DL (ref 0–199)
CHOLESTEROL/HDL RATIO: 2.6
CO2 SERPL-SCNC: 25 MMOL/L (ref 21–32)
CREAT SERPL-MCNC: 1.3 MG/DL (ref 0.5–1.05)
EGFRCR SERPLBLD CKD-EPI 2021: 41 ML/MIN/1.73M*2
EOSINOPHIL # BLD AUTO: 0.03 X10*3/UL (ref 0–0.4)
EOSINOPHIL NFR BLD AUTO: 1.1 %
ERYTHROCYTE [DISTWIDTH] IN BLOOD BY AUTOMATED COUNT: 19.8 % (ref 11.5–14.5)
GLUCOSE SERPL-MCNC: 136 MG/DL (ref 74–99)
HCT VFR BLD AUTO: 25.7 % (ref 36–46)
HDLC SERPL-MCNC: 60.4 MG/DL
HGB BLD-MCNC: 8.4 G/DL (ref 12–16)
IMM GRANULOCYTES # BLD AUTO: 0.02 X10*3/UL (ref 0–0.5)
IMM GRANULOCYTES NFR BLD AUTO: 0.8 % (ref 0–0.9)
LDLC SERPL CALC-MCNC: 81 MG/DL
LYMPHOCYTES # BLD AUTO: 1.35 X10*3/UL (ref 0.8–3)
LYMPHOCYTES NFR BLD AUTO: 51.7 %
MAGNESIUM SERPL-MCNC: 1.6 MG/DL (ref 1.6–2.4)
MCH RBC QN AUTO: 35.9 PG (ref 26–34)
MCHC RBC AUTO-ENTMCNC: 32.7 G/DL (ref 32–36)
MCV RBC AUTO: 110 FL (ref 80–100)
MONOCYTES # BLD AUTO: 0.12 X10*3/UL (ref 0.05–0.8)
MONOCYTES NFR BLD AUTO: 4.6 %
NEUTROPHILS # BLD AUTO: 1.06 X10*3/UL (ref 1.6–5.5)
NEUTROPHILS NFR BLD AUTO: 40.7 %
NON HDL CHOLESTEROL: 97 MG/DL (ref 0–149)
NRBC BLD-RTO: ABNORMAL /100{WBCS}
PLATELET # BLD AUTO: 172 X10*3/UL (ref 150–450)
POLYCHROMASIA BLD QL SMEAR: NORMAL
POTASSIUM SERPL-SCNC: 4.5 MMOL/L (ref 3.5–5.3)
PROT SERPL-MCNC: 6.3 G/DL (ref 6.4–8.2)
RBC # BLD AUTO: 2.34 X10*6/UL (ref 4–5.2)
RBC MORPH BLD: NORMAL
SODIUM SERPL-SCNC: 134 MMOL/L (ref 136–145)
TRIGL SERPL-MCNC: 79 MG/DL (ref 0–149)
TSH SERPL-ACNC: 3.75 MIU/L (ref 0.44–3.98)
VLDL: 16 MG/DL (ref 0–40)
WBC # BLD AUTO: 2.6 X10*3/UL (ref 4.4–11.3)

## 2025-04-17 PROCEDURE — 36415 COLL VENOUS BLD VENIPUNCTURE: CPT

## 2025-04-17 PROCEDURE — 80053 COMPREHEN METABOLIC PANEL: CPT

## 2025-04-17 PROCEDURE — 86300 IMMUNOASSAY TUMOR CA 15-3: CPT

## 2025-04-17 PROCEDURE — 84443 ASSAY THYROID STIM HORMONE: CPT | Performed by: STUDENT IN AN ORGANIZED HEALTH CARE EDUCATION/TRAINING PROGRAM

## 2025-04-17 PROCEDURE — 71046 X-RAY EXAM CHEST 2 VIEWS: CPT | Mod: COMPUTED RADIOGRAPHY X-RAY | Performed by: RADIOLOGY

## 2025-04-17 PROCEDURE — 85025 COMPLETE CBC W/AUTO DIFF WBC: CPT

## 2025-04-17 PROCEDURE — 83735 ASSAY OF MAGNESIUM: CPT | Performed by: STUDENT IN AN ORGANIZED HEALTH CARE EDUCATION/TRAINING PROGRAM

## 2025-04-17 PROCEDURE — 71046 X-RAY EXAM CHEST 2 VIEWS: CPT | Mod: FY

## 2025-04-17 PROCEDURE — 80061 LIPID PANEL: CPT | Performed by: STUDENT IN AN ORGANIZED HEALTH CARE EDUCATION/TRAINING PROGRAM

## 2025-04-18 ENCOUNTER — OFFICE VISIT (OUTPATIENT)
Dept: HEMATOLOGY/ONCOLOGY | Facility: CLINIC | Age: 85
End: 2025-04-18
Payer: MEDICARE

## 2025-04-18 ENCOUNTER — INFUSION (OUTPATIENT)
Dept: HEMATOLOGY/ONCOLOGY | Facility: CLINIC | Age: 85
End: 2025-04-18
Payer: MEDICARE

## 2025-04-18 VITALS
HEART RATE: 76 BPM | TEMPERATURE: 96.8 F | RESPIRATION RATE: 16 BRPM | DIASTOLIC BLOOD PRESSURE: 55 MMHG | WEIGHT: 155.87 LBS | SYSTOLIC BLOOD PRESSURE: 100 MMHG | BODY MASS INDEX: 30.44 KG/M2 | OXYGEN SATURATION: 95 %

## 2025-04-18 DIAGNOSIS — I10 ESSENTIAL HYPERTENSION: ICD-10-CM

## 2025-04-18 DIAGNOSIS — D61.82 MYELOPHTHISIC ANEMIA: ICD-10-CM

## 2025-04-18 DIAGNOSIS — H40.9 GLAUCOMA OF BOTH EYES, UNSPECIFIED GLAUCOMA TYPE: ICD-10-CM

## 2025-04-18 DIAGNOSIS — C50.919 MALIGNANT NEOPLASM OF BREAST ASSOCIATED WITH MUTATION IN CHEK2 GENE: Primary | ICD-10-CM

## 2025-04-18 DIAGNOSIS — C79.51 CARCINOMA OF RIGHT BREAST METASTATIC TO BONE: ICD-10-CM

## 2025-04-18 DIAGNOSIS — E78.2 MIXED HYPERLIPIDEMIA: ICD-10-CM

## 2025-04-18 DIAGNOSIS — K21.00 GASTROESOPHAGEAL REFLUX DISEASE WITH ESOPHAGITIS WITHOUT HEMORRHAGE: ICD-10-CM

## 2025-04-18 DIAGNOSIS — Z15.02 MALIGNANT NEOPLASM OF BREAST ASSOCIATED WITH MUTATION IN CHEK2 GENE: Primary | ICD-10-CM

## 2025-04-18 DIAGNOSIS — C50.911 CARCINOMA OF RIGHT BREAST METASTATIC TO BONE: ICD-10-CM

## 2025-04-18 DIAGNOSIS — Z15.89 MALIGNANT NEOPLASM OF BREAST ASSOCIATED WITH MUTATION IN CHEK2 GENE: Primary | ICD-10-CM

## 2025-04-18 DIAGNOSIS — Z15.09 MALIGNANT NEOPLASM OF BREAST ASSOCIATED WITH MUTATION IN CHEK2 GENE: Primary | ICD-10-CM

## 2025-04-18 DIAGNOSIS — I82.451 ACUTE DEEP VEIN THROMBOSIS (DVT) OF RIGHT PERONEAL VEIN (MULTI): ICD-10-CM

## 2025-04-18 DIAGNOSIS — M89.8X9 LYTIC BONE LESIONS ON XRAY: ICD-10-CM

## 2025-04-18 LAB — CANCER AG27-29 SERPL-ACNC: 253.7 U/ML (ref 0–38.6)

## 2025-04-18 PROCEDURE — 99214 OFFICE O/P EST MOD 30 MIN: CPT | Performed by: INTERNAL MEDICINE

## 2025-04-18 PROCEDURE — 96402 CHEMO HORMON ANTINEOPL SQ/IM: CPT

## 2025-04-18 PROCEDURE — 96372 THER/PROPH/DIAG INJ SC/IM: CPT

## 2025-04-18 PROCEDURE — 1126F AMNT PAIN NOTED NONE PRSNT: CPT | Performed by: INTERNAL MEDICINE

## 2025-04-18 PROCEDURE — 3074F SYST BP LT 130 MM HG: CPT | Performed by: INTERNAL MEDICINE

## 2025-04-18 PROCEDURE — 1123F ACP DISCUSS/DSCN MKR DOCD: CPT | Performed by: INTERNAL MEDICINE

## 2025-04-18 PROCEDURE — 2500000004 HC RX 250 GENERAL PHARMACY W/ HCPCS (ALT 636 FOR OP/ED): Mod: JZ,TB | Performed by: INTERNAL MEDICINE

## 2025-04-18 PROCEDURE — 2500000004 HC RX 250 GENERAL PHARMACY W/ HCPCS (ALT 636 FOR OP/ED): Mod: JZ,TB

## 2025-04-18 PROCEDURE — 3078F DIAST BP <80 MM HG: CPT | Performed by: INTERNAL MEDICINE

## 2025-04-18 PROCEDURE — 1159F MED LIST DOCD IN RCRD: CPT | Performed by: INTERNAL MEDICINE

## 2025-04-18 PROCEDURE — G2211 COMPLEX E/M VISIT ADD ON: HCPCS | Performed by: INTERNAL MEDICINE

## 2025-04-18 PROCEDURE — 1160F RVW MEDS BY RX/DR IN RCRD: CPT | Performed by: INTERNAL MEDICINE

## 2025-04-18 RX ORDER — DIPHENHYDRAMINE HYDROCHLORIDE 50 MG/ML
50 INJECTION, SOLUTION INTRAMUSCULAR; INTRAVENOUS AS NEEDED
OUTPATIENT
Start: 2025-05-09

## 2025-04-18 RX ORDER — DIPHENHYDRAMINE HYDROCHLORIDE 50 MG/ML
50 INJECTION, SOLUTION INTRAMUSCULAR; INTRAVENOUS AS NEEDED
Status: DISCONTINUED | OUTPATIENT
Start: 2025-04-18 | End: 2025-04-18 | Stop reason: HOSPADM

## 2025-04-18 RX ORDER — EPINEPHRINE 0.3 MG/.3ML
0.3 INJECTION SUBCUTANEOUS EVERY 5 MIN PRN
Status: DISCONTINUED | OUTPATIENT
Start: 2025-04-18 | End: 2025-04-18 | Stop reason: HOSPADM

## 2025-04-18 RX ORDER — FAMOTIDINE 10 MG/ML
20 INJECTION, SOLUTION INTRAVENOUS ONCE AS NEEDED
OUTPATIENT
Start: 2025-05-09

## 2025-04-18 RX ORDER — ALBUTEROL SULFATE 0.83 MG/ML
3 SOLUTION RESPIRATORY (INHALATION) AS NEEDED
OUTPATIENT
Start: 2025-05-09

## 2025-04-18 RX ORDER — ALBUTEROL SULFATE 0.83 MG/ML
3 SOLUTION RESPIRATORY (INHALATION) AS NEEDED
Status: DISCONTINUED | OUTPATIENT
Start: 2025-04-18 | End: 2025-04-18 | Stop reason: HOSPADM

## 2025-04-18 RX ORDER — EPINEPHRINE 0.3 MG/.3ML
0.3 INJECTION SUBCUTANEOUS EVERY 5 MIN PRN
OUTPATIENT
Start: 2025-05-09

## 2025-04-18 RX ORDER — FAMOTIDINE 10 MG/ML
20 INJECTION, SOLUTION INTRAVENOUS ONCE AS NEEDED
Status: DISCONTINUED | OUTPATIENT
Start: 2025-04-18 | End: 2025-04-18 | Stop reason: HOSPADM

## 2025-04-18 RX ORDER — LAMOTRIGINE 25 MG/1
500 TABLET ORAL ONCE
Status: COMPLETED | OUTPATIENT
Start: 2025-04-18 | End: 2025-04-18

## 2025-04-18 RX ADMIN — FULVESTRANT 500 MG: 50 INJECTION, SOLUTION INTRAMUSCULAR at 12:25

## 2025-04-18 RX ADMIN — DENOSUMAB 120 MG: 120 INJECTION SUBCUTANEOUS at 12:21

## 2025-04-18 ASSESSMENT — PAIN SCALES - GENERAL: PAINLEVEL_OUTOF10: 0-NO PAIN

## 2025-04-18 NOTE — PATIENT INSTRUCTIONS
Continue taking the eliquis for now    Continue all meds the same way/schedule for now    You will have a new full body CT scan done just before your next visit

## 2025-04-18 NOTE — PROGRESS NOTES
Patient ID: Arabella Springer is a 84 y.o. female.  Referring Physician: Andrew Mcfarland MD  [id number] [facility] [address]  [place] [address]  Primary Care Provider: Angel Luis Velazquez DO  Visit Type: Follow Up      Subjective    HPI I got put on a blood thinner    Review of Systems   Constitutional: Negative.    HENT:  Negative.     Eyes: Negative.    Respiratory:  Positive for shortness of breath.    Cardiovascular:  Positive for leg swelling.   Gastrointestinal: Negative.    Endocrine: Negative.    Genitourinary: Negative.     Musculoskeletal: Negative.    Skin:  Positive for rash.   Neurological: Negative.    Hematological: Negative.    Psychiatric/Behavioral: Negative.          Objective   BSA: 1.73 meters squared  /55 (BP Location: Left arm, Patient Position: Sitting, BP Cuff Size: Adult) Comment: Ruth MASSEY aware  Pulse 76   Temp 36 °C (96.8 °F) (Temporal)   Resp 16   Wt 70.7 kg (155 lb 13.8 oz) Comment: NO SHOES,COAT  SpO2 95%   BMI 30.44 kg/m²      has a past medical history of Anxiety, Arthritis, Breast cancer, Breast cancer metastasized to bone, GERD (gastroesophageal reflux disease), Glaucoma, Hiatal hernia, Hyperlipidemia, and Hypertension.   has a past surgical history that includes Mastectomy complete / simple (Right); Hysterectomy; and Shoulder surgery.  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 Her smoking use included cigarettes. She has never used smokeless tobacco.  She  reports that she does not currently use alcohol.  She  reports no history of drug use.    Physical Exam  Vitals reviewed.   Constitutional:       Appearance: Normal appearance.    HENT:      Head: Normocephalic.      Mouth/Throat:      Mouth: Mucous membranes are moist.   Eyes:      Extraocular Movements: Extraocular movements intact.      Pupils: Pupils are equal, round, and reactive to light.   Cardiovascular:      Rate and Rhythm: Normal rate and regular rhythm.      Pulses: Normal pulses.      Heart sounds: Normal heart sounds.   Pulmonary:      Effort: Pulmonary effort is normal.      Breath sounds: Normal breath sounds.   Abdominal:      General: Bowel sounds are normal.      Palpations: Abdomen is soft.   Musculoskeletal:         General: Normal range of motion.      Cervical back: Normal range of motion and neck supple.      Right lower leg: Edema present.      Left lower leg: Edema present.   Skin:     General: Skin is warm.   Neurological:      General: No focal deficit present.      Mental Status: She is alert and oriented to person, place, and time.   Psychiatric:         Mood and Affect: Mood normal.         Behavior: Behavior normal.         WBC   Date/Time Value Ref Range Status   04/17/2025 08:39 AM 2.6 (L) 4.4 - 11.3 x10*3/uL Final   03/20/2025 02:26 PM 2.6 (L) 4.4 - 11.3 x10*3/uL Final   03/06/2025 02:25 PM 4.0 (L) 4.4 - 11.3 x10*3/uL Final     WHITE BLOOD CELL COUNT   Date/Time Value Ref Range Status   02/12/2025 10:34 AM 2.8 (L) 3.8 - 10.8 Thousand/uL Final     nRBC   Date Value Ref Range Status   04/17/2025   Final     Comment:     Not Measured   03/20/2025   Final     Comment:     Not Measured   03/06/2025   Final     Comment:     Not Measured     RBC   Date Value Ref Range Status   04/17/2025 2.34 (L) 4.00 - 5.20 x10*6/uL Final   03/20/2025 2.61 (L) 4.00 - 5.20 x10*6/uL Final   03/06/2025 2.53 (L) 4.00 - 5.20 x10*6/uL Final     RED BLOOD CELL COUNT   Date Value Ref Range Status   02/12/2025 2.74 (L) 3.80 - 5.10 Million/uL Final     Hemoglobin   Date Value Ref Range Status   04/17/2025 8.4 (L) 12.0 - 16.0 g/dL Final   03/20/2025 8.7 (L) 12.0 - 16.0 g/dL Final    03/06/2025 8.2 (L) 12.0 - 16.0 g/dL Final     HEMOGLOBIN   Date Value Ref Range Status   02/12/2025 8.5 (L) 11.7 - 15.5 g/dL Final     Hematocrit   Date Value Ref Range Status   04/17/2025 25.7 (L) 36.0 - 46.0 % Final   03/20/2025 27.2 (L) 36.0 - 46.0 % Final   03/06/2025 26.2 (L) 36.0 - 46.0 % Final     HEMATOCRIT   Date Value Ref Range Status   02/12/2025 27.0 (L) 35.0 - 45.0 % Final     MCV   Date/Time Value Ref Range Status   04/17/2025 08:39  (H) 80 - 100 fL Final   03/20/2025 02:26  (H) 80 - 100 fL Final   03/06/2025 02:25  (H) 80 - 100 fL Final   02/12/2025 10:34 AM 98.5 80.0 - 100.0 fL Final     MCH   Date/Time Value Ref Range Status   04/17/2025 08:39 AM 35.9 (H) 26.0 - 34.0 pg Final   03/20/2025 02:26 PM 33.3 26.0 - 34.0 pg Final   03/06/2025 02:25 PM 32.4 26.0 - 34.0 pg Final   02/12/2025 10:34 AM 31.0 27.0 - 33.0 pg Final     MCHC   Date/Time Value Ref Range Status   04/17/2025 08:39 AM 32.7 32.0 - 36.0 g/dL Final   03/20/2025 02:26 PM 32.0 32.0 - 36.0 g/dL Final   03/06/2025 02:25 PM 31.3 (L) 32.0 - 36.0 g/dL Final   02/12/2025 10:34 AM 31.5 (L) 32.0 - 36.0 g/dL Final     Comment:     For adults, a slight decrease in the calculated MCHC  value (in the range of 30 to 32 g/dL) is most likely  not clinically significant; however, it should be  interpreted with caution in correlation with other  red cell parameters and the patient's clinical  condition.       RDW   Date/Time Value Ref Range Status   04/17/2025 08:39 AM 19.8 (H) 11.5 - 14.5 % Final   03/20/2025 02:26 PM 22.4 (H) 11.5 - 14.5 % Final   03/06/2025 02:25 PM 21.5 (H) 11.5 - 14.5 % Final   02/12/2025 10:34 AM 16.9 (H) 11.0 - 15.0 % Final     Platelets   Date/Time Value Ref Range Status   04/17/2025 08:39  150 - 450 x10*3/uL Final   03/20/2025 02:26  150 - 450 x10*3/uL Final   03/06/2025 02:25  150 - 450 x10*3/uL Final     PLATELET COUNT   Date/Time Value Ref Range Status   02/12/2025 10:34  140 - 400  Thousand/uL Final     MPV   Date/Time Value Ref Range Status   02/12/2025 10:34 AM 9.8 7.5 - 12.5 fL Final   10/19/2023 08:01 AM 9.2 7.5 - 11.5 fL Final     Neutrophils %   Date/Time Value Ref Range Status   04/17/2025 08:39 AM 40.7 40.0 - 80.0 % Final   03/20/2025 02:26 PM 32.2 40.0 - 80.0 % Final   03/06/2025 02:25 PM 29.3 40.0 - 80.0 % Final     Immature Granulocytes %, Automated   Date/Time Value Ref Range Status   04/17/2025 08:39 AM 0.8 0.0 - 0.9 % Final     Comment:     Immature Granulocyte Count (IG) includes promyelocytes, myelocytes and metamyelocytes but does not include bands. Percent differential counts (%) should be interpreted in the context of the absolute cell counts (cells/UL).   03/20/2025 02:26 PM 0.0 0.0 - 0.9 % Final     Comment:     Immature Granulocyte Count (IG) includes promyelocytes, myelocytes and metamyelocytes but does not include bands. Percent differential counts (%) should be interpreted in the context of the absolute cell counts (cells/UL).   03/06/2025 02:25 PM 0.7 0.0 - 0.9 % Final     Comment:     Immature Granulocyte Count (IG) includes promyelocytes, myelocytes and metamyelocytes but does not include bands. Percent differential counts (%) should be interpreted in the context of the absolute cell counts (cells/UL).     Lymphocytes %   Date/Time Value Ref Range Status   04/17/2025 08:39 AM 51.7 13.0 - 44.0 % Final   03/20/2025 02:26 PM 58.2 13.0 - 44.0 % Final   03/06/2025 02:25 PM 54.9 13.0 - 44.0 % Final     Monocytes %   Date/Time Value Ref Range Status   04/17/2025 08:39 AM 4.6 2.0 - 10.0 % Final   03/20/2025 02:26 PM 6.9 2.0 - 10.0 % Final   03/06/2025 02:25 PM 13.2 2.0 - 10.0 % Final     Eosinophils %   Date/Time Value Ref Range Status   04/17/2025 08:39 AM 1.1 0.0 - 6.0 % Final   03/20/2025 02:26 PM 1.9 0.0 - 6.0 % Final   03/06/2025 02:25 PM 1.2 0.0 - 6.0 % Final     Basophils %   Date/Time Value Ref Range Status   04/17/2025 08:39 AM 1.1 0.0 - 2.0 % Final   03/20/2025  02:26 PM 0.8 0.0 - 2.0 % Final   03/06/2025 02:25 PM 0.7 0.0 - 2.0 % Final     Neutrophils Absolute   Date/Time Value Ref Range Status   04/17/2025 08:39 AM 1.06 (L) 1.60 - 5.50 x10*3/uL Final     Comment:     Percent differential counts (%) should be interpreted in the context of the absolute cell counts (cells/uL).   03/20/2025 02:26 PM 0.84 (L) 1.60 - 5.50 x10*3/uL Final     Comment:     Percent differential counts (%) should be interpreted in the context of the absolute cell counts (cells/uL).   03/06/2025 02:25 PM 1.17 (L) 1.60 - 5.50 x10*3/uL Final     Comment:     Percent differential counts (%) should be interpreted in the context of the absolute cell counts (cells/uL).     Immature Granulocytes Absolute, Automated   Date/Time Value Ref Range Status   04/17/2025 08:39 AM 0.02 0.00 - 0.50 x10*3/uL Final   03/20/2025 02:26 PM 0.00 0.00 - 0.50 x10*3/uL Final   03/06/2025 02:25 PM 0.03 0.00 - 0.50 x10*3/uL Final     Lymphocytes Absolute   Date/Time Value Ref Range Status   04/17/2025 08:39 AM 1.35 0.80 - 3.00 x10*3/uL Final   03/20/2025 02:26 PM 1.52 0.80 - 3.00 x10*3/uL Final   03/06/2025 02:25 PM 2.20 0.80 - 3.00 x10*3/uL Final     Monocytes Absolute   Date/Time Value Ref Range Status   04/17/2025 08:39 AM 0.12 0.05 - 0.80 x10*3/uL Final   03/20/2025 02:26 PM 0.18 0.05 - 0.80 x10*3/uL Final   03/06/2025 02:25 PM 0.53 0.05 - 0.80 x10*3/uL Final     Eosinophils Absolute   Date/Time Value Ref Range Status   04/17/2025 08:39 AM 0.03 0.00 - 0.40 x10*3/uL Final   03/20/2025 02:26 PM 0.05 0.00 - 0.40 x10*3/uL Final   03/06/2025 02:25 PM 0.05 0.00 - 0.40 x10*3/uL Final     Basophils Absolute   Date/Time Value Ref Range Status   04/17/2025 08:39 AM 0.03 0.00 - 0.10 x10*3/uL Final     Comment:     Automated WBC differential has been confirmed by manual smear.   03/20/2025 02:26 PM 0.02 0.00 - 0.10 x10*3/uL Final     Comment:     Automated WBC differential has been confirmed by manual smear.   03/06/2025 02:25 PM 0.03  "0.00 - 0.10 x10*3/uL Final     Comment:     Automated WBC differential has been confirmed by manual smear.       No components found for: \"PT\"  No results found for: \"APTT\"  Medication Documentation Review Audit       Reviewed by Akosua Candelario MA (Medical Assistant) on 04/18/25 at 1049      Medication Order Taking? Sig Documenting Provider Last Dose Status   acetaminophen (Tylenol) 500 mg tablet [id number] Yes Take 2 tablets (1,000 mg) by mouth 3 times a day as needed for mild pain (1 - 3) or moderate pain (4 - 6). Tika Hackett MD Past Month Active   apixaban (Eliquis) 5 mg tablet [id number] Yes Take 2 tablets (10 mg) by mouth 2 times a day for 7 days, THEN 1 tablet (5 mg) 2 times a day. 10Mg (2pills) twice a day x 7 days then 5 mg twice a day. DEX Barcenas  Active   beclomethasone HFA (Qvar) 40 mcg/actuation inhaler [id number] Yes Inhale 1 Inhalation 2 times a day. Tika Hackett MD 2/5/2025 Morning Active   Blood glucose monitoring meter [id number] Yes Test two times daily. DEX Bolton  Active   blood sugar diagnostic strip [id number] Yes 1 each 2 times a day. DEX Bolton  Active   brimonidine-timoloL (Combigan) 0.2-0.5 % ophthalmic solution [id number] Yes Administer 1 drop into both eyes every 12 hours. Tika Hackett MD 2/5/2025 Morning Active   calcium carbonate-vitamin D3 600 mg-10 mcg (400 unit) tablet [id number] Yes Take 3 tablets by mouth once daily. Tika Hackett MD 2/5/2025 Morning Active   denosumab (Xgeva) 120 mg/1.7 mL (70 mg/mL) injection [id number] Yes Inject 1.7 mL (120 mg) under the skin every 30 (thirty) days. Tika Hackett MD Past Month Active   diclofenac sodium (Voltaren Arthritis Pain) 1 % gel [id number] Yes Apply 4.5 inches (4 g) topically 2 times a day as needed. Tika Hackett MD  Active   esomeprazole (NexIUM) 40 mg DR capsule [id number] Yes Take 1 capsule (40 mg) by mouth once daily in the morning. Take before " meals. Historical Provider, MD 2/5/2025 Morning Active   furosemide (Lasix) 20 mg tablet [id number] Yes Take 20mg alternating with 40mg ROCHELLE Barcenas-CNP  Active   inavolisib 3 mg tablet [id number] Yes Take 3 mg by mouth once daily. For 3 weeks then take 1 week off Andrew Mcfarland MD  Active   inhalat.spacing dev,large mask (Pro Comfort Spacer-Adult Mask) spacer [id number] Yes 1 each every 4 hours if needed (shortness of breath, wheezing, cough). Naina Pisano, DO Unknown Active   isopropyl alcohoL 70 % towelette [id number] Yes Test 2 times a day, clean finger prior to testing ROCHELLE Bolton-CNP  Active   LORazepam (Ativan) 0.5 mg tablet [id number] Yes Take 1 tablet (0.5 mg) by mouth every 2 hours if needed for anxiety (anxiety related to radiology tests) for up to 5 doses. ROCHELLE Bolton-CNP Past Month Active   losartan-hydrochlorothiazide (Hyzaar) 100-12.5 mg tablet [id number] Yes Take 1 tablet by mouth once daily. Abraham Mohan,  2/5/2025 Morning Active   mometasone (Elocon) 0.1 % lotion [id number] Yes Apply topically 2 times a day. Jimmie Nam MD 2/5/2025 Morning Active   multivitamin tablet [id number] Yes Take 1 tablet by mouth once daily. Historical Provider, MD 2/5/2025 Morning Active   ondansetron (Zofran) 8 mg tablet [id number] Yes Take 1 tablet (8 mg) by mouth every 8 hours if needed for nausea or vomiting. Andrew Mcfarland MD Past Month Active   palbociclib (Ibrance) 125 mg tablet [id number] Yes Take 125 mg (1 tablet) by mouth once daily for three weeks, then one week off.  Swallow whole.  Do not crush, chew or split. Andrew Mcfarland MD  Active   pravastatin (Pravachol) 10 mg tablet [id number] Yes Take 1 tablet (10 mg) by mouth once daily. Abraham Mohan DO 2/5/2025 Morning Active   prochlorperazine (Compazine) 10 mg tablet [id number] Yes Take 1 tablet (10 mg) by mouth every 6 hours if needed for nausea or vomiting. Andrew Mcfarland MD Past Month Active   Rhopressa 0.02 %  drops opthalmic solution [id number] Yes Administer 1 drop into the right eye once daily at bedtime. Historical Provider, MD 2/4/2025 Bedtime Active   triamcinolone (Kenalog) 0.1 % cream [id number] Yes Apply topically 2 times a day. 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  -now off steroids  -CT scan done on 4/3/2024 reviewed: interval worsening of bilateral pleural effusions, right >left; multiple scattered <0.5 mm pulmonary nodules bilateral lungs not changed; interval development of several branching opacities involving left lung apex; there are no new worrisome hepatic lesions; mild right sided hydronephrosis; diffuse sclerosis of axial and appendicular skeleton  -has been more short of breath recently--was evaluated in the ER at [facility] on 8/4--was told she had bilateral pleural effusions, which are not actually new  -right sided pleural effusion is already seen on PET scan done in 8/2023  -she had an echo done in June which was read as normal, [facility] did not perform a new one, cardiologist on 8/21 told the patient that she did not have CHF and that these effusions were likely malignant; she was started on low dose lasix which barely helped with the leg edema--and so dose was bumped up; she was recommended by pulmonology consult to undergo thoracentesis and she declined to have it done  -since effusions are not new, and actually were already  noted over one year ago, a normal echo does NOT completely rule out well-compensated CHF  -PET done on 8/16/2024 reviewed--no concerning pulmonary nodule; moderate-to-large bilateral pleura effusions, right greater than left; no FDG avid mediastinal, hilar or axillary lymphadenopathy; s/p right axillary ike dissection; s/p right mastectomy and reconstruction; no FDG avid lymphadenopathy in the abdomen and pelvis; bilateral hydronephrosis; interval improvement of FDG avid widespread bone metastases throughout the axial and appendicular skeleton many of which are non FDG avid ; residual disease noted in bilateral femur (SUV 3.6), pelvis (SUV 2.4), bilateral humerus (SUV 3.3), bilateral clavicles (SUV 2/9)  -there is no FDG activity in her thorax/pleura, making the effusions less likely to be of malignant origin, and given bilateral nature--more likely cardiac in origin  -she had thoracentesis done on 9/24/2024--with removal of 700 ml yellow fluid; cytology was negative  -here for interval followup  -CT scan done on 12/9/2024 reviewed--moderate bilateral pleural effusions with atelectasis, right greater than left; no concerning lung nodule; no mediastinal, hilar or axillary lymphadenopathy; postsurgical changes of right mastectomy with right breast implants;  moderate hydronephrosis left greater than right increased from prior exam; small to moderate volume ascites; new peritoneal nodular thickening (33 mm peritoneal implant in anterior deep pelvis); additional areas of peritoneal nodular thickening and enhancement in deep pelvis; no abdominopelvic lymphadenopathy; similar appearance of diffuse sclerotic lesions throughout the axial and appendicular skeleton; chronic bilateral rib fractures are noted; plate and screw fixation of the right humerus  -bone scan done on 12/9/2024 reviewed similar appearance of diffusely increased radiotracer uptake throughout the axial and appendicular skeleton corresponding with  widespread sclerotic osseous lesions on CT  --labs done on 12/11/2024 included CBC, COMP, CA 27.29  -results reviewed--wbc 4.6, hgb 9.1 plt 211,000, creatinine 1.04, calcium 9.0, albumin 4.1 AST 18, ALT 12  CA 27.29 295  -benefits, risks, potential morbidity related to xgeva were reviewed with Arabella and she provided informed consent to proceed  -she went on to receive xgeva 120 mg subcutaneous  -as there is now evidence of progression of disease (new peritoneal metastases), she is failing AI, and I have recommended therapeutic switch  -since her tumor has the PIK3CA mutation, I have advised switching to faslodex + palbociclib + Inavolisib  -benefits, risks, potential morbidity related to faslodex + palbociclib + inavolisib were reviewed with Arabella and she signed informed consent to proceed  -on days 1 and 15 (and then Q28 days) she will receive faslodex 500 mg intramuscular  -she will also take palbociclib 125 mg PO once daily for 21 days on then 7 days off  -she will also take inavolisib 9 mg PO once daily  -here for interval followup  -shortly after starting the new combination, she was admitted to [facility] from 1/21 to 1/23/2025 for mucositis and electrolyte abnormalities  -inavolisib was held  -she was then readmitted to [facility] from 2/5 to 2/72025 for shortness of breath, thoracentesis was done on the left side with removal of 750 ml of fluid but none was sent for cytology  -her mucositis eventually recovered  -she received inavoliisb 3 mg in hand a couple days ago and started it; mucositis has not returned but this morning she noticed in the mirror 2 maculopapular lesions--one on right cheek, one near left nares  -she was at physical therapy a couple weeks ago--and the therapist noted a slight asymmetry in her ability to rotate her ankles and asked Arabella if she has ever had a stroke--which quite alarmed her  -labs done on 2/20/2025 included CBC + COMP + CA 27.29  -results reviewed--creatinine 1.22, calcium 9.1,  "alk phos 44, AST 15, ALT 8, wbc 2.66, hgb 8.3, plt 218,000, , CA 27.29 295  -here for interval followup  -she has taken inavolisib 3 mg daily for an additional 2 weeks--has not had recurrent mucositis, no new dermatologic issues  -continues to take lasix 20 mg every other day--leg edema and shortness of breath improved and stable  -here for interval followup  -complaining of right foot drop--her orthopedist wants to fit her for a leg brace  -no further mucositis; still getting occasional \"red blotches\" on her cheeks  -labs done on 4/17/2025 included CBC +  COMP + CA 27.29  -results reviewed--wbc 2.6, hgb 8.4, plt 172,000, ANC 1060, creatinine 1.3, calcium 8.5, alk phos 46, AST 10, total bili 0.4, ALT 8 CA 27.29 253.7  -she will continue to take palbociclib 125 mg PO once daily for 21 days on then 7 days off  -she will continue to take inavolisib 3 mg PO once daily for 21 days on then 7 days off  -due for xgeva (120 mg subcutaneously) and faslodex (500 mg intramuscularly) today  -benefits, risks, potential morbidity related to xgeva were reviewed with Arabella and she provided informed consent to proceed  -she went on to receive xgeva 120 mg subcutaneously  -benefits, risks, potential morbidity related to faslodex were reviewed with Arabella and she provided informed consent to proceed  -she went on to receive faslodex 500 mg intramuscularly  -she will have a new full body CT scan done in 3 weeks  -will see her again in 4 weeks     2) bone metastases  -secondary to metastatic breast cancer  -receives xgeva Q4 weeks     3) anemia  -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  -hgb slowly improving     4) hyperlipidemia  -on pravastatin     5) arthritis  -on tylenol PRN  -on diclofenac PRN  -received steroid injections into her knees and she feels so much better, so she wants to receive them again     6) hypertension  -on amlodipine  -on losartan-HCTZ     7) GERD  -on nexium     8) glaucoma  -on " combigan eyedrops    9) DVT  -has had chronic leg edema for years  -recently had an injection into her knee  -she saw pulmonologist for first time on 4/14/2025 who ordered doppler  -doppler showed acute occlusive DVT in gastrocnemius veins in calf vein; rest of right leg is negative for DVT; gastrocnemius vein thrombus is seen at mid calf; in left leg there is no evidence of acute DVT   -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

## 2025-04-19 PROBLEM — I82.451 ACUTE DEEP VEIN THROMBOSIS (DVT) OF RIGHT PERONEAL VEIN (MULTI): Status: ACTIVE | Noted: 2025-04-19

## 2025-04-19 PROBLEM — E83.42 HYPOMAGNESEMIA: Status: ACTIVE | Noted: 2025-04-19

## 2025-04-19 PROCEDURE — RXMED WILLOW AMBULATORY MEDICATION CHARGE

## 2025-04-19 ASSESSMENT — ENCOUNTER SYMPTOMS
CONSTITUTIONAL NEGATIVE: 1
LEG SWELLING: 1
NEUROLOGICAL NEGATIVE: 1
EYES NEGATIVE: 1
HEMATOLOGIC/LYMPHATIC NEGATIVE: 1
PSYCHIATRIC NEGATIVE: 1
ENDOCRINE NEGATIVE: 1
GASTROINTESTINAL NEGATIVE: 1
SHORTNESS OF BREATH: 1
MUSCULOSKELETAL NEGATIVE: 1

## 2025-04-22 ENCOUNTER — SPECIALTY PHARMACY (OUTPATIENT)
Dept: PHARMACY | Facility: CLINIC | Age: 85
End: 2025-04-22

## 2025-04-23 ENCOUNTER — PHARMACY VISIT (OUTPATIENT)
Dept: PHARMACY | Facility: CLINIC | Age: 85
End: 2025-04-23
Payer: COMMERCIAL

## 2025-04-30 ENCOUNTER — APPOINTMENT (OUTPATIENT)
Dept: RADIOLOGY | Facility: HOSPITAL | Age: 85
End: 2025-04-30
Payer: MEDICARE

## 2025-04-30 ENCOUNTER — TELEPHONE (OUTPATIENT)
Facility: CLINIC | Age: 85
End: 2025-04-30
Payer: MEDICARE

## 2025-04-30 ENCOUNTER — TELEPHONE (OUTPATIENT)
Dept: PRIMARY CARE | Facility: CLINIC | Age: 85
End: 2025-04-30

## 2025-04-30 ENCOUNTER — APPOINTMENT (OUTPATIENT)
Dept: PRIMARY CARE | Facility: CLINIC | Age: 85
End: 2025-04-30
Payer: MEDICARE

## 2025-04-30 ENCOUNTER — HOSPITAL ENCOUNTER (INPATIENT)
Facility: HOSPITAL | Age: 85
LOS: 2 days | Discharge: HOME | End: 2025-05-02
Attending: EMERGENCY MEDICINE | Admitting: INTERNAL MEDICINE
Payer: MEDICARE

## 2025-04-30 ENCOUNTER — APPOINTMENT (OUTPATIENT)
Dept: CARDIOLOGY | Facility: HOSPITAL | Age: 85
End: 2025-04-30
Payer: MEDICARE

## 2025-04-30 DIAGNOSIS — R60.0 LOCALIZED EDEMA: ICD-10-CM

## 2025-04-30 DIAGNOSIS — L03.119 CELLULITIS OF LOWER EXTREMITY, UNSPECIFIED LATERALITY: ICD-10-CM

## 2025-04-30 DIAGNOSIS — J90 BILATERAL PLEURAL EFFUSION: ICD-10-CM

## 2025-04-30 DIAGNOSIS — R06.02 SHORTNESS OF BREATH: ICD-10-CM

## 2025-04-30 DIAGNOSIS — E87.70 HYPERVOLEMIA, UNSPECIFIED HYPERVOLEMIA TYPE: Primary | ICD-10-CM

## 2025-04-30 LAB
ALBUMIN SERPL BCP-MCNC: 4 G/DL (ref 3.4–5)
ALP SERPL-CCNC: 45 U/L (ref 33–136)
ALT SERPL W P-5'-P-CCNC: 7 U/L (ref 7–45)
ANION GAP SERPL CALC-SCNC: 13 MMOL/L (ref 10–20)
AST SERPL W P-5'-P-CCNC: 14 U/L (ref 9–39)
BASOPHILS # BLD AUTO: 0.03 X10*3/UL (ref 0–0.1)
BASOPHILS NFR BLD AUTO: 0.9 %
BILIRUB SERPL-MCNC: 0.3 MG/DL (ref 0–1.2)
BNP SERPL-MCNC: 44 PG/ML (ref 0–99)
BUN SERPL-MCNC: 24 MG/DL (ref 6–23)
CALCIUM SERPL-MCNC: 8.6 MG/DL (ref 8.6–10.3)
CHLORIDE SERPL-SCNC: 101 MMOL/L (ref 98–107)
CO2 SERPL-SCNC: 25 MMOL/L (ref 21–32)
CREAT SERPL-MCNC: 1.13 MG/DL (ref 0.5–1.05)
CRP SERPL-MCNC: 0.92 MG/DL
DACRYOCYTES BLD QL SMEAR: NORMAL
EGFRCR SERPLBLD CKD-EPI 2021: 49 ML/MIN/1.73M*2
EOSINOPHIL # BLD AUTO: 0.03 X10*3/UL (ref 0–0.4)
EOSINOPHIL NFR BLD AUTO: 0.9 %
ERYTHROCYTE [DISTWIDTH] IN BLOOD BY AUTOMATED COUNT: 19.4 % (ref 11.5–14.5)
ERYTHROCYTE [SEDIMENTATION RATE] IN BLOOD BY WESTERGREN METHOD: 28 MM/H (ref 0–30)
GLUCOSE SERPL-MCNC: 104 MG/DL (ref 74–99)
HCT VFR BLD AUTO: 24.7 % (ref 36–46)
HGB BLD-MCNC: 8 G/DL (ref 12–16)
IMM GRANULOCYTES # BLD AUTO: 0.04 X10*3/UL (ref 0–0.5)
IMM GRANULOCYTES NFR BLD AUTO: 1.2 % (ref 0–0.9)
LACTATE SERPL-SCNC: 0.6 MMOL/L (ref 0.4–2)
LYMPHOCYTES # BLD AUTO: 1.19 X10*3/UL (ref 0.8–3)
LYMPHOCYTES NFR BLD AUTO: 35 %
MCH RBC QN AUTO: 36.7 PG (ref 26–34)
MCHC RBC AUTO-ENTMCNC: 32.4 G/DL (ref 32–36)
MCV RBC AUTO: 113 FL (ref 80–100)
MONOCYTES # BLD AUTO: 0.63 X10*3/UL (ref 0.05–0.8)
MONOCYTES NFR BLD AUTO: 18.5 %
NEUTROPHILS # BLD AUTO: 1.48 X10*3/UL (ref 1.6–5.5)
NEUTROPHILS NFR BLD AUTO: 43.5 %
NRBC BLD-RTO: 0.6 /100 WBCS (ref 0–0)
OVALOCYTES BLD QL SMEAR: NORMAL
PLATELET # BLD AUTO: 183 X10*3/UL (ref 150–450)
POLYCHROMASIA BLD QL SMEAR: NORMAL
POTASSIUM SERPL-SCNC: 4.4 MMOL/L (ref 3.5–5.3)
PROT SERPL-MCNC: 6.7 G/DL (ref 6.4–8.2)
RBC # BLD AUTO: 2.18 X10*6/UL (ref 4–5.2)
RBC MORPH BLD: NORMAL
SODIUM SERPL-SCNC: 135 MMOL/L (ref 136–145)
WBC # BLD AUTO: 3.4 X10*3/UL (ref 4.4–11.3)

## 2025-04-30 PROCEDURE — 99285 EMERGENCY DEPT VISIT HI MDM: CPT | Mod: 25 | Performed by: EMERGENCY MEDICINE

## 2025-04-30 PROCEDURE — 73590 X-RAY EXAM OF LOWER LEG: CPT | Mod: 50

## 2025-04-30 PROCEDURE — 71045 X-RAY EXAM CHEST 1 VIEW: CPT

## 2025-04-30 PROCEDURE — 2500000001 HC RX 250 WO HCPCS SELF ADMINISTERED DRUGS (ALT 637 FOR MEDICARE OP)

## 2025-04-30 PROCEDURE — 86140 C-REACTIVE PROTEIN: CPT | Performed by: STUDENT IN AN ORGANIZED HEALTH CARE EDUCATION/TRAINING PROGRAM

## 2025-04-30 PROCEDURE — 85025 COMPLETE CBC W/AUTO DIFF WBC: CPT | Performed by: STUDENT IN AN ORGANIZED HEALTH CARE EDUCATION/TRAINING PROGRAM

## 2025-04-30 PROCEDURE — 83880 ASSAY OF NATRIURETIC PEPTIDE: CPT | Performed by: EMERGENCY MEDICINE

## 2025-04-30 PROCEDURE — 93970 EXTREMITY STUDY: CPT

## 2025-04-30 PROCEDURE — 85652 RBC SED RATE AUTOMATED: CPT | Performed by: STUDENT IN AN ORGANIZED HEALTH CARE EDUCATION/TRAINING PROGRAM

## 2025-04-30 PROCEDURE — 99285 EMERGENCY DEPT VISIT HI MDM: CPT | Performed by: EMERGENCY MEDICINE

## 2025-04-30 PROCEDURE — 71045 X-RAY EXAM CHEST 1 VIEW: CPT | Performed by: RADIOLOGY

## 2025-04-30 PROCEDURE — 80053 COMPREHEN METABOLIC PANEL: CPT | Performed by: STUDENT IN AN ORGANIZED HEALTH CARE EDUCATION/TRAINING PROGRAM

## 2025-04-30 PROCEDURE — 36415 COLL VENOUS BLD VENIPUNCTURE: CPT | Performed by: STUDENT IN AN ORGANIZED HEALTH CARE EDUCATION/TRAINING PROGRAM

## 2025-04-30 PROCEDURE — 96365 THER/PROPH/DIAG IV INF INIT: CPT

## 2025-04-30 PROCEDURE — 87040 BLOOD CULTURE FOR BACTERIA: CPT | Mod: STJLAB | Performed by: STUDENT IN AN ORGANIZED HEALTH CARE EDUCATION/TRAINING PROGRAM

## 2025-04-30 PROCEDURE — 1100000001 HC PRIVATE ROOM DAILY

## 2025-04-30 PROCEDURE — 93971 EXTREMITY STUDY: CPT

## 2025-04-30 PROCEDURE — 83605 ASSAY OF LACTIC ACID: CPT | Performed by: STUDENT IN AN ORGANIZED HEALTH CARE EDUCATION/TRAINING PROGRAM

## 2025-04-30 PROCEDURE — 2500000004 HC RX 250 GENERAL PHARMACY W/ HCPCS (ALT 636 FOR OP/ED): Mod: JZ | Performed by: STUDENT IN AN ORGANIZED HEALTH CARE EDUCATION/TRAINING PROGRAM

## 2025-04-30 PROCEDURE — 87070 CULTURE OTHR SPECIMN AEROBIC: CPT | Mod: STJLAB | Performed by: STUDENT IN AN ORGANIZED HEALTH CARE EDUCATION/TRAINING PROGRAM

## 2025-04-30 PROCEDURE — 73590 X-RAY EXAM OF LOWER LEG: CPT | Mod: BILATERAL PROCEDURE | Performed by: RADIOLOGY

## 2025-04-30 PROCEDURE — 93970 EXTREMITY STUDY: CPT | Performed by: INTERNAL MEDICINE

## 2025-04-30 PROCEDURE — 96367 TX/PROPH/DG ADDL SEQ IV INF: CPT

## 2025-04-30 RX ORDER — NEOMYCIN SULFATE, POLYMYXIN B SULFATE AND DEXAMETHASONE 3.5; 10000; 1 MG/ML; [USP'U]/ML; MG/ML
2 SUSPENSION/ DROPS OPHTHALMIC EVERY 6 HOURS SCHEDULED
Status: DISCONTINUED | OUTPATIENT
Start: 2025-04-30 | End: 2025-04-30

## 2025-04-30 RX ORDER — ACETAMINOPHEN 325 MG/1
975 TABLET ORAL ONCE
Status: COMPLETED | OUTPATIENT
Start: 2025-04-30 | End: 2025-04-30

## 2025-04-30 RX ORDER — NEOMYCIN SULFATE, POLYMYXIN B SULFATE AND DEXAMETHASONE 3.5; 10000; 1 MG/ML; [USP'U]/ML; MG/ML
1 SUSPENSION/ DROPS OPHTHALMIC EVERY 6 HOURS SCHEDULED
Status: DISCONTINUED | OUTPATIENT
Start: 2025-04-30 | End: 2025-04-30

## 2025-04-30 RX ORDER — FUROSEMIDE 10 MG/ML
20 INJECTION INTRAMUSCULAR; INTRAVENOUS ONCE
Status: DISCONTINUED | OUTPATIENT
Start: 2025-04-30 | End: 2025-05-02

## 2025-04-30 RX ORDER — DICLOFENAC SODIUM 50 MG/1
50 TABLET, DELAYED RELEASE ORAL EVERY MORNING
COMMUNITY

## 2025-04-30 RX ORDER — BRIMONIDINE TARTRATE AND TIMOLOL MALEATE 2; 5 MG/ML; MG/ML
1 SOLUTION OPHTHALMIC EVERY 12 HOURS SCHEDULED
Status: DISCONTINUED | OUTPATIENT
Start: 2025-04-30 | End: 2025-04-30

## 2025-04-30 RX ORDER — VANCOMYCIN HYDROCHLORIDE 1.25 G/250ML
1250 INJECTION, SOLUTION INTRAVITREAL ONCE
Status: COMPLETED | OUTPATIENT
Start: 2025-04-30 | End: 2025-04-30

## 2025-04-30 RX ORDER — PROCHLORPERAZINE MALEATE 10 MG
10 TABLET ORAL EVERY 6 HOURS PRN
Status: DISCONTINUED | OUTPATIENT
Start: 2025-04-30 | End: 2025-05-02 | Stop reason: HOSPADM

## 2025-04-30 RX ORDER — NEOMYCIN SULFATE, POLYMYXIN B SULFATE AND DEXAMETHASONE 3.5; 10000; 1 MG/ML; [USP'U]/ML; MG/ML
1 SUSPENSION/ DROPS OPHTHALMIC 4 TIMES DAILY
COMMUNITY

## 2025-04-30 RX ORDER — DOXYCYCLINE 100 MG/1
100 CAPSULE ORAL EVERY 12 HOURS SCHEDULED
Status: DISCONTINUED | OUTPATIENT
Start: 2025-05-01 | End: 2025-05-02 | Stop reason: HOSPADM

## 2025-04-30 RX ORDER — CEPHALEXIN 500 MG/1
500 CAPSULE ORAL EVERY 6 HOURS SCHEDULED
Status: DISCONTINUED | OUTPATIENT
Start: 2025-05-01 | End: 2025-05-01

## 2025-04-30 RX ORDER — POLYETHYLENE GLYCOL 3350 17 G/17G
17 POWDER, FOR SOLUTION ORAL DAILY
Status: DISCONTINUED | OUTPATIENT
Start: 2025-05-01 | End: 2025-05-02 | Stop reason: HOSPADM

## 2025-04-30 RX ORDER — PANTOPRAZOLE SODIUM 40 MG/1
40 TABLET, DELAYED RELEASE ORAL
Status: DISCONTINUED | OUTPATIENT
Start: 2025-05-01 | End: 2025-05-02 | Stop reason: HOSPADM

## 2025-04-30 RX ORDER — VANCOMYCIN HYDROCHLORIDE 1 G/200ML
1000 INJECTION, SOLUTION INTRAVENOUS ONCE
Status: DISCONTINUED | OUTPATIENT
Start: 2025-04-30 | End: 2025-04-30

## 2025-04-30 RX ORDER — PRAVASTATIN SODIUM 20 MG/1
10 TABLET ORAL DAILY
Status: DISCONTINUED | OUTPATIENT
Start: 2025-05-01 | End: 2025-05-02 | Stop reason: HOSPADM

## 2025-04-30 RX ORDER — FUROSEMIDE 10 MG/ML
20 INJECTION INTRAMUSCULAR; INTRAVENOUS ONCE
Status: COMPLETED | OUTPATIENT
Start: 2025-05-01 | End: 2025-05-01

## 2025-04-30 RX ORDER — NEOMYCIN SULFATE, POLYMYXIN B SULFATE AND DEXAMETHASONE 3.5; 10000; 1 MG/ML; [USP'U]/ML; MG/ML
2 SUSPENSION/ DROPS OPHTHALMIC EVERY 6 HOURS SCHEDULED
Status: DISCONTINUED | OUTPATIENT
Start: 2025-04-30 | End: 2025-05-02 | Stop reason: HOSPADM

## 2025-04-30 RX ADMIN — ACETAMINOPHEN 975 MG: 325 TABLET, FILM COATED ORAL at 22:33

## 2025-04-30 RX ADMIN — VANCOMYCIN HYDROCHLORIDE 1250 MG: 1.25 INJECTION, SOLUTION INTRAVITREAL at 18:14

## 2025-04-30 RX ADMIN — INAVOLISIB 3 MG: 3 TABLET, FILM COATED ORAL at 23:33

## 2025-04-30 RX ADMIN — PIPERACILLIN SODIUM AND TAZOBACTAM SODIUM 3.38 G: 3; .375 INJECTION, SOLUTION INTRAVENOUS at 17:42

## 2025-04-30 SDOH — SOCIAL STABILITY: SOCIAL INSECURITY: HAVE YOU HAD THOUGHTS OF HARMING ANYONE ELSE?: NO

## 2025-04-30 SDOH — SOCIAL STABILITY: SOCIAL INSECURITY: DO YOU FEEL UNSAFE GOING BACK TO THE PLACE WHERE YOU ARE LIVING?: NO

## 2025-04-30 SDOH — SOCIAL STABILITY: SOCIAL INSECURITY: HAVE YOU HAD ANY THOUGHTS OF HARMING ANYONE ELSE?: NO

## 2025-04-30 SDOH — SOCIAL STABILITY: SOCIAL INSECURITY: HAS ANYONE EVER THREATENED TO HURT YOUR FAMILY OR YOUR PETS?: NO

## 2025-04-30 SDOH — SOCIAL STABILITY: SOCIAL INSECURITY: DO YOU FEEL ANYONE HAS EXPLOITED OR TAKEN ADVANTAGE OF YOU FINANCIALLY OR OF YOUR PERSONAL PROPERTY?: NO

## 2025-04-30 SDOH — SOCIAL STABILITY: SOCIAL INSECURITY: ABUSE: ADULT

## 2025-04-30 SDOH — SOCIAL STABILITY: SOCIAL INSECURITY: ARE YOU OR HAVE YOU BEEN THREATENED OR ABUSED PHYSICALLY, EMOTIONALLY, OR SEXUALLY BY ANYONE?: NO

## 2025-04-30 SDOH — SOCIAL STABILITY: SOCIAL INSECURITY: ARE THERE ANY APPARENT SIGNS OF INJURIES/BEHAVIORS THAT COULD BE RELATED TO ABUSE/NEGLECT?: NO

## 2025-04-30 SDOH — SOCIAL STABILITY: SOCIAL INSECURITY: DOES ANYONE TRY TO KEEP YOU FROM HAVING/CONTACTING OTHER FRIENDS OR DOING THINGS OUTSIDE YOUR HOME?: NO

## 2025-04-30 ASSESSMENT — PAIN - FUNCTIONAL ASSESSMENT
PAIN_FUNCTIONAL_ASSESSMENT: 0-10
PAIN_FUNCTIONAL_ASSESSMENT: 0-10

## 2025-04-30 ASSESSMENT — LIFESTYLE VARIABLES
PRESCIPTION_ABUSE_PAST_12_MONTHS: NO
HOW OFTEN DO YOU HAVE 6 OR MORE DRINKS ON ONE OCCASION: NEVER
EVER FELT BAD OR GUILTY ABOUT YOUR DRINKING: NO
EVER HAD A DRINK FIRST THING IN THE MORNING TO STEADY YOUR NERVES TO GET RID OF A HANGOVER: NO
AUDIT-C TOTAL SCORE: 0
AUDIT-C TOTAL SCORE: 0
SUBSTANCE_ABUSE_PAST_12_MONTHS: NO
HAVE PEOPLE ANNOYED YOU BY CRITICIZING YOUR DRINKING: NO
SKIP TO QUESTIONS 9-10: 1
HOW MANY STANDARD DRINKS CONTAINING ALCOHOL DO YOU HAVE ON A TYPICAL DAY: PATIENT DOES NOT DRINK
TOTAL SCORE: 0
HAVE YOU EVER FELT YOU SHOULD CUT DOWN ON YOUR DRINKING: NO
HOW OFTEN DO YOU HAVE A DRINK CONTAINING ALCOHOL: NEVER

## 2025-04-30 ASSESSMENT — PAIN SCALES - GENERAL
PAINLEVEL_OUTOF10: 0 - NO PAIN

## 2025-04-30 ASSESSMENT — ACTIVITIES OF DAILY LIVING (ADL)
WALKS IN HOME: INDEPENDENT
ADEQUATE_TO_COMPLETE_ADL: YES
LACK_OF_TRANSPORTATION: NO
HEARING - LEFT EAR: FUNCTIONAL
GROOMING: NEEDS ASSISTANCE
FEEDING YOURSELF: NEEDS ASSISTANCE
JUDGMENT_ADEQUATE_SAFELY_COMPLETE_DAILY_ACTIVITIES: YES
BATHING: INDEPENDENT
ASSISTIVE_DEVICE: CANE;WALKER
HEARING - RIGHT EAR: FUNCTIONAL
PATIENT'S MEMORY ADEQUATE TO SAFELY COMPLETE DAILY ACTIVITIES?: YES
DRESSING YOURSELF: INDEPENDENT
TOILETING: NEEDS ASSISTANCE

## 2025-04-30 ASSESSMENT — COLUMBIA-SUICIDE SEVERITY RATING SCALE - C-SSRS
1. IN THE PAST MONTH, HAVE YOU WISHED YOU WERE DEAD OR WISHED YOU COULD GO TO SLEEP AND NOT WAKE UP?: NO
2. HAVE YOU ACTUALLY HAD ANY THOUGHTS OF KILLING YOURSELF?: NO
6. HAVE YOU EVER DONE ANYTHING, STARTED TO DO ANYTHING, OR PREPARED TO DO ANYTHING TO END YOUR LIFE?: NO
1. IN THE PAST MONTH, HAVE YOU WISHED YOU WERE DEAD OR WISHED YOU COULD GO TO SLEEP AND NOT WAKE UP?: NO
6. HAVE YOU EVER DONE ANYTHING, STARTED TO DO ANYTHING, OR PREPARED TO DO ANYTHING TO END YOUR LIFE?: NO
2. HAVE YOU ACTUALLY HAD ANY THOUGHTS OF KILLING YOURSELF?: NO

## 2025-04-30 ASSESSMENT — PATIENT HEALTH QUESTIONNAIRE - PHQ9
2. FEELING DOWN, DEPRESSED OR HOPELESS: NOT AT ALL
1. LITTLE INTEREST OR PLEASURE IN DOING THINGS: NOT AT ALL
SUM OF ALL RESPONSES TO PHQ9 QUESTIONS 1 & 2: 0

## 2025-04-30 NOTE — TELEPHONE ENCOUNTER
Patient called stating her leg swelled and is seeping clear fluid. I spoke with Raina Ewing CNP she advised patient to call PCP or go to Emergency Department. Patient verbalized understanding and stated she will call PCP

## 2025-04-30 NOTE — ED TRIAGE NOTES
Pt to ED with bilateral leg swelling and left leg weeping, does take lasix and has been on this for a year. Recent diagnosis of DVT in right leg started on eliquis. Currently taking chemo pills for stage IV breast cancer with mets to bone and stomach.

## 2025-04-30 NOTE — PROGRESS NOTES
Emergency Department Transition of Care Note       Signout   I received Arabella Springer in signout from Ameena Deluna MD.  Please see the ED Provider Note for all HPI, PE and MDM up to the time of signout at 1930.  This is in addition to the primary record.    In brief Arabella Springer is an 84 y.o. female presenting for Bilateral Leg swelling and SOB.  SIRS criteria initiated in setting of suspected's cellulitis, heart rate above 90 and leukopenia of 3.4.  IV fluids, IV vancomycin and Zosyn initiated.  A septic workup obtained, blood cultures collected.  CMP showed no significant electrolyte derangement, CR elevated at 1.13 which is improved from patient's previous baseline from 2 weeks ago.CRP, BMP and lactate within normal range.  Chest x-ray revealed pulmonary vascular congestion and bilateral effusions, x-ray of tib-fib showed no evidence of soft tissue or gas.  Vascular ultrasound of the lower extremities showed no DVT bilaterally.    At the time of signout we were awaiting:  Disposition    ED Course & Medical Decision Making   Medical Decision Making:  Under my care, patient is alert and oriented to patient, place and time.  Lung sounds clear to auscultation.  Heart tones normal.  Regular rate and rhythm.  There is +2-3 pitting edema to lower extremities with discoloration and mild seepage.  Distal pulses intact.  Patient educated on results and differential diagnoses.  IV Lasix ordered.  Case discussed with the medicine team, patient admitted to Dr. Guaman for hypervolemia.  Case discussed with Dr. Micyk Grover  ED Course:  ED Course as of 04/30/25 2057 Wed Apr 30, 2025 1905 On reassessment, patient is hemodynamically stable and afebrile. Good perfusion. [MF]      ED Course User Index  [MF] Ameena Deluna MD         Diagnoses as of 04/30/25 2057   Hypervolemia, unspecified hypervolemia type       Disposition   Admitted to Medicine for Hypervolemia.    Procedures    Procedures    This was a shared visit with an ED attending.  The patient was seen and discussed with the ED attending Dr. Vlad Contreras, ROCHELLE-CNP  Emergency Medicine

## 2025-04-30 NOTE — TELEPHONE ENCOUNTER
Spoke with pt and she understood the message below and stated she would go to [facility]. Just an fyi. Thank you!

## 2025-04-30 NOTE — ED PROVIDER NOTES
EMERGENCY DEPARTMENT ENCOUNTER      Pt Name: Arabella Springer  MRN: [id number]  Birthdate 1940  Date of evaluation: 4/30/2025  Provider: Micky Silviera DO    CHIEF COMPLAINT       Chief Complaint   Patient presents with    Leg Swelling    left leg weeping         HISTORY OF PRESENT ILLNESS    A 84-year-old female with past medical history of stage IV breast cancer with metastasis to the bone, DVT (on Eliquis), essential hypertension and pleural effusions who presents to the ED for swelling started in the last couple of days and clear discharge beginning yesterday afternoon from sore in the lower extremities.  Patient reported difficulty wearing shoes due to swelling and softness of the feet and she mention using an Ace bandage to manage fluid leakage at home.  She denies history of heart failure, fever, chills, chest pain, palpitations or shortness of breath.      History provided by:  Patient   used: No        Nursing Notes were reviewed.    PAST MEDICAL HISTORY   Medical History[1]      SURGICAL HISTORY     Surgical History[2]      CURRENT MEDICATIONS       Current Discharge Medication List        CONTINUE these medications which have NOT CHANGED    Details   apixaban (Eliquis) 5 mg tablet Take 2 tablets (10 mg) by mouth 2 times a day for 7 days, THEN 1 tablet (5 mg) 2 times a day. 10Mg (2pills) twice a day x 7 days then 5 mg twice a day.  Qty: 88 tablet, Refills: 0    Associated Diagnoses: Acute deep vein thrombosis (DVT) of distal end of right lower extremity      beclomethasone HFA (Qvar) 40 mcg/actuation inhaler Inhale 1 Inhalation 2 times a day.      brimonidine-timoloL (Combigan) 0.2-0.5 % ophthalmic solution Administer 1 drop into both eyes every 12 hours.      calcium carbonate-vitamin D3 600 mg-10 mcg (400 unit) tablet Take 3 tablets by mouth once daily.      denosumab (Xgeva) 120 mg/1.7 mL (70 mg/mL) injection Inject 1.7 mL (120 mg) under the skin every 30 (thirty) days.       diclofenac (Voltaren) 50 mg EC tablet Take 1 tablet (50 mg) by mouth once daily in the morning. Do not crush, chew, or split.      esomeprazole (NexIUM) 40 mg DR capsule Take 1 capsule (40 mg) by mouth once daily in the morning. Take before meals.      furosemide (Lasix) 20 mg tablet Take 20mg alternating with 40mg  Qty: 45 tablet, Refills: 3    Associated Diagnoses: Bilateral pleural effusion      inavolisib 3 mg tablet Take 3 mg by mouth once daily. For 3 weeks then take 1 week off  Qty: 21 tablet, Refills: 1    Comments: Per 3/21/25 clinic:  hold & dispense for April cycle ~ 4/10/25 or so -- inavolisib 3 mg PO once daily on D1 - 21 of a 28 day cycle  Associated Diagnoses: Carcinoma of right breast metastatic to bone      losartan-hydrochlorothiazide (Hyzaar) 100-12.5 mg tablet Take 1 tablet by mouth once daily.  Qty: 90 tablet, Refills: 0    Associated Diagnoses: Hypertension, unspecified type      multivitamin tablet Take 1 tablet by mouth once daily.      neomycin-polymyxin-dexAMETHasone (Maxitrol) 3.5mg/mL-10,000 unit/mL-0.1 % ophthalmic suspension Administer 1 drop into the left eye 4 times a day.      palbociclib (Ibrance) 125 mg tablet Take 125 mg (1 tablet) by mouth once daily for three weeks, then one week off.  Swallow whole.  Do not crush, chew or split.  Qty: 21 tablet, Refills: 2    Comments: FLAT DOSING:  palbociclib tablets 125 mg PO once daily on D1 - 21 of a 28 day cycle.  (In combination with inavolisib + fulvestrant)  Associated Diagnoses: Carcinoma of right breast metastatic to bone      pravastatin (Pravachol) 10 mg tablet Take 1 tablet (10 mg) by mouth once daily.  Qty: 90 tablet, Refills: 0    Associated Diagnoses: Mixed hyperlipidemia      prochlorperazine (Compazine) 10 mg tablet Take 1 tablet (10 mg) by mouth every 6 hours if needed for nausea or vomiting.  Qty: 60 tablet, Refills: 5    Associated Diagnoses: Carcinoma of right breast metastatic to bone      Rhopressa 0.02 % drops  opthalmic solution Administer 1 drop into the right eye once daily at bedtime.      triamcinolone (Kenalog) 0.1 % cream Apply topically 2 times a day.  Qty: 453.6 g, Refills: 1    Associated Diagnoses: Drug reaction, initial encounter; Drug-induced skin rash      acetaminophen (Tylenol) 500 mg tablet Take 2 tablets (1,000 mg) by mouth 3 times a day as needed for mild pain (1 - 3) or moderate pain (4 - 6).      Blood glucose monitoring meter Test two times daily.  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      inhalat.spacing dev,large mask (Pro Comfort Spacer-Adult Mask) spacer 1 each every 4 hours if needed (shortness of breath, wheezing, cough).  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  Qty: 5 tablet, Refills: 0    Associated Diagnoses: Anxiety      mometasone (Elocon) 0.1 % lotion Apply topically 2 times a day.  Qty: 30 mL, Refills: 0    Associated Diagnoses: Other noninfectious chronic otitis externa of both ears      ondansetron (Zofran) 8 mg tablet Take 1 tablet (8 mg) by mouth every 8 hours if needed for nausea  or vomiting.  Qty: 60 tablet, Refills: 5    Associated Diagnoses: Carcinoma of right breast metastatic to bone             ALLERGIES     Oxycodone    FAMILY HISTORY     Family History[3]       SOCIAL HISTORY     Social History[4]    SCREENINGS                        PHYSICAL EXAM    (up to 7 for level 4, 8 or more for level 5)     ED Triage Vitals [04/30/25 1529]   Temperature Heart Rate Respirations BP   37 °C (98.6 °F) 97 18 144/69      Pulse Ox Temp src Heart Rate Source Patient Position   95 % -- Monitor Sitting      BP Location FiO2 (%)     Right arm --       Physical Exam  Vitals and nursing note reviewed.   Constitutional:       General: She is not in acute distress.     Appearance: Normal appearance. She is not ill-appearing or toxic-appearing.   Cardiovascular:      Rate and Rhythm: Normal rate and regular rhythm.      Pulses: Normal pulses.   Pulmonary:      Effort: Pulmonary effort is normal. No respiratory distress.      Breath sounds: No stridor. Rhonchi present. No wheezing or rales.      Comments: Rhonchi heard bilaterally  Musculoskeletal:         General: Swelling present. No tenderness or deformity. Normal range of motion.      Right lower leg: No edema.      Left lower leg: No edema.      Comments: Warmth, erythema, edema and multiple sores noted bilaterally on the legs extending up to the shin.  Normal sensation and distal pulses palpated with Doppler in the lower extremities.  Please, see uploaded photos for more details.   Skin:     Findings: Erythema present.   Neurological:      General: No focal deficit present.      Mental Status: She is alert and oriented to person, place, and time.          DIAGNOSTIC RESULTS     LABS:  Labs Reviewed   CBC WITH AUTO DIFFERENTIAL - Abnormal       Result Value    WBC 3.4 (*)     nRBC 0.6 (*)     RBC 2.18 (*)     Hemoglobin 8.0 (*)     Hematocrit 24.7 (*)      (*)     MCH 36.7 (*)     MCHC 32.4      RDW 19.4 (*)     Platelets 183      Neutrophils %  43.5      Immature Granulocytes %, Automated 1.2 (*)     Lymphocytes % 35.0      Monocytes % 18.5      Eosinophils % 0.9      Basophils % 0.9      Neutrophils Absolute 1.48 (*)     Immature Granulocytes Absolute, Automated 0.04      Lymphocytes Absolute 1.19      Monocytes Absolute 0.63      Eosinophils Absolute 0.03      Basophils Absolute 0.03     COMPREHENSIVE METABOLIC PANEL - Abnormal    Glucose 104 (*)     Sodium 135 (*)     Potassium 4.4      Chloride 101      Bicarbonate 25      Anion Gap 13      Urea Nitrogen 24 (*)     Creatinine 1.13 (*)     eGFR 49 (*)     Calcium 8.6      Albumin 4.0      Alkaline Phosphatase 45      Total Protein 6.7      AST 14      Bilirubin, Total 0.3      ALT 7     CBC - Abnormal    WBC 3.3 (*)     nRBC 0.0      RBC 1.91 (*)     Hemoglobin 7.0 (*)     Hematocrit 21.7 (*)      (*)     MCH 36.6 (*)     MCHC 32.3      RDW 19.7 (*)     Platelets 167     BASIC METABOLIC PANEL - Abnormal    Glucose 104 (*)     Sodium 135 (*)     Potassium 4.5      Chloride 103      Bicarbonate 26      Anion Gap 11      Urea Nitrogen 22      Creatinine 1.07 (*)     eGFR 52 (*)     Calcium 7.9 (*)    MAGNESIUM - Abnormal    Magnesium 1.38 (*)    BLOOD CULTURE - Normal    Blood Culture No growth at 1 day     BLOOD CULTURE - Normal    Blood Culture No growth at 1 day     SEDIMENTATION RATE, AUTOMATED - Normal    Sedimentation Rate 28     C-REACTIVE PROTEIN - Normal    C-Reactive Protein 0.92     B-TYPE NATRIURETIC PEPTIDE - Normal    BNP 44      Narrative:        <100 pg/mL - Heart failure unlikely  100-299 pg/mL - Intermediate probability of acute heart                  failure exacerbation. Correlate with clinical                  context and patient history.    >=300 pg/mL - Heart Failure likely. Correlate with clinical                  context and patient history.    BNP testing is performed using different testing methodology at [facility] than at other [facility]. Direct  result comparisons should only be made within the same method.      LACTATE - Normal    Lactate 0.6      Narrative:     Venipuncture immediately after or during the administration of Metamizole may lead to falsely low results. Testing should be performed immediately prior to Metamizole dosing.   VITAMIN B12 - Normal    Vitamin B12 345     FOLATE - Normal    Folate, Serum 20.6      Narrative:     Low           <3.4  Borderline 3.4-5.0  Normal        >5.0    Patients receiving more than 5 mg/day of biotin may have interference in test results. A sample should be taken no sooner than eight hours after previous dose. Contact the testing laboratory for additional information.    TISSUE/WOUND CULTURE/SMEAR    Tissue/Wound Culture/Smear No growth to date      Gram Stain No polymorphonuclear leukocytes seen      Gram Stain No organisms seen     CBC   BASIC METABOLIC PANEL   MAGNESIUM   PATH REVIEW-CBC DIFFERENTIAL    Pathologist Review-CBC Differential        Value: Macrocytic anemia, rule out folate and B12 deficiency.    Leukoerythroblastic smear.    Lymphocytes with morphologic features suggestive of chronic lymphocytic leukemia/small cell lymphocytic leukemia (CLL/SLL).    No blasts seen on smear review.    The patient's history of bone marrow with abnormal CD5 positive B-cell population most suggestive of chronic lymphocytic leukemia/small lymphocytic lymphoma is noted (Flow cytometry number: [id number], 7/26/2023)   MORPHOLOGY    RBC Morphology See Below      Polychromasia Mild      Ovalocytes Few      Teardrop Cells Few         All other labs were within normal range or not returned as of this dictation.    Imaging  Transthoracic Echo Complete         Vascular US Lower Extremity Venous Duplex Bilateral   Final Result      XR chest 1 view   Final Result   Pulmonary vascular congestive change and bilateral pleural effusions   and basilar atelectasis or airspace disease.        Extensive osseous sclerotic lesions  compatible with metastatic   disease.        MACRO:   None        Signed by: Nilay Carpenter 4/30/2025 6:50 PM   Dictation workstation:   [id number]      XR tibia fibula bilateral 2 views   Final Result   No abnormal soft tissue gas        Demineralized bones. Osseous metastatic disease again detected.             MACRO:   None        Signed by: Chintan Stern 4/30/2025 6:21 PM   Dictation workstation:   [id number]           Procedures  Procedures     EMERGENCY DEPARTMENT COURSE/MDM:     ED Course as of 05/02/25 0240 Wed Apr 30, 2025 1905 On reassessment, patient is hemodynamically stable and afebrile. Good perfusion. [MF]      ED Course User Index  [MF] Ameena Deluna MD         Diagnoses as of 05/02/25 0240   Hypervolemia, unspecified hypervolemia type        Medical Decision Making    A 84-year-old with past medical history as detailed above presents to the ED with bilateral lower extremity erythema, warmth, edema multiple skin lesions with discharge.  Patient is hemodynamically stable and afebrile.  Bilateral presentation makes primary infectious etiology(cellulitis) more likely.  However stasis dermatitis, recurrent DVT and organ failure (cardiac, TSERING) are not excluded.  Patient meets sepsis criteria in the setting of suspected cellulitis, HR >90 and leukopenia (3.4) on labs. Patient was started on IV antibiotics (vancomycin and Zosyn) and conservative IV fluid (500mL) due to concern for possible compromised cardiac function and history of prior lung effusion. A septic workup was initiated and nursing staff was advised to collect blood cultures before administration of IV antibiotics. Vascular ultrasound bilaterally, tibia/fibula x-ray bilaterally and chest x-ray were ordered to evaluate for new or recurrent DVT, osteomyelitis and evidence of heart failure, respectively.    Vascular US lower extremities revealed no DVT bilaterally.  X-ray tibia fibula revealed no evidence of abnormal soft tissue  or gas.  Chest x-ray revealed pulmonary vascular congestion and bilateral pleural effusions concerning for heart failure.  Therefore, the patient was started on IV Lasix 20mg.   Patient was signed out in stable condition to the oncoming team with pending labs and disposition.    Patient and or family in agreement and understanding of treatment plan.  All questions answered.      I reviewed the case with the attending ED physician. The attending ED physician agrees with the plan. Patient and/or patient´s representative was counseled regarding labs, imaging, likely diagnosis, and plan. All questions were answered.    ED Medications administered this visit:    Medications   furosemide (Lasix) injection 20 mg (20 mg intravenous Not Given 4/30/25 2033)   polyethylene glycol (Glycolax, Miralax) packet 17 g (17 g oral Not Given 5/1/25 0924)   doxycycline (Vibramycin) capsule 100 mg (100 mg oral Given 5/1/25 2058)   neomycin-polymyxin-dexAMETHasone (Maxitrol) 3.5mg/mL-10,000 unit/mL-0.1 % ophthalmic suspension 2 drop (2 drops Left Eye Given 5/1/25 2359)   inavolisib tablet 3 mg (3 mg oral Given 5/1/25 2128)   pantoprazole (ProtoNix) EC tablet 40 mg (40 mg oral Given 5/1/25 0610)   losartan 100 mg, hydroCHLOROthiazide 12.5 mg for Hyzaar 100 mg-12.5 mg ( oral Given 5/1/25 0923)   palbociclib (Ibrance) tablet 125 mg (125 mg oral Given 5/1/25 1136)   pravastatin (Pravachol) tablet 10 mg (10 mg oral Given 5/1/25 0923)   prochlorperazine (Compazine) tablet 10 mg (10 mg oral Given 5/1/25 2058)   apixaban (Eliquis) tablet 5 mg (5 mg oral Given 5/1/25 2058)   acetaminophen (Tylenol) tablet 975 mg (975 mg oral Given 5/1/25 2100)   cephalexin (Keflex) capsule 500 mg (500 mg oral Given 5/2/25 0022)   ondansetron (Zofran) tablet 4 mg (4 mg oral Given 5/1/25 1056)     Or   ondansetron (Zofran) injection 4 mg ( intravenous See Alternative 5/1/25 1056)   eucerin cream (has no administration in time range)   piperacillin-tazobactam (Zosyn)  3.375 g in dextrose (iso) IV 50 mL (0 g intravenous Stopped 4/30/25 1814)   vancomycin 1,250 mg in D5W  mL (0 mg intravenous Stopped 4/30/25 1935)   furosemide (Lasix) injection 20 mg (20 mg intravenous Given 5/1/25 0923)   acetaminophen (Tylenol) tablet 975 mg (975 mg oral Given 4/30/25 2233)   magnesium sulfate 2 g in sterile water for injection 50 mL (0 g intravenous Stopped 5/1/25 1315)       New Prescriptions from this visit:    Current Discharge Medication List          Follow-up:  No follow-up provider specified.      Final Impression:   1. Hypervolemia, unspecified hypervolemia type    2. Localized edema    3. Shortness of breath          (Please note that portions of this note were completed with a voice recognition program.  Efforts were made to edit the dictations but occasionally words are mis-transcribed.)       Ameena Deluna MD  Resident  04/30/25 2002         Ameena Deluna MD  Resident  04/30/25 2024      The patient was seen by the resident/fellow.  I have personally performed a substantive portion of the encounter.  I have seen and examined the patient; agree with the workup, evaluation, MDM, management and diagnosis.  The care plan has been discussed with the resident; I have reviewed the resident’s note and agree with the documented findings.                                                      [1]   Past Medical History:  Diagnosis Date    Anxiety     Arthritis     Breast cancer     Breast cancer metastasized to bone     GERD (gastroesophageal reflux disease)     Glaucoma     Hiatal hernia     Hyperlipidemia     Hypertension    [2]   Past Surgical History:  Procedure Laterality Date    HYSTERECTOMY      MASTECTOMY COMPLETE / SIMPLE Right     SHOULDER SURGERY     [3]   Family History  Problem Relation Name Age of Onset    Colon cancer Sister      Breast cancer Daughter     [4]   Social History  Socioeconomic History    Marital status:    Tobacco Use    Smoking  status: Former     Current packs/day: 0.00     Types: Cigarettes     Quit date: 3/10/1965     Years since quittin.1    Smokeless tobacco: Never   Vaping Use    Vaping status: Never Used   Substance and Sexual Activity    Alcohol use: Not Currently    Drug use: Never    Sexual activity: Defer     Social Drivers of Health     Financial Resource Strain: Low Risk  (2025)    Overall Financial Resource Strain (CARDIA)     Difficulty of Paying Living Expenses: Not hard at all   Food Insecurity: No Food Insecurity (2025)    Hunger Vital Sign     Worried About Running Out of Food in the Last Year: Never true     Ran Out of Food in the Last Year: Never true   Transportation Needs: No Transportation Needs (2025)    PRAPARE - Transportation     Lack of Transportation (Medical): No     Lack of Transportation (Non-Medical): No   Physical Activity: Inactive (10/9/2023)    Exercise Vital Sign     Days of Exercise per Week: 0 days     Minutes of Exercise per Session: 0 min   Stress: No Stress Concern Present (10/9/2023)    [employer] - Occupational Stress Questionnaire     Feeling of Stress : Not at all   Social Connections: Unknown (2025)    Social Connection and Isolation Panel [NHANES]     Marital Status:    Intimate Partner Violence: Not At Risk (2025)    Humiliation, Afraid, Rape, and Kick questionnaire     Fear of Current or Ex-Partner: No     Emotionally Abused: No     Physically Abused: No     Sexually Abused: No   Housing Stability: Low Risk  (2025)    Housing Stability Vital Sign     Unable to Pay for Housing in the Last Year: No     Number of Times Moved in the Last Year: 0     Homeless in the Last Year: No        Micky Silveira DO  25 0240

## 2025-04-30 NOTE — TELEPHONE ENCOUNTER
Pt had called and scheduled appt - with Dr Chavez  - seeping left leg/right leg black and blue at her knee/started Eloquis in April/PF patient - Per Dr Chavez and Dr Velazquez pt is advised to go to the ER -     Left message with pt and spouse advised to go to ER

## 2025-05-01 ENCOUNTER — APPOINTMENT (OUTPATIENT)
Dept: CARDIOLOGY | Facility: HOSPITAL | Age: 85
End: 2025-05-01
Payer: MEDICARE

## 2025-05-01 ENCOUNTER — APPOINTMENT (OUTPATIENT)
Facility: CLINIC | Age: 85
End: 2025-05-01
Payer: MEDICARE

## 2025-05-01 LAB
ANION GAP SERPL CALC-SCNC: 11 MMOL/L (ref 10–20)
BUN SERPL-MCNC: 22 MG/DL (ref 6–23)
CALCIUM SERPL-MCNC: 7.9 MG/DL (ref 8.6–10.3)
CHLORIDE SERPL-SCNC: 103 MMOL/L (ref 98–107)
CO2 SERPL-SCNC: 26 MMOL/L (ref 21–32)
CREAT SERPL-MCNC: 1.07 MG/DL (ref 0.5–1.05)
EGFRCR SERPLBLD CKD-EPI 2021: 52 ML/MIN/1.73M*2
ERYTHROCYTE [DISTWIDTH] IN BLOOD BY AUTOMATED COUNT: 19.7 % (ref 11.5–14.5)
FOLATE SERPL-MCNC: 20.6 NG/ML
GLUCOSE SERPL-MCNC: 104 MG/DL (ref 74–99)
HCT VFR BLD AUTO: 21.7 % (ref 36–46)
HGB BLD-MCNC: 7 G/DL (ref 12–16)
MAGNESIUM SERPL-MCNC: 1.38 MG/DL (ref 1.6–2.4)
MCH RBC QN AUTO: 36.6 PG (ref 26–34)
MCHC RBC AUTO-ENTMCNC: 32.3 G/DL (ref 32–36)
MCV RBC AUTO: 114 FL (ref 80–100)
NRBC BLD-RTO: 0 /100 WBCS (ref 0–0)
PATH REVIEW-CBC DIFFERENTIAL: NORMAL
PLATELET # BLD AUTO: 167 X10*3/UL (ref 150–450)
POTASSIUM SERPL-SCNC: 4.5 MMOL/L (ref 3.5–5.3)
RBC # BLD AUTO: 1.91 X10*6/UL (ref 4–5.2)
SODIUM SERPL-SCNC: 135 MMOL/L (ref 136–145)
VIT B12 SERPL-MCNC: 345 PG/ML (ref 211–911)
WBC # BLD AUTO: 3.3 X10*3/UL (ref 4.4–11.3)

## 2025-05-01 PROCEDURE — 2500000004 HC RX 250 GENERAL PHARMACY W/ HCPCS (ALT 636 FOR OP/ED)

## 2025-05-01 PROCEDURE — 2500000004 HC RX 250 GENERAL PHARMACY W/ HCPCS (ALT 636 FOR OP/ED): Mod: JZ

## 2025-05-01 PROCEDURE — 36415 COLL VENOUS BLD VENIPUNCTURE: CPT

## 2025-05-01 PROCEDURE — 2500000001 HC RX 250 WO HCPCS SELF ADMINISTERED DRUGS (ALT 637 FOR MEDICARE OP)

## 2025-05-01 PROCEDURE — 2500000005 HC RX 250 GENERAL PHARMACY W/O HCPCS

## 2025-05-01 PROCEDURE — 83735 ASSAY OF MAGNESIUM: CPT

## 2025-05-01 PROCEDURE — 85027 COMPLETE CBC AUTOMATED: CPT

## 2025-05-01 PROCEDURE — 2500000001 HC RX 250 WO HCPCS SELF ADMINISTERED DRUGS (ALT 637 FOR MEDICARE OP): Performed by: INTERNAL MEDICINE

## 2025-05-01 PROCEDURE — 99223 1ST HOSP IP/OBS HIGH 75: CPT

## 2025-05-01 PROCEDURE — 1100000001 HC PRIVATE ROOM DAILY

## 2025-05-01 PROCEDURE — 82607 VITAMIN B-12: CPT | Mod: STJLAB

## 2025-05-01 PROCEDURE — 93005 ELECTROCARDIOGRAM TRACING: CPT

## 2025-05-01 PROCEDURE — 80048 BASIC METABOLIC PNL TOTAL CA: CPT

## 2025-05-01 PROCEDURE — 82746 ASSAY OF FOLIC ACID SERUM: CPT | Mod: STJLAB

## 2025-05-01 PROCEDURE — 97161 PT EVAL LOW COMPLEX 20 MIN: CPT | Mod: GP

## 2025-05-01 PROCEDURE — 82374 ASSAY BLOOD CARBON DIOXIDE: CPT

## 2025-05-01 PROCEDURE — 93306 TTE W/DOPPLER COMPLETE: CPT

## 2025-05-01 RX ORDER — ACETAMINOPHEN 325 MG/1
975 TABLET ORAL EVERY 8 HOURS PRN
Status: DISCONTINUED | OUTPATIENT
Start: 2025-05-01 | End: 2025-05-02 | Stop reason: HOSPADM

## 2025-05-01 RX ORDER — ONDANSETRON 4 MG/1
4 TABLET, FILM COATED ORAL EVERY 8 HOURS PRN
Status: DISCONTINUED | OUTPATIENT
Start: 2025-05-01 | End: 2025-05-02 | Stop reason: HOSPADM

## 2025-05-01 RX ORDER — ONDANSETRON HYDROCHLORIDE 2 MG/ML
4 INJECTION, SOLUTION INTRAVENOUS EVERY 8 HOURS PRN
Status: DISCONTINUED | OUTPATIENT
Start: 2025-05-01 | End: 2025-05-02 | Stop reason: HOSPADM

## 2025-05-01 RX ORDER — CEPHALEXIN 500 MG/1
500 CAPSULE ORAL EVERY 8 HOURS SCHEDULED
Status: DISCONTINUED | OUTPATIENT
Start: 2025-05-01 | End: 2025-05-02 | Stop reason: HOSPADM

## 2025-05-01 RX ORDER — MAGNESIUM SULFATE HEPTAHYDRATE 40 MG/ML
2 INJECTION, SOLUTION INTRAVENOUS ONCE
Status: COMPLETED | OUTPATIENT
Start: 2025-05-01 | End: 2025-05-01

## 2025-05-01 RX ADMIN — APIXABAN 5 MG: 5 TABLET, FILM COATED ORAL at 20:58

## 2025-05-01 RX ADMIN — PRAVASTATIN SODIUM 10 MG: 20 TABLET ORAL at 09:23

## 2025-05-01 RX ADMIN — CEPHALEXIN 500 MG: 500 CAPSULE ORAL at 17:22

## 2025-05-01 RX ADMIN — ACETAMINOPHEN 975 MG: 325 TABLET ORAL at 21:00

## 2025-05-01 RX ADMIN — ACETAMINOPHEN 975 MG: 325 TABLET ORAL at 10:43

## 2025-05-01 RX ADMIN — APIXABAN 5 MG: 5 TABLET, FILM COATED ORAL at 09:23

## 2025-05-01 RX ADMIN — INAVOLISIB 3 MG: 3 TABLET, FILM COATED ORAL at 21:28

## 2025-05-01 RX ADMIN — NEOMYCIN SULFATE, POLYMYXIN B SULFATE AND DEXAMETHASONE 2 DROP: 3.5; 10000; 1 SUSPENSION OPHTHALMIC at 06:10

## 2025-05-01 RX ADMIN — PANTOPRAZOLE SODIUM 40 MG: 40 TABLET, DELAYED RELEASE ORAL at 06:10

## 2025-05-01 RX ADMIN — DOXYCYCLINE HYCLATE 100 MG: 100 CAPSULE ORAL at 20:58

## 2025-05-01 RX ADMIN — LOSARTAN POTASSIUM: 100 TABLET, FILM COATED ORAL at 09:23

## 2025-05-01 RX ADMIN — PALBOCICLIB 125 MG: 125 TABLET, FILM COATED ORAL at 11:36

## 2025-05-01 RX ADMIN — ONDANSETRON HYDROCHLORIDE 4 MG: 4 TABLET, FILM COATED ORAL at 10:56

## 2025-05-01 RX ADMIN — NEOMYCIN SULFATE, POLYMYXIN B SULFATE AND DEXAMETHASONE 2 DROP: 3.5; 10000; 1 SUSPENSION OPHTHALMIC at 17:22

## 2025-05-01 RX ADMIN — MAGNESIUM SULFATE HEPTAHYDRATE 2 G: 40 INJECTION, SOLUTION INTRAVENOUS at 10:43

## 2025-05-01 RX ADMIN — CEPHALEXIN 500 MG: 500 CAPSULE ORAL at 09:23

## 2025-05-01 RX ADMIN — DOXYCYCLINE HYCLATE 100 MG: 100 CAPSULE ORAL at 09:23

## 2025-05-01 RX ADMIN — PROCHLORPERAZINE MALEATE 10 MG: 10 TABLET ORAL at 20:58

## 2025-05-01 RX ADMIN — FUROSEMIDE 20 MG: 10 INJECTION, SOLUTION INTRAMUSCULAR; INTRAVENOUS at 09:23

## 2025-05-01 RX ADMIN — NEOMYCIN SULFATE, POLYMYXIN B SULFATE AND DEXAMETHASONE 2 DROP: 3.5; 10000; 1 SUSPENSION OPHTHALMIC at 23:59

## 2025-05-01 RX ADMIN — NEOMYCIN SULFATE, POLYMYXIN B SULFATE AND DEXAMETHASONE 2 DROP: 3.5; 10000; 1 SUSPENSION OPHTHALMIC at 12:34

## 2025-05-01 SDOH — ECONOMIC STABILITY: HOUSING INSECURITY: IN THE LAST 12 MONTHS, WAS THERE A TIME WHEN YOU WERE NOT ABLE TO PAY THE MORTGAGE OR RENT ON TIME?: NO

## 2025-05-01 SDOH — SOCIAL STABILITY: SOCIAL INSECURITY: ARE YOU MARRIED, WIDOWED, DIVORCED, SEPARATED, NEVER MARRIED, OR LIVING WITH A PARTNER?: MARRIED

## 2025-05-01 SDOH — ECONOMIC STABILITY: FOOD INSECURITY: WITHIN THE PAST 12 MONTHS, THE FOOD YOU BOUGHT JUST DIDN'T LAST AND YOU DIDN'T HAVE MONEY TO GET MORE.: NEVER TRUE

## 2025-05-01 SDOH — ECONOMIC STABILITY: HOUSING INSECURITY: AT ANY TIME IN THE PAST 12 MONTHS, WERE YOU HOMELESS OR LIVING IN A SHELTER (INCLUDING NOW)?: NO

## 2025-05-01 SDOH — ECONOMIC STABILITY: INCOME INSECURITY: IN THE PAST 12 MONTHS HAS THE ELECTRIC, GAS, OIL, OR WATER COMPANY THREATENED TO SHUT OFF SERVICES IN YOUR HOME?: NO

## 2025-05-01 SDOH — ECONOMIC STABILITY: FOOD INSECURITY: WITHIN THE PAST 12 MONTHS, YOU WORRIED THAT YOUR FOOD WOULD RUN OUT BEFORE YOU GOT THE MONEY TO BUY MORE.: NEVER TRUE

## 2025-05-01 SDOH — ECONOMIC STABILITY: FOOD INSECURITY: HOW HARD IS IT FOR YOU TO PAY FOR THE VERY BASICS LIKE FOOD, HOUSING, MEDICAL CARE, AND HEATING?: NOT HARD AT ALL

## 2025-05-01 SDOH — ECONOMIC STABILITY: TRANSPORTATION INSECURITY: IN THE PAST 12 MONTHS, HAS LACK OF TRANSPORTATION KEPT YOU FROM MEDICAL APPOINTMENTS OR FROM GETTING MEDICATIONS?: NO

## 2025-05-01 ASSESSMENT — COGNITIVE AND FUNCTIONAL STATUS - GENERAL
MOBILITY SCORE: 20
STANDING UP FROM CHAIR USING ARMS: A LITTLE
CLIMB 3 TO 5 STEPS WITH RAILING: A LITTLE
WALKING IN HOSPITAL ROOM: A LITTLE
MOVING TO AND FROM BED TO CHAIR: A LITTLE
TURNING FROM BACK TO SIDE WHILE IN FLAT BAD: A LITTLE
CLIMB 3 TO 5 STEPS WITH RAILING: TOTAL
WALKING IN HOSPITAL ROOM: A LITTLE
MOVING FROM LYING ON BACK TO SITTING ON SIDE OF FLAT BED WITH BEDRAILS: A LITTLE
MOBILITY SCORE: 16
STANDING UP FROM CHAIR USING ARMS: A LITTLE
MOVING TO AND FROM BED TO CHAIR: A LITTLE

## 2025-05-01 ASSESSMENT — PAIN - FUNCTIONAL ASSESSMENT
PAIN_FUNCTIONAL_ASSESSMENT: 0-10

## 2025-05-01 ASSESSMENT — PAIN SCALES - GENERAL
PAINLEVEL_OUTOF10: 0 - NO PAIN
PAINLEVEL_OUTOF10: 2
PAINLEVEL_OUTOF10: 0 - NO PAIN
PAINLEVEL_OUTOF10: 0 - NO PAIN

## 2025-05-01 ASSESSMENT — ACTIVITIES OF DAILY LIVING (ADL)
ADL_ASSISTANCE: INDEPENDENT
LACK_OF_TRANSPORTATION: NO

## 2025-05-01 NOTE — PROGRESS NOTES
05/01/25 1416   Discharge Planning   Living Arrangements Spouse/significant other   Support Systems Spouse/significant other;Home care staff   Assistance Needed yes   Type of Residence Private residence   Home or Post Acute Services In home services   Type of Home Care Services Home health aide;Home OT;Home PT   Expected Discharge Disposition Home H  ([facility])   Financial Resource Strain   How hard is it for you to pay for the very basics like food, housing, medical care, and heating? Not hard   Housing Stability   In the last 12 months, was there a time when you were not able to pay the mortgage or rent on time? N   At any time in the past 12 months, were you homeless or living in a shelter (including now)? N   Transportation Needs   In the past 12 months, has lack of transportation kept you from medical appointments or from getting medications? no   In the past 12 months, has lack of transportation kept you from meetings, work, or from getting things needed for daily living? No   Patient Choice   Provider Choice list and CMS website (https://medicare.gov/care-compare#search) for post-acute Quality and Resource Measure Data were provided and reviewed with: Patient   Patient / Family choosing to utilize agency / facility established prior to hospitalization Yes   Intensity of Service   Intensity of Service >30 min     Met with patient at bedside. Patient lives at home with her spouse in [place]. Patient has a private duty caregiver who comes 5 days/week from 8am-12pm. Aide assists patient with ADLs and household chores. Patient uses a cane at home and a walker out in the community. PCP is Angel Luis Velazquez. Patient confirms understanding of how to manage her health at home and of her home medications. Patient is not currently active with [facility], but has been in the past and requests a referral to [facility] for PT/OT. Will need internal referral. Therapy evals pending.

## 2025-05-01 NOTE — HOSPITAL COURSE
Arabella Springer is a 84 y.o. female w/ pmhx of stage IV breast cancer w/ bony metastasis (palbociclib/inavolisib), R DVT (eliquis), chronic b/l pleural effusions, anemia of chronic disease, HLD, arthritis, GERD presented from home to [facility] ED on 4/30 by direction of her PCP for worsening exertional shortness of breath and leg swelling over the last 3-4 days.  CXR showed mildly worsened pulmonary edema from prior CXR available in early April. Pleural effusions appeared stable. She was admitted for pulmonary and peripheral edema, and received lasix 20 mg IV on 5/1 with significant improvement. She never desatted in the hospital and remained on room air. Patient did very well on ambulatory pulse ox testing and was not dyspneic. TTE pending, although suspicion of CHF is low as patient denies PND and is not usually short of breath. Was also started on PO keflex and doxycycline for possible cellulitis given mild erythema, swelling and small wounds bilaterally for which wound care was consulted. B12 and folic acid were ordered for anemia workup. Folate levels were within normal limits, although B12 levels were on the lower end of normal range, thus MMA levels should be obtained at next PPC visit to assess for vitamin B12 deficiency. She will complete her antibiotic course with cefoxidil 500mg daily for 4 days. She should continue PO lasix 20mg daily and increase dose to 40mg when more symptomatic. She will be discharged home with current [facility].

## 2025-05-01 NOTE — PROGRESS NOTES
Physical Therapy    Physical Therapy Evaluation    Patient Name: Arabella Springer  MRN: [id number]  Today's Date: 5/1/2025   Time Calculation  Start Time: 1030  Stop Time: 1041  Time Calculation (min): 11 min  3035/3035-A    Assessment/Plan   PT Assessment  PT Assessment Results: Decreased strength, Decreased range of motion, Decreased endurance, Impaired balance, Decreased mobility, Decreased safety awareness  Rehab Prognosis: Good  Evaluation/Treatment Tolerance: Patient limited by fatigue  Medical Staff Made Aware: Yes  End of Session Communication: Bedside nurse  Assessment Comment: Pt is a 83 y/o female admitted for hypervolemia. Pt presents with decreased strength, endurance and balance. Pt able to tolerate bed mobility, transfers and ambulating in room. She is functioning below baseline level of function and will benefit from skilled therapy during stay to improve overall functional mobility and safety awareness. Upon discharge pt will benefit from low intensity therapy for continued improvement in functional mobility.     End of Session Patient Position: Up in chair, Alarm on (call light within reach)  IP OR SWING BED PT PLAN  Inpatient or Swing Bed: Inpatient  PT Plan  Treatment/Interventions: Bed mobility, Transfer training, Gait training, Stair training, Balance training, Neuromuscular re-education, Endurance training, Strengthening, Range of motion, Therapeutic exercise, Therapeutic activity, Home exercise program, Positioning, Postural re-education  PT Plan: Ongoing PT  PT Frequency: 4 times per week  PT Discharge Recommendations: Low intensity level of continued care  Equipment Recommended upon Discharge: Wheeled walker  PT Recommended Transfer Status: Assist x1, Assistive device, Stand by assist  PT - OK to Discharge: Yes (Once medically cleared)    Subjective     Current Problem:  1. Hypervolemia, unspecified hypervolemia type  Transthoracic Echo Complete    Transthoracic Echo Complete      2.  Localized edema  Vascular US Lower Extremity Venous Duplex Bilateral      3. Shortness of breath  Transthoracic Echo Complete    Transthoracic Echo Complete        Problem List[1]    General Visit Information:  General  Reason for Referral: P/w leg swelling and worsening SOB with exertion. She states she gets short of breath walking across her house, does not have stairs.  Her legs have been swelling worse over the last few days and having copious nonpurulent weeping, she wrapped with Ace wrap last night which did seem to help.  Her legs are also very tender -there is redness that has coincided with increasing swelling. Pt admitted for hypervolemia.  Referred By: Dr. Guaman  Past Medical History Relevant to Rehab: stage IV breast cancer with mets to bone and stomach, osteopenia, HTN, HLD, BPPV, arthritis, R DVT on Eliquis, chronic BL pleural effusions  Family/Caregiver Present: Yes  Caregiver Feedback:  present at end of session  Prior to Session Communication: Bedside nurse  Patient Position Received: Bed, 3 rail up, Alarm on  Preferred Learning Style: verbal  General Comment: Pt pleasant and agreeable to work with therapy. Reports nausea throughout session.    Home Living:  Home Living  Type of Home: House  Lives With: Spouse  Home Adaptive Equipment: Walker rolling or standard, Cane (3WW)  Home Layout: One level  Home Access: Level entry  Bathroom Shower/Tub: Walk-in shower  Bathroom Equipment: Grab bars in shower  Home Living Comments: Pt's  assists as needed. Pt also has adult care 5x/week from 8-12 and they assist with meals and light housekeeping    Prior Level of Function:  Prior Function Per Pt/Caregiver Report  Level of [place]: Independent with ADLs and functional transfers, Needs assistance with homemaking  Receives Help From: Family, Home health  ADL Assistance: Independent  Homemaking Assistance: Needs assistance (Care comes daily to assist with housework and meals)  Ambulatory  Assistance: Independent (Mod I w/3WW in community and cane in home)  Prior Function Comments: (-) falls    Precautions:  Precautions  Medical Precautions: Fall precautions    Objective     Pain:  Pain Assessment  Pain Assessment: 0-10  0-10 (Numeric) Pain Score: 0 - No pain    Cognition:  Cognition  Overall Cognitive Status: Within Functional Limits  Orientation Level: Oriented X4    General Assessments:      Activity Tolerance  Endurance: Tolerates 10 - 20 min exercise with multiple rests    Sensation  Light Touch: No apparent deficits  Sensation Comment: Pt denies any n/t.     Static Sitting Balance  Static Sitting-Balance Support: Bilateral upper extremity supported, Feet supported  Static Sitting-Level of Assistance: Close supervision, Distant supervision  Static Standing Balance  Static Standing-Balance Support: Bilateral upper extremity supported  Static Standing-Level of Assistance:  (SBA)  Dynamic Standing Balance  Dynamic Standing-Balance Support: Bilateral upper extremity supported  Dynamic Standing-Level of Assistance:  (SBA)    Functional Assessments:     Bed Mobility  Bed Mobility: Yes  Bed Mobility 1  Bed Mobility 1: Supine to sitting, Scooting  Level of Assistance 1:  (SBA)  Bed Mobility Comments 1: HOB elevated and use of bed HR.    Transfers  Transfer: Yes  Transfer 1  Transfer From 1: Bed to  Transfer to 1: Stand  Technique 1: Sit to stand  Transfer Device 1: Walker  Transfer Level of Assistance 1:  (SBA)  Trials/Comments 1: VCs for proper hand placement and body mechanics.  Transfers 2  Transfer From 2: Stand to, Toilet to  Transfer to 2: Toilet, Stand  Technique 2: Sit to stand, Stand to sit  Transfer Device 2: Walker  Transfer Level of Assistance 2: Close supervision, Minimal verbal cues  Trials/Comments 2: VCs for safety awareness with FWW  Transfers 3  Transfer From 3: Stand to  Transfer to 3: Chair with arms  Technique 3: Stand to sit  Transfer Device 3: Walker  Transfer Level of Assistance  3:  (SBA)  Trials/Comments 3: VCs for proper hand placement, body mechanics and positioning of FWW in relation to chair    Ambulation/Gait Training  Ambulation/Gait Training Performed: Yes  Ambulation/Gait Training 1  Surface 1: Level tile  Device 1: Rolling walker  Assistance 1:  (SBA)  Quality of Gait 1: Wide base of support, Diminished heel strike, Forward flexed posture (decreased jordan)  Comments/Distance (ft) 1: x2 trials w/reciprocal gait pattern; ~10 ft then sitting rest break; ~10 ft (VCs for safety awareness with FWW)     Extremity/Trunk Assessments:  RUE   RUE : Within Functional Limits  LUE   LUE: Within Functional Limits  RLE   RLE : Within Functional Limits  LLE   LLE : Within Functional Limits    Outcome Measures:     [facility] Basic Mobility  Turning from your back to your side while in a flat bed without using bedrails: A little  Moving from lying on your back to sitting on the side of a flat bed without using bedrails: A little  Moving to and from bed to chair (including a wheelchair): A little  Standing up from a chair using your arms (e.g. wheelchair or bedside chair): A little  To walk in hospital room: A little  Climbing 3-5 steps with railing: Total  Basic Mobility - Total Score: 16     Goals:  Encounter Problems       Encounter Problems (Active)       Balance       Pt will demonstrate static/dynamic standing balance for >/= 5 min SBA without evidence of instability or LOB with functional mobility tasks.         Start:  05/01/25    Expected End:  05/15/25               Mobility       Pt will be able to ambulate >/= 50 ft with LRD distant supervision with good safety awareness and stability.        Start:  05/01/25    Expected End:  05/15/25            Pt will complete supine, seated and standing exercises to maintain/improve overall strength with minimal verbal cues.         Start:  05/01/25    Expected End:  05/15/25               PT Transfers       Pt will be able to complete all bed mobility  tasks mod I.        Start:  05/01/25    Expected End:  05/15/25            Pt will be able to complete all transfers with LRD mod I demonstrating good safety awareness and proper body mechanics.        Start:  05/01/25    Expected End:  05/15/25                 Education Documentation  Precautions, taught by Rosibel Rosenberg PT at 5/1/2025  2:24 PM.  Learner: Patient  Readiness: Acceptance  Method: Explanation  Response: Verbalizes Understanding, Demonstrated Understanding, Needs Reinforcement    Body Mechanics, taught by Rosibel Rosenberg PT at 5/1/2025  2:24 PM.  Learner: Patient  Readiness: Acceptance  Method: Explanation  Response: Verbalizes Understanding, Demonstrated Understanding, Needs Reinforcement    Home Exercise Program, taught by Rosibel Rosenberg PT at 5/1/2025  2:24 PM.  Learner: Patient  Readiness: Acceptance  Method: Explanation  Response: Verbalizes Understanding, Demonstrated Understanding, Needs Reinforcement    Mobility Training, taught by Rosibel Rosenberg PT at 5/1/2025  2:24 PM.  Learner: Patient  Readiness: Acceptance  Method: Explanation  Response: Verbalizes Understanding, Demonstrated Understanding, Needs Reinforcement    Education Comments  No comments found.           [1]   Patient Active Problem List  Diagnosis    Decreased GFR    Bronchitis    Upper respiratory infection, acute    Breast cancer metastasized to bone    Lytic bone lesions on xray    Spinal stenosis in cervical region    Primary osteoarthritis of right knee    Primary osteoarthritis of left knee    Palpitations    Pain, cancer    Osteopenia    Obesity (BMI 30-39.9)    Monoallelic mutation of CHEK2 gene in female patient    Low-tension glaucoma of both eyes, moderate stage    Leg cramps    Hyperlipidemia    Glaucoma    Essential hypertension    Decreased white blood cell count    Malignant neoplasm of breast associated with mutation in CHEK2 gene    BPPV (benign paroxysmal positional vertigo), bilateral     Brachial neuritis or radiculitis    Anemia    Arthritis    Anxiety    Gastroesophageal reflux disease with esophagitis without hemorrhage    Drug-induced skin rash    Psoriasis    Bilateral impacted cerumen    Bronchiolitis    Pleural effusion    Shortness of breath    Acute otitis externa of left ear    Bilateral otitis media    Chronic non-infective otitis externa of both ears    Esophageal dysphagia    Oropharyngeal dysphagia    Bilateral high frequency sensorineural hearing loss    Acute on chronic heart failure with preserved ejection fraction (HFpEF)    Hypokalemia    Bilateral pleural effusion    Chest pain    Laceration of earlobe, left, initial encounter    TSERING (acute kidney injury)    Stomatitis    Drug reaction, initial encounter    Diarrhea due to drug    Dehydration    Chemotherapy induced nausea and vomiting    Acute hypoxemic respiratory failure    Acute deep vein thrombosis (DVT) of right peroneal vein (Multi)    Hypomagnesemia    Hypervolemia, unspecified hypervolemia type

## 2025-05-01 NOTE — H&P
History Of Present Illness  Arabella Springer is a 84 y.o. female w/ pmhx of stage IV breast cancer w/ bony metastasis (palbociclib/inavolisib), R DVT (eliquis), chronic b/l pleural effusions, anemia of chronic disease, HLD, arthritis, GERD presents from home to [facility] ED by direction of her PCP for worsening exertional sob and leg swelling over the last 3-4 days.  She states she gets short of breath walking across her house, does not have stairs.  Her legs have been swelling worse over the last few days and having copious nonpurulent weeping, she wrapped with Ace wrap last night which did seem to help.  Her legs are also very tender -there is redness that has coincided with increasing swelling.  She describes feeling like this back in February when she was admitted to the hospital for similar concern of fluid overloaded where she had a thoracentesis on 2/6/2025 which was transudative in nature.  She did get symptomatic relief after that.  She had been working with her primary care physician as an outpatient with diuretics.  She was on Lasix alternating 20 mg and 40 mg, however due to increasing creatinine her Lasix was back down to 20 mg once a day.  This medication change was about a month ago.  She denies having any orthopnea, PND, chest pain, wheezing, cough, fever, chills.    In ED:  Vitals: T98.6, 97 HR, 18 RR, 144/69 BP, 95% on RA  Labs: Sodium 135, creatinine 1.13, K4.4, WBC 3.4, BNP 44  Imaging: Chest x-ray showed pulmonary vascular congestion and bilateral pleural effusions as well as extensive osseous sclerotic lesions compatible with metastatic disease  Intervention: Patient given Vanco and Zosyn with concern for bilateral cellulitis.  Given 20 IV Lasix.  Admitted to medicine for fluid overload     Past Medical History  She has a past medical history of Anxiety, Arthritis, Breast cancer, Breast cancer metastasized to bone, GERD (gastroesophageal reflux disease), Glaucoma, Hiatal hernia, Hyperlipidemia, and  Hypertension.    Surgical History  She has a past surgical history that includes Mastectomy complete / simple (Right); Hysterectomy; and Shoulder surgery.     Social History  She reports that she quit smoking about 60 years ago. Her smoking use included cigarettes. She has never used smokeless tobacco. She reports that she does not currently use alcohol. She reports that she does not use drugs.    Family History  Family History[1]     Allergies  Oxycodone    Review of Systems   ROS: 12 systems reviewed and negative except per HPI above     Physical Exam by System:    Constitutional: A&Ox4, NAD, resting comfortable   Head and Face: Atraumatic, normocephalic   Eyes: Normal external exam, EOMI  ENT: Normal external inspection of ears and nose. Oropharynx normal.  Cardiovascular: RRR, S1/S2, no murmurs, rubs, or gallops, radial pulses +2  Pulmonary: CTAB, no respiratory distress, no wheezing, rales or rhonchi, on RA  Abdomen: +BS, soft, non-tender, nondistended, no guarding rigidity or rebound tenderness, no masses noted  MSK: 3+ pitting edema to knee b/l, No joint swelling, normal movements of all extremities.   Neuro: No focal deficits, normal motor function, normal sensation, follows all commands  Skin- weeping serosanguineous fluid from legs bilaterally, skin abrasion on right lateral leg well-demarcated erythematous  Psychiatric: Judgment intact. Appropriate mood, affect and behavior      Last Recorded Vitals  Blood pressure 136/78, pulse 74, temperature 37 °C (98.6 °F), resp. rate 17, height 1.524 m (5'), weight 70.3 kg (155 lb), SpO2 96%.    Relevant Results  Results for orders placed or performed during the hospital encounter of 04/30/25 (from the past 24 hours)   CBC and Auto Differential   Result Value Ref Range    WBC 3.4 (L) 4.4 - 11.3 x10*3/uL    nRBC 0.6 (H) 0.0 - 0.0 /100 WBCs    RBC 2.18 (L) 4.00 - 5.20 x10*6/uL    Hemoglobin 8.0 (L) 12.0 - 16.0 g/dL    Hematocrit 24.7 (L) 36.0 - 46.0 %     (H) 80  - 100 fL    MCH 36.7 (H) 26.0 - 34.0 pg    MCHC 32.4 32.0 - 36.0 g/dL    RDW 19.4 (H) 11.5 - 14.5 %    Platelets 183 150 - 450 x10*3/uL    Neutrophils % 43.5 40.0 - 80.0 %    Immature Granulocytes %, Automated 1.2 (H) 0.0 - 0.9 %    Lymphocytes % 35.0 13.0 - 44.0 %    Monocytes % 18.5 2.0 - 10.0 %    Eosinophils % 0.9 0.0 - 6.0 %    Basophils % 0.9 0.0 - 2.0 %    Neutrophils Absolute 1.48 (L) 1.60 - 5.50 x10*3/uL    Immature Granulocytes Absolute, Automated 0.04 0.00 - 0.50 x10*3/uL    Lymphocytes Absolute 1.19 0.80 - 3.00 x10*3/uL    Monocytes Absolute 0.63 0.05 - 0.80 x10*3/uL    Eosinophils Absolute 0.03 0.00 - 0.40 x10*3/uL    Basophils Absolute 0.03 0.00 - 0.10 x10*3/uL   Comprehensive metabolic panel   Result Value Ref Range    Glucose 104 (H) 74 - 99 mg/dL    Sodium 135 (L) 136 - 145 mmol/L    Potassium 4.4 3.5 - 5.3 mmol/L    Chloride 101 98 - 107 mmol/L    Bicarbonate 25 21 - 32 mmol/L    Anion Gap 13 10 - 20 mmol/L    Urea Nitrogen 24 (H) 6 - 23 mg/dL    Creatinine 1.13 (H) 0.50 - 1.05 mg/dL    eGFR 49 (L) >60 mL/min/1.73m*2    Calcium 8.6 8.6 - 10.3 mg/dL    Albumin 4.0 3.4 - 5.0 g/dL    Alkaline Phosphatase 45 33 - 136 U/L    Total Protein 6.7 6.4 - 8.2 g/dL    AST 14 9 - 39 U/L    Bilirubin, Total 0.3 0.0 - 1.2 mg/dL    ALT 7 7 - 45 U/L   Sedimentation rate, automated   Result Value Ref Range    Sedimentation Rate 28 0 - 30 mm/h   C-reactive protein   Result Value Ref Range    C-Reactive Protein 0.92 <1.00 mg/dL   B-type natriuretic peptide   Result Value Ref Range    BNP 44 0 - 99 pg/mL   Morphology   Result Value Ref Range    RBC Morphology See Below     Polychromasia Mild     Ovalocytes Few     Teardrop Cells Few    Lactate   Result Value Ref Range    Lactate 0.6 0.4 - 2.0 mmol/L      Scheduled medications  Scheduled Medications[2]  Continuous medications  Continuous Medications[3]  PRN medications  PRN Medications[4]     Imaging  XR chest 1 view  Result Date: 4/30/2025  Pulmonary vascular congestive  change and bilateral pleural effusions and basilar atelectasis or airspace disease.   Extensive osseous sclerotic lesions compatible with metastatic disease.   MACRO: None   Signed by: Nilay Carpenter 4/30/2025 6:50 PM Dictation workstation:   [id number]    XR tibia fibula bilateral 2 views  Result Date: 4/30/2025  No abnormal soft tissue gas   Demineralized bones. Osseous metastatic disease again detected.     MACRO: None   Signed by: Chintan Stern 4/30/2025 6:21 PM Dictation workstation:   [id number]      Cardiology, Vascular, and Other Imaging  Vascular US Lower Extremity Venous Duplex Bilateral  Result Date: 4/30/2025  Preliminary Cardiology Report            [facility] [place] [id number] [address] [place] [id number]     Tel [phone] Fax [phone]          Preliminary Vascular Lab Report  [facility] US LOWER EXTREMITY VENOUS DUPLEX BILATERAL  Patient Name:     ALEXIS MASSEY MUNIR Reading Physician:  [id number] Beata Villar MD Study Date:       4/30/2025        Ordering Provider:  [id number] SCHUYLER LATIF MRN/PID:          [id number]         Fellow: Accession#:       [id number]     Technologist:       Jarad ALVES RVT YOB: 1940       Technologist 2: Gender:           F                Encounter#:         [id number] Admission Status: Emergency        Location Performed: [facility]  Diagnosis/ICD: Localized (leg) edema-R60.0 Indication:    Limb edema CPT Codes:     77773 Peripheral venous duplex scan for DVT complete  Pertinent History: Previous DVT, HTN and Hyperlipidemia.  **Report Amended** Date and Time: 4/30/2025 at 4/30/2025  PRELIMINARY CONCLUSIONS:  Right Lower Venous: No evidence of acute deep vein thrombus visualized in the right lower extremity. Left Lower Venous: No evidence of acute deep vein thrombus visualized in the left lower extremity.  Imaging & Doppler Findings:  Right                 Compressible Thrombus        Flow Distal External Iliac                None    Spontaneous/Phasic CFV                       Yes        None   Spontaneous/Phasic PFV                       Yes        None FV Proximal               Yes        None   Spontaneous/Phasic FV Mid                    Yes        None FV Distal                 Yes        None Popliteal                 Yes        None   Spontaneous/Phasic Peroneal                  Yes        None PTV                       Yes        None  Left                  Compress Thrombus        Flow Distal External Iliac            None       Pulsatile CFV                     Yes      None   Spontaneous/Phasic PFV                     Yes      None FV Proximal             Yes      None       Pulsatile FV Mid                  Yes      None FV Distal               Yes      None Popliteal               Yes      None   Spontaneous/Phasic Peroneal                Yes      None PTV                     Yes      None  VASCULAR PRELIMINARY REPORT completed by Jarad ALVES RVT on 4/30/2025 at 7:45:19 PM  ** Final (Updated) **           Assessment/Plan   Assessment & Plan  Hypervolemia, unspecified hypervolemia type      Patient is a 84 y.o. female w/ pmhx of stage IV breast cancer w/ bony metastasis (palbociclib/inavolisib), R DVT (eliquis), chronic b/l pleural effusions, anemia of chronic disease, HLD, arthritis, GERD presents with worsening leg swelling and exertional dyspnea admitted for fluid overload and diuresis.    #Pulmonary edema  #Peripheral edema  #B/l Pleural Effusions  -CXR shows mildly worsened pulmonary edema from prior CXR available in early April. Pleural effusions appear stable.  -Last TTE (6/2024) showed normal EF w/ diastolic dysfunction, although patient has other reasons to have poor venous drainage like poor lymphatics in setting of malignancy and immunotherapy - should repeat TTE here to assess any changes in cardiac function for possible development of CHF  -Lasix 20mg IV daily  -Monitor Input/output  -Daily weights  -ace  wraps  -Will defer thoracentesis to see how patient responds to IV diuresis since these effusions do not appear signfiicantly worsen    #Mild cellulitis R leg  -Small area of erythema noted from skin tear that is suspicious for cellulitis. Both legs show noninfectious changes of venous stasis  -Will cover with PO keflex/doxy  -wound care consult  -ace wraps    #L bacterial conjunctivitis  -Saw Opth[facility] outpatient 4/30  -barb/poly/dex drops four times a day for 1 week    DVT: eliquis  Diet: regular  CODE: DNR/DNI    Disposition: Patient admitted for diruesis anticipate 1-2 night stay    To be discussed with attending,  López Hernandez, DO PGY-2       [1]   Family History  Problem Relation Name Age of Onset    Colon cancer Sister      Breast cancer Daughter     [2] furosemide, 20 mg, intravenous, Once    [3]    [4]

## 2025-05-01 NOTE — CARE PLAN
Problem: Discharge Planning  Goal: Discharge to home or other facility with appropriate resources  Outcome: Progressing     Problem: Chronic Conditions and Co-morbidities  Goal: Patient's chronic conditions and co-morbidity symptoms are monitored and maintained or improved  Outcome: Progressing     Problem: Pain - Adult  Goal: Verbalizes/displays adequate comfort level or baseline comfort level  Outcome: Met     Problem: Safety - Adult  Goal: Free from fall injury  Outcome: Met     Problem: Nutrition  Goal: Nutrient intake appropriate for maintaining nutritional needs  Outcome: Met      The clinical goals for the shift include see care plan    EOS:  Patient has had good urine output with the dose of IV lasix earlier.  There is improved edema in BLE's.  Mag was 1.38 and IV replacement was given.  Left eye is red and she has occasional drainage - drops applied as ordered.  She has been encouraged to keep legs elevated.  Patient was seen by wound care and referred to the outpatient clinic.

## 2025-05-01 NOTE — CONSULTS
Wound Care Consult     Visit Date: 5/1/2025      Patient Name: Arabella Springer         MRN: [id number]             Reason for Consult: Multiple ulcers BLE        Wound History: Present on admission     Pertinent Labs:       Assessment: Pt sitting in chair with legs down. She said she just returned from bathroom. Per her nurse, legs are less edematous no than they were earlier. Weeping ulcer left lower leg, approx 1.0 x 1.0 x 0.1cm. Below this wound is another ulcer, approx 0.5 x 0.5 covered with white tissue, no drainage. Right lower, mid, lateral extremity is an area that is not draining but is red, angry looking. Pt states this is the one that bothers her the most. No drainage noted. Proximal portion right lateral leg extending up to her thigh is reddish purple, intact area. Pt said this happened after she started on a blood thinner for a blood clot. In talking with pt I asked if she was following anyone for her legs, she said no and we talked about our [facility]. She was interested in going there. An appointment was made with her approval. She will see a podiatrist May 7 @ 3:00 PM.                         Wound Plan: Cleanse ulcers with NS, cover with Mepilex every other day and PRN for soilage. Eucerin cream to dry skin lower extremities. To follow up at wound care center after discharge.      Ewa Abebe RN  5/1/2025  4:45 PM

## 2025-05-01 NOTE — CARE PLAN
The patient's goals for the shift include Sleep    The clinical goals for the shift include Comfort and rest    Over the shift, the patient did not make progress toward the following goals. Barriers to progression include acute illness. Recommendations to address these barriers include provide a quiet environment.

## 2025-05-02 ENCOUNTER — HOME HEALTH ADMISSION (OUTPATIENT)
Dept: HOME HEALTH SERVICES | Facility: HOME HEALTH | Age: 85
End: 2025-05-02
Payer: MEDICARE

## 2025-05-02 ENCOUNTER — DOCUMENTATION (OUTPATIENT)
Dept: HOME HEALTH SERVICES | Facility: HOME HEALTH | Age: 85
End: 2025-05-02
Payer: MEDICARE

## 2025-05-02 ENCOUNTER — PHARMACY VISIT (OUTPATIENT)
Dept: PHARMACY | Facility: CLINIC | Age: 85
End: 2025-05-02
Payer: COMMERCIAL

## 2025-05-02 VITALS
WEIGHT: 165.79 LBS | RESPIRATION RATE: 16 BRPM | SYSTOLIC BLOOD PRESSURE: 117 MMHG | HEIGHT: 60 IN | DIASTOLIC BLOOD PRESSURE: 63 MMHG | BODY MASS INDEX: 32.55 KG/M2 | TEMPERATURE: 97.3 F | HEART RATE: 83 BPM | OXYGEN SATURATION: 96 %

## 2025-05-02 LAB
ANION GAP SERPL CALC-SCNC: 13 MMOL/L (ref 10–20)
AORTIC VALVE PEAK VELOCITY: 1.79 M/S
ATRIAL RATE: 90 BPM
AV PEAK GRADIENT: 13 MMHG
AVA (PEAK VEL): 2.43 CM2
BUN SERPL-MCNC: 22 MG/DL (ref 6–23)
CALCIUM SERPL-MCNC: 8.4 MG/DL (ref 8.6–10.3)
CHLORIDE SERPL-SCNC: 100 MMOL/L (ref 98–107)
CO2 SERPL-SCNC: 24 MMOL/L (ref 21–32)
CREAT SERPL-MCNC: 1.13 MG/DL (ref 0.5–1.05)
EGFRCR SERPLBLD CKD-EPI 2021: 49 ML/MIN/1.73M*2
EJECTION FRACTION APICAL 4 CHAMBER: 60
EJECTION FRACTION: 66 %
ERYTHROCYTE [DISTWIDTH] IN BLOOD BY AUTOMATED COUNT: 19.1 % (ref 11.5–14.5)
GLUCOSE SERPL-MCNC: 111 MG/DL (ref 74–99)
HCT VFR BLD AUTO: 23.7 % (ref 36–46)
HGB BLD-MCNC: 7.6 G/DL (ref 12–16)
LEFT VENTRICLE INTERNAL DIMENSION DIASTOLE: 3.62 CM (ref 3.5–6)
LEFT VENTRICULAR OUTFLOW TRACT DIAMETER: 1.8 CM
LV EJECTION FRACTION BIPLANE: 66 %
MAGNESIUM SERPL-MCNC: 1.75 MG/DL (ref 1.6–2.4)
MCH RBC QN AUTO: 36.7 PG (ref 26–34)
MCHC RBC AUTO-ENTMCNC: 32.1 G/DL (ref 32–36)
MCV RBC AUTO: 115 FL (ref 80–100)
MITRAL VALVE E/A RATIO: 0.71
MITRAL VALVE E/E' RATIO: 22.7
NRBC BLD-RTO: 0 /100 WBCS (ref 0–0)
P AXIS: 42 DEGREES
P OFFSET: 189 MS
P ONSET: 152 MS
PLATELET # BLD AUTO: 179 X10*3/UL (ref 150–450)
POTASSIUM SERPL-SCNC: 4.2 MMOL/L (ref 3.5–5.3)
PR INTERVAL: 134 MS
Q ONSET: 219 MS
QRS COUNT: 15 BEATS
QRS DURATION: 82 MS
QT INTERVAL: 352 MS
QTC CALCULATION(BAZETT): 430 MS
QTC FREDERICIA: 403 MS
R AXIS: 19 DEGREES
RBC # BLD AUTO: 2.07 X10*6/UL (ref 4–5.2)
RIGHT VENTRICLE FREE WALL PEAK S': 13 CM/S
RIGHT VENTRICLE PEAK SYSTOLIC PRESSURE: 47.9 MMHG
SODIUM SERPL-SCNC: 133 MMOL/L (ref 136–145)
T AXIS: 33 DEGREES
T OFFSET: 395 MS
TRICUSPID ANNULAR PLANE SYSTOLIC EXCURSION: 2.3 CM
VENTRICULAR RATE: 90 BPM
WBC # BLD AUTO: 4 X10*3/UL (ref 4.4–11.3)

## 2025-05-02 PROCEDURE — 99239 HOSP IP/OBS DSCHRG MGMT >30: CPT

## 2025-05-02 PROCEDURE — 97110 THERAPEUTIC EXERCISES: CPT | Mod: GP,CQ

## 2025-05-02 PROCEDURE — 82374 ASSAY BLOOD CARBON DIOXIDE: CPT

## 2025-05-02 PROCEDURE — 97530 THERAPEUTIC ACTIVITIES: CPT | Mod: GP,CQ

## 2025-05-02 PROCEDURE — 2500000001 HC RX 250 WO HCPCS SELF ADMINISTERED DRUGS (ALT 637 FOR MEDICARE OP): Performed by: INTERNAL MEDICINE

## 2025-05-02 PROCEDURE — 83735 ASSAY OF MAGNESIUM: CPT

## 2025-05-02 PROCEDURE — 97116 GAIT TRAINING THERAPY: CPT | Mod: GP,CQ

## 2025-05-02 PROCEDURE — 2500000001 HC RX 250 WO HCPCS SELF ADMINISTERED DRUGS (ALT 637 FOR MEDICARE OP)

## 2025-05-02 PROCEDURE — RXMED WILLOW AMBULATORY MEDICATION CHARGE

## 2025-05-02 PROCEDURE — 82565 ASSAY OF CREATININE: CPT

## 2025-05-02 PROCEDURE — 85027 COMPLETE CBC AUTOMATED: CPT

## 2025-05-02 PROCEDURE — 36415 COLL VENOUS BLD VENIPUNCTURE: CPT

## 2025-05-02 RX ORDER — FUROSEMIDE 20 MG/1
20 TABLET ORAL DAILY
Qty: 45 TABLET | Refills: 3 | Status: SHIPPED | OUTPATIENT
Start: 2025-05-02

## 2025-05-02 RX ORDER — CEFADROXIL 500 MG/1
500 CAPSULE ORAL DAILY
Qty: 4 CAPSULE | Refills: 0 | Status: SHIPPED | OUTPATIENT
Start: 2025-05-03 | End: 2025-05-07

## 2025-05-02 RX ADMIN — ACETAMINOPHEN 975 MG: 325 TABLET ORAL at 06:10

## 2025-05-02 RX ADMIN — CEPHALEXIN 500 MG: 500 CAPSULE ORAL at 00:22

## 2025-05-02 RX ADMIN — NEOMYCIN SULFATE, POLYMYXIN B SULFATE AND DEXAMETHASONE 2 DROP: 3.5; 10000; 1 SUSPENSION OPHTHALMIC at 06:07

## 2025-05-02 RX ADMIN — DOXYCYCLINE HYCLATE 100 MG: 100 CAPSULE ORAL at 09:25

## 2025-05-02 RX ADMIN — NEOMYCIN SULFATE, POLYMYXIN B SULFATE AND DEXAMETHASONE 2 DROP: 3.5; 10000; 1 SUSPENSION OPHTHALMIC at 12:53

## 2025-05-02 RX ADMIN — PALBOCICLIB 125 MG: 125 TABLET, FILM COATED ORAL at 11:37

## 2025-05-02 RX ADMIN — PROCHLORPERAZINE MALEATE 10 MG: 10 TABLET ORAL at 10:39

## 2025-05-02 RX ADMIN — CEPHALEXIN 500 MG: 500 CAPSULE ORAL at 09:34

## 2025-05-02 RX ADMIN — LOSARTAN POTASSIUM: 100 TABLET, FILM COATED ORAL at 09:24

## 2025-05-02 RX ADMIN — PRAVASTATIN SODIUM 10 MG: 20 TABLET ORAL at 09:24

## 2025-05-02 RX ADMIN — PANTOPRAZOLE SODIUM 40 MG: 40 TABLET, DELAYED RELEASE ORAL at 06:07

## 2025-05-02 RX ADMIN — APIXABAN 5 MG: 5 TABLET, FILM COATED ORAL at 09:24

## 2025-05-02 ASSESSMENT — COGNITIVE AND FUNCTIONAL STATUS - GENERAL
MOBILITY SCORE: 20
MOVING TO AND FROM BED TO CHAIR: A LITTLE
STANDING UP FROM CHAIR USING ARMS: A LITTLE
MOVING FROM LYING ON BACK TO SITTING ON SIDE OF FLAT BED WITH BEDRAILS: A LITTLE
STANDING UP FROM CHAIR USING ARMS: A LITTLE
WALKING IN HOSPITAL ROOM: A LITTLE
WALKING IN HOSPITAL ROOM: A LITTLE
MOBILITY SCORE: 17
DAILY ACTIVITIY SCORE: 24
WALKING IN HOSPITAL ROOM: A LITTLE
CLIMB 3 TO 5 STEPS WITH RAILING: A LITTLE
MOVING TO AND FROM BED TO CHAIR: A LITTLE
MOVING TO AND FROM BED TO CHAIR: A LITTLE
MOBILITY SCORE: 20
CLIMB 3 TO 5 STEPS WITH RAILING: A LOT
CLIMB 3 TO 5 STEPS WITH RAILING: A LITTLE
TURNING FROM BACK TO SIDE WHILE IN FLAT BAD: A LITTLE
STANDING UP FROM CHAIR USING ARMS: A LITTLE
DAILY ACTIVITIY SCORE: 24

## 2025-05-02 ASSESSMENT — PAIN - FUNCTIONAL ASSESSMENT
PAIN_FUNCTIONAL_ASSESSMENT: 0-10
PAIN_FUNCTIONAL_ASSESSMENT: 0-10

## 2025-05-02 ASSESSMENT — PAIN SCALES - GENERAL
PAINLEVEL_OUTOF10: 0 - NO PAIN
PAINLEVEL_OUTOF10: 3

## 2025-05-02 NOTE — DISCHARGE SUMMARY
Discharge Diagnosis  Hypervolemia  Lower extremities cellulitis    Issues Requiring Follow-Up  Follow up with primary care physician  Follow up with cardiology  Cefadroxil 500 mg po daily for more 04 days ordered to complete 7 days antibiotics  Patient has borderline vitamin B12, needs MMA levels to rule vitamin B12 deficiency  Patient advised to take additional lasix 20 mg po for weight gain of 2 or 3 pounds      Discharge Meds     Medication List      START taking these medications     cefadroxil 500 mg capsule; Commonly known as: Duricef; Take 1 capsule   (500 mg) by mouth once daily for 4 days. Do not start before May 3, 2025.;   Start taking on: May 3, 2025     CHANGE how you take these medications     furosemide 20 mg tablet; Commonly known as: Lasix; Take 1 tablet (20 mg)   by mouth once daily. Please Take additional lasix 20 mg if you gained 2 or   3 pounds; What changed: how much to take, how to take this, when to take   this, additional instructions   Itovebi 3 mg tablet; Generic drug: inavolisib; Take 3 mg by mouth once   daily. For 3 weeks then take 1 week off; What changed: when to take this     CONTINUE taking these medications     acetaminophen 500 mg tablet; Commonly known as: Tylenol   apixaban 5 mg tablet; Commonly known as: Eliquis; Take 2 tablets (10 mg)   by mouth 2 times a day for 7 days, THEN 1 tablet (5 mg) 2 times a day.   10Mg (2pills) twice a day x 7 days then 5 mg twice a day.; Start taking   on: April 14, 2025   beclomethasone 40 mcg/actuation inhaler; Commonly known as: Qvar   Blood glucose monitoring meter; Test two times daily.   blood sugar diagnostic; 1 each 2 times a day.   brimonidine-timoloL 0.2-0.5 % ophthalmic solution; Commonly known as:   Combigan   calcium carbonate-vitamin D3 600 mg-10 mcg (400 unit) tablet   denosumab 120 mg/1.7 mL (70 mg/mL) injection; Commonly known as: Xgeva   esomeprazole 40 mg DR capsule; Commonly known as: NexIUM   Ibrance 125 mg tablet; Generic  drug: palbociclib; Take 125 mg (1 tablet)   by mouth once daily for three weeks, then one week off.  Swallow whole.    Do not crush, chew or split.   isopropyl alcohoL 70 % towelette; Test 2 times a day, clean finger prior   to testing   losartan-hydrochlorothiazide 100-12.5 mg tablet; Commonly known as:   Hyzaar; Take 1 tablet by mouth once daily.   multivitamin tablet   neomycin-polymyxin-dexAMETHasone 3.5mg/mL-10,000 unit/mL-0.1 %   ophthalmic suspension; Commonly known as: Maxitrol   ondansetron 8 mg tablet; Commonly known as: Zofran; Take 1 tablet (8 mg)   by mouth every 8 hours if needed for nausea or vomiting.   pravastatin 10 mg tablet; Commonly known as: Pravachol; Take 1 tablet   (10 mg) by mouth once daily.   Pro Comfort Spacer-Adult Mask spacer; Generic drug: inhalat.spacing   dev,large mask; 1 each every 4 hours if needed (shortness of breath,   wheezing, cough).   prochlorperazine 10 mg tablet; Commonly known as: Compazine; Take 1   tablet (10 mg) by mouth every 6 hours if needed for nausea or vomiting.   Rhopressa 0.02 % drops opthalmic solution; Generic drug: netarsudiL   triamcinolone 0.1 % cream; Commonly known as: Kenalog; Apply topically 2   times a day.   * Voltaren Arthritis Pain 1 % gel; Generic drug: diclofenac sodium   * diclofenac 50 mg EC tablet; Commonly known as: Voltaren  * This list has 2 medication(s) that are the same as other medications   prescribed for you. Read the directions carefully, and ask your doctor or   other care provider to review them with you.     ASK your doctor about these medications     LORazepam 0.5 mg tablet; Commonly known as: Ativan; Take 1 tablet (0.5   mg) by mouth every 2 hours if needed for anxiety (anxiety related to   radiology tests) for up to 5 doses.   mometasone 0.1 % lotion; Commonly known as: Elocon; Apply topically 2   times a day.       Test Results Pending At Discharge  Pending Labs       Order Current Status    Blood Culture Preliminary result     Blood Culture Preliminary result    Tissue/Wound Culture/Smear Preliminary result            Hospital Course  Arabella Springer is a 84 y.o. 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  CXR showed mildly worsened pulmonary edema from prior CXR available in early April. Pleural effusions appeared stable. She was admitted for pulmonary and peripheral edema, and received lasix 20 mg IV on 5/1 with significant improvement. She never desatted in the hospital and remained on room air. Patient did very well on ambulatory pulse ox testing and was not dyspneic. TTE pending, although suspicion of CHF is low as patient denies PND and is not usually short of breath. Was also started on PO keflex and doxycycline for possible cellulitis given mild erythema, swelling and small wounds bilaterally for which wound care was consulted. B12 and folic acid were ordered for anemia workup. Folate levels were within normal limits, although B12 levels were on the lower end of normal range, thus MMA levels should be obtained at next PPC visit to assess for vitamin B12 deficiency. She will complete her antibiotic course with cefoxidil 500mg daily for 4 days. She should continue PO lasix 20mg daily and advised to take additional lasix 20 mg for weight gain of 2 or 3 pounds. She is discharged in stable medical condition with [facility] referral.   Pertinent Physical Exam At Time of Discharge  Physical Exam  HENT:      Head: Normocephalic and atraumatic.      Nose: Nose normal.   Eyes:      Extraocular Movements: Extraocular movements intact.      Conjunctiva/sclera: Conjunctivae normal.   Cardiovascular:      Rate and Rhythm: Normal rate and regular rhythm.      Heart  sounds: Normal heart sounds.   Pulmonary:      Effort: Pulmonary effort is normal.      Comments: Decreased breath sounds on the lower 1/3rd of the chest  Abdominal:      General: Abdomen is flat. Bowel sounds are normal.      Palpations: Abdomen is soft.   Musculoskeletal:         General: Normal range of motion.      Cervical back: Normal range of motion.      Right lower leg: Edema present.      Left lower leg: Edema present.      Comments: With erythema of lower extremities    Skin:     General: Skin is warm.   Neurological:      General: No focal deficit present.      Mental Status: She is alert and oriented to person, place, and time.   Psychiatric:         Mood and Affect: Mood normal.         Behavior: Behavior normal.         Thought Content: Thought content normal.         Judgment: Judgment normal.         Outpatient Follow-Up  Future Appointments   Date Time Provider Department Center   5/7/2025  3:00 PM Garcia Proctor DPM [id number] [place]   5/15/2025  1:15 PM [facility]   5/16/2025 10:00 AM Andrew Mcfarland MD [id number] [place]   5/16/2025 11:00 [id number] [place]   7/2/2025  1:30 PM Angel Luis Velazquez DO [id number] [place]   4/14/2026  1:30 PM Angel Luis Velazquez DO [id number] [place]         Mike Calvin MD  PGY I Internal Medicine resident

## 2025-05-02 NOTE — NURSING NOTE
ADOD: 5/2.   Destination: home  Transportation Provided by: spouse  Patient feels updated by provider(s) and involved in POC.   Patient has been advised to defer to AVS for new medications and follow-up visits.   Patient is active with MyChart.  Patient's Pharmacy [facility].  Appointments will be made by patient.  Patient has been notified about a survey and call back is to be expected 1-2 weeks post discharge.   Notified patient that DC Navigator is available everyday throughout their stay if any assistance is needed.     Patients  will transport   [facility] delivered new prescriptions

## 2025-05-02 NOTE — CARE PLAN
The patient's goals for the shift include Sleep    The clinical goals for the shift include see care plan

## 2025-05-02 NOTE — CARE PLAN
The patient's goals for the shift include Sleep    The clinical goals for the shift include see care plan      Problem: Discharge Planning  Goal: Discharge to home or other facility with appropriate resources  Outcome: Progressing     Problem: Chronic Conditions and Co-morbidities  Goal: Patient's chronic conditions and co-morbidity symptoms are monitored and maintained or improved  Outcome: Progressing

## 2025-05-02 NOTE — NURSING NOTE
2100 - patient lying in bed. Administered medications per MAR, patient tolerated well. Call light within reach.    0022 - administered medications per MAR, patient tolerated well. Call light within reach.    0500 - patient had bout of diarrhea. Back in bed, call light within reach.    0605 - patient is asking for medication to help with diarrhea. States that this is normal with her cancer medication. Messaged provider for orders. Administered other medications per MAR, patient tolerated well. Call light within reach.

## 2025-05-02 NOTE — PROGRESS NOTES
Spiritual Care Visit  Spiritual Care Request    Reason for Visit:  Routine Visit: Introduction     Request Received From:       Focus of Care:  Visited With: Patient and family together         Refer to :          Spiritual Care Assessment    Spiritual Assessment:                      Care Provided:  Intended Effects: Promote sense of peace, Preserve dignity and respect, Meaning-making  Methods: Offer spiritual/Druze support  Interventions: Share words of hope and inspiration, [place]    Sense of Community and or Anglican Affiliation:  Religious         Addressed Needs/Concerns and/or Maksim Through:          Outcome:        Advance Directives:         Spiritual Care Annotation    Annotation:  Patient was with her .   prayed and was a supportive presence.

## 2025-05-02 NOTE — PROGRESS NOTES
Occupational Therapy                 Therapy Communication Note    Patient Name: Arabella Springer  MRN: [id number]  Department: [facility] 3 N  Room: [facility]3035-A  Today's Date: 5/2/2025     Discipline: Occupational Therapy          Missed Visit Reason:  OT orders received and chart reviewed. Pt. Sleeping soundly on attempt. Will attempt again as able.     Missed Time: Attempt    Comment:

## 2025-05-02 NOTE — PROGRESS NOTES
05/02/25 1426   Discharge Planning   Expected Discharge Disposition Home H     Message sent to [facility] intake nurse to inform her of patient's d/c and final [facility] orders in place.

## 2025-05-02 NOTE — DISCHARGE INSTRUCTIONS
Please follow up with your primary care provider within 7 days for hospital follow up. Please call to make this appointment.   Please follow with your cardiologist within 7 days for hospital follow up. Please call to make this appointment.  Please take additional lasix 20 mg by mouth for weight gain of 2 or 3 pounds.     New medication added  Cefadroxil 500 mg by mouth for 04 days    Please take your medications as prescribed.     If you have any new or worsening symptoms seek medical attention.    Thank you for allowing us to participate in your care!    [place] [place] [facility] [place] [facility].

## 2025-05-02 NOTE — NURSING NOTE
Patient given discharge information, meds to beds delivered. Iv site discontinued. Patient taken down via wheelchair to private car

## 2025-05-02 NOTE — HH CARE COORDINATION
Home Care received a Referral for Nursing, Physical Therapy, and Occupational Therapy. We have processed the referral for a Start of Care on 05/04/2025.     If you have any questions or concerns, please feel free to contact us at [phone]. Follow the prompts, enter your five digit zip code, and you will be directed to your care team on WEST 1.

## 2025-05-02 NOTE — PROGRESS NOTES
Physical Therapy    Physical Therapy Treatment    Patient Name: Arabella Springer  MRN: [id number]  Today's Date: 5/2/2025  Time Calculation  Start Time: 0757  Stop Time: 0835  Time Calculation (min): 38 min     3035/3035-A       05/02/25 0757   PT  Visit   PT Received On 05/02/25   Response to Previous Treatment Patient with no complaints from previous session.   General   Reason for Referral P/w leg swelling and worsening SOB with exertion. She states she gets short of breath walking across her house, does not have stairs.  Her legs have been swelling worse over the last few days and having copious nonpurulent weeping, she wrapped with Ace wrap last night which did seem to help.  Her legs are also very tender -there is redness that has coincided with increasing swelling. Pt admitted for hypervolemia.   Referred By Dr. Guaman   Past Medical History Relevant to Rehab stage IV breast cancer with mets to bone and stomach, osteopenia, HTN, HLD, BPPV, arthritis, R DVT on Eliquis, chronic BL pleural effusions   Family/Caregiver Present No   Prior to Session Communication Bedside nurse   Patient Position Received Bed, 2 rail up;Alarm on   Preferred Learning Style verbal;visual   General Comment Pt pleasant and agreeable to work with therapy. Pt. seated at EOB upon entry to room.   Precautions   Medical Precautions Fall precautions   Vital Signs   SpO2 (!) 89 %   Vital Signs Comment Pt. SPO2 recorded during ambulation, pt. dropped to 89% twice but recovered to above 90% within under a minute.   Pain Assessment   Pain Assessment 0-10   0-10 (Numeric) Pain Score 0 - No pain   Cognition   Overall Cognitive Status WFL   Orientation Level Oriented X4   Coordination   Movements are Fluid and Coordinated Yes   Postural Control   Postural Control Impaired   Posture Comment flexed fwd posture   Static Sitting Balance   Static Sitting-Balance Support Bilateral upper extremity supported   Static Sitting-Level of Assistance Close  supervision   Static Standing Balance   Static Standing-Balance Support Bilateral upper extremity supported   Static Standing-Level of Assistance Close supervision   Therapeutic Exercise   Therapeutic Exercise Performed Yes   Therapeutic Exercise Activity 1 PF/DF X15   Therapeutic Exercise Activity 2 Marching X15   Therapeutic Exercise Activity 3 LAQ X15   Therapeutic Exercise Activity 4 Resisted leg push x15   Therapeutic Exercise Activity 5 Pilllow Squeezes X10 3sec hold   Bed Mobility   Bed Mobility Yes   Bed Mobility 1   Bed Mobility 1 Supine to sitting   Level of Assistance 1 Contact guard   Ambulation/Gait Training   Ambulation/Gait Training Performed Yes   Ambulation/Gait Training 1   Surface 1 Level tile   Device 1 Rolling walker   Gait Support Devices Gait belt   Assistance 1 Close supervision   Quality of Gait 1 WBOS;Diminished heel strike;Forward flexed posture   Comments/Distance (ft) 1 80' X2, pt. required verbal cues for postural adjustment   Transfers   Transfer Yes   Transfer 1   Transfer From 1 Sit to   Transfer to 1 Stand;Chair with arms   Technique 1 Sit to stand;Stand to sit   Transfer Device 1 Walker;Gait belt   Transfer Level of Assistance 1 Close supervision   Trials/Comments 1 Verbal cues for postural adjustment.   Activity Tolerance   Endurance Tolerates 10 - 20 min exercise with multiple rests   Early Mobility/Exercise Safety Screen Proceed with mobilization - No exclusion criteria met   PT Assessment   PT Assessment Results Decreased strength;Decreased range of motion;Decreased endurance;Impaired balance;Decreased mobility;Decreased safety awareness   Rehab Prognosis Good   Evaluation/Treatment Tolerance Patient tolerated treatment well   Medical Staff Made Aware Yes   End of Session Communication Bedside nurse   Assessment Comment Pt. tolerated treatment well, pt. concerned about oxygen levels prior to ambulation, but did not feel SOB during ambulation during todays treatment, see vitals  above, pt. may benefit from using FWW upon discharge.   End of Session Patient Position Up in chair;Alarm on   Outpatient Education   Individual(s) Educated Patient   Education Provided Body Mechanics;Fall Risk   Risk and Benefits Discussed with Patient/Caregiver/Other yes   Patient/Caregiver Demonstrated Understanding yes   Plan of Care Discussed and Agreed Upon yes   Patient Response to Education Patient/Caregiver Verbalized Understanding of Information   Education Comment Pt. educated on postural adjustments and general safety.   PT Plan   Treatment/Interventions Transfer training;Gait training;Balance training;Strengthening;Therapeutic exercise;Therapeutic activity   PT Plan Ongoing PT   PT Frequency 4 times per week   PT Discharge Recommendations Low intensity level of continued care   Equipment Recommended upon Discharge Wheeled walker   PT Recommended Transfer Status Assist x1;Assistive device;Stand by assist         Outcome Measures:  [facility] Basic Mobility  Turning from your back to your side while in a flat bed without using bedrails: A little  Moving from lying on your back to sitting on the side of a flat bed without using bedrails: A little  Moving to and from bed to chair (including a wheelchair): A little  Standing up from a chair using your arms (e.g. wheelchair or bedside chair): A little  To walk in hospital room: A little  Climbing 3-5 steps with railing: A lot  Basic Mobility - Total Score: 17            EDUCATION:  Outpatient Education  Individual(s) Educated: Patient  Education Provided: Body Mechanics, Fall Risk  Risk and Benefits Discussed with Patient/Caregiver/Other: yes  Patient/Caregiver Demonstrated Understanding: yes  Plan of Care Discussed and Agreed Upon: yes  Patient Response to Education: Patient/Caregiver Verbalized Understanding of Information  Education Comment: Pt. educated on postural adjustments and general safety.    GOALS:  Encounter Problems       Encounter Problems (Active)        Balance       Pt will demonstrate static/dynamic standing balance for >/= 5 min SBA without evidence of instability or LOB with functional mobility tasks.         Start:  05/01/25    Expected End:  05/15/25               Mobility       Pt will be able to ambulate >/= 50 ft with LRD distant supervision with good safety awareness and stability.        Start:  05/01/25    Expected End:  05/15/25            Pt will complete supine, seated and standing exercises to maintain/improve overall strength with minimal verbal cues.         Start:  05/01/25    Expected End:  05/15/25               PT Transfers       Pt will be able to complete all bed mobility tasks mod I.        Start:  05/01/25    Expected End:  05/15/25            Pt will be able to complete all transfers with LRD mod I demonstrating good safety awareness and proper body mechanics.        Start:  05/01/25    Expected End:  05/15/25               Pain - Adult              I personally have reviewed data entered by the student into the flowsheet and agree with this treatment session of this patient.    Ray Forrest, PTA

## 2025-05-03 LAB
BACTERIA SPEC CULT: NORMAL
GRAM STN SPEC: NORMAL
GRAM STN SPEC: NORMAL

## 2025-05-04 ENCOUNTER — HOME CARE VISIT (OUTPATIENT)
Dept: HOME HEALTH SERVICES | Facility: HOME HEALTH | Age: 85
End: 2025-05-04
Payer: MEDICARE

## 2025-05-04 VITALS
BODY MASS INDEX: 30.82 KG/M2 | TEMPERATURE: 97.9 F | WEIGHT: 157 LBS | RESPIRATION RATE: 18 BRPM | HEIGHT: 60 IN | SYSTOLIC BLOOD PRESSURE: 130 MMHG | HEART RATE: 75 BPM | DIASTOLIC BLOOD PRESSURE: 69 MMHG

## 2025-05-04 LAB
BACTERIA BLD CULT: NORMAL
BACTERIA BLD CULT: NORMAL

## 2025-05-04 PROCEDURE — G0299 HHS/HOSPICE OF RN EA 15 MIN: HCPCS | Mod: HHH

## 2025-05-04 PROCEDURE — 169592 NO-PAY CLAIM PROCEDURE

## 2025-05-04 ASSESSMENT — PAIN SCALES - PAIN ASSESSMENT IN ADVANCED DEMENTIA (PAINAD)
CONSOLABILITY: 0 - NO NEED TO CONSOLE.
CONSOLABILITY: 0
FACIALEXPRESSION: 0 - SMILING OR INEXPRESSIVE.
BREATHING: 0
NEGVOCALIZATION: 0
FACIALEXPRESSION: 0
TOTALSCORE: 0
NEGVOCALIZATION: 0 - NONE.
BODYLANGUAGE: 0
BODYLANGUAGE: 0 - RELAXED.

## 2025-05-04 ASSESSMENT — ENCOUNTER SYMPTOMS
PAIN: 1
PAIN SEVERITY GOAL: 0/10
PAIN LOCATION: RIGHT KNEE
SUBJECTIVE PAIN PROGRESSION: UNCHANGED
HIGHEST PAIN SEVERITY IN PAST 24 HOURS: 5/10
LOWEST PAIN SEVERITY IN PAST 24 HOURS: 2/10
PERSON REPORTING PAIN: PATIENT
PAIN LOCATION: LEFT KNEE

## 2025-05-04 ASSESSMENT — ACTIVITIES OF DAILY LIVING (ADL): ENTERING_EXITING_HOME: MODERATE ASSIST

## 2025-05-05 ENCOUNTER — TELEPHONE (OUTPATIENT)
Dept: PRIMARY CARE | Facility: CLINIC | Age: 85
End: 2025-05-05
Payer: MEDICARE

## 2025-05-05 DIAGNOSIS — I10 HYPERTENSION, UNSPECIFIED TYPE: ICD-10-CM

## 2025-05-05 DIAGNOSIS — E78.2 MIXED HYPERLIPIDEMIA: ICD-10-CM

## 2025-05-05 LAB
BACTERIA BLD CULT: NORMAL
BACTERIA BLD CULT: NORMAL

## 2025-05-05 RX ORDER — LOSARTAN POTASSIUM AND HYDROCHLOROTHIAZIDE 12.5; 1 MG/1; MG/1
1 TABLET ORAL DAILY
Qty: 90 TABLET | Refills: 0 | Status: SHIPPED | OUTPATIENT
Start: 2025-05-05

## 2025-05-05 RX ORDER — PRAVASTATIN SODIUM 10 MG/1
10 TABLET ORAL DAILY
Qty: 90 TABLET | Refills: 0 | Status: SHIPPED | OUTPATIENT
Start: 2025-05-05

## 2025-05-05 ASSESSMENT — ENCOUNTER SYMPTOMS
APPETITE LEVEL: GOOD
CHANGE IN APPETITE: UNCHANGED
LOSS OF SENSATION IN FEET: 0
DEPRESSION: 0
OCCASIONAL FEELINGS OF UNSTEADINESS: 0

## 2025-05-05 ASSESSMENT — ACTIVITIES OF DAILY LIVING (ADL)
OASIS_M1830: 03
AMBULATION ASSISTANCE: STAND BY ASSIST
AMBULATION ASSISTANCE: 1

## 2025-05-05 NOTE — TELEPHONE ENCOUNTER
Pt called in to inform Dr Velazquez she has to have an infected tooth extracted this Thursday. She is set to follow up with him for a hospital follow up on 5/7. While discharging they gave pt Duricef and she is supposed to start taking Amoxicillin 500mg before her procedure. She is asking if it is ok to take both? Please advise. Thank you!

## 2025-05-06 ENCOUNTER — HOME CARE VISIT (OUTPATIENT)
Dept: HOME HEALTH SERVICES | Facility: HOME HEALTH | Age: 85
End: 2025-05-06
Payer: MEDICARE

## 2025-05-06 ENCOUNTER — PATIENT OUTREACH (OUTPATIENT)
Dept: PRIMARY CARE | Facility: CLINIC | Age: 85
End: 2025-05-06
Payer: MEDICARE

## 2025-05-06 VITALS
TEMPERATURE: 98.9 F | RESPIRATION RATE: 16 BRPM | HEART RATE: 76 BPM | OXYGEN SATURATION: 94 % | SYSTOLIC BLOOD PRESSURE: 122 MMHG | DIASTOLIC BLOOD PRESSURE: 56 MMHG

## 2025-05-06 PROCEDURE — G0299 HHS/HOSPICE OF RN EA 15 MIN: HCPCS | Mod: HHH

## 2025-05-06 PROCEDURE — G0151 HHCP-SERV OF PT,EA 15 MIN: HCPCS | Mod: HHH

## 2025-05-06 SDOH — HEALTH STABILITY: PHYSICAL HEALTH: EXERCISE TYPE: INSTRUCTED AND DEMONSTRATED SEATED AND STANDING THER EX PROGRAMS, NEW HANDOUTS PROVIDED

## 2025-05-06 ASSESSMENT — ACTIVITIES OF DAILY LIVING (ADL): AMBULATION ASSISTANCE ON FLAT SURFACES: 1

## 2025-05-06 ASSESSMENT — ENCOUNTER SYMPTOMS
DENIES PAIN: 1
PERSON REPORTING PAIN: PATIENT
OCCASIONAL FEELINGS OF UNSTEADINESS: 0

## 2025-05-06 NOTE — PROGRESS NOTES
Discharge Facility:[facility]  Discharge Diagnosis: Hypervolemia  Lower Extremities Cellulitis   Admission Date:4/30/2025  Discharge Date: 5/2/2025    PCP Appointment Date:5/7/2025 , cx  Specialist Appointment Date:   5/7/2025 Wound Care/[place]  5/16/2025 Hem/Onc [facility] Encounter and Summary Linked: Yes  ED to Hosp-Admission (Discharged) with Jian Butcher DO; Micky Silveira DO (04/30/2025)       *Two attempts were made to reach patient within two business days after discharge. Left voicemail with contact information for patient to call back with any non-emergent questions or concerns.

## 2025-05-07 ENCOUNTER — OFFICE VISIT (OUTPATIENT)
Dept: WOUND CARE | Facility: CLINIC | Age: 85
End: 2025-05-07
Payer: MEDICARE

## 2025-05-07 ENCOUNTER — APPOINTMENT (OUTPATIENT)
Dept: PRIMARY CARE | Facility: CLINIC | Age: 85
End: 2025-05-07
Payer: MEDICARE

## 2025-05-07 LAB
ATRIAL RATE: 90 BPM
P AXIS: 42 DEGREES
P OFFSET: 189 MS
P ONSET: 152 MS
PR INTERVAL: 134 MS
Q ONSET: 219 MS
QRS COUNT: 15 BEATS
QRS DURATION: 82 MS
QT INTERVAL: 352 MS
QTC CALCULATION(BAZETT): 430 MS
QTC FREDERICIA: 403 MS
R AXIS: 19 DEGREES
T AXIS: 33 DEGREES
T OFFSET: 395 MS
VENTRICULAR RATE: 90 BPM

## 2025-05-07 PROCEDURE — 99214 OFFICE O/P EST MOD 30 MIN: CPT | Mod: 25

## 2025-05-07 PROCEDURE — 11042 DBRDMT SUBQ TIS 1ST 20SQCM/<: CPT

## 2025-05-07 ASSESSMENT — ENCOUNTER SYMPTOMS
DENIES PAIN: 1
MUSCLE WEAKNESS: 1
APPETITE LEVEL: GOOD
LOWER EXTREMITY EDEMA: 1

## 2025-05-08 ENCOUNTER — HOME CARE VISIT (OUTPATIENT)
Dept: HOME HEALTH SERVICES | Facility: HOME HEALTH | Age: 85
End: 2025-05-08
Payer: MEDICARE

## 2025-05-08 ENCOUNTER — SPECIALTY PHARMACY (OUTPATIENT)
Dept: PHARMACY | Facility: CLINIC | Age: 85
End: 2025-05-08

## 2025-05-08 VITALS
TEMPERATURE: 98 F | SYSTOLIC BLOOD PRESSURE: 120 MMHG | HEART RATE: 86 BPM | DIASTOLIC BLOOD PRESSURE: 62 MMHG | RESPIRATION RATE: 16 BRPM

## 2025-05-08 DIAGNOSIS — C50.911 CARCINOMA OF RIGHT BREAST METASTATIC TO BONE: ICD-10-CM

## 2025-05-08 DIAGNOSIS — C79.51 CARCINOMA OF RIGHT BREAST METASTATIC TO BONE: ICD-10-CM

## 2025-05-08 PROCEDURE — G0299 HHS/HOSPICE OF RN EA 15 MIN: HCPCS | Mod: HHH

## 2025-05-08 PROCEDURE — G0152 HHCP-SERV OF OT,EA 15 MIN: HCPCS | Mod: HHH

## 2025-05-08 PROCEDURE — RXMED WILLOW AMBULATORY MEDICATION CHARGE

## 2025-05-08 ASSESSMENT — ENCOUNTER SYMPTOMS
DENIES PAIN: 1
APPETITE LEVEL: GOOD
PERSON REPORTING PAIN: PATIENT

## 2025-05-08 ASSESSMENT — ACTIVITIES OF DAILY LIVING (ADL)
TOILETING: 1
BATHING_WITHIN_DEFINED_LIMITS: 1
CURRENT_FUNCTION: INDEPENDENT
PHYSICAL TRANSFERS ASSESSED: 1
DRESSING_UB_CURRENT_FUNCTION: INDEPENDENT
FEEDING_WITHIN_DEFINED_LIMITS: 1
AMBULATION ASSISTANCE: 1
AMBULATION ASSISTANCE: INDEPENDENT
TOILETING: INDEPENDENT
DRESSING_LB_CURRENT_FUNCTION: INDEPENDENT
PHYSICAL_TRANSFERS_DEVICES: SC
GROOMING_WITHIN_DEFINED_LIMITS: 1

## 2025-05-09 ASSESSMENT — ENCOUNTER SYMPTOMS
LOWER EXTREMITY EDEMA: 1
MUSCLE WEAKNESS: 1
DENIES PAIN: 1

## 2025-05-10 ENCOUNTER — HOME CARE VISIT (OUTPATIENT)
Dept: HOME HEALTH SERVICES | Facility: HOME HEALTH | Age: 85
End: 2025-05-10
Payer: MEDICARE

## 2025-05-10 VITALS
SYSTOLIC BLOOD PRESSURE: 120 MMHG | RESPIRATION RATE: 16 BRPM | OXYGEN SATURATION: 94 % | TEMPERATURE: 98 F | HEART RATE: 60 BPM | DIASTOLIC BLOOD PRESSURE: 82 MMHG

## 2025-05-10 PROCEDURE — G0299 HHS/HOSPICE OF RN EA 15 MIN: HCPCS | Mod: HHH

## 2025-05-10 ASSESSMENT — ENCOUNTER SYMPTOMS
DENIES PAIN: 1
LOWER EXTREMITY EDEMA: 1
APPETITE LEVEL: FAIR
BOWEL PATTERN COMMENTS: LOOSE
MUSCLE WEAKNESS: 1
LAST BOWEL MOVEMENT: 67335

## 2025-05-12 ENCOUNTER — HOME CARE VISIT (OUTPATIENT)
Dept: HOME HEALTH SERVICES | Facility: HOME HEALTH | Age: 85
End: 2025-05-12
Payer: MEDICARE

## 2025-05-12 ENCOUNTER — PHARMACY VISIT (OUTPATIENT)
Dept: PHARMACY | Facility: CLINIC | Age: 85
End: 2025-05-12
Payer: COMMERCIAL

## 2025-05-12 VITALS
DIASTOLIC BLOOD PRESSURE: 66 MMHG | TEMPERATURE: 98.2 F | RESPIRATION RATE: 16 BRPM | HEART RATE: 68 BPM | SYSTOLIC BLOOD PRESSURE: 114 MMHG | OXYGEN SATURATION: 93 %

## 2025-05-12 PROCEDURE — G0299 HHS/HOSPICE OF RN EA 15 MIN: HCPCS | Mod: HHH

## 2025-05-12 PROCEDURE — RXMED WILLOW AMBULATORY MEDICATION CHARGE

## 2025-05-12 ASSESSMENT — ENCOUNTER SYMPTOMS
MUSCLE WEAKNESS: 1
APPETITE LEVEL: GOOD
DENIES PAIN: 1
LOWER EXTREMITY EDEMA: 1

## 2025-05-14 ENCOUNTER — HOME CARE VISIT (OUTPATIENT)
Dept: HOME HEALTH SERVICES | Facility: HOME HEALTH | Age: 85
End: 2025-05-14
Payer: MEDICARE

## 2025-05-14 ENCOUNTER — OFFICE VISIT (OUTPATIENT)
Dept: WOUND CARE | Facility: CLINIC | Age: 85
End: 2025-05-14
Payer: MEDICARE

## 2025-05-14 PROCEDURE — 11042 DBRDMT SUBQ TIS 1ST 20SQCM/<: CPT

## 2025-05-15 ENCOUNTER — LAB (OUTPATIENT)
Dept: LAB | Facility: CLINIC | Age: 85
End: 2025-05-15
Payer: MEDICARE

## 2025-05-15 ENCOUNTER — HOSPITAL ENCOUNTER (OUTPATIENT)
Dept: RADIOLOGY | Facility: HOSPITAL | Age: 85
Discharge: HOME | End: 2025-05-15
Payer: MEDICARE

## 2025-05-15 DIAGNOSIS — C50.911 CARCINOMA OF RIGHT BREAST METASTATIC TO BONE: ICD-10-CM

## 2025-05-15 DIAGNOSIS — C79.51 CARCINOMA OF RIGHT BREAST METASTATIC TO BONE: ICD-10-CM

## 2025-05-15 DIAGNOSIS — M89.8X9 LYTIC BONE LESIONS ON XRAY: ICD-10-CM

## 2025-05-15 LAB
ALBUMIN SERPL BCP-MCNC: 3.9 G/DL (ref 3.4–5)
ALP SERPL-CCNC: 45 U/L (ref 33–136)
ALT SERPL W P-5'-P-CCNC: 7 U/L (ref 7–45)
ANION GAP SERPL CALC-SCNC: 12 MMOL/L (ref 10–20)
AST SERPL W P-5'-P-CCNC: 13 U/L (ref 9–39)
BASOPHILS # BLD MANUAL: 0.02 X10*3/UL (ref 0–0.1)
BASOPHILS NFR BLD MANUAL: 1 %
BILIRUB SERPL-MCNC: 0.3 MG/DL (ref 0–1.2)
BUN SERPL-MCNC: 28 MG/DL (ref 6–23)
CALCIUM SERPL-MCNC: 8.2 MG/DL (ref 8.6–10.3)
CHLORIDE SERPL-SCNC: 99 MMOL/L (ref 98–107)
CO2 SERPL-SCNC: 26 MMOL/L (ref 21–32)
CREAT SERPL-MCNC: 1.38 MG/DL (ref 0.5–1.05)
EGFRCR SERPLBLD CKD-EPI 2021: 38 ML/MIN/1.73M*2
EOSINOPHIL # BLD MANUAL: 0.1 X10*3/UL (ref 0–0.4)
EOSINOPHIL NFR BLD MANUAL: 4 %
ERYTHROCYTE [DISTWIDTH] IN BLOOD BY AUTOMATED COUNT: 16.1 % (ref 11.5–14.5)
GLUCOSE SERPL-MCNC: 114 MG/DL (ref 74–99)
HCT VFR BLD AUTO: 24.2 % (ref 36–46)
HGB BLD-MCNC: 7.9 G/DL (ref 12–16)
IMM GRANULOCYTES # BLD AUTO: 0.19 X10*3/UL (ref 0–0.5)
IMM GRANULOCYTES NFR BLD AUTO: 7.8 % (ref 0–0.9)
LYMPHOCYTES # BLD MANUAL: 0.96 X10*3/UL (ref 0.8–3)
LYMPHOCYTES NFR BLD MANUAL: 40 %
MCH RBC QN AUTO: 38.2 PG (ref 26–34)
MCHC RBC AUTO-ENTMCNC: 32.6 G/DL (ref 32–36)
MCV RBC AUTO: 117 FL (ref 80–100)
METAMYELOCYTES # BLD MANUAL: 0.05 X10*3/UL
METAMYELOCYTES NFR BLD MANUAL: 2 %
MONOCYTES # BLD MANUAL: 0 X10*3/UL (ref 0.05–0.8)
MONOCYTES NFR BLD MANUAL: 0 %
NEUTROPHILS # BLD MANUAL: 1.27 X10*3/UL (ref 1.6–5.5)
NEUTS BAND # BLD MANUAL: 0.05 X10*3/UL (ref 0–0.5)
NEUTS BAND NFR BLD MANUAL: 2 %
NEUTS SEG # BLD MANUAL: 1.22 X10*3/UL (ref 1.6–5)
NEUTS SEG NFR BLD MANUAL: 51 %
NRBC BLD MANUAL-RTO: 2 % (ref 0–0)
NRBC BLD-RTO: ABNORMAL /100{WBCS}
PLATELET # BLD AUTO: 171 X10*3/UL (ref 150–450)
POLYCHROMASIA BLD QL SMEAR: ABNORMAL
POTASSIUM SERPL-SCNC: 4.6 MMOL/L (ref 3.5–5.3)
PROT SERPL-MCNC: 6.3 G/DL (ref 6.4–8.2)
RBC # BLD AUTO: 2.07 X10*6/UL (ref 4–5.2)
RBC MORPH BLD: ABNORMAL
SODIUM SERPL-SCNC: 132 MMOL/L (ref 136–145)
TOTAL CELLS COUNTED BLD: 100
WBC # BLD AUTO: 2.4 X10*3/UL (ref 4.4–11.3)

## 2025-05-15 PROCEDURE — 2550000001 HC RX 255 CONTRASTS: Performed by: INTERNAL MEDICINE

## 2025-05-15 PROCEDURE — 36415 COLL VENOUS BLD VENIPUNCTURE: CPT

## 2025-05-15 PROCEDURE — 84075 ASSAY ALKALINE PHOSPHATASE: CPT

## 2025-05-15 PROCEDURE — 74177 CT ABD & PELVIS W/CONTRAST: CPT

## 2025-05-15 PROCEDURE — 85007 BL SMEAR W/DIFF WBC COUNT: CPT

## 2025-05-15 PROCEDURE — 86300 IMMUNOASSAY TUMOR CA 15-3: CPT

## 2025-05-15 PROCEDURE — 85027 COMPLETE CBC AUTOMATED: CPT

## 2025-05-15 RX ADMIN — IOHEXOL 75 ML: 350 INJECTION, SOLUTION INTRAVENOUS at 13:27

## 2025-05-16 ENCOUNTER — INFUSION (OUTPATIENT)
Dept: HEMATOLOGY/ONCOLOGY | Facility: CLINIC | Age: 85
End: 2025-05-16
Payer: MEDICARE

## 2025-05-16 ENCOUNTER — OFFICE VISIT (OUTPATIENT)
Dept: HEMATOLOGY/ONCOLOGY | Facility: CLINIC | Age: 85
End: 2025-05-16
Payer: MEDICARE

## 2025-05-16 ENCOUNTER — SPECIALTY PHARMACY (OUTPATIENT)
Dept: HEMATOLOGY/ONCOLOGY | Facility: CLINIC | Age: 85
End: 2025-05-16
Payer: MEDICARE

## 2025-05-16 ENCOUNTER — HOME CARE VISIT (OUTPATIENT)
Dept: HOME HEALTH SERVICES | Facility: HOME HEALTH | Age: 85
End: 2025-05-16
Payer: MEDICARE

## 2025-05-16 VITALS
RESPIRATION RATE: 16 BRPM | SYSTOLIC BLOOD PRESSURE: 120 MMHG | TEMPERATURE: 98 F | HEART RATE: 70 BPM | DIASTOLIC BLOOD PRESSURE: 80 MMHG

## 2025-05-16 VITALS
TEMPERATURE: 97.2 F | RESPIRATION RATE: 16 BRPM | BODY MASS INDEX: 31.47 KG/M2 | WEIGHT: 161.16 LBS | OXYGEN SATURATION: 93 % | DIASTOLIC BLOOD PRESSURE: 72 MMHG | SYSTOLIC BLOOD PRESSURE: 156 MMHG | HEART RATE: 78 BPM

## 2025-05-16 DIAGNOSIS — C79.51 CARCINOMA OF RIGHT BREAST METASTATIC TO BONE: ICD-10-CM

## 2025-05-16 DIAGNOSIS — C50.911 CARCINOMA OF RIGHT BREAST METASTATIC TO BONE: ICD-10-CM

## 2025-05-16 DIAGNOSIS — K21.00 GASTROESOPHAGEAL REFLUX DISEASE WITH ESOPHAGITIS WITHOUT HEMORRHAGE: ICD-10-CM

## 2025-05-16 DIAGNOSIS — I82.451 ACUTE DEEP VEIN THROMBOSIS (DVT) OF RIGHT PERONEAL VEIN (MULTI): ICD-10-CM

## 2025-05-16 DIAGNOSIS — C50.911 CARCINOMA OF RIGHT BREAST METASTATIC TO BONE: Primary | ICD-10-CM

## 2025-05-16 DIAGNOSIS — M89.8X9 LYTIC BONE LESIONS ON XRAY: ICD-10-CM

## 2025-05-16 DIAGNOSIS — C79.51 CARCINOMA OF RIGHT BREAST METASTATIC TO BONE: Primary | ICD-10-CM

## 2025-05-16 DIAGNOSIS — D61.82 MYELOPHTHISIC ANEMIA: ICD-10-CM

## 2025-05-16 DIAGNOSIS — E78.2 MIXED HYPERLIPIDEMIA: ICD-10-CM

## 2025-05-16 DIAGNOSIS — I82.4Z1 ACUTE DEEP VEIN THROMBOSIS (DVT) OF DISTAL END OF RIGHT LOWER EXTREMITY: ICD-10-CM

## 2025-05-16 DIAGNOSIS — I10 ESSENTIAL HYPERTENSION: ICD-10-CM

## 2025-05-16 LAB — CANCER AG27-29 SERPL-ACNC: 232.6 U/ML (ref 0–38.6)

## 2025-05-16 PROCEDURE — 1160F RVW MEDS BY RX/DR IN RCRD: CPT | Performed by: INTERNAL MEDICINE

## 2025-05-16 PROCEDURE — 1159F MED LIST DOCD IN RCRD: CPT | Performed by: INTERNAL MEDICINE

## 2025-05-16 PROCEDURE — G0299 HHS/HOSPICE OF RN EA 15 MIN: HCPCS | Mod: HHH

## 2025-05-16 PROCEDURE — 1126F AMNT PAIN NOTED NONE PRSNT: CPT | Performed by: INTERNAL MEDICINE

## 2025-05-16 PROCEDURE — 99214 OFFICE O/P EST MOD 30 MIN: CPT | Performed by: INTERNAL MEDICINE

## 2025-05-16 PROCEDURE — 2500000004 HC RX 250 GENERAL PHARMACY W/ HCPCS (ALT 636 FOR OP/ED): Mod: JZ,TB | Performed by: INTERNAL MEDICINE

## 2025-05-16 PROCEDURE — 96402 CHEMO HORMON ANTINEOPL SQ/IM: CPT

## 2025-05-16 PROCEDURE — 3077F SYST BP >= 140 MM HG: CPT | Performed by: INTERNAL MEDICINE

## 2025-05-16 PROCEDURE — G2211 COMPLEX E/M VISIT ADD ON: HCPCS | Performed by: INTERNAL MEDICINE

## 2025-05-16 PROCEDURE — 3078F DIAST BP <80 MM HG: CPT | Performed by: INTERNAL MEDICINE

## 2025-05-16 PROCEDURE — 1111F DSCHRG MED/CURRENT MED MERGE: CPT | Performed by: INTERNAL MEDICINE

## 2025-05-16 RX ORDER — LAMOTRIGINE 25 MG/1
500 TABLET ORAL ONCE
Status: COMPLETED | OUTPATIENT
Start: 2025-05-16 | End: 2025-05-16

## 2025-05-16 RX ORDER — EPINEPHRINE 0.3 MG/.3ML
0.3 INJECTION SUBCUTANEOUS EVERY 5 MIN PRN
Status: DISCONTINUED | OUTPATIENT
Start: 2025-05-16 | End: 2025-05-19 | Stop reason: HOSPADM

## 2025-05-16 RX ORDER — FAMOTIDINE 10 MG/ML
20 INJECTION, SOLUTION INTRAVENOUS ONCE AS NEEDED
Status: DISCONTINUED | OUTPATIENT
Start: 2025-05-16 | End: 2025-05-19 | Stop reason: HOSPADM

## 2025-05-16 RX ORDER — ALBUTEROL SULFATE 0.83 MG/ML
3 SOLUTION RESPIRATORY (INHALATION) AS NEEDED
Status: DISCONTINUED | OUTPATIENT
Start: 2025-05-16 | End: 2025-05-19 | Stop reason: HOSPADM

## 2025-05-16 RX ORDER — DIPHENHYDRAMINE HYDROCHLORIDE 50 MG/ML
50 INJECTION, SOLUTION INTRAMUSCULAR; INTRAVENOUS AS NEEDED
Status: DISCONTINUED | OUTPATIENT
Start: 2025-05-16 | End: 2025-05-19 | Stop reason: HOSPADM

## 2025-05-16 RX ADMIN — FULVESTRANT 500 MG: 50 INJECTION, SOLUTION INTRAMUSCULAR at 11:41

## 2025-05-16 ASSESSMENT — ENCOUNTER SYMPTOMS
PAIN SEVERITY GOAL: 4/10
PAIN LOCATION - PAIN SEVERITY: 5/10
PERSON REPORTING PAIN: PATIENT
HIGHEST PAIN SEVERITY IN PAST 24 HOURS: 6/10
APPETITE LEVEL: GOOD
LOWEST PAIN SEVERITY IN PAST 24 HOURS: 3/10
LOWER EXTREMITY EDEMA: 1
PAIN: 1
PAIN LOCATION: LEFT LEG
MUSCLE WEAKNESS: 1

## 2025-05-16 ASSESSMENT — PAIN SCALES - GENERAL: PAINLEVEL_OUTOF10: 0-NO PAIN

## 2025-05-16 NOTE — PATIENT INSTRUCTIONS
You will not receive xgeva today    Continue taking the inavolisib + palbociclib as is    You will continue with faslodex monthly

## 2025-05-16 NOTE — PROGRESS NOTES
Per discussion at today's 5/16/25 clinic visit with Dr. Mcfarland:  will continue apixaban but reduce to 2.5 mg PO twice daily for maintenance/prophylactic dosing.    New prescription sent to local [facility]'s #[id number].

## 2025-05-16 NOTE — PROGRESS NOTES
[facility] Refill Management:    Medication(s) Requested: inavolisib 3 mg PO once daily on D1 - 21 of a 28 day cycle.      (Take in combination with palbociclib 125 mg PO once daily on D1 - 21 of a 28 day cycle and fulvestrant 500 mg IM on D1 only of a 28 day cycle)    Date of Request: 05/16/25 12:09 PM    - Labs in last 3 months: Yes    Date: 5/15/25  - Office visit in last 3 months: Yes    Date: 5/16/25  - Changes in medications in last 3 months: No  - Actionable labs or dose adjustments requiring physician clarification: No    Next FUV scheduled for 6/13/25 w/ Andrew Mcfarland MD    Approved for refill under Consult Agreement: Yes    Comments: Continue current therapy and dosing.      Dario Arredondo, [place], MS, BCOP  Clinical Pharmacy Specialist - Ambulatory Oncology

## 2025-05-16 NOTE — PROGRESS NOTES
Patient ID: Arabella Springer is a 84 y.o. female.  Referring Physician: Andrew Mcfarland MD  [id number] [facility] [address]  [place] [address]  Primary Care Provider: Angel Luis Velazquez DO  Visit Type: Follow Up      Subjective    HPI    Review of Systems - Oncology     Objective   BSA: There is no height or weight on file to calculate BSA.  There were no vitals taken for this visit.     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   has a past surgical history that includes Mastectomy complete / simple (Right); Hysterectomy; and Shoulder surgery.  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 Her smoking use included cigarettes. She has never used smokeless tobacco.  She  reports that she does not currently use alcohol.  She  reports no history of drug use.    Physical Exam    WBC   Date/Time Value Ref Range Status   05/15/2025 01:58 PM 2.4 (L) 4.4 - 11.3 x10*3/uL Final   05/02/2025 06:43 AM 4.0 (L) 4.4 - 11.3 x10*3/uL Final   05/01/2025 06:09 AM 3.3 (L) 4.4 - 11.3 x10*3/uL Final     WHITE BLOOD CELL COUNT   Date/Time Value Ref Range Status   02/12/2025 10:34 AM 2.8 (L) 3.8 - 10.8 Thousand/uL Final     nRBC   Date Value Ref Range Status   05/15/2025   Final     Comment:     Not Measured   05/02/2025 0.0 0.0 - 0.0 /100 WBCs Final   05/01/2025 0.0 0.0 - 0.0 /100 WBCs Final     RBC   Date Value Ref Range Status   05/15/2025 2.07 (L) 4.00 - 5.20 x10*6/uL Final   05/02/2025 2.07 (L) 4.00 - 5.20 x10*6/uL Final   05/01/2025 1.91 (L) 4.00 -  5.20 x10*6/uL Final     RED BLOOD CELL COUNT   Date Value Ref Range Status   02/12/2025 2.74 (L) 3.80 - 5.10 Million/uL Final     Hemoglobin   Date Value Ref Range Status   05/15/2025 7.9 (L) 12.0 - 16.0 g/dL Final   05/02/2025 7.6 (L) 12.0 - 16.0 g/dL Final   05/01/2025 7.0 (L) 12.0 - 16.0 g/dL Final     HEMOGLOBIN   Date Value Ref Range Status   02/12/2025 8.5 (L) 11.7 - 15.5 g/dL Final     Hematocrit   Date Value Ref Range Status   05/15/2025 24.2 (L) 36.0 - 46.0 % Final   05/02/2025 23.7 (L) 36.0 - 46.0 % Final   05/01/2025 21.7 (L) 36.0 - 46.0 % Final     HEMATOCRIT   Date Value Ref Range Status   02/12/2025 27.0 (L) 35.0 - 45.0 % Final     MCV   Date/Time Value Ref Range Status   05/15/2025 01:58  (H) 80 - 100 fL Final   05/02/2025 06:43  (H) 80 - 100 fL Final   05/01/2025 06:09  (H) 80 - 100 fL Final   02/12/2025 10:34 AM 98.5 80.0 - 100.0 fL Final     MCH   Date/Time Value Ref Range Status   05/15/2025 01:58 PM 38.2 (H) 26.0 - 34.0 pg Final   05/02/2025 06:43 AM 36.7 (H) 26.0 - 34.0 pg Final   05/01/2025 06:09 AM 36.6 (H) 26.0 - 34.0 pg Final   02/12/2025 10:34 AM 31.0 27.0 - 33.0 pg Final     MCHC   Date/Time Value Ref Range Status   05/15/2025 01:58 PM 32.6 32.0 - 36.0 g/dL Final   05/02/2025 06:43 AM 32.1 32.0 - 36.0 g/dL Final   05/01/2025 06:09 AM 32.3 32.0 - 36.0 g/dL Final   02/12/2025 10:34 AM 31.5 (L) 32.0 - 36.0 g/dL Final     Comment:     For adults, a slight decrease in the calculated MCHC  value (in the range of 30 to 32 g/dL) is most likely  not clinically significant; however, it should be  interpreted with caution in correlation with other  red cell parameters and the patient's clinical  condition.       RDW   Date/Time Value Ref Range Status   05/15/2025 01:58 PM 16.1 (H) 11.5 - 14.5 % Final   05/02/2025 06:43 AM 19.1 (H) 11.5 - 14.5 % Final   05/01/2025 06:09 AM 19.7 (H) 11.5 - 14.5 % Final   02/12/2025 10:34 AM 16.9 (H) 11.0 - 15.0 % Final     Platelets   Date/Time Value  Ref Range Status   05/15/2025 01:58  150 - 450 x10*3/uL Final   05/02/2025 06:43  150 - 450 x10*3/uL Final   05/01/2025 06:09  150 - 450 x10*3/uL Final     PLATELET COUNT   Date/Time Value Ref Range Status   02/12/2025 10:34  140 - 400 Thousand/uL Final     MPV   Date/Time Value Ref Range Status   02/12/2025 10:34 AM 9.8 7.5 - 12.5 fL Final   10/19/2023 08:01 AM 9.2 7.5 - 11.5 fL Final     Neutrophils %   Date/Time Value Ref Range Status   04/30/2025 05:35 PM 43.5 40.0 - 80.0 % Final   04/17/2025 08:39 AM 40.7 40.0 - 80.0 % Final   03/20/2025 02:26 PM 32.2 40.0 - 80.0 % Final     Immature Granulocytes %, Automated   Date/Time Value Ref Range Status   05/15/2025 01:58 PM 7.8 (H) 0.0 - 0.9 % Final     Comment:     Immature Granulocyte Count (IG) includes promyelocytes, myelocytes and metamyelocytes but does not include bands. Percent differential counts (%) should be interpreted in the context of the absolute cell counts (cells/UL).   04/30/2025 05:35 PM 1.2 (H) 0.0 - 0.9 % Final     Comment:     Immature Granulocyte Count (IG) includes promyelocytes, myelocytes and metamyelocytes but does not include bands. Percent differential counts (%) should be interpreted in the context of the absolute cell counts (cells/UL).   04/17/2025 08:39 AM 0.8 0.0 - 0.9 % Final     Comment:     Immature Granulocyte Count (IG) includes promyelocytes, myelocytes and metamyelocytes but does not include bands. Percent differential counts (%) should be interpreted in the context of the absolute cell counts (cells/UL).     Lymphocytes %, Manual   Date/Time Value Ref Range Status   05/15/2025 01:58 PM 40.0 13.0 - 44.0 % Final     Lymphocytes %   Date/Time Value Ref Range Status   04/30/2025 05:35 PM 35.0 13.0 - 44.0 % Final   04/17/2025 08:39 AM 51.7 13.0 - 44.0 % Final   03/20/2025 02:26 PM 58.2 13.0 - 44.0 % Final     Monocytes %, Manual   Date/Time Value Ref Range Status   05/15/2025 01:58 PM 0.0 2.0 - 10.0 % Final      Monocytes %   Date/Time Value Ref Range Status   04/30/2025 05:35 PM 18.5 2.0 - 10.0 % Final   04/17/2025 08:39 AM 4.6 2.0 - 10.0 % Final   03/20/2025 02:26 PM 6.9 2.0 - 10.0 % Final     Eosinophils %, Manual   Date/Time Value Ref Range Status   05/15/2025 01:58 PM 4.0 0.0 - 6.0 % Final     Eosinophils %   Date/Time Value Ref Range Status   04/30/2025 05:35 PM 0.9 0.0 - 6.0 % Final   04/17/2025 08:39 AM 1.1 0.0 - 6.0 % Final   03/20/2025 02:26 PM 1.9 0.0 - 6.0 % Final     Basophils %, Manual   Date/Time Value Ref Range Status   05/15/2025 01:58 PM 1.0 0.0 - 2.0 % Final     Basophils %   Date/Time Value Ref Range Status   04/30/2025 05:35 PM 0.9 0.0 - 2.0 % Final   04/17/2025 08:39 AM 1.1 0.0 - 2.0 % Final   03/20/2025 02:26 PM 0.8 0.0 - 2.0 % Final     Neutrophils Absolute   Date/Time Value Ref Range Status   04/30/2025 05:35 PM 1.48 (L) 1.60 - 5.50 x10*3/uL Final     Comment:     Percent differential counts (%) should be interpreted in the context of the absolute cell counts (cells/uL).   04/17/2025 08:39 AM 1.06 (L) 1.60 - 5.50 x10*3/uL Final     Comment:     Percent differential counts (%) should be interpreted in the context of the absolute cell counts (cells/uL).   03/20/2025 02:26 PM 0.84 (L) 1.60 - 5.50 x10*3/uL Final     Comment:     Percent differential counts (%) should be interpreted in the context of the absolute cell counts (cells/uL).     Immature Granulocytes Absolute, Automated   Date/Time Value Ref Range Status   05/15/2025 01:58 PM 0.19 0.00 - 0.50 x10*3/uL Final   04/30/2025 05:35 PM 0.04 0.00 - 0.50 x10*3/uL Final   04/17/2025 08:39 AM 0.02 0.00 - 0.50 x10*3/uL Final     Lymphocytes Absolute   Date/Time Value Ref Range Status   04/30/2025 05:35 PM 1.19 0.80 - 3.00 x10*3/uL Final   04/17/2025 08:39 AM 1.35 0.80 - 3.00 x10*3/uL Final   03/20/2025 02:26 PM 1.52 0.80 - 3.00 x10*3/uL Final     Monocytes Absolute   Date/Time Value Ref Range Status   04/30/2025 05:35 PM 0.63 0.05 - 0.80 x10*3/uL  "Final   04/17/2025 08:39 AM 0.12 0.05 - 0.80 x10*3/uL Final   03/20/2025 02:26 PM 0.18 0.05 - 0.80 x10*3/uL Final     Eosinophils Absolute   Date/Time Value Ref Range Status   04/30/2025 05:35 PM 0.03 0.00 - 0.40 x10*3/uL Final   04/17/2025 08:39 AM 0.03 0.00 - 0.40 x10*3/uL Final   03/20/2025 02:26 PM 0.05 0.00 - 0.40 x10*3/uL Final     Eosinophils Absolute, Manual   Date/Time Value Ref Range Status   05/15/2025 01:58 PM 0.10 0.00 - 0.40 x10*3/uL Final     Basophils Absolute   Date/Time Value Ref Range Status   04/30/2025 05:35 PM 0.03 0.00 - 0.10 x10*3/uL Final   04/17/2025 08:39 AM 0.03 0.00 - 0.10 x10*3/uL Final     Comment:     Automated WBC differential has been confirmed by manual smear.   03/20/2025 02:26 PM 0.02 0.00 - 0.10 x10*3/uL Final     Comment:     Automated WBC differential has been confirmed by manual smear.     Basophils Absolute, Manual   Date/Time Value Ref Range Status   05/15/2025 01:58 PM 0.02 0.00 - 0.10 x10*3/uL Final       No components found for: \"PT\"  No results found for: \"APTT\"  Medication Documentation Review Audit       Reviewed by [place] RN (Registered Nurse) on 05/04/25 at 1215      Medication Order Taking? Sig Documenting Provider Last Dose Status   acetaminophen (Tylenol) 500 mg tablet [id number] Yes Take 2 tablets (1,000 mg) by mouth 3 times a day as needed for mild pain (1 - 3) or moderate pain (4 - 6). Historical Provider, MD Unknown Active   apixaban (Eliquis) 5 mg tablet [id number] Yes Take 2 tablets (10 mg) by mouth 2 times a day for 7 days, THEN 1 tablet (5 mg) 2 times a day. 10Mg (2pills) twice a day x 7 days then 5 mg twice a day. DEX Barcenas 4/30/2025 Morning Active   beclomethasone HFA (Qvar) 40 mcg/actuation inhaler [id number] Yes Inhale 1 Inhalation 2 times a day. Historical Provider, MD 4/30/2025 Morning Active   Blood glucose monitoring meter [id number]  Test two times daily. DEX Bolton  Active   blood sugar diagnostic strip " [id number]  1 each 2 times a day. Rhoda Santana, APRN-CNP  Active   brimonidine-timoloL (Combigan) 0.2-0.5 % ophthalmic solution [id number] Yes Administer 1 drop into both eyes every 12 hours. Tika Hackett MD 4/30/2025 Morning Active   calcium carbonate-vitamin D3 600 mg-10 mcg (400 unit) tablet [id number] Yes Take 3 tablets by mouth once daily. Tika Hackett MD 4/30/2025 Morning Active   cefadroxil (Duricef) 500 mg capsule [id number] Yes Take 1 capsule (500 mg) by mouth once daily for 4 days. Do not start before May 3, 2025. Mike Calvin MD  Active   denosumab (Xgeva) 120 mg/1.7 mL (70 mg/mL) injection [id number] Yes Inject 1.7 mL (120 mg) under the skin every 30 (thirty) days. Tika Hackett MD Past Month Active   diclofenac (Voltaren) 50 mg EC tablet [id number] Yes Take 1 tablet (50 mg) by mouth once daily in the morning. Do not crush, chew, or split. Tika Hackett MD 4/30/2025 Morning Active   diclofenac sodium (Voltaren Arthritis Pain) 1 % gel [id number] Yes Apply 4.5 inches (4 g) topically 2 times a day as needed. Tika Hackett MD Unknown Active   esomeprazole (NexIUM) 40 mg DR capsule [id number] Yes Take 1 capsule (40 mg) by mouth once daily in the morning. Take before meals. Tika Hackett MD 4/30/2025 Morning Active   furosemide (Lasix) 20 mg tablet [id number] Yes Take 1 tablet (20 mg) by mouth once daily. Please Take additional lasix 20 mg if you gained 2 or 3 pounds Mike Calvin MD  Active   inavolisib 3 mg tablet [id number] Yes Take 3 mg by mouth once daily. For 3 weeks then take 1 week off   Patient taking differently: Take 3 mg by mouth once daily in the evening. For 3 weeks then take 1 week off    Andrew Mcfarland MD 4/29/2025 Evening Active   inhalat.spacing dev,large mask (Pro Comfort Spacer-Adult Mask) spacer [id number] Yes 1 each every 4 hours if needed (shortness of breath, wheezing, cough). Naina Pisano, DO Unknown Active   isopropyl alcohoL 70 %  towelette [id number]  Test 2 times a day, clean finger prior to testing DEX Bolton  Active   LORazepam (Ativan) 0.5 mg tablet [id number] Yes Take 1 tablet (0.5 mg) by mouth every 2 hours if needed for anxiety (anxiety related to radiology tests) for up to 5 doses.   Patient not taking: Reported on 4/30/2025    DEX Bolton Not Taking Active   losartan-hydrochlorothiazide (Hyzaar) 100-12.5 mg tablet [id number] Yes Take 1 tablet by mouth once daily. Abraham Mohan DO 4/30/2025 Morning Active   mometasone (Elocon) 0.1 % lotion [id number] No Apply topically 2 times a day.   Patient not taking: Reported on 4/30/2025    Jimmie Nam MD Not Taking Active   multivitamin tablet [id number] Yes Take 1 tablet by mouth once daily. Historical Provider, MD 4/30/2025 Morning Active   neomycin-polymyxin-dexAMETHasone (Maxitrol) 3.5mg/mL-10,000 unit/mL-0.1 % ophthalmic suspension [id number] Yes Administer 1 drop into the left eye 4 times a day. Historical Provider, MD 4/30/2025 Noon Active   ondansetron (Zofran) 8 mg tablet [id number] Yes Take 1 tablet (8 mg) by mouth every 8 hours if needed for nausea or vomiting. Andrew Mcfarland MD Unknown Active   palbociclib (Ibrance) 125 mg tablet [id number] Yes Take 125 mg (1 tablet) by mouth once daily for three weeks, then one week off.  Swallow whole.  Do not crush, chew or split. Andrew Mcfarland MD 4/30/2025 Morning Active   pravastatin (Pravachol) 10 mg tablet [id number] Yes Take 1 tablet (10 mg) by mouth once daily. Abraham Mohan DO 4/30/2025 Morning Active   prochlorperazine (Compazine) 10 mg tablet [id number] Yes Take 1 tablet (10 mg) by mouth every 6 hours if needed for nausea or vomiting. Andrew Mcfarland MD Past Week Bedtime Active   Rhopressa 0.02 % drops opthalmic solution [id number] Yes Administer 1 drop into the right eye once daily at bedtime. Historical Provider, MD 4/29/2025 Bedtime Active   triamcinolone (Kenalog) 0.1 % cream [id number] Yes Apply  topically 2 times a day. 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 on 8/4--was told she had bilateral pleural effusions, which are not actually new  -right sided pleural effusion is already seen on PET scan done in 8/2023  -she had an echo done in June which was read as normal, [facility] did not perform a new one, cardiologist on 8/21 told the patient that she did not have CHF and that these effusions were likely malignant; she was started on low dose lasix which barely helped with the leg edema--and so dose was bumped up; she was recommended by pulmonology consult to undergo thoracentesis and she declined to have it done  -since effusions are not new, and actually were already noted over one year ago, a normal echo does NOT completely rule out well-compensated CHF  -PET done on 8/16/2024 reviewed--no concerning pulmonary nodule; moderate-to-large bilateral pleura effusions, right greater than left; no FDG avid mediastinal, hilar or axillary lymphadenopathy; s/p right axillary ike dissection; s/p right mastectomy and reconstruction; no FDG avid lymphadenopathy in the abdomen and pelvis; bilateral hydronephrosis; interval improvement of FDG avid widespread bone metastases throughout the axial and appendicular skeleton many of which are non FDG avid ; residual disease noted in bilateral femur (SUV 3.6), pelvis (SUV 2.4), bilateral humerus (SUV 3.3), bilateral clavicles (SUV 2/9)  -there is no FDG activity in her thorax/pleura, making the effusions  less likely to be of malignant origin, and given bilateral nature--more likely cardiac in origin  -she had thoracentesis done on 9/24/2024--with removal of 700 ml yellow fluid; cytology was negative  -here for interval followup  -CT scan done on 12/9/2024 reviewed--moderate bilateral pleural effusions with atelectasis, right greater than left; no concerning lung nodule; no mediastinal, hilar or axillary lymphadenopathy; postsurgical changes of right mastectomy with right breast implants;  moderate hydronephrosis left greater than right increased from prior exam; small to moderate volume ascites; new peritoneal nodular thickening (33 mm peritoneal implant in anterior deep pelvis); additional areas of peritoneal nodular thickening and enhancement in deep pelvis; no abdominopelvic lymphadenopathy; similar appearance of diffuse sclerotic lesions throughout the axial and appendicular skeleton; chronic bilateral rib fractures are noted; plate and screw fixation of the right humerus  -bone scan done on 12/9/2024 reviewed similar appearance of diffusely increased radiotracer uptake throughout the axial and appendicular skeleton corresponding with widespread sclerotic osseous lesions on CT  --labs done on 12/11/2024 included CBC, COMP, CA 27.29  -results reviewed--wbc 4.6, hgb 9.1 plt 211,000, creatinine 1.04, calcium 9.0, albumin 4.1 AST 18, ALT 12  CA 27.29 295  -benefits, risks, potential morbidity related to xgeva were reviewed with Arabella and she provided informed consent to proceed  -she went on to receive xgeva 120 mg subcutaneous  -as there is now evidence of progression of disease (new peritoneal metastases), she is failing AI, and I have recommended therapeutic switch  -since her tumor has the [id number] mutation, I have advised switching to faslodex + palbociclib + Inavolisib  -benefits, risks, potential morbidity related to faslodex + palbociclib + inavolisib were reviewed with Arabella and she signed informed consent to  proceed  -on days 1 and 15 (and then Q28 days) she will receive faslodex 500 mg intramuscular  -she will also take palbociclib 125 mg PO once daily for 21 days on then 7 days off  -she will also take inavolisib 9 mg PO once daily  -here for interval followup  -shortly after starting the new combination, she was admitted to [facility] from 1/21 to 1/23/2025 for mucositis and electrolyte abnormalities  -inavolisib was held  -she was then readmitted to [facility] from 2/5 to 2/72025 for shortness of breath, thoracentesis was done on the left side with removal of 750 ml of fluid but none was sent for cytology  -her mucositis eventually recovered  -she received inavoliisb 3 mg in hand a couple days ago and started it; mucositis has not returned but this morning she noticed in the mirror 2 maculopapular lesions--one on right cheek, one near left nares  -she was at physical therapy a couple weeks ago--and the therapist noted a slight asymmetry in her ability to rotate her ankles and asked Arabella if she has ever had a stroke--which quite alarmed her  -labs done on 2/20/2025 included CBC + COMP + CA 27.29  -results reviewed--creatinine 1.22, calcium 9.1, alk phos 44, AST 15, ALT 8, wbc 2.66, hgb 8.3, plt 218,000, , CA 27.29 295  -here for interval followup  -she has taken inavolisib 3 mg daily for an additional 2 weeks--has not had recurrent mucositis, no new dermatologic issues  -continues to take lasix 20 mg every other day--leg edema and shortness of breath improved and stable  -here for interval followup  -had a tooth extracted 2 days ago--tooth was also causing a canker sore on inside of her cheek right next to the tooth--so will need to hold xgeva today  -was recently hospitalized at [facility] for edema/fluid overload; hgb trended all the way down to 7.0; very sleepy during daytime  -labs done on 5/15/2025 included CBC +  COMP + CA 27.29  -results reviewed--wbc 2.4, hgb 7.9, plt 171,000, ANC 1220, creatinine 1.38,  calcium 8.2, alk phos 45, AST 13, total bili 0.3, ALT 7 CA 27.29 2322.6  -CT scan done on 5/15/2025 reviewed--moderate bilateral pleural effusions mildly decreased from prior; no concerning lung nodule; no mediastinal, hilar or axillary lymphadenopathy; hepatic lesions are overall stable after accounting for differences in contrast bolus timing; 11 mm right hepatic nodule; no new liver lesion; bilateral hydroureteronephrosis has improved likely a secondary sign of improved nonmeasurable tumor burden; small volume ascites in the deep pelvis has improved which is consistent with improved nonmeasurable tumor burden; interval decrease in size of dominant 23 mm peritoneal implant in the pelvis which has shifted and is now more inferiorly located due to decreased ascites; decreased but persistent nonmeasurable peritoneal nodular thickening is seen elsewhere in left upper quadrant; no abdominopelvic lymphadenopathy; diffuse predominantly sclerotic lesions are seen throughout the axial and appendicular skeleton with overall increased density of lesions; chronic bilateral rib fractures are again seen  -she will continue to take palbociclib 125 mg PO once daily for 21 days on then 7 days off  -she will continue to take inavolisib 3 mg PO once daily for 21 days on then 7 days off  -due for  faslodex (500 mg intramuscularly) today  -benefits, risks, potential morbidity related to faslodex were reviewed with Arabella and she provided informed consent to proceed  -she went on to receive faslodex 500 mg intramuscularly  -will see her again in 4 weeks     2) bone metastases  -secondary to metastatic breast cancer  -receives xgeva Q4 weeks     3) anemia  -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  -hgb slowly improving     4) hyperlipidemia  -on pravastatin     5) arthritis  -on tylenol PRN  -on diclofenac PRN  -received steroid injections into her knees and she feels so much better, so she wants to receive them  again     6) hypertension  -on amlodipine  -on losartan-HCTZ     7) GERD  -on nexium     8) glaucoma  -on combigan eyedrops     9) DVT  -has had chronic leg edema for years  -recently had an injection into her knee  -she saw pulmonologist for first time on 4/14/2025 who ordered doppler  -doppler showed acute occlusive DVT in gastrocnemius veins in calf vein; rest of right leg is negative for DVT; gastrocnemius vein thrombus is seen at mid calf; in left leg there is no evidence of acute DVT   -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--she should continue on eliquis for now (still on loading period- 10 mg BID), will repeat doppler in 6-8 weeks; given that she has stage IV cancer, likely will advise her to remain on eliquis long term, but perhaps to transition to maintenance dosing  -4/30/2025 doppler done in [place] ED showed in right leg no evidence of acute DVT; in left leg no evidence of acute DVT  -she is in need of refill of eliquis--will refill with 2.5 mg BID     Problem List Items Addressed This Visit           ICD-10-CM    Breast cancer metastasized to bone C50.919, C79.51            Andrew Mcfarland MD                                [1]   Family History  Problem Relation Name Age of Onset    Colon cancer Sister      Breast cancer Daughter

## 2025-05-16 NOTE — PROGRESS NOTES
[facility] Clinical Note  Patient Reassessment     Introduction  Arabella Springer is a 84 y.o. female who is on the specialty pharmacy service for management of: Oncology Core.      [facility] supplied medication: palbociclib 125 mg PO once daily on D1 - 21 of a 28 day cycle + inavolisib 3 mg PO once daily on D! - 21 of a 28 day cycle    Duration of therapy: Until drug toxicity or progression    The most recent encounter visit with the referring prescriber Andrew Mcfarland MD on 5/16/25 was reviewed.  Pharmacy will continue to collaborate in the care of this patient with the referring prescriber.    Discussion  Arabella was contacted on 5/16/2025 at 10:00 for a pharmacy visit with encounter number [id number] from:   [facility] [address] [facility]   [address]   [facility] [address]  [facility] [address]  Dept: [phone]  Dept Fax: [phone]  Arabella and Spouse consented to a/an In person visit, which was performed.    Efficacy  Patient has developed new symptoms of condition: No  Patient/caregiver feels medication is affecting the disease state: helping control disease    Goals  Provided education on goals and possible outcomes of therapy:  Adherence with therapy  Timely completion of appropriate labs  Timely and appropriate follow up with provider  Identify and address medication interactions with presciption medications, OTC medications and supplements  Optimize or maintain quality of life  Oncology: Prolong life/No disease progression  Manage side effects (ex: nausea/vomiting, constipation, fatigue) in conjunction with care team  Patient has documented target(s) for goals of therapy: Yes    Tolerance  Patient has experienced side effects from this medication: Yes - some minor, limited oral mucositis continues -- dental provider gave her a mouth rinse to try.  Other AEs (derm, GI) have essentially cleared up.  Changes to current therapy regimen: No    The follow-up  timeline was discussed. Every person responds to and reacts to therapy differently. Patient should be assessed for efficacy and tolerability in approximately: 6 months    Adherence  Patient Information  Informant: Self (Patient)  Demonstrates Understanding of Importance of Adherence: Yes  Does the patient have any barriers to self-administration (including physical and mental?): No  Support Network for Adherence: Family Member, Healthcare Provider  Medication Information  Medication:  (Both inavolisib and palbocicib reassessed.)  Patient Reported Missed Doses in the Last 4 Weeks: 0  Estimated Medication Adherence Level: Good  Adherence Estimation Source: Claims history  Barriers to Adherence: No Problems identified   The importance of adherence was discussed and patient/caregiver was advised to take the medication as prescribed by their provider. Encouraged patient/caregiver to call physician's office or specialty pharmacy if they have a question regarding a missed dose.    General Assessment  Changes to home medications, OTCs or supplements: No  Current Medications[1]  Reported new allergies: No  Reported new medical conditions: No  Additional monitoring reviewed: Oncology - CBC-diff:   Lab Results   Component Value Date    WBC 2.4 (L) 05/15/2025    RBC 2.07 (L) 05/15/2025    HGB 7.9 (L) 05/15/2025    HCT 24.2 (L) 05/15/2025     (H) 05/15/2025    MCHC 32.6 05/15/2025     05/15/2025    RDW 16.1 (H) 05/15/2025    NEUTOPHILPCT 43.5 04/30/2025    IGPCT 7.8 (H) 05/15/2025    LYMPHOPCT 40.0 05/15/2025    MONOPCT 0.0 05/15/2025    EOSPCT 4.0 05/15/2025    BASOPCT 1.0 05/15/2025    NEUTROABS 1.48 (L) 04/30/2025    LYMPHSABS 1.19 04/30/2025    MONOSABS 0.63 04/30/2025    EOSABS 0.10 05/15/2025    BASOSABS 0.02 05/15/2025    and CMP:   Lab Results   Component Value Date    GLUCOSE 114 (H) 05/15/2025     (L) 05/15/2025    K 4.6 05/15/2025    CL 99 05/15/2025    CO2 26 05/15/2025    ANIONGAP 12 05/15/2025     BUN 28 (H) 05/15/2025    CREATININE 1.38 (H) 05/15/2025    GFRF 56 (A) 09/28/2023    CALCIUM 8.2 (L) 05/15/2025    ALBUMIN 3.9 05/15/2025    ALKPHOS 45 05/15/2025    PROT 6.3 (L) 05/15/2025    AST 13 05/15/2025    BILITOT 0.3 05/15/2025    ALT 7 05/15/2025     Is laboratory follow up needed? Yes - routine per clinic    Advised to contact the pharmacy if there are any changes to the patient's medication list, including prescriptions, OTC medications, herbal products, or supplements.    Impression/Plan  This patient has been identified as high risk due to Geriatric (over 65 years of age).  The following action was taken: N/A.    QOL/Patient Satisfaction  Rate your quality of life on scale of 1-10: 8  Rate your satisfaction with  Specialty Pharmacy on scale of 1-10: 9    Provided contact information ([phone]) for [facility] and reviewed dispensing process, refill timeline and patient management follow up. Confirmed understanding of education conducted during assessment. All questions and concerns were addressed and patient/caregiver was encouraged to reach out for additional questions or concerns.    Based on the patient's diagnosis, medication list, progress towards goals, adherence, tolerance, and medication list, medication remains appropriate: Therapy remains appropriate (I attest)    Dario Arredondo, [facility], MS, BCOP  Clinical Pharmacy Specialist - Ambulatory Oncology         [1]   Current Outpatient Medications   Medication Sig Dispense Refill    acetaminophen (Tylenol) 500 mg tablet Take 2 tablets (1,000 mg) by mouth 3 times a day as needed for mild pain (1 - 3) or moderate pain (4 - 6).      amoxicillin (Amoxil) 500 mg capsule Take 1 capsule (500 mg) by mouth 2 times a day.      apixaban (Eliquis) 2.5 mg tablet Take 1 tablet (2.5 mg) by mouth 2 times a day. 60 tablet 3    beclomethasone HFA (Qvar) 40 mcg/actuation inhaler Inhale 1 Inhalation 2 times a day. (Patient not taking:  Reported on 5/16/2025)      Blood glucose monitoring meter Test two times daily. (Patient not taking: Reported on 5/16/2025) 1 each 0    blood sugar diagnostic strip 1 each 2 times a day. (Patient not taking: Reported on 5/16/2025) 100 each 0    brimonidine-timoloL (Combigan) 0.2-0.5 % ophthalmic solution Administer 1 drop into both eyes every 12 hours.      calcium carbonate-vitamin D3 600 mg-10 mcg (400 unit) tablet Take 3 tablets by mouth once daily.      denosumab (Xgeva) 120 mg/1.7 mL (70 mg/mL) injection Inject 1.7 mL (120 mg) under the skin every 30 (thirty) days.      diclofenac (Voltaren) 50 mg EC tablet Take 1 tablet (50 mg) by mouth once daily in the morning. Do not crush, chew, or split.      diclofenac sodium (Voltaren Arthritis Pain) 1 % gel Apply 4.5 inches (4 g) topically 2 times a day as needed.      esomeprazole (NexIUM) 40 mg DR capsule Take 1 capsule (40 mg) by mouth once daily in the morning. Take before meals.      furosemide (Lasix) 20 mg tablet Take 1 tablet (20 mg) by mouth once daily. Please Take additional lasix 20 mg if you gained 2 or 3 pounds 45 tablet 3    inavolisib 3 mg tablet Take 3 mg by mouth once daily. For 3 weeks then take 1 week off 21 tablet 4    inhalat.spacing dev,large mask (Pro Comfort Spacer-Adult Mask) spacer 1 each every 4 hours if needed (shortness of breath, wheezing, cough). (Patient not taking: Reported on 5/16/2025) 1 each 0    isopropyl alcohoL 70 % towelette Test 2 times a day, clean finger prior to testing (Patient not taking: Reported on 5/16/2025) 1 each 0    LORazepam (Ativan) 0.5 mg tablet Take 1 tablet (0.5 mg) by mouth every 2 hours if needed for anxiety (anxiety related to radiology tests) for up to 5 doses. 5 tablet 0    losartan-hydrochlorothiazide (Hyzaar) 100-12.5 mg tablet Take 1 tablet by mouth once daily. 90 tablet 0    mometasone (Elocon) 0.1 % lotion Apply topically 2 times a day. (Patient not taking: Reported on 4/30/2025) 30 mL 0     multivitamin tablet Take 1 tablet by mouth once daily.      neomycin-polymyxin-dexAMETHasone (Maxitrol) 3.5mg/mL-10,000 unit/mL-0.1 % ophthalmic suspension Administer 1 drop into the left eye 4 times a day.      ondansetron (Zofran) 8 mg tablet Take 1 tablet (8 mg) by mouth every 8 hours if needed for nausea or vomiting. 60 tablet 5    palbociclib (Ibrance) 125 mg tablet Take 125 mg (1 tablet) by mouth once daily for three weeks, then one week off.  Swallow whole.  Do not crush, chew or split. 21 tablet 2    pravastatin (Pravachol) 10 mg tablet Take 1 tablet (10 mg) by mouth once daily. 90 tablet 0    prochlorperazine (Compazine) 10 mg tablet Take 1 tablet (10 mg) by mouth every 6 hours if needed for nausea or vomiting. 60 tablet 5    Rhopressa 0.02 % drops opthalmic solution Administer 1 drop into the right eye once daily at bedtime.      triamcinolone (Kenalog) 0.1 % cream Apply topically 2 times a day. (Patient not taking: Reported on 5/16/2025) 453.6 g 1     No current facility-administered medications for this visit.     Facility-Administered Medications Ordered in Other Visits   Medication Dose Route Frequency Provider Last Rate Last Admin    albuterol 2.5 mg /3 mL (0.083 %) nebulizer solution 3 mL  3 mL nebulization PRN Andrew Mcfarland MD        dextrose 5 % in water (D5W) bolus 500 mL  500 mL intravenous PRN Andrew Mcfarland MD        diphenhydrAMINE (BENADryl) injection 50 mg  50 mg intravenous PRN Andrew Mcfarland MD        EPINEPHrine (Epipen) injection syringe 0.3 mg  0.3 mg intramuscular q5 min PRN Andrew Mcfarland MD        famotidine PF (Pepcid) injection 20 mg  20 mg intravenous Once PRN Andrew Mcfarland MD        methylPREDNISolone sod succinate (SOLU-Medrol) 40 mg/mL injection 40 mg  40 mg intravenous PRN Andrew Mcfarland MD        sodium chloride 0.9 % bolus 500 mL  500 mL intravenous PRN Andrew Mcfarland MD

## 2025-05-17 ENCOUNTER — SPECIALTY PHARMACY (OUTPATIENT)
Dept: PHARMACY | Facility: CLINIC | Age: 85
End: 2025-05-17

## 2025-05-20 ENCOUNTER — SPECIALTY PHARMACY (OUTPATIENT)
Dept: PHARMACY | Facility: CLINIC | Age: 85
End: 2025-05-20

## 2025-05-20 ENCOUNTER — HOME CARE VISIT (OUTPATIENT)
Dept: HOME HEALTH SERVICES | Facility: HOME HEALTH | Age: 85
End: 2025-05-20
Payer: MEDICARE

## 2025-05-20 VITALS
HEART RATE: 79 BPM | TEMPERATURE: 98.2 F | OXYGEN SATURATION: 97 % | DIASTOLIC BLOOD PRESSURE: 64 MMHG | SYSTOLIC BLOOD PRESSURE: 120 MMHG | RESPIRATION RATE: 16 BRPM

## 2025-05-20 PROCEDURE — G0299 HHS/HOSPICE OF RN EA 15 MIN: HCPCS | Mod: HHH

## 2025-05-20 RX ORDER — LAMOTRIGINE 25 MG/1
500 TABLET ORAL ONCE
OUTPATIENT
Start: 2025-06-13

## 2025-05-20 RX ORDER — FAMOTIDINE 10 MG/ML
20 INJECTION, SOLUTION INTRAVENOUS ONCE AS NEEDED
OUTPATIENT
Start: 2025-06-13

## 2025-05-20 RX ORDER — DIPHENHYDRAMINE HYDROCHLORIDE 50 MG/ML
50 INJECTION, SOLUTION INTRAMUSCULAR; INTRAVENOUS AS NEEDED
OUTPATIENT
Start: 2025-06-13

## 2025-05-20 RX ORDER — ALBUTEROL SULFATE 0.83 MG/ML
3 SOLUTION RESPIRATORY (INHALATION) AS NEEDED
OUTPATIENT
Start: 2025-06-13

## 2025-05-20 RX ORDER — EPINEPHRINE 0.3 MG/.3ML
0.3 INJECTION SUBCUTANEOUS EVERY 5 MIN PRN
OUTPATIENT
Start: 2025-06-13

## 2025-05-20 ASSESSMENT — ENCOUNTER SYMPTOMS
ENDOCRINE NEGATIVE: 1
HEMATOLOGIC/LYMPHATIC NEGATIVE: 1
GASTROINTESTINAL NEGATIVE: 1
LEG SWELLING: 1
EXTREMITY WEAKNESS: 1
RESPIRATORY NEGATIVE: 1
FATIGUE: 1
EYES NEGATIVE: 1
PSYCHIATRIC NEGATIVE: 1

## 2025-05-21 ENCOUNTER — PATIENT OUTREACH (OUTPATIENT)
Dept: PRIMARY CARE | Facility: CLINIC | Age: 85
End: 2025-05-21
Payer: MEDICARE

## 2025-05-21 ENCOUNTER — OFFICE VISIT (OUTPATIENT)
Dept: WOUND CARE | Facility: CLINIC | Age: 85
End: 2025-05-21
Payer: MEDICARE

## 2025-05-21 ENCOUNTER — PHARMACY VISIT (OUTPATIENT)
Dept: PHARMACY | Facility: CLINIC | Age: 85
End: 2025-05-21
Payer: COMMERCIAL

## 2025-05-21 PROCEDURE — 11042 DBRDMT SUBQ TIS 1ST 20SQCM/<: CPT

## 2025-05-21 NOTE — PROGRESS NOTES
Confirmation of at least 2 patient identifiers.    Completed telephonic follow-up with patient approximately 14 days post discharge, no PCP follow up.   Spoke to patient during outreach call.    Patient reports feeling: Back to baseline    Patient has questions or concerns about medications: No    Have all prescribed medications been filled? Yes    Patient has necessary resources to manage their care? Yes    Patient has questions or concerns? No    Next care management follow-up approximately within one month.  Care  information provided to patient.  Arabella gets HHC every other day for wound and goes to wound care every Wednesday to keep close eye on leg wound, she is also with dental issues so will not follow with PCP right now since so many appts.

## 2025-05-23 ENCOUNTER — HOME CARE VISIT (OUTPATIENT)
Dept: HOME HEALTH SERVICES | Facility: HOME HEALTH | Age: 85
End: 2025-05-23
Payer: MEDICARE

## 2025-05-23 ENCOUNTER — CLINICAL SUPPORT (OUTPATIENT)
Dept: WOUND CARE | Facility: CLINIC | Age: 85
End: 2025-05-23
Payer: MEDICARE

## 2025-05-23 PROCEDURE — 99212 OFFICE O/P EST SF 10 MIN: CPT

## 2025-05-23 ASSESSMENT — ENCOUNTER SYMPTOMS
MUSCLE WEAKNESS: 1
DENIES PAIN: 1
APPETITE LEVEL: FAIR
LOWER EXTREMITY EDEMA: 1

## 2025-05-28 ENCOUNTER — HOME CARE VISIT (OUTPATIENT)
Dept: HOME HEALTH SERVICES | Facility: HOME HEALTH | Age: 85
End: 2025-05-28
Payer: MEDICARE

## 2025-05-28 ENCOUNTER — OFFICE VISIT (OUTPATIENT)
Dept: WOUND CARE | Facility: CLINIC | Age: 85
End: 2025-05-28
Payer: MEDICARE

## 2025-05-28 DIAGNOSIS — L03.90 WOUND CELLULITIS: Primary | ICD-10-CM

## 2025-05-28 PROCEDURE — 11042 DBRDMT SUBQ TIS 1ST 20SQCM/<: CPT

## 2025-05-30 ENCOUNTER — HOME CARE VISIT (OUTPATIENT)
Dept: HOME HEALTH SERVICES | Facility: HOME HEALTH | Age: 85
End: 2025-05-30
Payer: MEDICARE

## 2025-06-01 LAB
BACTERIA SPEC AEROBE CULT: ABNORMAL
BACTERIA SPEC ANAEROBE CULT: ABNORMAL

## 2025-06-02 ENCOUNTER — HOME CARE VISIT (OUTPATIENT)
Dept: HOME HEALTH SERVICES | Facility: HOME HEALTH | Age: 85
End: 2025-06-02
Payer: MEDICARE

## 2025-06-02 PROCEDURE — RXMED WILLOW AMBULATORY MEDICATION CHARGE

## 2025-06-03 ENCOUNTER — PHARMACY VISIT (OUTPATIENT)
Dept: PHARMACY | Facility: CLINIC | Age: 85
End: 2025-06-03
Payer: COMMERCIAL

## 2025-06-03 LAB
BACTERIA SPEC AEROBE CULT: ABNORMAL
BACTERIA SPEC ANAEROBE CULT: ABNORMAL

## 2025-06-04 ENCOUNTER — OFFICE VISIT (OUTPATIENT)
Dept: WOUND CARE | Facility: CLINIC | Age: 85
End: 2025-06-04
Payer: MEDICARE

## 2025-06-04 PROCEDURE — 11042 DBRDMT SUBQ TIS 1ST 20SQCM/<: CPT

## 2025-06-05 ENCOUNTER — HOSPITAL ENCOUNTER (INPATIENT)
Age: 85
LOS: 1 days | Discharge: HOME HEALTH | DRG: 683 | End: 2025-06-07
Attending: INTERNAL MEDICINE | Admitting: INTERNAL MEDICINE
Payer: MEDICARE

## 2025-06-05 ENCOUNTER — APPOINTMENT (OUTPATIENT)
Dept: GENERAL RADIOLOGY | Age: 85
DRG: 683 | End: 2025-06-05
Payer: MEDICARE

## 2025-06-05 DIAGNOSIS — K80.20 CALCULUS OF GALLBLADDER WITHOUT CHOLECYSTITIS WITHOUT OBSTRUCTION: ICD-10-CM

## 2025-06-05 DIAGNOSIS — E83.42 HYPOMAGNESEMIA: ICD-10-CM

## 2025-06-05 DIAGNOSIS — R55 SYNCOPE AND COLLAPSE: Primary | ICD-10-CM

## 2025-06-05 DIAGNOSIS — N13.30 HYDRONEPHROSIS, UNSPECIFIED HYDRONEPHROSIS TYPE: ICD-10-CM

## 2025-06-05 DIAGNOSIS — J90 BILATERAL PLEURAL EFFUSION: ICD-10-CM

## 2025-06-05 DIAGNOSIS — D75.89 MACROCYTOSIS: ICD-10-CM

## 2025-06-05 DIAGNOSIS — N17.9 AKI (ACUTE KIDNEY INJURY): ICD-10-CM

## 2025-06-05 DIAGNOSIS — E83.51 HYPOCALCEMIA: ICD-10-CM

## 2025-06-05 DIAGNOSIS — D64.9 ANEMIA, UNSPECIFIED TYPE: ICD-10-CM

## 2025-06-05 LAB
ETHANOL PERCENT: NORMAL G/DL
ETHANOLAMINE SERPL-MCNC: <10 MG/DL (ref 0–0.08)
LACTATE BLDV-SCNC: 1.4 MMOL/L (ref 0.5–2.2)

## 2025-06-05 PROCEDURE — 99285 EMERGENCY DEPT VISIT HI MDM: CPT

## 2025-06-05 PROCEDURE — 83735 ASSAY OF MAGNESIUM: CPT

## 2025-06-05 PROCEDURE — 93005 ELECTROCARDIOGRAM TRACING: CPT | Performed by: PHYSICIAN ASSISTANT

## 2025-06-05 PROCEDURE — 82077 ASSAY SPEC XCP UR&BREATH IA: CPT

## 2025-06-05 PROCEDURE — 83605 ASSAY OF LACTIC ACID: CPT

## 2025-06-05 PROCEDURE — 84484 ASSAY OF TROPONIN QUANT: CPT

## 2025-06-05 PROCEDURE — 36415 COLL VENOUS BLD VENIPUNCTURE: CPT

## 2025-06-05 PROCEDURE — 85025 COMPLETE CBC W/AUTO DIFF WBC: CPT

## 2025-06-05 PROCEDURE — 80053 COMPREHEN METABOLIC PANEL: CPT

## 2025-06-05 RX ORDER — BACITRACIN ZINC 500 [USP'U]/G
OINTMENT TOPICAL ONCE
Status: DISCONTINUED | OUTPATIENT
Start: 2025-06-05 | End: 2025-06-07 | Stop reason: HOSPADM

## 2025-06-05 ASSESSMENT — ENCOUNTER SYMPTOMS
NAUSEA: 1
SORE THROAT: 0
ABDOMINAL PAIN: 0
BLOOD IN STOOL: 0
SHORTNESS OF BREATH: 0
BACK PAIN: 0
CONSTIPATION: 0
VOMITING: 1
DIARRHEA: 0

## 2025-06-06 ENCOUNTER — APPOINTMENT (OUTPATIENT)
Dept: MRI IMAGING | Age: 85
DRG: 683 | End: 2025-06-06
Payer: MEDICARE

## 2025-06-06 ENCOUNTER — APPOINTMENT (OUTPATIENT)
Dept: GENERAL RADIOLOGY | Age: 85
DRG: 683 | End: 2025-06-06
Payer: MEDICARE

## 2025-06-06 ENCOUNTER — APPOINTMENT (OUTPATIENT)
Dept: CT IMAGING | Age: 85
DRG: 683 | End: 2025-06-06
Payer: MEDICARE

## 2025-06-06 PROBLEM — R33.9 URINARY RETENTION: Status: ACTIVE | Noted: 2025-06-06

## 2025-06-06 PROBLEM — N13.30 HYDRONEPHROSIS: Status: ACTIVE | Noted: 2025-06-06

## 2025-06-06 PROBLEM — R55 SYNCOPE AND COLLAPSE: Status: ACTIVE | Noted: 2025-06-06

## 2025-06-06 LAB
ABO/RH: NORMAL
ALBUMIN SERPL-MCNC: 3.5 G/DL (ref 3.5–4.6)
ALBUMIN SERPL-MCNC: 3.7 G/DL (ref 3.5–4.6)
ALP SERPL-CCNC: 58 U/L (ref 40–130)
ALP SERPL-CCNC: 60 U/L (ref 40–130)
ALT SERPL-CCNC: 7 U/L (ref 0–33)
ALT SERPL-CCNC: 7 U/L (ref 0–33)
ANION GAP SERPL CALCULATED.3IONS-SCNC: 14 MEQ/L (ref 9–15)
ANION GAP SERPL CALCULATED.3IONS-SCNC: 16 MEQ/L (ref 9–15)
ANISOCYTOSIS BLD QL SMEAR: ABNORMAL
ANTIBODY SCREEN: NORMAL
APTT PPP: 36.2 SEC (ref 24.4–36.8)
AST SERPL-CCNC: 15 U/L (ref 0–35)
AST SERPL-CCNC: 16 U/L (ref 0–35)
BACTERIA URNS QL MICRO: NEGATIVE /HPF
BASOPHILS # BLD: 0 K/UL (ref 0–0.2)
BASOPHILS # BLD: 0 K/UL (ref 0–0.2)
BASOPHILS NFR BLD: 0.7 %
BASOPHILS NFR BLD: 1 %
BILIRUB SERPL-MCNC: <0.2 MG/DL (ref 0.2–0.7)
BILIRUB SERPL-MCNC: <0.2 MG/DL (ref 0.2–0.7)
BILIRUB UR QL STRIP: NEGATIVE
BNP BLD-MCNC: 755 PG/ML
BUN SERPL-MCNC: 33 MG/DL (ref 8–23)
BUN SERPL-MCNC: 39 MG/DL (ref 8–23)
CALCIUM SERPL-MCNC: 7.7 MG/DL (ref 8.5–9.9)
CALCIUM SERPL-MCNC: 8.2 MG/DL (ref 8.5–9.9)
CHLORIDE SERPL-SCNC: 100 MEQ/L (ref 95–107)
CHLORIDE SERPL-SCNC: 97 MEQ/L (ref 95–107)
CK SERPL-CCNC: 228 U/L (ref 0–170)
CLARITY UR: CLEAR
CO2 SERPL-SCNC: 20 MEQ/L (ref 20–31)
CO2 SERPL-SCNC: 22 MEQ/L (ref 20–31)
COLOR UR: YELLOW
CREAT SERPL-MCNC: 1.56 MG/DL (ref 0.5–0.9)
CREAT SERPL-MCNC: 1.74 MG/DL (ref 0.5–0.9)
CREAT UR-MCNC: 51.4 MG/DL
EKG ATRIAL RATE: 87 BPM
EKG ATRIAL RATE: 90 BPM
EKG DIAGNOSIS: NORMAL
EKG DIAGNOSIS: NORMAL
EKG P AXIS: 27 DEGREES
EKG P AXIS: 56 DEGREES
EKG P-R INTERVAL: 162 MS
EKG P-R INTERVAL: 164 MS
EKG Q-T INTERVAL: 358 MS
EKG Q-T INTERVAL: 370 MS
EKG QRS DURATION: 76 MS
EKG QRS DURATION: 82 MS
EKG QTC CALCULATION (BAZETT): 437 MS
EKG QTC CALCULATION (BAZETT): 445 MS
EKG R AXIS: 10 DEGREES
EKG R AXIS: 31 DEGREES
EKG T AXIS: 20 DEGREES
EKG T AXIS: 33 DEGREES
EKG VENTRICULAR RATE: 87 BPM
EKG VENTRICULAR RATE: 90 BPM
EOSINOPHIL # BLD: 0 K/UL (ref 0–0.7)
EOSINOPHIL # BLD: 0.1 K/UL (ref 0–0.7)
EOSINOPHIL NFR BLD: 0.3 %
EOSINOPHIL NFR BLD: 1.4 %
EPI CELLS #/AREA URNS AUTO: ABNORMAL /HPF (ref 0–5)
ERYTHROCYTE [DISTWIDTH] IN BLOOD BY AUTOMATED COUNT: 13.9 % (ref 11.5–14.5)
ERYTHROCYTE [DISTWIDTH] IN BLOOD BY AUTOMATED COUNT: 14.2 % (ref 11.5–14.5)
GLOBULIN SER CALC-MCNC: 2.8 G/DL (ref 2.3–3.5)
GLOBULIN SER CALC-MCNC: 2.9 G/DL (ref 2.3–3.5)
GLUCOSE SERPL-MCNC: 173 MG/DL (ref 70–99)
GLUCOSE SERPL-MCNC: 197 MG/DL (ref 70–99)
GLUCOSE UR STRIP-MCNC: NEGATIVE MG/DL
HCT VFR BLD AUTO: 23.5 % (ref 37–47)
HCT VFR BLD AUTO: 23.5 % (ref 37–47)
HGB BLD-MCNC: 8 G/DL (ref 12–16)
HGB BLD-MCNC: 8 G/DL (ref 12–16)
HGB UR QL STRIP: ABNORMAL
HYALINE CASTS #/AREA URNS AUTO: ABNORMAL /HPF (ref 0–5)
HYPOCHROMIA BLD QL SMEAR: ABNORMAL
INR PPP: 1.3
KETONES UR STRIP-MCNC: NEGATIVE MG/DL
LEUKOCYTE ESTERASE UR QL STRIP: NEGATIVE
LYMPHOCYTES # BLD: 0.5 K/UL (ref 1–4.8)
LYMPHOCYTES # BLD: 1.1 K/UL (ref 1–4.8)
LYMPHOCYTES NFR BLD: 14 %
LYMPHOCYTES NFR BLD: 25.7 %
MACROCYTES BLD QL SMEAR: ABNORMAL
MACROCYTES BLD QL SMEAR: ABNORMAL
MAGNESIUM SERPL-MCNC: 1 MG/DL (ref 1.7–2.4)
MAGNESIUM SERPL-MCNC: 2 MG/DL (ref 1.7–2.4)
MCH RBC QN AUTO: 38.1 PG (ref 27–31.3)
MCH RBC QN AUTO: 38.6 PG (ref 27–31.3)
MCHC RBC AUTO-ENTMCNC: 34 % (ref 33–37)
MCHC RBC AUTO-ENTMCNC: 34 % (ref 33–37)
MCV RBC AUTO: 111.9 FL (ref 79.4–94.8)
MCV RBC AUTO: 113.5 FL (ref 79.4–94.8)
MICROALBUMIN UR-MCNC: <1.2 MG/DL
MICROALBUMIN/CREAT UR-RTO: NORMAL MG/G (ref 0–30)
MONOCYTES # BLD: 0 K/UL (ref 0.2–0.8)
MONOCYTES # BLD: 0.3 K/UL (ref 0.2–0.8)
MONOCYTES NFR BLD: 1 %
MONOCYTES NFR BLD: 7.3 %
NEUTROPHILS # BLD: 2.7 K/UL (ref 1.4–6.5)
NEUTROPHILS # BLD: 3.2 K/UL (ref 1.4–6.5)
NEUTS SEG NFR BLD: 64 %
NEUTS SEG NFR BLD: 84 %
NITRITE UR QL STRIP: NEGATIVE
PERFORMED ON: ABNORMAL
PH UR STRIP: 5.5 [PH] (ref 5–9)
PLATELET # BLD AUTO: 314 K/UL (ref 130–400)
PLATELET # BLD AUTO: 330 K/UL (ref 130–400)
PLATELET BLD QL SMEAR: ADEQUATE
PLATELET BLD QL SMEAR: ADEQUATE
POC CREATININE: 1.9 MG/DL (ref 0.6–1.2)
POC SAMPLE TYPE: ABNORMAL
POIKILOCYTOSIS BLD QL SMEAR: ABNORMAL
POLYCHROMASIA BLD QL SMEAR: ABNORMAL
POTASSIUM SERPL-SCNC: 4.4 MEQ/L (ref 3.4–4.9)
POTASSIUM SERPL-SCNC: 4.4 MEQ/L (ref 3.4–4.9)
PROT SERPL-MCNC: 6.3 G/DL (ref 6.3–8)
PROT SERPL-MCNC: 6.6 G/DL (ref 6.3–8)
PROT UR STRIP-MCNC: NEGATIVE MG/DL
PROTHROMBIN TIME: 17.1 SEC (ref 12.3–14.9)
RBC # BLD AUTO: 2.07 M/UL (ref 4.2–5.4)
RBC # BLD AUTO: 2.1 M/UL (ref 4.2–5.4)
RBC #/AREA URNS AUTO: ABNORMAL /HPF (ref 0–5)
REASON FOR REJECTION: NORMAL
REASON FOR REJECTION: NORMAL
REJECTED TEST: NORMAL
REJECTED TEST: NORMAL
SLIDE REVIEW: ABNORMAL
SLIDE REVIEW: ABNORMAL
SMUDGE CELLS BLD QL SMEAR: 14.4
SODIUM SERPL-SCNC: 133 MEQ/L (ref 135–144)
SODIUM SERPL-SCNC: 136 MEQ/L (ref 135–144)
SP GR UR STRIP: 1.02 (ref 1–1.03)
STOMATOCYTES BLD QL SMEAR: ABNORMAL
TROPONIN, HIGH SENSITIVITY: 33 NG/L (ref 0–19)
TROPONIN, HIGH SENSITIVITY: 44 NG/L (ref 0–19)
TROPONIN, HIGH SENSITIVITY: 47 NG/L (ref 0–19)
URINE REFLEX TO CULTURE: ABNORMAL
UROBILINOGEN UR STRIP-ACNC: 0.2 E.U./DL
WBC # BLD AUTO: 3.8 K/UL (ref 4.8–10.8)
WBC # BLD AUTO: 4.2 K/UL (ref 4.8–10.8)
WBC #/AREA URNS AUTO: ABNORMAL /HPF (ref 0–5)

## 2025-06-06 PROCEDURE — 93010 ELECTROCARDIOGRAM REPORT: CPT | Performed by: INTERNAL MEDICINE

## 2025-06-06 PROCEDURE — 80053 COMPREHEN METABOLIC PANEL: CPT

## 2025-06-06 PROCEDURE — 82043 UR ALBUMIN QUANTITATIVE: CPT

## 2025-06-06 PROCEDURE — 70486 CT MAXILLOFACIAL W/O DYE: CPT

## 2025-06-06 PROCEDURE — 83880 ASSAY OF NATRIURETIC PEPTIDE: CPT

## 2025-06-06 PROCEDURE — 82550 ASSAY OF CK (CPK): CPT

## 2025-06-06 PROCEDURE — 74177 CT ABD & PELVIS W/CONTRAST: CPT

## 2025-06-06 PROCEDURE — 6360000002 HC RX W HCPCS

## 2025-06-06 PROCEDURE — 97162 PT EVAL MOD COMPLEX 30 MIN: CPT

## 2025-06-06 PROCEDURE — 81001 URINALYSIS AUTO W/SCOPE: CPT

## 2025-06-06 PROCEDURE — 95816 EEG AWAKE AND DROWSY: CPT

## 2025-06-06 PROCEDURE — APPSS45 APP SPLIT SHARED TIME 31-45 MINUTES: Performed by: NURSE PRACTITIONER

## 2025-06-06 PROCEDURE — 99223 1ST HOSP IP/OBS HIGH 75: CPT | Performed by: PSYCHIATRY & NEUROLOGY

## 2025-06-06 PROCEDURE — 82570 ASSAY OF URINE CREATININE: CPT

## 2025-06-06 PROCEDURE — 85730 THROMBOPLASTIN TIME PARTIAL: CPT

## 2025-06-06 PROCEDURE — 6370000000 HC RX 637 (ALT 250 FOR IP): Performed by: INTERNAL MEDICINE

## 2025-06-06 PROCEDURE — 93005 ELECTROCARDIOGRAM TRACING: CPT

## 2025-06-06 PROCEDURE — 99221 1ST HOSP IP/OBS SF/LOW 40: CPT | Performed by: PHYSICIAN ASSISTANT

## 2025-06-06 PROCEDURE — 2580000003 HC RX 258: Performed by: PHYSICIAN ASSISTANT

## 2025-06-06 PROCEDURE — 70450 CT HEAD/BRAIN W/O DYE: CPT

## 2025-06-06 PROCEDURE — 2500000003 HC RX 250 WO HCPCS

## 2025-06-06 PROCEDURE — 72125 CT NECK SPINE W/O DYE: CPT

## 2025-06-06 PROCEDURE — 36415 COLL VENOUS BLD VENIPUNCTURE: CPT

## 2025-06-06 PROCEDURE — 83735 ASSAY OF MAGNESIUM: CPT

## 2025-06-06 PROCEDURE — 71275 CT ANGIOGRAPHY CHEST: CPT

## 2025-06-06 PROCEDURE — 96365 THER/PROPH/DIAG IV INF INIT: CPT

## 2025-06-06 PROCEDURE — 2580000003 HC RX 258

## 2025-06-06 PROCEDURE — 97166 OT EVAL MOD COMPLEX 45 MIN: CPT

## 2025-06-06 PROCEDURE — A9579 GAD-BASE MR CONTRAST NOS,1ML: HCPCS | Performed by: PSYCHIATRY & NEUROLOGY

## 2025-06-06 PROCEDURE — 86900 BLOOD TYPING SEROLOGIC ABO: CPT

## 2025-06-06 PROCEDURE — 70553 MRI BRAIN STEM W/O & W/DYE: CPT

## 2025-06-06 PROCEDURE — 85610 PROTHROMBIN TIME: CPT

## 2025-06-06 PROCEDURE — 6360000002 HC RX W HCPCS: Performed by: PHYSICIAN ASSISTANT

## 2025-06-06 PROCEDURE — RXMED WILLOW AMBULATORY MEDICATION CHARGE

## 2025-06-06 PROCEDURE — 6370000000 HC RX 637 (ALT 250 FOR IP): Performed by: PHYSICIAN ASSISTANT

## 2025-06-06 PROCEDURE — 86850 RBC ANTIBODY SCREEN: CPT

## 2025-06-06 PROCEDURE — 86901 BLOOD TYPING SEROLOGIC RH(D): CPT

## 2025-06-06 PROCEDURE — 6360000004 HC RX CONTRAST MEDICATION: Performed by: PHYSICIAN ASSISTANT

## 2025-06-06 PROCEDURE — 6360000004 HC RX CONTRAST MEDICATION: Performed by: PSYCHIATRY & NEUROLOGY

## 2025-06-06 PROCEDURE — 1210000000 HC MED SURG R&B

## 2025-06-06 PROCEDURE — 6370000000 HC RX 637 (ALT 250 FOR IP)

## 2025-06-06 PROCEDURE — 85025 COMPLETE CBC W/AUTO DIFF WBC: CPT

## 2025-06-06 PROCEDURE — 51702 INSERT TEMP BLADDER CATH: CPT

## 2025-06-06 PROCEDURE — 73590 X-RAY EXAM OF LOWER LEG: CPT

## 2025-06-06 PROCEDURE — 84484 ASSAY OF TROPONIN QUANT: CPT

## 2025-06-06 RX ORDER — SODIUM CHLORIDE 0.9 % (FLUSH) 0.9 %
5-40 SYRINGE (ML) INJECTION EVERY 12 HOURS SCHEDULED
Status: DISCONTINUED | OUTPATIENT
Start: 2025-06-06 | End: 2025-06-07 | Stop reason: HOSPADM

## 2025-06-06 RX ORDER — BRIMONIDINE TARTRATE AND TIMOLOL MALEATE 2; 5 MG/ML; MG/ML
1 SOLUTION OPHTHALMIC EVERY 12 HOURS
Status: DISCONTINUED | OUTPATIENT
Start: 2025-06-06 | End: 2025-06-06

## 2025-06-06 RX ORDER — ACETAMINOPHEN 325 MG/1
650 TABLET ORAL EVERY 6 HOURS PRN
Status: DISCONTINUED | OUTPATIENT
Start: 2025-06-06 | End: 2025-06-07 | Stop reason: HOSPADM

## 2025-06-06 RX ORDER — MAGNESIUM SULFATE IN WATER 40 MG/ML
2000 INJECTION, SOLUTION INTRAVENOUS ONCE
Status: COMPLETED | OUTPATIENT
Start: 2025-06-06 | End: 2025-06-06

## 2025-06-06 RX ORDER — CIPROFLOXACIN 250 MG/1
500 TABLET, FILM COATED ORAL DAILY
Status: DISCONTINUED | OUTPATIENT
Start: 2025-06-06 | End: 2025-06-07 | Stop reason: HOSPADM

## 2025-06-06 RX ORDER — HYDRALAZINE HYDROCHLORIDE 20 MG/ML
10 INJECTION INTRAMUSCULAR; INTRAVENOUS EVERY 4 HOURS PRN
Status: DISCONTINUED | OUTPATIENT
Start: 2025-06-06 | End: 2025-06-07 | Stop reason: HOSPADM

## 2025-06-06 RX ORDER — SODIUM CHLORIDE 0.9 % (FLUSH) 0.9 %
5-40 SYRINGE (ML) INJECTION PRN
Status: DISCONTINUED | OUTPATIENT
Start: 2025-06-06 | End: 2025-06-07 | Stop reason: HOSPADM

## 2025-06-06 RX ORDER — PRAVASTATIN SODIUM 10 MG
10 TABLET ORAL NIGHTLY
Status: DISCONTINUED | OUTPATIENT
Start: 2025-06-06 | End: 2025-06-07 | Stop reason: HOSPADM

## 2025-06-06 RX ORDER — BRIMONIDINE TARTRATE 2 MG/ML
1 SOLUTION/ DROPS OPHTHALMIC EVERY 12 HOURS
Status: DISCONTINUED | OUTPATIENT
Start: 2025-06-06 | End: 2025-06-07 | Stop reason: HOSPADM

## 2025-06-06 RX ORDER — TIMOLOL MALEATE 5 MG/ML
1 SOLUTION/ DROPS OPHTHALMIC EVERY 12 HOURS
Status: DISCONTINUED | OUTPATIENT
Start: 2025-06-06 | End: 2025-06-07 | Stop reason: HOSPADM

## 2025-06-06 RX ORDER — FUROSEMIDE 20 MG/1
20 TABLET ORAL DAILY
COMMUNITY
Start: 2024-09-26

## 2025-06-06 RX ORDER — TAMSULOSIN HYDROCHLORIDE 0.4 MG/1
0.4 CAPSULE ORAL DAILY
Status: DISCONTINUED | OUTPATIENT
Start: 2025-06-06 | End: 2025-06-07 | Stop reason: HOSPADM

## 2025-06-06 RX ORDER — PROCHLORPERAZINE MALEATE 10 MG
10 TABLET ORAL EVERY 6 HOURS PRN
COMMUNITY
Start: 2024-12-13

## 2025-06-06 RX ORDER — DIAZEPAM 2 MG/1
2 TABLET ORAL
Status: COMPLETED | OUTPATIENT
Start: 2025-06-06 | End: 2025-06-06

## 2025-06-06 RX ORDER — LANOLIN ALCOHOL/MO/W.PET/CERES
400 CREAM (GRAM) TOPICAL ONCE
Status: COMPLETED | OUTPATIENT
Start: 2025-06-06 | End: 2025-06-06

## 2025-06-06 RX ORDER — SODIUM CHLORIDE 9 MG/ML
INJECTION, SOLUTION INTRAVENOUS CONTINUOUS
Status: DISCONTINUED | OUTPATIENT
Start: 2025-06-06 | End: 2025-06-07 | Stop reason: HOSPADM

## 2025-06-06 RX ORDER — MOMETASONE FUROATE 1 MG/ML
2 SOLUTION TOPICAL 2 TIMES DAILY
Status: ON HOLD | COMMUNITY
Start: 2024-12-06 | End: 2025-06-06

## 2025-06-06 RX ORDER — BRIMONIDINE TARTRATE AND TIMOLOL MALEATE 2; 5 MG/ML; MG/ML
1 SOLUTION OPHTHALMIC
COMMUNITY

## 2025-06-06 RX ORDER — IOPAMIDOL 755 MG/ML
75 INJECTION, SOLUTION INTRAVASCULAR
Status: COMPLETED | OUTPATIENT
Start: 2025-06-06 | End: 2025-06-06

## 2025-06-06 RX ORDER — 0.9 % SODIUM CHLORIDE 0.9 %
500 INTRAVENOUS SOLUTION INTRAVENOUS ONCE
Status: COMPLETED | OUTPATIENT
Start: 2025-06-06 | End: 2025-06-06

## 2025-06-06 RX ORDER — POLYETHYLENE GLYCOL 3350 17 G/17G
17 POWDER, FOR SOLUTION ORAL DAILY PRN
Status: DISCONTINUED | OUTPATIENT
Start: 2025-06-06 | End: 2025-06-07 | Stop reason: HOSPADM

## 2025-06-06 RX ORDER — SODIUM CHLORIDE 9 MG/ML
INJECTION, SOLUTION INTRAVENOUS PRN
Status: DISCONTINUED | OUTPATIENT
Start: 2025-06-06 | End: 2025-06-07 | Stop reason: HOSPADM

## 2025-06-06 RX ORDER — CIPROFLOXACIN 500 MG/1
500 TABLET, FILM COATED ORAL 2 TIMES DAILY
COMMUNITY
Start: 2025-06-04

## 2025-06-06 RX ORDER — ACETAMINOPHEN 500 MG
1000 TABLET ORAL 3 TIMES DAILY PRN
COMMUNITY

## 2025-06-06 RX ORDER — ACETAMINOPHEN 650 MG/1
650 SUPPOSITORY RECTAL EVERY 6 HOURS PRN
Status: DISCONTINUED | OUTPATIENT
Start: 2025-06-06 | End: 2025-06-07 | Stop reason: HOSPADM

## 2025-06-06 RX ORDER — ONDANSETRON 2 MG/ML
4 INJECTION INTRAMUSCULAR; INTRAVENOUS EVERY 6 HOURS PRN
Status: DISCONTINUED | OUTPATIENT
Start: 2025-06-06 | End: 2025-06-07 | Stop reason: HOSPADM

## 2025-06-06 RX ORDER — GADOTERIDOL 279.3 MG/ML
10 INJECTION INTRAVENOUS
Status: COMPLETED | OUTPATIENT
Start: 2025-06-06 | End: 2025-06-06

## 2025-06-06 RX ORDER — CALCIUM CARBONATE/VITAMIN D3 600 MG-10
3 TABLET ORAL DAILY
COMMUNITY

## 2025-06-06 RX ORDER — HYDROCHLOROTHIAZIDE 25 MG/1
12.5 TABLET ORAL DAILY
Status: DISCONTINUED | OUTPATIENT
Start: 2025-06-07 | End: 2025-06-07 | Stop reason: HOSPADM

## 2025-06-06 RX ORDER — ONDANSETRON 4 MG/1
4 TABLET, ORALLY DISINTEGRATING ORAL EVERY 8 HOURS PRN
Status: DISCONTINUED | OUTPATIENT
Start: 2025-06-06 | End: 2025-06-07 | Stop reason: HOSPADM

## 2025-06-06 RX ORDER — ONDANSETRON 8 MG/1
8 TABLET, FILM COATED ORAL EVERY 8 HOURS PRN
COMMUNITY
Start: 2024-12-13

## 2025-06-06 RX ORDER — CALCIUM CARBONATE 500 MG/1
1000 TABLET, CHEWABLE ORAL ONCE
Status: COMPLETED | OUTPATIENT
Start: 2025-06-06 | End: 2025-06-06

## 2025-06-06 RX ORDER — NETARSUDIL 0.2 MG/ML
1 SOLUTION/ DROPS OPHTHALMIC; TOPICAL NIGHTLY
COMMUNITY
Start: 2025-03-03

## 2025-06-06 RX ADMIN — MAGNESIUM SULFATE HEPTAHYDRATE 2000 MG: 40 INJECTION, SOLUTION INTRAVENOUS at 00:26

## 2025-06-06 RX ADMIN — SODIUM CHLORIDE: 0.9 INJECTION, SOLUTION INTRAVENOUS at 06:15

## 2025-06-06 RX ADMIN — MAGNESIUM SULFATE HEPTAHYDRATE 2000 MG: 40 INJECTION, SOLUTION INTRAVENOUS at 05:10

## 2025-06-06 RX ADMIN — SODIUM CHLORIDE: 0.9 INJECTION, SOLUTION INTRAVENOUS at 22:18

## 2025-06-06 RX ADMIN — PRAVASTATIN SODIUM 10 MG: 10 TABLET ORAL at 21:51

## 2025-06-06 RX ADMIN — Medication 10 ML: at 09:15

## 2025-06-06 RX ADMIN — Medication 3 MG: at 21:51

## 2025-06-06 RX ADMIN — TIMOLOL MALEATE 1 DROP: 5 SOLUTION OPHTHALMIC at 15:45

## 2025-06-06 RX ADMIN — SODIUM CHLORIDE 500 ML: 9 INJECTION, SOLUTION INTRAVENOUS at 00:07

## 2025-06-06 RX ADMIN — IOPAMIDOL 75 ML: 755 INJECTION, SOLUTION INTRAVENOUS at 01:33

## 2025-06-06 RX ADMIN — GADOTERIDOL 10 ML: 279.3 INJECTION, SOLUTION INTRAVENOUS at 19:22

## 2025-06-06 RX ADMIN — DIAZEPAM 2 MG: 2 TABLET ORAL at 18:26

## 2025-06-06 RX ADMIN — TAMSULOSIN HYDROCHLORIDE 0.4 MG: 0.4 CAPSULE ORAL at 17:36

## 2025-06-06 RX ADMIN — BRIMONIDINE TARTRATE 1 DROP: 2 SOLUTION OPHTHALMIC at 15:45

## 2025-06-06 RX ADMIN — ANTACID TABLETS 1000 MG: 500 TABLET, CHEWABLE ORAL at 02:12

## 2025-06-06 RX ADMIN — Medication 400 MG: at 01:51

## 2025-06-06 ASSESSMENT — ENCOUNTER SYMPTOMS
APNEA: 0
NAUSEA: 0
COLOR CHANGE: 0
CHEST TIGHTNESS: 0
ABDOMINAL PAIN: 0
SHORTNESS OF BREATH: 0
TROUBLE SWALLOWING: 0
WHEEZING: 0
DIARRHEA: 1
BACK PAIN: 0
COUGH: 0
VOMITING: 0

## 2025-06-06 NOTE — ACP (ADVANCE CARE PLANNING)
Had a lengthy goals of care conversation with patient with her family present (with her permission).  She would not want intubation, chest compressions, shocks or meds pushed in the event of a cardiac or pulmonary arrest.  Limited code order placed.    Denis Leggett

## 2025-06-06 NOTE — ED PROVIDER NOTES
[facility] EMERGENCY DEPARTMENT  EMERGENCY DEPARTMENT ENCOUNTER      Pt Name: Megan Cuevas  MRN: [id number]  Birthdate 1940  Date of evaluation: 6/5/2025  Provider: Carlos Deal PA-C  Note Started: 6/5/25 11:26 PM EDT    CHIEF COMPLAINT       Chief Complaint   Patient presents with    Fall         HISTORY OF PRESENT ILLNESS   (Location/Symptom, Timing/Onset, Context/Setting, Quality, Duration, Modifying Factors, Severity)  Note limiting factors.   Megan Cuevas is a 84 y.o. female who presents to the emergency department post fall at home on [place]. Patient states she felt normal all day today, does not recall fall. Patient's only complaint at this time is nausea, only complains of pain with palpation. Patient denies a known history of seizures. Patient was administered Zofran via EMS with some improvement. Patient's  states they were watching TV tonight, at some point she stood up to go to her room. Pt's  expected her to return shortly. Patient did not come right back so he went to look for her at which time he found her on the ground in her room with her head underneath her side table. Pt's  states he was talking to her and trying to help her sit up for 20 minutes prior to calling EMS. He never found her unconscious and states he was talking to her but she was not responding or seeming to understand what he was saying to her.    [facility]    Nursing Notes were reviewed.    REVIEW OF SYSTEMS    (2-9 systems for level 4, 10 or more for level 5)     Review of Systems   Constitutional:  Negative for fatigue.   HENT:  Negative for ear pain, nosebleeds and sore throat.    Eyes:  Negative for visual disturbance.   Respiratory:  Negative for shortness of breath.    Cardiovascular:  Negative for chest pain and leg swelling.   Gastrointestinal:  Positive for nausea and vomiting. Negative for abdominal pain, blood in stool, constipation and diarrhea.   Genitourinary:  Positive for flank

## 2025-06-06 NOTE — CONSULTS
PODIATRIC MEDICINE AND SURGERY  CONSULT HISTORY AND PHYSICAL      ASSESSMENT:  84 y.o. female with PMH significant for breast cancer with bone mets, HTN,  and synope and collapse admitted after sustaining an unwitnessed fall and was found by her  on the bedroom floor. Podiatry consulted for evaluation of lle ulcer. Recent ulcer cultures growing pseudomonas and enterococcus. Wound appears significantly improved from visit in [facility] a few days ago. Recommend continuing abx for the lle wound infection. No acute podiatric surgical intervention at this time as wound appears stable and improved since visit to wound care center last week. XR neg for underlying OM. WBC decreased.    PLAN AND RECOMMENDATIONS::  Patient's case to be discussed with staff, Dr. Corona, who will provide final recommendations going forward  Wound care: aquacel ag , 4x4 ,abd ,kerlix  WBAT BLE  XR neg for om   Will continue to monitor wound. Recommend continuing with abx for pseudomonas and enterococcus faecalis infection.   Antibiotic coverage per primary   Pain management per primary team   Podiatry to follow while in house   Patient will need follow up with Dr. Corona within 7-10 days of discharge      HPI: This 84 y.o. year old female was seen today for lle ulcer. States she sees Dr. Knight in the wound care center and saw him about 2 days ago. She begin taking oral abx for the wound and states the appearance has significantly improved. Does endorse severe pain to the wound when having her dressing changed. Admitted because she fell and has been weak secondary to chemo.denies nausea, vomiting, diarrhea, fevers, chills, chest pain, shortness of breath, head ache, or calf pain.  No other pedal complaints.     Past Medical History:   Diagnosis Date    Breast cancer (HCC)     right       Past Surgical History:   Procedure Laterality Date    MASTECTOMY      right       No current facility-administered medications on file prior to  06/06/2025    HCT 23.5 (L) 06/06/2025    .9 (H) 06/06/2025     06/06/2025     Lab Results   Component Value Date/Time     06/06/2025 05:44 AM    K 4.4 06/06/2025 05:44 AM     06/06/2025 05:44 AM    CO2 22 06/06/2025 05:44 AM    BUN 33 06/06/2025 05:44 AM    CREATININE 1.56 06/06/2025 05:44 AM    GLUCOSE 173 06/06/2025 05:44 AM    CALCIUM 8.2 06/06/2025 05:44 AM      No results found for: \"LABPROT\", \"LABALBU\"  No results found for: \"SEDRATE\"  No results found for: \"CRP\"  Lab Results   Component Value Date    LABA1C 6.0 (H) 04/21/2018       MICROBIOLOGY:   Culture growing pseudomonas and enteroccus facealis        Jocelyne Heller DPM, DPM PGY-3  Podiatric Surgery Resident  Podiatry On Call Pager: [phone]  June 6, 2025  2:52 PM    The resident/student was under my direct supervision during today's patient encounter. reflection of my personal examination, assessment and treatment plan.  The patient was seen and examined with the resident/student.  The above findings and recommendations were reviewed and I agree with above treatment plan.  Any questions or concerns, please Dr. Corona or podiatry resident on call.    Luis Corona DPM  Podiatry Attending  06/11/25  9:57 AM

## 2025-06-06 NOTE — PROGRESS NOTES
Hospitalist Progress Note      PCP: Kranthi Metz DO    Date of Admission: 6/5/2025    Chief Complaint:    Chief Complaint   Patient presents with    Fall       Subjective:  Patient denies fevers, chills, sweats, CP, SOB, diarrhea or burning micturition.  12 point ROS negative other than mentioned above       Medications:  Reviewed    Infusion Medications    sodium chloride      sodium chloride 75 mL/hr at 06/06/25 0615     Scheduled Medications    sodium chloride flush  5-40 mL IntraVENous 2 times per day    melatonin  3 mg Oral Nightly    ciprofloxacin  500 mg Oral Daily    bacitracin zinc   Topical Once     PRN Meds: sodium chloride flush, sodium chloride, ondansetron **OR** ondansetron, polyethylene glycol, acetaminophen **OR** acetaminophen, hydrALAZINE, diazePAM      Intake/Output Summary (Last 24 hours) at 6/6/2025 1352  Last data filed at 6/6/2025 0300  Gross per 24 hour   Intake 550 ml   Output --   Net 550 ml       Exam:    /60   Pulse 97   Temp 97.5 °F (36.4 °C) (Oral)   Resp 18   Ht 1.524 m (5')   Wt 68 kg (150 lb)   SpO2 98%   BMI 29.29 kg/m²     General appearance: No apparent distress, appears stated age and cooperative.  HEENT:  Conjunctivae/corneas clear.  Neck: Trachea midline.  Respiratory:  Normal respiratory effort. Clear to auscultation  Cardiovascular: Regular rate and rhythm  Abdomen: Soft, non-tender, non-distended with normal bowel sounds.  Musculoskeletal: No clubbing, cyanosis or edema bilaterally  Neuro: Non Focal.   Capillary Refill: Brisk,< 3 seconds   Peripheral Pulses: +2 palpable, equal bilaterally       Labs:   Recent Labs     06/05/25 2338 06/06/25  0544   WBC 4.2* 3.8*   HGB 8.0* 8.0*   HCT 23.5* 23.5*    314     Recent Labs     06/05/25 2338 06/05/25  2341 06/06/25  0544   *  --  136   K 4.4  --  4.4   CL 97  --  100   CO2 20  --  22   BUN 39*  --  33*   CREATININE 1.74* 1.9* 1.56*   CALCIUM 7.7*  --  8.2*     Recent Labs     06/05/25 2338

## 2025-06-06 NOTE — FLOWSHEET NOTE
Home meds complete perfect serve sent to Dr. Pemberton.    1016 Home meds complete perfect serve sent to Dr. Smallwood.

## 2025-06-06 NOTE — CONSULTS
Spiritual Health History and Assessment/Progress Note  [facility]    Initial Encounter, Spiritual/Emotional Needs,  ,  ,      Name: Megan Cuevas MRN: [id number]    Age: 84 y.o.     Sex: female   Language: English   Jainism: Other   Syncope and collapse     Date: 6/6/2025            Total Time Calculated: 30 min              Spiritual Assessment began in [facility] 2W ORTHO TELE        Referral/Consult From: Nurse   Encounter Overview/Reason: Initial Encounter, Spiritual/Emotional Needs  Service Provided For: Patient and family together     visited patient for a spiritual care consult. Patient sitting up in a chair after working with therapy. Family member present and patient's  was with patient overnight. Patient spoke about her illness including her cancer diagnosis. Patient feels well supported in dealing with illness from her , children, and other family. Patient once had a connection with a lola community but does not currently maintain a [employer] membership.  provided support and encouragement.    Lola, Belief, Meaning:   Patient identifies as spiritual and has beliefs or practices that help with coping during difficult times  Family/Friends identify as spiritual and have beliefs or practices that help with coping during difficult times      Importance and Influence:  Patient has spiritual/personal beliefs that influence decisions regarding their health  Family/Friends have spiritual/personal beliefs that influence decisions regarding the patient's health    Community:  Patient feels well-supported. Support system includes: Spouse/Partner, Children, and Extended family  Family/Friends feel well-supported. Support system includes: Spouse/Partner, Parent/s, Children, and Extended family    Assessment and Plan of Care:     Patient Interventions include: Facilitated expression of thoughts and feelings, Affirmed coping skills/support systems, and Facilitated life review

## 2025-06-06 NOTE — CONSULTS
[facility]                   [address] [place] [address]                              CONSULTATION      PATIENT NAME: KEHINDE CARREON             : 1940  MED REC NO: [id number]                        ROOM: W295  ACCOUNT NO: [id number]                       ADMIT DATE: 2025  PROVIDER: German Luna DO    RENAL CONSULT    CONSULT DATE: 2025      HISTORY OF PRESENT ILLNESS:  84-year-old  female, pleasant in nature, being seen for renal parameter changes.  The patient does have baseline GFR of 44 mL/minute and was aware of this.  She was never given a diagnosis as to why.  On admission to the hospital, presently, the patient has normal electrolytes.  Creatinine 1.56 and a GFR 33 mL/minute.  Family members are present during the interview.  She denies any gross hematuria or dysuria.  There is no history of chronic urinary tract infections or kidney stones.  The patient does use a significant amount of nonsteroidal anti-inflammatory medication and Tylenol.  She was advised by her family doctor to stop taking these; however, her arthritic pain is significant and she has ignored that suggestion.  The patient has no significant albumin in the urine on old charting.  Recent CT x-rays of the abdomen show bilateral hydronephrosis, right side less than left.  She does have a history of hypertension for over 25 years.  She has known history of breast cancer with bone metastasis.  The patient was admitted initially because of a syncopal episode on admission with no prior spells.  She denied any dizziness or a forewarning of this bout.  She does have a bruise on her left infraorbital area.  The patient also has a left lower leg wound that is being followed by the [facility].    PAST SURGICAL HISTORY:  None expressed.    PAST MEDICAL HISTORY:  Hypertension and right breast cancer with metastatic disease.    ALLERGIES TO MEDICATIONS:

## 2025-06-06 NOTE — CARE COORDINATION
Patient states she has [facility] SN/PT/OT for wound care. Sent [facility] referral via care port to verify LAINEY awaiting response. Discharge plan home with  and supportive family, acc private duty and [facility]-verifying.

## 2025-06-06 NOTE — CARE COORDINATION
Case Management Assessment  Initial Evaluation    Date/Time of Evaluation: 6/6/2025 2:31 PM  Assessment Completed by: TREVA Taylor    If patient is discharged prior to next notation, then this note serves as note for discharge by case management.    Patient Name: Megan Cuevas                   YOB: 1940  Diagnosis: Hypocalcemia [E83.51]  Syncope and collapse [R55]  Hypomagnesemia [E83.42]  Macrocytosis [D75.89]  Bilateral pleural effusion [J90]  CESAR (acute kidney injury) [N17.9]  Calculus of gallbladder without cholecystitis without obstruction [K80.20]  Hydronephrosis, unspecified hydronephrosis type [N13.30]  Anemia, unspecified type [D64.9]                   Date / Time: 6/5/2025 11:21 PM    Patient Admission Status: Inpatient   Readmission Risk (Low < 19, Mod (19-27), High > 27): Readmission Risk Score: 15.1    Current PCP: Kranthi Metz, DO  PCP verified by CM? Yes    Chart Reviewed: Yes      History Provided by: Patient, Significant Other  Patient Orientation: Alert and Oriented    Patient Cognition: Alert    Hospitalization in the last 30 days (Readmission):  No    If yes, Readmission Assessment in CM Navigator will be completed.    Advance Directives:      Code Status: Limited   Patient's Primary Decision Maker is: Legal Next of Kin      Discharge Planning:    Patient lives with: Spouse/Significant Other Type of Home: House  Primary Care Giver: Spouse  Patient Support Systems include: Spouse/Significant Other, Children   Current Financial resources:    Current community resources:    Current services prior to admission: Home Care, Private Duty Homecare            Current DME:              Type of Home Care services:  OT, PT, Nursing Services    ADLS  Prior functional level: Assistance with the following:, Mobility, Shopping, Housework, Cooking, Bathing  Current functional level: Assistance with the following:, Cooking, Housework, Shopping, Mobility, Bathing    PT AM-PAC: 16  Freedom of choice list with basic dialogue that supports the patient's individualized plan of care/goals and shares the quality data associated with the providers was provided to: Patient, Patient Representative   Patient Representative Name: SPOUSE     The Patient and/or Patient Representative Agree with the Discharge Plan? Yes    TREVA Taylor  Case Management Department  Ph: [phone] Fax: [phone]

## 2025-06-06 NOTE — CONSULTS
along with FLAIR and diffusion weighted imaging and gradient echo axial sequences were acquired. Postcontrast images included with the patient receiving 17 mL of ProHance.    FINDINGS:    No intra-axial mass or extra-axial mass or fluid collection. No restricted diffusion to suggest acute infarct. No blood products.    Nonspecific periventricular white matter increased signal on FLAIR and T2 consistent with aging. No significant demyelinating process suspected.    No enhancing lesions in the brain. Small 1 cm focus in the posterior ethmoid air cells on the right side. No air-fluid levels in the sinuses or mastoid air cells. Minimal mastoiditis on the right suspected.    Extracranial soft tissues unremarkable.    Impression  NO ACUTE PROCESS IN THE BRAIN. NONSPECIFIC SOFT TISSUE FOCUS IN THE RIGHT ETHMOID AIR CELLS. NO EVIDENCE OF ACUTE STROKE OR METASTASIS.  No results found for this or any previous visit.                            MRA of the Head and Neck: No results found for this or any previous visit.  No results found for this or any previous visit.  Results for orders placed during the hospital encounter of 04/20/18    MRA Head WO Contrast    Narrative  MRA BRAIN    COMPARISONS:  NONE    CLINICAL HISTORY:  Syncope. Breast cancer.    FINDINGS:  2-D reconstructions after time-of-flight acquisition displayed. Source images reviewed.    Flow is visualized in both the vertebral arteries, basilar artery and posterior cerebral arteries.   Flow is visualized in the internal carotid arteries and central cerebral vasculature including anterior, and middle cerebral arteries.  No significant  stenosis. No evidence of intracranial aneurysm.    Impression  NORMAL EXAM                            CT of the Head: Results for orders placed during the hospital encounter of 06/05/25    CT Head W/O Contrast    Narrative  EXAMINATION:  CT OF THE HEAD WITHOUT CONTRAST; CT OF THE FACE WITHOUT CONTRAST; CT OF THE  CERVICAL SPINE  for this or any previous visit.  Results for orders placed during the hospital encounter of 04/20/18    US CAROTID ARTERY BILATERAL    Narrative  EXAMINATION:  ULTRASOUND CAROTID ARTERIES    CLINICAL HISTORY:  Vertigo, dizziness with head motion    COMPARISONS:  NONE AVAILABLE    TECHNIQUE:  Grayscale, duplex Doppler. Sonographic imaging was performed by a registered sonographer and the images are submitted for interpretation.    FINDINGS:  Grayscale images demonstrate small focal plaque in the proximal right internal carotid artery. The peak systolic velocity in the right internal carotid artery is 57 cm/s with an ICA/CCA ratio of 0.5. The peak systolic velocity in the left  internal carotid artery is 84 cm/s with an ICA/CCA ratio of 0.9. There is antegrade flow in both vertebral arteries.        The peak systolic velocity in the right external carotid artery is 119cm/s and the peak systolic velocity in the left external carotid artery is 85cm/s.    Impression  SMALL FOCAL PLAQUE IN THE PROXIMAL RIGHT INTERNAL CAROTID ARTERY. ESTIMATED RIGHT INTERNAL CAROTID ARTERY STENOSIS IS LESS THAN 50% AND ESTIMATED LEFT INTERNAL CAROTID ARTERY STENOSIS IS 0%.    Validated velocity measurements with angiographic measurements and velocity criteria are extrapolated from diameter data as defined by the Society of Radiologist in Ultrasound Consensus Conference. Radiology 2003; 229;340-346.      Echo No results found for this or any previous visit.            Assessment/Plan:   Patient is a 84-year-old female with past medical history of right breast cancer with bone mets, hypertension, retinal vein occlusion who presented to [facility] emergency room on 6/5/2025 due to unwitnessed fall.  Patient does not remember events.  Patient was found on the floor in her bedroom by her  and patient appeared to be confused.  NIH score 0  Blood pressure in the emergency room 149/73  CT of the head negative for acute intracranial  findings.  Initial lab testing remarkable for hemoglobin of 8.0, white blood cell count of 4.2, sodium 133, creatinine 1.74, calcium of 7.7, magnesium of 1.0, high-sensitivity troponin of 33.  Denies any recent episodes of lightheadedness or dizziness.  Did have some diarrhea yesterday.  Denies recent fevers.  Does have infection of the left lower leg.  Currently on chemotherapy for metastatic breast cancer with multiple bony lesions.  Denies any recent chest pain pressure or palpitations.  No previous history of syncope or seizure.  No history of significant head trauma.  No focal deficits on exam.  Does have noted tremor of upper extremities at rest right greater than left with some rigidity on exam.  Bradykinetic.    Patient with syncope versus seizure.  Patient with underlying anemia, electrolyte abnormalities, CESAR putting her at risk for both.  MRI of the brain with and without contrast is pending given patient's history of metastatic cancer to assess for any acute findings or metastasis.  EEG pending.  Will assess orthostatic blood pressures.    I have personally performed a face to face diagnostic evaluation on this patient, reviewed all data and investigations, and am the sole provider of all clinical decisions on the neurological status of this patient.  Patient seen and examined.  Patient has bony mets and has some unwitnessed fall.  She is having weakness in the right lower extremity foot drop.  NIH was 0 reported.  Patient was found in the bedroom confused and may have passed out for about 5 to 10 minutes.  She was somewhat ashen but not clammy or sweaty.  Findings still consistent with the possibility of syncope without any session to suggest a seizure though we will keep an eye on this obtain an EEG and recommend a complete CT of the lumbar and thoracic spine.  The findings may suggest a peroneal palsy.  60% time spent on evaluating  pt      Horacio Park MD, REINIER  Diplomate, American Board of

## 2025-06-06 NOTE — PROGRESS NOTES
Physical Therapy Med Surg Initial Assessment  Facility/Department: [address] ORTHO TELE  Room: [place] [address]       NAME: Megan Cuevas  : 1940 (84 y.o.)  MRN: [id number]  CODE STATUS: Full Code    Date of Service: 2025    Patient Diagnosis(es): Hypocalcemia [E83.51]  Syncope and collapse [R55]  Hypomagnesemia [E83.42]  Macrocytosis [D75.89]  Bilateral pleural effusion [J90]  CESAR (acute kidney injury) [N17.9]  Calculus of gallbladder without cholecystitis without obstruction [K80.20]  Hydronephrosis, unspecified hydronephrosis type [N13.30]  Anemia, unspecified type [D64.9]   Chief Complaint   Patient presents with    Fall     Patient Active Problem List    Diagnosis Date Noted    Syncope and collapse 2025    Alcohol withdrawal, uncomplicated (HCC) 2018    BPPV (benign paroxysmal positional vertigo), bilateral 2018        Past Medical History:   Diagnosis Date    Breast cancer (HCC)     right     Past Surgical History:   Procedure Laterality Date    MASTECTOMY      right       Chart Reviewed: Yes  Patient assessed for rehabilitation services?: Yes  Family/Caregiver Present: Yes (daughter in law came in with pt meds during visit, alerted nurse about the meds)  Diagnosis: Syncope and collapse  General  General Comments: Pt supine in bed upon arrival, agreeable to therapy and pleasant in nature. Pt mentioned that she needs her chemo meds soon.    Restrictions:  Restrictions/Precautions: Fall Risk (seizure precautions)  Activity Level: Up as Tolerated     SUBJECTIVE:   Subjective: \"I need my chemo meds\"    Pain  Pain  Pre-Pain: 10  Post-Pain:  (pt reported improvement in pain, not formally rated)       Prior Level of Function:  Social/Functional History  Lives With: Spouse  Type of Home: House  Home Layout: One level  Home Access: Level entry  Bathroom Shower/Tub: Walk-in shower  Bathroom Equipment: Grab bars in shower  Home Equipment: Cane (3 wheeled rollator)  Prior Level of Assist for  Deviations: Slow Crystal;Shuffles;Decreased step height;Decreased step length  Distance: 5ft from bed to chair  Comments: Pt had flexed posture, verbal cueing to  upright posture, slow gait and transition to begin sitting                   Activity Tolerance  Activity Tolerance: Patient tolerated evaluation without incident;Patient tolerated treatment well  Activity Tolerance Comments: Pt mentioned that her pain did decrease post-session  Vitals  Pulse: 85  BP: (!) 126/50  MAP (Calculated): 75  BP Location: Left upper arm  Patient Position: Supine (HOB elevated)    Patient Education  Education Given To: Patient  Education Provided: Role of Therapy;Plan of Care;Precautions;Mobility Training;Transfer Training;Fall Prevention Strategies  Education Provided Comments: Educated pt about coming back tomorrow for therapy, as well as safety awareness when amb and transferring  Education Method: Verbal  Barriers to Learning: None  Education Outcome: Verbalized understanding       ASSESSMENT:   Body Structures, Functions, Activity Limitations Requiring Skilled Therapeutic Intervention: Decreased functional mobility ;Decreased strength;Decreased safe awareness;Decreased endurance;Decreased balance;Decreased coordination;Decreased posture  Decision Making: Medium Complexity  History: High  Exam: Medium  Clinical Presentation: Medium    Treatment Diagnosis: Syncope and collapse  Therapy Prognosis: Excellent         DISCHARGE RECOMMENDATIONS:  Discharge Recommendations: Continue to assess pending progress, Patient would benefit from continued therapy after discharge    Assessment: Pt is 85 yo female presenting w/ syncope and falls, negatively affecting her functional mobility and increasing her risk for falls, further exacerbated by her complicated medical history. Continued PT indicated to address gait training, transfer training, and activity tolerance to facilitate safest level of indep upon D/C home.  Requires PT

## 2025-06-06 NOTE — CONSULTS
Renal consult    CKD 3a GFR 40 cc/min  May 2025  Syncope etiology ?  Hx Breast cancer with bone mets    Hypertension-history  Hyperlipidemia  Anemia  Bilateral hydro R<L  Hx DVT on eliquis    Plan CT abdomen reveals bilateral hydro R mod  L minimal  No documented hypotension Voltaren & Mobic listed on med list -D/C'd telemetry Keep euvolemic avoid toxic meds

## 2025-06-06 NOTE — PROGRESS NOTES
[facility] OCCUPATIONAL THERAPY EVALUATION - ACUTE     NAME: Megan Cuevas  : 1940 (84 y.o.)  MRN: [id number]  CODE STATUS: Full Code  Room: 95/W295-01    Date of Service: 2025    Patient Diagnosis(es): Hypocalcemia [E83.51]  Syncope and collapse [R55]  Hypomagnesemia [E83.42]  Macrocytosis [D75.89]  Bilateral pleural effusion [J90]  CESAR (acute kidney injury) [N17.9]  Calculus of gallbladder without cholecystitis without obstruction [K80.20]  Hydronephrosis, unspecified hydronephrosis type [N13.30]  Anemia, unspecified type [D64.9]   Patient Active Problem List    Diagnosis Date Noted    Syncope and collapse 2025    Alcohol withdrawal, uncomplicated (HCC) 2018    BPPV (benign paroxysmal positional vertigo), bilateral 2018      Anemia, unspecified type  CESAR (acute kidney injury)  Hypomagnesemia  Hypocalcemia  Macrocytosis  Syncope and collapse  Hydronephrosis, unspecified hydronephrosis type  Bilateral pleural effusion  Calculus of gallbladder without cholecystitis without obstruction    Past Medical History:   Diagnosis Date    Breast cancer (HCC)     right     Past Surgical History:   Procedure Laterality Date    MASTECTOMY      right        Restrictions  Restrictions/Precautions: Fall Risk  Activity Level: Up as Tolerated   Up as Tolerated    Safety Devices: Safety Devices  Type of Devices: All fall risk precautions in place;Call light within reach;Left in chair;Chair alarm in place     Patient's date of birth confirmed: Yes    General:  Chart Reviewed: Yes  Patient assessed for rehabilitation services?: Yes    Subjective  Subjective: \"I've never passed out before.\"       Pain at start of treatment: No    Pain at end of treatment: No    Location:   Description:   Nursing notified: Not Applicable  RN:   Intervention: None    Prior Level of Function:  Social/Functional History  Lives With: Spouse  Type of Home: House  Home Layout: One level  Home Access: Level entry  Bathroom  Duration:  (Length of acute stay)  Current Treatment Recommendations: Strengthening, Balance training, Functional mobility training, Endurance training, Pain management, Safety education & training, Self-Care / ADL, Patient/Caregiver education & training, Equipment evaluation, education, & procurement, Home management training    Goals:   Patient will:    - Improve functional endurance to tolerate/complete 30 mins of ADL's  - Be Mod I in UB ADLs   - Be Mod I in LB ADLs  - Be Mod I in ADL transfers without LOB  - Be Mod I in toileting tasks  - Improve B UE strength and endurance to 4/5 in order to participate in self-care activities as projected.  - Access appropriate D/C site with as few architectural barriers as possible.  - Sequence self-care tasks with no verbal cues for safety    Patient Goal: Patient goals : \"I want to get home\"     Discussed and agreed upon: Yes Comments:       Therapy Time:   Individual   Time In 0851   Time Out 0908   Minutes 17     Eval: 17 minutes     Electronically signed by:    VIC Purdy,   6/6/2025, 1:07 PM

## 2025-06-06 NOTE — ED NOTES
Incont soft stool. Pericare completed w family assist. Electronically signed by Sissy Collado RN on 6/6/2025 at 12:46 AM

## 2025-06-06 NOTE — H&P
Hospitalist Group   History and Physical    Attending: Dr. Millan    CHIEF COMPLAINT:  Syncope    History of Present Illness:  Megan Cuevas is a 84 y.o. female with a PMHx of breast cancer with mets to the bone and abdomen, DVT on Eliquis, HFpEF, BPPV, HLD, HTN, anemia, osteopenia, GERD, anxiety per the patient and EMR review, presents with a chief complaint of syncope and collapse.  Patient's  is at bedside participating in HPI.  Patient reports that she had a normal day.  States that she did not feel anything atypical.  She ate dinner and then went upstairs to get ready for bed.  She reports that she started to feel like she was going \"in and out.\"  States that she fell like she was coming in and out of consciousness but prior to that, was asymptomatic.  That was last feeling she remembers experiencing before being woken up by her .   reported that 5 minutes after patient went up to her bedroom, he went up to also get ready for bed and found her laying on the floor.  He said that she was unconscious for few moments but he was able to arouse her.  He states that she was so weak he was unable to pick her up despite trying for 20 minutes so he called EMS.   was unable to clarify patient's status when she woke up.  States that her speech was somewhat off but had difficulty clarifying if it was garbled, slurred, confused, etc. states he did not notice a facial droop.  Patient did hit upper middle chest and left side of face when she fell.  She denies chest pain, shortness of breath, lightheadedness, dizziness, headaches, fevers, myalgias, vision changes.  She does endorse nausea, vomiting, and diarrhea that started when she got into the squad.  Patient endorses being compliant with her chemo medications.  Stating that she takes them for 3 weeks and then off for 1 week.  She is currently in the second week of this round.  Patient also endorses a left shin wound that she has been going  reactive  Continuous telemetry  Consult cardiology if symptoms arise    4.  Hypertension  BP as high as 161/70 in ED.  Currently 149/73  Denies chest pain, headache, and dizziness.   Will resume home medications as tolerated, when home med rec is completed by nursing  PRN Hydralazine for SBP >160  Frequent vitals  Low-sodium diet     5.  Hypocalcemia  Calcium 7.7  Albumin 3.7  Given 1000 mg calcium carbonate in ED  Likely second to cancer  Monitor and trend CMP    6. CESAR on CKD  BUN 39,  baseline is 28  Creatinine 1.74, baseline is 1.38  GFR 28.8, baseline is 38  Patient denies decreased oral intake  Gentle hydration x 12 hours  Monitor and trend CMP  Avoid nephrotoxic agents  Encourage oral hydration  I/Os    7.  Left shin wound cellulitis  Chronic  Follows with wound care through   Wound culture on 5- showed Ps.aeruginosa and E.faecalis   On amoxicillin, ciprofloxacin, doxycycline  Consult wound care  Resume medications    8.  Breast cancer, mets to bone and abdomen  Follows with  oncology and hematology  Currently on oral chemo, 3 weeks on 1 week off  Resume home medications    EKG: Normal sinus rhythm  Code Status: Full  DVT/PE prophylaxis: Eliquis    All findings, assessment, and plan discussed with and confirmed by supervising physician.    Additional workup and/or treatment plan may be added today or then after based on clinical progression.  I am managing a portion of this patient's care.  Since some medical issues may be handled by other specialist.  Additional workup and treatment should be done in outpatient setting by patient's PCP and other outpatient providers.     In addition to examining and evaluating patient, I spent additional time explaining care normal and abnormal findings and treatment plan all of patient's questions were answered.  Case will be discussed with nursing staff when appropriate.  Family will be updated if and when appropriate.    Electronically signed by Sarah   LARRY Craig - CNP on 6/6/2025 at 4:36 AM    NOTE: This report was transcribed using voice recognition software. Every effort was made to ensure accuracy; however, inadvertent computerized transcription errors may be present.

## 2025-06-06 NOTE — ED NOTES
Returned from CT. Pt resting quietly. Electronically signed by Sissy Collado RN on 6/6/2025 at 1:44 AM

## 2025-06-06 NOTE — PROGRESS NOTES
Renal Adjustment Per Protocol: Cipro 500 mg PO BID changed to Cipro 500 mg daily based on CrCl < 30 ml/min  Recent Labs     06/06/25  0544   CREATININE 1.56*   Estimated Creatinine Clearance: 24 mL/min (A) (based on SCr of 1.56 mg/dL (H)).  .

## 2025-06-06 NOTE — ED TRIAGE NOTES
Pt had fall at home this evening. Home with .  Positive LOC. Pt on blood thinners. ALert and oriented. X 4

## 2025-06-06 NOTE — ED NOTES
Report called to [facility]. Personal belongings gathered. TELE verified. Electronically signed by Sissy Collado RN on 6/6/2025 at 5:11 AM

## 2025-06-06 NOTE — PROGRESS NOTES
Wound Ostomy Continence Nurse  Consult Note       NAME:  Megan Cuevas  MEDICAL RECORD NUMBER:  [id number]  AGE: 84 y.o.   GENDER: female  : 1940  TODAY'S DATE:  2025    Subjective   Reason for WOC Nurse Evaluation and Assessment: KELVIN Cuevas is a 84 y.o. female referred by:   [x] Physician  [] Nursing  [] Other:     Wound Identification:  Wound Type: traumatic  Contributing Factors:  undetermined    Wound History: Patient presenting to [facility] with a wound to the left shin that she has had for approximately 1 month. Patient follows the [facility] and reports that she does not want to return there as she is unhappy with the wound care. Patient reports she does not know how the wound happened, it just appeared.  Current Wound Care Treatment:  recommending IP consult to Podiatry as wound appears infected with devitalized tissue noted. Plurogel dressing applied at this time to promote autolytic debridement    Patient Goal of Care:  [x] Wound Healing  [] Odor Control  [] Palliative Care  [] Pain Control   [] Other:         PAST MEDICAL HISTORY        Diagnosis Date    Breast cancer (HCC)     right       PAST SURGICAL HISTORY    Past Surgical History:   Procedure Laterality Date    MASTECTOMY      right       FAMILY HISTORY    No family history on file.    SOCIAL HISTORY    Social History     Tobacco Use    Smoking status: Never    Smokeless tobacco: Never       ALLERGIES    Allergies   Allergen Reactions    Dorzolamide Other (See Comments)     Burning        MEDICATIONS    No current facility-administered medications on file prior to encounter.     Current Outpatient Medications on File Prior to Encounter   Medication Sig Dispense Refill    acetaminophen (TYLENOL) 500 MG tablet Take 2 tablets by mouth 3 times daily as needed      brimonidine-timolol (COMBIGAN) 0.2-0.5 % ophthalmic solution Place 1 drop into both eyes      calcium carb-cholecalciferol 600-10 MG-MCG        Measurements:  Wound 06/05/25 Pretibial Left (Active)   Wound Image   06/06/25 0745   Wound Etiology Traumatic 06/06/25 0745   Dressing Status New dressing applied 06/06/25 0745   Wound Cleansed Cleansed with saline 06/06/25 0745   Dressing/Treatment Other (comment) 06/06/25 0745   Dressing Change Due 06/07/25 06/06/25 0745   Wound Length (cm) 3 cm 06/06/25 0745   Wound Width (cm) 3 cm 06/06/25 0745   Wound Surface Area (cm^2) 9 cm^2 06/06/25 0745   Wound Assessment Slough;Devitalized tissue 06/06/25 0745   Drainage Amount Small (< 25%) 06/06/25 0745   Drainage Description Serosanguinous 06/06/25 0745   Odor None 06/06/25 0745   Mary-wound Assessment Blanchable erythema;Edematous 06/06/25 0745   Margins Defined edges 06/06/25 0745   Wound Thickness Description not for Pressure Injury Full thickness 06/06/25 0745   Number of days: 1     Assessment:    Left shin with wound of undetermined origin. Mary wound is covered with the topical violet cleanser which patient did not tolerate cleansing of this tissue with soap and water. Wound bed is predominately slough with only small amount of granular tissue noted. Mary wound skin is erythematous and edematous. A lot of pain present. Plurogel dressing applied.    Plan   Plan of Care: Wound 06/05/25 Pretibial Left-Dressing/Treatment: Other (comment) (Plurogel, 4x4, Roll gauze)    Specialty Bed Required : N/A   [] Low Air Loss   [] Pressure Redistribution  [] Fluid Immersion  [] Bariatric  [] Other:     Current Diet: ADULT DIET; Regular  Dietician consult:  Yes    Discharge Plan:  Placement for patient upon discharge: TBD    Patient appropriate for Outpatient Wound Care Center: Yes    Referrals:  []   [] Home Health Care  [] Supplies  [] Other    Patient/Caregiver Teaching:  Level of patient/caregiver understanding able to:   [] Indicates understanding       [] Needs reinforcement  [] Unsuccessful      [] Verbal Understanding  [] Demonstrated understanding

## 2025-06-06 NOTE — CONSULTS
Urology Consult      Megan Cuevas  1940  [id number]    Date of Admission:  6/5/2025 11:21 PM  Date of Consultation:  6/6/2025    Consultant: Tamir Luna PA-C  SupervisingPhysician: Dr. Pina  PCP:  Kranthi Metz DO       Reason for Consultation: urinary retention      C/C:   Chief Complaint   Patient presents with    Fall       History of Present Illness: Urinary Retention  Patient complains of urinary retention. Onset of retention was 1 day ago and was unknown in onset. Patient currently does have a urinary catheter in place.  554 ml of urine were drained when catheter was placed. Prior to this event voiding symptoms consisted of slow stream. Prior treatments include n/a. Recent medications that may have affected his voiding include none.    Allergies:  Allergies   Allergen Reactions    Dorzolamide Other (See Comments)     Burning        PMH: Patient has a past medical history of Breast cancer (HCC).    PSH: Patient has a past surgical history that includes Mastectomy.    Social History: Patient reports that she has never smoked. She has never used smokeless tobacco.    Family History: Patientsfamily history is not on file.    ROS:  Review of Systems   Constitutional:  Negative for fever.   HENT:  Negative for congestion.    Respiratory:  Negative for apnea.    Genitourinary:  Negative for difficulty urinating and flank pain.   Neurological:  Negative for speech difficulty.       Current Meds: sodium chloride flush 0.9 % injection 5-40 mL, 2 times per day  sodium chloride flush 0.9 % injection 5-40 mL, PRN  0.9 % sodium chloride infusion, PRN  ondansetron (ZOFRAN-ODT) disintegrating tablet 4 mg, Q8H PRN   Or  ondansetron (ZOFRAN) injection 4 mg, Q6H PRN  melatonin tablet 3 mg, Nightly  polyethylene glycol (GLYCOLAX) packet 17 g, Daily PRN  acetaminophen (TYLENOL) tablet 650 mg, Q6H PRN   Or  acetaminophen (TYLENOL) suppository 650 mg, Q6H PRN  hydrALAZINE (APRESOLINE) injection 10 mg, Q4H

## 2025-06-06 NOTE — PLAN OF CARE
See OT evaluation for all goals and OT POC. Electronically signed by Sandra Meredith OTR/L on 6/6/2025 at 1:10 PM

## 2025-06-07 ENCOUNTER — APPOINTMENT (OUTPATIENT)
Dept: GENERAL RADIOLOGY | Age: 85
DRG: 683 | End: 2025-06-07
Payer: MEDICARE

## 2025-06-07 VITALS
HEART RATE: 92 BPM | DIASTOLIC BLOOD PRESSURE: 40 MMHG | RESPIRATION RATE: 18 BRPM | SYSTOLIC BLOOD PRESSURE: 117 MMHG | WEIGHT: 150 LBS | HEIGHT: 60 IN | BODY MASS INDEX: 29.45 KG/M2 | TEMPERATURE: 99 F | OXYGEN SATURATION: 95 %

## 2025-06-07 LAB
ALBUMIN SERPL-MCNC: 3.1 G/DL (ref 3.5–4.6)
ALP SERPL-CCNC: 54 U/L (ref 40–130)
ALT SERPL-CCNC: <5 U/L (ref 0–33)
ANION GAP SERPL CALCULATED.3IONS-SCNC: 12 MEQ/L (ref 9–15)
ANISOCYTOSIS BLD QL SMEAR: ABNORMAL
AST SERPL-CCNC: 14 U/L (ref 0–35)
BASOPHILS # BLD: 0.1 K/UL (ref 0–0.2)
BASOPHILS NFR BLD: 2 %
BILIRUB SERPL-MCNC: <0.2 MG/DL (ref 0.2–0.7)
BNP BLD-MCNC: 778 PG/ML
BUN SERPL-MCNC: 17 MG/DL (ref 8–23)
CALCIUM SERPL-MCNC: 7.7 MG/DL (ref 8.5–9.9)
CHLORIDE SERPL-SCNC: 105 MEQ/L (ref 95–107)
CO2 SERPL-SCNC: 22 MEQ/L (ref 20–31)
CREAT SERPL-MCNC: 1.11 MG/DL (ref 0.5–0.9)
CRP SERPL HS-MCNC: 17.3 MG/L (ref 0–5)
EKG ATRIAL RATE: 86 BPM
EKG DIAGNOSIS: NORMAL
EKG P AXIS: 45 DEGREES
EKG P-R INTERVAL: 158 MS
EKG Q-T INTERVAL: 376 MS
EKG QRS DURATION: 84 MS
EKG QTC CALCULATION (BAZETT): 449 MS
EKG R AXIS: 20 DEGREES
EKG T AXIS: 38 DEGREES
EKG VENTRICULAR RATE: 86 BPM
EOSINOPHIL # BLD: 0.1 K/UL (ref 0–0.7)
EOSINOPHIL NFR BLD: 2 %
ERYTHROCYTE [DISTWIDTH] IN BLOOD BY AUTOMATED COUNT: 14 % (ref 11.5–14.5)
ERYTHROCYTE [SEDIMENTATION RATE] IN BLOOD BY WESTERGREN METHOD: 82 MM (ref 0–30)
GLOBULIN SER CALC-MCNC: 2.5 G/DL (ref 2.3–3.5)
GLUCOSE SERPL-MCNC: 141 MG/DL (ref 70–99)
HCT VFR BLD AUTO: 22.6 % (ref 37–47)
HGB BLD-MCNC: 7.3 G/DL (ref 12–16)
LYMPHOCYTES # BLD: 0.9 K/UL (ref 1–4.8)
LYMPHOCYTES NFR BLD: 36 %
MACROCYTES BLD QL SMEAR: ABNORMAL
MAGNESIUM SERPL-MCNC: 2.1 MG/DL (ref 1.7–2.4)
MCH RBC QN AUTO: 36.9 PG (ref 27–31.3)
MCHC RBC AUTO-ENTMCNC: 32.3 % (ref 33–37)
MCV RBC AUTO: 114.1 FL (ref 79.4–94.8)
METAMYELOCYTES NFR BLD MANUAL: 1 %
MONOCYTES # BLD: 0.1 K/UL (ref 0.2–0.8)
MONOCYTES NFR BLD: 3.9 %
NEUTROPHILS # BLD: 1.5 K/UL (ref 1.4–6.5)
NEUTS SEG NFR BLD: 55 %
PLATELET # BLD AUTO: 280 K/UL (ref 130–400)
PLATELET BLD QL SMEAR: ADEQUATE
POTASSIUM SERPL-SCNC: 4.4 MEQ/L (ref 3.4–4.9)
POTASSIUM SERPL-SCNC: 4.4 MEQ/L (ref 3.4–4.9)
PROCALCITONIN SERPL IA-MCNC: 0.09 NG/ML (ref 0–0.15)
PROT SERPL-MCNC: 5.6 G/DL (ref 6.3–8)
RBC # BLD AUTO: 1.98 M/UL (ref 4.2–5.4)
SODIUM SERPL-SCNC: 139 MEQ/L (ref 135–144)
WBC # BLD AUTO: 2.6 K/UL (ref 4.8–10.8)

## 2025-06-07 PROCEDURE — 86140 C-REACTIVE PROTEIN: CPT

## 2025-06-07 PROCEDURE — 84145 PROCALCITONIN (PCT): CPT

## 2025-06-07 PROCEDURE — 85025 COMPLETE CBC W/AUTO DIFF WBC: CPT

## 2025-06-07 PROCEDURE — 93005 ELECTROCARDIOGRAM TRACING: CPT

## 2025-06-07 PROCEDURE — 97535 SELF CARE MNGMENT TRAINING: CPT

## 2025-06-07 PROCEDURE — 6370000000 HC RX 637 (ALT 250 FOR IP)

## 2025-06-07 PROCEDURE — 71046 X-RAY EXAM CHEST 2 VIEWS: CPT

## 2025-06-07 PROCEDURE — 36415 COLL VENOUS BLD VENIPUNCTURE: CPT

## 2025-06-07 PROCEDURE — 6370000000 HC RX 637 (ALT 250 FOR IP): Performed by: INTERNAL MEDICINE

## 2025-06-07 PROCEDURE — 83735 ASSAY OF MAGNESIUM: CPT

## 2025-06-07 PROCEDURE — 83880 ASSAY OF NATRIURETIC PEPTIDE: CPT

## 2025-06-07 PROCEDURE — 85652 RBC SED RATE AUTOMATED: CPT

## 2025-06-07 PROCEDURE — 6370000000 HC RX 637 (ALT 250 FOR IP): Performed by: PHYSICIAN ASSISTANT

## 2025-06-07 PROCEDURE — 97116 GAIT TRAINING THERAPY: CPT

## 2025-06-07 PROCEDURE — 80053 COMPREHEN METABOLIC PANEL: CPT

## 2025-06-07 PROCEDURE — 99232 SBSQ HOSP IP/OBS MODERATE 35: CPT | Performed by: PSYCHIATRY & NEUROLOGY

## 2025-06-07 RX ORDER — HYDROCHLOROTHIAZIDE 12.5 MG/1
12.5 TABLET ORAL DAILY
Qty: 30 TABLET | Refills: 3 | Status: SHIPPED | OUTPATIENT
Start: 2025-06-08

## 2025-06-07 RX ORDER — TAMSULOSIN HYDROCHLORIDE 0.4 MG/1
0.4 CAPSULE ORAL DAILY
Qty: 30 CAPSULE | Refills: 3 | Status: SHIPPED | OUTPATIENT
Start: 2025-06-08

## 2025-06-07 RX ADMIN — HYDROCHLOROTHIAZIDE 12.5 MG: 25 TABLET ORAL at 08:09

## 2025-06-07 RX ADMIN — ACETAMINOPHEN 650 MG: 325 TABLET ORAL at 08:11

## 2025-06-07 RX ADMIN — TAMSULOSIN HYDROCHLORIDE 0.4 MG: 0.4 CAPSULE ORAL at 08:09

## 2025-06-07 RX ADMIN — CIPROFLOXACIN HYDROCHLORIDE 500 MG: 250 TABLET, FILM COATED ORAL at 08:09

## 2025-06-07 RX ADMIN — ACETAMINOPHEN 650 MG: 325 TABLET ORAL at 15:55

## 2025-06-07 RX ADMIN — BRIMONIDINE TARTRATE 1 DROP: 2 SOLUTION OPHTHALMIC at 08:15

## 2025-06-07 RX ADMIN — TIMOLOL MALEATE 1 DROP: 5 SOLUTION OPHTHALMIC at 08:15

## 2025-06-07 ASSESSMENT — PAIN DESCRIPTION - LOCATION
LOCATION: THROAT
LOCATION: LEG

## 2025-06-07 ASSESSMENT — PAIN DESCRIPTION - ORIENTATION
ORIENTATION: MID
ORIENTATION: RIGHT

## 2025-06-07 ASSESSMENT — PAIN SCALES - GENERAL
PAINLEVEL_OUTOF10: 2
PAINLEVEL_OUTOF10: 0

## 2025-06-07 ASSESSMENT — PAIN DESCRIPTION - DESCRIPTORS
DESCRIPTORS: ACHING
DESCRIPTORS: ACHING

## 2025-06-07 NOTE — DISCHARGE SUMMARY
Hospital Medicine Discharge Summary    Megan Cuevas  :  1940  MRN:  [id number]    Admit date:  2025  Discharge date:  2025    Admitting Physician:  Stanislaw Millan MD  Primary Care Physician:  Kranthi Metz, DO      Discharge Diagnoses:    Syncope and collapse    Chief Complaint   Patient presents with    Fall     Hospital Course:   Patient presented with syncope and found to have an CESAR.  Her CESAR resolved with holding her losartan, and nsaids as well as placing a montelongo for obstruction.  Her blood pressure is relatively normotensive so recommending stopping the losaartan and continuing her home htcz and to limit nsaids as able.    Exam on discharge:   BP (!) 117/40   Pulse 92   Temp 99 °F (37.2 °C) (Oral)   Resp 18   Ht 1.524 m (5')   Wt 68 kg (150 lb)   SpO2 95%   BMI 29.29 kg/m²   General appearance: No apparent distress, appears stated age and cooperative.  HEENT:  Conjunctivae/corneas clear.  Neck:  Trachea midline.  Respiratory:  Normal respiratory effort. Clear to auscultation  Cardiovascular: Regular rate and rhythm   Abdomen: Soft, non-tender, non-distended with normal bowel sounds.  Musculoskeletal: No clubbing, cyanosis or edema bilaterally.  Neuro: Non Focal.     Patient was seen by the following consultants   Consults:  IP CONSULT TO NEUROLOGY  IP CONSULT TO SPIRITUAL SERVICES  IP CONSULT TO PODIATRY  IP CONSULT TO NEPHROLOGY  IP CONSULT TO UROLOGY    Significant Diagnostic Studies:    Refer to chart     Please refer to chart if no studies are shown here    MRI BRAIN W WO CONTRAST  Result Date: 2025  EXAM: MRI BRAIN WITH AND WITHOUT CONTRAST 2025 07:29:45 PM TECHNIQUE: Multiplanar multisequence MRI of the head/brain was performed with and without the administration of intravenous contrast. COMPARISON: None available. CLINICAL HISTORY: Syncope / concern for metastatic disease. FINDINGS: BRAIN AND VENTRICLES: Mild volume loss is seen in the cerebrum with mild chronic  known as: ELIQUIS     brimonidine-timolol 0.2-0.5 % ophthalmic solution  Commonly known as: COMBIGAN     calcium carb-cholecalciferol 600-10 MG-MCG Tabs per tab     ciprofloxacin 500 MG tablet  Commonly known as: CIPRO     denosumab 120 MG/1.7ML Soln SC injection  Commonly known as: XGEVA     esomeprazole 40 MG delayed release capsule  Commonly known as: NEXIUM     furosemide 20 MG tablet  Commonly known as: LASIX     Inavolisib 3 MG Tabs     ondansetron 8 MG tablet  Commonly known as: ZOFRAN     ophthalmic solution Rhopressa 0.02 % Soln  Generic drug: Netarsudil Dimesylate     palbociclib 125 MG capsule  Commonly known as: IBRANCE     pravastatin 10 MG tablet  Commonly known as: PRAVACHOL     prochlorperazine 10 MG tablet  Commonly known as: COMPAZINE            STOP taking these medications      DICLOFENAC PO     losartan-hydroCHLOROthiazide 100-12.5 MG per tablet  Commonly known as: HYZAAR               Where to Get Your Medications        These medications were sent to [id number] - [place], [place] - [address] - P [phone] - F [phone]  [address] [place] [address] [place] [address]      Phone: [phone]   hydroCHLOROthiazide 12.5 MG tablet  tamsulosin 0.4 MG capsule         Disposition:   If discharged to Home, Any [facility] needs that were indicated and/or required as been addressed and set up by Social Work.     Condition at discharge: good     Activity: activity as tolerated    Total time taken for discharging this patient: 40 minutes. Greater than 70% of time was spent focused exclusively on this patient. Time was taken to review chart, discuss plans with consultants, reconciling medications, discussing plan answering questions with patient.     Signed:  Denis Smallwood MD  6/7/2025, 4:00 PM  ----------------------------------------------------------------------------------------------------------------------    Megan Cuevas,

## 2025-06-07 NOTE — PROGRESS NOTES
Physical Therapy Med Surg Daily Treatment Note  Facility/Department: [address] ORTHO TELE  Room: [place] [address]       NAME: Megan Cuevas  : 1940 (84 y.o.)  MRN: [id number]  CODE STATUS: Limited    Date of Service: 2025    Patient Diagnosis(es): Hypocalcemia [E83.51]  Syncope and collapse [R55]  Hypomagnesemia [E83.42]  Macrocytosis [D75.89]  Bilateral pleural effusion [J90]  CESAR (acute kidney injury) [N17.9]  Calculus of gallbladder without cholecystitis without obstruction [K80.20]  Hydronephrosis, unspecified hydronephrosis type [N13.30]  Anemia, unspecified type [D64.9]   Chief Complaint   Patient presents with    Fall     Patient Active Problem List    Diagnosis Date Noted    Syncope and collapse 2025    Hydronephrosis 2025    Urinary retention 2025    Alcohol withdrawal, uncomplicated (HCC) 2018    BPPV (benign paroxysmal positional vertigo), bilateral 2018        Past Medical History:   Diagnosis Date    Breast cancer (HCC)     right     Past Surgical History:   Procedure Laterality Date    MASTECTOMY      right       Chart Reviewed: Yes  Family/Caregiver Present: Yes    Restrictions:  Restrictions/Precautions: Fall Risk    SUBJECTIVE:   Subjective: pt agreeable to tx.    Pain  Pain  Pre-Pain: 0  Post-Pain: 0     OBJECTIVE:        Bed mobility  Supine to Sit: Stand by assistance  Sit to Supine: Stand by assistance  Bed Mobility Comments: Increased effort to complete transitions, HOB elevated    Transfers  Sit to Stand: Stand by assistance;Contact guard assistance  Stand to Sit: Stand by assistance  Comment: small LOB during initial stand, CGA needed to correct, pt does not c/o dizziness this session. BP taken in standing 111/55. vc's given for improved safety awareness and sequencing of AD and hands.    Ambulation  Surface: Level tile  Device: Rolling Walker  Assistance: Stand by assistance  Quality of Gait: FF posture.  Gait Deviations: Slow Crystal;Shuffles;Decreased  pt requires total physical assistance to accomplish the task

## 2025-06-07 NOTE — PLAN OF CARE
Problem: Safety - Adult  Goal: Free from fall injury  6/7/2025 0056 by Traci Pizarro RN  Outcome: Progressing  6/6/2025 1123 by Shoshana Nielson RN  Outcome: Progressing     Problem: Discharge Planning  Goal: Discharge to home or other facility with appropriate resources  6/7/2025 0056 by Traci Pizarro RN  Outcome: Progressing  6/6/2025 1123 by Shoshana Nielson RN  Outcome: Progressing     Problem: ABCDS Injury Assessment  Goal: Absence of physical injury  6/7/2025 0056 by Traci Pizarro RN  Outcome: Progressing  6/6/2025 1123 by Shoshana Nielson RN  Outcome: Progressing     Problem: Occupational Therapy - Adult  Goal: By Discharge: Performs self-care activities at highest level of function for planned discharge setting.  See evaluation for individualized goals.  6/6/2025 1310 by Sandra Meredith, OTR/L  Outcome: Progressing     Problem: Physical Therapy - Adult  Goal: By Discharge: Performs mobility at highest level of function for planned discharge setting.  See evaluation for individualized goals.  6/6/2025 1343 by Inez Rizzo, PT  Outcome: Progressing

## 2025-06-07 NOTE — CARE COORDINATION
[facility]WAIT[facility] BACK. SENT DISCHARGE ADT PACKET VIA [facility]. SPOKE W/ROSA M FROM  AND THEY ARE ABLE TO ACCEPT PT BACK. NURSE INFORMED.

## 2025-06-07 NOTE — DISCHARGE INSTR - COC
Continuity of Care Form    Patient Name: Megan Cuevas   :  1940  MRN:  [id number]    Admit date:  2025  Discharge date:  2025    Code Status Order: Limited   Advance Directives:     Admitting Physician:  Stanislaw Millan MD  PCP: Kranthi Metz DO    Discharging Nurse: PD  Discharging Hospital Unit/Room#: W295/W295-01  Discharging Unit Phone Number: [phone]    Emergency Contact:   Extended Emergency Contact Information  Primary Emergency Contact: Brian Cuevas  Address: [address] [place] [address]  Home Phone: [phone]  Mobile Phone: [phone]  Relation: Spouse    Past Surgical History:  Past Surgical History:   Procedure Laterality Date    MASTECTOMY      right       Immunization History:   Immunization History   Administered Date(s) Administered    COVID-19, MODERNA, , (age 12y+), IM, 50mcg/0.5mL 10/25/2024    COVID-19, PFIZER Bivalent, DO NOT Dilute, (age 12y+), IM, 30 mcg/0.3 mL 2022    COVID-19, PFIZER GRAY top, DO NOT Dilute, (age 12 y+), IM, 30 mcg/0.3 mL 2022    COVID-19, PFIZER PURPLE top, DILUTE for use, (age 12 y+), 30mcg/0.3mL 2021, 2021, 2021    COVID-19, PFIZER, , (age 12y+), IM, 30mcg/0.3mL 10/28/2023       Active Problems:  Patient Active Problem List   Diagnosis Code    BPPV (benign paroxysmal positional vertigo), bilateral H81.13    Alcohol withdrawal, uncomplicated (HCC) F10.930    Syncope and collapse R55    Hydronephrosis N13.30    Urinary retention R33.9       Isolation/Infection:   Isolation            No Isolation          Patient Infection Status    None to display         Nurse Assessment:  Last Vital Signs: BP (!) 117/40   Pulse 92   Temp 99 °F (37.2 °C) (Oral)   Resp 18   Ht 1.524 m (5')   Wt 68 kg (150 lb)   SpO2 95%   BMI 29.29 kg/m²     Last documented pain score (0-10 scale): Pain Level: 2  Last Weight:   Wt Readings from Last 1 Encounters:   25 68 kg (150 lb)      Mental Status:  oriented, alert, coherent, logical, thought processes intact, and able to concentrate and follow conversation    IV Access:  - None    Nursing Mobility/ADLs:  Walking   Assisted  Transfer  Assisted  Bathing  Assisted  Dressing  Assisted  Toileting  Assisted  Feeding  Independent  Med Admin  Assisted  Med Delivery   whole    Wound Care Documentation and Therapy:  Wound 06/05/25 Pretibial Left (Active)   Wound Image   06/06/25 0745   Wound Etiology Traumatic 06/06/25 0745   Dressing Status Intact 06/07/25 0807   Wound Cleansed Cleansed with saline 06/06/25 0745   Dressing/Treatment Other (comment) 06/06/25 0745   Dressing Change Due 06/07/25 06/06/25 0745   Wound Length (cm) 3 cm 06/06/25 0745   Wound Width (cm) 3 cm 06/06/25 0745   Wound Surface Area (cm^2) 9 cm^2 06/06/25 0745   Wound Assessment Slough;Devitalized tissue 06/06/25 0745   Drainage Amount Moderate (25-50%) 06/07/25 0807   Drainage Description Yellow 06/07/25 0807   Odor None 06/06/25 0745   Mary-wound Assessment Blanchable erythema;Edematous 06/06/25 0745   Margins Defined edges 06/06/25 0745   Wound Thickness Description not for Pressure Injury Full thickness 06/06/25 0745   Number of days: 2        Elimination:  Continence:   Bowel: Yes  Bladder: Yes  Urinary Catheter: Inserted Friday June 6th  Colostomy/Ileostomy/Ileal Conduit: No       Date of Last BM: 6/5    Intake/Output Summary (Last 24 hours) at 6/7/2025 1755  Last data filed at 6/7/2025 0734  Gross per 24 hour   Intake 1506.26 ml   Output 1000 ml   Net 506.26 ml     I/O last 3 completed shifts:  In: 2056.3 [I.V.:1506.3; IV Piggyback:550]  Out: 950 [Urine:950]    Safety Concerns:     History of Falls (last 30 days) and At Risk for Falls    Impairments/Disabilities:      None    Nutrition Therapy:  Current Nutrition Therapy:   - Oral Diet:  General    Routes of Feeding: Oral  Liquids: Thin Liquids  Daily Fluid Restriction: no  Last Modified Barium Swallow with Video (Video

## 2025-06-07 NOTE — PROGRESS NOTES
Nephrology Progress Note    Assessment:  Improved CESAR d/t pre renal state admission known CKD 3a heavy NSAIDS use pre-admission  Syncope admission etiology?  Breast cancer with bone metastatic dx  Anemia/leucopenia  Bilateral hydro  Left lower leg wound          Plan: maintain hydration reviewed labs & labs  feeling better today wound care following  Urology seeing    Patient Active Problem List:     BPPV (benign paroxysmal positional vertigo), bilateral     Alcohol withdrawal, uncomplicated (HCC)     Syncope and collapse     Hydronephrosis     Urinary retention      Subjective:  Admit Date: 6/5/2025    Interval History: no issues    Medications:  Scheduled Meds:   sodium chloride flush  5-40 mL IntraVENous 2 times per day    melatonin  3 mg Oral Nightly    ciprofloxacin  500 mg Oral Daily    hydroCHLOROthiazide  12.5 mg Oral Daily    palbociclib  125 mg Oral Daily    pravastatin  10 mg Oral Nightly    brimonidine  1 drop Both Eyes Q12H    timolol  1 drop Both Eyes Q12H    tamsulosin  0.4 mg Oral Daily    bacitracin zinc   Topical Once     Continuous Infusions:   sodium chloride      sodium chloride 75 mL/hr at 06/06/25 2218       CBC:   Recent Labs     06/06/25  0544 06/07/25  0507   WBC 3.8* 2.6*   HGB 8.0* 7.3*    280     CMP:    Recent Labs     06/05/25  2338 06/05/25  2341 06/06/25  0544 06/07/25  0507   *  --  136 139   K 4.4  --  4.4 4.4  4.4   CL 97  --  100 105   CO2 20  --  22 22   BUN 39*  --  33* 17   CREATININE 1.74* 1.9* 1.56* 1.11*   GLUCOSE 197*  --  173* 141*   CALCIUM 7.7*  --  8.2* 7.7*   LABGLOM 28.8* 26* 32.9* 49.5*     Troponin: No results for input(s): \"TROPONINI\" in the last 72 hours.  BNP: No results for input(s): \"BNP\" in the last 72 hours.  INR:   Recent Labs     06/06/25  0034   INR 1.3     Lipids: No results for input(s): \"CHOL\", \"LDLDIRECT\", \"TRIG\", \"HDL\", \"AMYLASE\", \"LIPASE\" in the last 72 hours.  Liver:   Recent Labs     06/07/25  0507   AST 14   ALT <5   ALKPHOS 54    BILITOT <0.2     Iron:  No results for input(s): \"FERRITIN\" in the last 72 hours.    Invalid input(s): \"IRONS\", \"LABIRONS\"  Urinalysis: No results for input(s): \"UA\" in the last 72 hours.    Objective:  Vitals: BP (!) 115/49   Pulse 89   Temp 97.9 °F (36.6 °C) (Oral)   Resp 19   Ht 1.524 m (5')   Wt 68 kg (150 lb)   SpO2 95%   BMI 29.29 kg/m²    Wt Readings from Last 3 Encounters:   06/05/25 68 kg (150 lb)   03/21/23 82.6 kg (182 lb)   05/27/22 82.6 kg (182 lb)      24HR INTAKE/OUTPUT:    Intake/Output Summary (Last 24 hours) at 6/7/2025 0921  Last data filed at 6/7/2025 0734  Gross per 24 hour   Intake 1506.26 ml   Output 1950 ml   Net -443.74 ml       General: alert, in no apparent distress  HEENT: normocephalic, atraumatic, anicteric  Neck: supple, no mass  Lungs: non-labored respirations, clear to auscultation bilaterally  Heart: regular rate and rhythm, no murmurs or rubs  Abdomen: soft, non-tender, non-distended  Ext: no cyanosis, no peripheral edema  Neuro: alert and oriented, no gross abnormalities  Psych: normal mood and affect  Skin: no rash      Electronically signed by German Luna DO

## 2025-06-07 NOTE — PROGRESS NOTES
If your child received fluoride varnish today, here are some general guidelines for the rest of the day.    Your child can eat and drink right away after varnish is applied but should AVOID hot liquids or sticky/crunchy foods for 24 hours.    Don't brush or floss your teeth for the next 4-6 hours and resume regular brushing, flossing and dental checkups after this initial time period.    Patient Education    BRIGHT FUTURES HANDOUT- PARENT  18 MONTH VISIT  Here are some suggestions from [employer] experts that may be of value to your family.     YOUR CHILD S BEHAVIOR  Expect your child to cling to you in new situations or to be anxious around strangers.  Play with your child each day by doing things she likes.  Be consistent in discipline and setting limits for your child.  Plan ahead for difficult situations and try things that can make them easier. Think about your day and your child s energy and mood.  Wait until your child is ready for toilet training. Signs of being ready for toilet training include  Staying dry for 2 hours  Knowing if she is wet or dry  Can pull pants down and up  Wanting to learn  Can tell you if she is going to have a bowel movement  Read books about toilet training with your child.  Praise sitting on the potty or toilet.  If you are expecting a new baby, you can read books about being a big brother or sister.  Recognize what your child is able to do. Don t ask her to do things she is not ready to do at this age.    YOUR CHILD AND TV  Do activities with your child such as reading, playing games, and singing.  Be active together as a family. Make sure your child is active at home, in , and with sitters.  If you choose to introduce media now,  Choose high-quality programs and apps.  Use them together.  Limit viewing to 1 hour or less each day.  Avoid using TV, tablets, or smartphones to keep your child busy.  Be aware of how much media you use.    TALKING AND HEARING  Read and  sing to your child often.  Talk about and describe pictures in books.  Use simple words with your child.  Suggest words that describe emotions to help your child learn the language of feelings.  Ask your child simple questions, offer praise for answers, and explain simply.  Use simple, clear words to tell your child what you want him to do.    HEALTHY EATING  Offer your child a variety of healthy foods and snacks, especially vegetables, fruits, and lean protein.  Give one bigger meal and a few smaller snacks or meals each day.  Let your child decide how much to eat.  Give your child 16 to 24 oz of milk each day.  Know that you don t need to give your child juice. If you do, don t give more than 4 oz a day of 100% juice and serve it with meals.  Give your toddler many chances to try a new food. Allow her to touch and put new food into her mouth so she can learn about them.    SAFETY  Make sure your child s car safety seat is rear facing until he reaches the highest weight or height allowed by the car safety seat s . This will probably be after the second birthday.  Never put your child in the front seat of a vehicle that has a passenger airbag. The back seat is the safest.  Everyone should wear a seat belt in the car.  Keep poisons, medicines, and lawn and cleaning supplies in locked cabinets, out of your child s sight and reach.  Put the Poison Help number into all phones, including cell phones. Call if you are worried your child has swallowed something harmful. Do not make your child vomit.  When you go out, put a hat on your child, have him wear sun protection clothing, and apply sunscreen with SPF of 15 or higher on his exposed skin. Limit time outside when the sun is strongest (11:00 am-3:00 pm).  If it is necessary to keep a gun in your home, store it unloaded and locked with the ammunition locked separately.    WHAT TO EXPECT AT YOUR CHILD S 4 YEAR VISIT  We will talk about  Caring for your child,  your family, and yourself  Handling your child s behavior  Supporting your talking child  Starting toilet training  Keeping your child safe at home, outside, and in the car        Helpful Resources: Poison Help Line:  724.134.2304  Information About Car Safety Seats: www.safercar.gov/parents  Toll-free Auto Safety Hotline: 960.269.3172  Consistent with Bright Futures: Guidelines for Health Supervision of Infants, Children, and Adolescents, 4th Edition  For more information, go to https://brightfutures.aap.org.                    and/or weight based adjustment of the mA/kV was utilized to reduce the radiation dose to as low as reasonably achievable.; CT of the face was performed without the administration of intravenous contrast. Multiplanar reformatted images are provided for review. Automated exposure control, iterative reconstruction, and/or weight based adjustment of the mA/kV was utilized to reduce the radiation dose to as low as reasonably achievable.; CT of the cervical spine was performed without the administration of intravenous contrast. Multiplanar reformatted images are provided for review. Automated exposure control, iterative reconstruction, and/or weight based adjustment of the mA/kV was utilized to reduce the radiation dose to as low as reasonably achievable. COMPARISON: None. HISTORY: ORDERING SYSTEM PROVIDED HISTORY: fall with loc on eliquis TECHNOLOGIST PROVIDED HISTORY: Reason for exam:->fall with loc on eliquis Has a \"code stroke\" or \"stroke alert\" been called?->No Decision Support Exception - unselect if not a suspected or confirmed emergency medical condition->Emergency Medical Condition (MA) What reading provider will be dictating this exam?->CRC; ORDERING SYSTEM PROVIDED HISTORY: facial injury TECHNOLOGIST PROVIDED HISTORY: Reason for exam:->facial injury Decision Support Exception - unselect if not a suspected or confirmed emergency medical condition->Emergency Medical Condition (MA) What reading provider will be dictating this exam?->CRC; ORDERING SYSTEM PROVIDED HISTORY: fall with head injury TECHNOLOGIST PROVIDED HISTORY: Reason for exam:->fall with head injury Decision Support Exception - unselect if not a suspected or confirmed emergency medical condition->Emergency Medical Condition (MA) What reading provider will be dictating this exam?->CRC FINDINGS: CT HEAD: BRAIN/VENTRICLES: There is no acute intracranial hemorrhage, mass effect or midline shift. No abnormal extra-axial fluid collection.  The gray-white  differentiation is maintained without evidence of an acute infarct.  There is no evidence of hydrocephalus. CT FACIAL BONES: FACIAL BONES: There is diffuse sclerosis of the bones.  The maxilla, pterygoid plates and zygomatic arches are intact.  The mandible is intact. The left lower 1st molar is absent, and there is a small amount of air within the socket.  The mandibular condyles are flattened but normally situated. The nasal bones and maxillary nasal processes are intact. ORBITS: The globes appear intact.  The extraocular muscles, optic nerve sheath complexes and lacrimal glands appear unremarkable.  No retrobulbar hematoma or mass is seen.  The orbital walls and rims are intact.  Right cataract surgery. SINUSES/MASTOIDS: The paranasal sinuses and mastoid air cells are well aerated.  No acute fracture is seen. SOFT TISSUES: No acute abnormality of the visualized skull or soft tissues. CT CERVICAL SPINE: BONES/ALIGNMENT: There is no acute fracture or traumatic malalignment.  There is diffuse sclerosis. DEGENERATIVE CHANGES: No severe osseous spinal canal stenosis. SOFT TISSUES: Bilateral pleural effusions are incompletely visualized..     No acute intracranial abnormality. No acute traumatic injury of the facial bones.  The left lower 1st molar is absent, and there is a small amount of air within the tooth socket. Correlate for recent extraction. No acute fracture or traumatic malalignment of the cervical spine. There is diffuse osseous sclerosis.     XR TIBIA FIBULA LEFT (2 VIEWS)  Result Date: 6/6/2025  EXAMINATION: 2 XRAY VIEWS OF THE LEFT TIBIA AND FIBULA 6/6/2025 12:02 am COMPARISON: None. HISTORY: ORDERING SYSTEM PROVIDED HISTORY: fall with leg pain TECHNOLOGIST PROVIDED HISTORY: Reason for exam:->fall with leg pain What reading provider will be dictating this exam?->CRC FINDINGS: Narrowing of the lateral femorotibial compartment.  The left tibia and fibula appear intact. There are scattered areas of  sclerosis in the distal femur and proximal tibia and fibula..     The left tibia and fibula appear intact. Degenerative changes of the knee.       Recent Labs     06/05/25 2338 06/06/25 0544 06/07/25  0507   WBC 4.2* 3.8* 2.6*   HGB 8.0* 8.0* 7.3*    314 280     Recent Labs     06/05/25 2338 06/05/25  2341 06/06/25 0544 06/07/25  0507   *  --  136 139   K 4.4  --  4.4 4.4  4.4   CL 97  --  100 105   CO2 20  --  22 22   BUN 39*  --  33* 17   CREATININE 1.74* 1.9* 1.56* 1.11*   GLUCOSE 197*  --  173* 141*     Recent Labs     06/05/25 2338 06/06/25 0544 06/07/25  0507   BILITOT <0.2 <0.2 <0.2   ALKPHOS 58 60 54   AST 15 16 14   ALT 7 7 <5     Lab Results   Component Value Date/Time    PROTIME 17.1 06/06/2025 12:34 AM    INR 1.3 06/06/2025 12:34 AM     No results found for: \"LITHIUM\", \"DILFRTOT\", \"VALPROATE\"    ASSESSMENT AND PLAN  Syncope without any other sequelae except for closed head injury without postconcussive symptoms.  MRI reviewed and no acute stroke noted though patient has some enhancement of the meninges as seen in cerebral hypotension.  Patient is not orthostatic.  She has no headache or any findings to suggest a pathology of the manage is at least at this time.  This will require follow-up in 6 months.  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  Findings discussed with the patient    Horacio Park MD, REINIER  Diplomate, American Board of Psychiatry & Neurology  Board Certified in Vascular Neurology  Board Certified in Neuromuscular Medicine  Certified in Neurorehabilitation

## 2025-06-07 NOTE — PROGRESS NOTES
Physical Therapy Missed Treatment   Facility/Department: [facility] MED SURG W295/W295-01    NAME: Megan Cuevas    : 1940 (84 y.o.)  MRN: [id number]    Account: [id number]  Gender: female    Chart reviewed, attempted PT at 1030. Patient unavailable 2° to:        [x] Pt.. going off floor for test/procedure.   Watch BP.  Now on O2  Chest xray          Will attempt PT treatment again at earliest convenience.      Electronically signed by Cinda Ocampo PTA on 25 at 10:35 AM EDT

## 2025-06-07 NOTE — FLOWSHEET NOTE
Discharge instructions given, patient verbalized understanding. Teaching completed on care  of the montelongo cath. Aware of new medications and to continue Cipro at home. JAVID given for HHC. HHC set up per Wanda CAMPOS. IV and tele removed. Discharged to home with her .

## 2025-06-08 ENCOUNTER — HOME CARE VISIT (OUTPATIENT)
Dept: HOME HEALTH SERVICES | Facility: HOME HEALTH | Age: 85
End: 2025-06-08
Payer: MEDICARE

## 2025-06-08 PROCEDURE — 95816 EEG AWAKE AND DROWSY: CPT | Performed by: PSYCHIATRY & NEUROLOGY

## 2025-06-08 NOTE — PROGRESS NOTES
Physical Therapy  Facility/Department: [facility] MED SURG W295/W295-01  Physical Therapy Discharge      NAME: Megan Cuevas    : 1940 (84 y.o.)  MRN: [id number]    Account: [id number]  Gender: female      Patient has been discharged from acute care hospital. DC patient from current PT program.      Electronically signed by Korin Solorio PT on 25 at 8:20 AM EDT

## 2025-06-08 NOTE — PROCEDURES
[facility]                   [address] [place] [address]                          ELECTROENCEPHALOGRAM      PATIENT NAME: KEHINDE CARREON             : 1940  MED REC NO: [id number]                        ROOM: W295  ACCOUNT NO: [id number]                       ADMIT DATE: 2025  PROVIDER: Fuad Campbell MD      MEDICATIONS:  Zofran, Apresoline, Combigan, and Lasix.    DESCRIPTION:  This is a spontaneous 20-channel EEG recording for this patient with questionable seizure disorder.  The background rhythm of this EEG shows well-regulated 9 hertz posterior dominant rhythm of alpha.  This is symmetrical and attenuates with eye opening. EKG was monitored, this is regular.  Photic stimulation was performed without any photic responses.  Hyperventilation was not performed due to the patient's sleep.  As the record proceeds, some drowsy sleep patterns are seen with some very subtle metabolic triphasic waves.    IMPRESSION:  This is an essentially normal EEG recording with some drowsy patterns.  Clinical correlation is recommended.          FUAD CAMPBELL MD      D:  2025 12:41:17     T:  2025 13:10:21     EUNICE/CHERI  Job #:  [id number]     Doc#:  [id number]

## 2025-06-09 ENCOUNTER — HOME CARE VISIT (OUTPATIENT)
Dept: HOME HEALTH SERVICES | Facility: HOME HEALTH | Age: 85
End: 2025-06-09
Payer: MEDICARE

## 2025-06-09 VITALS
OXYGEN SATURATION: 95 % | DIASTOLIC BLOOD PRESSURE: 60 MMHG | SYSTOLIC BLOOD PRESSURE: 124 MMHG | HEART RATE: 82 BPM | RESPIRATION RATE: 16 BRPM | TEMPERATURE: 98.1 F

## 2025-06-09 LAB
EKG ATRIAL RATE: 86 BPM
EKG DIAGNOSIS: NORMAL
EKG P AXIS: 45 DEGREES
EKG P-R INTERVAL: 158 MS
EKG Q-T INTERVAL: 376 MS
EKG QRS DURATION: 84 MS
EKG QTC CALCULATION (BAZETT): 449 MS
EKG R AXIS: 20 DEGREES
EKG T AXIS: 38 DEGREES
EKG VENTRICULAR RATE: 86 BPM

## 2025-06-09 PROCEDURE — G0299 HHS/HOSPICE OF RN EA 15 MIN: HCPCS | Mod: HHH

## 2025-06-10 ENCOUNTER — PATIENT OUTREACH (OUTPATIENT)
Dept: PRIMARY CARE | Facility: CLINIC | Age: 85
End: 2025-06-10
Payer: MEDICARE

## 2025-06-10 ENCOUNTER — TELEPHONE (OUTPATIENT)
Dept: PRIMARY CARE | Facility: CLINIC | Age: 85
End: 2025-06-10
Payer: MEDICARE

## 2025-06-10 NOTE — TELEPHONE ENCOUNTER
----- Message from Rosa CALDERÓN sent at 6/10/2025  1:57 PM EDT -----  Regarding: FW: TCM no contact    ----- Message -----  From: Catherine Boykin MA  Sent: 6/10/2025   1:21 PM EDT  To:  [id number] [id number] Clinical Support Staff  Subject: TCM no contact                                   This patient was discharged from:  [facility]  Discharge diagnosis:  Anemia, TSERING, Hypomagnesemia, Hypocalcemia, Macrocytosis, Hydronephrosis, B/L Pleural Effusions, Calculus of Gallbladder   Date of discharge: 6/7/2025            Please reach out to patient and schedule an appointment BY:6/20/2025  Thank You!

## 2025-06-10 NOTE — PROGRESS NOTES
Discharge Facility:[facility]  Discharge Diagnosis:  Anemia, unspecified type   TSERING    Hypomagnesemia  Hypocalcemia  Macrocytosis  Hydronephrosis, unspecified hydronephrosis type   Bilateral pleural effusion  Calculus of gallbladder     Admission Date:6/5/2025  Discharge Date:6/7/2025     PCP Appointment Date:TBD  Specialist Appointment Date:   6/11/2025 Wound/Hehemann  6/13/2025 Hem/Onc [facility] Encounter and Summary Linked: Yes     Hospital Encounter with Rosana Reed MD; Jaren Whitney MD   *Two attempts were made to reach patient within two business days after discharge. Left voicemail with contact information for patient to call back with any non-emergent questions or concerns.

## 2025-06-11 ENCOUNTER — LAB (OUTPATIENT)
Dept: LAB | Facility: CLINIC | Age: 85
End: 2025-06-11
Payer: MEDICARE

## 2025-06-11 ENCOUNTER — HOME CARE VISIT (OUTPATIENT)
Dept: HOME HEALTH SERVICES | Facility: HOME HEALTH | Age: 85
End: 2025-06-11
Payer: MEDICARE

## 2025-06-11 ENCOUNTER — SPECIALTY PHARMACY (OUTPATIENT)
Dept: PHARMACY | Facility: CLINIC | Age: 85
End: 2025-06-11

## 2025-06-11 ENCOUNTER — OFFICE VISIT (OUTPATIENT)
Dept: WOUND CARE | Facility: CLINIC | Age: 85
End: 2025-06-11
Payer: MEDICARE

## 2025-06-11 DIAGNOSIS — C50.911 CARCINOMA OF RIGHT BREAST METASTATIC TO BONE: ICD-10-CM

## 2025-06-11 DIAGNOSIS — M89.8X9 LYTIC BONE LESIONS ON XRAY: ICD-10-CM

## 2025-06-11 DIAGNOSIS — C79.51 CARCINOMA OF RIGHT BREAST METASTATIC TO BONE: ICD-10-CM

## 2025-06-11 LAB
ALBUMIN SERPL BCP-MCNC: 4 G/DL (ref 3.4–5)
ALP SERPL-CCNC: 52 U/L (ref 33–136)
ALT SERPL W P-5'-P-CCNC: 7 U/L (ref 7–45)
ANION GAP SERPL CALC-SCNC: 14 MMOL/L (ref 10–20)
AST SERPL W P-5'-P-CCNC: 13 U/L (ref 9–39)
BASOPHILS # BLD AUTO: 0.03 X10*3/UL (ref 0–0.1)
BASOPHILS NFR BLD AUTO: 1 %
BILIRUB SERPL-MCNC: 0.3 MG/DL (ref 0–1.2)
BUN SERPL-MCNC: 14 MG/DL (ref 6–23)
CALCIUM SERPL-MCNC: 8.6 MG/DL (ref 8.6–10.3)
CHLORIDE SERPL-SCNC: 97 MMOL/L (ref 98–107)
CO2 SERPL-SCNC: 27 MMOL/L (ref 21–32)
CREAT SERPL-MCNC: 1.27 MG/DL (ref 0.5–1.05)
EGFRCR SERPLBLD CKD-EPI 2021: 42 ML/MIN/1.73M*2
EOSINOPHIL # BLD AUTO: 0.07 X10*3/UL (ref 0–0.4)
EOSINOPHIL NFR BLD AUTO: 2.4 %
ERYTHROCYTE [DISTWIDTH] IN BLOOD BY AUTOMATED COUNT: 13.5 % (ref 11.5–14.5)
GLUCOSE SERPL-MCNC: 133 MG/DL (ref 74–99)
HCT VFR BLD AUTO: 23.8 % (ref 36–46)
HGB BLD-MCNC: 7.7 G/DL (ref 12–16)
IMM GRANULOCYTES # BLD AUTO: 0 X10*3/UL (ref 0–0.5)
IMM GRANULOCYTES NFR BLD AUTO: 0 % (ref 0–0.9)
LYMPHOCYTES # BLD AUTO: 1.16 X10*3/UL (ref 0.8–3)
LYMPHOCYTES NFR BLD AUTO: 40 %
MCH RBC QN AUTO: 37.7 PG (ref 26–34)
MCHC RBC AUTO-ENTMCNC: 32.4 G/DL (ref 32–36)
MCV RBC AUTO: 117 FL (ref 80–100)
MONOCYTES # BLD AUTO: 0.22 X10*3/UL (ref 0.05–0.8)
MONOCYTES NFR BLD AUTO: 7.6 %
NEUTROPHILS # BLD AUTO: 1.42 X10*3/UL (ref 1.6–5.5)
NEUTROPHILS NFR BLD AUTO: 49 %
PLATELET # BLD AUTO: 192 X10*3/UL (ref 150–450)
POTASSIUM SERPL-SCNC: 4.5 MMOL/L (ref 3.5–5.3)
PROT SERPL-MCNC: 6.6 G/DL (ref 6.4–8.2)
RBC # BLD AUTO: 2.04 X10*6/UL (ref 4–5.2)
SODIUM SERPL-SCNC: 133 MMOL/L (ref 136–145)
WBC # BLD AUTO: 2.9 X10*3/UL (ref 4.4–11.3)

## 2025-06-11 PROCEDURE — 11042 DBRDMT SUBQ TIS 1ST 20SQCM/<: CPT

## 2025-06-11 PROCEDURE — 36415 COLL VENOUS BLD VENIPUNCTURE: CPT

## 2025-06-11 PROCEDURE — G0151 HHCP-SERV OF PT,EA 15 MIN: HCPCS | Mod: HHH

## 2025-06-11 PROCEDURE — RXMED WILLOW AMBULATORY MEDICATION CHARGE

## 2025-06-11 PROCEDURE — 80053 COMPREHEN METABOLIC PANEL: CPT

## 2025-06-11 PROCEDURE — 85025 COMPLETE CBC W/AUTO DIFF WBC: CPT

## 2025-06-11 PROCEDURE — 11045 DBRDMT SUBQ TISS EACH ADDL: CPT | Mod: 59

## 2025-06-11 PROCEDURE — 86300 IMMUNOASSAY TUMOR CA 15-3: CPT

## 2025-06-11 SDOH — ECONOMIC STABILITY: HOUSING INSECURITY: HOME SAFETY: HAS HOME CARE FOLDER IN THE HOME

## 2025-06-11 ASSESSMENT — ENCOUNTER SYMPTOMS
LOWEST PAIN SEVERITY IN PAST 24 HOURS: 3/10
PAIN LOCATION - PAIN SEVERITY: 7/10
PAIN LOCATION: LEFT LEG
PAIN SEVERITY GOAL: 4/10
LOWEST PAIN SEVERITY IN PAST 24 HOURS: 4/10
LOWER EXTREMITY EDEMA: 1
PAIN LOCATION: LEFT KNEE
HIGHEST PAIN SEVERITY IN PAST 24 HOURS: 7/10
PERSON REPORTING PAIN: PATIENT
MUSCLE WEAKNESS: 1
PAIN: 1
PAIN: 1
PERSON REPORTING PAIN: PATIENT
LAST BOWEL MOVEMENT: 67363
PAIN LOCATION: RIGHT KNEE
PAIN LOCATION - PAIN SEVERITY: 4/10
APPETITE LEVEL: FAIR
HIGHEST PAIN SEVERITY IN PAST 24 HOURS: 7/10
PAIN LOCATION - PAIN SEVERITY: 4/10

## 2025-06-11 ASSESSMENT — ACTIVITIES OF DAILY LIVING (ADL)
AMBULATION_DISTANCE/DURATION_TOLERATED: 100'
ENTERING_EXITING_HOME: SUPERVISION
OASIS_M1830: 05
AMBULATION ASSISTANCE ON FLAT SURFACES: 1

## 2025-06-12 ENCOUNTER — PHARMACY VISIT (OUTPATIENT)
Dept: PHARMACY | Facility: CLINIC | Age: 85
End: 2025-06-12
Payer: COMMERCIAL

## 2025-06-12 ENCOUNTER — HOME CARE VISIT (OUTPATIENT)
Dept: HOME HEALTH SERVICES | Facility: HOME HEALTH | Age: 85
End: 2025-06-12
Payer: MEDICARE

## 2025-06-12 LAB — CANCER AG27-29 SERPL-ACNC: 203.4 U/ML (ref 0–38.6)

## 2025-06-12 PROCEDURE — G0152 HHCP-SERV OF OT,EA 15 MIN: HCPCS | Mod: HHH

## 2025-06-13 ENCOUNTER — INFUSION (OUTPATIENT)
Dept: HEMATOLOGY/ONCOLOGY | Facility: CLINIC | Age: 85
End: 2025-06-13
Payer: MEDICARE

## 2025-06-13 ENCOUNTER — OFFICE VISIT (OUTPATIENT)
Dept: HEMATOLOGY/ONCOLOGY | Facility: CLINIC | Age: 85
End: 2025-06-13
Payer: MEDICARE

## 2025-06-13 ENCOUNTER — HOME CARE VISIT (OUTPATIENT)
Dept: HOME HEALTH SERVICES | Facility: HOME HEALTH | Age: 85
End: 2025-06-13
Payer: MEDICARE

## 2025-06-13 ENCOUNTER — APPOINTMENT (OUTPATIENT)
Dept: HEMATOLOGY/ONCOLOGY | Facility: CLINIC | Age: 85
End: 2025-06-13
Payer: MEDICARE

## 2025-06-13 VITALS
HEART RATE: 78 BPM | DIASTOLIC BLOOD PRESSURE: 62 MMHG | RESPIRATION RATE: 16 BRPM | SYSTOLIC BLOOD PRESSURE: 133 MMHG | WEIGHT: 154.76 LBS | TEMPERATURE: 98.1 F | OXYGEN SATURATION: 93 % | BODY MASS INDEX: 30.23 KG/M2

## 2025-06-13 DIAGNOSIS — D61.82 MYELOPHTHISIC ANEMIA: ICD-10-CM

## 2025-06-13 DIAGNOSIS — I10 ESSENTIAL HYPERTENSION: ICD-10-CM

## 2025-06-13 DIAGNOSIS — K21.00 GASTROESOPHAGEAL REFLUX DISEASE WITH ESOPHAGITIS WITHOUT HEMORRHAGE: ICD-10-CM

## 2025-06-13 DIAGNOSIS — E78.2 MIXED HYPERLIPIDEMIA: ICD-10-CM

## 2025-06-13 DIAGNOSIS — C50.911 CARCINOMA OF RIGHT BREAST METASTATIC TO BONE: ICD-10-CM

## 2025-06-13 DIAGNOSIS — H40.9 GLAUCOMA OF BOTH EYES, UNSPECIFIED GLAUCOMA TYPE: ICD-10-CM

## 2025-06-13 DIAGNOSIS — M89.8X9 LYTIC LESION OF BONE ON X-RAY: ICD-10-CM

## 2025-06-13 DIAGNOSIS — C79.51 CARCINOMA OF RIGHT BREAST METASTATIC TO BONE: Primary | ICD-10-CM

## 2025-06-13 DIAGNOSIS — C79.51 CARCINOMA OF RIGHT BREAST METASTATIC TO BONE: ICD-10-CM

## 2025-06-13 DIAGNOSIS — I82.451 ACUTE DEEP VEIN THROMBOSIS (DVT) OF RIGHT PERONEAL VEIN (MULTI): ICD-10-CM

## 2025-06-13 DIAGNOSIS — M89.8X9 LYTIC BONE LESIONS ON XRAY: ICD-10-CM

## 2025-06-13 DIAGNOSIS — M19.90 ARTHRITIS: ICD-10-CM

## 2025-06-13 DIAGNOSIS — C50.911 CARCINOMA OF RIGHT BREAST METASTATIC TO BONE: Primary | ICD-10-CM

## 2025-06-13 PROCEDURE — 1159F MED LIST DOCD IN RCRD: CPT | Performed by: INTERNAL MEDICINE

## 2025-06-13 PROCEDURE — 3078F DIAST BP <80 MM HG: CPT | Performed by: INTERNAL MEDICINE

## 2025-06-13 PROCEDURE — 3075F SYST BP GE 130 - 139MM HG: CPT | Performed by: INTERNAL MEDICINE

## 2025-06-13 PROCEDURE — 1126F AMNT PAIN NOTED NONE PRSNT: CPT | Performed by: INTERNAL MEDICINE

## 2025-06-13 PROCEDURE — G2211 COMPLEX E/M VISIT ADD ON: HCPCS | Performed by: INTERNAL MEDICINE

## 2025-06-13 PROCEDURE — 2500000004 HC RX 250 GENERAL PHARMACY W/ HCPCS (ALT 636 FOR OP/ED): Mod: JZ,TB | Performed by: INTERNAL MEDICINE

## 2025-06-13 PROCEDURE — 99214 OFFICE O/P EST MOD 30 MIN: CPT | Performed by: INTERNAL MEDICINE

## 2025-06-13 PROCEDURE — 96402 CHEMO HORMON ANTINEOPL SQ/IM: CPT

## 2025-06-13 PROCEDURE — 99214 OFFICE O/P EST MOD 30 MIN: CPT | Mod: 25 | Performed by: INTERNAL MEDICINE

## 2025-06-13 PROCEDURE — 1160F RVW MEDS BY RX/DR IN RCRD: CPT | Performed by: INTERNAL MEDICINE

## 2025-06-13 RX ORDER — FAMOTIDINE 10 MG/ML
20 INJECTION, SOLUTION INTRAVENOUS ONCE AS NEEDED
Status: DISCONTINUED | OUTPATIENT
Start: 2025-06-13 | End: 2025-06-13 | Stop reason: HOSPADM

## 2025-06-13 RX ORDER — DIPHENHYDRAMINE HYDROCHLORIDE 50 MG/ML
50 INJECTION, SOLUTION INTRAMUSCULAR; INTRAVENOUS AS NEEDED
OUTPATIENT
Start: 2025-07-11

## 2025-06-13 RX ORDER — EPINEPHRINE 0.3 MG/.3ML
0.3 INJECTION SUBCUTANEOUS EVERY 5 MIN PRN
OUTPATIENT
Start: 2025-07-11

## 2025-06-13 RX ORDER — LAMOTRIGINE 25 MG/1
500 TABLET ORAL ONCE
Status: COMPLETED | OUTPATIENT
Start: 2025-06-13 | End: 2025-06-13

## 2025-06-13 RX ORDER — LAMOTRIGINE 25 MG/1
500 TABLET ORAL ONCE
OUTPATIENT
Start: 2025-07-11

## 2025-06-13 RX ORDER — DIPHENHYDRAMINE HYDROCHLORIDE 50 MG/ML
50 INJECTION, SOLUTION INTRAMUSCULAR; INTRAVENOUS AS NEEDED
Status: DISCONTINUED | OUTPATIENT
Start: 2025-06-13 | End: 2025-06-13 | Stop reason: HOSPADM

## 2025-06-13 RX ORDER — FAMOTIDINE 10 MG/ML
20 INJECTION, SOLUTION INTRAVENOUS ONCE AS NEEDED
OUTPATIENT
Start: 2025-07-11

## 2025-06-13 RX ORDER — ALBUTEROL SULFATE 0.83 MG/ML
3 SOLUTION RESPIRATORY (INHALATION) AS NEEDED
OUTPATIENT
Start: 2025-07-11

## 2025-06-13 RX ORDER — EPINEPHRINE 0.3 MG/.3ML
0.3 INJECTION SUBCUTANEOUS EVERY 5 MIN PRN
Status: DISCONTINUED | OUTPATIENT
Start: 2025-06-13 | End: 2025-06-13 | Stop reason: HOSPADM

## 2025-06-13 RX ORDER — ALBUTEROL SULFATE 0.83 MG/ML
3 SOLUTION RESPIRATORY (INHALATION) AS NEEDED
Status: DISCONTINUED | OUTPATIENT
Start: 2025-06-13 | End: 2025-06-13 | Stop reason: HOSPADM

## 2025-06-13 RX ADMIN — FULVESTRANT 500 MG: 50 INJECTION, SOLUTION INTRAMUSCULAR at 13:45

## 2025-06-13 SDOH — ECONOMIC STABILITY: HOUSING INSECURITY: HOME SAFETY: LIVES IN 1 STORY CONDO WITH HUSBAND. SON JUST MOVED INTO CONDO UPSTAIRS.

## 2025-06-13 ASSESSMENT — ACTIVITIES OF DAILY LIVING (ADL)
AMBULATION ASSISTANCE: 1
DRESSING_LB_CURRENT_FUNCTION: MINIMUM ASSIST
GROOMING_CURRENT_FUNCTION: SUPERVISION
LIGHT HOUSEKEEPING: NEEDS ASSISTANCE
DRESSING_UB_CURRENT_FUNCTION: SUPERVISION
BATHING ASSESSED: 1
LAUNDRY ASSESSED: 1
PREPARING MEALS: NEEDS ASSISTANCE
GROOMING ASSESSED: 1
HOUSEKEEPING ASSESSED: 1
BATHING_CURRENT_FUNCTION: SUPERVISION
LAUNDRY: NEEDS ASSISTANCE
GROOMING EQUIPMENT USED: SETUP
AMBULATION ASSISTANCE: SUPERVISION
TOILETING: 1
TOILETING: SUPERVISION

## 2025-06-13 ASSESSMENT — ENCOUNTER SYMPTOMS
PAIN LOCATION - PAIN DURATION: 1 MONTH
PAIN: 1
SUBJECTIVE PAIN PROGRESSION: UNCHANGED
PAIN LOCATION - PAIN SEVERITY: 2/10
PAIN LOCATION - PAIN FREQUENCY: CONSTANT
LOWEST PAIN SEVERITY IN PAST 24 HOURS: 2/10
PERSON REPORTING PAIN: PATIENT
PAIN LOCATION - EXACERBATING FACTORS: WOUND
PAIN SEVERITY GOAL: 0/10
HIGHEST PAIN SEVERITY IN PAST 24 HOURS: 10/10
PAIN LOCATION: LEFT LEG
PAIN LOCATION - PAIN QUALITY: ACHES

## 2025-06-13 ASSESSMENT — PAIN SCALES - GENERAL: PAINLEVEL_OUTOF10: 0-NO PAIN

## 2025-06-13 NOTE — PATIENT INSTRUCTIONS
Continue taking the inavolisib and palbociclib    You will not receive xgeva today    You will continue with monthly faslodex

## 2025-06-13 NOTE — PROGRESS NOTES
Patient ID: Arabella Springer is a 84 y.o. female.  Referring Physician: Andrew Mcfarland MD  [id number]  [place] [id number]  Primary Care Provider: Angel Luis Velazquez DO  Visit Type: Follow Up      Subjective    HPI I was hospitalized at [facility]  I have a Sanchez catheter  I had a tooth pulled on May 19    Review of Systems   Constitutional:  Positive for fatigue.   HENT:  Negative.     Eyes: Negative.    Respiratory: Negative.     Cardiovascular:  Positive for leg swelling.   Gastrointestinal: Negative.    Endocrine: Negative.    Genitourinary:  Positive for difficulty urinating.    Musculoskeletal: Negative.    Skin: Negative.    Neurological: Negative.    Hematological: Negative.         Objective   BSA: 1.72 meters squared  /62 (BP Location: Left arm, Patient Position: Sitting, BP Cuff Size: Adult)   Pulse 78   Temp 36.7 °C (98.1 °F) (Temporal)   Resp 16   Wt 70.2 kg (154 lb 12.2 oz) Comment: no coat,shoes  SpO2 93%   BMI 30.23 kg/m²      has a past medical history of Anxiety, Arthritis, Breast cancer, Breast cancer metastasized to bone, GERD (gastroesophageal reflux disease), Glaucoma, Hiatal hernia, Hyperlipidemia, and Hypertension.   has a past surgical history that includes Mastectomy complete / simple (Right); Hysterectomy; and Shoulder surgery.  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 Her smoking use included cigarettes. She has never used smokeless tobacco.  She  reports that she does not currently use alcohol.  She  reports no history of drug use.    Physical Exam  Vitals reviewed.   Constitutional:       Appearance: Normal appearance.    HENT:      Head: Normocephalic.      Mouth/Throat:      Mouth: Mucous membranes are moist.   Eyes:      Extraocular Movements: Extraocular movements intact.      Pupils: Pupils are equal, round, and reactive to light.   Cardiovascular:      Rate and Rhythm: Normal rate and regular rhythm.      Pulses: Normal pulses.      Heart sounds: Normal heart sounds.   Pulmonary:      Effort: Pulmonary effort is normal.      Breath sounds: Normal breath sounds.   Abdominal:      General: Bowel sounds are normal.      Palpations: Abdomen is soft.   Genitourinary:     Comments: Sanchez catheter with leg bag  Musculoskeletal:         General: Normal range of motion.      Cervical back: Normal range of motion and neck supple.      Right lower leg: Edema present.      Left lower leg: Edema present.   Skin:     General: Skin is warm.   Neurological:      General: No focal deficit present.      Mental Status: She is alert and oriented to person, place, and time.   Psychiatric:         Mood and Affect: Mood normal.         Behavior: Behavior normal.         WBC   Date/Time Value Ref Range Status   06/11/2025 03:27 PM 2.9 (L) 4.4 - 11.3 x10*3/uL Final   05/15/2025 01:58 PM 2.4 (L) 4.4 - 11.3 x10*3/uL Final   05/02/2025 06:43 AM 4.0 (L) 4.4 - 11.3 x10*3/uL Final     WHITE BLOOD CELL COUNT   Date/Time Value Ref Range Status   02/12/2025 10:34 AM 2.8 (L) 3.8 - 10.8 Thousand/uL Final     nRBC   Date Value Ref Range Status   05/15/2025   Final     Comment:     Not Measured   05/02/2025 0.0 0.0 - 0.0 /100 WBCs Final   05/01/2025 0.0 0.0 - 0.0 /100 WBCs Final     RBC   Date Value Ref Range Status   06/11/2025 2.04 (L) 4.00 - 5.20 x10*6/uL Final   05/15/2025 2.07 (L) 4.00 - 5.20 x10*6/uL Final   05/02/2025 2.07 (L) 4.00 - 5.20 x10*6/uL Final     RED BLOOD CELL COUNT   Date Value Ref Range Status   02/12/2025 2.74 (L) 3.80 - 5.10 Million/uL Final     Hemoglobin   Date Value Ref Range Status   06/11/2025 7.7 (L) 12.0 - 16.0 g/dL Final    05/15/2025 7.9 (L) 12.0 - 16.0 g/dL Final   05/02/2025 7.6 (L) 12.0 - 16.0 g/dL Final     HEMOGLOBIN   Date Value Ref Range Status   02/12/2025 8.5 (L) 11.7 - 15.5 g/dL Final     Hematocrit   Date Value Ref Range Status   06/11/2025 23.8 (L) 36.0 - 46.0 % Final   05/15/2025 24.2 (L) 36.0 - 46.0 % Final   05/02/2025 23.7 (L) 36.0 - 46.0 % Final     HEMATOCRIT   Date Value Ref Range Status   02/12/2025 27.0 (L) 35.0 - 45.0 % Final     MCV   Date/Time Value Ref Range Status   06/11/2025 03:27  (H) 80 - 100 fL Final   05/15/2025 01:58  (H) 80 - 100 fL Final   05/02/2025 06:43  (H) 80 - 100 fL Final   02/12/2025 10:34 AM 98.5 80.0 - 100.0 fL Final     MCH   Date/Time Value Ref Range Status   06/11/2025 03:27 PM 37.7 (H) 26.0 - 34.0 pg Final   05/15/2025 01:58 PM 38.2 (H) 26.0 - 34.0 pg Final   05/02/2025 06:43 AM 36.7 (H) 26.0 - 34.0 pg Final   02/12/2025 10:34 AM 31.0 27.0 - 33.0 pg Final     MCHC   Date/Time Value Ref Range Status   06/11/2025 03:27 PM 32.4 32.0 - 36.0 g/dL Final   05/15/2025 01:58 PM 32.6 32.0 - 36.0 g/dL Final   05/02/2025 06:43 AM 32.1 32.0 - 36.0 g/dL Final   02/12/2025 10:34 AM 31.5 (L) 32.0 - 36.0 g/dL Final     Comment:     For adults, a slight decrease in the calculated MCHC  value (in the range of 30 to 32 g/dL) is most likely  not clinically significant; however, it should be  interpreted with caution in correlation with other  red cell parameters and the patient's clinical  condition.       RDW   Date/Time Value Ref Range Status   06/11/2025 03:27 PM 13.5 11.5 - 14.5 % Final   05/15/2025 01:58 PM 16.1 (H) 11.5 - 14.5 % Final   05/02/2025 06:43 AM 19.1 (H) 11.5 - 14.5 % Final   02/12/2025 10:34 AM 16.9 (H) 11.0 - 15.0 % Final     Platelets   Date/Time Value Ref Range Status   06/11/2025 03:27  150 - 450 x10*3/uL Final   05/15/2025 01:58  150 - 450 x10*3/uL Final   05/02/2025 06:43  150 - 450 x10*3/uL Final     PLATELET COUNT   Date/Time Value Ref Range  Status   02/12/2025 10:34  140 - 400 Thousand/uL Final     MPV   Date/Time Value Ref Range Status   02/12/2025 10:34 AM 9.8 7.5 - 12.5 fL Final   10/19/2023 08:01 AM 9.2 7.5 - 11.5 fL Final     Neutrophils %   Date/Time Value Ref Range Status   06/11/2025 03:27 PM 49.0 40.0 - 80.0 % Final   04/30/2025 05:35 PM 43.5 40.0 - 80.0 % Final   04/17/2025 08:39 AM 40.7 40.0 - 80.0 % Final     Immature Granulocytes %, Automated   Date/Time Value Ref Range Status   06/11/2025 03:27 PM 0.0 0.0 - 0.9 % Final     Comment:     Immature Granulocyte Count (IG) includes promyelocytes, myelocytes and metamyelocytes but does not include bands. Percent differential counts (%) should be interpreted in the context of the absolute cell counts (cells/UL).   05/15/2025 01:58 PM 7.8 (H) 0.0 - 0.9 % Final     Comment:     Immature Granulocyte Count (IG) includes promyelocytes, myelocytes and metamyelocytes but does not include bands. Percent differential counts (%) should be interpreted in the context of the absolute cell counts (cells/UL).   04/30/2025 05:35 PM 1.2 (H) 0.0 - 0.9 % Final     Comment:     Immature Granulocyte Count (IG) includes promyelocytes, myelocytes and metamyelocytes but does not include bands. Percent differential counts (%) should be interpreted in the context of the absolute cell counts (cells/UL).     Lymphocytes %, Manual   Date/Time Value Ref Range Status   05/15/2025 01:58 PM 40.0 13.0 - 44.0 % Final     Lymphocytes %   Date/Time Value Ref Range Status   06/11/2025 03:27 PM 40.0 13.0 - 44.0 % Final   04/30/2025 05:35 PM 35.0 13.0 - 44.0 % Final   04/17/2025 08:39 AM 51.7 13.0 - 44.0 % Final     Monocytes %, Manual   Date/Time Value Ref Range Status   05/15/2025 01:58 PM 0.0 2.0 - 10.0 % Final     Monocytes %   Date/Time Value Ref Range Status   06/11/2025 03:27 PM 7.6 2.0 - 10.0 % Final   04/30/2025 05:35 PM 18.5 2.0 - 10.0 % Final   04/17/2025 08:39 AM 4.6 2.0 - 10.0 % Final     Eosinophils %, Manual    Date/Time Value Ref Range Status   05/15/2025 01:58 PM 4.0 0.0 - 6.0 % Final     Eosinophils %   Date/Time Value Ref Range Status   06/11/2025 03:27 PM 2.4 0.0 - 6.0 % Final   04/30/2025 05:35 PM 0.9 0.0 - 6.0 % Final   04/17/2025 08:39 AM 1.1 0.0 - 6.0 % Final     Basophils %, Manual   Date/Time Value Ref Range Status   05/15/2025 01:58 PM 1.0 0.0 - 2.0 % Final     Basophils %   Date/Time Value Ref Range Status   06/11/2025 03:27 PM 1.0 0.0 - 2.0 % Final   04/30/2025 05:35 PM 0.9 0.0 - 2.0 % Final   04/17/2025 08:39 AM 1.1 0.0 - 2.0 % Final     Neutrophils Absolute   Date/Time Value Ref Range Status   06/11/2025 03:27 PM 1.42 (L) 1.60 - 5.50 x10*3/uL Final     Comment:     Percent differential counts (%) should be interpreted in the context of the absolute cell counts (cells/uL).   04/30/2025 05:35 PM 1.48 (L) 1.60 - 5.50 x10*3/uL Final     Comment:     Percent differential counts (%) should be interpreted in the context of the absolute cell counts (cells/uL).   04/17/2025 08:39 AM 1.06 (L) 1.60 - 5.50 x10*3/uL Final     Comment:     Percent differential counts (%) should be interpreted in the context of the absolute cell counts (cells/uL).     Immature Granulocytes Absolute, Automated   Date/Time Value Ref Range Status   06/11/2025 03:27 PM 0.00 0.00 - 0.50 x10*3/uL Final   05/15/2025 01:58 PM 0.19 0.00 - 0.50 x10*3/uL Final   04/30/2025 05:35 PM 0.04 0.00 - 0.50 x10*3/uL Final     Lymphocytes Absolute   Date/Time Value Ref Range Status   06/11/2025 03:27 PM 1.16 0.80 - 3.00 x10*3/uL Final   04/30/2025 05:35 PM 1.19 0.80 - 3.00 x10*3/uL Final   04/17/2025 08:39 AM 1.35 0.80 - 3.00 x10*3/uL Final     Monocytes Absolute   Date/Time Value Ref Range Status   06/11/2025 03:27 PM 0.22 0.05 - 0.80 x10*3/uL Final   04/30/2025 05:35 PM 0.63 0.05 - 0.80 x10*3/uL Final   04/17/2025 08:39 AM 0.12 0.05 - 0.80 x10*3/uL Final     Eosinophils Absolute   Date/Time Value Ref Range Status   06/11/2025 03:27 PM 0.07 0.00 - 0.40  "x10*3/uL Final   04/30/2025 05:35 PM 0.03 0.00 - 0.40 x10*3/uL Final   04/17/2025 08:39 AM 0.03 0.00 - 0.40 x10*3/uL Final     Eosinophils Absolute, Manual   Date/Time Value Ref Range Status   05/15/2025 01:58 PM 0.10 0.00 - 0.40 x10*3/uL Final     Basophils Absolute   Date/Time Value Ref Range Status   06/11/2025 03:27 PM 0.03 0.00 - 0.10 x10*3/uL Final   04/30/2025 05:35 PM 0.03 0.00 - 0.10 x10*3/uL Final   04/17/2025 08:39 AM 0.03 0.00 - 0.10 x10*3/uL Final     Comment:     Automated WBC differential has been confirmed by manual smear.     Basophils Absolute, Manual   Date/Time Value Ref Range Status   05/15/2025 01:58 PM 0.02 0.00 - 0.10 x10*3/uL Final       No components found for: \"PT\"  No results found for: \"APTT\"  Medication Documentation Review Audit       Reviewed by Akosua Candelario MA (Medical Assistant) on 06/13/25 at 1201      Medication Order Taking? Sig Documenting Provider Last Dose Status   acetaminophen (Tylenol) 500 mg tablet [id number] Yes Take 2 tablets (1,000 mg) by mouth 3 times a day as needed for mild pain (1 - 3) or moderate pain (4 - 6). Historical Provider, MD Unknown Active   amoxicillin (Amoxil) 500 mg capsule [id number] Yes Take 1 capsule (500 mg) by mouth 2 times a day. Historical Provider, MD  Active   apixaban (Eliquis) 2.5 mg tablet [id number] Yes Take 1 tablet (2.5 mg) by mouth 2 times a day. Andrew Mcfarland MD  Active   beclomethasone HFA (Qvar) 40 mcg/actuation inhaler [id number] Yes Inhale 1 Inhalation 2 times a day. Historical Provider, MD 4/30/2025 Morning Active   Blood glucose monitoring meter [id number] Yes Test two times daily. ROCHELLE Bolton-CNP  Active   blood sugar diagnostic strip [id number] Yes 1 each 2 times a day. Rhoda Santana, APRN-CNP  Active   brimonidine-timoloL (Combigan) 0.2-0.5 % ophthalmic solution [id number] Yes Administer 1 drop into both eyes every 12 hours. Historical Provider, MD 4/30/2025 Morning Active   calcium carbonate-vitamin D3 600 mg-10 " mcg (400 unit) tablet [id number] Yes Take 3 tablets by mouth once daily. Tika Hackett MD 4/30/2025 Morning Active   denosumab (Xgeva) 120 mg/1.7 mL (70 mg/mL) injection [id number] Yes Inject 1.7 mL (120 mg) under the skin every 30 (thirty) days. Historical Provider, MD Past Month Active   diclofenac (Voltaren) 50 mg EC tablet [id number] Yes Take 1 tablet (50 mg) by mouth once daily in the morning. Do not crush, chew, or split. Tika Hackett MD 4/30/2025 Morning Active   esomeprazole (NexIUM) 40 mg DR capsule [id number] Yes Take 1 capsule (40 mg) by mouth once daily in the morning. Take before meals. Tika Provider, MD 4/30/2025 Morning Active   furosemide (Lasix) 20 mg tablet [id number] Yes Take 1 tablet (20 mg) by mouth once daily. Please Take additional lasix 20 mg if you gained 2 or 3 pounds Mike Calvin MD  Active   hydroCHLOROthiazide (Microzide) 12.5 mg tablet [id number] Yes Take 1 tablet (12.5 mg) by mouth once daily. Historical Provider, MD  Active   inavolisib 3 mg tablet [id number] Yes Take 1 tablet (3 mg) by mouth once daily. For 3 weeks then take 1 week off Andrew Mcfarland MD  Active   inhalat.spacing dev,large mask (Pro Comfort Spacer-Adult Mask) spacer [id number] Yes 1 each every 4 hours if needed (shortness of breath, wheezing, cough). Naina Pisano, DO Unknown Active   isopropyl alcohoL 70 % towelette [id number] Yes Test 2 times a day, clean finger prior to testing ROCHELLE Bolton-CNP  Active   LORazepam (Ativan) 0.5 mg tablet [id number] Yes Take 1 tablet (0.5 mg) by mouth every 2 hours if needed for anxiety (anxiety related to radiology tests) for up to 5 doses. DEX Bolton Not Taking Active   losartan-hydrochlorothiazide (Hyzaar) 100-12.5 mg tablet [id number] Yes Take 1 tablet by mouth once daily. Abraham Mohan, DO  Active   mometasone (Elocon) 0.1 % lotion [id number] Yes Apply topically 2 times a day. Jimmie Nam MD Not Taking Active   multivitamin tablet  [id number] Yes Take 1 tablet by mouth once daily. Historical Provider, MD 4/30/2025 Morning Active   ondansetron (Zofran) 8 mg tablet [id number] Yes Take 1 tablet (8 mg) by mouth every 8 hours if needed for nausea or vomiting. Andrew Mcfarland MD Unknown Active   palbociclib (Ibrance) 125 mg tablet [id number] Yes Take 125 mg (1 tablet) by mouth once daily for three weeks, then one week off.  Swallow whole.  Do not crush, chew or split. Andrew Mcfarland MD  Active   pravastatin (Pravachol) 10 mg tablet [id number] Yes Take 1 tablet (10 mg) by mouth once daily. Abraham Mohan, DO  Active   prochlorperazine (Compazine) 10 mg tablet [id number] Yes Take 1 tablet (10 mg) by mouth every 6 hours if needed for nausea or vomiting. Andrew Mcfarland MD Past Week Bedtime Active   Rhopressa 0.02 % drops opthalmic solution [id number] Yes Administer 1 drop into the right eye once daily at bedtime. Historical Provider, MD 4/29/2025 Bedtime Active   tamsulosin (Flomax) 0.4 mg 24 hr capsule [id number] Yes Take 1 capsule (0.4 mg) by mouth once daily. Historical Provider, MD  Active   triamcinolone (Kenalog) 0.1 % cream [id number] Yes Apply topically 2 times a day. 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  -now off steroids  -CT scan done on 4/3/2024 reviewed: interval worsening of bilateral pleural effusions, right >left; multiple scattered <0.5 mm pulmonary nodules  bilateral lungs not changed; interval development of several branching opacities involving left lung apex; there are no new worrisome hepatic lesions; mild right sided hydronephrosis; diffuse sclerosis of axial and appendicular skeleton  -has been more short of breath recently--was evaluated in the ER at [facility] on 8/4--was told she had bilateral pleural effusions, which are not actually new  -right sided pleural effusion is already seen on PET scan done in 8/2023  -she had an echo done in June which was read as normal, [facility] did not perform a new one, cardiologist on 8/21 told the patient that she did not have CHF and that these effusions were likely malignant; she was started on low dose lasix which barely helped with the leg edema--and so dose was bumped up; she was recommended by pulmonology consult to undergo thoracentesis and she declined to have it done  -since effusions are not new, and actually were already noted over one year ago, a normal echo does NOT completely rule out well-compensated CHF  -PET done on 8/16/2024 reviewed--no concerning pulmonary nodule; moderate-to-large bilateral pleura effusions, right greater than left; no FDG avid mediastinal, hilar or axillary lymphadenopathy; s/p right axillary ike dissection; s/p right mastectomy and reconstruction; no FDG avid lymphadenopathy in the abdomen and pelvis; bilateral hydronephrosis; interval improvement of FDG avid widespread bone metastases throughout the axial and appendicular skeleton many of which are non FDG avid ; residual disease noted in bilateral femur (SUV 3.6), pelvis (SUV 2.4), bilateral humerus (SUV 3.3), bilateral clavicles (SUV 2/9)  -there is no FDG activity in her thorax/pleura, making the effusions less likely to be of malignant origin, and given bilateral nature--more likely cardiac in origin  -she had thoracentesis done on 9/24/2024--with removal of 700 ml yellow fluid; cytology was negative  -here for interval followup  -CT scan  done on 12/9/2024 reviewed--moderate bilateral pleural effusions with atelectasis, right greater than left; no concerning lung nodule; no mediastinal, hilar or axillary lymphadenopathy; postsurgical changes of right mastectomy with right breast implants;  moderate hydronephrosis left greater than right increased from prior exam; small to moderate volume ascites; new peritoneal nodular thickening (33 mm peritoneal implant in anterior deep pelvis); additional areas of peritoneal nodular thickening and enhancement in deep pelvis; no abdominopelvic lymphadenopathy; similar appearance of diffuse sclerotic lesions throughout the axial and appendicular skeleton; chronic bilateral rib fractures are noted; plate and screw fixation of the right humerus  -bone scan done on 12/9/2024 reviewed similar appearance of diffusely increased radiotracer uptake throughout the axial and appendicular skeleton corresponding with widespread sclerotic osseous lesions on CT  --labs done on 12/11/2024 included CBC, COMP, CA 27.29  -results reviewed--wbc 4.6, hgb 9.1 plt 211,000, creatinine 1.04, calcium 9.0, albumin 4.1 AST 18, ALT 12  CA 27.29 295  -benefits, risks, potential morbidity related to xgeva were reviewed with Arabella and she provided informed consent to proceed  -she went on to receive xgeva 120 mg subcutaneous  -as there is now evidence of progression of disease (new peritoneal metastases), she is failing AI, and I have recommended therapeutic switch  -since her tumor has the PIK3CA mutation, I have advised switching to faslodex + palbociclib + Inavolisib  -benefits, risks, potential morbidity related to faslodex + palbociclib + inavolisib were reviewed with Arabella and she signed informed consent to proceed  -on days 1 and 15 (and then Q28 days) she will receive faslodex 500 mg intramuscular  -she will also take palbociclib 125 mg PO once daily for 21 days on then 7 days off  -she will also take inavolisib 9 mg PO once daily  -here  for interval followup  -shortly after starting the new combination, she was admitted to [facility] from 1/21 to 1/23/2025 for mucositis and electrolyte abnormalities  -inavolisib was held  -she was then readmitted to [facility] from 2/5 to 2/72025 for shortness of breath, thoracentesis was done on the left side with removal of 750 ml of fluid but none was sent for cytology  -her mucositis eventually recovered  -she received inavoliisb 3 mg in hand a couple days ago and started it; mucositis has not returned but this morning she noticed in the mirror 2 maculopapular lesions--one on right cheek, one near left nares  -she was at physical therapy a couple weeks ago--and the therapist noted a slight asymmetry in her ability to rotate her ankles and asked Arabella if she has ever had a stroke--which quite alarmed her  -labs done on 2/20/2025 included CBC + COMP + CA 27.29  -results reviewed--creatinine 1.22, calcium 9.1, alk phos 44, AST 15, ALT 8, wbc 2.66, hgb 8.3, plt 218,000, , CA 27.29 295  -here for interval followup  -she has taken inavolisib 3 mg daily for an additional 2 weeks--has not had recurrent mucositis, no new dermatologic issues  -continues to take lasix 20 mg every other day--leg edema and shortness of breath improved and stable  -here for interval followup  -had a tooth extracted 2 days ago--tooth was also causing a canker sore on inside of her cheek right next to the tooth--so will need to hold xgeva today  -was recently hospitalized at [facility] for edema/fluid overload; hgb trended all the way down to 7.0; very sleepy during daytime  -labs done on 5/15/2025 included CBC +  COMP + CA 27.29  -results reviewed--wbc 2.4, hgb 7.9, plt 171,000, ANC 1220, creatinine 1.38, calcium 8.2, alk phos 45, AST 13, total bili 0.3, ALT 7 CA 27.29 2322.6  -CT scan done on 5/15/2025 reviewed--moderate bilateral pleural effusions mildly decreased from prior; no concerning lung nodule; no mediastinal, hilar or axillary  lymphadenopathy; hepatic lesions are overall stable after accounting for differences in contrast bolus timing; 11 mm right hepatic nodule; no new liver lesion; bilateral hydroureteronephrosis has improved likely a secondary sign of improved nonmeasurable tumor burden; small volume ascites in the deep pelvis has improved which is consistent with improved nonmeasurable tumor burden; interval decrease in size of dominant 23 mm peritoneal implant in the pelvis which has shifted and is now more inferiorly located due to decreased ascites; decreased but persistent nonmeasurable peritoneal nodular thickening is seen elsewhere in left upper quadrant; no abdominopelvic lymphadenopathy; diffuse predominantly sclerotic lesions are seen throughout the axial and appendicular skeleton with overall increased density of lesions; chronic bilateral rib fractures are again seen  -here for interval followup  -was recently hospitalized at [facility] for acute on top of chronic renal failure in part secondary to hydronephrosis secondary to urinary retention  -has an indwelling vasquez with leg bag; supposed to see urology for voiding trial in a few days  -has not been 6 weeks since last tooth extraction, so will continue holding off on xgeva  -labs done on 6/11/2025 included CBC + COMP + CA 27.29  -results reviewed--wbc 2.9, hgb 7.7, plt 192,000, ANC 1420, creatinine 1.27, calcium 8.6, alk phos 52, AST 13, total bili 0.3, ALT 7  -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; she would ultimately be helped with JOSSE (such as darbepoietin), however, all ESAs have a black box warning that they can be associated with cancer growth/progression  -she will continue to take palbociclib 125 mg PO once daily for 21 days on then 7 days off  -she will continue to take inavolisib 3 mg PO once daily for 21 days on then 7 days off  -due for  faslodex (500 mg intramuscularly) today  -benefits, risks, potential morbidity related to faslodex were  reviewed with Arabella and she provided informed consent to proceed  -she went on to receive faslodex 500 mg intramuscularly  -will see her again in 4 weeks       2) bone metastases  -secondary to metastatic breast cancer  -receives xgeva Q4 weeks     3) anemia  -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  -hgb slowly improving     4) hyperlipidemia  -on pravastatin     5) arthritis  -on tylenol PRN  -on diclofenac PRN  -received steroid injections into her knees and she feels so much better, so she wants to receive them again     6) hypertension  -on amlodipine  -on losartan-HCTZ     7) GERD  -on nexium     8) glaucoma  -on combigan eyedrops     9) DVT  -has had chronic leg edema for years  -recently had an injection into her knee  -she saw pulmonologist for first time on 4/14/2025 who ordered doppler  -doppler showed acute occlusive DVT in gastrocnemius veins in calf vein; rest of right leg is negative for DVT; gastrocnemius vein thrombus is seen at mid calf; in left leg there is no evidence of acute DVT   -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--she should continue on eliquis for now (still on loading period- 10 mg BID), will repeat doppler in 6-8 weeks; given that she has stage IV cancer, likely will advise her to remain on eliquis long term, but perhaps to transition to maintenance dosing  -4/30/2025 doppler done in [place] ED showed in right leg no evidence of acute DVT; in left leg no evidence of acute DVT  -she is in need of refill of eliquis--will refill with 2.5 mg BID     Problem List Items Addressed This Visit           ICD-10-CM    Breast cancer metastasized to bone - Primary C50.919, C79.51    Relevant Orders    Clinic Appointment Request Chemo Follow Up; [facility]    Infusion Appointment Request [facility] INFUSION    Cancer Antigen 27-29    CBC and Auto Differential    Comprehensive metabolic panel            Andrew Mcfarland MD                              [1]    Family History  Problem Relation Name Age of Onset    Colon cancer Sister      Breast cancer Daughter

## 2025-06-15 PROBLEM — M89.8X9 LYTIC LESION OF BONE ON X-RAY: Status: ACTIVE | Noted: 2025-06-15

## 2025-06-15 ASSESSMENT — ENCOUNTER SYMPTOMS
GASTROINTESTINAL NEGATIVE: 1
MUSCULOSKELETAL NEGATIVE: 1
NEUROLOGICAL NEGATIVE: 1
FATIGUE: 1
HEMATOLOGIC/LYMPHATIC NEGATIVE: 1
ENDOCRINE NEGATIVE: 1
RESPIRATORY NEGATIVE: 1
LEG SWELLING: 1
EYES NEGATIVE: 1
DIFFICULTY URINATING: 1

## 2025-06-17 ENCOUNTER — HOME CARE VISIT (OUTPATIENT)
Dept: HOME HEALTH SERVICES | Facility: HOME HEALTH | Age: 85
End: 2025-06-17
Payer: MEDICARE

## 2025-06-17 VITALS
RESPIRATION RATE: 18 BRPM | TEMPERATURE: 97.8 F | OXYGEN SATURATION: 93 % | SYSTOLIC BLOOD PRESSURE: 124 MMHG | DIASTOLIC BLOOD PRESSURE: 66 MMHG | HEART RATE: 90 BPM

## 2025-06-17 PROCEDURE — G0299 HHS/HOSPICE OF RN EA 15 MIN: HCPCS | Mod: HHH

## 2025-06-17 ASSESSMENT — ENCOUNTER SYMPTOMS
PAIN LOCATION: LEFT LEG
PAIN SEVERITY GOAL: 4/10
HIGHEST PAIN SEVERITY IN PAST 24 HOURS: 5/10
PAIN: 1
MUSCLE WEAKNESS: 1
APPETITE LEVEL: GOOD
PAIN LOCATION - PAIN SEVERITY: 4/10
LOWEST PAIN SEVERITY IN PAST 24 HOURS: 3/10
PERSON REPORTING PAIN: PATIENT
LOWER EXTREMITY EDEMA: 1

## 2025-06-18 ENCOUNTER — OFFICE VISIT (OUTPATIENT)
Dept: WOUND CARE | Facility: CLINIC | Age: 85
End: 2025-06-18
Payer: MEDICARE

## 2025-06-18 ENCOUNTER — APPOINTMENT (OUTPATIENT)
Dept: PRIMARY CARE | Facility: CLINIC | Age: 85
End: 2025-06-18
Payer: MEDICARE

## 2025-06-18 PROCEDURE — 11042 DBRDMT SUBQ TIS 1ST 20SQCM/<: CPT

## 2025-06-21 ENCOUNTER — HOME CARE VISIT (OUTPATIENT)
Dept: HOME HEALTH SERVICES | Facility: HOME HEALTH | Age: 85
End: 2025-06-21
Payer: MEDICARE

## 2025-06-23 ENCOUNTER — HOME CARE VISIT (OUTPATIENT)
Dept: HOME HEALTH SERVICES | Facility: HOME HEALTH | Age: 85
End: 2025-06-23
Payer: MEDICARE

## 2025-06-23 VITALS
TEMPERATURE: 98.8 F | DIASTOLIC BLOOD PRESSURE: 62 MMHG | OXYGEN SATURATION: 93 % | SYSTOLIC BLOOD PRESSURE: 116 MMHG | RESPIRATION RATE: 16 BRPM | HEART RATE: 85 BPM

## 2025-06-23 PROCEDURE — G0299 HHS/HOSPICE OF RN EA 15 MIN: HCPCS | Mod: HHH

## 2025-06-23 ASSESSMENT — ENCOUNTER SYMPTOMS
LOWER EXTREMITY EDEMA: 1
MUSCLE WEAKNESS: 1
APPETITE LEVEL: FAIR

## 2025-06-24 ENCOUNTER — PATIENT OUTREACH (OUTPATIENT)
Dept: PRIMARY CARE | Facility: CLINIC | Age: 85
End: 2025-06-24
Payer: MEDICARE

## 2025-06-24 ENCOUNTER — OFFICE VISIT (OUTPATIENT)
Age: 85
End: 2025-06-24
Payer: MEDICARE

## 2025-06-24 VITALS
DIASTOLIC BLOOD PRESSURE: 68 MMHG | BODY MASS INDEX: 30.23 KG/M2 | HEART RATE: 86 BPM | HEIGHT: 60 IN | WEIGHT: 154 LBS | SYSTOLIC BLOOD PRESSURE: 102 MMHG

## 2025-06-24 DIAGNOSIS — R33.9 URINARY RETENTION: Primary | ICD-10-CM

## 2025-06-24 PROCEDURE — 1123F ACP DISCUSS/DSCN MKR DOCD: CPT | Performed by: PHYSICIAN ASSISTANT

## 2025-06-24 PROCEDURE — 1159F MED LIST DOCD IN RCRD: CPT | Performed by: PHYSICIAN ASSISTANT

## 2025-06-24 PROCEDURE — 99202 OFFICE O/P NEW SF 15 MIN: CPT | Performed by: PHYSICIAN ASSISTANT

## 2025-06-24 PROCEDURE — G8400 PT W/DXA NO RESULTS DOC: HCPCS | Performed by: PHYSICIAN ASSISTANT

## 2025-06-24 PROCEDURE — 99203 OFFICE O/P NEW LOW 30 MIN: CPT | Performed by: PHYSICIAN ASSISTANT

## 2025-06-24 PROCEDURE — 1090F PRES/ABSN URINE INCON ASSESS: CPT | Performed by: PHYSICIAN ASSISTANT

## 2025-06-24 PROCEDURE — 1111F DSCHRG MED/CURRENT MED MERGE: CPT | Performed by: PHYSICIAN ASSISTANT

## 2025-06-24 PROCEDURE — G8427 DOCREV CUR MEDS BY ELIG CLIN: HCPCS | Performed by: PHYSICIAN ASSISTANT

## 2025-06-24 PROCEDURE — 1036F TOBACCO NON-USER: CPT | Performed by: PHYSICIAN ASSISTANT

## 2025-06-24 PROCEDURE — G8417 CALC BMI ABV UP PARAM F/U: HCPCS | Performed by: PHYSICIAN ASSISTANT

## 2025-06-24 ASSESSMENT — ENCOUNTER SYMPTOMS: APNEA: 0

## 2025-06-24 NOTE — PROGRESS NOTES
Subjective:      Patient ID: Megan Cuevas is a 84 y.o. female     HPI  84 year old female who was diagnosed with urinary retention on 6/6/25, when she was hospitalized for syncope. She was initially found to be retaining 554 ml of urine. She had a montelongo catheter placed at that time and was started on flomax. She presents today for a trial to void.     Past Medical History:   Diagnosis Date    Breast cancer (HCC)     right     Past Surgical History:   Procedure Laterality Date    MASTECTOMY      right     Social History     Socioeconomic History    Marital status:      Spouse name: None    Number of children: None    Years of education: None    Highest education level: None   Tobacco Use    Smoking status: Never    Smokeless tobacco: Never     Social Drivers of Health     Financial Resource Strain: Low Risk  (5/1/2025)    Received from [facility]    Overall Financial Resource Strain (CARDIA)     Difficulty of Paying Living Expenses: Not hard at all   Food Insecurity: No Food Insecurity (5/1/2025)    Received from [facility]    Hunger Vital Sign     Worried About Running Out of Food in the Last Year: Never true     Ran Out of Food in the Last Year: Never true   Transportation Needs: No Transportation Needs (6/9/2025)    Received from [facility]    OASIS : Transportation     Lack of Transportation (Medical): No     Lack of Transportation (Non-Medical): No     Patient Unable or Declines to Respond: No   Physical Activity: Inactive (10/9/2023)    Received from [facility]    Exercise Vital Sign     Days of Exercise per Week: 0 days     Minutes of Exercise per Session: 0 min   Stress: No Stress Concern Present (10/9/2023)    Received from [facility]    [employer] [place] [employer] - Occupational Stress Questionnaire     Feeling of Stress : Not at all   Social Connections: Feeling

## 2025-06-25 ENCOUNTER — OFFICE VISIT (OUTPATIENT)
Dept: WOUND CARE | Facility: CLINIC | Age: 85
End: 2025-06-25
Payer: MEDICARE

## 2025-06-25 PROCEDURE — 11045 DBRDMT SUBQ TISS EACH ADDL: CPT

## 2025-06-25 PROCEDURE — 11042 DBRDMT SUBQ TIS 1ST 20SQCM/<: CPT

## 2025-06-27 ENCOUNTER — APPOINTMENT (OUTPATIENT)
Dept: PRIMARY CARE | Facility: CLINIC | Age: 85
End: 2025-06-27
Payer: MEDICARE

## 2025-06-27 ENCOUNTER — HOME CARE VISIT (OUTPATIENT)
Dept: HOME HEALTH SERVICES | Facility: HOME HEALTH | Age: 85
End: 2025-06-27
Payer: MEDICARE

## 2025-06-27 VITALS — BODY MASS INDEX: 30.08 KG/M2 | WEIGHT: 154 LBS

## 2025-06-27 DIAGNOSIS — N17.9 AKI (ACUTE KIDNEY INJURY): Primary | ICD-10-CM

## 2025-06-27 DIAGNOSIS — J90 BILATERAL PLEURAL EFFUSION: ICD-10-CM

## 2025-06-27 DIAGNOSIS — R60.0 BILATERAL LOWER EXTREMITY EDEMA: ICD-10-CM

## 2025-06-27 DIAGNOSIS — L97.929 ULCER OF LEFT LOWER EXTREMITY, UNSPECIFIED ULCER STAGE: ICD-10-CM

## 2025-06-27 DIAGNOSIS — I10 ESSENTIAL HYPERTENSION: ICD-10-CM

## 2025-06-27 PROCEDURE — 1036F TOBACCO NON-USER: CPT | Performed by: STUDENT IN AN ORGANIZED HEALTH CARE EDUCATION/TRAINING PROGRAM

## 2025-06-27 PROCEDURE — 1160F RVW MEDS BY RX/DR IN RCRD: CPT | Performed by: STUDENT IN AN ORGANIZED HEALTH CARE EDUCATION/TRAINING PROGRAM

## 2025-06-27 PROCEDURE — 99213 OFFICE O/P EST LOW 20 MIN: CPT | Performed by: STUDENT IN AN ORGANIZED HEALTH CARE EDUCATION/TRAINING PROGRAM

## 2025-06-27 PROCEDURE — 1159F MED LIST DOCD IN RCRD: CPT | Performed by: STUDENT IN AN ORGANIZED HEALTH CARE EDUCATION/TRAINING PROGRAM

## 2025-06-27 RX ORDER — FUROSEMIDE 20 MG/1
20 TABLET ORAL DAILY
Qty: 45 TABLET | Refills: 0 | Status: SHIPPED | OUTPATIENT
Start: 2025-06-27

## 2025-06-27 ASSESSMENT — ENCOUNTER SYMPTOMS
ROS GI COMMENTS: NO MELENA
DYSURIA: 0
VOMITING: 0
WHEEZING: 0
FEVER: 0
SHORTNESS OF BREATH: 0
NAUSEA: 0
CHILLS: 0
ABDOMINAL PAIN: 0
COUGH: 1
LIGHT-HEADEDNESS: 0
MYALGIAS: 0
DIAPHORESIS: 0
DIARRHEA: 1
DIZZINESS: 0
BLOOD IN STOOL: 0

## 2025-06-27 NOTE — PROGRESS NOTES
Subjective   Patient ID: Arabella Springer is a 84 y.o. female who presents for Hospital Follow-up (Patient appt is for a hospital follow up).  History of Present Illness  The patient is an 84-year-old female seen for a virtual follow-up. She was admitted in June 2025 for syncope and collapse, during which found to have TSERING. Her acute kidney injury (TSERING) resolved after holding losartan and NSAIDs, and placing a Sanchez catheter for obstruction. Losartan was discontinued due to hypotension. Bilateral hydronephrosis was noted, with the right side being more affected than the left, and urology initiated Flomax. Post-discharge, she had a heme/onc follow-up for stage IV breast cancer. She saw urology since follow up.     The patient reports feeling overall well and has increased her water intake to prevent dehydration. She has not yet consulted neurology. She denies systemic symptoms such as fever, chills, sweats, dizziness, chest pain, and others. Her diarrhea resolved with Imodium, and she is scheduled for an infusion on 07/13/2025.    The patient reports bilateral leg swelling despite taking Lasix 20 mg daily. She was advised to take an additional dose for the swelling but expressed concern about dehydration. The swelling does not limit her activities. She has had a leg wound for six weeks and visits the wound clinic weekly. Compression bandages are applied, and a nurse monitors her wound and blood pressure. She continues to elevate her feet. She is not monitoring weight or BP. She receives home care five days a week for household assistance.    The patient had her catheter removed on Tuesday after two weeks. Her initial urinary retention resolved, and a bladder scan with urology reportedly showed an empty bladder, indicating dehydration.  Pain is managed with extra strength Tylenol.    Virtual or Telephone Consent    An interactive audio and video telecommunication system which permits real time communications between  the patient (at the originating site) and provider (at the distant site) was utilized to provide this telehealth service.   Verbal consent was requested and obtained from Arabella MASSEY Ameliafarhat on this date, 06/27/25 for a telehealth visit and the patient's location was confirmed at the time of the visit.        Review of Systems   Constitutional:  Negative for chills, diaphoresis and fever.   Respiratory:  Positive for cough (rare intermittent). Negative for shortness of breath and wheezing.    Cardiovascular:  Negative for chest pain.   Gastrointestinal:  Positive for diarrhea (intermittent this week). Negative for abdominal pain, blood in stool, nausea and vomiting.        No melena     Genitourinary:  Negative for dysuria and urgency.   Musculoskeletal:  Negative for myalgias.   Skin:  Negative for rash.   Neurological:  Negative for dizziness, syncope and light-headedness.       Objective     Wt 69.9 kg (154 lb) Comment: per verbal from patient  BMI 30.08 kg/m²      Physical Exam  Gen: NAD  Respiratory: No respiratory distress    Assessment & Plan  TSERING:  Bilateral Hydronephrosis:  - Resolved with cessation of losartan/NSAIDs and Vasquez catheter placement  - Kidney function improved  - Saw urology earlier this week w/ removal of vasquez, continues to void. Continue uro f/u  - Blood work ordered for next week  - Continues Flomax  - Vasquez catheter removal successful  - No dysuria or urinary urgency  - Advised no NSAIDS    Stage IV Breast Cancer:  - Hematology follow-up ongoing  - Next infusion on 07/13/2025  - F/u per heme onc    Edema:  - Foot swelling reported  - Lasix 20 mg daily prescribed  - Refill provided with instructions to take an additional dose if weight increases by 2-3 lbs  - Weight and blood pressure monitoring advised, pt to let us know  - Check labs next week.   - ER for syncope, CP, SOB, worsening swelling or any leg pain  - Discussed limited ability to assess virtually, appt was to be in person but pt  had to switch to virtual.    Leg ulcer  -F/u wound care  -Hoping to improve swelling with lasix, will check labs and plan as above.     Hypertension:  - Losartan discontinued due to hypotension during hospitalization  - Current /69  - Continued BP monitoring recommended    F/u 1-2 months and will reach out to pt to schedule, possibly sooner than 1 month if able for swelling.       Results         Angel Luis Velazquez DO     This medical note was created with the assistance of artificial intelligence (AI) for documentation purposes. The content has been reviewed and confirmed by the healthcare provider for accuracy and completeness. Patient consented to the use of audio recording and use of AI during their visit.

## 2025-06-30 ENCOUNTER — TELEPHONE (OUTPATIENT)
Dept: PRIMARY CARE | Facility: CLINIC | Age: 85
End: 2025-06-30
Payer: MEDICARE

## 2025-06-30 NOTE — TELEPHONE ENCOUNTER
----- Message from Angel Luis Velazquez sent at 6/27/2025  7:52 PM EDT -----  Please call pt to set up 1-2 month f/u for her swelling.

## 2025-07-02 ENCOUNTER — OFFICE VISIT (OUTPATIENT)
Dept: WOUND CARE | Facility: CLINIC | Age: 85
End: 2025-07-02
Payer: MEDICARE

## 2025-07-02 ENCOUNTER — HOME CARE VISIT (OUTPATIENT)
Dept: HOME HEALTH SERVICES | Facility: HOME HEALTH | Age: 85
End: 2025-07-02
Payer: MEDICARE

## 2025-07-02 ENCOUNTER — APPOINTMENT (OUTPATIENT)
Dept: PRIMARY CARE | Facility: CLINIC | Age: 85
End: 2025-07-02
Payer: MEDICARE

## 2025-07-02 ENCOUNTER — LAB (OUTPATIENT)
Dept: LAB | Facility: CLINIC | Age: 85
End: 2025-07-02
Payer: MEDICARE

## 2025-07-02 DIAGNOSIS — C79.51 CARCINOMA OF RIGHT BREAST METASTATIC TO BONE: ICD-10-CM

## 2025-07-02 DIAGNOSIS — M89.8X9 LYTIC BONE LESIONS ON XRAY: ICD-10-CM

## 2025-07-02 DIAGNOSIS — C50.911 CARCINOMA OF RIGHT BREAST METASTATIC TO BONE: ICD-10-CM

## 2025-07-02 LAB
ALBUMIN SERPL BCP-MCNC: 4 G/DL (ref 3.4–5)
ALP SERPL-CCNC: 39 U/L (ref 33–136)
ALT SERPL W P-5'-P-CCNC: 7 U/L (ref 7–45)
ANION GAP SERPL CALC-SCNC: 13 MMOL/L (ref 10–20)
AST SERPL W P-5'-P-CCNC: 14 U/L (ref 9–39)
BASOPHILS # BLD AUTO: 0.05 X10*3/UL (ref 0–0.1)
BASOPHILS NFR BLD AUTO: 1.6 %
BILIRUB SERPL-MCNC: 0.3 MG/DL (ref 0–1.2)
BUN SERPL-MCNC: 36 MG/DL (ref 6–23)
CALCIUM SERPL-MCNC: 8.6 MG/DL (ref 8.6–10.3)
CHLORIDE SERPL-SCNC: 95 MMOL/L (ref 98–107)
CO2 SERPL-SCNC: 30 MMOL/L (ref 21–32)
CREAT SERPL-MCNC: 1.64 MG/DL (ref 0.5–1.05)
EGFRCR SERPLBLD CKD-EPI 2021: 31 ML/MIN/1.73M*2
EOSINOPHIL # BLD AUTO: 0.09 X10*3/UL (ref 0–0.4)
EOSINOPHIL NFR BLD AUTO: 2.9 %
ERYTHROCYTE [DISTWIDTH] IN BLOOD BY AUTOMATED COUNT: 14.9 % (ref 11.5–14.5)
GLUCOSE SERPL-MCNC: 119 MG/DL (ref 74–99)
HCT VFR BLD AUTO: 23.6 % (ref 36–46)
HGB BLD-MCNC: 7.7 G/DL (ref 12–16)
HYPOCHROMIA BLD QL SMEAR: NORMAL
IMM GRANULOCYTES # BLD AUTO: 0.01 X10*3/UL (ref 0–0.5)
IMM GRANULOCYTES NFR BLD AUTO: 0.3 % (ref 0–0.9)
LYMPHOCYTES # BLD AUTO: 1.16 X10*3/UL (ref 0.8–3)
LYMPHOCYTES NFR BLD AUTO: 37.3 %
MCH RBC QN AUTO: 38.1 PG (ref 26–34)
MCHC RBC AUTO-ENTMCNC: 32.6 G/DL (ref 32–36)
MCV RBC AUTO: 117 FL (ref 80–100)
MONOCYTES # BLD AUTO: 0.19 X10*3/UL (ref 0.05–0.8)
MONOCYTES NFR BLD AUTO: 6.1 %
NEUTROPHILS # BLD AUTO: 1.61 X10*3/UL (ref 1.6–5.5)
NEUTROPHILS NFR BLD AUTO: 51.8 %
NRBC BLD-RTO: ABNORMAL /100{WBCS}
PLATELET # BLD AUTO: 246 X10*3/UL (ref 150–450)
POLYCHROMASIA BLD QL SMEAR: NORMAL
POTASSIUM SERPL-SCNC: 4.3 MMOL/L (ref 3.5–5.3)
PROT SERPL-MCNC: 6.4 G/DL (ref 6.4–8.2)
RBC # BLD AUTO: 2.02 X10*6/UL (ref 4–5.2)
RBC MORPH BLD: NORMAL
SODIUM SERPL-SCNC: 134 MMOL/L (ref 136–145)
WBC # BLD AUTO: 3.1 X10*3/UL (ref 4.4–11.3)

## 2025-07-02 PROCEDURE — 85025 COMPLETE CBC W/AUTO DIFF WBC: CPT

## 2025-07-02 PROCEDURE — 84075 ASSAY ALKALINE PHOSPHATASE: CPT

## 2025-07-02 PROCEDURE — 86300 IMMUNOASSAY TUMOR CA 15-3: CPT

## 2025-07-02 PROCEDURE — 11042 DBRDMT SUBQ TIS 1ST 20SQCM/<: CPT

## 2025-07-02 PROCEDURE — 11045 DBRDMT SUBQ TISS EACH ADDL: CPT

## 2025-07-02 PROCEDURE — 36415 COLL VENOUS BLD VENIPUNCTURE: CPT

## 2025-07-03 LAB — CANCER AG27-29 SERPL-ACNC: 166.4 U/ML (ref 0–38.6)

## 2025-07-07 PROCEDURE — RXMED WILLOW AMBULATORY MEDICATION CHARGE

## 2025-07-07 ASSESSMENT — ACTIVITIES OF DAILY LIVING (ADL)
OASIS_M1830: 01
HOME_HEALTH_OASIS: 01

## 2025-07-09 ENCOUNTER — LAB (OUTPATIENT)
Dept: LAB | Facility: CLINIC | Age: 85
End: 2025-07-09
Payer: MEDICARE

## 2025-07-09 ENCOUNTER — OFFICE VISIT (OUTPATIENT)
Dept: WOUND CARE | Facility: CLINIC | Age: 85
End: 2025-07-09
Payer: MEDICARE

## 2025-07-09 DIAGNOSIS — C79.51 CARCINOMA OF RIGHT BREAST METASTATIC TO BONE: ICD-10-CM

## 2025-07-09 DIAGNOSIS — C50.911 CARCINOMA OF RIGHT BREAST METASTATIC TO BONE: ICD-10-CM

## 2025-07-09 LAB
ALBUMIN SERPL BCP-MCNC: 3.9 G/DL (ref 3.4–5)
ALP SERPL-CCNC: 38 U/L (ref 33–136)
ALT SERPL W P-5'-P-CCNC: 7 U/L (ref 7–45)
ANION GAP SERPL CALC-SCNC: 14 MMOL/L (ref 10–20)
AST SERPL W P-5'-P-CCNC: 12 U/L (ref 9–39)
BASOPHILS # BLD AUTO: 0.02 X10*3/UL (ref 0–0.1)
BASOPHILS NFR BLD AUTO: 0.8 %
BILIRUB SERPL-MCNC: 0.4 MG/DL (ref 0–1.2)
BUN SERPL-MCNC: 41 MG/DL (ref 6–23)
CALCIUM SERPL-MCNC: 7.6 MG/DL (ref 8.6–10.3)
CHLORIDE SERPL-SCNC: 100 MMOL/L (ref 98–107)
CO2 SERPL-SCNC: 26 MMOL/L (ref 21–32)
CREAT SERPL-MCNC: 1.82 MG/DL (ref 0.5–1.05)
EGFRCR SERPLBLD CKD-EPI 2021: 27 ML/MIN/1.73M*2
EOSINOPHIL # BLD AUTO: 0.06 X10*3/UL (ref 0–0.4)
EOSINOPHIL NFR BLD AUTO: 2.3 %
ERYTHROCYTE [DISTWIDTH] IN BLOOD BY AUTOMATED COUNT: 14.9 % (ref 11.5–14.5)
GLUCOSE SERPL-MCNC: 170 MG/DL (ref 74–99)
HCT VFR BLD AUTO: 22.6 % (ref 36–46)
HGB BLD-MCNC: 7.3 G/DL (ref 12–16)
IMM GRANULOCYTES # BLD AUTO: 0 X10*3/UL (ref 0–0.5)
IMM GRANULOCYTES NFR BLD AUTO: 0 % (ref 0–0.9)
LYMPHOCYTES # BLD AUTO: 0.95 X10*3/UL (ref 0.8–3)
LYMPHOCYTES NFR BLD AUTO: 35.7 %
MCH RBC QN AUTO: 37.8 PG (ref 26–34)
MCHC RBC AUTO-ENTMCNC: 32.3 G/DL (ref 32–36)
MCV RBC AUTO: 117 FL (ref 80–100)
MONOCYTES # BLD AUTO: 0.21 X10*3/UL (ref 0.05–0.8)
MONOCYTES NFR BLD AUTO: 7.9 %
NEUTROPHILS # BLD AUTO: 1.42 X10*3/UL (ref 1.6–5.5)
NEUTROPHILS NFR BLD AUTO: 53.3 %
NRBC BLD-RTO: ABNORMAL /100{WBCS}
PLATELET # BLD AUTO: 150 X10*3/UL (ref 150–450)
POTASSIUM SERPL-SCNC: 3.9 MMOL/L (ref 3.5–5.3)
PROT SERPL-MCNC: 6 G/DL (ref 6.4–8.2)
RBC # BLD AUTO: 1.93 X10*6/UL (ref 4–5.2)
SODIUM SERPL-SCNC: 136 MMOL/L (ref 136–145)
WBC # BLD AUTO: 2.7 X10*3/UL (ref 4.4–11.3)

## 2025-07-09 PROCEDURE — 86300 IMMUNOASSAY TUMOR CA 15-3: CPT

## 2025-07-09 PROCEDURE — 11042 DBRDMT SUBQ TIS 1ST 20SQCM/<: CPT

## 2025-07-09 PROCEDURE — 85025 COMPLETE CBC W/AUTO DIFF WBC: CPT

## 2025-07-09 PROCEDURE — 36415 COLL VENOUS BLD VENIPUNCTURE: CPT

## 2025-07-09 PROCEDURE — 80053 COMPREHEN METABOLIC PANEL: CPT

## 2025-07-10 LAB — CANCER AG27-29 SERPL-ACNC: 163.7 U/ML (ref 0–38.6)

## 2025-07-11 ENCOUNTER — INFUSION (OUTPATIENT)
Dept: HEMATOLOGY/ONCOLOGY | Facility: CLINIC | Age: 85
End: 2025-07-11
Payer: MEDICARE

## 2025-07-11 ENCOUNTER — APPOINTMENT (OUTPATIENT)
Dept: HEMATOLOGY/ONCOLOGY | Facility: CLINIC | Age: 85
End: 2025-07-11
Payer: MEDICARE

## 2025-07-11 ENCOUNTER — SPECIALTY PHARMACY (OUTPATIENT)
Dept: PHARMACY | Facility: CLINIC | Age: 85
End: 2025-07-11

## 2025-07-11 ENCOUNTER — OFFICE VISIT (OUTPATIENT)
Dept: HEMATOLOGY/ONCOLOGY | Facility: CLINIC | Age: 85
End: 2025-07-11
Payer: MEDICARE

## 2025-07-11 ENCOUNTER — PHARMACY VISIT (OUTPATIENT)
Dept: PHARMACY | Facility: CLINIC | Age: 85
End: 2025-07-11
Payer: COMMERCIAL

## 2025-07-11 VITALS
BODY MASS INDEX: 31.13 KG/M2 | SYSTOLIC BLOOD PRESSURE: 107 MMHG | RESPIRATION RATE: 16 BRPM | HEART RATE: 82 BPM | TEMPERATURE: 97.9 F | OXYGEN SATURATION: 93 % | DIASTOLIC BLOOD PRESSURE: 62 MMHG | WEIGHT: 159.39 LBS

## 2025-07-11 DIAGNOSIS — N18.4 ANEMIA DUE TO STAGE 4 CHRONIC KIDNEY DISEASE TREATED WITH DARBEPOETIN: ICD-10-CM

## 2025-07-11 DIAGNOSIS — C50.919 MALIGNANT NEOPLASM OF BREAST ASSOCIATED WITH MUTATION IN CHEK2 GENE: ICD-10-CM

## 2025-07-11 DIAGNOSIS — K21.00 GASTROESOPHAGEAL REFLUX DISEASE WITH ESOPHAGITIS WITHOUT HEMORRHAGE: ICD-10-CM

## 2025-07-11 DIAGNOSIS — M17.12 PRIMARY OSTEOARTHRITIS OF LEFT KNEE: ICD-10-CM

## 2025-07-11 DIAGNOSIS — Z15.89 MALIGNANT NEOPLASM OF BREAST ASSOCIATED WITH MUTATION IN CHEK2 GENE: ICD-10-CM

## 2025-07-11 DIAGNOSIS — D63.1 ANEMIA DUE TO STAGE 4 CHRONIC KIDNEY DISEASE TREATED WITH DARBEPOETIN: ICD-10-CM

## 2025-07-11 DIAGNOSIS — H40.9 GLAUCOMA OF BOTH EYES, UNSPECIFIED GLAUCOMA TYPE: ICD-10-CM

## 2025-07-11 DIAGNOSIS — M89.8X9 LYTIC BONE LESIONS ON XRAY: ICD-10-CM

## 2025-07-11 DIAGNOSIS — C50.911 CARCINOMA OF RIGHT BREAST METASTATIC TO BONE: ICD-10-CM

## 2025-07-11 DIAGNOSIS — C50.911 CARCINOMA OF RIGHT BREAST METASTATIC TO BONE: Primary | ICD-10-CM

## 2025-07-11 DIAGNOSIS — Z15.02 MALIGNANT NEOPLASM OF BREAST ASSOCIATED WITH MUTATION IN CHEK2 GENE: ICD-10-CM

## 2025-07-11 DIAGNOSIS — Z15.09 MALIGNANT NEOPLASM OF BREAST ASSOCIATED WITH MUTATION IN CHEK2 GENE: ICD-10-CM

## 2025-07-11 DIAGNOSIS — E78.2 MIXED HYPERLIPIDEMIA: ICD-10-CM

## 2025-07-11 DIAGNOSIS — I10 ESSENTIAL HYPERTENSION: ICD-10-CM

## 2025-07-11 DIAGNOSIS — C79.51 CARCINOMA OF RIGHT BREAST METASTATIC TO BONE: Primary | ICD-10-CM

## 2025-07-11 DIAGNOSIS — C79.51 CARCINOMA OF RIGHT BREAST METASTATIC TO BONE: ICD-10-CM

## 2025-07-11 DIAGNOSIS — M17.11 PRIMARY OSTEOARTHRITIS OF RIGHT KNEE: ICD-10-CM

## 2025-07-11 PROCEDURE — 2500000004 HC RX 250 GENERAL PHARMACY W/ HCPCS (ALT 636 FOR OP/ED): Mod: JZ,TB | Performed by: INTERNAL MEDICINE

## 2025-07-11 PROCEDURE — 3074F SYST BP LT 130 MM HG: CPT | Performed by: INTERNAL MEDICINE

## 2025-07-11 PROCEDURE — 1159F MED LIST DOCD IN RCRD: CPT | Performed by: INTERNAL MEDICINE

## 2025-07-11 PROCEDURE — G2211 COMPLEX E/M VISIT ADD ON: HCPCS | Performed by: INTERNAL MEDICINE

## 2025-07-11 PROCEDURE — 1126F AMNT PAIN NOTED NONE PRSNT: CPT | Performed by: INTERNAL MEDICINE

## 2025-07-11 PROCEDURE — 1160F RVW MEDS BY RX/DR IN RCRD: CPT | Performed by: INTERNAL MEDICINE

## 2025-07-11 PROCEDURE — 99214 OFFICE O/P EST MOD 30 MIN: CPT | Performed by: INTERNAL MEDICINE

## 2025-07-11 PROCEDURE — 3078F DIAST BP <80 MM HG: CPT | Performed by: INTERNAL MEDICINE

## 2025-07-11 PROCEDURE — 96402 CHEMO HORMON ANTINEOPL SQ/IM: CPT

## 2025-07-11 RX ORDER — LAMOTRIGINE 25 MG/1
500 TABLET ORAL ONCE
Status: COMPLETED | OUTPATIENT
Start: 2025-07-11 | End: 2025-07-11

## 2025-07-11 RX ORDER — FAMOTIDINE 10 MG/ML
20 INJECTION, SOLUTION INTRAVENOUS ONCE AS NEEDED
OUTPATIENT
Start: 2025-07-25

## 2025-07-11 RX ORDER — EPINEPHRINE 0.3 MG/.3ML
0.3 INJECTION SUBCUTANEOUS EVERY 5 MIN PRN
Status: DISCONTINUED | OUTPATIENT
Start: 2025-07-11 | End: 2025-07-11 | Stop reason: HOSPADM

## 2025-07-11 RX ORDER — FAMOTIDINE 10 MG/ML
20 INJECTION, SOLUTION INTRAVENOUS ONCE AS NEEDED
Status: DISCONTINUED | OUTPATIENT
Start: 2025-07-11 | End: 2025-07-11 | Stop reason: HOSPADM

## 2025-07-11 RX ORDER — DIPHENHYDRAMINE HYDROCHLORIDE 50 MG/ML
50 INJECTION, SOLUTION INTRAMUSCULAR; INTRAVENOUS AS NEEDED
OUTPATIENT
Start: 2025-07-25

## 2025-07-11 RX ORDER — ALBUTEROL SULFATE 0.83 MG/ML
3 SOLUTION RESPIRATORY (INHALATION) AS NEEDED
Status: DISCONTINUED | OUTPATIENT
Start: 2025-07-11 | End: 2025-07-11 | Stop reason: HOSPADM

## 2025-07-11 RX ORDER — EPINEPHRINE 0.3 MG/.3ML
0.3 INJECTION SUBCUTANEOUS EVERY 5 MIN PRN
OUTPATIENT
Start: 2025-07-25

## 2025-07-11 RX ORDER — ALBUTEROL SULFATE 0.83 MG/ML
3 SOLUTION RESPIRATORY (INHALATION) AS NEEDED
OUTPATIENT
Start: 2025-07-25

## 2025-07-11 RX ORDER — DIPHENHYDRAMINE HYDROCHLORIDE 50 MG/ML
50 INJECTION, SOLUTION INTRAMUSCULAR; INTRAVENOUS AS NEEDED
Status: DISCONTINUED | OUTPATIENT
Start: 2025-07-11 | End: 2025-07-11 | Stop reason: HOSPADM

## 2025-07-11 RX ADMIN — FULVESTRANT 500 MG: 50 INJECTION, SOLUTION INTRAMUSCULAR at 16:33

## 2025-07-11 ASSESSMENT — PAIN SCALES - GENERAL: PAINLEVEL_OUTOF10: 0-NO PAIN

## 2025-07-11 NOTE — PROGRESS NOTES
Patient in clinic for faslodex. Tolerated injection without issue. Xgeva held today for corrected calcium =7.68. Per Dr. Mcfarland, patient should increase her PO calcium at home. RN educated the patient. Patient verbalized understanding. DC to home after injection.

## 2025-07-11 NOTE — PROGRESS NOTES
Patient education:  Learner: patient  Educated on: Plan of care. faslodex  Readiness: acceptance  Preferred learning method: preferred: listening  Method used: explanation  Response: demonstrated understanding  Barriers: None  Preferred language: English

## 2025-07-16 ENCOUNTER — OFFICE VISIT (OUTPATIENT)
Dept: WOUND CARE | Facility: CLINIC | Age: 85
End: 2025-07-16
Payer: MEDICARE

## 2025-07-16 DIAGNOSIS — L97.909 VENOUS ULCER (MULTI): Primary | ICD-10-CM

## 2025-07-16 DIAGNOSIS — I83.009 VENOUS ULCER (MULTI): Primary | ICD-10-CM

## 2025-07-16 PROCEDURE — 0JBP0ZZ EXCISION OF LEFT LOWER LEG SUBCUTANEOUS TISSUE AND FASCIA, OPEN APPROACH: ICD-10-PCS | Performed by: PODIATRIST

## 2025-07-16 PROCEDURE — 11042 DBRDMT SUBQ TIS 1ST 20SQCM/<: CPT

## 2025-07-18 ENCOUNTER — SPECIALTY PHARMACY (OUTPATIENT)
Dept: PHARMACY | Facility: CLINIC | Age: 85
End: 2025-07-18

## 2025-07-18 ENCOUNTER — HOSPITAL ENCOUNTER (INPATIENT)
Facility: HOSPITAL | Age: 85
LOS: 1 days | Discharge: HOME | DRG: 357 | End: 2025-07-19
Attending: STUDENT IN AN ORGANIZED HEALTH CARE EDUCATION/TRAINING PROGRAM | Admitting: INTERNAL MEDICINE
Payer: MEDICARE

## 2025-07-18 ENCOUNTER — CLINICAL SUPPORT (OUTPATIENT)
Dept: WOUND CARE | Facility: CLINIC | Age: 85
DRG: 357 | End: 2025-07-18
Payer: MEDICARE

## 2025-07-18 DIAGNOSIS — M89.8X9 LYTIC BONE LESIONS ON XRAY: ICD-10-CM

## 2025-07-18 DIAGNOSIS — R19.7 DIARRHEA, UNSPECIFIED TYPE: ICD-10-CM

## 2025-07-18 DIAGNOSIS — C50.919 MALIGNANT NEOPLASM OF BREAST ASSOCIATED WITH MUTATION IN CHEK2 GENE: ICD-10-CM

## 2025-07-18 DIAGNOSIS — K66.8 PNEUMOPERITONEUM: Primary | ICD-10-CM

## 2025-07-18 DIAGNOSIS — H10.9 BACTERIAL CONJUNCTIVITIS: ICD-10-CM

## 2025-07-18 DIAGNOSIS — R19.8 PERFORATED ABDOMINAL VISCUS: ICD-10-CM

## 2025-07-18 DIAGNOSIS — Z15.02 MALIGNANT NEOPLASM OF BREAST ASSOCIATED WITH MUTATION IN CHEK2 GENE: ICD-10-CM

## 2025-07-18 DIAGNOSIS — Z15.09 MALIGNANT NEOPLASM OF BREAST ASSOCIATED WITH MUTATION IN CHEK2 GENE: ICD-10-CM

## 2025-07-18 DIAGNOSIS — Z15.89 MALIGNANT NEOPLASM OF BREAST ASSOCIATED WITH MUTATION IN CHEK2 GENE: ICD-10-CM

## 2025-07-18 PROCEDURE — 99285 EMERGENCY DEPT VISIT HI MDM: CPT | Performed by: STUDENT IN AN ORGANIZED HEALTH CARE EDUCATION/TRAINING PROGRAM

## 2025-07-18 PROCEDURE — 99212 OFFICE O/P EST SF 10 MIN: CPT

## 2025-07-18 RX ORDER — POLYMYXIN B SULFATE AND TRIMETHOPRIM 1; 10000 MG/ML; [USP'U]/ML
1 SOLUTION OPHTHALMIC 4 TIMES DAILY
Qty: 10 ML | Refills: 0 | Status: SHIPPED | OUTPATIENT
Start: 2025-07-18 | End: 2025-07-23

## 2025-07-18 RX ORDER — DEXTROSE MONOHYDRATE AND SODIUM CHLORIDE 5; .9 G/100ML; G/100ML
500 INJECTION, SOLUTION INTRAVENOUS ONCE
Status: COMPLETED | OUTPATIENT
Start: 2025-07-18 | End: 2025-07-19

## 2025-07-18 ASSESSMENT — LIFESTYLE VARIABLES
EVER HAD A DRINK FIRST THING IN THE MORNING TO STEADY YOUR NERVES TO GET RID OF A HANGOVER: NO
TOTAL SCORE: 0
HAVE YOU EVER FELT YOU SHOULD CUT DOWN ON YOUR DRINKING: NO
HAVE PEOPLE ANNOYED YOU BY CRITICIZING YOUR DRINKING: NO
EVER FELT BAD OR GUILTY ABOUT YOUR DRINKING: NO

## 2025-07-18 ASSESSMENT — PAIN SCALES - GENERAL: PAINLEVEL_OUTOF10: 0 - NO PAIN

## 2025-07-18 ASSESSMENT — PAIN - FUNCTIONAL ASSESSMENT: PAIN_FUNCTIONAL_ASSESSMENT: 0-10

## 2025-07-19 ENCOUNTER — APPOINTMENT (OUTPATIENT)
Dept: RADIOLOGY | Facility: HOSPITAL | Age: 85
DRG: 357 | End: 2025-07-19
Payer: MEDICARE

## 2025-07-19 VITALS
HEART RATE: 100 BPM | BODY MASS INDEX: 30.69 KG/M2 | TEMPERATURE: 99.1 F | WEIGHT: 156.31 LBS | OXYGEN SATURATION: 94 % | HEIGHT: 60 IN | SYSTOLIC BLOOD PRESSURE: 125 MMHG | RESPIRATION RATE: 16 BRPM | DIASTOLIC BLOOD PRESSURE: 63 MMHG

## 2025-07-19 PROBLEM — J90 PLEURAL EFFUSION: Status: RESOLVED | Noted: 2024-04-11 | Resolved: 2025-07-19

## 2025-07-19 PROBLEM — E86.0 DEHYDRATION: Status: RESOLVED | Noted: 2025-01-24 | Resolved: 2025-07-19

## 2025-07-19 PROBLEM — R60.0 BILATERAL LOWER EXTREMITY EDEMA: Status: RESOLVED | Noted: 2025-06-27 | Resolved: 2025-07-19

## 2025-07-19 PROBLEM — M17.12 PRIMARY OSTEOARTHRITIS OF LEFT KNEE: Status: RESOLVED | Noted: 2023-04-11 | Resolved: 2025-07-19

## 2025-07-19 PROBLEM — E87.70 HYPERVOLEMIA, UNSPECIFIED HYPERVOLEMIA TYPE: Status: RESOLVED | Noted: 2025-04-30 | Resolved: 2025-07-19

## 2025-07-19 PROBLEM — G89.3 PAIN, CANCER: Status: RESOLVED | Noted: 2023-10-09 | Resolved: 2025-07-19

## 2025-07-19 PROBLEM — H10.32 ACUTE CONJUNCTIVITIS OF LEFT EYE: Status: ACTIVE | Noted: 2025-07-19

## 2025-07-19 PROBLEM — J21.9 BRONCHIOLITIS: Status: RESOLVED | Noted: 2024-04-11 | Resolved: 2025-07-19

## 2025-07-19 PROBLEM — F41.9 ANXIETY: Status: RESOLVED | Noted: 2023-11-12 | Resolved: 2025-07-19

## 2025-07-19 PROBLEM — E87.8 ELECTROLYTE DEPLETION: Status: ACTIVE | Noted: 2025-07-19

## 2025-07-19 PROBLEM — J06.9 UPPER RESPIRATORY INFECTION, ACUTE: Status: RESOLVED | Noted: 2023-06-06 | Resolved: 2025-07-19

## 2025-07-19 PROBLEM — E83.42 HYPOMAGNESEMIA: Status: RESOLVED | Noted: 2025-04-19 | Resolved: 2025-07-19

## 2025-07-19 PROBLEM — H90.3 BILATERAL HIGH FREQUENCY SENSORINEURAL HEARING LOSS: Status: RESOLVED | Noted: 2024-08-07 | Resolved: 2025-07-19

## 2025-07-19 PROBLEM — K12.1 STOMATITIS: Status: RESOLVED | Noted: 2025-01-24 | Resolved: 2025-07-19

## 2025-07-19 PROBLEM — J90 BILATERAL PLEURAL EFFUSION: Status: RESOLVED | Noted: 2024-09-22 | Resolved: 2025-07-19

## 2025-07-19 PROBLEM — R13.12 OROPHARYNGEAL DYSPHAGIA: Status: RESOLVED | Noted: 2024-08-07 | Resolved: 2025-07-19

## 2025-07-19 PROBLEM — D64.9 ANEMIA: Status: RESOLVED | Noted: 2023-10-09 | Resolved: 2025-07-19

## 2025-07-19 PROBLEM — R11.2 CHEMOTHERAPY INDUCED NAUSEA AND VOMITING: Status: RESOLVED | Noted: 2025-01-24 | Resolved: 2025-07-19

## 2025-07-19 PROBLEM — R19.8 PERFORATED ABDOMINAL VISCUS: Status: ACTIVE | Noted: 2025-07-19

## 2025-07-19 PROBLEM — R25.2 LEG CRAMPS: Status: RESOLVED | Noted: 2023-10-09 | Resolved: 2025-07-19

## 2025-07-19 PROBLEM — I50.33 ACUTE ON CHRONIC HEART FAILURE WITH PRESERVED EJECTION FRACTION (HFPEF): Status: RESOLVED | Noted: 2024-08-15 | Resolved: 2025-07-19

## 2025-07-19 PROBLEM — M85.80 OSTEOPENIA: Status: RESOLVED | Noted: 2023-10-09 | Resolved: 2025-07-19

## 2025-07-19 PROBLEM — J40 BRONCHITIS: Status: RESOLVED | Noted: 2023-06-06 | Resolved: 2025-07-19

## 2025-07-19 PROBLEM — R13.19 ESOPHAGEAL DYSPHAGIA: Status: RESOLVED | Noted: 2024-08-07 | Resolved: 2025-07-19

## 2025-07-19 PROBLEM — D72.819 DECREASED WHITE BLOOD CELL COUNT: Status: RESOLVED | Noted: 2023-10-09 | Resolved: 2025-07-19

## 2025-07-19 PROBLEM — T45.1X5A CHEMOTHERAPY INDUCED NAUSEA AND VOMITING: Status: RESOLVED | Noted: 2025-01-24 | Resolved: 2025-07-19

## 2025-07-19 PROBLEM — H66.93 BILATERAL OTITIS MEDIA: Status: RESOLVED | Noted: 2024-07-30 | Resolved: 2025-07-19

## 2025-07-19 PROBLEM — K66.8 PNEUMOPERITONEUM: Status: ACTIVE | Noted: 2025-07-19

## 2025-07-19 PROBLEM — R06.02 SHORTNESS OF BREATH: Status: RESOLVED | Noted: 2024-04-11 | Resolved: 2025-07-19

## 2025-07-19 PROBLEM — R06.09 DYSPNEA ON EXERTION: Status: ACTIVE | Noted: 2025-07-19

## 2025-07-19 PROBLEM — H60.502 ACUTE OTITIS EXTERNA OF LEFT EAR: Status: RESOLVED | Noted: 2024-07-30 | Resolved: 2025-07-19

## 2025-07-19 PROBLEM — H61.23 BILATERAL IMPACTED CERUMEN: Status: RESOLVED | Noted: 2024-03-07 | Resolved: 2025-07-19

## 2025-07-19 PROBLEM — D64.9 ACUTE ON CHRONIC ANEMIA: Status: ACTIVE | Noted: 2025-07-19

## 2025-07-19 PROBLEM — N17.9 AKI (ACUTE KIDNEY INJURY): Status: RESOLVED | Noted: 2025-01-21 | Resolved: 2025-07-19

## 2025-07-19 PROBLEM — H60.63 CHRONIC NON-INFECTIVE OTITIS EXTERNA OF BOTH EARS: Status: RESOLVED | Noted: 2024-08-07 | Resolved: 2025-07-19

## 2025-07-19 PROBLEM — H81.13 BPPV (BENIGN PAROXYSMAL POSITIONAL VERTIGO), BILATERAL: Status: RESOLVED | Noted: 2018-04-20 | Resolved: 2025-07-19

## 2025-07-19 PROBLEM — T50.905A DRUG REACTION, INITIAL ENCOUNTER: Status: RESOLVED | Noted: 2025-01-24 | Resolved: 2025-07-19

## 2025-07-19 PROBLEM — J96.01 ACUTE HYPOXEMIC RESPIRATORY FAILURE: Status: RESOLVED | Noted: 2025-02-05 | Resolved: 2025-07-19

## 2025-07-19 PROBLEM — K52.1 DIARRHEA DUE TO DRUG: Status: RESOLVED | Noted: 2025-01-24 | Resolved: 2025-07-19

## 2025-07-19 PROBLEM — L27.0 DRUG-INDUCED SKIN RASH: Status: RESOLVED | Noted: 2023-12-03 | Resolved: 2025-07-19

## 2025-07-19 PROBLEM — L97.929 ULCER OF LEFT LOWER EXTREMITY: Status: RESOLVED | Noted: 2025-06-27 | Resolved: 2025-07-19

## 2025-07-19 PROBLEM — H40.9 GLAUCOMA: Status: RESOLVED | Noted: 2023-10-09 | Resolved: 2025-07-19

## 2025-07-19 PROBLEM — S01.312A: Status: RESOLVED | Noted: 2024-12-06 | Resolved: 2025-07-19

## 2025-07-19 PROBLEM — E87.6 HYPOKALEMIA: Status: RESOLVED | Noted: 2024-08-15 | Resolved: 2025-07-19

## 2025-07-19 PROBLEM — K21.00 GASTROESOPHAGEAL REFLUX DISEASE WITH ESOPHAGITIS WITHOUT HEMORRHAGE: Status: RESOLVED | Noted: 2023-11-12 | Resolved: 2025-07-19

## 2025-07-19 PROBLEM — R94.4 DECREASED GFR: Status: RESOLVED | Noted: 2023-03-20 | Resolved: 2025-07-19

## 2025-07-19 PROBLEM — M89.8X9 LYTIC LESION OF BONE ON X-RAY: Status: RESOLVED | Noted: 2025-06-15 | Resolved: 2025-07-19

## 2025-07-19 PROBLEM — M19.90 ARTHRITIS: Status: RESOLVED | Noted: 2023-10-18 | Resolved: 2025-07-19

## 2025-07-19 PROBLEM — I82.451 ACUTE DEEP VEIN THROMBOSIS (DVT) OF RIGHT PERONEAL VEIN (MULTI): Status: RESOLVED | Noted: 2025-04-19 | Resolved: 2025-07-19

## 2025-07-19 PROBLEM — L40.9 PSORIASIS: Status: RESOLVED | Noted: 2024-01-14 | Resolved: 2025-07-19

## 2025-07-19 PROBLEM — R07.9 CHEST PAIN: Status: RESOLVED | Noted: 2024-09-22 | Resolved: 2025-07-19

## 2025-07-19 LAB
ABO GROUP (TYPE) IN BLOOD: NORMAL
ALBUMIN SERPL BCP-MCNC: 3.4 G/DL (ref 3.4–5)
ALP SERPL-CCNC: 49 U/L (ref 33–136)
ALT SERPL W P-5'-P-CCNC: 8 U/L (ref 7–45)
ANION GAP SERPL CALC-SCNC: 18 MMOL/L
ANTIBODY SCREEN: NORMAL
AST SERPL W P-5'-P-CCNC: 15 U/L (ref 9–39)
BACTERIA SPEC AEROBE CULT: NORMAL
BACTERIA SPEC ANAEROBE CULT: NORMAL
BASOPHILS # BLD MANUAL: 0 X10*3/UL (ref 0–0.1)
BASOPHILS NFR BLD MANUAL: 0 %
BILIRUB SERPL-MCNC: 0.5 MG/DL (ref 0–1.2)
BLOOD EXPIRATION DATE: NORMAL
BUN SERPL-MCNC: 51 MG/DL (ref 6–23)
CA-I BLD-SCNC: 0.69 MMOL/L (ref 1.1–1.33)
CALCIUM SERPL-MCNC: 6.4 MG/DL (ref 8.6–10.3)
CHLORIDE SERPL-SCNC: 92 MMOL/L (ref 98–107)
CO2 SERPL-SCNC: 22 MMOL/L (ref 21–32)
CREAT SERPL-MCNC: 3.23 MG/DL (ref 0.5–1.05)
CRITICAL CALL TIME: 559
CRITICAL CALLED BY: ABNORMAL
CRITICAL CALLED TO: ABNORMAL
CRITICAL READ BACK: ABNORMAL
DISPENSE STATUS: NORMAL
DOHLE BOD BLD QL SMEAR: PRESENT
EGFRCR SERPLBLD CKD-EPI 2021: 14 ML/MIN/1.73M*2
EOSINOPHIL # BLD MANUAL: 0.03 X10*3/UL (ref 0–0.4)
EOSINOPHIL NFR BLD MANUAL: 1 %
ERYTHROCYTE [DISTWIDTH] IN BLOOD BY AUTOMATED COUNT: 14.9 % (ref 11.5–14.5)
GLUCOSE BLD MANUAL STRIP-MCNC: 127 MG/DL (ref 74–99)
GLUCOSE SERPL-MCNC: 128 MG/DL (ref 74–99)
HCT VFR BLD AUTO: 20.3 % (ref 36–46)
HGB BLD-MCNC: 6.8 G/DL (ref 12–16)
IMM GRANULOCYTES # BLD AUTO: 0.03 X10*3/UL (ref 0–0.5)
IMM GRANULOCYTES NFR BLD AUTO: 1 % (ref 0–0.9)
LACTATE SERPL-SCNC: 0.7 MMOL/L (ref 0.4–2)
LIPASE SERPL-CCNC: <3 U/L (ref 9–82)
LYMPHOCYTES # BLD MANUAL: 0.36 X10*3/UL (ref 0.8–3)
LYMPHOCYTES NFR BLD MANUAL: 12 %
MAGNESIUM SERPL-MCNC: 0.77 MG/DL (ref 1.6–2.4)
MCH RBC QN AUTO: 37.4 PG (ref 26–34)
MCHC RBC AUTO-ENTMCNC: 33.5 G/DL (ref 32–36)
MCV RBC AUTO: 112 FL (ref 80–100)
MONOCYTES # BLD MANUAL: 0.09 X10*3/UL (ref 0.05–0.8)
MONOCYTES NFR BLD MANUAL: 3 %
MYELOCYTES # BLD MANUAL: 0.03 X10*3/UL
MYELOCYTES NFR BLD MANUAL: 1 %
NEUTROPHILS # BLD MANUAL: 2.49 X10*3/UL (ref 1.6–5.5)
NEUTS BAND # BLD MANUAL: 0.24 X10*3/UL (ref 0–0.5)
NEUTS BAND NFR BLD MANUAL: 8 %
NEUTS SEG # BLD MANUAL: 2.25 X10*3/UL (ref 1.6–5)
NEUTS SEG NFR BLD MANUAL: 75 %
NRBC BLD-RTO: 0 /100 WBCS (ref 0–0)
PLATELET # BLD AUTO: 155 X10*3/UL (ref 150–450)
POLYCHROMASIA BLD QL SMEAR: ABNORMAL
POTASSIUM SERPL-SCNC: 3.5 MMOL/L (ref 3.5–5.3)
PRODUCT BLOOD TYPE: 6200
PRODUCT CODE: NORMAL
PROT SERPL-MCNC: 6.3 G/DL (ref 6.4–8.2)
RBC # BLD AUTO: 1.82 X10*6/UL (ref 4–5.2)
RBC MORPH BLD: ABNORMAL
RH FACTOR (ANTIGEN D): NORMAL
SODIUM SERPL-SCNC: 128 MMOL/L (ref 136–145)
TOTAL CELLS COUNTED BLD: 100
TOXIC GRANULES BLD QL SMEAR: PRESENT
UNIT ABO: NORMAL
UNIT NUMBER: NORMAL
UNIT RH: NORMAL
UNIT VOLUME: 350
WBC # BLD AUTO: 3 X10*3/UL (ref 4.4–11.3)
XM INTEP: NORMAL

## 2025-07-19 PROCEDURE — 99223 1ST HOSP IP/OBS HIGH 75: CPT | Performed by: INTERNAL MEDICINE

## 2025-07-19 PROCEDURE — 96365 THER/PROPH/DIAG IV INF INIT: CPT

## 2025-07-19 PROCEDURE — 99223 1ST HOSP IP/OBS HIGH 75: CPT | Performed by: SURGERY

## 2025-07-19 PROCEDURE — 80053 COMPREHEN METABOLIC PANEL: CPT

## 2025-07-19 PROCEDURE — 36415 COLL VENOUS BLD VENIPUNCTURE: CPT | Performed by: NURSE PRACTITIONER

## 2025-07-19 PROCEDURE — 36415 COLL VENOUS BLD VENIPUNCTURE: CPT

## 2025-07-19 PROCEDURE — 85027 COMPLETE CBC AUTOMATED: CPT

## 2025-07-19 PROCEDURE — 83735 ASSAY OF MAGNESIUM: CPT

## 2025-07-19 PROCEDURE — 36430 TRANSFUSION BLD/BLD COMPNT: CPT

## 2025-07-19 PROCEDURE — 74176 CT ABD & PELVIS W/O CONTRAST: CPT | Mod: FOREIGN READ | Performed by: RADIOLOGY

## 2025-07-19 PROCEDURE — 74176 CT ABD & PELVIS W/O CONTRAST: CPT

## 2025-07-19 PROCEDURE — 2500000004 HC RX 250 GENERAL PHARMACY W/ HCPCS (ALT 636 FOR OP/ED): Performed by: INTERNAL MEDICINE

## 2025-07-19 PROCEDURE — 82330 ASSAY OF CALCIUM: CPT | Performed by: NURSE PRACTITIONER

## 2025-07-19 PROCEDURE — 85007 BL SMEAR W/DIFF WBC COUNT: CPT

## 2025-07-19 PROCEDURE — 99239 HOSP IP/OBS DSCHRG MGMT >30: CPT | Performed by: INTERNAL MEDICINE

## 2025-07-19 PROCEDURE — 86900 BLOOD TYPING SEROLOGIC ABO: CPT | Performed by: STUDENT IN AN ORGANIZED HEALTH CARE EDUCATION/TRAINING PROGRAM

## 2025-07-19 PROCEDURE — 1100000001 HC PRIVATE ROOM DAILY

## 2025-07-19 PROCEDURE — P9040 RBC LEUKOREDUCED IRRADIATED: HCPCS

## 2025-07-19 PROCEDURE — 2500000001 HC RX 250 WO HCPCS SELF ADMINISTERED DRUGS (ALT 637 FOR MEDICARE OP): Performed by: INTERNAL MEDICINE

## 2025-07-19 PROCEDURE — 83605 ASSAY OF LACTIC ACID: CPT | Performed by: STUDENT IN AN ORGANIZED HEALTH CARE EDUCATION/TRAINING PROGRAM

## 2025-07-19 PROCEDURE — 96375 TX/PRO/DX INJ NEW DRUG ADDON: CPT

## 2025-07-19 PROCEDURE — 82947 ASSAY GLUCOSE BLOOD QUANT: CPT

## 2025-07-19 PROCEDURE — 2500000004 HC RX 250 GENERAL PHARMACY W/ HCPCS (ALT 636 FOR OP/ED)

## 2025-07-19 PROCEDURE — 83690 ASSAY OF LIPASE: CPT

## 2025-07-19 PROCEDURE — 86923 COMPATIBILITY TEST ELECTRIC: CPT

## 2025-07-19 PROCEDURE — 96361 HYDRATE IV INFUSION ADD-ON: CPT

## 2025-07-19 PROCEDURE — 2500000004 HC RX 250 GENERAL PHARMACY W/ HCPCS (ALT 636 FOR OP/ED): Performed by: STUDENT IN AN ORGANIZED HEALTH CARE EDUCATION/TRAINING PROGRAM

## 2025-07-19 RX ORDER — ACETAMINOPHEN 160 MG/5ML
650 SOLUTION ORAL EVERY 4 HOURS PRN
Status: DISCONTINUED | OUTPATIENT
Start: 2025-07-19 | End: 2025-07-19 | Stop reason: HOSPADM

## 2025-07-19 RX ORDER — ACETAMINOPHEN 650 MG/1
650 SUPPOSITORY RECTAL EVERY 4 HOURS PRN
Status: DISCONTINUED | OUTPATIENT
Start: 2025-07-19 | End: 2025-07-19 | Stop reason: HOSPADM

## 2025-07-19 RX ORDER — ONDANSETRON HYDROCHLORIDE 2 MG/ML
4 INJECTION, SOLUTION INTRAVENOUS EVERY 8 HOURS PRN
Status: DISCONTINUED | OUTPATIENT
Start: 2025-07-19 | End: 2025-07-19 | Stop reason: HOSPADM

## 2025-07-19 RX ORDER — ONDANSETRON 4 MG/1
4 TABLET, ORALLY DISINTEGRATING ORAL EVERY 8 HOURS PRN
Status: DISCONTINUED | OUTPATIENT
Start: 2025-07-19 | End: 2025-07-19 | Stop reason: HOSPADM

## 2025-07-19 RX ORDER — AMOXICILLIN AND CLAVULANATE POTASSIUM 500; 125 MG/1; MG/1
1 TABLET, FILM COATED ORAL 2 TIMES DAILY
Qty: 14 TABLET | Refills: 0 | Status: SHIPPED | OUTPATIENT
Start: 2025-07-19 | End: 2025-07-26

## 2025-07-19 RX ORDER — SODIUM CHLORIDE 9 MG/ML
75 INJECTION, SOLUTION INTRAVENOUS CONTINUOUS
Status: DISCONTINUED | OUTPATIENT
Start: 2025-07-19 | End: 2025-07-19 | Stop reason: HOSPADM

## 2025-07-19 RX ORDER — HALOPERIDOL 2 MG/ML
1 SOLUTION ORAL EVERY 8 HOURS PRN
Status: DISCONTINUED | OUTPATIENT
Start: 2025-07-19 | End: 2025-07-19 | Stop reason: HOSPADM

## 2025-07-19 RX ORDER — PANTOPRAZOLE SODIUM 40 MG/10ML
40 INJECTION, POWDER, LYOPHILIZED, FOR SOLUTION INTRAVENOUS
Status: DISCONTINUED | OUTPATIENT
Start: 2025-07-19 | End: 2025-07-19 | Stop reason: HOSPADM

## 2025-07-19 RX ORDER — ACETAMINOPHEN 325 MG/1
650 TABLET ORAL EVERY 4 HOURS PRN
Status: DISCONTINUED | OUTPATIENT
Start: 2025-07-19 | End: 2025-07-19 | Stop reason: HOSPADM

## 2025-07-19 RX ORDER — PANTOPRAZOLE SODIUM 40 MG/1
40 TABLET, DELAYED RELEASE ORAL
Status: DISCONTINUED | OUTPATIENT
Start: 2025-07-19 | End: 2025-07-19 | Stop reason: HOSPADM

## 2025-07-19 RX ORDER — POLYMYXIN B SULFATE AND TRIMETHOPRIM 1; 10000 MG/ML; [USP'U]/ML
1 SOLUTION OPHTHALMIC ONCE
Status: DISCONTINUED | OUTPATIENT
Start: 2025-07-19 | End: 2025-07-19

## 2025-07-19 RX ORDER — METOCLOPRAMIDE 10 MG/1
5 TABLET ORAL EVERY 6 HOURS PRN
Status: DISCONTINUED | OUTPATIENT
Start: 2025-07-19 | End: 2025-07-19

## 2025-07-19 RX ORDER — METOCLOPRAMIDE HYDROCHLORIDE 5 MG/ML
5 INJECTION INTRAMUSCULAR; INTRAVENOUS EVERY 6 HOURS PRN
Status: DISCONTINUED | OUTPATIENT
Start: 2025-07-19 | End: 2025-07-19

## 2025-07-19 RX ORDER — POLYMYXIN B SULFATE AND TRIMETHOPRIM 1; 10000 MG/ML; [USP'U]/ML
1 SOLUTION OPHTHALMIC
Status: DISCONTINUED | OUTPATIENT
Start: 2025-07-19 | End: 2025-07-19 | Stop reason: HOSPADM

## 2025-07-19 RX ORDER — HYOSCYAMINE SULFATE 0.12 MG/1
0.12 TABLET, ORALLY DISINTEGRATING ORAL EVERY 4 HOURS PRN
Status: DISCONTINUED | OUTPATIENT
Start: 2025-07-19 | End: 2025-07-19 | Stop reason: HOSPADM

## 2025-07-19 RX ORDER — MORPHINE SULFATE 10 MG/.5ML
5 SOLUTION ORAL
Refills: 0 | Status: DISCONTINUED | OUTPATIENT
Start: 2025-07-19 | End: 2025-07-19 | Stop reason: HOSPADM

## 2025-07-19 RX ORDER — MAGNESIUM SULFATE HEPTAHYDRATE 40 MG/ML
2 INJECTION, SOLUTION INTRAVENOUS ONCE
Status: COMPLETED | OUTPATIENT
Start: 2025-07-19 | End: 2025-07-19

## 2025-07-19 RX ORDER — TALC
3 POWDER (GRAM) TOPICAL NIGHTLY PRN
Status: DISCONTINUED | OUTPATIENT
Start: 2025-07-19 | End: 2025-07-19 | Stop reason: HOSPADM

## 2025-07-19 RX ADMIN — SODIUM CHLORIDE 75 ML/HR: 0.9 INJECTION, SOLUTION INTRAVENOUS at 09:02

## 2025-07-19 RX ADMIN — PANTOPRAZOLE SODIUM 40 MG: 40 INJECTION, POWDER, FOR SOLUTION INTRAVENOUS at 08:25

## 2025-07-19 RX ADMIN — DEXTROSE AND SODIUM CHLORIDE 500 ML: 5; .9 INJECTION, SOLUTION INTRAVENOUS at 00:44

## 2025-07-19 RX ADMIN — PIPERACILLIN SODIUM AND TAZOBACTAM SODIUM 3.38 G: 3; .375 INJECTION, SOLUTION INTRAVENOUS at 12:47

## 2025-07-19 RX ADMIN — SODIUM CHLORIDE, SODIUM LACTATE, POTASSIUM CHLORIDE, AND CALCIUM CHLORIDE 1000 ML: .6; .31; .03; .02 INJECTION, SOLUTION INTRAVENOUS at 04:58

## 2025-07-19 RX ADMIN — MAGNESIUM SULFATE HEPTAHYDRATE 2 G: 40 INJECTION, SOLUTION INTRAVENOUS at 04:58

## 2025-07-19 RX ADMIN — PIPERACILLIN SODIUM AND TAZOBACTAM SODIUM 4.5 G: 4; .5 INJECTION, SOLUTION INTRAVENOUS at 04:08

## 2025-07-19 RX ADMIN — POLYMYXIN B SULFATE AND TRIMETHOPRIM 1 DROP: 10000; 1 SOLUTION OPHTHALMIC at 08:25

## 2025-07-19 RX ADMIN — POLYMYXIN B SULFATE AND TRIMETHOPRIM 1 DROP: 10000; 1 SOLUTION OPHTHALMIC at 11:58

## 2025-07-19 RX ADMIN — CALCIUM CHLORIDE 1 G: 100 INJECTION INTRAVENOUS; INTRAVENTRICULAR at 08:56

## 2025-07-19 ASSESSMENT — ENCOUNTER SYMPTOMS
MYALGIAS: 1
SHORTNESS OF BREATH: 1
ENDOCRINE NEGATIVE: 1
UNEXPECTED WEIGHT CHANGE: 0
NEUROLOGICAL NEGATIVE: 1
CONFUSION: 0
CARDIOVASCULAR NEGATIVE: 1
FLANK PAIN: 0
VOMITING: 0
DECREASED APPETITE: 1
ABDOMINAL PAIN: 1
HEMATOLOGIC/LYMPHATIC NEGATIVE: 1
FEVER: 0
WOUND: 1
APPETITE CHANGE: 1
DYSURIA: 0
RESPIRATORY NEGATIVE: 1
NECK PAIN: 0
ABDOMINAL DISTENTION: 0
DIARRHEA: 1
HEMATURIA: 0
DIZZINESS: 0
HEADACHES: 0
PSYCHIATRIC NEGATIVE: 1
NAUSEA: 1
PALPITATIONS: 0
NECK STIFFNESS: 0
COUGH: 0
CHILLS: 0
MUSCULOSKELETAL NEGATIVE: 1
CHEST TIGHTNESS: 0
SORE THROAT: 0
FATIGUE: 1
EYES NEGATIVE: 1
BLOOD IN STOOL: 0

## 2025-07-19 ASSESSMENT — PAIN - FUNCTIONAL ASSESSMENT
PAIN_FUNCTIONAL_ASSESSMENT: 0-10

## 2025-07-19 ASSESSMENT — PAIN SCALES - GENERAL
PAINLEVEL_OUTOF10: 0 - NO PAIN

## 2025-07-19 ASSESSMENT — COGNITIVE AND FUNCTIONAL STATUS - GENERAL
DRESSING REGULAR LOWER BODY CLOTHING: A LITTLE
DRESSING REGULAR UPPER BODY CLOTHING: A LITTLE
TOILETING: A LITTLE
MOVING TO AND FROM BED TO CHAIR: A LITTLE
WALKING IN HOSPITAL ROOM: A LITTLE
DAILY ACTIVITIY SCORE: 20
STANDING UP FROM CHAIR USING ARMS: A LITTLE
MOBILITY SCORE: 18
MOVING FROM LYING ON BACK TO SITTING ON SIDE OF FLAT BED WITH BEDRAILS: A LITTLE
CLIMB 3 TO 5 STEPS WITH RAILING: A LITTLE
TURNING FROM BACK TO SIDE WHILE IN FLAT BAD: A LITTLE
HELP NEEDED FOR BATHING: A LITTLE

## 2025-07-19 NOTE — ED PROVIDER NOTES
Emergency Department Provider Note        History of Present Illness     History provided by: Patient  Limitations to History: None  External Records Reviewed with Brief Summary: Discharge summary from 2025 admitted for syncope found to have an TSERING that may have had an obstructive component    HPI:  Arabella Springer is a 84 y.o. female past medical history significant for breast cancer with mets to bone/abdomen on immunotherapy, DVTs on Eliquis, HFpEF, BPPV, hyperlipidemia, hypertension, anemia, osteopenia, GERD, anxiety presenting to the emergency department due to decreased appetite since Wednesday along with diarrhea for the last week.  She overall has felt fatigued, she denies any fever, chills, chest pain, shortness of breath.  She also did note that her left eyes become more red and irritated than usual.    Physical Exam   Triage vitals:  T 36.5 °C (97.7 °F)  HR 95  /56  RR 20  O2 95 %      Physical Exam  Vitals and nursing note reviewed.   Constitutional:       Appearance: Normal appearance.   HENT:      Head: Normocephalic.      Nose: Nose normal.      Mouth/Throat:      Mouth: Mucous membranes are moist.     Eyes:      Pupils: Pupils are equal, round, and reactive to light.       Cardiovascular:      Rate and Rhythm: Normal rate.      Pulses: Normal pulses.      Heart sounds: Normal heart sounds.   Pulmonary:      Effort: Pulmonary effort is normal.   Abdominal:      General: Abdomen is flat.      Tenderness: There is abdominal tenderness.     Musculoskeletal:         General: Normal range of motion.      Cervical back: Normal range of motion.     Skin:     General: Skin is warm.      Capillary Refill: Capillary refill takes less than 2 seconds.      Coloration: Skin is pale.     Neurological:      General: No focal deficit present.      Mental Status: She is alert and oriented to person, place, and time.          Medical Decision Making & ED Course   Medical Decision Makin y.o. female  with past medical history and HPI as described above.  Workup to include CBC, CMP, mag, lactate, CT abdomen pelvis without IV contrast.  Workup notable for elevation in creatinine, hypocalcemia, hyponatremia, hypomagnesemia, anemia to 6.8 that is macrocytic, as well as a leukopenia.  Interventions to include D5 normal saline initially followed by 1 L of lactated Ringer's.  CT abdomen pelvis was notable for free air in the abdomen concerning for a perforated viscus.  General surgery was consulted from the ED who states that this would be a very high risk surgery and should discuss risk versus benefit with patient.  This resident had a conversation with patient who understands that medical management in the setting of a perforated viscus could be fatal however there is a high likelihood of complication if she did undergo surgery.  Patient understands risks and benefits of both sides and would like to continue with medical management.  Patient started on Zosyn, given 1 unit of blood and admitted to medicine.  ----    Differential diagnoses considered include but are not limited to: Perforated viscus, gastroenteritis, side effect of immunotherapy, peritoneal carcinomatosis, appendicitis, diverticulitis, enteritis, colitis, C. difficile     Social Determinants of Health which Significantly Impact Care: None identified     Independent Result Review and Interpretation: Relevant laboratory and radiographic results were reviewed and independently interpreted by myself.  As necessary, they are commented on in the ED Course.    Chronic conditions affecting the patient's care: As documented above in [facility]    The patient was discussed with the following consultants/services: General surgery and hospitalist who accepted the patient for admission    Care Considerations: As documented above in [facility]    ED Course:  ED Course as of 07/19/25 0848   Sat Jul 19, 2025   0247 Call to general surgery.  Will start on Zosyn for free air in the  abdomen.  Patient ashvin hemodynamically stable however is leukopenic.  Patient with known metastatic breast cancer.  Received call from radiologist who did not see an obvious etiology for the free air. [TL]   0304 Resident physician spoke to the general surgeon who will review the patient's CT imaging and case for further recommendations. [TL]   0327 Spoke with general surgery at then patient about risks and benefits of surgery.  Patient understands that without surgery this is a life-threatening diagnosis and that could be fatal.  She wants to continue with medical management and focus on comfort [IS]      ED Course User Index  [IS] David Dale MD  [TL] Darrel Chaudhari, DO         Diagnoses as of 07/19/25 0848   Bacterial conjunctivitis   Diarrhea, unspecified type   Pneumoperitoneum     Disposition   Admission    Procedures   Procedures    Patient seen and discussed with ED attending physician.    David Dale MD  Emergency Medicine     David Dale MD  Resident  07/19/25 0848

## 2025-07-19 NOTE — ASSESSMENT & PLAN NOTE
Serial labs, check ionized calcium in light of normal albumin, will need nutrition therapy evaluation when cleared for enteral intake

## 2025-07-19 NOTE — SIGNIFICANT EVENT
Patient is seen and evaluated.  Later went back and spoke with patient's son and updated him as well.  Awaiting for surgery to evaluate patient.  Initially patient had declined any surgical option and was admitted with IV antibiotics and palliative care consult.  Now they want to speak with surgery and try to see how it looks with surgery or without surgery and what they can expect.  Case discussed with Dr. Kenny will be seeing the patient shortly.  Status post 1 unit of packed RBC.  Will repeat hemoglobin and labs tomorrow.  Continue with IV fluids.  Patient did develop acute kidney injury likely secondary to above.  Will maintain n.p.o. for now.    Plan discussed with patient and son at bedside in CCU bed #2110    This note is created using voice recognition software. All efforts are made to minimize errors, if there are errors there due to transcription.    Brandon Roman  Hospitalist    Addendum 11:40 AM: Spoke with family members including patient's , daughter, son and granddaughter.  They want to take her home and proceeded with hospice.  They are questioning if she can be discharged which I said she is absolutely able to get discharged but I would like her to meet with hospice and set up equipments and thinks that they could need to be successful as they want to keep her comfortable and around home for her last remaining time.  Hospice consult is placed.  Patient can transfer out of CCU without telemetry to regular floor.  Will order some comfort care medications as well.

## 2025-07-19 NOTE — PROGRESS NOTES
RENAL DOSING ADJUSTMENT    Renal dose adjustments for the following medications have been made:  Current order Zosyn 4.5g Q8h changed to Zosyn 3.375g Q12h extended infusion over 4 hrs     Results from last 72 hours   Lab Units 07/19/25 0044   CREATININE mg/dL 3.23*   BUN mg/dL 51*     Serum creatinine: 3.23 mg/dL (H) 07/19/25 0044  Estimated creatinine clearance: 11.8 mL/min (A)

## 2025-07-19 NOTE — H&P
Arabella Springer is a 84 y.o. female presenting to [facility] on 7/18/2025 with Pneumoperitoneum. Medical history is significant for right breast cancer status postmastectomy and reconstruction with known metastases to bone and peritoneum currently on immunotherapy, stage IV chronic kidney disease, chronic multifactorial anemia, chronic bilateral pleural effusions in the setting of heart failure with preserved ejection fraction, peripheral vascular disease with chronic left lower extremity ulcer, remote lower extremity DVT, and long-term current anticoagulation on apixaban.    She presented to the emergency department earlier [facility] complaining of new bilateral low transverse abdominal pain, diarrhea which has been a chronic intermittent issue for her for some time, worsening nausea without vomiting, and essentially no oral intake since Wednesday.  She also complains of dyspnea on exertion and refractory bilateral lower extremity edema which are chronic issues for her.    Past Medical History:   Diagnosis Date    Anxiety     Atherosclerotic peripheral vascular disease with ulceration (Multi)     Benign paroxysmal positional vertigo     Breast cancer, right     Central retinal vein occlusion, right eye     Cervical spinal stenosis     Cholelithiasis     Chronic anemia     Chronic kidney disease, stage IV (severe) (Multi)     Current use of long term anticoagulation     Diverticulosis     Gastroesophageal reflux disease with hiatal hernia     Glaucoma     Hearing loss     Heart failure with preserved ejection fraction     66% April 2025    History of deep vein thrombosis     History of fractured rib     History of syncope     Hydronephrosis, bilateral     Hypertension     Immunotherapy     Metastasis to bone (Multi)     Metastasis to peritoneum (Multi)     Multiple pulmonary nodules     Osteoarthritis     Osteopenia     Peripheral vascular disease     Pleural effusion, bilateral     Psoriasis     Immunotherapy induced     Squamous cell carcinoma in situ (SCCIS) of skin of left upper extremity        Past Surgical History:   Procedure Laterality Date    BREAST RECONSTRUCTION Right     DENTAL SURGERY      HUMERUS FRACTURE SURGERY Right     HYSTERECTOMY      MASTECTOMY Right     SHOULDER SURGERY      SKIN BIOPSY      THORACENTESIS          Social History     Tobacco Use    Smoking status: Former     Current packs/day: 0.00     Types: Cigarettes     Quit date: 3/10/1965     Years since quittin.4    Smokeless tobacco: Never   Vaping Use    Vaping status: Never Used   Substance Use Topics    Alcohol use: Not Currently    Drug use: Never        Family History   Problem Relation Name Age of Onset    Cancer Mother Cherelle Grewal     Heart failure Mother Cherelle Grewal     Heart failure Father Giovany Grewal     Colon cancer Sister      Cancer Sister Suki sanchez     Breast cancer Daughter        Allergies  Oxycodone    Review of Systems   Constitutional:  Positive for appetite change and fatigue. Negative for chills, fever and unexpected weight change.   HENT:  Positive for hearing loss. Negative for mouth sores and sore throat.    Eyes:  Negative for visual disturbance.   Respiratory:  Positive for shortness of breath. Negative for cough and chest tightness.    Cardiovascular:  Positive for leg swelling. Negative for chest pain and palpitations.   Gastrointestinal:  Positive for abdominal pain, diarrhea and nausea. Negative for abdominal distention, blood in stool and vomiting.   Genitourinary:  Positive for decreased urine volume. Negative for dysuria, flank pain and hematuria.   Musculoskeletal:  Positive for myalgias. Negative for neck pain and neck stiffness.        Bilateral leg cramps   Skin:  Positive for wound. Negative for rash.        Chronic LLE vascular wound   Neurological:  Negative for dizziness and headaches.   Psychiatric/Behavioral:  Negative for confusion.        Physical Exam  Constitutional:       General: She is  not in acute distress.     Appearance: Normal appearance. She is not ill-appearing or toxic-appearing.   HENT:      Head: Normocephalic.      Right Ear: External ear normal.      Left Ear: External ear normal.      Nose: Nose normal.      Mouth/Throat:      Mouth: Mucous membranes are moist.     Eyes:      General: No scleral icterus.        Left eye: Discharge present.      Cardiovascular:      Rate and Rhythm: Regular rhythm. Tachycardia present.      Pulses: Normal pulses.   Pulmonary:      Effort: Pulmonary effort is normal. No respiratory distress.   Abdominal:      General: Bowel sounds are normal. There is no distension.      Palpations: Abdomen is soft.      Tenderness: There is abdominal tenderness. There is no guarding or rebound.     Musculoskeletal:      Cervical back: No rigidity or tenderness.      Right lower leg: Edema present.      Left lower leg: Edema present.     Skin:     Capillary Refill: Capillary refill takes less than 2 seconds.      Findings: Lesion present. No erythema or rash.     Neurological:      General: No focal deficit present.      Mental Status: She is alert and oriented to person, place, and time. Mental status is at baseline.     Psychiatric:         Mood and Affect: Mood normal.         Behavior: Behavior normal.       Last Recorded Vitals  Blood pressure 127/60, pulse 99, temperature 36.5 °C (97.7 °F), resp. rate 15, height (!) 1.524 m (5'), weight 69.9 kg (154 lb), SpO2 (!) 93%.  Intake/Output last 3 Shifts:  No intake/output data recorded.    Relevant Results  Results for orders placed or performed during the hospital encounter of 07/18/25 (from the past 24 hours)   POCT GLUCOSE   Result Value Ref Range    POCT Glucose 127 (H) 74 - 99 mg/dL   CBC and Auto Differential   Result Value Ref Range    WBC 3.0 (L) 4.4 - 11.3 x10*3/uL    nRBC 0.0 0.0 - 0.0 /100 WBCs    RBC 1.82 (L) 4.00 - 5.20 x10*6/uL    Hemoglobin 6.8 (L) 12.0 - 16.0 g/dL    Hematocrit 20.3 (L) 36.0 - 46.0 %      (H) 80 - 100 fL    MCH 37.4 (H) 26.0 - 34.0 pg    MCHC 33.5 32.0 - 36.0 g/dL    RDW 14.9 (H) 11.5 - 14.5 %    Platelets 155 150 - 450 x10*3/uL    Immature Granulocytes %, Automated 1.0 (H) 0.0 - 0.9 %    Immature Granulocytes Absolute, Automated 0.03 0.00 - 0.50 x10*3/uL   Comprehensive metabolic panel   Result Value Ref Range    Glucose 128 (H) 74 - 99 mg/dL    Sodium 128 (L) 136 - 145 mmol/L    Potassium 3.5 3.5 - 5.3 mmol/L    Chloride 92 (L) 98 - 107 mmol/L    Bicarbonate 22 21 - 32 mmol/L    Anion Gap 18 mmol/L    Urea Nitrogen 51 (H) 6 - 23 mg/dL    Creatinine 3.23 (H) 0.50 - 1.05 mg/dL    eGFR 14 (L) >60 mL/min/1.73m*2    Calcium 6.4 (L) 8.6 - 10.3 mg/dL    Albumin 3.4 3.4 - 5.0 g/dL    Alkaline Phosphatase 49 33 - 136 U/L    Total Protein 6.3 (L) 6.4 - 8.2 g/dL    AST 15 9 - 39 U/L    Bilirubin, Total 0.5 0.0 - 1.2 mg/dL    ALT 8 7 - 45 U/L   Magnesium   Result Value Ref Range    Magnesium 0.77 (L) 1.60 - 2.40 mg/dL   Lipase   Result Value Ref Range    Lipase <3 (L) 9 - 82 U/L   Lactate   Result Value Ref Range    Lactate 0.7 0.4 - 2.0 mmol/L   Manual Differential   Result Value Ref Range    Neutrophils %, Manual 75.0 40.0 - 80.0 %    Bands %, Manual 8.0 0.0 - 5.0 %    Lymphocytes %, Manual 12.0 13.0 - 44.0 %    Monocytes %, Manual 3.0 2.0 - 10.0 %    Eosinophils %, Manual 1.0 0.0 - 6.0 %    Basophils %, Manual 0.0 0.0 - 2.0 %    Myelocytes %, Manual 1.0 0.0 - 0.0 %    Seg Neutrophils Absolute, Manual 2.25 1.60 - 5.00 x10*3/uL    Bands Absolute, Manual 0.24 0.00 - 0.50 x10*3/uL    Lymphocytes Absolute, Manual 0.36 (L) 0.80 - 3.00 x10*3/uL    Monocytes Absolute, Manual 0.09 0.05 - 0.80 x10*3/uL    Eosinophils Absolute, Manual 0.03 0.00 - 0.40 x10*3/uL    Basophils Absolute, Manual 0.00 0.00 - 0.10 x10*3/uL    Myelocytes Absolute, Manual 0.03 0.00 - 0.00 x10*3/uL    Total Cells Counted 100     Neutrophils Absolute, Manual 2.49 1.60 - 5.50 x10*3/uL    RBC Morphology See Below     Polychromasia Mild      Dohle Bodies Present     Toxic Granulation Present         Medications  Scheduled medications  lactated Ringer's, 1,000 mL, intravenous, Once  magnesium sulfate, 2 g, intravenous, Once  polymyxin B sulf-trimethoprim, 1 drop, Left Eye, Once      Continuous medications     PRN medications     Imaging  CT abdomen pelvis wo IV contrast  Result Date: 7/19/2025  1.Moderate volume pneumoperitoneum, concerning for perforated viscus. No site of perforation is identified. 2.Stable groundglass opacity involving the omentum with mild associated peritoneal thickening consistent with known carcinomatosis. 3.Moderate bilateral pleural effusions, not significantly changed from prior exam. 4.Stable bilateral hydronephrosis. 5.Diffuse sclerotic osseous metastases. 6.Cholelithiasis. 7.Stable diffuse sclerotic osseous metastases. No pathologic fractures identified. Findings discussed with Dr. David Dale at approximately 0245 hours on 19 July. Signed by Chintan Jha MD      Cardiology, Vascular, and Other Imaging  OCT MACULA CIRRUS OU (BOTH EYES)  Result Date: 7/15/2025  Date of Procedure 7/15/2025. Technician Information Imaging Technician: HA. ROF+ , . OCT Macula Interpretation Right Eye Abnormal foveal contour. Findings include Negative for Intraretinal fluid. Left Eye Abnormal foveal contour. Findings include Negative for Intraretinal fluid. Interval Change Right Eye Stable. Left Eye Stable.        Assessment & Plan  Pneumoperitoneum  Perforated abdominal viscus  Nothing by mouth, general surgery consult, serial abdominal exams  Electrolyte depletion  Serial labs, check ionized calcium in light of normal albumin, will need nutrition therapy evaluation when cleared for enteral intake  Acute on chronic anemia  Received 1 PRBC via emergency department staff  Dyspnea on exertion  Multifactorial, attending to consider ambulatory pulse oximetry testing  Acute conjunctivitis of left eye  Ophthalmic antibiotic    Medical decision  making  Labs tonight are noteworthy for sodium of 128 with chloride 92, potassium of 3.5 and magnesium of 0.77 for which replacement is underway, gradually worsening renal function with creatinine of 3.23 and GFR 14 compared to 1.82 and 27 just 10 days ago, calcium of 6.4 with albumin 3.4, chronic leukocytopenia with white blood cell count 3.0 and elevated immature granulocytes at 1%, and hemoglobin of 6.8 with recent baseline in the 7-8 range.    CT abdomen pelvis suggests moderate volume pneumoperitoneum consistent with perforated viscus however no site of perforation is identified.  Her omental groundglass opacities and peritoneal thickening that are consistent with her known carcinomatosis are largely unchanged, she has chronic bilateral pleural effusions that are symmetric and grossly unchanged, and there is persistent evidence of her known widespread bony metastases.    At the time of my examination in the emergency department Arabella has no complaints.  She says that she is a little more nauseous than she is at baseline but attributes this mostly to the fact that she has not eaten in a few days.  She says that she is not having any abdominal pain at the moment and does not have any rebound tenderness or guarding on exam.  Her abdomen is reasonably soft and she has active bowel sounds.  She does not appear systemically ill and is modestly tachycardic in the low 100s which is likely related at least in part to hypovolemia from significant diarrhea.     She is noted to be borderline hypoxic on room air, with pulse oximetry ranging between 91 and 93% though she says she is not short of breath at rest.  This and her gradually worsening dyspnea on exertion are likely multifactorial, with contributions coming from her bilateral pleural effusions, acute on chronic anemia, and intra-abdominal pathology.    I talked to Arabella about her current diagnoses and she seems to have an appropriate level of understanding.  She  told me that she does not believe that she would survive and abdominal surgery, and she does not think that she would be offered surgery.  She told me that even if she was determined to be a surgical candidate she does not think she would want intervention.  We talked about multiple different options going forward, and she agrees to speak with general surgery before making a final decision.  She says that she is ok with transfusion of blood and blood products.  She also affirms that her correct CODE STATUS is DO NOT RESUSCITATE and no intubation.  I asked her if there was anybody she wanted me to call for her and she said not at the moment.    I spent 60 minutes in the professional and overall care of this patient. Overall time includes preparing to see the patient, face-to-face patient care, completing clinical documentation, obtaining and/or reviewing separately obtained history, performing a medically appropriate examination, counseling and educating the patient/family/caregiver, ordering medications, tests, or procedures, communicating with other HCPs (not separately reported), independently interpreting results (not separately reported), communicating results to the patient/family/caregiver, and care coordination (not separately reported).     Code Status: DO NOT RESUSCITATE and no intubation    Batool León, APRN-CNP  [facility] [phone]  Attending physician Charanjit Kohli, DO

## 2025-07-19 NOTE — DISCHARGE SUMMARY
Discharge Diagnosis  Pneumoperitoneum  Acute kidney injury  Metastatic breast cancer to bone and abdomen           Issues Requiring Follow-Up  Follow-up with PCP as needed  You are also given a referral for meeting with hospice    Discharge Meds     Medication List      START taking these medications     amoxicillin-clavulanate 500-125 mg tablet; Commonly known as: Augmentin;   Take 1 tablet by mouth 2 times a day for 7 days.   polymyxin B sulf-trimethoprim ophthalmic solution; Commonly known as:   Polytrim; Administer 1 drop into the left eye 4 times a day for 5 days.     CONTINUE taking these medications     acetaminophen 500 mg tablet; Commonly known as: Tylenol   Blood glucose monitoring meter; Test two times daily.   brimonidine-timoloL 0.2-0.5 % ophthalmic solution; Commonly known as:   Combigan   calcium carbonate-vitamin D3 600 mg-10 mcg (400 unit) tablet   esomeprazole magnesium 20 mg tablet,delayed release (DR/EC)   isopropyl alcohoL 70 % towelette; Test 2 times a day, clean finger prior   to testing   LORazepam 0.5 mg tablet; Commonly known as: Ativan; Take 1 tablet (0.5   mg) by mouth every 2 hours if needed for anxiety (anxiety related to   radiology tests) for up to 5 doses.   multivitamin tablet   ondansetron 8 mg tablet; Commonly known as: Zofran; Take 1 tablet (8 mg)   by mouth every 8 hours if needed for nausea or vomiting.   pravastatin 10 mg tablet; Commonly known as: Pravachol; Take 1 tablet   (10 mg) by mouth once daily.   Pro Comfort Spacer-Adult Mask spacer; Generic drug: inhalat.spacing   dev,large mask; 1 each every 4 hours if needed (shortness of breath,   wheezing, cough).   prochlorperazine 10 mg tablet; Commonly known as: Compazine; Take 1   tablet (10 mg) by mouth every 6 hours if needed for nausea or vomiting.   Rhopressa 0.02 % drops opthalmic solution; Generic drug: netarsudiL   tamsulosin 0.4 mg 24 hr capsule; Commonly known as: Flomax     STOP taking these medications     apixaban  2.5 mg tablet; Commonly known as: Eliquis   blood sugar diagnostic   denosumab 120 mg/1.7 mL (70 mg/mL) injection; Commonly known as: Xgeva   diclofenac 50 mg EC tablet; Commonly known as: Voltaren   furosemide 20 mg tablet; Commonly known as: Lasix   hydroCHLOROthiazide 12.5 mg tablet; Commonly known as: Microzide   Ibrance 125 mg tablet; Generic drug: palbociclib   Itovebi 3 mg tablet; Generic drug: inavolisib   mometasone 0.1 % lotion; Commonly known as: Elocon   triamcinolone 0.1 % cream; Commonly known as: Kenalog       Test Results Pending At Discharge  Pending Labs       No current pending labs.            Hospital Course  84-year-old female with past medical history of breast cancer with metastasis to bone and abdomen on immunotherapy, history of DVT on Eliquis, HFpEF, BPPV, hyperlipidemia, hypertension, anemia, osteopenia, GERD, anxiety presented to emergency department for decreased appetite since Wednesday along with diarrhea.  Patient was admitted and was found to have CT scan revealing free air in the abdomen with no other clear source of perforation.  Patient did not want surgery and had discussion with general surgery as well.  She understands along with her family and they just want her comfortable at home.  We consulted hospice but unfortunately they were not able to get to her today and patient would like to go home.  She tolerated diet and we talked about discontinuing most of her oral medication and having hospice evaluate her at home.  Patient along with her , son and daughter are in agreement.  Currently she is in stable condition for discharge and will be discharged home and hospice will evaluate patient either later today or tomorrow at home.    32 minutes spent in discharge timing    Pertinent Physical Exam At Time of Discharge  General: Chronically ill-appearing female in mild distress on room air  HEENT: PERRLA, head intact and normocephalic  Neck: Normal to inspection  Lungs: Clear  to auscultation, work of breathing within normal limit  Cardiac: Regular rate and rhythm  Abdomen: Soft nontender, positive bowel sounds  : Exam deferred  Skin: Intact  Hematology: No petechia or excessive ecchymosis  Musculoskeletal: Without significant trauma  Neurological: Alert awake oriented, no focal deficit, cranial nerves grossly intact  Psych: No suicidal ideation or homicidal ideation    Outpatient Follow-Up  Future Appointments   Date Time Provider Department [place]   7/23/2025  2:45 PM Garcia Proctor DPM [id number]   7/25/2025  2:00 PM INF 15A [id number]   8/8/2025  1:00 PM Andrew Mcfarland MD [id number]   8/8/2025  1:45 PM INF 13 [id number]   8/13/2025  3:00 PM Angel Luis Velazquez DO [id number] [facility]   8/22/2025  2:00 PM INF 15A [id number] [place]   9/5/2025  2:20 PM Andrew Mcfarland MD [id number]   9/5/2025  2:45 PM INF 14B [id number]   4/14/2026  1:30 PM Angel Luis Velazquez DO [id number] Saran Roman MD

## 2025-07-19 NOTE — HOSPITAL COURSE
84-year-old female with past medical history of breast cancer with metastasis to bone and abdomen on immunotherapy, history of DVT on Eliquis, HFpEF, BPPV, hyperlipidemia, hypertension, anemia, osteopenia, GERD, anxiety presented to emergency department for decreased appetite since Wednesday along with diarrhea.  Patient was admitted and was found to have CT scan revealing free air in the abdomen with no other clear source of perforation.  Patient did not want surgery and had discussion with general surgery as well.  She understands along with her family and they just want her comfortable at home.  We consulted hospice but unfortunately they were not able to get to her today and patient would like to go home.  She tolerated diet and we talked about discontinuing most of her oral medication and having hospice evaluate her at home.  Patient along with her , son and daughter are in agreement.  Currently she is in stable condition for discharge and will be discharged home and hospice will evaluate patient either later today or tomorrow at home.    32 minutes spent in discharge timing

## 2025-07-19 NOTE — CARE PLAN
1425- Patient arrived on the unit. Family at bedside. Call light placed within reach and bed alarm on.    1445- Notified MD that family would really like to take patient home if all we are waiting on is the hospice meeting as hospice told them they can see them here or at her house.    1530- MD at bedside. Family at bedside. Discharge instructions gone over and all questions answered. Patient denies any pain/SOB/N/V. IV removed and belongings returned. Safety maintained and call light within reach.

## 2025-07-19 NOTE — CONSULTS
Reason For Consult  Pneumoperitoneum  Peritoneal carcinomatosis    History Of Present Illness  Arabella Springer is a 84 y.o. 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 Patient has had nausea and poor appetite for several days, but pain only started yesterday. Of note, patient is on active palliative chemotherapy, three weeks on and one week off. This is her off week. She also has a DNR/DNI order in her chart from prior encounters.     In ER, vitals stable. Labs showed WBC 3, Hb 6.8 (chronic anemia), Cr 3.2 (baseline 1.8). CT scan showed free air without discrete inflammatory changes to indicate source. Surgery consulted for evaluation.     I had a discussion with ER provider overnight when consult was originally called and requested he ask patient if her DNR/DNI included no major operations. She confirmed yes, so patient was admitted for antibiotics with the plan for formal surgery consult in the morning.      Past Medical History  She has a past medical history of Anxiety, Atherosclerotic peripheral vascular disease with ulceration (Multi), Benign paroxysmal positional vertigo, Breast cancer, right, Central retinal vein occlusion, right eye, Cervical spinal stenosis, Cholelithiasis, Chronic anemia, Chronic kidney disease, stage IV (severe) (Multi), Current use of long term anticoagulation, Diverticulosis, Gastroesophageal reflux disease with hiatal hernia, Glaucoma, Hearing loss, Heart failure with preserved ejection fraction, History of deep vein thrombosis, History of fractured rib, History of syncope, Hydronephrosis, bilateral, Hypertension, Immunotherapy, Metastasis to bone (Multi), Metastasis to peritoneum (Multi), Multiple pulmonary nodules, Osteoarthritis, Osteopenia, Peripheral vascular disease, Pleural effusion, bilateral, Psoriasis, and 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    Surgical History  She has a past surgical history that includes Hysterectomy; Shoulder surgery; Dental surgery; Breast reconstruction (Right); Mastectomy (Right); Skin biopsy; Thoracentesis; and Humerus fracture surgery (Right).     Social History  She reports that she quit smoking about 60 years ago. Her smoking use included cigarettes. She has never used smokeless tobacco. She reports that she does not currently use alcohol. She reports that she does not use drugs.    Family History  Family History[1]     Allergies  Oxycodone    Review of Systems  Review of Systems   Constitutional: Positive for decreased appetite and malaise/fatigue.   HENT: Negative.     Eyes: Negative.    Cardiovascular: Negative.    Respiratory: Negative.     Endocrine: Negative.    Hematologic/Lymphatic: Negative.    Skin: Negative.    Musculoskeletal: Negative.    Gastrointestinal:  Positive for abdominal pain and diarrhea.   Genitourinary: Negative.    Neurological: Negative.    Psychiatric/Behavioral: Negative.            Physical Exam  Physical Exam  Vitals reviewed.   Constitutional:       General: She is not in acute distress.     Appearance: Normal appearance. She is not diaphoretic.   HENT:      Head: Normocephalic and atraumatic.     Cardiovascular:      Rate and Rhythm: Normal rate and regular rhythm.   Pulmonary:      Effort: Pulmonary effort is normal.      Breath sounds: Normal breath sounds.   Abdominal:      General: Abdomen is flat. There is no distension.      Palpations: Abdomen is soft.      Tenderness: There is abdominal tenderness. There is guarding. There is no rebound.     Musculoskeletal:         General: Normal range of motion.     Skin:     General: Skin is warm and dry.     Neurological:      General: No focal deficit present.      Mental Status: She is alert and oriented to person, place, and time.     Psychiatric:         Mood and Affect: Mood normal.         Behavior: Behavior normal.             Last Recorded Vitals  Blood pressure 109/62, pulse 96, temperature 36.3 °C (97.3 °F), temperature source Temporal, resp. rate 16, height (!) 1.524 m (5'), weight 70.9 kg (156 lb 4.9 oz), SpO2 94%.    Relevant Results  CT Abdomen and Pelvis without IV Contrast; 7/19/2025 at 1:01 AM  INDICATION:  Bilateral lower abdominal pin, diarrhea, loss of appetite.  History of  breast cancer with mets.  COMPARISON:  CT chest, abdomen and pelvis 5/15/2025, CTA chest 2/5/2025.    IMPRESSION:  1.Moderate volume pneumoperitoneum, concerning for perforated  viscus. No site of perforation is identified.  2.Stable groundglass opacity involving the omentum with mild  associated peritoneal thickening consistent with known carcinomatosis.  3.Moderate bilateral pleural effusions, not significantly changed  from prior exam.  4.Stable bilateral hydronephrosis.  5.Diffuse sclerotic osseous metastases.  6.Cholelithiasis.  7.Stable diffuse sclerotic osseous metastases. No pathologic  fractures identified.     Assessment/Plan     Pneumoperitoneum, likely perforated viscous  Stage IV breast cancer with peritoneal carcinomatosis    - Bk discussion completed with patient and family at bedside. Reviewed patient's cancer history and recent chemo course. Reviewed imaging and labs from today's encounter. Possibilities for source of perforation could be her known diverticular disease, side effect of chemo, or erosion of peritoneal implants into the GI tract. In any of those cases, operative intervention would be a large endeavor and possibly require an ostomy, further surgery, prolonged wound care, etc. Combined with her TSERING and anemia, her sherrell-operative risk is very high for bleeding, infection, cardiac event, pulmonary complications including need for prolonged intubation, blood clots, or death. She is also at high risk for these in the post-operative period. However, not operating would likely result in sepsis with similar complication  profile.     Patient expressed understanding of the above and confirmed she would not like surgery and would instead like hospice care for comfort. Questions answered from patient and family.   - Surgery will sign off at this time. Please call with questions.     I spent 60 minutes in the professional and overall care of this patient.      Charanjit Kenny MD         [1]   Family History  Problem Relation Name Age of Onset    Cancer Mother Cherelle Grewal     Heart failure Mother Cherelle Grewal     Heart failure Father Giovany Grewal     Colon cancer Sister      Cancer Sister Suki sanchez     Breast cancer Daughter

## 2025-07-22 ENCOUNTER — APPOINTMENT (OUTPATIENT)
Dept: HEMATOLOGY/ONCOLOGY | Facility: CLINIC | Age: 85
End: 2025-07-22
Payer: MEDICARE

## 2025-07-22 ENCOUNTER — PATIENT OUTREACH (OUTPATIENT)
Dept: PRIMARY CARE | Facility: CLINIC | Age: 85
End: 2025-07-22
Payer: MEDICARE

## 2025-07-22 LAB
BACTERIA SPEC AEROBE CULT: NORMAL
BACTERIA SPEC ANAEROBE CULT: NORMAL

## 2025-07-22 ASSESSMENT — ENCOUNTER SYMPTOMS
EYES NEGATIVE: 1
RESPIRATORY NEGATIVE: 1
NEUROLOGICAL NEGATIVE: 1
HEMATOLOGIC/LYMPHATIC NEGATIVE: 1
GASTROINTESTINAL NEGATIVE: 1
ENDOCRINE NEGATIVE: 1
LEG SWELLING: 1
FATIGUE: 1
MUSCULOSKELETAL NEGATIVE: 1
PSYCHIATRIC NEGATIVE: 1

## 2025-07-22 NOTE — PROGRESS NOTES
Patient ID: Arabella Springer is a 84 y.o. female.  Referring Physician: Andrew Mcfarland MD  [address]  [place] [address]  Primary Care Provider: Angel Luis Velazquez DO  Visit Type: Follow Up      Subjective    HPI How was my bloodwork?  The doctors on my recent hospitalization told me to double up on the lasix    Review of Systems   Constitutional:  Positive for fatigue.   HENT:  Negative.     Eyes: Negative.    Respiratory: Negative.     Cardiovascular:  Positive for leg swelling.   Gastrointestinal: Negative.    Endocrine: Negative.    Genitourinary: Negative.     Musculoskeletal: Negative.    Skin: Negative.    Neurological: Negative.    Hematological: Negative.    Psychiatric/Behavioral: Negative.          Objective   BSA: 1.75 meters squared  /62 (BP Location: Left arm, Patient Position: Sitting, BP Cuff Size: Adult)   Pulse 82   Temp 36.6 °C (97.9 °F) (Temporal)   Resp 16   Wt 72.3 kg (159 lb 6.3 oz) Comment: No shoes  SpO2 93%   BMI 31.13 kg/m²      has a past medical history of Anxiety, Atherosclerotic peripheral vascular disease with ulceration (Multi), Benign paroxysmal positional vertigo, Breast cancer, right, Central retinal vein occlusion, right eye, Cervical spinal stenosis, Cholelithiasis, Chronic anemia, Chronic kidney disease, stage IV (severe) (Multi), Current use of long term anticoagulation, Diverticulosis, Gastroesophageal reflux disease with hiatal hernia, Glaucoma, Hearing loss, Heart failure with preserved ejection fraction, History of deep vein thrombosis, History of fractured rib, History of syncope, Hydronephrosis, bilateral, Hypertension, Immunotherapy, Metastasis to bone (Multi), 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   has a past surgical history that includes  Hysterectomy; Shoulder surgery; Dental surgery; Breast reconstruction (Right); Mastectomy (Right); Skin biopsy; Thoracentesis; and Humerus fracture surgery (Right).  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 Her smoking use included cigarettes. She has never used smokeless tobacco.  She  reports that she does not currently use alcohol.  She  reports no history of drug use.    Physical Exam  Vitals reviewed.   Constitutional:       Appearance: Normal appearance.   HENT:      Head: Normocephalic.      Mouth/Throat:      Mouth: Mucous membranes are moist.     Eyes:      Extraocular Movements: Extraocular movements intact.      Pupils: Pupils are equal, round, and reactive to light.       Cardiovascular:      Rate and Rhythm: Normal rate and regular rhythm.      Pulses: Normal pulses.      Heart sounds: Normal heart sounds.   Pulmonary:      Effort: Pulmonary effort is normal.      Breath sounds: Normal breath sounds.   Abdominal:      Palpations: Abdomen is soft.     Musculoskeletal:         General: Normal range of motion.      Cervical back: Normal range of motion and neck supple.      Right lower leg: Edema present.      Left lower leg: Edema present.     Skin:     General: Skin is warm.     Neurological:      General: No focal deficit present.      Mental Status: She is alert and oriented to person, place, and time.     Psychiatric:         Mood and Affect: Mood normal.         Behavior: Behavior normal.         WBC   Date/Time Value Ref Range Status   07/19/2025 12:44 AM 3.0 (L) 4.4 - 11.3 x10*3/uL Final   07/09/2025 02:08 PM 2.7 (L) 4.4 - 11.3 x10*3/uL Final   07/02/2025  03:36 PM 3.1 (L) 4.4 - 11.3 x10*3/uL Final     WHITE BLOOD CELL COUNT   Date/Time Value Ref Range Status   02/12/2025 10:34 AM 2.8 (L) 3.8 - 10.8 Thousand/uL Final     nRBC   Date Value Ref Range Status   07/19/2025 0.0 0.0 - 0.0 /100 WBCs Final   07/09/2025   Final     Comment:     Not Measured   07/02/2025   Final     Comment:     Not Measured     RBC   Date Value Ref Range Status   07/19/2025 1.82 (L) 4.00 - 5.20 x10*6/uL Final   07/09/2025 1.93 (L) 4.00 - 5.20 x10*6/uL Final   07/02/2025 2.02 (L) 4.00 - 5.20 x10*6/uL Final     RED BLOOD CELL COUNT   Date Value Ref Range Status   02/12/2025 2.74 (L) 3.80 - 5.10 Million/uL Final     Hemoglobin   Date Value Ref Range Status   07/19/2025 6.8 (L) 12.0 - 16.0 g/dL Final   07/09/2025 7.3 (L) 12.0 - 16.0 g/dL Final   07/02/2025 7.7 (L) 12.0 - 16.0 g/dL Final     HEMOGLOBIN   Date Value Ref Range Status   02/12/2025 8.5 (L) 11.7 - 15.5 g/dL Final     Hematocrit   Date Value Ref Range Status   07/19/2025 20.3 (L) 36.0 - 46.0 % Final   07/09/2025 22.6 (L) 36.0 - 46.0 % Final   07/02/2025 23.6 (L) 36.0 - 46.0 % Final     HEMATOCRIT   Date Value Ref Range Status   02/12/2025 27.0 (L) 35.0 - 45.0 % Final     MCV   Date/Time Value Ref Range Status   07/19/2025 12:44  (H) 80 - 100 fL Final   07/09/2025 02:08  (H) 80 - 100 fL Final   07/02/2025 03:36  (H) 80 - 100 fL Final   02/12/2025 10:34 AM 98.5 80.0 - 100.0 fL Final     MCH   Date/Time Value Ref Range Status   07/19/2025 12:44 AM 37.4 (H) 26.0 - 34.0 pg Final   07/09/2025 02:08 PM 37.8 (H) 26.0 - 34.0 pg Final   07/02/2025 03:36 PM 38.1 (H) 26.0 - 34.0 pg Final   02/12/2025 10:34 AM 31.0 27.0 - 33.0 pg Final     MCHC   Date/Time Value Ref Range Status   07/19/2025 12:44 AM 33.5 32.0 - 36.0 g/dL Final   07/09/2025 02:08 PM 32.3 32.0 - 36.0 g/dL Final   07/02/2025 03:36 PM 32.6 32.0 - 36.0 g/dL Final   02/12/2025 10:34 AM 31.5 (L) 32.0 - 36.0 g/dL Final     Comment:     For adults, a slight decrease in the  calculated MCHC  value (in the range of 30 to 32 g/dL) is most likely  not clinically significant; however, it should be  interpreted with caution in correlation with other  red cell parameters and the patient's clinical  condition.       RDW   Date/Time Value Ref Range Status   07/19/2025 12:44 AM 14.9 (H) 11.5 - 14.5 % Final   07/09/2025 02:08 PM 14.9 (H) 11.5 - 14.5 % Final   07/02/2025 03:36 PM 14.9 (H) 11.5 - 14.5 % Final   02/12/2025 10:34 AM 16.9 (H) 11.0 - 15.0 % Final     Platelets   Date/Time Value Ref Range Status   07/19/2025 12:44  150 - 450 x10*3/uL Final   07/09/2025 02:08  150 - 450 x10*3/uL Final   07/02/2025 03:36  150 - 450 x10*3/uL Final     PLATELET COUNT   Date/Time Value Ref Range Status   02/12/2025 10:34  140 - 400 Thousand/uL Final     MPV   Date/Time Value Ref Range Status   02/12/2025 10:34 AM 9.8 7.5 - 12.5 fL Final   10/19/2023 08:01 AM 9.2 7.5 - 11.5 fL Final     Neutrophils %   Date/Time Value Ref Range Status   07/09/2025 02:08 PM 53.3 40.0 - 80.0 % Final   07/02/2025 03:36 PM 51.8 40.0 - 80.0 % Final   06/11/2025 03:27 PM 49.0 40.0 - 80.0 % Final     Immature Granulocytes %, Automated   Date/Time Value Ref Range Status   07/19/2025 12:44 AM 1.0 (H) 0.0 - 0.9 % Final     Comment:     Immature Granulocyte Count (IG) includes promyelocytes, myelocytes and metamyelocytes but does not include bands. Percent differential counts (%) should be interpreted in the context of the absolute cell counts (cells/UL).   07/09/2025 02:08 PM 0.0 0.0 - 0.9 % Final     Comment:     Immature Granulocyte Count (IG) includes promyelocytes, myelocytes and metamyelocytes but does not include bands. Percent differential counts (%) should be interpreted in the context of the absolute cell counts (cells/UL).   07/02/2025 03:36 PM 0.3 0.0 - 0.9 % Final     Comment:     Immature Granulocyte Count (IG) includes promyelocytes, myelocytes and metamyelocytes but does not include bands. Percent  differential counts (%) should be interpreted in the context of the absolute cell counts (cells/UL).     Lymphocytes %, Manual   Date/Time Value Ref Range Status   07/19/2025 12:44 AM 12.0 13.0 - 44.0 % Final   05/15/2025 01:58 PM 40.0 13.0 - 44.0 % Final     Lymphocytes %   Date/Time Value Ref Range Status   07/09/2025 02:08 PM 35.7 13.0 - 44.0 % Final   07/02/2025 03:36 PM 37.3 13.0 - 44.0 % Final   06/11/2025 03:27 PM 40.0 13.0 - 44.0 % Final     Monocytes %, Manual   Date/Time Value Ref Range Status   07/19/2025 12:44 AM 3.0 2.0 - 10.0 % Final   05/15/2025 01:58 PM 0.0 2.0 - 10.0 % Final     Monocytes %   Date/Time Value Ref Range Status   07/09/2025 02:08 PM 7.9 2.0 - 10.0 % Final   07/02/2025 03:36 PM 6.1 2.0 - 10.0 % Final   06/11/2025 03:27 PM 7.6 2.0 - 10.0 % Final     Eosinophils %, Manual   Date/Time Value Ref Range Status   07/19/2025 12:44 AM 1.0 0.0 - 6.0 % Final   05/15/2025 01:58 PM 4.0 0.0 - 6.0 % Final     Eosinophils %   Date/Time Value Ref Range Status   07/09/2025 02:08 PM 2.3 0.0 - 6.0 % Final   07/02/2025 03:36 PM 2.9 0.0 - 6.0 % Final   06/11/2025 03:27 PM 2.4 0.0 - 6.0 % Final     Basophils %, Manual   Date/Time Value Ref Range Status   07/19/2025 12:44 AM 0.0 0.0 - 2.0 % Final   05/15/2025 01:58 PM 1.0 0.0 - 2.0 % Final     Basophils %   Date/Time Value Ref Range Status   07/09/2025 02:08 PM 0.8 0.0 - 2.0 % Final   07/02/2025 03:36 PM 1.6 0.0 - 2.0 % Final   06/11/2025 03:27 PM 1.0 0.0 - 2.0 % Final     Neutrophils Absolute   Date/Time Value Ref Range Status   07/09/2025 02:08 PM 1.42 (L) 1.60 - 5.50 x10*3/uL Final     Comment:     Percent differential counts (%) should be interpreted in the context of the absolute cell counts (cells/uL).   07/02/2025 03:36 PM 1.61 1.60 - 5.50 x10*3/uL Final     Comment:     Percent differential counts (%) should be interpreted in the context of the absolute cell counts (cells/uL).   06/11/2025 03:27 PM 1.42 (L) 1.60 - 5.50 x10*3/uL Final     Comment:      "Percent differential counts (%) should be interpreted in the context of the absolute cell counts (cells/uL).     Immature Granulocytes Absolute, Automated   Date/Time Value Ref Range Status   07/19/2025 12:44 AM 0.03 0.00 - 0.50 x10*3/uL Final   07/09/2025 02:08 PM 0.00 0.00 - 0.50 x10*3/uL Final   07/02/2025 03:36 PM 0.01 0.00 - 0.50 x10*3/uL Final     Lymphocytes Absolute   Date/Time Value Ref Range Status   07/09/2025 02:08 PM 0.95 0.80 - 3.00 x10*3/uL Final   07/02/2025 03:36 PM 1.16 0.80 - 3.00 x10*3/uL Final   06/11/2025 03:27 PM 1.16 0.80 - 3.00 x10*3/uL Final     Monocytes Absolute   Date/Time Value Ref Range Status   07/09/2025 02:08 PM 0.21 0.05 - 0.80 x10*3/uL Final   07/02/2025 03:36 PM 0.19 0.05 - 0.80 x10*3/uL Final   06/11/2025 03:27 PM 0.22 0.05 - 0.80 x10*3/uL Final     Eosinophils Absolute   Date/Time Value Ref Range Status   07/09/2025 02:08 PM 0.06 0.00 - 0.40 x10*3/uL Final   07/02/2025 03:36 PM 0.09 0.00 - 0.40 x10*3/uL Final   06/11/2025 03:27 PM 0.07 0.00 - 0.40 x10*3/uL Final     Eosinophils Absolute, Manual   Date/Time Value Ref Range Status   07/19/2025 12:44 AM 0.03 0.00 - 0.40 x10*3/uL Final   05/15/2025 01:58 PM 0.10 0.00 - 0.40 x10*3/uL Final     Basophils Absolute   Date/Time Value Ref Range Status   07/09/2025 02:08 PM 0.02 0.00 - 0.10 x10*3/uL Final   07/02/2025 03:36 PM 0.05 0.00 - 0.10 x10*3/uL Final     Comment:     Automated WBC differential has been confirmed by manual smear review   06/11/2025 03:27 PM 0.03 0.00 - 0.10 x10*3/uL Final     Basophils Absolute, Manual   Date/Time Value Ref Range Status   07/19/2025 12:44 AM 0.00 0.00 - 0.10 x10*3/uL Final   05/15/2025 01:58 PM 0.02 0.00 - 0.10 x10*3/uL Final       No components found for: \"PT\"  No results found for: \"APTT\"  Medication Documentation Review Audit       Reviewed by Arabella Michelle (Technician) on 07/19/25 at 0820      Medication Order Taking? Sig Documenting Provider Last Dose Status   acetaminophen (Tylenol) 500 mg " tablet [id number] No Take 2 tablets (1,000 mg) by mouth 3 times a day as needed for mild pain (1 - 3) or moderate pain (4 - 6). Tika Hackett MD Unknown Active   apixaban (Eliquis) 2.5 mg tablet [id number] Yes Take 1 tablet (2.5 mg) by mouth 2 times a day. Andrew Mcfarland MD Past Week Active   Blood glucose monitoring meter [id number] No Test two times daily.   Patient not taking: Reported on 7/19/2025    DEX Bolton Not Taking Flag for Review   blood sugar diagnostic strip [id number] No 1 each 2 times a day.   Patient not taking: Reported on 7/19/2025    DEX Bolton Not Taking Flag for Review   brimonidine-timoloL (Combigan) 0.2-0.5 % ophthalmic solution [id number] Yes Administer 1 drop into both eyes every 12 hours. Tika Hackett MD 7/18/2025 Evening Active   calcium carbonate-vitamin D3 600 mg-10 mcg (400 unit) tablet [id number] Yes Take 1 tablet by mouth once daily. Tika Hackett MD Past Week Active   denosumab (Xgeva) 120 mg/1.7 mL (70 mg/mL) injection [id number] No Inject 1.7 mL (120 mg) under the skin every 30 (thirty) days. Tika Hackett MD 7/11/2025 Morning Active   diclofenac (Voltaren) 50 mg EC tablet [id number] Yes Take 1 tablet (50 mg) by mouth once daily in the morning. Do not crush, chew, or split. Tika Hackett MD Past Week Active   esomeprazole magnesium 20 mg tablet,delayed release (DR/EC) [id number] Yes Take 1 capsule by mouth once daily in the morning. Take before meals. Tika Hackett MD Past Week Active   furosemide (Lasix) 20 mg tablet [id number] Yes Take 1 tablet (20 mg) by mouth once daily. Please Take additional lasix 20 mg if you gained 2 or 3 pounds Angel Luis Velazquez, DO Past Week Active   hydroCHLOROthiazide (Microzide) 12.5 mg tablet [id number] Yes Take 1 tablet (12.5 mg) by mouth once daily. Tika Hackett MD Past Week Active   inavolisib 3 mg tablet [id number] Yes Take 1 tablet (3 mg) by mouth once daily. For 3 weeks then  take 1 week off Andrew Mcfarland MD Unknown Active   inhalat.spacing dev,large mask (Pro Comfort Spacer-Adult Mask) spacer [id number] No 1 each every 4 hours if needed (shortness of breath, wheezing, cough).   Patient not taking: Reported on 7/19/2025    Naina Pisano, DO Not Taking Flag for Review   isopropyl alcohoL 70 % towelette [id number] No Test 2 times a day, clean finger prior to testing   Patient not taking: Reported on 7/19/2025    DEX Bolton Not Taking Flag for Review   LORazepam (Ativan) 0.5 mg tablet [id number] No Take 1 tablet (0.5 mg) by mouth every 2 hours if needed for anxiety (anxiety related to radiology tests) for up to 5 doses. DEX Bolton Unknown Active   mometasone (Elocon) 0.1 % lotion [id number] No Apply topically 2 times a day.   Patient not taking: Reported on 7/19/2025    Jimmie Nam MD Not Taking Flag for Review   multivitamin tablet [id number] Yes Take 1 tablet by mouth once daily. Historical Provider, MD Past Week Active   ondansetron (Zofran) 8 mg tablet [id number] No Take 1 tablet (8 mg) by mouth every 8 hours if needed for nausea or vomiting. Andrew Mcfarland MD Unknown Active   palbociclib (Ibrance) 125 mg tablet [id number] Yes Take 125 mg (1 tablet) by mouth once daily for three weeks, then one week off.  Swallow whole.  Do not crush, chew or split. Andrew Mcfarland MD Unknown Active   pravastatin (Pravachol) 10 mg tablet [id number] Yes Take 1 tablet (10 mg) by mouth once daily. Abraham Mohan, DO Past Week Active   prochlorperazine (Compazine) 10 mg tablet [id number] No Take 1 tablet (10 mg) by mouth every 6 hours if needed for nausea or vomiting. Andrew Mcfarland MD Unknown Active   Rhopressa 0.02 % drops opthalmic solution [id number] Yes Administer 1 drop into the right eye once daily at bedtime. Historical Provider, MD 7/18/2025 Active   tamsulosin (Flomax) 0.4 mg 24 hr capsule [id number] Yes Take 1 capsule (0.4 mg) by mouth once daily. Historical  Provider, MD Past Week Active   triamcinolone (Kenalog) 0.1 % cream [id number] No Apply topically 2 times a day.   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, and she did have a punch biopsy done--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  -now off steroids  -CT scan done on 4/3/2024 reviewed: interval worsening of bilateral pleural effusions, right >left; multiple scattered <0.5 mm pulmonary nodules bilateral lungs not changed; interval development of several branching opacities involving left lung apex; there are no new worrisome hepatic lesions; mild right sided hydronephrosis; diffuse sclerosis of axial and appendicular skeleton  -has been more short of breath recently--was evaluated in the ER at [facility] on 8/4--was told she had bilateral pleural effusions, which are not actually new  -right sided pleural effusion is already seen on PET scan done in 8/2023  -she had an echo done in June which was read as normal, [facility] did not perform a new one, cardiologist on 8/21 told the patient that she did not have CHF and that these effusions were likely malignant; she was started on low dose lasix which barely helped with the leg edema--and so dose was bumped up; she was recommended by pulmonology consult to undergo thoracentesis and she declined to have it done  -since effusions are not new, and actually were already noted over one year ago, a normal echo does NOT completely rule out  well-compensated CHF  -PET done on 8/16/2024 reviewed--no concerning pulmonary nodule; moderate-to-large bilateral pleura effusions, right greater than left; no FDG avid mediastinal, hilar or axillary lymphadenopathy; s/p right axillary ike dissection; s/p right mastectomy and reconstruction; no FDG avid lymphadenopathy in the abdomen and pelvis; bilateral hydronephrosis; interval improvement of FDG avid widespread bone metastases throughout the axial and appendicular skeleton many of which are non FDG avid ; residual disease noted in bilateral femur (SUV 3.6), pelvis (SUV 2.4), bilateral humerus (SUV 3.3), bilateral clavicles (SUV 2/9)  -there is no FDG activity in her thorax/pleura, making the effusions less likely to be of malignant origin, and given bilateral nature--more likely cardiac in origin  -she had thoracentesis done on 9/24/2024--with removal of 700 ml yellow fluid; cytology was negative  -here for interval followup  -CT scan done on 12/9/2024 reviewed--moderate bilateral pleural effusions with atelectasis, right greater than left; no concerning lung nodule; no mediastinal, hilar or axillary lymphadenopathy; postsurgical changes of right mastectomy with right breast implants;  moderate hydronephrosis left greater than right increased from prior exam; small to moderate volume ascites; new peritoneal nodular thickening (33 mm peritoneal implant in anterior deep pelvis); additional areas of peritoneal nodular thickening and enhancement in deep pelvis; no abdominopelvic lymphadenopathy; similar appearance of diffuse sclerotic lesions throughout the axial and appendicular skeleton; chronic bilateral rib fractures are noted; plate and screw fixation of the right humerus  -bone scan done on 12/9/2024 reviewed similar appearance of diffusely increased radiotracer uptake throughout the axial and appendicular skeleton corresponding with widespread sclerotic osseous lesions on CT  --labs done on 12/11/2024  included CBC, COMP, CA 27.29  -results reviewed--wbc 4.6, hgb 9.1 plt 211,000, creatinine 1.04, calcium 9.0, albumin 4.1 AST 18, ALT 12  CA 27.29 295  -benefits, risks, potential morbidity related to xgeva were reviewed with Arabella and she provided informed consent to proceed  -she went on to receive xgeva 120 mg subcutaneous  -as there is now evidence of progression of disease (new peritoneal metastases), she is failing AI, and I have recommended therapeutic switch  -since her tumor has the PIK3CA mutation, I have advised switching to faslodex + palbociclib + Inavolisib  -benefits, risks, potential morbidity related to faslodex + palbociclib + inavolisib were reviewed with Arabella and she signed informed consent to proceed  -on days 1 and 15 (and then Q28 days) she will receive faslodex 500 mg intramuscular  -she will also take palbociclib 125 mg PO once daily for 21 days on then 7 days off  -she will also take inavolisib 9 mg PO once daily  -here for interval followup  -shortly after starting the new combination, she was admitted to [facility] from 1/21 to 1/23/2025 for mucositis and electrolyte abnormalities  -inavolisib was held  -she was then readmitted to [facility] from 2/5 to 2/72025 for shortness of breath, thoracentesis was done on the left side with removal of 750 ml of fluid but none was sent for cytology  -her mucositis eventually recovered  -she received inavoliisb 3 mg in hand a couple days ago and started it; mucositis has not returned but this morning she noticed in the mirror 2 maculopapular lesions--one on right cheek, one near left nares  -she was at physical therapy a couple weeks ago--and the therapist noted a slight asymmetry in her ability to rotate her ankles and asked Arabella if she has ever had a stroke--which quite alarmed her  -labs done on 2/20/2025 included CBC + COMP + CA 27.29  -results reviewed--creatinine 1.22, calcium 9.1, alk phos 44, AST 15, ALT 8, wbc 2.66, hgb 8.3, plt 218,000, ,  CA 27.29 295  -here for interval followup  -she has taken inavolisib 3 mg daily for an additional 2 weeks--has not had recurrent mucositis, no new dermatologic issues  -continues to take lasix 20 mg every other day--leg edema and shortness of breath improved and stable  -here for interval followup  -had a tooth extracted 2 days ago--tooth was also causing a canker sore on inside of her cheek right next to the tooth--so will need to hold xgeva today  -was recently hospitalized at [facility] for edema/fluid overload; hgb trended all the way down to 7.0; very sleepy during daytime  -labs done on 5/15/2025 included CBC +  COMP + CA 27.29  -results reviewed--wbc 2.4, hgb 7.9, plt 171,000, ANC 1220, creatinine 1.38, calcium 8.2, alk phos 45, AST 13, total bili 0.3, ALT 7 CA 27.29 2322.6  -CT scan done on 5/15/2025 reviewed--moderate bilateral pleural effusions mildly decreased from prior; no concerning lung nodule; no mediastinal, hilar or axillary lymphadenopathy; hepatic lesions are overall stable after accounting for differences in contrast bolus timing; 11 mm right hepatic nodule; no new liver lesion; bilateral hydroureteronephrosis has improved likely a secondary sign of improved nonmeasurable tumor burden; small volume ascites in the deep pelvis has improved which is consistent with improved nonmeasurable tumor burden; interval decrease in size of dominant 23 mm peritoneal implant in the pelvis which has shifted and is now more inferiorly located due to decreased ascites; decreased but persistent nonmeasurable peritoneal nodular thickening is seen elsewhere in left upper quadrant; no abdominopelvic lymphadenopathy; diffuse predominantly sclerotic lesions are seen throughout the axial and appendicular skeleton with overall increased density of lesions; chronic bilateral rib fractures are again seen  -here for interval followup  -was recently hospitalized at [facility] for acute on top of chronic renal failure in part  secondary to hydronephrosis secondary to urinary retention  -has an indwelling vasquez with leg bag; supposed to see urology for voiding trial in a few days  -has not been 6 weeks since last tooth extraction, so will continue holding off on xgeva  -here for interval followup  -she was hospitalized at [facility] recently and was discharged on 6/7/2025--she was instructed to double up on her lasix  -labs done on 7/9/2025 included CBC + COMP + CA 27.29  -results reviewed--wbc 2.7, hgb 7.3, plt 150,000, ANC 1420, creatinine 1.82, calcium 7.6, alk phos 38, AST 12, total bili 0.4, ALT 7, CA 27.29 163.7  -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; she would ultimately be helped with JOSSE (such as darbepoietin), however, all ESAs have a black box warning that they can be associated with cancer growth/progression-discussed again pros and cons--she wished to proceed with starting darbepoietin  -benefits, risks, potential morbidity related to darbepoietin were reviewed with Arabella and she provided informed consent to proceed  -she will start darbepoietin 60 mcg subcutaneously Q14 days  -creatinine has bumped up--most likely secondary to uptitration of lasix  -however, even after correction of calcium, she is too hypocalcemic--will not be getting xgeva today  -she will continue to take palbociclib 125 mg PO once daily for 21 days on then 7 days off  -she will continue to take inavolisib 3 mg PO once daily for 21 days on then 7 days off  -due for  faslodex (500 mg intramuscularly) today  -benefits, risks, potential morbidity related to faslodex were reviewed with Arabella and she provided informed consent to proceed  -she went on to receive faslodex 500 mg intramuscularly  -cleared her to stop eliquis  (duplex of legs done on 4/30/2025 showed no evidence of DVT in either leg)  -will see her again in 4 weeks        2) bone metastases  -secondary to metastatic breast cancer  -receives xgeva Q4 weeks     3) anemia  -secondary  to marrow replacement by metastatic breast cancer  -hgb slowly improving     4) hyperlipidemia  -on pravastatin     5) arthritis  -on tylenol PRN  -on diclofenac PRN  -received steroid injections into her knees and she feels so much better, so she wants to receive them again     6) hypertension  -on amlodipine  -on losartan-HCTZ     7) GERD  -on nexium     8) glaucoma  -on combigan eyedrops     9) DVT  -has had chronic leg edema for years  -recently had an injection into her knee  -she saw pulmonologist for first time on 4/14/2025 who ordered doppler  -doppler showed acute occlusive DVT in gastrocnemius veins in calf vein; rest of right leg is negative for DVT; gastrocnemius vein thrombus is seen at mid calf; in left leg there is no evidence of acute DVT   -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--she should continue on eliquis for now (still on loading period- 10 mg BID), will repeat doppler in 6-8 weeks; given that she has stage IV cancer, likely will advise her to remain on eliquis long term, but perhaps to transition to maintenance dosing  -4/30/2025 doppler done in [place] [facility] showed in right leg no evidence of acute DVT; in left leg no evidence of acute DVT  -she is in need of refill of eliquis--will refill with 2.5 mg BID     Problem List Items Addressed This Visit           ICD-10-CM    Breast cancer metastasized to bone C50.919, C79.51    Anemia due to stage 4 chronic kidney disease treated with darbepoetin - Primary N18.4, D63.1            Andrew Mcfarland MD                              [1]   Family History  Problem Relation Name Age of Onset    Cancer Mother Cherelle Grewal     Heart failure Mother Cherelle Grewal     Heart failure Father Giovany Grewal     Colon cancer Sister      Cancer Sister Suki alatorre daniel     Breast cancer Daughter

## 2025-07-22 NOTE — PROGRESS NOTES
Discharge Facility: [facility]  Discharge Diagnosis:  Pneumoperitoneum  Acute kidney injury  Metastatic breast cancer to bone and abdomen    Admission Date:7/18/2025  Discharge Date: 7/19/2025    PCP Appointment Date:8/13/2025  Specialist Appointment Date:   7/23/2025 Hem/Onc Mcfarland  7/23/2025 [facility]  Hospital Encounter and Summary Linked: Yes  ED to Hosp-Admission (Discharged) with Brandon Roman MD; Sotero Ruano MD (07/18/2025)     *Two attempts were made to reach patient within two business days after discharge. Left voicemail with contact information for patient to call back with any non-emergent questions or concerns.

## 2025-07-23 ENCOUNTER — TELEMEDICINE (OUTPATIENT)
Dept: HEMATOLOGY/ONCOLOGY | Facility: CLINIC | Age: 85
End: 2025-07-23
Payer: MEDICARE

## 2025-07-23 DIAGNOSIS — C50.919 MALIGNANT NEOPLASM OF BREAST ASSOCIATED WITH MUTATION IN CHEK2 GENE: ICD-10-CM

## 2025-07-23 DIAGNOSIS — I10 ESSENTIAL HYPERTENSION: ICD-10-CM

## 2025-07-23 DIAGNOSIS — Z15.09 MALIGNANT NEOPLASM OF BREAST ASSOCIATED WITH MUTATION IN CHEK2 GENE: ICD-10-CM

## 2025-07-23 DIAGNOSIS — N18.4 ANEMIA DUE TO STAGE 4 CHRONIC KIDNEY DISEASE TREATED WITH DARBEPOETIN: ICD-10-CM

## 2025-07-23 DIAGNOSIS — Z15.89 MALIGNANT NEOPLASM OF BREAST ASSOCIATED WITH MUTATION IN CHEK2 GENE: ICD-10-CM

## 2025-07-23 DIAGNOSIS — Z15.02 MALIGNANT NEOPLASM OF BREAST ASSOCIATED WITH MUTATION IN CHEK2 GENE: ICD-10-CM

## 2025-07-23 DIAGNOSIS — K21.00 GASTROESOPHAGEAL REFLUX DISEASE WITH ESOPHAGITIS WITHOUT HEMORRHAGE: ICD-10-CM

## 2025-07-23 DIAGNOSIS — I82.451 ACUTE DEEP VEIN THROMBOSIS (DVT) OF RIGHT PERONEAL VEIN (MULTI): ICD-10-CM

## 2025-07-23 DIAGNOSIS — H40.9 GLAUCOMA OF BOTH EYES, UNSPECIFIED GLAUCOMA TYPE: ICD-10-CM

## 2025-07-23 DIAGNOSIS — R19.8 PERFORATED ABDOMINAL VISCUS: Primary | ICD-10-CM

## 2025-07-23 DIAGNOSIS — C50.919 CARCINOMA OF BREAST METASTATIC TO BONE, UNSPECIFIED LATERALITY: ICD-10-CM

## 2025-07-23 DIAGNOSIS — C79.51 CARCINOMA OF BREAST METASTATIC TO BONE, UNSPECIFIED LATERALITY: ICD-10-CM

## 2025-07-23 DIAGNOSIS — M19.90 ARTHRITIS: ICD-10-CM

## 2025-07-23 DIAGNOSIS — E78.2 MIXED HYPERLIPIDEMIA: ICD-10-CM

## 2025-07-23 DIAGNOSIS — D63.1 ANEMIA DUE TO STAGE 4 CHRONIC KIDNEY DISEASE TREATED WITH DARBEPOETIN: ICD-10-CM

## 2025-07-23 PROCEDURE — 1160F RVW MEDS BY RX/DR IN RCRD: CPT | Performed by: INTERNAL MEDICINE

## 2025-07-23 PROCEDURE — 1111F DSCHRG MED/CURRENT MED MERGE: CPT | Performed by: INTERNAL MEDICINE

## 2025-07-23 PROCEDURE — G2211 COMPLEX E/M VISIT ADD ON: HCPCS | Performed by: INTERNAL MEDICINE

## 2025-07-23 PROCEDURE — 99213 OFFICE O/P EST LOW 20 MIN: CPT | Performed by: INTERNAL MEDICINE

## 2025-07-23 PROCEDURE — 1159F MED LIST DOCD IN RCRD: CPT | Performed by: INTERNAL MEDICINE

## 2025-07-23 NOTE — PROGRESS NOTES
Patient ID: Arabella Springer is a 84 y.o. female.  Referring Physician: No referring provider defined for this encounter.  Primary Care Provider: Angel Luis Velazquez DO  Visit Type:  Follow Up     Virtual or Telephone Consent    An interactive audio and video telecommunication system which permits real time communications between the patient (at the originating site) and provider (at the distant site) was utilized to provide this telehealth service.   Verbal consent was requested and obtained from Arabella Springer on this date, 07/23/2025 for a telehealth visit and the patient's location was confirmed at the time of the visit.     Subjective    HPI I was recommended hospice  Was that the right thing?    Review of Systems   Constitutional:  Positive for fatigue.   HENT:  Negative.     Eyes: Negative.    Respiratory: Negative.     Cardiovascular: Negative.    Gastrointestinal: Negative.    Endocrine: Negative.    Genitourinary: Negative.     Musculoskeletal: Negative.    Skin: Negative.    Neurological: Negative.    Hematological: Negative.    Psychiatric/Behavioral: Negative.          Objective   BSA: There is no height or weight on file to calculate BSA.  There were no vitals taken for this visit.     has a past medical history of Anxiety, Atherosclerotic peripheral vascular disease with ulceration (Multi), Benign paroxysmal positional vertigo, Breast cancer, right, Central retinal vein occlusion, right eye, Cervical spinal stenosis, Cholelithiasis, Chronic anemia, Chronic kidney disease, stage IV (severe) (Multi), Current use of long term anticoagulation, Diverticulosis, Gastroesophageal reflux disease with hiatal hernia, Glaucoma, Hearing loss, Heart failure with preserved ejection fraction, History of deep vein thrombosis, History of fractured rib, History of syncope, Hydronephrosis, bilateral, Hypertension, Immunotherapy, Metastasis to bone (Multi), 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   has a past surgical history that includes Hysterectomy; Shoulder surgery; Dental surgery; Breast reconstruction (Right); Mastectomy (Right); Skin biopsy; Thoracentesis; and Humerus fracture surgery (Right).  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 Her smoking use included cigarettes. She has never used smokeless tobacco.  She  reports that she does not currently use alcohol.  She  reports no history of drug use.    Physical Exam    WBC   Date/Time Value Ref Range Status   07/19/2025 12:44 AM 3.0 (L) 4.4 - 11.3 x10*3/uL Final   07/09/2025 02:08 PM 2.7 (L) 4.4 - 11.3 x10*3/uL Final   07/02/2025 03:36 PM 3.1 (L) 4.4 - 11.3 x10*3/uL Final     WHITE BLOOD CELL COUNT   Date/Time Value Ref Range Status   02/12/2025 10:34 AM 2.8 (L) 3.8 - 10.8 Thousand/uL Final     nRBC   Date Value Ref Range Status   07/19/2025 0.0 0.0 - 0.0 /100 WBCs Final   07/09/2025   Final     Comment:     Not Measured   07/02/2025   Final     Comment:     Not Measured     RBC   Date Value Ref Range Status   07/19/2025 1.82 (L) 4.00 - 5.20 x10*6/uL Final   07/09/2025 1.93 (L) 4.00 - 5.20 x10*6/uL Final   07/02/2025 2.02 (L) 4.00 - 5.20 x10*6/uL Final     RED BLOOD CELL COUNT   Date Value Ref Range Status   02/12/2025 2.74 (L) 3.80 - 5.10 Million/uL Final     Hemoglobin   Date Value Ref Range Status   07/19/2025 6.8 (L) 12.0 - 16.0 g/dL Final   07/09/2025 7.3 (L) 12.0 - 16.0 g/dL Final   07/02/2025 7.7 (L) 12.0 - 16.0 g/dL Final      HEMOGLOBIN   Date Value Ref Range Status   02/12/2025 8.5 (L) 11.7 - 15.5 g/dL Final     Hematocrit   Date Value Ref Range Status   07/19/2025 20.3 (L) 36.0 - 46.0 % Final   07/09/2025 22.6 (L) 36.0 - 46.0 % Final   07/02/2025 23.6 (L) 36.0 - 46.0 % Final     HEMATOCRIT   Date Value Ref Range Status   02/12/2025 27.0 (L) 35.0 - 45.0 % Final     MCV   Date/Time Value Ref Range Status   07/19/2025 12:44  (H) 80 - 100 fL Final   07/09/2025 02:08  (H) 80 - 100 fL Final   07/02/2025 03:36  (H) 80 - 100 fL Final   02/12/2025 10:34 AM 98.5 80.0 - 100.0 fL Final     MCH   Date/Time Value Ref Range Status   07/19/2025 12:44 AM 37.4 (H) 26.0 - 34.0 pg Final   07/09/2025 02:08 PM 37.8 (H) 26.0 - 34.0 pg Final   07/02/2025 03:36 PM 38.1 (H) 26.0 - 34.0 pg Final   02/12/2025 10:34 AM 31.0 27.0 - 33.0 pg Final     MCHC   Date/Time Value Ref Range Status   07/19/2025 12:44 AM 33.5 32.0 - 36.0 g/dL Final   07/09/2025 02:08 PM 32.3 32.0 - 36.0 g/dL Final   07/02/2025 03:36 PM 32.6 32.0 - 36.0 g/dL Final   02/12/2025 10:34 AM 31.5 (L) 32.0 - 36.0 g/dL Final     Comment:     For adults, a slight decrease in the calculated MCHC  value (in the range of 30 to 32 g/dL) is most likely  not clinically significant; however, it should be  interpreted with caution in correlation with other  red cell parameters and the patient's clinical  condition.       RDW   Date/Time Value Ref Range Status   07/19/2025 12:44 AM 14.9 (H) 11.5 - 14.5 % Final   07/09/2025 02:08 PM 14.9 (H) 11.5 - 14.5 % Final   07/02/2025 03:36 PM 14.9 (H) 11.5 - 14.5 % Final   02/12/2025 10:34 AM 16.9 (H) 11.0 - 15.0 % Final     Platelets   Date/Time Value Ref Range Status   07/19/2025 12:44  150 - 450 x10*3/uL Final   07/09/2025 02:08  150 - 450 x10*3/uL Final   07/02/2025 03:36  150 - 450 x10*3/uL Final     PLATELET COUNT   Date/Time Value Ref Range Status   02/12/2025 10:34  140 - 400 Thousand/uL Final     MPV   Date/Time Value  Ref Range Status   02/12/2025 10:34 AM 9.8 7.5 - 12.5 fL Final   10/19/2023 08:01 AM 9.2 7.5 - 11.5 fL Final     Neutrophils %   Date/Time Value Ref Range Status   07/09/2025 02:08 PM 53.3 40.0 - 80.0 % Final   07/02/2025 03:36 PM 51.8 40.0 - 80.0 % Final   06/11/2025 03:27 PM 49.0 40.0 - 80.0 % Final     Immature Granulocytes %, Automated   Date/Time Value Ref Range Status   07/19/2025 12:44 AM 1.0 (H) 0.0 - 0.9 % Final     Comment:     Immature Granulocyte Count (IG) includes promyelocytes, myelocytes and metamyelocytes but does not include bands. Percent differential counts (%) should be interpreted in the context of the absolute cell counts (cells/UL).   07/09/2025 02:08 PM 0.0 0.0 - 0.9 % Final     Comment:     Immature Granulocyte Count (IG) includes promyelocytes, myelocytes and metamyelocytes but does not include bands. Percent differential counts (%) should be interpreted in the context of the absolute cell counts (cells/UL).   07/02/2025 03:36 PM 0.3 0.0 - 0.9 % Final     Comment:     Immature Granulocyte Count (IG) includes promyelocytes, myelocytes and metamyelocytes but does not include bands. Percent differential counts (%) should be interpreted in the context of the absolute cell counts (cells/UL).     Lymphocytes %, Manual   Date/Time Value Ref Range Status   07/19/2025 12:44 AM 12.0 13.0 - 44.0 % Final   05/15/2025 01:58 PM 40.0 13.0 - 44.0 % Final     Lymphocytes %   Date/Time Value Ref Range Status   07/09/2025 02:08 PM 35.7 13.0 - 44.0 % Final   07/02/2025 03:36 PM 37.3 13.0 - 44.0 % Final   06/11/2025 03:27 PM 40.0 13.0 - 44.0 % Final     Monocytes %, Manual   Date/Time Value Ref Range Status   07/19/2025 12:44 AM 3.0 2.0 - 10.0 % Final   05/15/2025 01:58 PM 0.0 2.0 - 10.0 % Final     Monocytes %   Date/Time Value Ref Range Status   07/09/2025 02:08 PM 7.9 2.0 - 10.0 % Final   07/02/2025 03:36 PM 6.1 2.0 - 10.0 % Final   06/11/2025 03:27 PM 7.6 2.0 - 10.0 % Final     Eosinophils %, Manual    Date/Time Value Ref Range Status   07/19/2025 12:44 AM 1.0 0.0 - 6.0 % Final   05/15/2025 01:58 PM 4.0 0.0 - 6.0 % Final     Eosinophils %   Date/Time Value Ref Range Status   07/09/2025 02:08 PM 2.3 0.0 - 6.0 % Final   07/02/2025 03:36 PM 2.9 0.0 - 6.0 % Final   06/11/2025 03:27 PM 2.4 0.0 - 6.0 % Final     Basophils %, Manual   Date/Time Value Ref Range Status   07/19/2025 12:44 AM 0.0 0.0 - 2.0 % Final   05/15/2025 01:58 PM 1.0 0.0 - 2.0 % Final     Basophils %   Date/Time Value Ref Range Status   07/09/2025 02:08 PM 0.8 0.0 - 2.0 % Final   07/02/2025 03:36 PM 1.6 0.0 - 2.0 % Final   06/11/2025 03:27 PM 1.0 0.0 - 2.0 % Final     Neutrophils Absolute   Date/Time Value Ref Range Status   07/09/2025 02:08 PM 1.42 (L) 1.60 - 5.50 x10*3/uL Final     Comment:     Percent differential counts (%) should be interpreted in the context of the absolute cell counts (cells/uL).   07/02/2025 03:36 PM 1.61 1.60 - 5.50 x10*3/uL Final     Comment:     Percent differential counts (%) should be interpreted in the context of the absolute cell counts (cells/uL).   06/11/2025 03:27 PM 1.42 (L) 1.60 - 5.50 x10*3/uL Final     Comment:     Percent differential counts (%) should be interpreted in the context of the absolute cell counts (cells/uL).     Immature Granulocytes Absolute, Automated   Date/Time Value Ref Range Status   07/19/2025 12:44 AM 0.03 0.00 - 0.50 x10*3/uL Final   07/09/2025 02:08 PM 0.00 0.00 - 0.50 x10*3/uL Final   07/02/2025 03:36 PM 0.01 0.00 - 0.50 x10*3/uL Final     Lymphocytes Absolute   Date/Time Value Ref Range Status   07/09/2025 02:08 PM 0.95 0.80 - 3.00 x10*3/uL Final   07/02/2025 03:36 PM 1.16 0.80 - 3.00 x10*3/uL Final   06/11/2025 03:27 PM 1.16 0.80 - 3.00 x10*3/uL Final     Monocytes Absolute   Date/Time Value Ref Range Status   07/09/2025 02:08 PM 0.21 0.05 - 0.80 x10*3/uL Final   07/02/2025 03:36 PM 0.19 0.05 - 0.80 x10*3/uL Final   06/11/2025 03:27 PM 0.22 0.05 - 0.80 x10*3/uL Final     Eosinophils  "Absolute   Date/Time Value Ref Range Status   07/09/2025 02:08 PM 0.06 0.00 - 0.40 x10*3/uL Final   07/02/2025 03:36 PM 0.09 0.00 - 0.40 x10*3/uL Final   06/11/2025 03:27 PM 0.07 0.00 - 0.40 x10*3/uL Final     Eosinophils Absolute, Manual   Date/Time Value Ref Range Status   07/19/2025 12:44 AM 0.03 0.00 - 0.40 x10*3/uL Final   05/15/2025 01:58 PM 0.10 0.00 - 0.40 x10*3/uL Final     Basophils Absolute   Date/Time Value Ref Range Status   07/09/2025 02:08 PM 0.02 0.00 - 0.10 x10*3/uL Final   07/02/2025 03:36 PM 0.05 0.00 - 0.10 x10*3/uL Final     Comment:     Automated WBC differential has been confirmed by manual smear review   06/11/2025 03:27 PM 0.03 0.00 - 0.10 x10*3/uL Final     Basophils Absolute, Manual   Date/Time Value Ref Range Status   07/19/2025 12:44 AM 0.00 0.00 - 0.10 x10*3/uL Final   05/15/2025 01:58 PM 0.02 0.00 - 0.10 x10*3/uL Final       No components found for: \"PT\"  No results found for: \"APTT\"  Medication Documentation Review Audit       Reviewed by Andrew Mcfarland MD (Physician) on 07/22/25 at 1450      Medication Order Taking? Sig Documenting Provider Last Dose Status   acetaminophen (Tylenol) 500 mg tablet [id number] Yes Take 2 tablets (1,000 mg) by mouth 3 times a day as needed for mild pain (1 - 3) or moderate pain (4 - 6). Historical Provider, MD Unknown Active     Discontinued 07/19/25 0806   amoxicillin-clavulanate (Augmentin) 500-125 mg tablet [id number]  Take 1 tablet by mouth 2 times a day for 7 days. Brandon Roman MD  Active   Discontinued 07/19/25 1628   Discontinued 07/19/25 0806   Patient not taking:  Discontinued 07/22/25 1449   Patient not taking:  Discontinued 07/19/25 1628   brimonidine-timoloL (Combigan) 0.2-0.5 % ophthalmic solution [id number] Yes Administer 1 drop into both eyes every 12 hours. Historical Provider, MD 7/18/2025 Evening Active   calcium carbonate-vitamin D3 600 mg-10 mcg (400 unit) tablet [id number] Yes Take 1 tablet by mouth once daily. Historical Provider, MD " Past Week Active   Discontinued 07/19/25 1628   Discontinued 07/19/25 1628   esomeprazole magnesium 20 mg tablet,delayed release (DR/EC) [id number] Yes Take 1 capsule by mouth once daily in the morning. Take before meals. Historical Provider, MD Past Week Active   Discontinued 07/19/25 1628   Discontinued 07/19/25 1628   Discontinued 07/19/25 1628   Patient not taking:  Discontinued 07/22/25 1450   Patient not taking:  Discontinued 07/22/25 1450   LORazepam (Ativan) 0.5 mg tablet [id number] Yes Take 1 tablet (0.5 mg) by mouth every 2 hours if needed for anxiety (anxiety related to radiology tests) for up to 5 doses. Rhoda Santana APRN-CNP Unknown Active   Patient not taking:  Discontinued 07/19/25 1628   multivitamin tablet [id number] Yes Take 1 tablet by mouth once daily. Historical Provider, MD Past Week Active   ondansetron (Zofran) 8 mg tablet [id number] Yes Take 1 tablet (8 mg) by mouth every 8 hours if needed for nausea or vomiting. Andrew Mcfarland MD Unknown Active   Discontinued 07/19/25 1628   polymyxin B sulf-trimethoprim (Polytrim) ophthalmic solution [id number]  Administer 1 drop into the left eye 4 times a day for 5 days. David Dale MD  Active   pravastatin (Pravachol) 10 mg tablet [id number] Yes Take 1 tablet (10 mg) by mouth once daily. Abraham Mohan,  Past Week Active   prochlorperazine (Compazine) 10 mg tablet [id number] Yes Take 1 tablet (10 mg) by mouth every 6 hours if needed for nausea or vomiting. Andrew Mcfarland MD Unknown Active   Rhopressa 0.02 % drops opthalmic solution [id number] Yes Administer 1 drop into the right eye once daily at bedtime. Historical Provider, MD 7/18/2025 Active   tamsulosin (Flomax) 0.4 mg 24 hr capsule [id number] Yes Take 1 capsule (0.4 mg) by mouth once daily. Historical Provider, MD Past Week Active   Patient not taking:  Discontinued 07/19/25 1628                   Assessment/Plan    1) stage IV breast cancer  -has high TMB  -has BRCA1  variant of uncertain  significance  -also has PIK3CA mutation  -pembrolizumab has been on hold since she developed a diffuse body rash, course of prednisone did not help, in fact a week after finishing prednisone, the rash got worse--papules have become confluent red/pink areas  -she did see her dermatologist (Dr Wolff)--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, and she did have a punch biopsy done--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  -now off steroids  -CT scan done on 4/3/2024 reviewed: interval worsening of bilateral pleural effusions, right >left; multiple scattered <0.5 mm pulmonary nodules bilateral lungs not changed; interval development of several branching opacities involving left lung apex; there are no new worrisome hepatic lesions; mild right sided hydronephrosis; diffuse sclerosis of axial and appendicular skeleton  -has been more short of breath recently--was evaluated in the ER at [facility] on 8/4--was told she had bilateral pleural effusions, which are not actually new  -right sided pleural effusion is already seen on PET scan done in 8/2023  -she had an echo done in June which was read as normal, [facility] did not perform a new one, cardiologist on 8/21 told the patient that she did not have CHF and that these effusions were likely malignant; she was started on low dose lasix which barely helped with the leg edema--and so dose was bumped up; she was recommended by pulmonology consult to undergo thoracentesis and she declined to have it done  -since effusions are not new, and actually were already noted over one year ago, a normal echo does NOT completely rule out well-compensated CHF  -PET done on 8/16/2024 reviewed--no concerning pulmonary nodule; moderate-to-large bilateral pleura effusions, right greater than left; no FDG avid mediastinal, hilar or axillary lymphadenopathy; s/p right axillary ike dissection; s/p right mastectomy and reconstruction; no FDG avid lymphadenopathy  in the abdomen and pelvis; bilateral hydronephrosis; interval improvement of FDG avid widespread bone metastases throughout the axial and appendicular skeleton many of which are non FDG avid ; residual disease noted in bilateral femur (SUV 3.6), pelvis (SUV 2.4), bilateral humerus (SUV 3.3), bilateral clavicles (SUV 2/9)  -there is no FDG activity in her thorax/pleura, making the effusions less likely to be of malignant origin, and given bilateral nature--more likely cardiac in origin  -she had thoracentesis done on 9/24/2024--with removal of 700 ml yellow fluid; cytology was negative  -here for interval followup  -CT scan done on 12/9/2024 reviewed--moderate bilateral pleural effusions with atelectasis, right greater than left; no concerning lung nodule; no mediastinal, hilar or axillary lymphadenopathy; postsurgical changes of right mastectomy with right breast implants;  moderate hydronephrosis left greater than right increased from prior exam; small to moderate volume ascites; new peritoneal nodular thickening (33 mm peritoneal implant in anterior deep pelvis); additional areas of peritoneal nodular thickening and enhancement in deep pelvis; no abdominopelvic lymphadenopathy; similar appearance of diffuse sclerotic lesions throughout the axial and appendicular skeleton; chronic bilateral rib fractures are noted; plate and screw fixation of the right humerus  -bone scan done on 12/9/2024 reviewed similar appearance of diffusely increased radiotracer uptake throughout the axial and appendicular skeleton corresponding with widespread sclerotic osseous lesions on CT  --labs done on 12/11/2024 included CBC, COMP, CA 27.29  -results reviewed--wbc 4.6, hgb 9.1 plt 211,000, creatinine 1.04, calcium 9.0, albumin 4.1 AST 18, ALT 12  CA 27.29 295  -benefits, risks, potential morbidity related to xgeva were reviewed with Arabella and she provided informed consent to proceed  -she went on to receive xgeva 120 mg  subcutaneous  -as there is now evidence of progression of disease (new peritoneal metastases), she is failing AI, and I have recommended therapeutic switch  -since her tumor has the PIK3CA mutation, I have advised switching to faslodex + palbociclib + Inavolisib  -benefits, risks, potential morbidity related to faslodex + palbociclib + inavolisib were reviewed with Arabella and she signed informed consent to proceed  -on days 1 and 15 (and then Q28 days) she will receive faslodex 500 mg intramuscular  -she will also take palbociclib 125 mg PO once daily for 21 days on then 7 days off  -she will also take inavolisib 9 mg PO once daily  -here for interval followup  -shortly after starting the new combination, she was admitted to [facility] from 1/21 to 1/23/2025 for mucositis and electrolyte abnormalities  -inavolisib was held  -she was then readmitted to [facility] from 2/5 to 2/72025 for shortness of breath, thoracentesis was done on the left side with removal of 750 ml of fluid but none was sent for cytology  -her mucositis eventually recovered  -she received inavoliisb 3 mg in hand a couple days ago and started it; mucositis has not returned but this morning she noticed in the mirror 2 maculopapular lesions--one on right cheek, one near left nares  -she was at physical therapy a couple weeks ago--and the therapist noted a slight asymmetry in her ability to rotate her ankles and asked Arabella if she has ever had a stroke--which quite alarmed her  -labs done on 2/20/2025 included CBC + COMP + CA 27.29  -results reviewed--creatinine 1.22, calcium 9.1, alk phos 44, AST 15, ALT 8, wbc 2.66, hgb 8.3, plt 218,000, , CA 27.29 295  -here for interval followup  -she has taken inavolisib 3 mg daily for an additional 2 weeks--has not had recurrent mucositis, no new dermatologic issues  -continues to take lasix 20 mg every other day--leg edema and shortness of breath improved and stable  -here for interval followup  -had a tooth  extracted 2 days ago--tooth was also causing a canker sore on inside of her cheek right next to the tooth--so will need to hold xgeva today  -was recently hospitalized at [place] for edema/fluid overload; hgb trended all the way down to 7.0; very sleepy during daytime  -labs done on 5/15/2025 included CBC +  COMP + CA 27.29  -results reviewed--wbc 2.4, hgb 7.9, plt 171,000, ANC 1220, creatinine 1.38, calcium 8.2, alk phos 45, AST 13, total bili 0.3, ALT 7 CA 27.29 2322.6  -CT scan done on 5/15/2025 reviewed--moderate bilateral pleural effusions mildly decreased from prior; no concerning lung nodule; no mediastinal, hilar or axillary lymphadenopathy; hepatic lesions are overall stable after accounting for differences in contrast bolus timing; 11 mm right hepatic nodule; no new liver lesion; bilateral hydroureteronephrosis has improved likely a secondary sign of improved nonmeasurable tumor burden; small volume ascites in the deep pelvis has improved which is consistent with improved nonmeasurable tumor burden; interval decrease in size of dominant 23 mm peritoneal implant in the pelvis which has shifted and is now more inferiorly located due to decreased ascites; decreased but persistent nonmeasurable peritoneal nodular thickening is seen elsewhere in left upper quadrant; no abdominopelvic lymphadenopathy; diffuse predominantly sclerotic lesions are seen throughout the axial and appendicular skeleton with overall increased density of lesions; chronic bilateral rib fractures are again seen  -here for interval followup  -was recently hospitalized at [facility] for acute on top of chronic renal failure in part secondary to hydronephrosis secondary to urinary retention  -has an indwelling vasquez with leg bag; supposed to see urology for voiding trial in a few days  -has not been 6 weeks since last tooth extraction, so will continue holding off on xgeva  -here for interval followup  -she was hospitalized at [facility] recently and  was discharged on 6/7/2025--she was instructed to double up on her lasix  -labs done on 7/9/2025 included CBC + COMP + CA 27.29  -results reviewed--wbc 2.7, hgb 7.3, plt 150,000, ANC 1420, creatinine 1.82, calcium 7.6, alk phos 38, AST 12, total bili 0.4, ALT 7, CA 27.29 163.7  -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; she would ultimately be helped with JOSSE (such as darbepoietin), however, all ESAs have a black box warning that they can be associated with cancer growth/progression-discussed again pros and cons--she wished to proceed with starting darbepoietin  -benefits, risks, potential morbidity related to darbepoietin were reviewed with Arabella and she provided informed consent to proceed  -she will start darbepoietin 60 mcg subcutaneously Q14 days  -creatinine has bumped up--most likely secondary to uptitration of lasix  -however, even after correction of calcium, she is too hypocalcemic--will not be getting xgeva today  -she will continue to take palbociclib 125 mg PO once daily for 21 days on then 7 days off  -she will continue to take inavolisib 3 mg PO once daily for 21 days on then 7 days off  -due for  faslodex (500 mg intramuscularly) today  -benefits, risks, potential morbidity related to faslodex were reviewed with Arabella and she provided informed consent to proceed  -she went on to receive faslodex 500 mg intramuscularly  -cleared her to stop eliquis  (duplex of legs done on 4/30/2025 showed no evidence of DVT in either leg)  -will see her again in 4 weeks  -on 7/18/2025 she went to the [place] ED for anorexia and diarrhea x 1 week  -CT scan done on 7/19/2025 showed moderate bilateral pleural effusions not significantly changed from prior; no focal hepatic lesion is seen; bilateral hydronephrosis not significantly changed; no large or small bowel distention or wall thickening; no pathologically enlarged lymph nodes are seen; there is moderate volume pneumoperitoneum concerning for  perforated viscus; there is stable groundglass opacity involving the omentum with mild associated peritoneal thickening; diffuse sclerotic osseous metastases   -she was seen by surgery (Dr Kenny) who speculated that her perforation could have been a result of known diverticular disease, side effect of chemo or erosion of peritoneal implants into the GI tract, and that operative intervention likely would have resulted in having to create an ostomy in addition to prolonged wound care; she was informed that she would be at high risk for sepsis if no intervention is undertaken; Arabella and family declined surgical intervention and has transitioned to home hospice  -Arabella feels fatigued but is not experiencing any abdominal pain; still moving bowels regularly  -her concern is that the hospice services have discontinued her breast cancer medications  -explained to Arabella that the goal of hospice/palliative care is mainly preservation of quality of life and so medications that will have no meaningful impact on the condition that led to referral to hospice (namely, the bowel perforation) are usually discontinued  -continuing to take her breast cancer medications would not in any way help the bowel perforation, especially if there was a chance that the medications could have contributed to the bowel perforation  -she appears very comfortable right now  -there is a small chance that bowel perforations heal/seal themselves spontaneously  -therefore they are comfortable with remaining on home hospice care for now  -family will update me on her condition/status in about 2 weeks--if subjectively better at that time, then may consider repeating abdominal CT scan     2) bone metastases  -secondary to metastatic breast cancer  -receives xgeva Q4 weeks     3) anemia  -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  -hgb slowly improving     4) hyperlipidemia  -on pravastatin     5) arthritis  -on tylenol PRN  -on diclofenac  PRN  -received steroid injections into her knees and she feels so much better, so she wants to receive them again     6) hypertension  -on amlodipine  -on losartan-HCTZ     7) GERD  -on nexium     8) glaucoma  -on combigan eyedrops     9) DVT  -has had chronic leg edema for years  -recently had an injection into her knee  -she saw pulmonologist for first time on 4/14/2025 who ordered doppler  -doppler showed acute occlusive DVT in gastrocnemius veins in calf vein; rest of right leg is negative for DVT; gastrocnemius vein thrombus is seen at mid calf; in left leg there is no evidence of acute DVT   -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--she should continue on eliquis for now (still on loading period- 10 mg BID), will repeat doppler in 6-8 weeks; given that she has stage IV cancer, likely will advise her to remain on eliquis long term, but perhaps to transition to maintenance dosing  -4/30/2025 doppler done in [facility] showed in right leg no evidence of acute DVT; in left leg no evidence of acute DVT  -she is in need of refill of eliquis--will refill with 2.5 mg BID       Problem List Items Addressed This Visit    None           Andrew Mcfarland MD                                [1]   Family History  Problem Relation Name Age of Onset    Cancer Mother Cherelle Bonilalrs     Heart failure Mother Cherelle Grewal     Heart failure Father Giovany Grewal     Colon cancer Sister      Cancer Sister Suki alatorre sanchez     Breast cancer Daughter

## 2025-07-25 ENCOUNTER — APPOINTMENT (OUTPATIENT)
Dept: HEMATOLOGY/ONCOLOGY | Facility: CLINIC | Age: 85
End: 2025-07-25
Payer: MEDICARE

## 2025-07-25 DIAGNOSIS — C79.51 CARCINOMA OF RIGHT BREAST METASTATIC TO BONE: ICD-10-CM

## 2025-07-25 DIAGNOSIS — C50.911 CARCINOMA OF RIGHT BREAST METASTATIC TO BONE: ICD-10-CM

## 2025-07-28 ENCOUNTER — SPECIALTY PHARMACY (OUTPATIENT)
Dept: PHARMACY | Facility: CLINIC | Age: 85
End: 2025-07-28

## 2025-07-28 ENCOUNTER — APPOINTMENT (OUTPATIENT)
Dept: HEMATOLOGY/ONCOLOGY | Facility: CLINIC | Age: 85
End: 2025-07-28
Payer: MEDICARE

## 2025-08-02 ASSESSMENT — ENCOUNTER SYMPTOMS
HEMATOLOGIC/LYMPHATIC NEGATIVE: 1
FATIGUE: 1
NEUROLOGICAL NEGATIVE: 1
PSYCHIATRIC NEGATIVE: 1
CARDIOVASCULAR NEGATIVE: 1
GASTROINTESTINAL NEGATIVE: 1
RESPIRATORY NEGATIVE: 1
ENDOCRINE NEGATIVE: 1
MUSCULOSKELETAL NEGATIVE: 1
EYES NEGATIVE: 1

## 2025-08-05 ENCOUNTER — PATIENT OUTREACH (OUTPATIENT)
Dept: PRIMARY CARE | Facility: CLINIC | Age: 85
End: 2025-08-05
Payer: MEDICARE

## 2025-08-06 ENCOUNTER — SPECIALTY PHARMACY (OUTPATIENT)
Dept: PHARMACY | Facility: CLINIC | Age: 85
End: 2025-08-06

## 2025-08-08 ENCOUNTER — APPOINTMENT (OUTPATIENT)
Dept: HEMATOLOGY/ONCOLOGY | Facility: CLINIC | Age: 85
End: 2025-08-08
Payer: MEDICARE

## 2025-08-11 DIAGNOSIS — E78.2 MIXED HYPERLIPIDEMIA: ICD-10-CM

## 2025-08-11 RX ORDER — PRAVASTATIN SODIUM 10 MG/1
10 TABLET ORAL DAILY
Qty: 90 TABLET | Refills: 0 | Status: SHIPPED | OUTPATIENT
Start: 2025-08-11

## 2025-08-13 ENCOUNTER — APPOINTMENT (OUTPATIENT)
Dept: PRIMARY CARE | Facility: CLINIC | Age: 85
End: 2025-08-13
Payer: MEDICARE

## 2025-08-22 ENCOUNTER — APPOINTMENT (OUTPATIENT)
Dept: HEMATOLOGY/ONCOLOGY | Facility: CLINIC | Age: 85
End: 2025-08-22
Payer: MEDICARE

## 2025-09-05 ENCOUNTER — APPOINTMENT (OUTPATIENT)
Dept: HEMATOLOGY/ONCOLOGY | Facility: CLINIC | Age: 85
End: 2025-09-05
Payer: MEDICARE

## 2026-04-14 ENCOUNTER — APPOINTMENT (OUTPATIENT)
Dept: PRIMARY CARE | Facility: CLINIC | Age: 86
End: 2026-04-14
Payer: MEDICARE